# Patient Record
Sex: FEMALE | Race: BLACK OR AFRICAN AMERICAN | NOT HISPANIC OR LATINO | Employment: UNEMPLOYED | URBAN - METROPOLITAN AREA
[De-identification: names, ages, dates, MRNs, and addresses within clinical notes are randomized per-mention and may not be internally consistent; named-entity substitution may affect disease eponyms.]

---

## 2017-01-10 ENCOUNTER — TELEPHONE (OUTPATIENT)
Dept: INTERNAL MEDICINE | Facility: CLINIC | Age: 72
End: 2017-01-10

## 2017-01-10 RX ORDER — ALPRAZOLAM 0.5 MG/1
TABLET ORAL
Qty: 60 TABLET | Refills: 5 | Status: SHIPPED | OUTPATIENT
Start: 2017-01-10 | End: 2017-08-07 | Stop reason: SDUPTHER

## 2017-01-10 NOTE — TELEPHONE ENCOUNTER
----- Message from Veronica Johnson sent at 1/10/2017 10:51 AM CST -----  Contact: Patient  Patient is requesting a refill on [facility] pharmacy. Please call patient back if needed at [phone]. Thank you

## 2017-02-07 ENCOUNTER — OFFICE VISIT (OUTPATIENT)
Dept: INTERNAL MEDICINE | Facility: CLINIC | Age: 72
End: 2017-02-07
Payer: MEDICARE

## 2017-02-07 VITALS
TEMPERATURE: 97 F | DIASTOLIC BLOOD PRESSURE: 80 MMHG | HEART RATE: 84 BPM | SYSTOLIC BLOOD PRESSURE: 146 MMHG | BODY MASS INDEX: 29.9 KG/M2 | WEIGHT: 162.5 LBS | HEIGHT: 62 IN

## 2017-02-07 DIAGNOSIS — D56.0 ALPHA THALASSEMIA: Chronic | ICD-10-CM

## 2017-02-07 DIAGNOSIS — I70.0 ATHEROSCLEROSIS OF AORTA: Chronic | ICD-10-CM

## 2017-02-07 DIAGNOSIS — F41.9 CHRONIC ANXIETY: Primary | ICD-10-CM

## 2017-02-07 DIAGNOSIS — F32.A DEPRESSION, UNSPECIFIED DEPRESSION TYPE: Chronic | ICD-10-CM

## 2017-02-07 DIAGNOSIS — R63.4 LOSS OF WEIGHT: ICD-10-CM

## 2017-02-07 PROCEDURE — 99214 OFFICE O/P EST MOD 30 MIN: CPT | Mod: S$GLB,,, | Performed by: FAMILY MEDICINE

## 2017-02-07 PROCEDURE — 3079F DIAST BP 80-89 MM HG: CPT | Mod: S$GLB,,, | Performed by: FAMILY MEDICINE

## 2017-02-07 PROCEDURE — 1157F ADVNC CARE PLAN IN RCRD: CPT | Mod: S$GLB,,, | Performed by: FAMILY MEDICINE

## 2017-02-07 PROCEDURE — 3077F SYST BP >= 140 MM HG: CPT | Mod: S$GLB,,, | Performed by: FAMILY MEDICINE

## 2017-02-07 PROCEDURE — 1159F MED LIST DOCD IN RCRD: CPT | Mod: S$GLB,,, | Performed by: FAMILY MEDICINE

## 2017-02-07 PROCEDURE — 99999 PR PBB SHADOW E&M-EST. PATIENT-LVL II: CPT | Mod: PBBFAC,,, | Performed by: FAMILY MEDICINE

## 2017-02-07 PROCEDURE — 1160F RVW MEDS BY RX/DR IN RCRD: CPT | Mod: S$GLB,,, | Performed by: FAMILY MEDICINE

## 2017-02-07 PROCEDURE — 99499 UNLISTED E&M SERVICE: CPT | Mod: S$GLB,,, | Performed by: FAMILY MEDICINE

## 2017-02-07 NOTE — MR AVS SNAPSHOT
[place] [facility]  [address] [address] [address]  Phone: [phone]                  Tea Ring   2017 9:00 AM   Office Visit    Description:  Female : 1945   Provider:  Tom Fernandez MD   Department:  [place] [facility]           Diagnoses this Visit        Comments    Chronic anxiety    -  Primary     Depression, unspecified depression type         Loss of weight         Alpha thalassemia         Atherosclerosis of aorta                To Do List           Future Appointments        Provider Department Dept Phone    3/14/2017 9:20 AM Tom Fernandez MD [place] [facility] [phone]      Goals (5 Years of Data)     None      Follow-Up and Disposition     Return in about 4 weeks (around 3/7/2017).      [facility]     [facility] On Call Nurse Care Line -  Assistance  Registered nurses in the [facility] Call Center provide clinical advisement, health education, appointment booking, and other advisory services.  Call for this free service at [phone].             Medications           Message regarding Medications     Verify the changes and/or additions to your medication regime listed below are the same as discussed with your clinician today.  If any of these changes or additions are incorrect, please notify your healthcare provider.             Verify that the below list of medications is an accurate representation of the medications you are currently taking.  If none reported, the list may be blank. If incorrect, please contact your healthcare provider. Carry this list with you in case of emergency.           Current Medications     albuterol (PROVENTIL HFA) 90 mcg/actuation inhaler Inhale 2 puffs into the lungs every 6 (six) hours as needed for Wheezing.    ALBUTEROL INHL Inhale into the lungs.    alendronate (FOSAMAX) 70 MG tablet Take 1 tablet once weekly in the morning with a full glass of water on an empty stomach. Do not lie down  "for at least 30 minutes afterwards.    alprazolam (XANAX) 0.5 MG tablet TAKE ONE TABLET BY MOUTH TWICE A DAY AS NEEDED FOR FOR ANXIETY    amitriptyline (ELAVIL) 50 MG tablet TAKE 1 TABLET BY MOUTH AT BEDTIME.    betamethasone valerate 0.1% (VALISONE) 0.1 % Crea APPLY TOPICALLY TWICE DAILY.    blood pressure monitor (BLOOD PRESSURE KIT) Kit Record daily    diltiazem (TIAZAC) 360 MG CpSR Take 1 capsule (360 mg total) by mouth once daily.    hydroquinone 4 % Crea Apply topically 2 (two) times daily.    mirtazapine (REMERON) 15 MG tablet Take 1 tablet (15 mg total) by mouth every evening.    oxybutynin (DITROPAN) 5 MG Tab Take 1 tablet (5 mg total) by mouth 3 (three) times daily.    SUPREP BOWEL PREP KIT 17.5-3.13-1.6 gram SolR Use as directed           Clinical Reference Information           Your Vitals Were     BP Pulse Temp    146/80 (BP Location: Right arm, Patient Position: Sitting, BP Method: Manual) 84 97.2 °F (36.2 °C) (Tympanic)    Height Weight BMI    5' 2" (1.575 m) 73.7 kg (162 lb 7.7 oz) 29.72 kg/m2      Blood Pressure          Most Recent Value    BP  (!)  146/80      Allergies as of 2/7/2017     Duloxetine      Immunizations Administered on Date of Encounter - 2/7/2017     None      Language Assistance Services     ATTENTION: Language assistance services are available, free of charge. Please call [phone].      ATENCIÓN: Si alexei humphrey, tiene a sanchez disposición servicios gratuitos de asistencia lingüística. Llame al [phone].     [facility] Ý: N?u b?n nói Ti?ng Vi?t, có các d?ch v? h? tr? ngôn ng? mi?n phí dành cho b?n. G?i s? [phone].         [facility] complies with applicable Federal civil rights laws and does not discriminate on the basis of race, color, national origin, age, disability, or sex.        "

## 2017-02-07 NOTE — PROGRESS NOTES
Subjective:       Patient ID: Tea Ring is a 71 y.o. female.    Chief Complaint: Multiple issues see below    HPI chronic anxiety/depression: she self escalated to tid xnax. D/wd avoiding. this  attrib intermitt wt loss to anxiety  Htn: d/wd taking qhs aleve for knee. Recent injxn; d/wd tyl instead of aleve  Atherosclerosis of aorta; stable x htn  Alpha thalassemia asympt    Past Medical History   Diagnosis Date    Alpha thalassemia     Asthma     Chronic anxiety      years tid xanax 1mg    Chronic knee pain     Chronic pain of left ankle     Clotting disorder     Cutaneous lupus erythematosus      dr henry derm    Depression     Depression      dr radha hughes previously    Ex-smoker      quit fall 1    Hypertension     Osteopenia  rec     Past Surgical History   Procedure Laterality Date    Knee arthroscopy       both knees twice    Back surgery      Ankle surgery Left      History reviewed. No pertinent family history.  Social History     Social History    Marital status:      Spouse name:     Number of children: 3    Years of education: N/A     Social History Main Topics    Smoking status: Former Smoker     Packs/day: 1.00     Years: 30.00     Types: Cigarettes    Smokeless tobacco: None      Comment: smoke last cigarette 9/15    Alcohol use Yes    Drug use: No    Sexual activity: No     Other Topics Concern    None     Social History Narrative       Review of Systems  Cardiovascular: no chest pain  Chest: no shortness of breath  Abd: no abd pain  Remainder review of systems negative    Objective:      Physical Exam  cvrrr    Assessment:       1. Chronic anxiety    2. Depression, unspecified depression type    3. Loss of weight    4. Alpha thalassemia    5. Atherosclerosis of aorta        Plan:       **no intervnt needed for Alpha thalassemia, ather aorta x nl bp  F/u one month htn, wt, mood  dont inc xnax beyond bid*    Defers counsling but considering speaking w    If wt loss cont then lab etc

## 2017-02-11 ENCOUNTER — HOSPITAL ENCOUNTER (EMERGENCY)
Facility: HOSPITAL | Age: 72
Discharge: HOME | End: 2017-02-11
Attending: EMERGENCY MEDICINE
Payer: MEDICARE

## 2017-02-11 VITALS
BODY MASS INDEX: 29.81 KG/M2 | TEMPERATURE: 99 F | OXYGEN SATURATION: 97 % | HEIGHT: 62 IN | HEART RATE: 75 BPM | SYSTOLIC BLOOD PRESSURE: 171 MMHG | DIASTOLIC BLOOD PRESSURE: 93 MMHG | WEIGHT: 162 LBS | RESPIRATION RATE: 18 BRPM

## 2017-02-11 DIAGNOSIS — R07.89 ANTERIOR CHEST WALL PAIN: Primary | ICD-10-CM

## 2017-02-11 DIAGNOSIS — L93.2 CUTANEOUS LUPUS ERYTHEMATOSUS: ICD-10-CM
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
## 2017-02-11 PROCEDURE — 25000003 PHARM REV CODE 250: Performed by: EMERGENCY MEDICINE

## 2017-02-11 PROCEDURE — 99283 EMERGENCY DEPT VISIT LOW MDM: CPT

## 2017-02-11 RX ORDER — HYDROCODONE BITARTRATE AND ACETAMINOPHEN 5; 325 MG/1; MG/1
1 TABLET ORAL EVERY 6 HOURS PRN
Qty: 15 TABLET | Refills: 0 | Status: SHIPPED | OUTPATIENT
Start: 2017-02-11 | End: 2017-12-07

## 2017-02-11 RX ORDER — HYDROCODONE BITARTRATE AND ACETAMINOPHEN 5; 325 MG/1; MG/1
1 TABLET ORAL
Status: DISCONTINUED | OUTPATIENT
Start: 2017-02-11 | End: 2017-02-11 | Stop reason: HOSPADM

## 2017-02-11 RX ORDER — NAPROXEN 500 MG/1
500 TABLET ORAL
Status: DISCONTINUED | OUTPATIENT
Start: 2017-02-11 | End: 2017-02-11
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
## 2017-02-11 NOTE — ED NOTES
Pt stable, in NAD, and states no further needs at this time. MD aware of vitals ok to d/c. Pt to be d/c'd home.
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
## 2017-02-28 ENCOUNTER — PATIENT OUTREACH (OUTPATIENT)
Dept: ADMINISTRATIVE | Facility: HOSPITAL | Age: 72
End: 2017-02-28

## 2017-03-21 ENCOUNTER — OFFICE VISIT (OUTPATIENT)
Dept: INTERNAL MEDICINE | Facility: CLINIC | Age: 72
End: 2017-03-21
Payer: MEDICARE

## 2017-03-21 VITALS
DIASTOLIC BLOOD PRESSURE: 88 MMHG | HEIGHT: 62 IN | WEIGHT: 166.25 LBS | OXYGEN SATURATION: 96 % | BODY MASS INDEX: 30.59 KG/M2 | TEMPERATURE: 97 F | HEART RATE: 96 BPM | SYSTOLIC BLOOD PRESSURE: 130 MMHG

## 2017-03-21 DIAGNOSIS — I10 ESSENTIAL HYPERTENSION: Chronic | ICD-10-CM

## 2017-03-21 DIAGNOSIS — Z23 NEED FOR VACCINATION FOR PNEUMOCOCCUS: ICD-10-CM

## 2017-03-21 DIAGNOSIS — F41.9 CHRONIC ANXIETY: Primary | ICD-10-CM

## 2017-03-21 PROCEDURE — 3079F DIAST BP 80-89 MM HG: CPT | Mod: S$GLB,,, | Performed by: FAMILY MEDICINE

## 2017-03-21 PROCEDURE — 90732 PPSV23 VACC 2 YRS+ SUBQ/IM: CPT | Mod: S$GLB,,, | Performed by: FAMILY MEDICINE

## 2017-03-21 PROCEDURE — 1157F ADVNC CARE PLAN IN RCRD: CPT | Mod: S$GLB,,, | Performed by: FAMILY MEDICINE

## 2017-03-21 PROCEDURE — 1159F MED LIST DOCD IN RCRD: CPT | Mod: S$GLB,,, | Performed by: FAMILY MEDICINE

## 2017-03-21 PROCEDURE — 99999 PR PBB SHADOW E&M-EST. PATIENT-LVL III: CPT | Mod: PBBFAC,,, | Performed by: FAMILY MEDICINE

## 2017-03-21 PROCEDURE — G0009 ADMIN PNEUMOCOCCAL VACCINE: HCPCS | Mod: S$GLB,,, | Performed by: FAMILY MEDICINE

## 2017-03-21 PROCEDURE — 99213 OFFICE O/P EST LOW 20 MIN: CPT | Mod: S$GLB,,, | Performed by: FAMILY MEDICINE

## 2017-03-21 PROCEDURE — 99499 UNLISTED E&M SERVICE: CPT | Mod: S$GLB,,, | Performed by: FAMILY MEDICINE

## 2017-03-21 PROCEDURE — 3075F SYST BP GE 130 - 139MM HG: CPT | Mod: S$GLB,,, | Performed by: FAMILY MEDICINE

## 2017-03-21 PROCEDURE — 1160F RVW MEDS BY RX/DR IN RCRD: CPT | Mod: S$GLB,,, | Performed by: FAMILY MEDICINE

## 2017-03-21 NOTE — MR AVS SNAPSHOT
[facility]  [address] [address] [address]  Phone: [phone]                  Tea Ring   3/21/2017 2:00 PM   Office Visit    Description:  Female : 1945   Provider:  Tom Fernandez MD   Department:  [facility]           Diagnoses this Visit        Comments    Chronic anxiety    -  Primary     Essential hypertension                To Do List           Future Appointments        Provider Department Dept Phone    2017 9:30 AM [facility] [place] LABORATORY [facility] [phone]      Goals (5 Years of Data)     None      [facility]     [facility] Care [facility] -  Assistance  Registered nurses in the [facility] provide clinical advisement, health education, appointment booking, and other advisory services.  Call for this free service at [phone].             Medications           Message regarding Medications     Verify the changes and/or additions to your medication regime listed below are the same as discussed with your clinician today.  If any of these changes or additions are incorrect, please notify your healthcare provider.             Verify that the below list of medications is an accurate representation of the medications you are currently taking.  If none reported, the list may be blank. If incorrect, please contact your healthcare provider. Carry this list with you in case of emergency.           Current Medications     albuterol (PROVENTIL HFA) 90 mcg/actuation inhaler Inhale 2 puffs into the lungs every 6 (six) hours as needed for Wheezing.    ALBUTEROL INHL Inhale into the lungs.    alendronate (FOSAMAX) 70 MG tablet Take 1 tablet once weekly in the morning with a full glass of water on an empty stomach. Do not lie down for at least 30 minutes afterwards.    alprazolam (XANAX) 0.5 MG tablet TAKE ONE TABLET BY MOUTH TWICE A DAY AS NEEDED FOR FOR ANXIETY    amitriptyline (ELAVIL) 50 MG tablet TAKE  "1 TABLET BY MOUTH AT BEDTIME.    betamethasone valerate 0.1% (VALISONE) 0.1 % Crea APPLY TOPICALLY TWICE DAILY.    blood pressure monitor (BLOOD PRESSURE KIT) Kit Record daily    diltiazem (TIAZAC) 360 MG CpSR Take 1 capsule (360 mg total) by mouth once daily.    hydrocodone-acetaminophen 5-325mg (NORCO) 5-325 mg per tablet Take 1 tablet by mouth every 6 (six) hours as needed for Pain (take at night as needed to help you rest).    hydroquinone 4 % Crea Apply topically 2 (two) times daily.    mirtazapine (REMERON) 15 MG tablet Take 1 tablet (15 mg total) by mouth every evening.    oxybutynin (DITROPAN) 5 MG Tab Take 1 tablet (5 mg total) by mouth 3 (three) times daily.    SUPREP BOWEL PREP KIT 17.5-3.13-1.6 gram SolR Use as directed           Clinical Reference Information           Your Vitals Were     BP Pulse Temp Height    130/88 (BP Location: Left arm, Patient Position: Sitting, BP Method: Manual) 96 97.4 °F (36.3 °C) (Tympanic) 5' 2" (1.575 m)    Weight SpO2 BMI    75.4 kg (166 lb 3.6 oz) 96% 30.4 kg/m2      Blood Pressure          Most Recent Value    BP  130/88      Allergies as of 3/21/2017     Duloxetine      Immunizations Administered on Date of Encounter - 3/21/2017     Name Date Dose VIS Date Route    Pneumococcal Conjugate - 13 Valent  Incomplete 0.5 mL 11/5/2015 Intramuscular      Orders Placed During Today's Visit      Normal Orders This Visit    Pneumococcal Conjugate Vaccine (13 Valent) (IM)     Future Labs/Procedures Expected by Expires    Basic metabolic panel  9/17/2017 3/21/2018    Lipid panel  9/17/2017 3/21/2018      Language Assistance Services     ATTENTION: Language assistance services are available, free of charge. Please call [phone].      ATENCIÓN: Si habla kam, tiene a sanchez disposición servicios gratuitos de asistencia lingüística. Llame al [phone].     CHÚ Ý: N?u b?n nói Ti?ng Vi?t, có các d?ch v? h? tr? ngôn ng? mi?n phí dành cho b?n. G?i s? [phone].         " [facility] complies with applicable Federal civil rights laws and does not discriminate on the basis of race, color, national origin, age, disability, or sex.

## 2017-03-21 NOTE — PROGRESS NOTES
Subjective:       Patient ID: Tea Ring is a. female.    Chief Complaint: Multiple issues see below    HPI chronic anxiety/depression: much better. Mostly good days. She tried to dec xanax from bid but unable  attrib intermitt wt loss to anxiety; this has improved  Htn: controlled  Atherosclerosis of aorta; stable x htn  Alpha thalassemia asympt    Past Medical History   Diagnosis Date    Alpha thalassemia     Asthma     Chronic anxiety      years tid xanax 1mg    Chronic knee pain     Chronic pain of left ankle     Clotting disorder     Cutaneous lupus erythematosus      dr henry derm    Depression     Depression      dr radha hughes previously    Ex-smoker      quit fall 1    Hypertension     Osteopenia  rec     Past Surgical History   Procedure Laterality Date    Knee arthroscopy       both knees twice    Back surgery      Ankle surgery Left      History reviewed. No pertinent family history.  Social History     Social History    Marital status:      Spouse name:     Number of children: 3    Years of education: N/A     Social History Main Topics    Smoking status: Former Smoker     Packs/day: 1.00     Years: 30.00     Types: Cigarettes    Smokeless tobacco: None      Comment: smoke last cigarette 9/15    Alcohol use Yes    Drug use: No    Sexual activity: No     Other Topics Concern    None     Social History Narrative       Review of Systems  Cardiovascular: no chest pain  Chest: no shortness of breath  Abd: no abd pain  Remainder review of systems negative    Objective:          Assessment:       1. Chronic anxiety    2. Depression, unspecified depression type    3. Loss of weight stable   4. Alpha thalassemia    5. Atherosclerosis of aorta        Plan:     cscope sched soon  **no intervnt needed for Alpha thalassemia, ather aorta x nl bp     Annual lab due sept and plan f/u there  pvax  Chronic anxiety    Essential hypertension  -     Lipid panel; Future;  Expected date: 9/17/17  -     Basic metabolic panel; Future; Expected date: 9/17/17  -     Pneumococcal Polysaccharide Vaccine (23 Valent) (SQ/IM)    Need for vaccination for pneumococcus  -     Pneumococcal Polysaccharide Vaccine (23 Valent) (SQ/IM)    Other orders  -     Cancel: Pneumococcal Conjugate Vaccine (13 Valent) (IM)

## 2017-04-03 DIAGNOSIS — I10 ESSENTIAL HYPERTENSION: ICD-10-CM

## 2017-04-03 RX ORDER — DILTIAZEM HYDROCHLORIDE 360 MG/1
CAPSULE, EXTENDED RELEASE ORAL
Qty: 30 CAPSULE | Status: CANCELLED | OUTPATIENT
Start: 2017-04-03

## 2017-04-05 DIAGNOSIS — I10 ESSENTIAL HYPERTENSION: ICD-10-CM

## 2017-04-05 RX ORDER — DILTIAZEM HYDROCHLORIDE 360 MG/1
CAPSULE, EXTENDED RELEASE ORAL
Qty: 30 CAPSULE | Refills: 11 | Status: SHIPPED | OUTPATIENT
Start: 2017-04-05 | End: 2017-08-17 | Stop reason: SDUPTHER

## 2017-04-05 RX ORDER — DILTIAZEM HYDROCHLORIDE 360 MG/1
360 CAPSULE, EXTENDED RELEASE ORAL DAILY
Qty: 30 CAPSULE | Refills: 5 | OUTPATIENT
Start: 2017-04-05

## 2017-04-05 NOTE — TELEPHONE ENCOUNTER
----- Message from Michelle Mora sent at 4/5/2017  2:46 PM CDT -----  [facility] at [phone]//regarding a refill requested for b/p medicine/we already loan her some,please call us///jorge luis ht

## 2017-05-08 DIAGNOSIS — F32.A DEPRESSION: ICD-10-CM

## 2017-05-08 DIAGNOSIS — F41.9 CHRONIC ANXIETY: ICD-10-CM

## 2017-05-08 RX ORDER — VENLAFAXINE HYDROCHLORIDE 75 MG/1
CAPSULE, EXTENDED RELEASE ORAL
Qty: 30 CAPSULE | Refills: 5 | Status: SHIPPED | OUTPATIENT
Start: 2017-05-08 | End: 2017-12-07

## 2017-05-11 RX ORDER — BETAMETHASONE VALERATE 1.2 MG/G
CREAM TOPICAL
Qty: 45 G | Refills: 5 | Status: SHIPPED | OUTPATIENT
Start: 2017-05-11 | End: 2017-12-07

## 2017-06-01 RX ORDER — AMITRIPTYLINE HYDROCHLORIDE 50 MG/1
TABLET, FILM COATED ORAL
Qty: 30 TABLET | Refills: 1 | Status: SHIPPED | OUTPATIENT
Start: 2017-06-01 | End: 2017-07-07 | Stop reason: SDUPTHER

## 2017-06-22 ENCOUNTER — TELEPHONE (OUTPATIENT)
Dept: INTERNAL MEDICINE | Facility: CLINIC | Age: 72
End: 2017-06-22

## 2017-06-22 DIAGNOSIS — R13.10 DYSPHAGIA, UNSPECIFIED TYPE: Primary | ICD-10-CM

## 2017-06-22 NOTE — TELEPHONE ENCOUNTER
----- Message from Nabeel Hernandez sent at 6/22/2017 12:19 PM CDT -----  Contact: Pt   Pt is calling nurse staff regarding a referral to see a gastro Doctor. Pt call back [phone] thanks

## 2017-06-22 NOTE — TELEPHONE ENCOUNTER
Pt req referral for gastro. Pt states she discussed at last visit problems with swallowing very cold or hot liquids/food.

## 2017-07-10 ENCOUNTER — PATIENT OUTREACH (OUTPATIENT)
Dept: ADMINISTRATIVE | Facility: HOSPITAL | Age: 72
End: 2017-07-10

## 2017-07-10 NOTE — LETTER
July 10, 2017    Tea Ring  [address] [address] [place] [address]             [facility]  [address]  [facility] [address]  Phone: [phone] Dear Mrs. Ring:    [facility] is committed to your overall health.  To help you get the most out of each of your visits, we will review your information to make sure you are up to date on all of your recommended tests and/or procedures.      Angelika Oden NP has found that you may be due for:  Health Maintenance Due   Topic    Colonoscopy     Zoster Vaccine      If you have had any of the above done at another facility, please bring the records or information with you so that your record at [facility] will be complete.  If you are currently taking medication, please bring it with you to your appointment for review.    We will be happy to assist you with scheduling any necessary appointments or you may contact the [facility] appointment desk at [phone] to schedule at your convenience.     Thank you for choosing [facility] for your healthcare needs,  If you have any questions or concerns, please don't hesitate to call.    Sincerely,    Neli PRADO LPN  Care Coordination Department  [facility]  [phone]

## 2017-07-12 ENCOUNTER — INITIAL CONSULT (OUTPATIENT)
Dept: GASTROENTEROLOGY | Facility: CLINIC | Age: 72
End: 2017-07-12
Payer: MEDICARE

## 2017-07-12 ENCOUNTER — LAB VISIT (OUTPATIENT)
Dept: LAB | Facility: HOSPITAL | Age: 72
End: 2017-07-12
Attending: NURSE PRACTITIONER
Payer: MEDICARE

## 2017-07-12 VITALS
DIASTOLIC BLOOD PRESSURE: 80 MMHG | HEIGHT: 62 IN | HEART RATE: 84 BPM | BODY MASS INDEX: 31.72 KG/M2 | SYSTOLIC BLOOD PRESSURE: 130 MMHG | WEIGHT: 172.38 LBS

## 2017-07-12 DIAGNOSIS — R49.0 HOARSENESS: ICD-10-CM

## 2017-07-12 DIAGNOSIS — R13.10 DYSPHAGIA, UNSPECIFIED TYPE: ICD-10-CM

## 2017-07-12 DIAGNOSIS — R13.10 DYSPHAGIA, UNSPECIFIED TYPE: Primary | ICD-10-CM

## 2017-07-12 DIAGNOSIS — Z12.11 COLON CANCER SCREENING: ICD-10-CM

## 2017-07-12 LAB
ALBUMIN SERPL BCP-MCNC: 3.3 G/DL
ALP SERPL-CCNC: 103 U/L
ALT SERPL W/O P-5'-P-CCNC: 7 U/L
ANION GAP SERPL CALC-SCNC: 10 MMOL/L
AST SERPL-CCNC: 12 U/L
BASOPHILS # BLD AUTO: 0.03 K/UL
BASOPHILS NFR BLD: 0.3 %
BILIRUB SERPL-MCNC: 0.3 MG/DL
BUN SERPL-MCNC: 19 MG/DL
CALCIUM SERPL-MCNC: 9.5 MG/DL
CHLORIDE SERPL-SCNC: 106 MMOL/L
CO2 SERPL-SCNC: 27 MMOL/L
CREAT SERPL-MCNC: 1 MG/DL
DIFFERENTIAL METHOD: ABNORMAL
EOSINOPHIL # BLD AUTO: 0.2 K/UL
EOSINOPHIL NFR BLD: 1.4 %
ERYTHROCYTE [DISTWIDTH] IN BLOOD BY AUTOMATED COUNT: 17.3 %
EST. GFR  (AFRICAN AMERICAN): >60 ML/MIN/1.73 M^2
EST. GFR  (NON AFRICAN AMERICAN): 57.2 ML/MIN/1.73 M^2
GLUCOSE SERPL-MCNC: 81 MG/DL
HCT VFR BLD AUTO: 37.8 %
HGB BLD-MCNC: 11.8 G/DL
LYMPHOCYTES # BLD AUTO: 2.5 K/UL
LYMPHOCYTES NFR BLD: 23.6 %
MCH RBC QN AUTO: 21.8 PG
MCHC RBC AUTO-ENTMCNC: 31.2 %
MCV RBC AUTO: 70 FL
MONOCYTES # BLD AUTO: 0.5 K/UL
MONOCYTES NFR BLD: 4.8 %
NEUTROPHILS # BLD AUTO: 7.5 K/UL
NEUTROPHILS NFR BLD: 69.7 %
PLATELET # BLD AUTO: 443 K/UL
PMV BLD AUTO: 9.5 FL
POTASSIUM SERPL-SCNC: 3.7 MMOL/L
PROT SERPL-MCNC: 7.9 G/DL
RBC # BLD AUTO: 5.41 M/UL
SODIUM SERPL-SCNC: 143 MMOL/L
WBC # BLD AUTO: 10.68 K/UL

## 2017-07-12 PROCEDURE — 36415 COLL VENOUS BLD VENIPUNCTURE: CPT | Mod: PO

## 2017-07-12 PROCEDURE — 99204 OFFICE O/P NEW MOD 45 MIN: CPT | Mod: S$GLB,,, | Performed by: NURSE PRACTITIONER

## 2017-07-12 PROCEDURE — 80053 COMPREHEN METABOLIC PANEL: CPT

## 2017-07-12 PROCEDURE — 1125F AMNT PAIN NOTED PAIN PRSNT: CPT | Mod: S$GLB,,, | Performed by: NURSE PRACTITIONER

## 2017-07-12 PROCEDURE — 1159F MED LIST DOCD IN RCRD: CPT | Mod: S$GLB,,, | Performed by: NURSE PRACTITIONER

## 2017-07-12 PROCEDURE — 99999 PR PBB SHADOW E&M-EST. PATIENT-LVL III: CPT | Mod: PBBFAC,,, | Performed by: NURSE PRACTITIONER

## 2017-07-12 PROCEDURE — 85025 COMPLETE CBC W/AUTO DIFF WBC: CPT

## 2017-07-12 RX ORDER — AMITRIPTYLINE HYDROCHLORIDE 50 MG/1
TABLET, FILM COATED ORAL
Qty: 30 TABLET | Refills: 5 | Status: SHIPPED | OUTPATIENT
Start: 2017-07-12 | End: 2017-12-07 | Stop reason: SDUPTHER

## 2017-07-12 NOTE — PROGRESS NOTES
Clinic Consult:  [facility] Consultation Note    Reason for Consult:  The primary encounter diagnosis was Dysphagia, unspecified type. Diagnoses of Hoarseness and Colon cancer screening were also pertinent to this visit.    PCP: Tom Fernandez   [address] [place] [address]    HPI:  This is a 71 y.o. female here for evaluation of the above  She reports that over the last 6-8 weeks, she has had progressive worsening of difficulty swallowing.  She reports that difficulty is to solid foods only, no issues with liquids.  She has been having to chop her meats very fine and yet still has difficulty.  In addition, she has also had a worsening of hoarseness for unexplained reasons.   She denies any abdominal pain.  No nausea or vomiting.  No change in bowel pattern.  No melena or hematochezia.   She has not previous colonoscopy for screening.   PMH includes smoking.  Has not smoked since 2013    Review of Systems   Constitutional: Negative for chills, fever, malaise/fatigue and weight loss.   Respiratory: Negative for cough.    Cardiovascular: Negative for chest pain.   Gastrointestinal:        Per HPI   Musculoskeletal: Negative for myalgias.   Skin: Negative for itching and rash.   Neurological: Negative for headaches.   Psychiatric/Behavioral: The patient is not nervous/anxious.        Medical History:   Past Medical History:   Diagnosis Date    Alpha thalassemia     Asthma     Chronic anxiety     years tid xanax 1mg    Chronic knee pain     Chronic pain of left ankle     Clotting disorder     Cutaneous lupus erythematosus     dr henry derm    Depression     Depression     dr radha hughes previously    Ex-smoker     quit fall 1    Hypertension     Osteopenia 1/16 reck1/18       Surgical History:  Past Surgical History:   Procedure Laterality Date    ANKLE SURGERY Left     BACK SURGERY      KNEE ARTHROSCOPY      both knees twice       Family History:   History reviewed. No  pertinent family history.    Social History:   Social History   Substance Use Topics    Smoking status: Former Smoker     Packs/day: 1.00     Years: 30.00     Types: Cigarettes    Smokeless tobacco: Never Used      Comment: smoke last cigarette 9/15    Alcohol use Yes       Allergies: Reviewed    Home Medications:   Current Outpatient Prescriptions on File Prior to Visit   Medication Sig Dispense Refill    albuterol (PROVENTIL HFA) 90 mcg/actuation inhaler Inhale 2 puffs into the lungs every 6 (six) hours as needed for Wheezing.      ALBUTEROL INHL Inhale into the lungs.      alendronate (FOSAMAX) 70 MG tablet Take 1 tablet once weekly in the morning with a full glass of water on an empty stomach. Do not lie down for at least 30 minutes afterwards. 4 tablet 11    alprazolam (XANAX) 0.5 MG tablet TAKE ONE TABLET BY MOUTH TWICE A DAY AS NEEDED FOR FOR ANXIETY 60 tablet 5    amitriptyline (ELAVIL) 50 MG tablet TAKE 1 TABLET BY MOUTH AT BEDTIME. 30 tablet 1    diltiaZEM (TIAZAC) 360 MG CpSR TAKE 1 CAPSULE BY MOUTH DAILY 30 capsule 11    mirtazapine (REMERON) 15 MG tablet Take 1 tablet (15 mg total) by mouth every evening. 30 tablet 11    oxybutynin (DITROPAN) 5 MG Tab Take 1 tablet (5 mg total) by mouth 3 (three) times daily. 90 tablet 2    venlafaxine (EFFEXOR-XR) 75 MG 24 hr capsule TAKE ONE CAPSULE BY MOUTH DAILY 30 capsule 5    betamethasone valerate 0.1% (VALISONE) 0.1 % Crea APPLY TOPICALLY TWICE DAILY. 45 g 5    blood pressure monitor (BLOOD PRESSURE KIT) Kit Record daily 1 each 0    hydrocodone-acetaminophen 5-325mg (NORCO) 5-325 mg per tablet Take 1 tablet by mouth every 6 (six) hours as needed for Pain (take at night as needed to help you rest). 15 tablet 0    hydroquinone 4 % Crea Apply topically 2 (two) times daily. 46 g 1    SUPREP BOWEL PREP KIT 17.5-3.13-1.6 gram SolR Use as directed 354 mL 0     No current facility-administered medications on file prior to visit.        Physical  "Exam:  Vital Signs:  /80   Pulse 84   Ht 5' 2" (1.575 m)   Wt 78.2 kg (172 lb 6.4 oz)   BMI 31.53 kg/m²   Body mass index is 31.53 kg/m².  Physical Exam   Constitutional: She is oriented to person, place, and time. She appears well-developed and well-nourished.   HENT:   Head: Normocephalic.   Eyes: No scleral icterus.   Neck: Normal range of motion.   Cardiovascular: Normal rate and regular rhythm.    Pulmonary/Chest: Effort normal and breath sounds normal.   Abdominal: Soft. Bowel sounds are normal. She exhibits no distension. There is no tenderness.   Musculoskeletal: Normal range of motion.   Neurological: She is alert and oriented to person, place, and time.   Skin: Skin is warm and dry.   Psychiatric: She has a normal mood and affect.   Vitals reviewed.      Labs: No recent labs.  WIll update      Assessment:  1. Dysphagia, unspecified type    2. Hoarseness    3. Colon cancer screening         Recommendations:  Dysphagia, unspecified type  Hoarseness  - Esophageal stricture vs GERD vs other etiologies  - Will plan for EGD  -     Case request GI: ESOPHAGOGASTRODUODENOSCOPY (EGD), COLONOSCOPY  -     CBC auto differential; Future; Expected date: 07/12/2017  -     Comprehensive metabolic panel; Future; Expected date: 07/12/2017          Colon cancer screening  -     Case request GI: ESOPHAGOGASTRODUODENOSCOPY (EGD), COLONOSCOPY  -     CBC auto differential; Future; Expected date: 07/12/2017  -     Comprehensive metabolic panel; Future; Expected date: 07/12/2017        Follow up to be determined by results of procedure.        Thank you so much for allowing me to participate in the care of PETER Srivastava  "

## 2017-07-12 NOTE — LETTER
July 12, 2017      Tom Fernandez MD  [address] [place] [address]           [facility]  [address]  [place] [address]  Phone: [phone]  Fax: [phone]          Patient: Tea Ring   MR Number: [id number]   YOB: 1945   Date of Visit: 7/12/2017       Dear Dr. Tom Fernandez:    Thank you for referring Tea Ring to me for evaluation. Attached you will find relevant portions of my assessment and plan of care.    If you have questions, please do not hesitate to call me. I look forward to following Tea Ring along with you.    Sincerely,    Tanesha Lim, [facility]    Enclosure  CC:  No Recipients    If you would like to receive this communication electronically, please contact [email or web address] or [phone] to request more information on [facility] Link access.    For providers and/or their staff who would like to refer a patient to [facility], please contact us through our one-stop-shop provider referral line, [facility], at [phone].    If you feel you have received this communication in error or would no longer like to receive these types of communications, please e-mail [email or web address]

## 2017-08-02 ENCOUNTER — ANESTHESIA (OUTPATIENT)
Dept: ENDOSCOPY | Facility: HOSPITAL | Age: 72
End: 2017-08-02
Payer: MEDICARE

## 2017-08-02 ENCOUNTER — SURGERY (OUTPATIENT)
Age: 72
End: 2017-08-02

## 2017-08-02 ENCOUNTER — HOSPITAL ENCOUNTER (OUTPATIENT)
Facility: HOSPITAL | Age: 72
Discharge: HOME | End: 2017-08-02
Attending: INTERNAL MEDICINE | Admitting: INTERNAL MEDICINE
Payer: MEDICARE

## 2017-08-02 ENCOUNTER — ANESTHESIA EVENT (OUTPATIENT)
Dept: ENDOSCOPY | Facility: HOSPITAL | Age: 72
End: 2017-08-02
Payer: MEDICARE

## 2017-08-02 VITALS — RESPIRATION RATE: 15 BRPM

## 2017-08-02 VITALS
HEIGHT: 62 IN | BODY MASS INDEX: 31.28 KG/M2 | OXYGEN SATURATION: 97 % | DIASTOLIC BLOOD PRESSURE: 77 MMHG | RESPIRATION RATE: 18 BRPM | SYSTOLIC BLOOD PRESSURE: 132 MMHG | WEIGHT: 170 LBS | TEMPERATURE: 99 F | HEART RATE: 70 BPM

## 2017-08-02 DIAGNOSIS — R13.10 DYSPHAGIA: ICD-10-CM

## 2017-08-02 PROCEDURE — 27201012 HC FORCEPS, HOT/COLD, DISP: Performed by: INTERNAL MEDICINE

## 2017-08-02 PROCEDURE — 25000003 PHARM REV CODE 250: Performed by: NURSE ANESTHETIST, CERTIFIED REGISTERED

## 2017-08-02 PROCEDURE — 25000003 PHARM REV CODE 250: Performed by: INTERNAL MEDICINE

## 2017-08-02 PROCEDURE — 37000009 HC ANESTHESIA EA ADD 15 MINS: Performed by: INTERNAL MEDICINE

## 2017-08-02 PROCEDURE — 63600175 PHARM REV CODE 636 W HCPCS: Performed by: NURSE ANESTHETIST, CERTIFIED REGISTERED

## 2017-08-02 PROCEDURE — G0121 COLON CA SCRN NOT HI RSK IND: HCPCS | Performed by: INTERNAL MEDICINE

## 2017-08-02 PROCEDURE — 43239 EGD BIOPSY SINGLE/MULTIPLE: CPT | Performed by: INTERNAL MEDICINE

## 2017-08-02 PROCEDURE — G0121 COLON CA SCRN NOT HI RSK IND: HCPCS | Mod: ,,, | Performed by: INTERNAL MEDICINE

## 2017-08-02 PROCEDURE — 88305 TISSUE EXAM BY PATHOLOGIST: CPT | Performed by: PATHOLOGY

## 2017-08-02 PROCEDURE — 88342 IMHCHEM/IMCYTCHM 1ST ANTB: CPT | Mod: 26,,, | Performed by: PATHOLOGY

## 2017-08-02 PROCEDURE — 43239 EGD BIOPSY SINGLE/MULTIPLE: CPT | Mod: 51,,, | Performed by: INTERNAL MEDICINE

## 2017-08-02 PROCEDURE — 37000008 HC ANESTHESIA 1ST 15 MINUTES: Performed by: INTERNAL MEDICINE

## 2017-08-02 PROCEDURE — 88305 TISSUE EXAM BY PATHOLOGIST: CPT | Mod: 26,,, | Performed by: PATHOLOGY

## 2017-08-02 RX ORDER — SODIUM CHLORIDE, SODIUM LACTATE, POTASSIUM CHLORIDE, CALCIUM CHLORIDE 600; 310; 30; 20 MG/100ML; MG/100ML; MG/100ML; MG/100ML
INJECTION, SOLUTION INTRAVENOUS CONTINUOUS
Status: DISCONTINUED | OUTPATIENT
Start: 2017-08-02 | End: 2017-08-02 | Stop reason: HOSPADM

## 2017-08-02 RX ORDER — SODIUM CHLORIDE, SODIUM LACTATE, POTASSIUM CHLORIDE, CALCIUM CHLORIDE 600; 310; 30; 20 MG/100ML; MG/100ML; MG/100ML; MG/100ML
INJECTION, SOLUTION INTRAVENOUS CONTINUOUS PRN
Status: DISCONTINUED | OUTPATIENT
Start: 2017-08-02 | End: 2017-08-02

## 2017-08-02 RX ORDER — FLUCONAZOLE 200 MG/1
200 TABLET ORAL DAILY
Qty: 21 TABLET | Refills: 0 | Status: SHIPPED | OUTPATIENT
Start: 2017-08-02 | End: 2017-08-23

## 2017-08-02 RX ORDER — PANTOPRAZOLE SODIUM 20 MG/1
20 TABLET, DELAYED RELEASE ORAL DAILY
Qty: 30 TABLET | Refills: 11 | Status: SHIPPED | OUTPATIENT
Start: 2017-08-02 | End: 2017-10-26 | Stop reason: SDUPTHER

## 2017-08-02 RX ORDER — LIDOCAINE HCL/PF 100 MG/5ML
SYRINGE (ML) INTRAVENOUS
Status: DISCONTINUED | OUTPATIENT
Start: 2017-08-02 | End: 2017-08-02

## 2017-08-02 RX ORDER — PROPOFOL 10 MG/ML
INJECTION, EMULSION INTRAVENOUS
Status: DISCONTINUED | OUTPATIENT
Start: 2017-08-02 | End: 2017-08-02

## 2017-08-02 RX ADMIN — PROPOFOL 30 MG: 10 INJECTION, EMULSION INTRAVENOUS at 11:08

## 2017-08-02 RX ADMIN — SODIUM CHLORIDE, SODIUM LACTATE, POTASSIUM CHLORIDE, AND CALCIUM CHLORIDE: 600; 310; 30; 20 INJECTION, SOLUTION INTRAVENOUS at 10:08

## 2017-08-02 RX ADMIN — LIDOCAINE HYDROCHLORIDE 100 MG: 20 INJECTION, SOLUTION INTRAVENOUS at 11:08

## 2017-08-02 RX ADMIN — SODIUM CHLORIDE, SODIUM LACTATE, POTASSIUM CHLORIDE, AND CALCIUM CHLORIDE: .6; .31; .03; .02 INJECTION, SOLUTION INTRAVENOUS at 10:08

## 2017-08-02 RX ADMIN — PROPOFOL 130 MG: 10 INJECTION, EMULSION INTRAVENOUS at 11:08

## 2017-08-02 NOTE — ANESTHESIA POSTPROCEDURE EVALUATION
"Anesthesia Post Evaluation    Patient: Tea Ring    Procedure(s) Performed: Procedure(s) (LRB):  ESOPHAGOGASTRODUODENOSCOPY (EGD) (N/A)  COLONOSCOPY (N/A)    Final Anesthesia Type: MAC  Patient location during evaluation: PACU  Patient participation: Yes- Able to Participate  Level of consciousness: awake and alert and oriented  Post-procedure vital signs: reviewed and stable  Pain management: adequate  Airway patency: patent  PONV status at discharge: No PONV  Anesthetic complications: no      Cardiovascular status: blood pressure returned to baseline  Respiratory status: unassisted, room air and spontaneous ventilation  Hydration status: euvolemic  Follow-up not needed.        Visit Vitals  BP (!) 101/42 (BP Location: Left arm, Patient Position: Lying, BP Method: Automatic)   Pulse 62   Temp 37.1 °C (98.8 °F) (Oral)   Resp 16   Ht 5' 2" (1.575 m)   Wt 77.1 kg (170 lb)   SpO2 (!) 94%   Breastfeeding? No   BMI 31.09 kg/m²       Pain/Rosanne Score: Pain Assessment Performed: Yes (8/2/2017 10:22 AM)  Presence of Pain: complains of pain/discomfort (8/2/2017 10:22 AM)      "

## 2017-08-02 NOTE — ANESTHESIA RELEASE NOTE
"Anesthesia Release from PACU Note    Patient: Tea Ring    Procedure(s) Performed: Procedure(s) (LRB):  ESOPHAGOGASTRODUODENOSCOPY (EGD) (N/A)  COLONOSCOPY (N/A)    Anesthesia type: MAC    Post pain: Adequate analgesia    Post assessment: no apparent anesthetic complications, tolerated procedure well and no evidence of recall    Last Vitals:   Visit Vitals  BP (!) 101/42 (BP Location: Left arm, Patient Position: Lying, BP Method: Automatic)   Pulse 62   Temp 37.1 °C (98.8 °F) (Oral)   Resp 16   Ht 5' 2" (1.575 m)   Wt 77.1 kg (170 lb)   SpO2 (!) 94%   Breastfeeding? No   BMI 31.09 kg/m²       Post vital signs: stable    Level of consciousness: awake and alert     Nausea/Vomiting: no nausea/no vomiting    Complications: none    Airway Patency: patent    Respiratory: unassisted, spontaneous ventilation, room air    Cardiovascular: stable    Hydration: euvolemic  "

## 2017-08-02 NOTE — H&P (VIEW-ONLY)
Clinic Consult:  [facility] Consultation Note    Reason for Consult:  The primary encounter diagnosis was Dysphagia, unspecified type. Diagnoses of Hoarseness and Colon cancer screening were also pertinent to this visit.    PCP: Tom Fernandez   [address] [place] [address]    HPI:  This is a 71 y.o. female here for evaluation of the above  She reports that over the last 6-8 weeks, she has had progressive worsening of difficulty swallowing.  She reports that difficulty is to solid foods only, no issues with liquids.  She has been having to chop her meats very fine and yet still has difficulty.  In addition, she has also had a worsening of hoarseness for unexplained reasons.   She denies any abdominal pain.  No nausea or vomiting.  No change in bowel pattern.  No melena or hematochezia.   She has not previous colonoscopy for screening.   PMH includes smoking.  Has not smoked since 2013    Review of Systems   Constitutional: Negative for chills, fever, malaise/fatigue and weight loss.   Respiratory: Negative for cough.    Cardiovascular: Negative for chest pain.   Gastrointestinal:        Per HPI   Musculoskeletal: Negative for myalgias.   Skin: Negative for itching and rash.   Neurological: Negative for headaches.   Psychiatric/Behavioral: The patient is not nervous/anxious.        Medical History:   Past Medical History:   Diagnosis Date    Alpha thalassemia     Asthma     Chronic anxiety     years tid xanax 1mg    Chronic knee pain     Chronic pain of left ankle     Clotting disorder     Cutaneous lupus erythematosus     dr henry derm    Depression     Depression     dr radha hughes previously    Ex-smoker     quit fall 1    Hypertension     Osteopenia 1/16 reck1/18       Surgical History:  Past Surgical History:   Procedure Laterality Date    ANKLE SURGERY Left     BACK SURGERY      KNEE ARTHROSCOPY      both knees twice       Family History:   History reviewed. No  pertinent family history.    Social History:   Social History   Substance Use Topics    Smoking status: Former Smoker     Packs/day: 1.00     Years: 30.00     Types: Cigarettes    Smokeless tobacco: Never Used      Comment: smoke last cigarette 9/15    Alcohol use Yes       Allergies: Reviewed    Home Medications:   Current Outpatient Prescriptions on File Prior to Visit   Medication Sig Dispense Refill    albuterol (PROVENTIL HFA) 90 mcg/actuation inhaler Inhale 2 puffs into the lungs every 6 (six) hours as needed for Wheezing.      ALBUTEROL INHL Inhale into the lungs.      alendronate (FOSAMAX) 70 MG tablet Take 1 tablet once weekly in the morning with a full glass of water on an empty stomach. Do not lie down for at least 30 minutes afterwards. 4 tablet 11    alprazolam (XANAX) 0.5 MG tablet TAKE ONE TABLET BY MOUTH TWICE A DAY AS NEEDED FOR FOR ANXIETY 60 tablet 5    amitriptyline (ELAVIL) 50 MG tablet TAKE 1 TABLET BY MOUTH AT BEDTIME. 30 tablet 1    diltiaZEM (TIAZAC) 360 MG CpSR TAKE 1 CAPSULE BY MOUTH DAILY 30 capsule 11    mirtazapine (REMERON) 15 MG tablet Take 1 tablet (15 mg total) by mouth every evening. 30 tablet 11    oxybutynin (DITROPAN) 5 MG Tab Take 1 tablet (5 mg total) by mouth 3 (three) times daily. 90 tablet 2    venlafaxine (EFFEXOR-XR) 75 MG 24 hr capsule TAKE ONE CAPSULE BY MOUTH DAILY 30 capsule 5    betamethasone valerate 0.1% (VALISONE) 0.1 % Crea APPLY TOPICALLY TWICE DAILY. 45 g 5    blood pressure monitor (BLOOD PRESSURE KIT) Kit Record daily 1 each 0    hydrocodone-acetaminophen 5-325mg (NORCO) 5-325 mg per tablet Take 1 tablet by mouth every 6 (six) hours as needed for Pain (take at night as needed to help you rest). 15 tablet 0    hydroquinone 4 % Crea Apply topically 2 (two) times daily. 46 g 1    SUPREP BOWEL PREP KIT 17.5-3.13-1.6 gram SolR Use as directed 354 mL 0     No current facility-administered medications on file prior to visit.        Physical  "Exam:  Vital Signs:  /80   Pulse 84   Ht 5' 2" (1.575 m)   Wt 78.2 kg (172 lb 6.4 oz)   BMI 31.53 kg/m²   Body mass index is 31.53 kg/m².  Physical Exam   Constitutional: She is oriented to person, place, and time. She appears well-developed and well-nourished.   HENT:   Head: Normocephalic.   Eyes: No scleral icterus.   Neck: Normal range of motion.   Cardiovascular: Normal rate and regular rhythm.    Pulmonary/Chest: Effort normal and breath sounds normal.   Abdominal: Soft. Bowel sounds are normal. She exhibits no distension. There is no tenderness.   Musculoskeletal: Normal range of motion.   Neurological: She is alert and oriented to person, place, and time.   Skin: Skin is warm and dry.   Psychiatric: She has a normal mood and affect.   Vitals reviewed.      Labs: No recent labs.  WIll update      Assessment:  1. Dysphagia, unspecified type    2. Hoarseness    3. Colon cancer screening         Recommendations:  Dysphagia, unspecified type  Hoarseness  - Esophageal stricture vs GERD vs other etiologies  - Will plan for EGD  -     Case request GI: ESOPHAGOGASTRODUODENOSCOPY (EGD), COLONOSCOPY  -     CBC auto differential; Future; Expected date: 07/12/2017  -     Comprehensive metabolic panel; Future; Expected date: 07/12/2017          Colon cancer screening  -     Case request GI: ESOPHAGOGASTRODUODENOSCOPY (EGD), COLONOSCOPY  -     CBC auto differential; Future; Expected date: 07/12/2017  -     Comprehensive metabolic panel; Future; Expected date: 07/12/2017        Follow up to be determined by results of procedure.        Thank you so much for allowing me to participate in the care of PETER Srivastava  "

## 2017-08-02 NOTE — ANESTHESIA PREPROCEDURE EVALUATION
08/02/2017  Tea Ring is a 71 y.o., female.    Anesthesia Evaluation    I have reviewed the Patient Summary Reports.    I have reviewed the Nursing Notes.   I have reviewed the Medications.     Review of Systems  Anesthesia Hx:  No problems with previous Anesthesia    Social:  Former Smoker, Alcohol Use    Hematology/Oncology:     Oncology Normal    -- Anemia:   EENT/Dental:EENT/Dental Normal   Cardiovascular:   Hypertension, well controlled    Pulmonary:   Asthma mild Snore   Renal/:  Renal/ Normal     Hepatic/GI:   Bowel Prep. GERD, poorly controlled 0645 last drink of fluid.   Musculoskeletal:   Arthritis     Neurological:  Neurology Normal    Endocrine:  Endocrine Normal    Dermatological:   Cutaneous lupus erythematosus       Physical Exam  General:  Well nourished, Obesity    Airway/Jaw/Neck:  Airway Findings: Mallampati: III     Dental:  Dental Findings: Upper Dentures, Lower Dentures             Anesthesia Plan  Type of Anesthesia, risks & benefits discussed:  Anesthesia Type:  MAC  Patient's Preference:   Intra-op Monitoring Plan:   Intra-op Monitoring Plan Comments:   Post Op Pain Control Plan:   Post Op Pain Control Plan Comments:   Induction:   IV  Beta Blocker:  Patient is not currently on a Beta-Blocker (No further documentation required).       Informed Consent: Patient understands risks and agrees with Anesthesia plan.  Questions answered. Anesthesia consent signed with patient.  ASA Score: 2     Day of Surgery Review of History & Physical: I have interviewed and examined the patient. I have reviewed the patient's H&P dated: 08/02/17. There are no significant changes.  H&P update referred to the surgeon.         Ready For Surgery From Anesthesia Perspective.

## 2017-08-02 NOTE — TRANSFER OF CARE
"Anesthesia Transfer of Care Note    Patient: Tea Ring    Procedure(s) Performed: Procedure(s) (LRB):  ESOPHAGOGASTRODUODENOSCOPY (EGD) (N/A)  COLONOSCOPY (N/A)    Patient location: PACU    Anesthesia Type: MAC    Transport from OR: Transported from OR on room air with adequate spontaneous ventilation    Post pain: adequate analgesia    Post assessment: no apparent anesthetic complications    Post vital signs: stable    Level of consciousness: sedated    Nausea/Vomiting: no nausea/vomiting    Complications: none    Transfer of care protocol was followed      Last vitals:   Visit Vitals  BP (!) 101/42 (BP Location: Left arm, Patient Position: Lying, BP Method: Automatic)   Pulse 62   Temp 37.1 °C (98.8 °F) (Oral)   Resp 16   Ht 5' 2" (1.575 m)   Wt 77.1 kg (170 lb)   SpO2 (!) 94%   Breastfeeding? No   BMI 31.09 kg/m²     "

## 2017-08-04 ENCOUNTER — TELEPHONE (OUTPATIENT)
Dept: ADMINISTRATIVE | Facility: HOSPITAL | Age: 72
End: 2017-08-04

## 2017-08-05 ENCOUNTER — TELEPHONE (OUTPATIENT)
Dept: URGENT CARE | Facility: CLINIC | Age: 72
End: 2017-08-05

## 2017-08-05 NOTE — TELEPHONE ENCOUNTER
Returned call and the patient only has an order for EGD and not colon I asked that she give us a call back and let us know why she needs the colon and I will send to DR Pryor for approval.

## 2017-08-07 ENCOUNTER — TELEPHONE (OUTPATIENT)
Dept: GASTROENTEROLOGY | Facility: CLINIC | Age: 72
End: 2017-08-07

## 2017-08-07 RX ORDER — ALPRAZOLAM 0.5 MG/1
TABLET ORAL
Qty: 60 TABLET | Refills: 2 | Status: SHIPPED | OUTPATIENT
Start: 2017-08-07 | End: 2017-11-06 | Stop reason: SDUPTHER

## 2017-08-08 ENCOUNTER — OFFICE VISIT (OUTPATIENT)
Dept: INTERNAL MEDICINE | Facility: CLINIC | Age: 72
End: 2017-08-08
Payer: MEDICARE

## 2017-08-08 ENCOUNTER — LAB VISIT (OUTPATIENT)
Dept: LAB | Facility: HOSPITAL | Age: 72
End: 2017-08-08
Attending: FAMILY MEDICINE
Payer: MEDICARE

## 2017-08-08 VITALS
HEIGHT: 62 IN | TEMPERATURE: 98 F | HEART RATE: 96 BPM | DIASTOLIC BLOOD PRESSURE: 80 MMHG | OXYGEN SATURATION: 98 % | BODY MASS INDEX: 31.32 KG/M2 | WEIGHT: 170.19 LBS | SYSTOLIC BLOOD PRESSURE: 136 MMHG

## 2017-08-08 DIAGNOSIS — I10 ESSENTIAL HYPERTENSION: Chronic | ICD-10-CM

## 2017-08-08 DIAGNOSIS — I10 ESSENTIAL HYPERTENSION: Primary | Chronic | ICD-10-CM

## 2017-08-08 DIAGNOSIS — L93.2 CUTANEOUS LUPUS ERYTHEMATOSUS: ICD-10-CM

## 2017-08-08 DIAGNOSIS — F32.A DEPRESSION, UNSPECIFIED DEPRESSION TYPE: Chronic | ICD-10-CM

## 2017-08-08 DIAGNOSIS — M85.80 OSTEOPENIA, UNSPECIFIED LOCATION: ICD-10-CM

## 2017-08-08 DIAGNOSIS — F41.9 CHRONIC ANXIETY: ICD-10-CM

## 2017-08-08 LAB
CHOLEST/HDLC SERPL: 3 {RATIO}
HDL/CHOLESTEROL RATIO: 33.6 %
HDLC SERPL-MCNC: 137 MG/DL
HDLC SERPL-MCNC: 46 MG/DL
LDLC SERPL CALC-MCNC: 77.6 MG/DL
NONHDLC SERPL-MCNC: 91 MG/DL
TRIGL SERPL-MCNC: 67 MG/DL

## 2017-08-08 PROCEDURE — 3079F DIAST BP 80-89 MM HG: CPT | Mod: S$GLB,,, | Performed by: FAMILY MEDICINE

## 2017-08-08 PROCEDURE — 80061 LIPID PANEL: CPT

## 2017-08-08 PROCEDURE — 36415 COLL VENOUS BLD VENIPUNCTURE: CPT | Mod: PO

## 2017-08-08 PROCEDURE — 99999 PR PBB SHADOW E&M-EST. PATIENT-LVL III: CPT | Mod: PBBFAC,,, | Performed by: FAMILY MEDICINE

## 2017-08-08 PROCEDURE — 3008F BODY MASS INDEX DOCD: CPT | Mod: S$GLB,,, | Performed by: FAMILY MEDICINE

## 2017-08-08 PROCEDURE — 99499 UNLISTED E&M SERVICE: CPT | Mod: S$GLB,,, | Performed by: FAMILY MEDICINE

## 2017-08-08 PROCEDURE — 1159F MED LIST DOCD IN RCRD: CPT | Mod: S$GLB,,, | Performed by: FAMILY MEDICINE

## 2017-08-08 PROCEDURE — 3075F SYST BP GE 130 - 139MM HG: CPT | Mod: S$GLB,,, | Performed by: FAMILY MEDICINE

## 2017-08-08 PROCEDURE — 1125F AMNT PAIN NOTED PAIN PRSNT: CPT | Mod: S$GLB,,, | Performed by: FAMILY MEDICINE

## 2017-08-08 PROCEDURE — 99214 OFFICE O/P EST MOD 30 MIN: CPT | Mod: S$GLB,,, | Performed by: FAMILY MEDICINE

## 2017-08-08 NOTE — PROGRESS NOTES
Subjective:       Patient ID: Tea Ring is a. female.    Chief Complaint: Multiple issues see below    HPI chronic anxiety/depression: intermitt anxeity. Mostly good days. She tried to dec xanax from bid but unable  attrib intermitt wt loss to anxiety; this has improved  Htn: controlled  Atherosclerosis of aorta; stable x htn  Alpha thalassemia asympt  Osteopenia on fosmax    Hoarseness rsolved; reviewed w her egd cscope report;aldo esoph. Taking med  Gastritis on ppi now    Past Medical History   Diagnosis Date    Alpha thalassemia     Asthma     Chronic anxiety      years tid xanax 1mg    Chronic knee pain     Chronic pain of left ankle     Clotting disorder     Cutaneous lupus erythematosus      dr henry derm    Depression     Depression      dr radha hughes previously    Ex-smoker      quit fall 1    Hypertension     Osteopenia  rec     Past Surgical History   Procedure Laterality Date    Knee arthroscopy       both knees twice    Back surgery      Ankle surgery Left      History reviewed. No pertinent family history.  Social History     Social History    Marital status:      Spouse name:     Number of children: 3    Years of education: N/A     Social History Main Topics    Smoking status: Former Smoker     Packs/day: 1.00     Years: 30.00     Types: Cigarettes    Smokeless tobacco: None      Comment: smoke last cigarette 9/15    Alcohol use Yes    Drug use: No    Sexual activity: No     Other Topics Concern    None     Social History Narrative       Review of Systems  Cardiovascular: no chest pain  Chest: no shortness of breath  Abd: no abd pain  Remainder review of systems negative    Objective:    cvrrr  Chest ctabilat      Assessment:     htn  1. Chronic anxiety    2. Depression, unspecified depression type    3. Loss of weight stable   4. Alpha thalassemia    5. Atherosclerosis of aorta        Plan:     Cancel sept lab  Do chol only today  F/u me summa 6  months  Can d/c ppi after 2 months  Essential hypertension    Chronic anxiety    Cutaneous lupus erythematosus    Depression, unspecified depression type    Osteopenia, unspecified location

## 2017-08-10 ENCOUNTER — TELEPHONE (OUTPATIENT)
Dept: INTERNAL MEDICINE | Facility: CLINIC | Age: 72
End: 2017-08-10

## 2017-08-10 NOTE — TELEPHONE ENCOUNTER
----- Message from Tom Fernandez MD sent at 8/9/2017  5:23 PM CDT -----  Her chol is in range where I recommend chol statin . If she is ok with this let me know and I will erx

## 2017-08-16 ENCOUNTER — TELEPHONE (OUTPATIENT)
Dept: INTERNAL MEDICINE | Facility: CLINIC | Age: 72
End: 2017-08-16

## 2017-08-16 DIAGNOSIS — I10 ESSENTIAL HYPERTENSION: ICD-10-CM

## 2017-08-16 NOTE — TELEPHONE ENCOUNTER
----- Message from Nathaly London sent at 8/16/2017  3:03 PM CDT -----  Contact: [phone]  Patient states she would like to consult with nurse regarding blood pressure medication making her weak. Please call back at [phone].    Pt uses:  [facility] - [place], [place] - [address]  [address] [place] [address]  Phone: [phone] Fax: [phone]        Thanks,  Nathaly London

## 2017-08-16 NOTE — TELEPHONE ENCOUNTER
Spoke with pt and she states the diltiazem 360mg is causing stomach pain and dizziness since she has lost weight. Pt ask if med need to be changed. Pt informed will call back tomorrow.

## 2017-08-17 RX ORDER — DILTIAZEM HYDROCHLORIDE 300 MG/1
300 CAPSULE, EXTENDED RELEASE ORAL DAILY
Qty: 30 CAPSULE | Refills: 11 | Status: SHIPPED | OUTPATIENT
Start: 2017-08-17 | End: 2017-12-07 | Stop reason: SDUPTHER

## 2017-08-17 NOTE — TELEPHONE ENCOUNTER
----- Message from Carmita Augustin sent at 8/17/2017 10:20 AM CDT -----  Contact: Pt  Pt called and stated she was returning a call to the nurse. She can be reached at [phone].      Thanks,  TF

## 2017-08-18 ENCOUNTER — TELEPHONE (OUTPATIENT)
Dept: INTERNAL MEDICINE | Facility: CLINIC | Age: 72
End: 2017-08-18

## 2017-08-18 DIAGNOSIS — E78.00 HYPERCHOLESTEREMIA: Primary | ICD-10-CM

## 2017-08-18 NOTE — TELEPHONE ENCOUNTER
----- Message from Mary Herrera sent at 8/18/2017  4:06 PM CDT -----  Patient returning Tammy call. Please av/call [phone].//thanks. cw

## 2017-08-22 RX ORDER — PRAVASTATIN SODIUM 20 MG/1
20 TABLET ORAL DAILY
Qty: 90 TABLET | Refills: 3 | Status: SHIPPED | OUTPATIENT
Start: 2017-08-22 | End: 2017-12-07

## 2017-08-22 NOTE — TELEPHONE ENCOUNTER
Pravastatin erxd  flp alt 6 weeks  Caution her if muscle aches develop (new ones) then d/c med and let me know

## 2017-08-24 ENCOUNTER — TELEPHONE (OUTPATIENT)
Dept: INTERNAL MEDICINE | Facility: CLINIC | Age: 72
End: 2017-08-24

## 2017-08-24 NOTE — TELEPHONE ENCOUNTER
----- Message from Michelle Mora sent at 8/24/2017 10:14 AM CDT -----  Call pt at [phone]//regarding test results//jorge luis latif

## 2017-08-25 ENCOUNTER — TELEPHONE (OUTPATIENT)
Dept: GASTROENTEROLOGY | Facility: CLINIC | Age: 72
End: 2017-08-25

## 2017-08-25 NOTE — TELEPHONE ENCOUNTER
----- Message from Tammy Watson LPN sent at 8/24/2017 10:41 AM CDT -----  Pt states she received letter.  ----- Message -----  From: Michelle Mora  Sent: 8/24/2017  10:14 AM  To: Jim MORGAN Staff    Call pt at [phone]//regarding test results//jorge luis ht

## 2017-08-25 NOTE — TELEPHONE ENCOUNTER
----- Message from Aarti Moreland sent at 8/25/2017  9:03 AM CDT -----  Contact: Pt   Pt returned your phone call..[phone] (home)

## 2017-09-13 ENCOUNTER — PATIENT OUTREACH (OUTPATIENT)
Dept: ADMINISTRATIVE | Facility: HOSPITAL | Age: 72
End: 2017-09-13

## 2017-09-13 NOTE — LETTER
September 13, 2017    Tea Ring  [address] [place] [address]             [facility]  [address]  [facility] [address]  Phone: [phone] Dear Ms. Ring:    [facility] is committed to your overall health.  To help you get the most out of each of your visits, we will review your information to make sure you are up to date on all of your recommended tests and/or procedures.      Tom Fernandez MD has found that you may be due for    Zoster Vaccine     Influenza Vaccine     Mammogram         If you have had any of the above done at another facility, please bring the records or information with you so that your record at [facility] will be complete.    If you are currently taking medication, please bring it with you to your appointment for review.    We will be happy to assist you with scheduling any necessary appointments or you may contact the [facility] appointment desk at [phone] to schedule at your convenience.     Thank you for choosing [facility] for your healthcare needs,        Bobbye, LPN  Care Coordination Department  [facility]  [phone]

## 2017-09-28 ENCOUNTER — TELEPHONE (OUTPATIENT)
Dept: INTERNAL MEDICINE | Facility: CLINIC | Age: 72
End: 2017-09-28

## 2017-09-28 NOTE — TELEPHONE ENCOUNTER
Spoke with pt and informed her that she missed her appt with  today at 1pm. Pt said she didn't realize she had an appt. She rescheduled for Monday 10/2/17 at 11am.

## 2017-10-02 ENCOUNTER — OFFICE VISIT (OUTPATIENT)
Dept: INTERNAL MEDICINE | Facility: CLINIC | Age: 72
End: 2017-10-02
Payer: MEDICARE

## 2017-10-02 VITALS
WEIGHT: 172.63 LBS | HEIGHT: 62 IN | HEART RATE: 109 BPM | BODY MASS INDEX: 31.77 KG/M2 | DIASTOLIC BLOOD PRESSURE: 90 MMHG | SYSTOLIC BLOOD PRESSURE: 149 MMHG | TEMPERATURE: 97 F | RESPIRATION RATE: 18 BRPM | OXYGEN SATURATION: 97 %

## 2017-10-02 DIAGNOSIS — F32.A DEPRESSION, UNSPECIFIED DEPRESSION TYPE: Chronic | ICD-10-CM

## 2017-10-02 DIAGNOSIS — I10 ESSENTIAL HYPERTENSION: Primary | Chronic | ICD-10-CM

## 2017-10-02 DIAGNOSIS — Z28.9 DELAYED IMMUNIZATIONS: ICD-10-CM

## 2017-10-02 PROCEDURE — 99214 OFFICE O/P EST MOD 30 MIN: CPT | Mod: S$GLB,,, | Performed by: FAMILY MEDICINE

## 2017-10-02 PROCEDURE — 3008F BODY MASS INDEX DOCD: CPT | Mod: S$GLB,,, | Performed by: FAMILY MEDICINE

## 2017-10-02 PROCEDURE — 90662 IIV NO PRSV INCREASED AG IM: CPT | Mod: S$GLB,,, | Performed by: FAMILY MEDICINE

## 2017-10-02 PROCEDURE — 1125F AMNT PAIN NOTED PAIN PRSNT: CPT | Mod: S$GLB,,, | Performed by: FAMILY MEDICINE

## 2017-10-02 PROCEDURE — 3080F DIAST BP >= 90 MM HG: CPT | Mod: S$GLB,,, | Performed by: FAMILY MEDICINE

## 2017-10-02 PROCEDURE — 1159F MED LIST DOCD IN RCRD: CPT | Mod: S$GLB,,, | Performed by: FAMILY MEDICINE

## 2017-10-02 PROCEDURE — 99999 PR PBB SHADOW E&M-EST. PATIENT-LVL III: CPT | Mod: PBBFAC,,, | Performed by: FAMILY MEDICINE

## 2017-10-02 PROCEDURE — 99499 UNLISTED E&M SERVICE: CPT | Mod: S$GLB,,, | Performed by: FAMILY MEDICINE

## 2017-10-02 PROCEDURE — G0008 ADMIN INFLUENZA VIRUS VAC: HCPCS | Mod: S$GLB,,, | Performed by: FAMILY MEDICINE

## 2017-10-02 PROCEDURE — 3077F SYST BP >= 140 MM HG: CPT | Mod: S$GLB,,, | Performed by: FAMILY MEDICINE

## 2017-10-02 RX ORDER — LOSARTAN POTASSIUM 50 MG/1
50 TABLET ORAL DAILY
Qty: 30 TABLET | Refills: 11 | Status: SHIPPED | OUTPATIENT
Start: 2017-10-02 | End: 2017-10-26 | Stop reason: SINTOL

## 2017-10-02 NOTE — PROGRESS NOTES
Subjective:       Patient ID: Tea Ring is a 71 y.o. female.    Chief Complaint: Follow-up; Hypertension; and Establish Care    Hypertension   This is a chronic problem. The current episode started more than 1 year ago. The problem is uncontrolled. Associated symptoms include headaches. Pertinent negatives include no chest pain, shortness of breath or sweats. The current treatment provides moderate improvement. There are no compliance problems.      Review of Systems   Constitutional: Negative for fever.   HENT: Negative for congestion.    Eyes: Negative for discharge.   Respiratory: Negative for shortness of breath.    Cardiovascular: Negative for chest pain.   Gastrointestinal: Negative for abdominal pain.   Genitourinary: Negative for difficulty urinating.   Musculoskeletal: Negative for joint swelling.   Neurological: Positive for headaches. Negative for dizziness.   Psychiatric/Behavioral: Negative for agitation.       Objective:      Physical Exam   Constitutional: She appears well-developed and well-nourished. No distress.   HENT:   Head: Normocephalic and atraumatic.   Nose: Nose normal.   Mouth/Throat: Oropharynx is clear and moist.   Pulmonary/Chest: Effort normal and breath sounds normal. No respiratory distress. She has no wheezes.   Skin: Skin is warm and dry. She is not diaphoretic. No erythema.   Diffuse areas of Hypopigmentation of BUE   Nursing note and vitals reviewed.      Assessment:       1. Essential hypertension    2. Delayed immunizations    3. Depression, unspecified depression type        Plan:           Delayed immunizations  Flu shot.  Will get zoster vacc next time.    Depression  Pt is controlled on meds.    Essential hypertension  Add losartan to regimen. F/u 3 wks.

## 2017-10-26 ENCOUNTER — OFFICE VISIT (OUTPATIENT)
Dept: INTERNAL MEDICINE | Facility: CLINIC | Age: 72
End: 2017-10-26
Payer: MEDICARE

## 2017-10-26 VITALS
OXYGEN SATURATION: 97 % | HEIGHT: 61 IN | WEIGHT: 170.44 LBS | TEMPERATURE: 97 F | SYSTOLIC BLOOD PRESSURE: 140 MMHG | BODY MASS INDEX: 32.18 KG/M2 | RESPIRATION RATE: 18 BRPM | DIASTOLIC BLOOD PRESSURE: 96 MMHG | HEART RATE: 114 BPM

## 2017-10-26 DIAGNOSIS — Z28.9 DELAYED IMMUNIZATIONS: ICD-10-CM

## 2017-10-26 DIAGNOSIS — I70.0 ATHEROSCLEROSIS OF AORTA: Chronic | ICD-10-CM

## 2017-10-26 DIAGNOSIS — I10 ESSENTIAL HYPERTENSION: Primary | Chronic | ICD-10-CM

## 2017-10-26 DIAGNOSIS — R13.10 ODYNOPHAGIA: ICD-10-CM

## 2017-10-26 PROCEDURE — 99999 PR PBB SHADOW E&M-EST. PATIENT-LVL IV: CPT | Mod: PBBFAC,,, | Performed by: FAMILY MEDICINE

## 2017-10-26 PROCEDURE — 90471 IMMUNIZATION ADMIN: CPT | Mod: S$GLB,,, | Performed by: FAMILY MEDICINE

## 2017-10-26 PROCEDURE — 99214 OFFICE O/P EST MOD 30 MIN: CPT | Mod: 25,S$GLB,, | Performed by: FAMILY MEDICINE

## 2017-10-26 PROCEDURE — 90736 HZV VACCINE LIVE SUBQ: CPT | Mod: S$GLB,,, | Performed by: FAMILY MEDICINE

## 2017-10-26 PROCEDURE — 99499 UNLISTED E&M SERVICE: CPT | Mod: S$GLB,,, | Performed by: FAMILY MEDICINE

## 2017-10-26 RX ORDER — PANTOPRAZOLE SODIUM 40 MG/1
40 TABLET, DELAYED RELEASE ORAL DAILY
Qty: 30 TABLET | Refills: 11 | Status: SHIPPED | OUTPATIENT
Start: 2017-10-26 | End: 2017-12-07 | Stop reason: SDUPTHER

## 2017-10-26 RX ORDER — BENAZEPRIL HYDROCHLORIDE AND HYDROCHLOROTHIAZIDE 20; 12.5 MG/1; MG/1
1 TABLET ORAL DAILY
Qty: 45 TABLET | Refills: 0 | Status: SHIPPED | OUTPATIENT
Start: 2017-10-26 | End: 2017-12-07 | Stop reason: SDUPTHER

## 2017-10-26 RX ORDER — ASPIRIN 81 MG/1
81 TABLET ORAL DAILY
Refills: 0 | COMMUNITY
Start: 2017-10-26 | End: 2017-12-07 | Stop reason: SDUPTHER

## 2017-10-26 RX ORDER — SUCRALFATE 1 G/1
1 TABLET ORAL 2 TIMES DAILY
Qty: 20 TABLET | Refills: 0 | Status: SHIPPED | OUTPATIENT
Start: 2017-10-26 | End: 2017-12-07 | Stop reason: ALTCHOICE

## 2017-10-26 NOTE — PROGRESS NOTES
Subjective:       Patient ID: Tea Ring is a 72 y.o. female.    Chief Complaint: Follow-up    Swallowing difficulty  Only able to eat small amounts.  L - throat pain  D - for 2 month  C - choking, sharp  A - attributes to indigestion, but only able to eat very small amounts.  R - no radiation.  T - occurs when swallowing  No feeling of acid, no pain in abdomen.      Review of Systems   Respiratory: Positive for shortness of breath.    Cardiovascular: Negative for chest pain.   Gastrointestinal: Negative for abdominal pain.       Objective:      Physical Exam   Constitutional: She appears well-developed and well-nourished. She appears distressed.   HENT:   Head: Normocephalic and atraumatic.   Nose: Nose normal.   Mouth/Throat: Oropharynx is clear and moist.   Pulmonary/Chest: Effort normal and breath sounds normal. No respiratory distress. She has no wheezes.   Abdominal: Soft. There is no tenderness. There is no rebound.   Skin: Skin is warm and dry. No rash noted. She is not diaphoretic. No erythema.   Nursing note and vitals reviewed.      Assessment:       1. Essential hypertension    2. Atherosclerosis of aorta    3. Delayed immunizations    4. Odynophagia        Plan:           Atherosclerosis of aorta  Asa 81 mg daily.    Delayed immunizations  zostavax    Odynophagia  Refer to Gi, curious if this is GERD related or 2/2 eosinophilic esophagitis perhaps.  Giving carafate for now, will consider need for montleukast or antihistamine therapy.  Will check on pt in a few days, and if not resolved with carafate, will start antihistamines, montleukast prior to seeing GI.

## 2017-10-30 ENCOUNTER — TELEPHONE (OUTPATIENT)
Dept: INTERNAL MEDICINE | Facility: CLINIC | Age: 72
End: 2017-10-30

## 2017-10-30 NOTE — ASSESSMENT & PLAN NOTE
Refer to Gi, curious if this is GERD related or 2/2 eosinophilic esophagitis perhaps.  Giving carafate for now, will consider need for montleukast or antihistamine therapy.  Will check on pt in a few days, and if not resolved with carafate, will start antihistamines, montleukast prior to seeing GI.

## 2017-11-07 RX ORDER — ALPRAZOLAM 0.5 MG/1
TABLET ORAL
Qty: 60 TABLET | Refills: 0 | Status: SHIPPED | OUTPATIENT
Start: 2017-11-07 | End: 2017-12-07 | Stop reason: SDUPTHER

## 2017-11-27 ENCOUNTER — PATIENT OUTREACH (OUTPATIENT)
Dept: ADMINISTRATIVE | Facility: HOSPITAL | Age: 72
End: 2017-11-27

## 2017-12-07 ENCOUNTER — OFFICE VISIT (OUTPATIENT)
Dept: INTERNAL MEDICINE | Facility: CLINIC | Age: 72
End: 2017-12-07
Payer: MEDICARE

## 2017-12-07 VITALS
WEIGHT: 173.5 LBS | OXYGEN SATURATION: 97 % | RESPIRATION RATE: 18 BRPM | TEMPERATURE: 98 F | SYSTOLIC BLOOD PRESSURE: 130 MMHG | BODY MASS INDEX: 32.76 KG/M2 | HEIGHT: 61 IN | DIASTOLIC BLOOD PRESSURE: 82 MMHG | HEART RATE: 97 BPM

## 2017-12-07 DIAGNOSIS — Z96.651 HISTORY OF TOTAL RIGHT KNEE REPLACEMENT: ICD-10-CM

## 2017-12-07 DIAGNOSIS — M25.561 ACUTE PAIN OF RIGHT KNEE: ICD-10-CM

## 2017-12-07 DIAGNOSIS — M85.80 OSTEOPENIA, UNSPECIFIED LOCATION: ICD-10-CM
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
## 2017-12-07 DIAGNOSIS — I10 ESSENTIAL HYPERTENSION: ICD-10-CM

## 2017-12-07 DIAGNOSIS — F41.9 CHRONIC ANXIETY: ICD-10-CM

## 2017-12-07 DIAGNOSIS — F32.A DEPRESSION, UNSPECIFIED DEPRESSION TYPE: ICD-10-CM

## 2017-12-07 PROBLEM — R13.10 DYSPHAGIA: Status: RESOLVED | Noted: 2017-08-02 | Resolved: 2017-12-07

## 2017-12-07 PROBLEM — R13.10 ODYNOPHAGIA: Status: RESOLVED | Noted: 2017-10-26 | Resolved: 2017-12-07

## 2017-12-07 PROCEDURE — 99214 OFFICE O/P EST MOD 30 MIN: CPT | Mod: S$GLB,,, | Performed by: FAMILY MEDICINE

## 2017-12-07 PROCEDURE — 99999 PR PBB SHADOW E&M-EST. PATIENT-LVL IV: CPT | Mod: PBBFAC,,, | Performed by: FAMILY MEDICINE

## 2017-12-07 PROCEDURE — 99499 UNLISTED E&M SERVICE: CPT | Mod: S$GLB,,, | Performed by: FAMILY MEDICINE

## 2017-12-07 RX ORDER — ALPRAZOLAM 0.5 MG/1
0.5 TABLET ORAL 2 TIMES DAILY PRN
Qty: 60 TABLET | Refills: 0 | Status: SHIPPED | OUTPATIENT
Start: 2017-12-07 | End: 2018-01-19 | Stop reason: SDUPTHER

## 2017-12-07 RX ORDER — FLUOXETINE 10 MG/1
10 TABLET ORAL DAILY
COMMUNITY
End: 2017-12-07 | Stop reason: SDUPTHER

## 2017-12-07 RX ORDER — PANTOPRAZOLE SODIUM 40 MG/1
40 TABLET, DELAYED RELEASE ORAL DAILY
Qty: 90 TABLET | Refills: 3 | Status: SHIPPED | OUTPATIENT
Start: 2017-12-07 | End: 2018-12-04 | Stop reason: SDUPTHER

## 2017-12-07 RX ORDER — BENAZEPRIL HYDROCHLORIDE AND HYDROCHLOROTHIAZIDE 20; 12.5 MG/1; MG/1
1 TABLET ORAL DAILY
Qty: 90 TABLET | Refills: 3 | Status: SHIPPED | OUTPATIENT
Start: 2017-12-07 | End: 2018-05-23 | Stop reason: ALTCHOICE

## 2017-12-07 RX ORDER — BACLOFEN 10 MG/1
10 TABLET ORAL NIGHTLY
Qty: 30 TABLET | Refills: 0 | Status: SHIPPED | OUTPATIENT
Start: 2017-12-07 | End: 2017-12-14 | Stop reason: SDUPTHER

## 2017-12-07 RX ORDER — ALENDRONATE SODIUM 5 MG/1
5 TABLET ORAL
Qty: 90 TABLET | Refills: 3 | Status: SHIPPED | OUTPATIENT
Start: 2017-12-07 | End: 2018-06-06

## 2017-12-07 RX ORDER — MELOXICAM 7.5 MG/1
7.5 TABLET ORAL DAILY
Qty: 15 TABLET | Refills: 0 | Status: SHIPPED | OUTPATIENT
Start: 2017-12-07 | End: 2018-03-08

## 2017-12-07 RX ORDER — DILTIAZEM HYDROCHLORIDE 300 MG/1
300 CAPSULE, EXTENDED RELEASE ORAL DAILY
Qty: 30 CAPSULE | Refills: 11 | Status: SHIPPED | OUTPATIENT
Start: 2017-12-07 | End: 2018-06-06

## 2017-12-07 RX ORDER — FLUOXETINE 10 MG/1
10 TABLET ORAL DAILY
Qty: 90 TABLET | Refills: 3 | Status: SHIPPED | OUTPATIENT
Start: 2017-12-07 | End: 2018-10-08

## 2017-12-07 RX ORDER — AMITRIPTYLINE HYDROCHLORIDE 50 MG/1
50 TABLET, FILM COATED ORAL NIGHTLY
Qty: 90 TABLET | Refills: 3 | Status: SHIPPED | OUTPATIENT
Start: 2017-12-07 | End: 2018-07-02 | Stop reason: SDUPTHER

## 2017-12-07 RX ORDER — ASPIRIN 81 MG/1
81 TABLET ORAL DAILY
Refills: 0 | COMMUNITY
Start: 2017-12-07 | End: 2019-10-29

## 2017-12-07 NOTE — PROGRESS NOTES
Subjective:       Patient ID: Tea Ring is a 72 y.o. female.    Chief Complaint: Hypertension and Spasms    Fall   Incident onset: 6 days ago. Fall occurred: pt slipped on brick steps at [employer]. She fell from a height of 1 to 2 ft. She landed on concrete. There was no blood loss. The point of impact was the head and right knee. Pain location: right buttocks, rt knee. The pain is moderate. The symptoms are aggravated by movement. Pertinent negatives include no abdominal pain, fever, headaches, loss of consciousness, nausea, numbness, tingling or visual change. She has tried heat, acetaminophen and NSAID for the symptoms. Improvement on treatment: aleve, motrin, heating (ad.     Review of Systems   Constitutional: Negative for fever.   Gastrointestinal: Negative for abdominal pain and nausea.   Neurological: Negative for tingling, loss of consciousness, numbness and headaches.       Objective:      Physical Exam   Constitutional: She appears well-developed and well-nourished. No distress.   HENT:   Head: Normocephalic and atraumatic.   Eyes: Conjunctivae are normal. No scleral icterus.   Cardiovascular: Normal rate and regular rhythm.    Pulmonary/Chest: Effort normal and breath sounds normal. No respiratory distress. She has no wheezes.   Abdominal: Soft. Bowel sounds are normal. There is no tenderness. There is no guarding.   Musculoskeletal:   Right knee NTTP   Neurological: She is alert. No cranial nerve deficit.   Skin: Skin is warm. No rash noted. She is not diaphoretic. No erythema. No pallor.   Good turgor   Nursing note and vitals reviewed.      Assessment:       1. Fall (on) (from) other stairs and steps, initial encounter    2. Essential hypertension    3. Depression, unspecified depression type    4. Chronic anxiety    5. Acute pain of right knee    6. History of total right knee replacement    7. Osteopenia, unspecified location        Plan:           No problem-specific Assessment & Plan notes  found for this encounter.    Fall (on) (from) other stairs and steps, initial encounter    Essential hypertension  -     aspirin (ECOTRIN) 81 MG EC tablet; Take 1 tablet (81 mg total) by mouth once daily.; Refill: 0  -     benazepril-hydrochlorthiazide (LOTENSIN HCT) 20-12.5 mg per tablet; Take 1 tablet by mouth once daily.  Dispense: 90 tablet; Refill: 3  -     diltiaZEM HCl (TIAZAC) 300 mg 24 hr capsule; Take 1 capsule (300 mg total) by mouth once daily.  Dispense: 30 capsule; Refill: 11    Depression, unspecified depression type  -     ALPRAZolam (XANAX) 0.5 MG tablet; Take 1 tablet (0.5 mg total) by mouth 2 (two) times daily as needed for Anxiety.  Dispense: 60 tablet; Refill: 0    Chronic anxiety  -     FLUoxetine 10 MG Tab; Take 1 tablet (10 mg total) by mouth once daily.  Dispense: 90 tablet; Refill: 3  -     ALPRAZolam (XANAX) 0.5 MG tablet; Take 1 tablet (0.5 mg total) by mouth 2 (two) times daily as needed for Anxiety.  Dispense: 60 tablet; Refill: 0    Acute pain of right knee  -     meloxicam (MOBIC) 7.5 MG tablet; Take 1 tablet (7.5 mg total) by mouth once daily.  Dispense: 15 tablet; Refill: 0    History of total right knee replacement    Osteopenia, unspecified location  -     alendronate (FOSAMAX) 5 MG Tab; Take 1 tablet (5 mg total) by mouth before breakfast.  Dispense: 90 tablet; Refill: 3    Other orders  -     Cancel: Mammo Digital Screening Bilat with CAD; Future  -     amitriptyline (ELAVIL) 50 MG tablet; Take 1 tablet (50 mg total) by mouth every evening.  Dispense: 90 tablet; Refill: 3  -     pantoprazole (PROTONIX) 40 MG tablet; Take 1 tablet (40 mg total) by mouth once daily.  Dispense: 90 tablet; Refill: 3  -     baclofen (LIORESAL) 10 MG tablet; Take 1 tablet (10 mg total) by mouth nightly.  Dispense: 30 tablet; Refill: 0

## 2017-12-12 ENCOUNTER — PATIENT OUTREACH (OUTPATIENT)
Dept: ADMINISTRATIVE | Facility: HOSPITAL | Age: 72
End: 2017-12-12

## 2017-12-12 NOTE — PROGRESS NOTES
Fax request was sent to [facility] Medical records for the pt's Health Maintenance records(mammogram, PAP and immunizations)

## 2017-12-14 ENCOUNTER — TELEPHONE (OUTPATIENT)
Dept: INTERNAL MEDICINE | Facility: CLINIC | Age: 72
End: 2017-12-14

## 2017-12-14 ENCOUNTER — OFFICE VISIT (OUTPATIENT)
Dept: INTERNAL MEDICINE | Facility: CLINIC | Age: 72
End: 2017-12-14
Payer: MEDICARE

## 2017-12-14 ENCOUNTER — PATIENT OUTREACH (OUTPATIENT)
Dept: ADMINISTRATIVE | Facility: HOSPITAL | Age: 72
End: 2017-12-14

## 2017-12-14 VITALS
OXYGEN SATURATION: 99 % | BODY MASS INDEX: 33.05 KG/M2 | DIASTOLIC BLOOD PRESSURE: 78 MMHG | WEIGHT: 175.06 LBS | RESPIRATION RATE: 18 BRPM | HEART RATE: 101 BPM | HEIGHT: 61 IN | TEMPERATURE: 98 F | SYSTOLIC BLOOD PRESSURE: 130 MMHG

## 2017-12-14 DIAGNOSIS — M62.838 MUSCLE SPASMS OF LOWER EXTREMITY, UNSPECIFIED LATERALITY: ICD-10-CM

## 2017-12-14 DIAGNOSIS — M25.569 CHRONIC KNEE PAIN, UNSPECIFIED LATERALITY: Primary | ICD-10-CM

## 2017-12-14 DIAGNOSIS — M85.80 OSTEOPENIA, UNSPECIFIED LOCATION: ICD-10-CM

## 2017-12-14 DIAGNOSIS — R26.81 GAIT INSTABILITY: ICD-10-CM

## 2017-12-14 DIAGNOSIS — Z12.31 SCREENING MAMMOGRAM, ENCOUNTER FOR: ICD-10-CM

## 2017-12-14 DIAGNOSIS — G89.29 CHRONIC KNEE PAIN, UNSPECIFIED LATERALITY: Primary | ICD-10-CM

## 2017-12-14 PROCEDURE — 99999 PR PBB SHADOW E&M-EST. PATIENT-LVL IV: CPT | Mod: PBBFAC,,, | Performed by: FAMILY MEDICINE

## 2017-12-14 PROCEDURE — 99214 OFFICE O/P EST MOD 30 MIN: CPT | Mod: S$GLB,,, | Performed by: FAMILY MEDICINE

## 2017-12-14 RX ORDER — BACLOFEN 10 MG/1
10 TABLET ORAL NIGHTLY
Qty: 30 TABLET | Refills: 0 | Status: SHIPPED | OUTPATIENT
Start: 2017-12-14 | End: 2018-10-08

## 2017-12-14 NOTE — PROGRESS NOTES
Subjective:       Patient ID: Tea Ring is a 72 y.o. female.    Chief Complaint: Follow-up (for muscle spasm)    Spasms   This is a recurrent problem. The current episode started more than 1 month ago. The problem occurs constantly. The problem has been gradually worsening. Pertinent negatives include no abdominal pain, anorexia, chest pain, joint swelling or neck pain. The symptoms are aggravated by bending and twisting.     Review of Systems   Respiratory: Negative for shortness of breath.    Cardiovascular: Negative for chest pain.   Gastrointestinal: Negative for abdominal pain and anorexia.   Musculoskeletal: Positive for gait problem. Negative for back pain, joint swelling and neck pain.       Objective:      Physical Exam   Constitutional: She appears well-developed and well-nourished. She appears distressed.   HENT:   Head: Normocephalic and atraumatic.   Nose: Nose normal.   Mouth/Throat: Oropharynx is clear and moist.   Pulmonary/Chest: Effort normal and breath sounds normal. No respiratory distress. She has no wheezes.   Musculoskeletal:   TTP of right gluteal muscle, some fasciculations present.  No clicks or clunks noted on exam.   Skin: Skin is warm and dry. No rash noted. She is not diaphoretic. No erythema.   Nursing note and vitals reviewed.      Assessment:       1. Chronic knee pain, unspecified laterality    2. Gait instability    3. Osteopenia, unspecified location    4. Muscle spasms of lower extremity, unspecified laterality    5. Screening mammogram, encounter for        Plan:           Muscle spasms of lower extremity  rx pain cream    Chronic knee pain, unspecified laterality  -     Ambulatory consult to Physical Therapy    Gait instability  -     Ambulatory consult to Physical Therapy    Osteopenia, unspecified location    Muscle spasms of lower extremity, unspecified laterality  -     Ambulatory consult to Physical Therapy  -     baclofen (LIORESAL) 10 MG tablet; Take 1 tablet (10 mg  total) by mouth nightly.  Dispense: 30 tablet; Refill: 0    Screening mammogram, encounter for  -     Mammo Digital Screening Bilat without CA; Future; Expected date: 12/14/2017    Other orders  -     Cancel: Mammo Digital Screening Bilat with CAD; Future

## 2017-12-14 NOTE — TELEPHONE ENCOUNTER
----- Message from Kayli Alcantar sent at 12/14/2017  8:57 AM CST -----  returned call..[phone] (home)

## 2017-12-18 ENCOUNTER — TELEPHONE (OUTPATIENT)
Dept: INTERNAL MEDICINE | Facility: CLINIC | Age: 72
End: 2017-12-18

## 2017-12-18 NOTE — TELEPHONE ENCOUNTER
Voice message left for pt. [facility] spoke with tech in regards to pt medication for muscle spasms.

## 2017-12-18 NOTE — TELEPHONE ENCOUNTER
----- Message from Umu Da Silva sent at 12/18/2017 11:05 AM CST -----  Contact: self [phone]  States that pharm did not receive rx for flexeril. Pt [place]     [facility] [address]  [address]  [place] [address]  Phone: [phone] Fax: [phone]    Please call back at [phone]//thank you acc

## 2018-01-19 DIAGNOSIS — F32.A DEPRESSION, UNSPECIFIED DEPRESSION TYPE: ICD-10-CM

## 2018-01-19 DIAGNOSIS — F41.9 CHRONIC ANXIETY: ICD-10-CM

## 2018-01-19 RX ORDER — ALPRAZOLAM 0.5 MG/1
0.5 TABLET ORAL 2 TIMES DAILY PRN
Qty: 60 TABLET | Refills: 0 | Status: SHIPPED | OUTPATIENT
Start: 2018-01-19 | End: 2018-03-05 | Stop reason: SDUPTHER

## 2018-01-19 RX ORDER — ALPRAZOLAM 0.5 MG/1
TABLET ORAL
Qty: 60 TABLET | Status: CANCELLED | OUTPATIENT
Start: 2018-01-19

## 2018-01-19 NOTE — TELEPHONE ENCOUNTER
----- Message from Greg Zhu sent at 1/19/2018  2:49 PM CST -----  Contact: pt  1. What is the name of the medication you are requesting? Xynex  2. What is the dose? 0.5 mg  3. How do you take the medication? Orally, topically, etc? Orally  4. How often do you take this medication? Twice daily  5. Do you need a 30 day or 90 day supply? 90  6. How many refills are you requesting? 4  7. What is your preferred pharmacy and location of the pharmacy? [facility] in [place]  8. Who can we contact with further questions? [phone] (home)      Pt is out of her medication...

## 2018-03-05 DIAGNOSIS — F32.A DEPRESSION, UNSPECIFIED DEPRESSION TYPE: ICD-10-CM

## 2018-03-05 DIAGNOSIS — F41.9 CHRONIC ANXIETY: ICD-10-CM

## 2018-03-05 RX ORDER — ALPRAZOLAM 0.5 MG/1
TABLET ORAL
Qty: 60 TABLET | Refills: 1 | Status: SHIPPED | OUTPATIENT
Start: 2018-03-05 | End: 2018-03-14 | Stop reason: SDUPTHER

## 2018-03-06 RX ORDER — BETAMETHASONE VALERATE 1.2 MG/G
CREAM TOPICAL
Qty: 45 G | Refills: 1 | Status: SHIPPED | OUTPATIENT
Start: 2018-03-06 | End: 2018-05-23 | Stop reason: SDUPTHER

## 2018-03-06 RX ORDER — BETAMETHASONE VALERATE 1.2 MG/G
CREAM TOPICAL
Qty: 45 G | OUTPATIENT
Start: 2018-03-06

## 2018-03-08 ENCOUNTER — OFFICE VISIT (OUTPATIENT)
Dept: INTERNAL MEDICINE | Facility: CLINIC | Age: 73
End: 2018-03-08
Payer: MEDICARE

## 2018-03-08 ENCOUNTER — HOSPITAL ENCOUNTER (OUTPATIENT)
Dept: RADIOLOGY | Facility: HOSPITAL | Age: 73
Discharge: HOME | End: 2018-03-08
Attending: FAMILY MEDICINE
Payer: MEDICARE

## 2018-03-08 VITALS
WEIGHT: 171.31 LBS | DIASTOLIC BLOOD PRESSURE: 78 MMHG | HEART RATE: 88 BPM | OXYGEN SATURATION: 96 % | SYSTOLIC BLOOD PRESSURE: 156 MMHG | TEMPERATURE: 97 F | HEIGHT: 62 IN | BODY MASS INDEX: 31.52 KG/M2

## 2018-03-08 DIAGNOSIS — F41.9 CHRONIC ANXIETY: ICD-10-CM

## 2018-03-08 DIAGNOSIS — M89.9 BONE DISORDER: ICD-10-CM

## 2018-03-08 DIAGNOSIS — R29.6 RECURRENT FALLS WHILE WALKING: ICD-10-CM

## 2018-03-08 DIAGNOSIS — R51.9 NONINTRACTABLE HEADACHE, UNSPECIFIED CHRONICITY PATTERN, UNSPECIFIED HEADACHE TYPE: ICD-10-CM

## 2018-03-08 DIAGNOSIS — I70.0 ATHEROSCLEROSIS OF AORTA: Chronic | ICD-10-CM

## 2018-03-08 DIAGNOSIS — L93.2 CUTANEOUS LUPUS ERYTHEMATOSUS: ICD-10-CM

## 2018-03-08 DIAGNOSIS — G89.29 CHRONIC MIDLINE LOW BACK PAIN WITHOUT SCIATICA: ICD-10-CM

## 2018-03-08 DIAGNOSIS — M54.50 CHRONIC MIDLINE LOW BACK PAIN WITHOUT SCIATICA: ICD-10-CM

## 2018-03-08 DIAGNOSIS — I10 ESSENTIAL HYPERTENSION: Primary | Chronic | ICD-10-CM

## 2018-03-08 DIAGNOSIS — D56.0 ALPHA THALASSEMIA: Chronic | ICD-10-CM

## 2018-03-08 PROCEDURE — 99214 OFFICE O/P EST MOD 30 MIN: CPT | Mod: S$GLB,,, | Performed by: FAMILY MEDICINE

## 2018-03-08 PROCEDURE — 99999 PR PBB SHADOW E&M-EST. PATIENT-LVL III: CPT | Mod: PBBFAC,,, | Performed by: FAMILY MEDICINE

## 2018-03-08 PROCEDURE — 72100 X-RAY EXAM L-S SPINE 2/3 VWS: CPT | Mod: TC,FY,PO

## 2018-03-08 PROCEDURE — 3078F DIAST BP <80 MM HG: CPT | Mod: S$GLB,,, | Performed by: FAMILY MEDICINE

## 2018-03-08 PROCEDURE — 99499 UNLISTED E&M SERVICE: CPT | Mod: S$GLB,,, | Performed by: FAMILY MEDICINE

## 2018-03-08 PROCEDURE — 72100 X-RAY EXAM L-S SPINE 2/3 VWS: CPT | Mod: 26,,, | Performed by: RADIOLOGY

## 2018-03-08 PROCEDURE — 3077F SYST BP >= 140 MM HG: CPT | Mod: S$GLB,,, | Performed by: FAMILY MEDICINE

## 2018-03-08 NOTE — PROGRESS NOTES
Subjective:       Patient ID: Tea Ring is a. female.    Chief Complaint: Multiple issues see below    HPI chronic anxiety/depression: intermitt anxeity. Mostly good days. She tried to dec xanax from bid but unable  attrib intermitt wt loss to anxiety; this had improved;she stopped prozac sec sedation  Htn: controlled typically; not t  Atherosclerosis of aorta; stable x htn  Alpha thalassemia asympt  Osteopenia on fosmax    Few months lower back pain. No radiat. No abd pain. May have started w a fall; occas falls since    Recurrent falls several months. Dr massey rec PT but she declined. She declines for back pain as swell    Months of r sided headached every other day; no nausea. Worse w sun. No neuro c/o;main have started after one of the falls    On elavil at least few months . She is not sure why started    Past Medical History   Diagnosis Date    Alpha thalassemia     Asthma     Chronic anxiety      years tid xanax 1mg    Chronic knee pain     Chronic pain of left ankle     Clotting disorder     Cutaneous lupus erythematosus      dr henry derm    Depression     Depression      dr radha hughes previously    Ex-smoker      quit fall 1    Hypertension     Osteopenia  rec     Past Surgical History   Procedure Laterality Date    Knee arthroscopy       both knees twice    Back surgery      Ankle surgery Left      History reviewed. No pertinent family history.  Social History     Social History    Marital status:      Spouse name:     Number of children: 3    Years of education: N/A     Social History Main Topics    Smoking status: Former Smoker     Packs/day: 1.00     Years: 30.00     Types: Cigarettes    Smokeless tobacco: None      Comment: smoke last cigarette 9/15    Alcohol use Yes    Drug use: No    Sexual activity: No     Other Topics Concern    None     Social History Narrative       Review of Systems  Cardiovascular: no chest pain  Chest: no shortness of  breath  Abd: no abd pain  Remainder review of systems negative    Objective:    cvrrr  Chest ctabilat  Neuro cn 2012 motor 5/5 nl dtrs  M/s ttp lumbar midlines  Assessment:     htn  1. Chronic anxiety    2. Depression, unspecified depression type    3. Low back pain   4. Alpha thalassemia    5. Atherosclerosis of aorta      Headaches  recurr falls  Plan:     F/u few weeks  Suspect much of her mult concerns related to stressors /anxiety/depression. Work on this mor at f/u    Defer dexa for now. She is concerneda bout mult testing    Taper off amitriptyline: decrease to 1/2 nightly for few days then every other night for one week then 1/2 every few nights one week then stop    D/wq resuming statin at a f/u    D/cibup/try prn tyl    Mult varied c/o. willwork on prioritizing     sched annual flp bmp at a f/u    Plans to get cataracts removed    Essential hypertension    Cutaneous lupus erythematosus    Chronic anxiety    Alpha thalassemia    Atherosclerosis of aorta    Bone disorder  -     X-Ray Lumbar Spine Ap And Lateral; Future; Expected date: 03/08/2018    Chronic midline low back pain without sciatica  -     X-Ray Lumbar Spine Ap And Lateral; Future; Expected date: 03/08/2018    Recurrent falls while walking    Nonintractable headache, unspecified chronicity pattern, unspecified headache type  -     CT Head Without Contrast; Future; Expected date: 03/08/2018    Other orders  -     Cancel: Basic metabolic panel; Future; Expected date: 09/04/2018  -     Cancel: Lipid panel; Future; Expected date: 09/04/2018  -     Cancel: DXA Bone Density Spine And Hip; Future; Expected date: 03/08/2018    f/u 6 months

## 2018-03-08 NOTE — PATIENT INSTRUCTIONS
Taper off amitriptyline: decrease to 1/2 nightly for few days then every other night for one week then 1/2 every few nights one week then stop

## 2018-03-09 ENCOUNTER — TELEPHONE (OUTPATIENT)
Dept: RADIOLOGY | Facility: HOSPITAL | Age: 73
End: 2018-03-09

## 2018-03-14 DIAGNOSIS — F32.A DEPRESSION, UNSPECIFIED DEPRESSION TYPE: ICD-10-CM

## 2018-03-14 DIAGNOSIS — F41.9 CHRONIC ANXIETY: ICD-10-CM

## 2018-03-14 RX ORDER — ALPRAZOLAM 0.5 MG/1
0.5 TABLET ORAL 2 TIMES DAILY
Qty: 60 TABLET | Refills: 1 | Status: SHIPPED | OUTPATIENT
Start: 2018-03-14 | End: 2018-06-25 | Stop reason: SDUPTHER

## 2018-03-21 ENCOUNTER — TELEPHONE (OUTPATIENT)
Dept: INTERNAL MEDICINE | Facility: CLINIC | Age: 73
End: 2018-03-21

## 2018-03-21 NOTE — TELEPHONE ENCOUNTER
Patient states she would like a call from Dr. Fernandez explaining her xray results because she does not understand them. Patient also states she does not have any money to come in for a visit

## 2018-04-04 ENCOUNTER — TELEPHONE (OUTPATIENT)
Dept: INTERNAL MEDICINE | Facility: CLINIC | Age: 73
End: 2018-04-04

## 2018-04-04 NOTE — TELEPHONE ENCOUNTER
----- Message from Nely Ray sent at 4/4/2018 10:52 AM CDT -----  Pt at [phone]//states she is returning your call/please call again//nisha//humberto

## 2018-04-06 ENCOUNTER — HOSPITAL ENCOUNTER (EMERGENCY)
Facility: HOSPITAL | Age: 73
Discharge: HOME | End: 2018-04-06
Attending: EMERGENCY MEDICINE
Payer: MEDICARE

## 2018-04-06 VITALS
OXYGEN SATURATION: 100 % | TEMPERATURE: 100 F | BODY MASS INDEX: 32.39 KG/M2 | DIASTOLIC BLOOD PRESSURE: 88 MMHG | WEIGHT: 176 LBS | SYSTOLIC BLOOD PRESSURE: 132 MMHG | HEART RATE: 100 BPM | HEIGHT: 62 IN | RESPIRATION RATE: 20 BRPM

## 2018-04-06 DIAGNOSIS — R53.1 WEAKNESS GENERALIZED: ICD-10-CM

## 2018-04-06 DIAGNOSIS — B34.9 ACUTE VIRAL SYNDROME: Primary | ICD-10-CM

## 2018-04-06 DIAGNOSIS — R51.9 NONINTRACTABLE HEADACHE, UNSPECIFIED CHRONICITY PATTERN, UNSPECIFIED HEADACHE TYPE: ICD-10-CM

## 2018-04-06 DIAGNOSIS — I10 HYPERTENSION, UNSPECIFIED TYPE: ICD-10-CM

## 2018-04-06 DIAGNOSIS — J45.41 MODERATE PERSISTENT ASTHMA WITH ACUTE EXACERBATION: ICD-10-CM

## 2018-04-06 DIAGNOSIS — J20.9 ACUTE BRONCHITIS, UNSPECIFIED ORGANISM: ICD-10-CM
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
## 2018-04-06 LAB
ALBUMIN SERPL BCP-MCNC: 3.3 G/DL
ALP SERPL-CCNC: 134 U/L
ALT SERPL W/O P-5'-P-CCNC: 6 U/L
ANION GAP SERPL CALC-SCNC: 11 MMOL/L
APTT BLDCRRT: 28.8 SEC
AST SERPL-CCNC: 10 U/L
BASOPHILS # BLD AUTO: 0.02 K/UL
BASOPHILS NFR BLD: 0.2 %
BILIRUB SERPL-MCNC: 0.4 MG/DL
BILIRUB UR QL STRIP: NEGATIVE
BUN SERPL-MCNC: 14 MG/DL
CALCIUM SERPL-MCNC: 9.4 MG/DL
CHLORIDE SERPL-SCNC: 103 MMOL/L
CLARITY UR REFRACT.AUTO: CLEAR
CO2 SERPL-SCNC: 26 MMOL/L
COLOR UR AUTO: YELLOW
CREAT SERPL-MCNC: 0.9 MG/DL
DIFFERENTIAL METHOD: ABNORMAL
EOSINOPHIL # BLD AUTO: 0.1 K/UL
EOSINOPHIL NFR BLD: 1.5 %
ERYTHROCYTE [DISTWIDTH] IN BLOOD BY AUTOMATED COUNT: 17.4 %
EST. GFR  (AFRICAN AMERICAN): >60 ML/MIN/1.73 M^2
EST. GFR  (NON AFRICAN AMERICAN): >60 ML/MIN/1.73 M^2
FLUAV AG SPEC QL IA: NEGATIVE
FLUBV AG SPEC QL IA: NEGATIVE
GLUCOSE SERPL-MCNC: 103 MG/DL
GLUCOSE SERPL-MCNC: 120 MG/DL (ref 70–110)
GLUCOSE UR QL STRIP: NEGATIVE
HCT VFR BLD AUTO: 36.3 %
HGB BLD-MCNC: 11.6 G/DL
HGB UR QL STRIP: ABNORMAL
INR PPP: 1
KETONES UR QL STRIP: NEGATIVE
LACTATE SERPL-SCNC: 1.5 MMOL/L
LEUKOCYTE ESTERASE UR QL STRIP: NEGATIVE
LYMPHOCYTES # BLD AUTO: 1.7 K/UL
LYMPHOCYTES NFR BLD: 18.7 %
MCH RBC QN AUTO: 21.1 PG
MCHC RBC AUTO-ENTMCNC: 32 G/DL
MCV RBC AUTO: 66 FL
MONOCYTES # BLD AUTO: 0.6 K/UL
MONOCYTES NFR BLD: 7.2 %
NEUTROPHILS # BLD AUTO: 6.4 K/UL
NEUTROPHILS NFR BLD: 72.4 %
NITRITE UR QL STRIP: NEGATIVE
PH UR STRIP: 7 [PH] (ref 5–8)
PLATELET # BLD AUTO: 464 K/UL
PMV BLD AUTO: 10 FL
POCT GLUCOSE: 120 MG/DL (ref 70–110)
POTASSIUM SERPL-SCNC: 3 MMOL/L
PROCALCITONIN SERPL IA-MCNC: 0.04 NG/ML
PROCALCITONIN SERPL IA-MCNC: 0.08 NG/ML
PROT SERPL-MCNC: 7.6 G/DL
PROT UR QL STRIP: NEGATIVE
PROTHROMBIN TIME: 10.7 SEC
RBC # BLD AUTO: 5.49 M/UL
SODIUM SERPL-SCNC: 140 MMOL/L
SP GR UR STRIP: 1.01 (ref 1–1.03)
SPECIMEN SOURCE: NORMAL
TROPONIN I SERPL DL<=0.01 NG/ML-MCNC: <0.006 NG/ML
URN SPEC COLLECT METH UR: ABNORMAL
UROBILINOGEN UR STRIP-ACNC: NEGATIVE EU/DL
WBC # BLD AUTO: 8.91 K/UL

## 2018-04-06 PROCEDURE — 82962 GLUCOSE BLOOD TEST: CPT

## 2018-04-06 PROCEDURE — 85025 COMPLETE CBC W/AUTO DIFF WBC: CPT

## 2018-04-06 PROCEDURE — 81003 URINALYSIS AUTO W/O SCOPE: CPT

## 2018-04-06 PROCEDURE — 96361 HYDRATE IV INFUSION ADD-ON: CPT

## 2018-04-06 PROCEDURE — 25000242 PHARM REV CODE 250 ALT 637 W/ HCPCS: Performed by: EMERGENCY MEDICINE

## 2018-04-06 PROCEDURE — 84145 PROCALCITONIN (PCT): CPT

## 2018-04-06 PROCEDURE — 87400 INFLUENZA A/B EACH AG IA: CPT | Mod: 59

## 2018-04-06 PROCEDURE — 99900035 HC TECH TIME PER 15 MIN (STAT)

## 2018-04-06 PROCEDURE — 85610 PROTHROMBIN TIME: CPT

## 2018-04-06 PROCEDURE — 93005 ELECTROCARDIOGRAM TRACING: CPT

## 2018-04-06 PROCEDURE — 25000003 PHARM REV CODE 250: Performed by: EMERGENCY MEDICINE

## 2018-04-06 PROCEDURE — 83605 ASSAY OF LACTIC ACID: CPT

## 2018-04-06 PROCEDURE — 84484 ASSAY OF TROPONIN QUANT: CPT

## 2018-04-06 PROCEDURE — 80053 COMPREHEN METABOLIC PANEL: CPT

## 2018-04-06 PROCEDURE — 99285 EMERGENCY DEPT VISIT HI MDM: CPT | Mod: 25

## 2018-04-06 PROCEDURE — 87086 URINE CULTURE/COLONY COUNT: CPT

## 2018-04-06 PROCEDURE — 85730 THROMBOPLASTIN TIME PARTIAL: CPT

## 2018-04-06 PROCEDURE — 96374 THER/PROPH/DIAG INJ IV PUSH: CPT

## 2018-04-06 PROCEDURE — 87040 BLOOD CULTURE FOR BACTERIA: CPT

## 2018-04-06 PROCEDURE — 94640 AIRWAY INHALATION TREATMENT: CPT

## 2018-04-06 PROCEDURE — 93010 ELECTROCARDIOGRAM REPORT: CPT | Mod: ,,, | Performed by: INTERNAL MEDICINE

## 2018-04-06 PROCEDURE — 63600175 PHARM REV CODE 636 W HCPCS: Performed by: EMERGENCY MEDICINE

## 2018-04-06 RX ORDER — IPRATROPIUM BROMIDE AND ALBUTEROL SULFATE 2.5; .5 MG/3ML; MG/3ML
3 SOLUTION RESPIRATORY (INHALATION)
Status: COMPLETED | OUTPATIENT
Start: 2018-04-06 | End: 2018-04-06

## 2018-04-06 RX ORDER — AMOXICILLIN 500 MG/1
500 CAPSULE ORAL 3 TIMES DAILY
Qty: 21 CAPSULE | Refills: 0 | Status: SHIPPED | OUTPATIENT
Start: 2018-04-06 | End: 2018-04-13

## 2018-04-06 RX ORDER — ACETAMINOPHEN 500 MG
1000 TABLET ORAL
Status: COMPLETED | OUTPATIENT
Start: 2018-04-06 | End: 2018-04-06

## 2018-04-06 RX ORDER — BENZONATATE 100 MG/1
100 CAPSULE ORAL 3 TIMES DAILY PRN
Qty: 20 CAPSULE | Refills: 0 | Status: SHIPPED | OUTPATIENT
Start: 2018-04-06 | End: 2018-04-16

## 2018-04-06 RX ORDER — PREDNISONE 20 MG/1
40 TABLET ORAL DAILY
Qty: 10 TABLET | Refills: 0 | Status: SHIPPED | OUTPATIENT
Start: 2018-04-06 | End: 2018-04-11

## 2018-04-06 RX ORDER — METHYLPREDNISOLONE SOD SUCC 125 MG
125 VIAL (EA) INJECTION
Status: COMPLETED | OUTPATIENT
Start: 2018-04-06 | End: 2018-04-06

## 2018-04-06 RX ORDER — CETIRIZINE HYDROCHLORIDE 10 MG/1
10 TABLET ORAL DAILY
Qty: 30 TABLET | Refills: 0 | Status: SHIPPED | OUTPATIENT
Start: 2018-04-06 | End: 2018-05-23 | Stop reason: SDUPTHER

## 2018-04-06 RX ORDER — TRAMADOL HYDROCHLORIDE 50 MG/1
50 TABLET ORAL
Status: DISCONTINUED | OUTPATIENT
Start: 2018-04-06 | End: 2018-04-06 | Stop reason: HOSPADM

## 2018-04-06 RX ADMIN — ACETAMINOPHEN 1000 MG: 500 TABLET, FILM COATED ORAL at 12:04

## 2018-04-06 RX ADMIN — IPRATROPIUM BROMIDE AND ALBUTEROL SULFATE 3 ML: .5; 3 SOLUTION RESPIRATORY (INHALATION) at 12:04

## 2018-04-06 RX ADMIN — SODIUM CHLORIDE 1000 ML: 0.9 INJECTION, SOLUTION INTRAVENOUS at 12:04

## 2018-04-06 RX ADMIN — METHYLPREDNISOLONE SODIUM SUCCINATE 125 MG: 125 INJECTION, POWDER, FOR SOLUTION INTRAMUSCULAR; INTRAVENOUS at 12:04

## 2018-04-06 NOTE — ED PROVIDER NOTES
Encounter Date: 4/6/2018       History     Chief Complaint   Patient presents with    Weakness     cough, congestion, weakness since monday      The history is provided by the patient.   General Injury    The incident occurred several days ago (3-4 days). The incident occurred at home. The injury mechanism was a fall (multiple falls). Context: weak/fever/cough. She came to the ER via walk-in. There is an injury to the head. There is an injury to the chest. The pain is at a severity of 3/10. It is unlikely that a foreign body is present. Associated symptoms include chest pain, nausea, vomiting, headaches, light-headedness, weakness, cough and difficulty breathing. Pertinent negatives include no altered mental status, no numbness, no visual disturbance, no abdominal pain, no bowel incontinence, no bladder incontinence, no hearing loss, no inability to bear weight, no neck pain, no focal weakness, no decreased responsiveness, no loss of consciousness, no seizures, no tingling and no memory loss. She has received no recent medical care.     Review of patient's allergies indicates:   Allergen Reactions    Duloxetine      Feels bad     Past Medical History:   Diagnosis Date    Alpha thalassemia     Asthma     Chronic anxiety     years tid xanax 1mg    Chronic knee pain     Chronic pain of left ankle     Clotting disorder     Cutaneous lupus erythematosus     dr henry derm    Depression     Depression     dr radha hughes previously    Ex-smoker     quit fall 1    Hypertension     Osteopenia 1/16 reck1/18     Past Surgical History:   Procedure Laterality Date    ANKLE SURGERY Left     BACK SURGERY      COLONOSCOPY N/A 8/2/2017    Procedure: COLONOSCOPY;  Surgeon: Virgil Oliva MD;  Location: [facility];  Service: Endoscopy;  Laterality: N/A;    KNEE ARTHROSCOPY      both knees twice     Family History   Problem Relation Age of Onset    Diabetes Mother     Diabetes Father      Social History  "  Substance Use Topics    Smoking status: Former Smoker     Packs/day: 1.00     Years: 30.00     Types: Cigarettes     Quit date: 9/2/2014    Smokeless tobacco: Never Used      Comment: smoke last cigarette 9/15    Alcohol use 0.6 oz/week     1 Glasses of wine per week     Review of Systems   Constitutional: Positive for fever. Negative for decreased responsiveness.   HENT: Negative for hearing loss and sore throat.    Eyes: Negative for visual disturbance.   Respiratory: Positive for cough and wheezing. Negative for chest tightness and shortness of breath.    Cardiovascular: Positive for chest pain.   Gastrointestinal: Positive for nausea and vomiting. Negative for abdominal pain and bowel incontinence.   Genitourinary: Negative for bladder incontinence and dysuria.   Musculoskeletal: Negative for back pain and neck pain.   Skin: Negative for rash.   Neurological: Positive for weakness, light-headedness and headaches. Negative for tingling, focal weakness, seizures, loss of consciousness and numbness.   Hematological: Does not bruise/bleed easily.   Psychiatric/Behavioral: Negative for memory loss.   All other systems reviewed and are negative.      Physical Exam     Initial Vitals [04/06/18 1129]   BP Pulse Resp Temp SpO2   (!) 195/99 100 20 99.8 °F (37.7 °C) 95 %      MAP       131         Vitals:    04/06/18 1129 04/06/18 1215 04/06/18 1241 04/06/18 1301   BP: (!) 195/99   (!) 158/91   Pulse: 100 91 92 99   Resp: 20  20 20   Temp: 99.8 °F (37.7 °C)      TempSrc: Oral      SpO2: 95%  100% 99%   Weight: 79.8 kg (176 lb)      Height: 5' 2" (1.575 m)        Physical Exam    Nursing note and vitals reviewed.  Constitutional: She appears well-developed and well-nourished. No distress.   HENT:   Head: Normocephalic and atraumatic.   Eyes: EOM are normal. Pupils are equal, round, and reactive to light.   Neck: Normal range of motion. Neck supple.   No meningeal signs   Cardiovascular: Normal rate, regular rhythm, " normal heart sounds and intact distal pulses.   Pulmonary/Chest: Accessory muscle usage present. No stridor. Tachypnea noted. She is in respiratory distress. She has decreased breath sounds. She has wheezes. She has no rhonchi.   Abdominal: Soft. Bowel sounds are normal. She exhibits no mass. There is no rebound and no guarding.   Musculoskeletal: Normal range of motion. She exhibits no edema.   Neurological: She is alert and oriented to person, place, and time. She has normal strength. No sensory deficit.   Skin: Skin is warm and dry. Capillary refill takes less than 2 seconds. No rash noted.   Chronic rash associated with her lupus-no acute change or new rashes noted   Psychiatric: She has a normal mood and affect. Her behavior is normal. Judgment and thought content normal.         ED Course   Procedures  Labs Reviewed   CBC W/ AUTO DIFFERENTIAL - Abnormal; Notable for the following:        Result Value    RBC 5.49 (*)     Hemoglobin 11.6 (*)     Hematocrit 36.3 (*)     MCV 66 (*)     MCH 21.1 (*)     RDW 17.4 (*)     Platelets 464 (*)     All other components within normal limits   POCT GLUCOSE MONITORING CONTINUOUS - Abnormal; Notable for the following:     POC Glucose 120 (*)     All other components within normal limits   POCT GLUCOSE - Abnormal; Notable for the following:     POCT Glucose 120 (*)     All other components within normal limits   CULTURE, BLOOD   CULTURE, BLOOD   CULTURE, URINE   LACTIC ACID, PLASMA   INFLUENZA A AND B ANTIGEN   PROCALCITONIN   LACTIC ACID, PLASMA   URINALYSIS   PROTIME-INR   APTT   TROPONIN I   COMPREHENSIVE METABOLIC PANEL   PROCALCITONIN     EKG Readings: (Independently Interpreted)   Initial Reading: No STEMI. Rhythm: Normal Sinus Rhythm. Heart Rate: 90. Ectopy: No Ectopy. Other Impression: Normal sinus at 90 with nonspecific ST-T wave abnormalities.  No STEMI    no previous EKG to compare to.        Results for orders placed or performed during the hospital encounter of  04/06/18   CBC auto differential   Result Value Ref Range    WBC 8.91 3.90 - 12.70 K/uL    RBC 5.49 (H) 4.00 - 5.40 M/uL    Hemoglobin 11.6 (L) 12.0 - 16.0 g/dL    Hematocrit 36.3 (L) 37.0 - 48.5 %    MCV 66 (L) 82 - 98 fL    MCH 21.1 (L) 27.0 - 31.0 pg    MCHC 32.0 32.0 - 36.0 g/dL    RDW 17.4 (H) 11.5 - 14.5 %    Platelets 464 (H) 150 - 350 K/uL    MPV 10.0 9.2 - 12.9 fL    Gran # (ANC) 6.4 1.8 - 7.7 K/uL    Lymph # 1.7 1.0 - 4.8 K/uL    Mono # 0.6 0.3 - 1.0 K/uL    Eos # 0.1 0.0 - 0.5 K/uL    Baso # 0.02 0.00 - 0.20 K/uL    Gran% 72.4 38.0 - 73.0 %    Lymph% 18.7 18.0 - 48.0 %    Mono% 7.2 4.0 - 15.0 %    Eosinophil% 1.5 0.0 - 8.0 %    Basophil% 0.2 0.0 - 1.9 %    Differential Method Automated    Lactic acid, plasma #1   Result Value Ref Range    Lactate (Lactic Acid) 1.5 0.5 - 2.2 mmol/L   Influenza antigen Nasopharyngeal Swab   Result Value Ref Range    Influenza A Ag, EIA Negative Negative    Influenza B Ag, EIA Negative Negative    Flu A & B Source Nasopharyngeal Swab    Procalcitonin   Result Value Ref Range    Procalcitonin 0.04 <0.25 ng/mL   POCT glucose   Result Value Ref Range    POC Glucose 120 (A) 70 - 110 mg/dL   POCT glucose   Result Value Ref Range    POCT Glucose 120 (H) 70 - 110 mg/dL                Imaging Results          X-Ray Chest AP Portable (Final result)  Result time 04/06/18 12:44:23    Final result by Celso Hall MD (04/06/18 12:44:23)                 Impression:     Prominence of the cardiac silhouette could reflect chamber enlargement or pericardial effusion.      Electronically signed by: CELSO HALL MD  Date:     04/06/18  Time:    12:44              Narrative:    Clinical Data:Sepsis    Comparison:  2/11/17    Findings: Single view of the chest.      Cardiac silhouette is enlarged.  The lungs demonstrate no evidence of active disease.  No evidence of pleural effusion or pneumothorax.  Bones demonstrate moderate degenerative changes.                             CT Head Without  Contrast (Final result)  Result time 04/06/18 12:39:12    Final result by Celso Hall MD (04/06/18 12:39:12)                 Impression:         Chronic microvascular ischemic changes.     All CT scans at this facility use dose modulation, iterative reconstruction and/or weight based dosing when appropriate to reduce radiation dose to as low as reasonably achievable.        Electronically signed by: CELSO HALL MD  Date:     04/06/18  Time:    12:39              Narrative:    CT OF THE HEAD WITHOUT CONTRAST:     Technique: 5 mm axial images were obtained from the skullbase to the vertex, without administration of contrast.    Comparison: 10/16/15    History:  Dizziness    Findings:    No intracranial hemorrhage, extra-axial fluid collection, midline shift, or mass effect.  No evidence of an acute cortical infarct or of an infarct in a major vascular territory.    There is mild prominence of the ventricles and sulci compatible with diffuse cerebral volume loss.  Patchy hypoattenuation in the periventricular white matter regions is nonspecific, but most commonly seen with chronic microvascular ischemic changes. Vascular calcifications noted.    The calvarium is unremarkable. Visualized portions of the paranasal sinuses are clear. Visualized mastoid air cells are clear. Visualized orbits are unremarkable.                                     patient improves after treatment here in emergency department.  She is not orthostatic.  She is here for discharge home and will follow-up with her primary care physician.  She states she has appointment with Dr. figueroa on Monday.  She will return to emergency department for any worsening signs or symptoms.    Patient presents with upper respiratory and flulike symptoms. Based on my assessment in the ED, I do not suspect any respiratory, airway, pulmonary, cardiovascular (including myocarditis), metabolic, CNS, medical, or surgical emergency medical condition. I have discussed  with the patient and/or caregiver signs and symptoms for secondary bacterial infections, such as pneumonia. I believe that the patient's symptoms are most consistent with a viral illness, possibly influenza. Patient is safe for discharge home with conservative therapy.        Clinical Impression:   The primary encounter diagnosis was Acute viral syndrome. Diagnoses of Weakness generalized, Acute bronchitis, unspecified organism, Moderate persistent asthma with acute exacerbation, Fall, initial encounter, Hypertension, unspecified type, and Nonintractable headache, unspecified chronicity pattern, unspecified headache type were also pertinent to this visit.    Disposition:   Disposition: Discharged  Condition: Stable                        Armando Grayson MD  04/06/18 8514

## 2018-04-07 LAB — BACTERIA UR CULT: NORMAL

## 2018-04-11 LAB
BACTERIA BLD CULT: NORMAL
BACTERIA BLD CULT: NORMAL

## 2018-04-12 ENCOUNTER — TELEPHONE (OUTPATIENT)
Dept: INTERNAL MEDICINE | Facility: CLINIC | Age: 73
End: 2018-04-12

## 2018-05-05 DIAGNOSIS — M62.838 MUSCLE SPASMS OF LOWER EXTREMITY, UNSPECIFIED LATERALITY: ICD-10-CM

## 2018-05-05 RX ORDER — BACLOFEN 10 MG/1
TABLET ORAL
Qty: 30 TABLET | OUTPATIENT
Start: 2018-05-05

## 2018-05-22 ENCOUNTER — TELEPHONE (OUTPATIENT)
Dept: INTERNAL MEDICINE | Facility: CLINIC | Age: 73
End: 2018-05-22

## 2018-05-22 NOTE — TELEPHONE ENCOUNTER
----- Message from Vamshi Juan sent at 5/22/2018  2:26 PM CDT -----  Contact: self [phone]  Returning call, please call back at [phone]. Lee Malone

## 2018-05-22 NOTE — TELEPHONE ENCOUNTER
----- Message from Bladimir Love sent at 5/22/2018  1:02 PM CDT -----  Contact: Pt  Please give pt a call at ..[phone] (home) regarding her appt she has for today.

## 2018-05-23 ENCOUNTER — OFFICE VISIT (OUTPATIENT)
Dept: INTERNAL MEDICINE | Facility: CLINIC | Age: 73
End: 2018-05-23
Payer: MEDICARE

## 2018-05-23 VITALS
DIASTOLIC BLOOD PRESSURE: 92 MMHG | TEMPERATURE: 97 F | BODY MASS INDEX: 33.18 KG/M2 | RESPIRATION RATE: 18 BRPM | WEIGHT: 180.31 LBS | HEIGHT: 62 IN | SYSTOLIC BLOOD PRESSURE: 172 MMHG | OXYGEN SATURATION: 98 % | HEART RATE: 96 BPM

## 2018-05-23 DIAGNOSIS — F33.1 MODERATE EPISODE OF RECURRENT MAJOR DEPRESSIVE DISORDER: Chronic | ICD-10-CM

## 2018-05-23 DIAGNOSIS — R41.9 MEDICATION NONCOMPLIANCE DUE TO COGNITIVE IMPAIRMENT: ICD-10-CM

## 2018-05-23 DIAGNOSIS — I10 ESSENTIAL HYPERTENSION: Chronic | ICD-10-CM

## 2018-05-23 DIAGNOSIS — Z96.651 HISTORY OF TOTAL RIGHT KNEE REPLACEMENT: ICD-10-CM

## 2018-05-23 DIAGNOSIS — H65.02 ACUTE SEROUS OTITIS MEDIA OF LEFT EAR, RECURRENCE NOT SPECIFIED: Primary | ICD-10-CM

## 2018-05-23 DIAGNOSIS — Z91.148 MEDICATION NONCOMPLIANCE DUE TO COGNITIVE IMPAIRMENT: ICD-10-CM

## 2018-05-23 PROBLEM — Z28.9 DELAYED IMMUNIZATIONS: Status: RESOLVED | Noted: 2017-10-02 | Resolved: 2018-05-23

## 2018-05-23 PROBLEM — Z12.31 SCREENING MAMMOGRAM, ENCOUNTER FOR: Status: RESOLVED | Noted: 2017-12-14 | Resolved: 2018-05-23

## 2018-05-23 PROCEDURE — 3080F DIAST BP >= 90 MM HG: CPT | Mod: CPTII,S$GLB,, | Performed by: FAMILY MEDICINE

## 2018-05-23 PROCEDURE — 99999 PR PBB SHADOW E&M-EST. PATIENT-LVL IV: CPT | Mod: PBBFAC,,, | Performed by: FAMILY MEDICINE

## 2018-05-23 PROCEDURE — 3077F SYST BP >= 140 MM HG: CPT | Mod: CPTII,S$GLB,, | Performed by: FAMILY MEDICINE

## 2018-05-23 PROCEDURE — 99214 OFFICE O/P EST MOD 30 MIN: CPT | Mod: S$GLB,,, | Performed by: FAMILY MEDICINE

## 2018-05-23 RX ORDER — LOSARTAN POTASSIUM 50 MG/1
50 TABLET ORAL DAILY
COMMUNITY
End: 2018-06-25

## 2018-05-23 RX ORDER — CETIRIZINE HYDROCHLORIDE 10 MG/1
10 TABLET ORAL DAILY
Qty: 30 TABLET | Refills: 5 | Status: SHIPPED | OUTPATIENT
Start: 2018-05-23 | End: 2018-08-08 | Stop reason: SDUPTHER

## 2018-05-23 RX ORDER — MIRTAZAPINE 15 MG/1
15 TABLET, FILM COATED ORAL NIGHTLY
COMMUNITY
End: 2018-10-08

## 2018-05-23 RX ORDER — BETAMETHASONE VALERATE 1.2 MG/G
CREAM TOPICAL
Qty: 45 G | Refills: 0 | Status: SHIPPED | OUTPATIENT
Start: 2018-05-23 | End: 2018-07-05 | Stop reason: SDUPTHER

## 2018-05-23 RX ORDER — FLUTICASONE PROPIONATE 50 MCG
1 SPRAY, SUSPENSION (ML) NASAL DAILY
Qty: 16 G | Refills: 5 | Status: SHIPPED | OUTPATIENT
Start: 2018-05-23 | End: 2018-08-08 | Stop reason: SDUPTHER

## 2018-05-23 NOTE — ASSESSMENT & PLAN NOTE
Today she brought in some medicines but says she is not taking all of them and also there are other medicines at home that she is taking but did bring.  She had multiple different pills within the same medication containers and could not really say confidently a what she was taking.  Says that her daughter manages a lot of her medications and sets them in a tray weekly.

## 2018-05-23 NOTE — ASSESSMENT & PLAN NOTE
She is not doing well in regards to depression although she does have some good days.  She is not really sure which she is taking.  She does have Elavil and Xanax and mirtazapine on her medication list.

## 2018-05-23 NOTE — PROGRESS NOTES
Subjective:       Patient ID: Tea Ring is a 72 y.o. female.    Chief Complaint: Otalgia (rt ); Shoulder Pain (alexandrea); and Fatigue    HPI  Here today with chief complaint of left ear discomfort for the last week or so.  She says she has decreased hearing on that side.  Never had similar symptoms before.  She is not currently taking Zyrtec all the was on her medication list.  Has not used any Flonase.  Not having any fever and no significant pain to her ear.  Has not had any drainage.  She also has chronic shoulder pain and sees Dr. Brady her orthopedic for that.  Recently had right-sided shoulder injection.  Has a history of bilateral knee replacements and frequently has pain associated with that.  She says she has taken Tylenol No.  3 to help manage her pain. She has difficulty walking and says that she has fallen multiple times.  She uses a walker at home sometimes but says that it is too big for her to really use effectively infrequently.   blood pressure is elevated today and continues to be this way over the last few visits.  I inquired about her medication and she has no idea what she is taking.  She says that her daughter manages all of her medications.  Her daughter is not here with    Family History   Problem Relation Age of Onset    Diabetes Mother     Diabetes Father        Current Outpatient Prescriptions:     ALPRAZolam (XANAX) 0.5 MG tablet, Take 1 tablet (0.5 mg total) by mouth 2 (two) times daily. as needed for anxiety., Disp: 60 tablet, Rfl: 1    amitriptyline (ELAVIL) 50 MG tablet, Take 1 tablet (50 mg total) by mouth every evening., Disp: 90 tablet, Rfl: 3    aspirin (ECOTRIN) 81 MG EC tablet, Take 1 tablet (81 mg total) by mouth once daily., Disp: , Rfl: 0    betamethasone valerate 0.1% (VALISONE) 0.1 % Crea, APPLY TOPICALLY TWICE DAILY., Disp: 45 g, Rfl: 1    blood pressure monitor (BLOOD PRESSURE KIT) Kit, Record daily, Disp: 1 each, Rfl: 0    cetirizine (ZYRTEC) 10 MG tablet, Take 1  "tablet (10 mg total) by mouth once daily., Disp: 30 tablet, Rfl: 5    diltiaZEM HCl (TIAZAC) 300 mg 24 hr capsule, Take 1 capsule (300 mg total) by mouth once daily., Disp: 30 capsule, Rfl: 11    losartan (COZAAR) 50 MG tablet, Take 50 mg by mouth once daily., Disp: , Rfl:     mirtazapine (REMERON) 15 MG tablet, Take 15 mg by mouth every evening., Disp: , Rfl:     pantoprazole (PROTONIX) 40 MG tablet, Take 1 tablet (40 mg total) by mouth once daily., Disp: 90 tablet, Rfl: 3    alendronate (FOSAMAX) 5 MG Tab, Take 1 tablet (5 mg total) by mouth before breakfast., Disp: 90 tablet, Rfl: 3    baclofen (LIORESAL) 10 MG tablet, Take 1 tablet (10 mg total) by mouth nightly., Disp: 30 tablet, Rfl: 0    FLUoxetine 10 MG Tab, Take 1 tablet (10 mg total) by mouth once daily., Disp: 90 tablet, Rfl: 3    fluticasone (FLONASE) 50 mcg/actuation nasal spray, 1 spray (50 mcg total) by Each Nare route once daily., Disp: 16 g, Rfl: 5    Review of Systems   Constitutional: Negative for chills and fever.   HENT: Positive for ear pain. Negative for congestion and ear discharge.    Respiratory: Negative for cough and shortness of breath.    Cardiovascular: Negative for chest pain.   Gastrointestinal: Negative for abdominal pain.   Musculoskeletal: Positive for arthralgias, back pain and gait problem.   Skin: Negative for rash.   Neurological: Negative for dizziness.   Psychiatric/Behavioral: The patient is nervous/anxious.        Objective:   BP (!) 172/92 (BP Location: Left arm, Patient Position: Sitting, BP Method: Large (Manual))   Pulse 96   Temp 96.8 °F (36 °C) (Tympanic)   Resp 18   Ht 5' 2" (1.575 m)   Wt 81.8 kg (180 lb 5.4 oz)   SpO2 98%   BMI 32.98 kg/m²      Physical Exam   Constitutional: She is oriented to person, place, and time. She appears well-developed and well-nourished.   HENT:   Head: Normocephalic and atraumatic.   Has some bulging and yellow appearance of the left tympanic membrane.  There is no " erythema.   Eyes: Conjunctivae are normal.   Cardiovascular: Normal rate.    Pulmonary/Chest: Effort normal. No respiratory distress.   Musculoskeletal: She exhibits no edema.   Has difficulty arising from a seated position.  Has a very slow gait with using a cane here in the clinic.  Seems very unstable.  Does have some trace lower extremity edema in the pretibial region and also on her ankles.  Scars on both of her knees from history of knee replacements.   Neurological: She is alert and oriented to person, place, and time. Coordination normal.   Skin: Skin is warm and dry. No rash noted.   Psychiatric: She has a normal mood and affect. Her behavior is normal.   She seems on the verge of crying several times during the encounter.   Vitals reviewed.      Assessment & Plan     Problem List Items Addressed This Visit        Psychiatric    Depression (Chronic)    Overview     ? Type. On Elavil         Current Assessment & Plan     She is not doing well in regards to depression although she does have some good days.  She is not really sure which she is taking.  She does have Elavil and Xanax and mirtazapine on her medication list.            ENT    Acute serous otitis media of left ear - Primary    Current Assessment & Plan     No signs of infection.  Recommended eustachian tube exercises and use of Zyrtec and Flonase.            Cardiac/Vascular    Essential hypertension (Chronic)    Current Assessment & Plan     Blood pressure is poorly controlled today and was last time.  It is impossible to adjust her medications at this time because she cannot clearly tell me which she is taking.            Orthopedic    History of total right knee replacement    Current Assessment & Plan     She admits to having many falls at home.  She uses a walker at home but says it is too big and does not help wheels on it so she does not use it too much.         Relevant Orders    WALKER FOR HOME USE    Ambulatory referral to Home Health        Other    Medication noncompliance due to cognitive impairment    Current Assessment & Plan     Today she brought in some medicines but says she is not taking all of them and also there are other medicines at home that she is taking but did bring.  She had multiple different pills within the same medication containers and could not really say confidently a what she was taking.  Says that her daughter manages a lot of her medications and sets them in a tray weekly.         Relevant Orders    Ambulatory referral to Home Health        Having her follow up in 1 week with her daughter so we can better understand what she is taking and more safely make medication adjustments.    Follow-up in about 7 days (around 5/30/2018).

## 2018-05-23 NOTE — ASSESSMENT & PLAN NOTE
She admits to having many falls at home.  She uses a walker at home but says it is too big and does not help wheels on it so she does not use it too much.

## 2018-05-23 NOTE — ASSESSMENT & PLAN NOTE
Blood pressure is poorly controlled today and was last time.  It is impossible to adjust her medications at this time because she cannot clearly tell me which she is taking.

## 2018-06-06 ENCOUNTER — OFFICE VISIT (OUTPATIENT)
Dept: INTERNAL MEDICINE | Facility: CLINIC | Age: 73
End: 2018-06-06
Payer: MEDICARE

## 2018-06-06 VITALS
RESPIRATION RATE: 17 BRPM | HEART RATE: 93 BPM | BODY MASS INDEX: 33.51 KG/M2 | WEIGHT: 182.13 LBS | DIASTOLIC BLOOD PRESSURE: 80 MMHG | SYSTOLIC BLOOD PRESSURE: 136 MMHG | TEMPERATURE: 98 F | HEIGHT: 62 IN | OXYGEN SATURATION: 98 %

## 2018-06-06 DIAGNOSIS — I10 ESSENTIAL HYPERTENSION: Primary | Chronic | ICD-10-CM

## 2018-06-06 DIAGNOSIS — L93.2 CUTANEOUS LUPUS ERYTHEMATOSUS: ICD-10-CM

## 2018-06-06 DIAGNOSIS — R60.0 LOWER EXTREMITY EDEMA: ICD-10-CM

## 2018-06-06 PROBLEM — H65.02 ACUTE SEROUS OTITIS MEDIA OF LEFT EAR: Status: RESOLVED | Noted: 2018-05-23 | Resolved: 2018-06-06

## 2018-06-06 PROCEDURE — 3075F SYST BP GE 130 - 139MM HG: CPT | Mod: CPTII,S$GLB,, | Performed by: FAMILY MEDICINE

## 2018-06-06 PROCEDURE — 3079F DIAST BP 80-89 MM HG: CPT | Mod: CPTII,S$GLB,, | Performed by: FAMILY MEDICINE

## 2018-06-06 PROCEDURE — 99499 UNLISTED E&M SERVICE: CPT | Mod: S$GLB,,, | Performed by: FAMILY MEDICINE

## 2018-06-06 PROCEDURE — 99214 OFFICE O/P EST MOD 30 MIN: CPT | Mod: S$GLB,,, | Performed by: FAMILY MEDICINE

## 2018-06-06 PROCEDURE — 99999 PR PBB SHADOW E&M-EST. PATIENT-LVL III: CPT | Mod: PBBFAC,,, | Performed by: FAMILY MEDICINE

## 2018-06-06 RX ORDER — POTASSIUM CHLORIDE 20 MEQ/1
20 TABLET, EXTENDED RELEASE ORAL 2 TIMES DAILY
Qty: 60 TABLET | Refills: 1 | Status: SHIPPED | OUTPATIENT
Start: 2018-06-06 | End: 2019-06-17 | Stop reason: SDUPTHER

## 2018-06-06 RX ORDER — FUROSEMIDE 20 MG/1
20 TABLET ORAL 2 TIMES DAILY
Qty: 60 TABLET | Refills: 1 | Status: SHIPPED | OUTPATIENT
Start: 2018-06-06 | End: 2018-10-08

## 2018-06-06 RX ORDER — ATORVASTATIN CALCIUM 10 MG/1
10 TABLET, FILM COATED ORAL DAILY
Qty: 30 TABLET | Refills: 1 | Status: SHIPPED | OUTPATIENT
Start: 2018-06-06 | End: 2018-10-08 | Stop reason: SDUPTHER
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
## 2018-06-11 PROBLEM — R60.0 LOWER EXTREMITY EDEMA: Status: ACTIVE | Noted: 2018-06-11

## 2018-06-11 NOTE — ASSESSMENT & PLAN NOTE
Do not suspect that this has anything to do with the lower extremity edema however will keep this in mind while monitoring her symptoms.

## 2018-06-11 NOTE — PROGRESS NOTES
Subjective:       Patient ID: Tea Ring is a 72 y.o. female.    Chief Complaint: Joint Swelling    HPI  Came in today to follow-up on hypertension and is concerned about lower extremity edema. At her last visit her blood pressure was quite elevated we may medication changes and today it is doing much better.  She has no concerning side effects at this time.  One thing that has been bothering her as her lower extremity edema.  She has had this going on for a long time.  She does not have any shortness of breath or chest pain. No known history of congestive heart failure.  She did have any injury or fall.  Her lower extremities are equal in swelling.  Says that the swelling does get worse throughout the day.  It does seem to get a bit better after sleeping.    Family History   Problem Relation Age of Onset    Diabetes Mother     Diabetes Father        Current Outpatient Prescriptions:     ALPRAZolam (XANAX) 0.5 MG tablet, Take 1 tablet (0.5 mg total) by mouth 2 (two) times daily. as needed for anxiety., Disp: 60 tablet, Rfl: 1    amitriptyline (ELAVIL) 50 MG tablet, Take 1 tablet (50 mg total) by mouth every evening., Disp: 90 tablet, Rfl: 3    aspirin (ECOTRIN) 81 MG EC tablet, Take 1 tablet (81 mg total) by mouth once daily., Disp: , Rfl: 0    baclofen (LIORESAL) 10 MG tablet, Take 1 tablet (10 mg total) by mouth nightly., Disp: 30 tablet, Rfl: 0    betamethasone valerate 0.1% (VALISONE) 0.1 % Crea, APPLY TOPICALLY TWICE DAILY., Disp: 45 g, Rfl: 0    blood pressure monitor (BLOOD PRESSURE KIT) Kit, Record daily, Disp: 1 each, Rfl: 0    cetirizine (ZYRTEC) 10 MG tablet, Take 1 tablet (10 mg total) by mouth once daily., Disp: 30 tablet, Rfl: 5    FLUoxetine 10 MG Tab, Take 1 tablet (10 mg total) by mouth once daily., Disp: 90 tablet, Rfl: 3    fluticasone (FLONASE) 50 mcg/actuation nasal spray, 1 spray (50 mcg total) by Each Nare route once daily., Disp: 16 g, Rfl: 5    losartan (COZAAR) 50 MG tablet,  "Take 50 mg by mouth once daily., Disp: , Rfl:     mirtazapine (REMERON) 15 MG tablet, Take 15 mg by mouth every evening., Disp: , Rfl:     pantoprazole (PROTONIX) 40 MG tablet, Take 1 tablet (40 mg total) by mouth once daily., Disp: 90 tablet, Rfl: 3    atorvastatin (LIPITOR) 10 MG tablet, Take 1 tablet (10 mg total) by mouth once daily., Disp: 30 tablet, Rfl: 1    furosemide (LASIX) 20 MG tablet, Take 1 tablet (20 mg total) by mouth 2 (two) times daily., Disp: 60 tablet, Rfl: 1    potassium chloride SA (K-DUR,KLOR-CON) 20 MEQ tablet, Take 1 tablet (20 mEq total) by mouth 2 (two) times daily., Disp: 60 tablet, Rfl: 1    Review of Systems   Constitutional: Negative for chills and fever.   Respiratory: Negative for cough and shortness of breath.    Cardiovascular: Positive for leg swelling. Negative for chest pain.   Gastrointestinal: Negative for abdominal pain.   Skin: Negative for rash.   Neurological: Negative for dizziness.       Objective:   /80 (BP Location: Left arm, Patient Position: Sitting, BP Method: Medium (Automatic))   Pulse 93   Temp 97.9 °F (36.6 °C) (Tympanic)   Resp 17   Ht 5' 2" (1.575 m)   Wt 82.6 kg (182 lb 1.6 oz)   SpO2 98%   BMI 33.31 kg/m²      Physical Exam   Constitutional: She is oriented to person, place, and time. She appears well-developed and well-nourished. No distress.   HENT:   Head: Normocephalic and atraumatic.   Nose: Nose normal.   Eyes: Conjunctivae and EOM are normal. Pupils are equal, round, and reactive to light. Right eye exhibits no discharge. Left eye exhibits no discharge.   Neck: No thyromegaly present.   Cardiovascular: Normal rate and regular rhythm.    No murmur heard.  Pulmonary/Chest: Effort normal and breath sounds normal. No respiratory distress. She has no rales.   Abdominal: Soft. She exhibits no distension.   Musculoskeletal: She exhibits edema (One to 2+ edema of bilateral lower extremities.  No erythema or tenderness to palpation). "   Neurological: She is alert and oriented to person, place, and time.   Skin: No rash noted. She is not diaphoretic.   Psychiatric: She has a normal mood and affect. Her behavior is normal.       Assessment & Plan     Problem List Items Addressed This Visit        Derm    Cutaneous lupus erythematosus    Current Assessment & Plan     Do not suspect that this has anything to do with the lower extremity edema however will keep this in mind while monitoring her symptoms.            Cardiac/Vascular    Essential hypertension - Primary (Chronic)    Current Assessment & Plan     Much better improved at this time.  Continue same dose of other medications.         Relevant Orders    Comprehensive metabolic panel    CBC auto differential       Other    Lower extremity edema    Current Assessment & Plan     Starting on a low dose of furosemide along with potassium supplementation.  If the symptoms continue or if she is having any shortness of breath will need to pursue further workup with echocardiogram.                 No Follow-up on file.

## 2018-06-11 NOTE — ASSESSMENT & PLAN NOTE
Starting on a low dose of furosemide along with potassium supplementation.  If the symptoms continue or if she is having any shortness of breath will need to pursue further workup with echocardiogram.

## 2018-06-13 ENCOUNTER — TELEPHONE (OUTPATIENT)
Dept: INTERNAL MEDICINE | Facility: CLINIC | Age: 73
End: 2018-06-13

## 2018-06-13 NOTE — TELEPHONE ENCOUNTER
Spoke with pt and she wanted  to know that she does not want to do the home health or physical therapy. That she can do for herself. I told her I would let  know.

## 2018-06-13 NOTE — TELEPHONE ENCOUNTER
----- Message from Aubrey Lane sent at 6/13/2018 12:09 PM CDT -----  Contact: Pt   States she doesn't want the home health right now and can be reached at [phone]//[email or web address]

## 2018-06-18 ENCOUNTER — LAB VISIT (OUTPATIENT)
Dept: LAB | Facility: HOSPITAL | Age: 73
End: 2018-06-18
Attending: FAMILY MEDICINE
Payer: MEDICARE

## 2018-06-18 DIAGNOSIS — I10 ESSENTIAL HYPERTENSION: Chronic | ICD-10-CM

## 2018-06-18 LAB
ALBUMIN SERPL BCP-MCNC: 3.5 G/DL
ALP SERPL-CCNC: 189 U/L
ALT SERPL W/O P-5'-P-CCNC: 73 U/L
ANION GAP SERPL CALC-SCNC: 8 MMOL/L
AST SERPL-CCNC: 27 U/L
BASOPHILS # BLD AUTO: 0.04 K/UL
BASOPHILS NFR BLD: 0.5 %
BILIRUB SERPL-MCNC: 0.3 MG/DL
BUN SERPL-MCNC: 13 MG/DL
CALCIUM SERPL-MCNC: 9.8 MG/DL
CHLORIDE SERPL-SCNC: 109 MMOL/L
CO2 SERPL-SCNC: 25 MMOL/L
CREAT SERPL-MCNC: 0.9 MG/DL
DIFFERENTIAL METHOD: ABNORMAL
EOSINOPHIL # BLD AUTO: 0.2 K/UL
EOSINOPHIL NFR BLD: 2.9 %
ERYTHROCYTE [DISTWIDTH] IN BLOOD BY AUTOMATED COUNT: 18.9 %
EST. GFR  (AFRICAN AMERICAN): >60 ML/MIN/1.73 M^2
EST. GFR  (NON AFRICAN AMERICAN): >60 ML/MIN/1.73 M^2
GLUCOSE SERPL-MCNC: 103 MG/DL
HCT VFR BLD AUTO: 37.5 %
HGB BLD-MCNC: 12 G/DL
LYMPHOCYTES # BLD AUTO: 2.7 K/UL
LYMPHOCYTES NFR BLD: 31.9 %
MCH RBC QN AUTO: 21.1 PG
MCHC RBC AUTO-ENTMCNC: 32 G/DL
MCV RBC AUTO: 66 FL
MONOCYTES # BLD AUTO: 0.5 K/UL
MONOCYTES NFR BLD: 5.6 %
NEUTROPHILS # BLD AUTO: 4.9 K/UL
NEUTROPHILS NFR BLD: 59.1 %
PLATELET # BLD AUTO: 450 K/UL
PMV BLD AUTO: 9.6 FL
POTASSIUM SERPL-SCNC: 3.6 MMOL/L
PROT SERPL-MCNC: 7.8 G/DL
RBC # BLD AUTO: 5.68 M/UL
SODIUM SERPL-SCNC: 142 MMOL/L
WBC # BLD AUTO: 8.32 K/UL

## 2018-06-18 PROCEDURE — 36415 COLL VENOUS BLD VENIPUNCTURE: CPT | Mod: PO

## 2018-06-18 PROCEDURE — 80053 COMPREHEN METABOLIC PANEL: CPT | Mod: PO

## 2018-06-18 PROCEDURE — 85025 COMPLETE CBC W/AUTO DIFF WBC: CPT | Mod: PO

## 2018-06-19 ENCOUNTER — PATIENT OUTREACH (OUTPATIENT)
Dept: ADMINISTRATIVE | Facility: HOSPITAL | Age: 73
End: 2018-06-19

## 2018-06-19 ENCOUNTER — TELEPHONE (OUTPATIENT)
Dept: ADMINISTRATIVE | Facility: CLINIC | Age: 73
End: 2018-06-19

## 2018-06-19 DIAGNOSIS — M89.9 BONE DISORDER: ICD-10-CM

## 2018-06-19 DIAGNOSIS — Z12.39 ENCOUNTER FOR SPECIAL SCREENING EXAMINATION FOR NEOPLASM OF BREAST: Primary | ICD-10-CM

## 2018-06-19 NOTE — PATIENT INSTRUCTIONS
Home Health SOC 05/29/2018 to 07/27/2018 [facility] Pawel Dominguez, SN, PT, MSW services.

## 2018-06-25 ENCOUNTER — OFFICE VISIT (OUTPATIENT)
Dept: INTERNAL MEDICINE | Facility: CLINIC | Age: 73
End: 2018-06-25
Payer: MEDICARE

## 2018-06-25 VITALS
SYSTOLIC BLOOD PRESSURE: 172 MMHG | DIASTOLIC BLOOD PRESSURE: 100 MMHG | WEIGHT: 177.69 LBS | HEART RATE: 97 BPM | RESPIRATION RATE: 18 BRPM | HEIGHT: 62 IN | BODY MASS INDEX: 32.7 KG/M2 | OXYGEN SATURATION: 99 % | TEMPERATURE: 96 F

## 2018-06-25 DIAGNOSIS — F32.A DEPRESSION, UNSPECIFIED DEPRESSION TYPE: ICD-10-CM

## 2018-06-25 DIAGNOSIS — F41.9 CHRONIC ANXIETY: ICD-10-CM

## 2018-06-25 DIAGNOSIS — I10 ESSENTIAL HYPERTENSION: Chronic | ICD-10-CM

## 2018-06-25 PROCEDURE — 99214 OFFICE O/P EST MOD 30 MIN: CPT | Mod: S$GLB,,, | Performed by: FAMILY MEDICINE

## 2018-06-25 PROCEDURE — 3080F DIAST BP >= 90 MM HG: CPT | Mod: CPTII,S$GLB,, | Performed by: FAMILY MEDICINE

## 2018-06-25 PROCEDURE — 3077F SYST BP >= 140 MM HG: CPT | Mod: CPTII,S$GLB,, | Performed by: FAMILY MEDICINE

## 2018-06-25 PROCEDURE — 99999 PR PBB SHADOW E&M-EST. PATIENT-LVL III: CPT | Mod: PBBFAC,,, | Performed by: FAMILY MEDICINE

## 2018-06-25 PROCEDURE — 99499 UNLISTED E&M SERVICE: CPT | Mod: HCNC,S$GLB,, | Performed by: FAMILY MEDICINE

## 2018-06-25 RX ORDER — IBUPROFEN 800 MG/1
800 TABLET ORAL DAILY PRN
Qty: 30 TABLET | Refills: 0 | Status: SHIPPED | OUTPATIENT
Start: 2018-06-25 | End: 2018-08-14 | Stop reason: SDUPTHER

## 2018-06-25 RX ORDER — VALSARTAN AND HYDROCHLOROTHIAZIDE 160; 12.5 MG/1; MG/1
1 TABLET, FILM COATED ORAL DAILY
Qty: 90 TABLET | Refills: 3 | Status: SHIPPED | OUTPATIENT
Start: 2018-06-25 | End: 2019-06-17 | Stop reason: SDUPTHER

## 2018-06-25 RX ORDER — ALPRAZOLAM 0.5 MG/1
0.5 TABLET ORAL 2 TIMES DAILY
Qty: 60 TABLET | Refills: 1 | Status: SHIPPED | OUTPATIENT
Start: 2018-06-25 | End: 2018-10-08

## 2018-06-25 RX ORDER — DILTIAZEM HYDROCHLORIDE 300 MG/1
300 CAPSULE, COATED, EXTENDED RELEASE ORAL DAILY
COMMUNITY
End: 2018-10-08

## 2018-06-29 NOTE — ASSESSMENT & PLAN NOTE
Elevated today and has been borderline last several visits. Changing losartan to valsartan/hctz. F/u 1 week to recheck blood pressure

## 2018-06-29 NOTE — PROGRESS NOTES
Subjective:       Patient ID: Tea Ring is a 72 y.o. female.    Chief Complaint: Follow-up (2 wks)    HPI  Came in today to follow-up on hypertension.  She has been taking all of her medications however blood pressure is elevated today.  At the last visit it was borderline but had been elevated frequently in the past.  She doesn't have any new acute complaints at this time.  Still struggles with depression which seems to stem a lot from her chronic musculoskeletal issues.    Family History   Problem Relation Age of Onset    Diabetes Mother     Diabetes Father        Current Outpatient Prescriptions:     ALPRAZolam (XANAX) 0.5 MG tablet, Take 1 tablet (0.5 mg total) by mouth 2 (two) times daily. as needed for anxiety., Disp: 60 tablet, Rfl: 1    amitriptyline (ELAVIL) 50 MG tablet, Take 1 tablet (50 mg total) by mouth every evening., Disp: 90 tablet, Rfl: 3    aspirin (ECOTRIN) 81 MG EC tablet, Take 1 tablet (81 mg total) by mouth once daily., Disp: , Rfl: 0    atorvastatin (LIPITOR) 10 MG tablet, Take 1 tablet (10 mg total) by mouth once daily., Disp: 30 tablet, Rfl: 1    baclofen (LIORESAL) 10 MG tablet, Take 1 tablet (10 mg total) by mouth nightly., Disp: 30 tablet, Rfl: 0    betamethasone valerate 0.1% (VALISONE) 0.1 % Crea, APPLY TOPICALLY TWICE DAILY., Disp: 45 g, Rfl: 0    blood pressure monitor (BLOOD PRESSURE KIT) Kit, Record daily, Disp: 1 each, Rfl: 0    cetirizine (ZYRTEC) 10 MG tablet, Take 1 tablet (10 mg total) by mouth once daily., Disp: 30 tablet, Rfl: 5    diltiaZEM (CARDIZEM CD) 300 MG 24 hr capsule, Take 300 mg by mouth once daily., Disp: , Rfl:     FLUoxetine 10 MG Tab, Take 1 tablet (10 mg total) by mouth once daily., Disp: 90 tablet, Rfl: 3    fluticasone (FLONASE) 50 mcg/actuation nasal spray, 1 spray (50 mcg total) by Each Nare route once daily., Disp: 16 g, Rfl: 5    furosemide (LASIX) 20 MG tablet, Take 1 tablet (20 mg total) by mouth 2 (two) times daily., Disp: 60 tablet,  "Rfl: 1    mirtazapine (REMERON) 15 MG tablet, Take 15 mg by mouth every evening., Disp: , Rfl:     pantoprazole (PROTONIX) 40 MG tablet, Take 1 tablet (40 mg total) by mouth once daily., Disp: 90 tablet, Rfl: 3    potassium chloride SA (K-DUR,KLOR-CON) 20 MEQ tablet, Take 1 tablet (20 mEq total) by mouth 2 (two) times daily., Disp: 60 tablet, Rfl: 1    ibuprofen (ADVIL,MOTRIN) 800 MG tablet, Take 1 tablet (800 mg total) by mouth daily as needed for Pain., Disp: 30 tablet, Rfl: 0    valsartan-hydrochlorothiazide (DIOVAN-HCT) 160-12.5 mg per tablet, Take 1 tablet by mouth once daily., Disp: 90 tablet, Rfl: 3    Review of Systems   Constitutional: Negative for chills and fever.   Respiratory: Negative for cough and shortness of breath.    Cardiovascular: Negative for chest pain.   Gastrointestinal: Negative for abdominal pain.   Skin: Negative for rash.   Neurological: Negative for dizziness.   Psychiatric/Behavioral: Positive for dysphoric mood.       Objective:   BP (!) 172/100 (BP Location: Right arm, Patient Position: Sitting, BP Method: Medium (Manual))   Pulse 97   Temp 96.4 °F (35.8 °C) (Tympanic)   Resp 18   Ht 5' 2" (1.575 m)   Wt 80.6 kg (177 lb 11.1 oz)   SpO2 99%   BMI 32.50 kg/m²      Physical Exam   Constitutional: She is oriented to person, place, and time. She appears well-developed and well-nourished.   HENT:   Head: Normocephalic and atraumatic.   Eyes: Conjunctivae are normal.   Cardiovascular: Normal rate.    Pulmonary/Chest: Effort normal. No respiratory distress.   Musculoskeletal: She exhibits no edema.   Neurological: She is alert and oriented to person, place, and time. Coordination normal.   Skin: Skin is warm and dry. No rash noted.   Psychiatric: She has a normal mood and affect. Her behavior is normal.   Vitals reviewed.      Assessment & Plan     Problem List Items Addressed This Visit        Psychiatric    Depression (Chronic)    Current Assessment & Plan     A lot of her " depression stems from not feeling so well physically. Continues on fluoxetine and taking xanax occasionally for breakthrough anxiety.          Relevant Medications    ALPRAZolam (XANAX) 0.5 MG tablet    Chronic anxiety    Relevant Medications    ALPRAZolam (XANAX) 0.5 MG tablet       Cardiac/Vascular    Essential hypertension (Chronic)    Current Assessment & Plan     Elevated today and has been borderline last several visits. Changing losartan to valsartan/hctz. F/u 1 week to recheck blood pressure                 Follow-up in about 7 days (around 7/2/2018) for Blood pressure monitoring.

## 2018-06-29 NOTE — ASSESSMENT & PLAN NOTE
A lot of her depression stems from not feeling so well physically. Continues on fluoxetine and taking xanax occasionally for breakthrough anxiety.

## 2018-07-02 ENCOUNTER — OFFICE VISIT (OUTPATIENT)
Dept: INTERNAL MEDICINE | Facility: CLINIC | Age: 73
End: 2018-07-02
Payer: MEDICARE

## 2018-07-02 VITALS
DIASTOLIC BLOOD PRESSURE: 78 MMHG | RESPIRATION RATE: 19 BRPM | WEIGHT: 177.94 LBS | TEMPERATURE: 97 F | OXYGEN SATURATION: 99 % | BODY MASS INDEX: 32.74 KG/M2 | HEART RATE: 96 BPM | SYSTOLIC BLOOD PRESSURE: 132 MMHG | HEIGHT: 62 IN

## 2018-07-02 DIAGNOSIS — I10 ESSENTIAL HYPERTENSION: Primary | Chronic | ICD-10-CM

## 2018-07-02 DIAGNOSIS — M85.80 OSTEOPENIA, UNSPECIFIED LOCATION: ICD-10-CM

## 2018-07-02 DIAGNOSIS — Z12.39 SCREENING FOR BREAST CANCER: ICD-10-CM

## 2018-07-02 PROCEDURE — 3075F SYST BP GE 130 - 139MM HG: CPT | Mod: CPTII,S$GLB,, | Performed by: NURSE PRACTITIONER

## 2018-07-02 PROCEDURE — 99214 OFFICE O/P EST MOD 30 MIN: CPT | Mod: S$GLB,,, | Performed by: NURSE PRACTITIONER

## 2018-07-02 PROCEDURE — 99999 PR PBB SHADOW E&M-EST. PATIENT-LVL V: CPT | Mod: PBBFAC,,, | Performed by: NURSE PRACTITIONER

## 2018-07-02 PROCEDURE — 3078F DIAST BP <80 MM HG: CPT | Mod: CPTII,S$GLB,, | Performed by: NURSE PRACTITIONER

## 2018-07-02 RX ORDER — AMITRIPTYLINE HYDROCHLORIDE 50 MG/1
50 TABLET, FILM COATED ORAL NIGHTLY
Qty: 90 TABLET | Refills: 3 | Status: SHIPPED | OUTPATIENT
Start: 2018-07-02 | End: 2018-10-18 | Stop reason: SDUPTHER

## 2018-07-05 RX ORDER — BETAMETHASONE VALERATE 1.2 MG/G
CREAM TOPICAL
Qty: 45 G | Refills: 0 | Status: SHIPPED | OUTPATIENT
Start: 2018-07-05 | End: 2018-08-17 | Stop reason: SDUPTHER

## 2018-07-08 NOTE — PROGRESS NOTES
Subjective:       Patient ID: Tea Ring is a 72 y.o. female.    Chief Complaint: Follow-up (HTN)    Hypertension   This is a chronic problem. The problem has been gradually improving since onset. The problem is controlled. Pertinent negatives include no anxiety, blurred vision, chest pain, headaches, malaise/fatigue, neck pain, orthopnea, palpitations, peripheral edema, PND, shortness of breath or sweats. There are no associated agents to hypertension. Risk factors for coronary artery disease include obesity, sedentary lifestyle and dyslipidemia. Past treatments include diuretics and angiotensin blockers. The current treatment provides significant improvement. There are no compliance problems.  Hypertensive end-organ damage includes CAD/MI.     Review of Systems   Constitutional: Negative for chills, fever and malaise/fatigue.   Eyes: Negative for blurred vision and visual disturbance.   Respiratory: Negative for cough and shortness of breath.    Cardiovascular: Positive for leg swelling. Negative for chest pain, palpitations, orthopnea and PND.   Gastrointestinal: Negative for abdominal pain, constipation, diarrhea, nausea and vomiting.   Musculoskeletal: Negative for myalgias and neck pain.   Skin: Negative for rash.   Neurological: Negative for dizziness, weakness, light-headedness and headaches.   Psychiatric/Behavioral: Negative.        Objective:      Physical Exam   Constitutional: She is oriented to person, place, and time. She appears well-developed and well-nourished. No distress.   HENT:   Head: Normocephalic and atraumatic.   Nose: Nose normal.   Eyes: Conjunctivae and EOM are normal. Pupils are equal, round, and reactive to light. Right eye exhibits no discharge. Left eye exhibits no discharge.   Neck: No thyromegaly present.   Cardiovascular: Normal rate and regular rhythm.    No murmur heard.  Pulmonary/Chest: Effort normal and breath sounds normal. No respiratory distress. She has no rales.    Abdominal: Soft. She exhibits no distension.   Musculoskeletal: She exhibits edema (1+ edema BLE).   Neurological: She is alert and oriented to person, place, and time.   Skin: No rash noted. She is not diaphoretic.   Psychiatric: She has a normal mood and affect. Her behavior is normal.       Assessment:       1. Essential hypertension    2. Osteopenia, unspecified location    3. Screening for breast cancer        Plan:     Problem List Items Addressed This Visit        Cardiac/Vascular    Essential hypertension - Primary (Chronic)    Current Assessment & Plan     Controlled continue current meds            Renal/    Screening for breast cancer    Current Assessment & Plan     Schedule mammo            Orthopedic    Osteopenia    Overview     DEXA 1/7/16         Current Assessment & Plan     Schedule dexa             Follow-up in about 3 months (around 10/2/2018) for mood/ HTN.

## 2018-08-08 ENCOUNTER — HOSPITAL ENCOUNTER (OUTPATIENT)
Dept: RADIOLOGY | Facility: HOSPITAL | Age: 73
Discharge: HOME | End: 2018-08-08
Attending: FAMILY MEDICINE
Payer: MEDICARE

## 2018-08-08 ENCOUNTER — OFFICE VISIT (OUTPATIENT)
Dept: INTERNAL MEDICINE | Facility: CLINIC | Age: 73
End: 2018-08-08
Payer: MEDICARE

## 2018-08-08 VITALS
HEART RATE: 100 BPM | WEIGHT: 175.5 LBS | SYSTOLIC BLOOD PRESSURE: 142 MMHG | BODY MASS INDEX: 32.3 KG/M2 | BODY MASS INDEX: 32.57 KG/M2 | TEMPERATURE: 97 F | DIASTOLIC BLOOD PRESSURE: 72 MMHG | HEIGHT: 62 IN | HEIGHT: 62 IN | OXYGEN SATURATION: 97 % | WEIGHT: 177 LBS | RESPIRATION RATE: 18 BRPM

## 2018-08-08 DIAGNOSIS — Z12.39 ENCOUNTER FOR SPECIAL SCREENING EXAMINATION FOR NEOPLASM OF BREAST: ICD-10-CM

## 2018-08-08 DIAGNOSIS — H93.8X3 POPPING OF BOTH EARS: Primary | ICD-10-CM

## 2018-08-08 PROCEDURE — 99999 PR PBB SHADOW E&M-EST. PATIENT-LVL IV: CPT | Mod: PBBFAC,,, | Performed by: FAMILY MEDICINE

## 2018-08-08 PROCEDURE — 99213 OFFICE O/P EST LOW 20 MIN: CPT | Mod: S$GLB,,, | Performed by: FAMILY MEDICINE

## 2018-08-08 PROCEDURE — 3078F DIAST BP <80 MM HG: CPT | Mod: CPTII,S$GLB,, | Performed by: FAMILY MEDICINE

## 2018-08-08 PROCEDURE — 3077F SYST BP >= 140 MM HG: CPT | Mod: CPTII,S$GLB,, | Performed by: FAMILY MEDICINE

## 2018-08-08 PROCEDURE — 77067 SCR MAMMO BI INCL CAD: CPT | Mod: TC,PO

## 2018-08-08 PROCEDURE — 77067 SCR MAMMO BI INCL CAD: CPT | Mod: 26,,, | Performed by: RADIOLOGY

## 2018-08-08 RX ORDER — CETIRIZINE HYDROCHLORIDE 10 MG/1
10 TABLET ORAL DAILY
Qty: 30 TABLET | Refills: 5 | Status: SHIPPED | OUTPATIENT
Start: 2018-08-08 | End: 2018-10-08

## 2018-08-08 RX ORDER — FLUTICASONE PROPIONATE 50 MCG
1 SPRAY, SUSPENSION (ML) NASAL DAILY
Qty: 16 G | Refills: 5 | Status: SHIPPED | OUTPATIENT
Start: 2018-08-08 | End: 2019-07-18

## 2018-08-08 NOTE — PROGRESS NOTES
Subjective:       Patient ID: Tea Ring is a 72 y.o. female.    Chief Complaint: Otalgia and Neck Pain    Pt states there is a constant popping in her ears and which has been occurring for mitch 2 months -  She is concerned about this and it is causing her distress.      Otalgia    There is pain in both ears. This is a recurrent problem. The current episode started more than 1 month ago. The problem occurs constantly. The problem has been gradually worsening. There has been no fever. The pain is moderate. Associated symptoms include rhinorrhea. Pertinent negatives include no coughing, ear discharge, sore throat or vomiting. She has tried nothing for the symptoms. The treatment provided no relief.     Review of Systems   HENT: Positive for ear pain and rhinorrhea. Negative for ear discharge and sore throat.    Respiratory: Negative for cough.    Gastrointestinal: Negative for vomiting.       Objective:      Physical Exam   Constitutional: She appears well-developed and well-nourished. She appears distressed.   HENT:   Head: Normocephalic and atraumatic.   Right Ear: Hearing and external ear normal.   Left Ear: Hearing and external ear normal.   Nose: Nose normal.   Mouth/Throat: Oropharynx is clear and moist.   Dullness and some minimal bulging of TM   Pulmonary/Chest: Effort normal and breath sounds normal. No respiratory distress. She has no wheezes.   Skin: Skin is warm and dry. No rash noted. She is not diaphoretic. No erythema.   Nursing note and vitals reviewed.      Assessment:       1. Popping of both ears        Plan:     Problem List Items Addressed This Visit        ENT    Popping of both ears - Primary    Current Assessment & Plan     mucinex d. I explained to pt that she can try a decongestant.  But to watch her BP, as it can cause increases in BP         Relevant Medications    dextromethorphan-guaifenesin  mg (MUCINEX DM)  mg per 12 hr tablet    cetirizine (ZYRTEC) 10 MG tablet     fluticasone (FLONASE) 50 mcg/actuation nasal spray    Other Relevant Orders    Ambulatory referral to ENT

## 2018-08-08 NOTE — ASSESSMENT & PLAN NOTE
mucinex d. I explained to pt that she can try a decongestant.  But to watch her BP, as it can cause increases in BP

## 2018-08-14 ENCOUNTER — CLINICAL SUPPORT (OUTPATIENT)
Dept: AUDIOLOGY | Facility: CLINIC | Age: 73
End: 2018-08-14
Payer: MEDICARE

## 2018-08-14 ENCOUNTER — OFFICE VISIT (OUTPATIENT)
Dept: OTOLARYNGOLOGY | Facility: CLINIC | Age: 73
End: 2018-08-14
Payer: MEDICARE

## 2018-08-14 VITALS
SYSTOLIC BLOOD PRESSURE: 161 MMHG | WEIGHT: 173.75 LBS | TEMPERATURE: 97 F | HEART RATE: 93 BPM | DIASTOLIC BLOOD PRESSURE: 94 MMHG | BODY MASS INDEX: 31.77 KG/M2

## 2018-08-14 DIAGNOSIS — H90.A32 MIXED CONDUCTIVE AND SENSORINEURAL HEARING LOSS OF LEFT EAR WITH RESTRICTED HEARING OF RIGHT EAR: ICD-10-CM

## 2018-08-14 DIAGNOSIS — H65.192 ACUTE OTITIS MEDIA WITH EFFUSION OF LEFT EAR: Primary | ICD-10-CM

## 2018-08-14 DIAGNOSIS — H90.A12 CONDUCTIVE HEARING LOSS OF LEFT EAR WITH RESTRICTED HEARING OF RIGHT EAR: Primary | ICD-10-CM

## 2018-08-14 PROCEDURE — 99204 OFFICE O/P NEW MOD 45 MIN: CPT | Mod: S$GLB,,, | Performed by: ORTHOPAEDIC SURGERY

## 2018-08-14 PROCEDURE — 92557 COMPREHENSIVE HEARING TEST: CPT | Mod: S$GLB,,, | Performed by: AUDIOLOGIST

## 2018-08-14 PROCEDURE — 3077F SYST BP >= 140 MM HG: CPT | Mod: CPTII,S$GLB,, | Performed by: ORTHOPAEDIC SURGERY

## 2018-08-14 PROCEDURE — 3080F DIAST BP >= 90 MM HG: CPT | Mod: CPTII,S$GLB,, | Performed by: ORTHOPAEDIC SURGERY

## 2018-08-14 PROCEDURE — 99999 PR PBB SHADOW E&M-EST. PATIENT-LVL IV: CPT | Mod: PBBFAC,,, | Performed by: ORTHOPAEDIC SURGERY

## 2018-08-14 PROCEDURE — 92567 TYMPANOMETRY: CPT | Mod: S$GLB,,, | Performed by: AUDIOLOGIST

## 2018-08-14 RX ORDER — BETAMETHASONE VALERATE 1.2 MG/G
CREAM TOPICAL
Qty: 45 G | Refills: 0 | OUTPATIENT
Start: 2018-08-14

## 2018-08-14 RX ORDER — IBUPROFEN 800 MG/1
TABLET ORAL
Qty: 30 TABLET | Refills: 0 | Status: SHIPPED | OUTPATIENT
Start: 2018-08-14 | End: 2018-11-06

## 2018-08-14 NOTE — PROGRESS NOTES
Tea Ring was seen 08/14/2018 for an audiological evaluation.  Patient complains of decreased hearing in her left ear.  She denies tinnitus and dizziness.  She reports popping sensation in her left ear.  She denies family history of hearing loss.    Results reveal a mild-to-moderate sensorineural hearing loss 250-8000 Hz for the right ear, and  moderate-to-severe mixed hearing loss 250-8000 Hz for the left ear.   Speech Reception Thresholds were  15 dBHL for the right ear and 40 dBHL for the left ear.   Word recognition scores were excellent for the right ear and good for the left ear.   Tympanograms were Type A for the right ear and Type B for the left ear.    Patient was counseled on the above findings.    REC:  ENT review of audiogram  Repeat audiogram upon ENT request

## 2018-08-14 NOTE — LETTER
August 15, 2018      Hernán Sandoval MD  [id number] [address] [address]           [facility]  [address] [facility] [address]  [facility] [address]  Phone: [phone]  Fax: [phone]          Patient: Tea Ring   MR Number: [id number]   YOB: 1945   Date of Visit: 8/14/2018       Dear Dr. Hernán Sandoval:    Thank you for referring Tea Ring to me for evaluation. Attached you will find relevant portions of my assessment and plan of care.    If you have questions, please do not hesitate to call me. I look forward to following Tea Ring along with you.    Sincerely,    Umu Alvarado MD    Enclosure  CC:  No Recipients    If you would like to receive this communication electronically, please contact [email or web address] or [phone] to request more information on [employer] access.    For providers and/or their staff who would like to refer a patient to [facility], please contact us through our one-stop-shop provider referral line, [facility], at [phone].    If you feel you have received this communication in error or would no longer like to receive these types of communications, please e-mail [email or web address]

## 2018-08-14 NOTE — PROGRESS NOTES
Subjective:      Patient ID: Tea Ring is a 72 y.o. female.    Chief Complaint: Popping of both ears and Otalgia (Left ear)    Patient is a very pleasant 72 y.o. female here to see me today for the first time for evaluation of hearing loss.  She reports hearing loss that has been present in the left ear for two months.  She has noted any difference in hearing between the ears, with the right ear being the better hearing ear.  She has noted any tinnitus in the left ear.  She has not had any recent issues with ear pain or ear drainage.  She denies a family history of hearing loss, and has not had any previous otologic surgery.  She denies any history of significant loud noise exposure.  She denies issues with dizziness.  She has Flonase at home, but has not used consistently.            Review of Systems   Constitutional: Positive for fatigue, fever (At night) and unexpected weight change (Gained weight). Negative for chills.   HENT: Negative for congestion, dental problem, ear discharge, ear pain, facial swelling, hearing loss, nosebleeds, postnasal drip, rhinorrhea, sinus pressure, sneezing, sore throat, tinnitus, trouble swallowing and voice change.    Eyes: Positive for redness, itching and visual disturbance.   Respiratory: Positive for shortness of breath and wheezing (Occasionally). Negative for cough and choking.    Cardiovascular: Positive for palpitations. Negative for chest pain.   Gastrointestinal: Positive for abdominal pain.        No reflux.   Musculoskeletal: Positive for gait problem.   Skin: Negative for rash.   Neurological: Positive for dizziness, light-headedness and headaches.       Objective:       Physical Exam   Constitutional: She is oriented to person, place, and time. She appears well-developed and well-nourished. No distress.   HENT:   Head: Normocephalic and atraumatic.   Right Ear: Tympanic membrane, external ear and ear canal normal.   Left Ear: External ear and ear canal normal. A  middle ear effusion is present. Decreased hearing is noted.   Nose: Nose normal. No mucosal edema, rhinorrhea, nasal deformity or septal deviation. No epistaxis. Right sinus exhibits no maxillary sinus tenderness and no frontal sinus tenderness. Left sinus exhibits no maxillary sinus tenderness and no frontal sinus tenderness.   Mouth/Throat: Uvula is midline, oropharynx is clear and moist and mucous membranes are normal. Mucous membranes are not pale and not dry. No dental caries. No oropharyngeal exudate or posterior oropharyngeal erythema.   Mucopurulent effusion left middle ear   Eyes: Conjunctivae, EOM and lids are normal. Pupils are equal, round, and reactive to light. Right eye exhibits no chemosis. Left eye exhibits no chemosis. Right conjunctiva is not injected. Left conjunctiva is not injected. No scleral icterus. Right eye exhibits normal extraocular motion and no nystagmus. Left eye exhibits normal extraocular motion and no nystagmus.   Neck: Trachea normal and phonation normal. No tracheal tenderness present. No tracheal deviation present. No thyroid mass and no thyromegaly present.   Cardiovascular: Intact distal pulses.   Pulmonary/Chest: Effort normal. No stridor. No respiratory distress.   Abdominal: She exhibits no distension.   Lymphadenopathy:        Head (right side): No submental, no submandibular, no preauricular, no posterior auricular and no occipital adenopathy present.        Head (left side): No submental, no submandibular, no preauricular, no posterior auricular and no occipital adenopathy present.     She has no cervical adenopathy.   Neurological: She is alert and oriented to person, place, and time. No cranial nerve deficit.   Skin: Skin is warm and dry. No rash noted. No erythema.   Diffuse hypopigmentation   Psychiatric: She has a normal mood and affect. Her behavior is normal.     AUDIOGRAM:  Results reveal a mild-to-moderate sensorineural hearing loss 250-8000 Hz for the right  ear, and  moderate-to-severe mixed hearing loss 250-8000 Hz for the left ear.   Speech Reception Thresholds were  15 dBHL for the right ear and 40 dBHL for the left ear.   Word recognition scores were excellent for the right ear and good for the left ear.   Tympanograms were Type A for the right ear and Type B for the left ear.    Assessment:       1. Acute otitis media with effusion of left ear    2. Mixed conductive and sensorineural hearing loss of left ear with restricted hearing of right ear        Plan:     Acute otitis media with effusion of left ear    Mixed conductive and sensorineural hearing loss of left ear with restricted hearing of right ear     Will treat with oral antibiotics as well as oral steroids for her acute otitis media in the left ear.  I would also recommend that she continue with her Flonase daily until she returns to clinic.  Return to clinic in 6 weeks with repeat audiogram.  If fluid in the left ear persists at that time, would then proceed with nasopharyngoscopy as well as discussion of tube placement in that ear.  Call for sooner appointment if needed.  Due to ear exam today, would possibly need to consider placement of tube in OR, will evaluate at return visit.

## 2018-08-15 RX ORDER — PREDNISONE 20 MG/1
TABLET ORAL
Qty: 21 TABLET | Refills: 0 | Status: SHIPPED | OUTPATIENT
Start: 2018-08-15 | End: 2018-10-08

## 2018-08-15 RX ORDER — AMOXICILLIN 875 MG/1
875 TABLET, FILM COATED ORAL 2 TIMES DAILY
Qty: 20 TABLET | Refills: 0 | Status: SHIPPED | OUTPATIENT
Start: 2018-08-15 | End: 2018-08-25

## 2018-08-15 RX ORDER — FLUTICASONE PROPIONATE 50 MCG
2 SPRAY, SUSPENSION (ML) NASAL DAILY
Qty: 1 BOTTLE | Refills: 12 | Status: SHIPPED | OUTPATIENT
Start: 2018-08-15 | End: 2018-10-08

## 2018-08-16 ENCOUNTER — TELEPHONE (OUTPATIENT)
Dept: INTERNAL MEDICINE | Facility: CLINIC | Age: 73
End: 2018-08-16

## 2018-08-16 RX ORDER — BETAMETHASONE VALERATE 1.2 MG/G
1 CREAM TOPICAL 2 TIMES DAILY
Qty: 45 G | Refills: 0 | OUTPATIENT
Start: 2018-08-16
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
## 2018-08-17 NOTE — TELEPHONE ENCOUNTER
----- Message from Nathaly London sent at 8/17/2018  3:28 PM CDT -----  Contact: self   Patient would like to consult with nurse regarding refilling face cream. Patient states she has left several messages without a response.Please call back at [phone].        Thanks,  Nathaly London

## 2018-08-20 RX ORDER — BETAMETHASONE VALERATE 1.2 MG/G
CREAM TOPICAL
Qty: 45 G | Refills: 0 | Status: SHIPPED | OUTPATIENT
Start: 2018-08-20 | End: 2018-10-08

## 2018-09-06 ENCOUNTER — OFFICE VISIT (OUTPATIENT)
Dept: OPHTHALMOLOGY | Facility: CLINIC | Age: 73
End: 2018-09-06
Payer: MEDICARE

## 2018-09-06 DIAGNOSIS — H40.023 OAG (OPEN ANGLE GLAUCOMA) SUSPECT, HIGH RISK, BILATERAL: Primary | ICD-10-CM

## 2018-09-06 DIAGNOSIS — H25.13 NUCLEAR SCLEROSIS, BILATERAL: ICD-10-CM

## 2018-09-06 DIAGNOSIS — H52.223 REGULAR ASTIGMATISM OF BOTH EYES: ICD-10-CM

## 2018-09-06 PROCEDURE — 92015 DETERMINE REFRACTIVE STATE: CPT | Mod: ,,, | Performed by: OPTOMETRIST

## 2018-09-06 PROCEDURE — 99211 OFF/OP EST MAY X REQ PHY/QHP: CPT | Mod: PBBFAC,PO | Performed by: OPTOMETRIST

## 2018-09-06 PROCEDURE — 99999 PR PBB SHADOW E&M-EST. PATIENT-LVL I: CPT | Mod: PBBFAC,,, | Performed by: OPTOMETRIST

## 2018-09-06 PROCEDURE — 92004 COMPRE OPH EXAM NEW PT 1/>: CPT | Mod: S$PBB,,, | Performed by: OPTOMETRIST

## 2018-09-06 NOTE — PROGRESS NOTES
HPI     PTs last exam was 5 years ago. PT c/o blurred near va and wears gls part   time.   HPI    Any vision changes since last exam: decreased near va  Eye pain: no  Other ocular symptoms: irritation and discharge, uses visine prn      Do you wear currently wear glasses or contacts? gls    Interested in contacts today? no    Do you plan on getting new glasses today? If needed      Last edited by Kristin Guerrero MA on 9/6/2018  9:56 AM. (History)            Assessment /Plan     For exam results, see Encounter Report.    OAG (open angle glaucoma) suspect, high risk, bilateral  -     Posterior Segment OCT Optic Nerve- Both eyes; Future  -     Marquez Visual Field - OU - Extended - Both Eyes; Future  Suspect based on ONH cupping, increased IOP and +fam h/o OAG- siblings  RTC for testing    Nuclear sclerosis, bilateral  Cataract accounts for change in vision. Patient is at the option stage. Cataract surgery will improve vision, but necessity is dependent on patient's symptoms. Patient prefers to wait on surgery at this time. Pt to call back if symptoms worsen before next appointment.    Regular astigmatism of both eyes  Eyeglass Final Rx     Eyeglass Final Rx       Sphere Cylinder Axis Add    Right -0.50 +1.00 170 +2.75    Left -0.25 +1.00 170 +2.75    Expiration Date:  9/7/2019                RTC 1-6 wks for IOP check, 24-2VF, gOCT and pach  Discussed above and answered questions.

## 2018-10-08 ENCOUNTER — OFFICE VISIT (OUTPATIENT)
Dept: INTERNAL MEDICINE | Facility: CLINIC | Age: 73
End: 2018-10-08
Payer: MEDICARE

## 2018-10-08 VITALS
DIASTOLIC BLOOD PRESSURE: 81 MMHG | RESPIRATION RATE: 16 BRPM | TEMPERATURE: 99 F | OXYGEN SATURATION: 97 % | HEART RATE: 100 BPM | HEIGHT: 62 IN | BODY MASS INDEX: 32.7 KG/M2 | WEIGHT: 177.69 LBS | SYSTOLIC BLOOD PRESSURE: 145 MMHG

## 2018-10-08 DIAGNOSIS — F41.9 CHRONIC ANXIETY: ICD-10-CM

## 2018-10-08 DIAGNOSIS — F32.1 MODERATE MAJOR DEPRESSION: Primary | ICD-10-CM

## 2018-10-08 DIAGNOSIS — M47.816 LUMBAR SPONDYLOSIS: ICD-10-CM

## 2018-10-08 PROCEDURE — 3077F SYST BP >= 140 MM HG: CPT | Mod: CPTII,,, | Performed by: FAMILY MEDICINE

## 2018-10-08 PROCEDURE — 99999 PR PBB SHADOW E&M-EST. PATIENT-LVL III: CPT | Mod: PBBFAC,,, | Performed by: FAMILY MEDICINE

## 2018-10-08 PROCEDURE — 3079F DIAST BP 80-89 MM HG: CPT | Mod: CPTII,,, | Performed by: FAMILY MEDICINE

## 2018-10-08 PROCEDURE — 99213 OFFICE O/P EST LOW 20 MIN: CPT | Mod: PBBFAC,PO | Performed by: FAMILY MEDICINE

## 2018-10-08 PROCEDURE — 99499 UNLISTED E&M SERVICE: CPT | Mod: HCNC,S$GLB,, | Performed by: FAMILY MEDICINE

## 2018-10-08 PROCEDURE — 99214 OFFICE O/P EST MOD 30 MIN: CPT | Mod: S$PBB,,, | Performed by: FAMILY MEDICINE

## 2018-10-08 PROCEDURE — 1101F PT FALLS ASSESS-DOCD LE1/YR: CPT | Mod: CPTII,,, | Performed by: FAMILY MEDICINE

## 2018-10-08 RX ORDER — OXYBUTYNIN CHLORIDE 5 MG/1
5 TABLET ORAL 3 TIMES DAILY
COMMUNITY
End: 2018-10-08

## 2018-10-08 RX ORDER — BETAMETHASONE VALERATE 1.2 MG/G
CREAM TOPICAL
Qty: 45 G | Refills: 0 | Status: SHIPPED | OUTPATIENT
Start: 2018-10-08 | End: 2018-11-06 | Stop reason: SDUPTHER

## 2018-10-08 RX ORDER — SERTRALINE HYDROCHLORIDE 50 MG/1
50 TABLET, FILM COATED ORAL DAILY
Qty: 30 TABLET | Refills: 11 | Status: SHIPPED | OUTPATIENT
Start: 2018-10-08 | End: 2018-11-06 | Stop reason: SDUPTHER

## 2018-10-08 RX ORDER — ALPRAZOLAM 0.25 MG/1
0.25 TABLET ORAL 2 TIMES DAILY
Qty: 60 TABLET | Refills: 0 | Status: SHIPPED | OUTPATIENT
Start: 2018-10-08 | End: 2018-11-07 | Stop reason: SDUPTHER

## 2018-10-08 RX ORDER — ATORVASTATIN CALCIUM 10 MG/1
10 TABLET, FILM COATED ORAL DAILY
Qty: 90 TABLET | Refills: 3 | Status: SHIPPED | OUTPATIENT
Start: 2018-10-08 | End: 2019-10-29 | Stop reason: SDUPTHER

## 2018-10-08 NOTE — PROGRESS NOTES
Subjective:       Patient ID: Tea Ring is a 72 y.o. female.    Chief Complaint: Follow-up; Back Pain; and Headache    HPI  Recently had a couple of falls. One was at [employer] and another was at home. One fall was when she was hustling to get to bathroom. Says that her feet got tangled in the sheets.   Continues to have headache.  Taking OTC pain meds and using ice and heat. A lot of back pain in morning.     She admits to being depressed and the only medication she has taken his alprazolam.  She will take 0.5 mg up to 3 times a day at times.  Is not on any SSRI medication.  Says that she was on higher dose   Of Xanax the past.    Still having ear issues after going to ENT. Received antibiotics and steroids.     Family History   Problem Relation Age of Onset    Diabetes Mother     Diabetes Father     Breast cancer Maternal Aunt        Current Outpatient Medications:     ALPRAZolam (XANAX) 0.25 MG tablet, Take 1 tablet (0.25 mg total) by mouth 2 (two) times daily. as needed for anxiety., Disp: 60 tablet, Rfl: 0    amitriptyline (ELAVIL) 50 MG tablet, Take 1 tablet (50 mg total) by mouth every evening., Disp: 90 tablet, Rfl: 3    aspirin (ECOTRIN) 81 MG EC tablet, Take 1 tablet (81 mg total) by mouth once daily., Disp: , Rfl: 0    atorvastatin (LIPITOR) 10 MG tablet, Take 1 tablet (10 mg total) by mouth once daily., Disp: 90 tablet, Rfl: 3    betamethasone valerate 0.1% (VALISONE) 0.1 % Crea, APPLY TOPICALLY TWICE DAILY., Disp: 45 g, Rfl: 0    blood pressure monitor (BLOOD PRESSURE KIT) Kit, Record daily, Disp: 1 each, Rfl: 0    fluticasone (FLONASE) 50 mcg/actuation nasal spray, 1 spray (50 mcg total) by Each Nare route once daily., Disp: 16 g, Rfl: 5    ibuprofen (ADVIL,MOTRIN) 800 MG tablet, TAKE ONE TABLET BY MOUTH ONCE DAILY AS NEEDED FOR PAIN, Disp: 30 tablet, Rfl: 0    pantoprazole (PROTONIX) 40 MG tablet, Take 1 tablet (40 mg total) by mouth once daily., Disp: 90 tablet, Rfl: 3     "valsartan-hydrochlorothiazide (DIOVAN-HCT) 160-12.5 mg per tablet, Take 1 tablet by mouth once daily., Disp: 90 tablet, Rfl: 3    sertraline (ZOLOFT) 50 MG tablet, Take 1 tablet (50 mg total) by mouth once daily., Disp: 30 tablet, Rfl: 11    Review of Systems   Constitutional: Negative for chills and fever.   Respiratory: Negative for cough and shortness of breath.    Cardiovascular: Negative for chest pain.   Gastrointestinal: Negative for abdominal pain.   Musculoskeletal: Positive for back pain.   Skin: Negative for rash.   Neurological: Positive for light-headedness and headaches. Negative for dizziness.       Objective:   BP (!) 145/81 (BP Location: Right arm, Patient Position: Sitting, BP Method: Medium (Automatic))   Pulse 100   Temp 99.1 °F (37.3 °C) (Oral)   Resp 16   Ht 5' 2.4" (1.585 m)   Wt 80.6 kg (177 lb 11.1 oz)   SpO2 97%   BMI 32.08 kg/m²      Physical Exam   Constitutional: She is oriented to person, place, and time. She appears well-developed and well-nourished.   HENT:   Head: Normocephalic and atraumatic.   Eyes: Conjunctivae are normal.   Cardiovascular: Normal rate.   Pulmonary/Chest: Effort normal. No respiratory distress.   Musculoskeletal: She exhibits no edema.   Neurological: She is alert and oriented to person, place, and time. Coordination normal.   Skin: Skin is warm and dry. No rash noted.   Psychiatric: Her behavior is normal.   Appeared quite depressed. Is really tired of dealing with all of the pain that she is in   Vitals reviewed.      Assessment & Plan     Problem List Items Addressed This Visit        Neuro    Lumbar spondylosis    Current Assessment & Plan       Recommended trying a low dose of daily meloxicam.  I reviewed her most recent renal function and everything was greater than 60. Hopefully this can help give her some relief because she says she is pretty miserable with her back and knee pain.            Psychiatric    Chronic anxiety    Relevant Medications    " ALPRAZolam (XANAX) 0.25 MG tablet    Moderate major depression - Primary    Current Assessment & Plan      Recommended that we start her on Zoloft in efforts to help with the depression and perhaps even manage was some of her chronic pain.  I told her that we need to have from list Xanax because it does pose an increased risk for falls.  She was not very happy about this but except a decision.  I told her wrist taper down to 0.25 mg at a maximum of 2 times a day but really to only use it as needed.                 Follow-up in about 4 weeks (around 11/5/2018).

## 2018-10-09 NOTE — ASSESSMENT & PLAN NOTE
Recommended that we start her on Zoloft in efforts to help with the depression and perhaps even manage was some of her chronic pain.  I told her that we need to have from list Xanax because it does pose an increased risk for falls.  She was not very happy about this but except a decision.  I told her wrist taper down to 0.25 mg at a maximum of 2 times a day but really to only use it as needed.

## 2018-10-09 NOTE — ASSESSMENT & PLAN NOTE
Recommended trying a low dose of daily meloxicam.  I reviewed her most recent renal function and everything was greater than 60. Hopefully this can help give her some relief because she says she is pretty miserable with her back and knee pain.

## 2018-10-18 ENCOUNTER — OFFICE VISIT (OUTPATIENT)
Dept: OPHTHALMOLOGY | Facility: CLINIC | Age: 73
End: 2018-10-18
Payer: MEDICARE

## 2018-10-18 ENCOUNTER — APPOINTMENT (OUTPATIENT)
Dept: OPHTHALMOLOGY | Facility: CLINIC | Age: 73
End: 2018-10-18
Payer: MEDICARE

## 2018-10-18 DIAGNOSIS — H40.023 OAG (OPEN ANGLE GLAUCOMA) SUSPECT, HIGH RISK, BILATERAL: ICD-10-CM

## 2018-10-18 DIAGNOSIS — H40.1133 PRIMARY OPEN ANGLE GLAUCOMA (POAG) OF BOTH EYES, SEVERE STAGE: Primary | ICD-10-CM

## 2018-10-18 PROCEDURE — 92012 INTRM OPH EXAM EST PATIENT: CPT | Mod: S$PBB,,, | Performed by: OPTOMETRIST

## 2018-10-18 PROCEDURE — 92133 CPTRZD OPH DX IMG PST SGM ON: CPT | Mod: PBBFAC,PO | Performed by: OPTOMETRIST

## 2018-10-18 PROCEDURE — 99999 PR PBB SHADOW E&M-EST. PATIENT-LVL I: CPT | Mod: PBBFAC,,, | Performed by: OPTOMETRIST

## 2018-10-18 PROCEDURE — 92083 EXTENDED VISUAL FIELD XM: CPT | Mod: PBBFAC,PO | Performed by: OPTOMETRIST

## 2018-10-18 PROCEDURE — 76514 ECHO EXAM OF EYE THICKNESS: CPT | Mod: PBBFAC,PO | Performed by: OPTOMETRIST

## 2018-10-18 PROCEDURE — 76514 ECHO EXAM OF EYE THICKNESS: CPT | Mod: 26,S$PBB,, | Performed by: OPTOMETRIST

## 2018-10-18 PROCEDURE — 99211 OFF/OP EST MAY X REQ PHY/QHP: CPT | Mod: PBBFAC,PO,25 | Performed by: OPTOMETRIST

## 2018-10-18 RX ORDER — AMITRIPTYLINE HYDROCHLORIDE 50 MG/1
50 TABLET, FILM COATED ORAL NIGHTLY
Qty: 90 TABLET | Refills: 3 | Status: SHIPPED | OUTPATIENT
Start: 2018-10-18 | End: 2019-07-25 | Stop reason: SDUPTHER

## 2018-10-18 RX ORDER — LATANOPROST 50 UG/ML
1 SOLUTION/ DROPS OPHTHALMIC NIGHTLY
Qty: 1 BOTTLE | Refills: 4 | Status: SHIPPED | OUTPATIENT
Start: 2018-10-18 | End: 2019-07-18

## 2018-10-18 NOTE — PROGRESS NOTES
HPI     Glaucoma Suspect      Additional comments: IOP check, 24-2VF, gOCT and pach              Comments     PT was last seen on 9/6/8 with DNL for full exam. PT was told to rtc 1-6   weeks for IOP check, 24-2VF, gOCT and pach.  Medication eye drops if any: none  Last HVF: 10/18/18  Last gOCT: 10/18/18  Last SDP: none             Last edited by Kristin Guerrero MA on 10/18/2018  9:25 AM. (History)            Assessment /Plan     For exam results, see Encounter Report.    Primary open angle glaucoma (POAG) of both eyes, severe stage    -     Marquez Visual Field - OU - Extended - Both Eyes  -     Posterior Segment OCT Optic Nerve- Both eyes    -     latanoprost 0.005 % ophthalmic solution; Place 1 drop into both eyes every evening.  Dispense: 1 Bottle; Refill: 4    Significant VF changes OD>OS with thinning on gOCT  Start latanoprost qhs OU    RTC 1 month for va check, discussed may need 2nd drop or SLT

## 2018-10-18 NOTE — TELEPHONE ENCOUNTER
----- Message from Sherron Galeana sent at 10/18/2018  2:37 PM CDT -----  Contact: pt  She is calling to get a refill...    1. What is the name of the medication you are requesting? Amitripty  2. What is the dose? 50 mg  3. How do you take the medication? Orally, topically, etc? orally  4. How often do you take this medication? Once daily  5. Do you need a 30 day or 90 day supply? 30  6. How many refills are you requesting? 1  7. What is your preferred pharmacy and location of the pharmacy? [facility] in [place], [place] [phone]  8. Who can we contact with further questions? [phone] ([place])

## 2018-11-02 ENCOUNTER — TELEPHONE (OUTPATIENT)
Dept: INTERNAL MEDICINE | Facility: CLINIC | Age: 73
End: 2018-11-02

## 2018-11-02 NOTE — TELEPHONE ENCOUNTER
----- Message from Carissa Jones sent at 11/2/2018  1:21 PM CDT -----  Patient returning call..[phone] ([place])

## 2018-11-06 ENCOUNTER — OUTPATIENT CASE MANAGEMENT (OUTPATIENT)
Dept: ADMINISTRATIVE | Facility: OTHER | Age: 73
End: 2018-11-06

## 2018-11-06 ENCOUNTER — OFFICE VISIT (OUTPATIENT)
Dept: INTERNAL MEDICINE | Facility: CLINIC | Age: 73
End: 2018-11-06
Payer: MEDICARE

## 2018-11-06 ENCOUNTER — LAB VISIT (OUTPATIENT)
Dept: LAB | Facility: HOSPITAL | Age: 73
End: 2018-11-06
Attending: NURSE PRACTITIONER
Payer: MEDICARE

## 2018-11-06 VITALS
WEIGHT: 182.31 LBS | SYSTOLIC BLOOD PRESSURE: 152 MMHG | RESPIRATION RATE: 18 BRPM | DIASTOLIC BLOOD PRESSURE: 84 MMHG | BODY MASS INDEX: 33.55 KG/M2 | HEART RATE: 91 BPM | TEMPERATURE: 98 F | OXYGEN SATURATION: 98 % | HEIGHT: 62 IN

## 2018-11-06 DIAGNOSIS — F41.9 CHRONIC ANXIETY: ICD-10-CM

## 2018-11-06 DIAGNOSIS — M89.9 DISORDER OF BONE: ICD-10-CM

## 2018-11-06 DIAGNOSIS — F33.1 MODERATE EPISODE OF RECURRENT MAJOR DEPRESSIVE DISORDER: Chronic | ICD-10-CM

## 2018-11-06 DIAGNOSIS — I10 ESSENTIAL HYPERTENSION: Primary | Chronic | ICD-10-CM

## 2018-11-06 DIAGNOSIS — M47.816 LUMBAR SPONDYLOSIS: ICD-10-CM

## 2018-11-06 DIAGNOSIS — L93.2 CUTANEOUS LUPUS ERYTHEMATOSUS: ICD-10-CM

## 2018-11-06 DIAGNOSIS — M25.569 CHRONIC KNEE PAIN, UNSPECIFIED LATERALITY: ICD-10-CM

## 2018-11-06 DIAGNOSIS — G89.29 CHRONIC KNEE PAIN, UNSPECIFIED LATERALITY: ICD-10-CM

## 2018-11-06 DIAGNOSIS — M85.80 OSTEOPENIA, UNSPECIFIED LOCATION: ICD-10-CM

## 2018-11-06 DIAGNOSIS — I10 ESSENTIAL HYPERTENSION: Chronic | ICD-10-CM

## 2018-11-06 LAB
CHOLEST SERPL-MCNC: 134 MG/DL
CHOLEST/HDLC SERPL: 3 {RATIO}
HDLC SERPL-MCNC: 45 MG/DL
HDLC SERPL: 33.6 %
LDLC SERPL CALC-MCNC: 71.8 MG/DL
NONHDLC SERPL-MCNC: 89 MG/DL
TRIGL SERPL-MCNC: 86 MG/DL
TSH SERPL DL<=0.005 MIU/L-ACNC: 0.47 UIU/ML

## 2018-11-06 PROCEDURE — 3079F DIAST BP 80-89 MM HG: CPT | Mod: CPTII,HCNC,S$GLB, | Performed by: NURSE PRACTITIONER

## 2018-11-06 PROCEDURE — G0008 ADMIN INFLUENZA VIRUS VAC: HCPCS | Mod: HCNC,S$GLB,, | Performed by: FAMILY MEDICINE

## 2018-11-06 PROCEDURE — 90662 IIV NO PRSV INCREASED AG IM: CPT | Mod: HCNC,S$GLB,, | Performed by: FAMILY MEDICINE

## 2018-11-06 PROCEDURE — 99499 UNLISTED E&M SERVICE: CPT | Mod: HCNC,S$GLB,, | Performed by: NURSE PRACTITIONER

## 2018-11-06 PROCEDURE — 99215 OFFICE O/P EST HI 40 MIN: CPT | Mod: HCNC,25,S$GLB, | Performed by: NURSE PRACTITIONER

## 2018-11-06 PROCEDURE — 36415 COLL VENOUS BLD VENIPUNCTURE: CPT | Mod: HCNC,PO

## 2018-11-06 PROCEDURE — 80061 LIPID PANEL: CPT | Mod: HCNC

## 2018-11-06 PROCEDURE — 84443 ASSAY THYROID STIM HORMONE: CPT | Mod: HCNC,PO

## 2018-11-06 PROCEDURE — 99999 PR PBB SHADOW E&M-EST. PATIENT-LVL V: CPT | Mod: PBBFAC,HCNC,, | Performed by: NURSE PRACTITIONER

## 2018-11-06 PROCEDURE — 1101F PT FALLS ASSESS-DOCD LE1/YR: CPT | Mod: CPTII,HCNC,S$GLB, | Performed by: NURSE PRACTITIONER

## 2018-11-06 PROCEDURE — 3077F SYST BP >= 140 MM HG: CPT | Mod: CPTII,HCNC,S$GLB, | Performed by: NURSE PRACTITIONER

## 2018-11-06 RX ORDER — AMLODIPINE BESYLATE 5 MG/1
5 TABLET ORAL DAILY
Qty: 30 TABLET | Refills: 1 | Status: SHIPPED | OUTPATIENT
Start: 2018-11-06 | End: 2019-10-08 | Stop reason: ALTCHOICE

## 2018-11-06 RX ORDER — BETAMETHASONE VALERATE 1.2 MG/G
CREAM TOPICAL
Qty: 45 G | Refills: 0 | Status: SHIPPED | OUTPATIENT
Start: 2018-11-06 | End: 2018-12-04 | Stop reason: SDUPTHER

## 2018-11-06 RX ORDER — MELOXICAM 7.5 MG/1
7.5 TABLET ORAL DAILY PRN
Qty: 30 TABLET | Refills: 1 | Status: SHIPPED | OUTPATIENT
Start: 2018-11-06 | End: 2018-12-06

## 2018-11-06 RX ORDER — SERTRALINE HYDROCHLORIDE 100 MG/1
100 TABLET, FILM COATED ORAL DAILY
Qty: 30 TABLET | Refills: 2 | Status: SHIPPED | OUTPATIENT
Start: 2018-11-06 | End: 2019-02-06 | Stop reason: SDUPTHER

## 2018-11-06 NOTE — PROGRESS NOTES
Thank you for the referral.  Patient has been assigned to Barb Crouch LMSW for low risk screening for Outpatient Case Management.     Reason for referral: Essential hypertension  Moderate episode of recurrent major depressive disorder  Lumbar spondylosis    Please contact [facility] at ext. [phone] with any questions.    Rhdoa Perez    [facility]

## 2018-11-06 NOTE — MEDICAL/APP STUDENT
Subjective:       Patient ID: Tea Ring is a 73 y.o. female.    Chief Complaint: Follow-up (4 week depression)    States her depression is a little better with zoloft, but states the lower dose of xanex is not working. Has not taken any since Sunday.    Hurting in her right knee reports history of steroid injection last year that Dr. Sandoval did. Reports that helped.   Also reports lower back pain. Reports the pain comes and goes, activity makes it worse. She took the mobic and reports that helped some. Has not seen ortho in a while. Dr. Fritz.   Right arm weakness.    Reports shortness of breath with exertion and reports productive cough in the morning. And reports BP has been elevated at home 150s. And reports headaches.     Eyes - elevated pressure, blurriness that improves with glasses - eye doctor in [place] on [address] - doesn't know their name.   Ears stay stopped up - ENT started her on steroids, [place] doc [place] [address].     Needs lipid level  Has not had Dexa scan states she was on medication for her bones but states she needs a new prescription for that.      Review of Systems    Objective:      Physical Exam    Assessment:       No diagnosis found.    Plan:

## 2018-11-07 ENCOUNTER — OUTPATIENT CASE MANAGEMENT (OUTPATIENT)
Dept: ADMINISTRATIVE | Facility: OTHER | Age: 73
End: 2018-11-07

## 2018-11-07 DIAGNOSIS — F41.9 CHRONIC ANXIETY: ICD-10-CM

## 2018-11-07 RX ORDER — ALPRAZOLAM 0.25 MG/1
0.25 TABLET ORAL 2 TIMES DAILY
Qty: 60 TABLET | Refills: 0 | Status: SHIPPED | OUTPATIENT
Start: 2018-11-07 | End: 2018-12-04 | Stop reason: SDUPTHER

## 2018-11-07 NOTE — ASSESSMENT & PLAN NOTE
Increasing zoloft  She is wanting to go back up on xanax  But willing to try increased zoloft first

## 2018-11-07 NOTE — PROGRESS NOTES
Subjective:       Patient ID: Tea Ring is a 73 y.o. female.    Chief Complaint: Follow-up (4 week depression)    States her depression is a little better with zoloft, but states the lower dose of xanex is not working. Has not taken any since Sunday.    Hurting in her right knee reports history of steroid injection last year that Dr. Sandoval did. Reports that helped.   Also reports lower back pain. Reports the pain comes and goes, activity makes it worse. She took the mobic and reports that helped some. Has not seen ortho in a while. Dr. Fritz.   Right arm weakness.     Reports shortness of breath with exertion and reports productive cough in the morning. And reports BP has been elevated at home 150s. And reports headaches.      Eyes - elevated pressure, blurriness that improves with glasses - eye doctor in [place] [address] - doesn't know their name.   Ears stay stopped up - ENT started her on steroids, See doc [place] [address].      Needs lipid level  Has not had Dexa scan states she was on medication for her bones but states she needs a new prescription for that.          Review of Systems   Constitutional: Negative for activity change, chills, diaphoresis and fever.   HENT: Positive for congestion and rhinorrhea. Negative for postnasal drip, sinus pressure, sinus pain and sore throat.    Eyes: Negative for pain and visual disturbance.   Respiratory: Negative for cough, chest tightness, shortness of breath and wheezing.    Cardiovascular: Negative for chest pain, palpitations and leg swelling.   Gastrointestinal: Negative for abdominal pain, constipation, diarrhea, nausea and vomiting.   Musculoskeletal: Positive for arthralgias and back pain. Negative for myalgias.   Skin: Negative for rash.   Allergic/Immunologic: Positive for environmental allergies. Negative for immunocompromised state.   Neurological: Negative for dizziness, weakness, light-headedness and headaches.   Hematological: Negative.     Psychiatric/Behavioral: Positive for dysphoric mood and sleep disturbance. Negative for confusion and decreased concentration. The patient is nervous/anxious.        Objective:      Physical Exam   Constitutional: She is oriented to person, place, and time. She appears well-developed and well-nourished. No distress.   HENT:   Head: Normocephalic and atraumatic.   Right Ear: A middle ear effusion is present.   Left Ear: A middle ear effusion is present.   Nose: Rhinorrhea present. No mucosal edema.   Mouth/Throat: Oropharynx is clear and moist.   Eyes: Conjunctivae and EOM are normal. Pupils are equal, round, and reactive to light. Right eye exhibits no discharge. Left eye exhibits no discharge.   Neck: No thyromegaly present.   Cardiovascular: Normal rate and regular rhythm.   No murmur heard.  Pulmonary/Chest: Effort normal and breath sounds normal. No respiratory distress. She has no rales.   Abdominal: Soft. She exhibits no distension.   Musculoskeletal: She exhibits no edema.        Right knee: She exhibits normal range of motion. Tenderness found.        Lumbar back: She exhibits tenderness and pain. She exhibits normal range of motion, no bony tenderness and no spasm.   Neurological: She is alert and oriented to person, place, and time. Coordination normal.   Skin: Skin is warm and dry. Rash (chronic to face) noted. She is not diaphoretic.   Psychiatric: Her behavior is normal. She exhibits a depressed mood.   Vitals reviewed.      Assessment:       1. Essential hypertension    2. Chronic anxiety    3. Moderate episode of recurrent major depressive disorder    4. Osteopenia, unspecified location    5. Chronic knee pain, unspecified laterality    6. Lumbar spondylosis    7. Cutaneous lupus erythematosus    8. Disorder of bone         Plan:     Problem List Items Addressed This Visit        Neuro    Lumbar spondylosis    Current Assessment & Plan     Has JADE 2016 with good results with Dr Fritz         Relevant  Medications    meloxicam (MOBIC) 7.5 MG tablet    Other Relevant Orders    Ambulatory referral to Outpatient Case Management       Psychiatric    Depression (Chronic)    Current Assessment & Plan     Increased zoloft         Relevant Medications    sertraline (ZOLOFT) 100 MG tablet    Other Relevant Orders    Ambulatory referral to Outpatient Case Management    Chronic anxiety    Current Assessment & Plan     Increasing zoloft  She is wanting to go back up on xanax  But willing to try increased zoloft first         Relevant Medications    sertraline (ZOLOFT) 100 MG tablet       Derm    Cutaneous lupus erythematosus    Relevant Medications    betamethasone valerate 0.1% (VALISONE) 0.1 % Crea       Cardiac/Vascular    Essential hypertension - Primary (Chronic)    Current Assessment & Plan     Adding norvasc RTC 4 weeks for recheck         Relevant Medications    amLODIPine (NORVASC) 5 MG tablet    Other Relevant Orders    Lipid panel (Completed)    Ambulatory referral to Outpatient Case Management    TSH (Completed)       Orthopedic    Chronic knee pain    Current Assessment & Plan     Trying mobic         Relevant Medications    meloxicam (MOBIC) 7.5 MG tablet    Osteopenia    Overview     DEXA 1/7/16         Current Assessment & Plan     Due for DXA         Relevant Orders    DXA Bone Density Spine And Hip    Disorder of bone     Relevant Orders    DXA Bone Density Spine And Hip      more than 40 mins spent with pt face to face >50% on coordination of care and   Follow-up in about 4 weeks (around 12/4/2018) for depression/ HTN with Dr Weeks.

## 2018-11-09 ENCOUNTER — OUTPATIENT CASE MANAGEMENT (OUTPATIENT)
Dept: ADMINISTRATIVE | Facility: OTHER | Age: 73
End: 2018-11-09

## 2018-11-09 NOTE — PROGRESS NOTES
2nd attempt  This LMSW attempted to reach patient/caregiver to provide resource and left msg requesting a return call.  Letter with contact information was sent via US Mail  to patient/caregiver.  Referral source notified.

## 2018-11-09 NOTE — LETTER
November 9, 2018    Tea Ring  [id number] [place] [id number]             [facility] [address] [place] [address] Dear: Tea Ring    I am writing from the Outpatient Complex Care Management Department at [facility].  I received a referral from Miri Hopper MD to contact you or your caregiver regarding any needs you may have. I have attempted to contact you or your cargeiver by phone two times unsuccessfully.  Please contact the Outpatient Complex Care Management Department at [phone] if you would like to discuss your needs.      Sincerely,         Barb Crouch LMSW

## 2018-12-04 ENCOUNTER — OFFICE VISIT (OUTPATIENT)
Dept: INTERNAL MEDICINE | Facility: CLINIC | Age: 73
End: 2018-12-04
Payer: MEDICARE

## 2018-12-04 VITALS
SYSTOLIC BLOOD PRESSURE: 136 MMHG | BODY MASS INDEX: 33.26 KG/M2 | OXYGEN SATURATION: 99 % | DIASTOLIC BLOOD PRESSURE: 76 MMHG | WEIGHT: 180.75 LBS | HEIGHT: 62 IN | RESPIRATION RATE: 18 BRPM | TEMPERATURE: 97 F | HEART RATE: 92 BPM

## 2018-12-04 DIAGNOSIS — I10 ESSENTIAL HYPERTENSION: Chronic | ICD-10-CM

## 2018-12-04 DIAGNOSIS — F33.1 MODERATE EPISODE OF RECURRENT MAJOR DEPRESSIVE DISORDER: Chronic | ICD-10-CM

## 2018-12-04 DIAGNOSIS — L93.2 CUTANEOUS LUPUS ERYTHEMATOSUS: ICD-10-CM

## 2018-12-04 DIAGNOSIS — F41.9 CHRONIC ANXIETY: ICD-10-CM

## 2018-12-04 DIAGNOSIS — M47.816 LUMBAR SPONDYLOSIS: Primary | ICD-10-CM

## 2018-12-04 PROCEDURE — 3075F SYST BP GE 130 - 139MM HG: CPT | Mod: CPTII,HCNC,S$GLB, | Performed by: FAMILY MEDICINE

## 2018-12-04 PROCEDURE — 3078F DIAST BP <80 MM HG: CPT | Mod: CPTII,HCNC,S$GLB, | Performed by: FAMILY MEDICINE

## 2018-12-04 PROCEDURE — 99214 OFFICE O/P EST MOD 30 MIN: CPT | Mod: HCNC,S$GLB,, | Performed by: FAMILY MEDICINE

## 2018-12-04 PROCEDURE — 1101F PT FALLS ASSESS-DOCD LE1/YR: CPT | Mod: CPTII,HCNC,S$GLB, | Performed by: FAMILY MEDICINE

## 2018-12-04 PROCEDURE — 99999 PR PBB SHADOW E&M-EST. PATIENT-LVL IV: CPT | Mod: PBBFAC,HCNC,, | Performed by: FAMILY MEDICINE

## 2018-12-04 PROCEDURE — 99499 UNLISTED E&M SERVICE: CPT | Mod: HCNC,S$GLB,, | Performed by: FAMILY MEDICINE

## 2018-12-04 RX ORDER — PANTOPRAZOLE SODIUM 40 MG/1
40 TABLET, DELAYED RELEASE ORAL DAILY
Qty: 90 TABLET | Refills: 3 | Status: SHIPPED | OUTPATIENT
Start: 2018-12-04 | End: 2019-10-08 | Stop reason: SDUPTHER

## 2018-12-04 RX ORDER — ALPRAZOLAM 0.25 MG/1
0.25 TABLET ORAL 2 TIMES DAILY
Qty: 60 TABLET | Refills: 2 | Status: SHIPPED | OUTPATIENT
Start: 2018-12-04 | End: 2019-03-11 | Stop reason: SDUPTHER

## 2018-12-04 RX ORDER — BETAMETHASONE VALERATE 1.2 MG/G
CREAM TOPICAL
Qty: 45 G | Refills: 0 | Status: SHIPPED | OUTPATIENT
Start: 2018-12-04 | End: 2019-10-29

## 2018-12-04 RX ORDER — AMITRIPTYLINE HYDROCHLORIDE 50 MG/1
50 TABLET, FILM COATED ORAL NIGHTLY
Qty: 90 TABLET | Refills: 3 | Status: CANCELLED | OUTPATIENT
Start: 2018-12-04

## 2018-12-04 NOTE — ASSESSMENT & PLAN NOTE
Advised to make a follow-up appointment with Dr. Fritz to consider methods for managing her back pain.

## 2018-12-04 NOTE — PROGRESS NOTES
Subjective:       Patient ID: Tea Ring is a 73 y.o. female.    Chief Complaint: Follow-up (4 weeks)    HPI  Came in today to follow-up on depression and hypertension.  At the last visit she had with nurse practitioner they had thorough discussion on her depression and agree to continue on Zoloft with use of Xanax for intermittent issues with anxiety.  She is doing okay but continues to have challenges with this.  Not interested in making any medication changes at this time.  Her main concern today however is her continued back pain. She feels like this is very limiting.  Had back surgery in the past with Dr. Fritz and says that she would like to see him again for recommendations and possible injection into her back to help alleviate some of her pain.    Family History   Problem Relation Age of Onset    Diabetes Mother     Diabetes Father     Breast cancer Maternal Aunt        Current Outpatient Medications:     ALPRAZolam (XANAX) 0.25 MG tablet, Take 1 tablet (0.25 mg total) by mouth 2 (two) times daily. as needed for anxiety., Disp: 60 tablet, Rfl: 2    amitriptyline (ELAVIL) 50 MG tablet, Take 1 tablet (50 mg total) by mouth every evening., Disp: 90 tablet, Rfl: 3    amLODIPine (NORVASC) 5 MG tablet, Take 1 tablet (5 mg total) by mouth once daily., Disp: 30 tablet, Rfl: 1    aspirin (ECOTRIN) 81 MG EC tablet, Take 1 tablet (81 mg total) by mouth once daily., Disp: , Rfl: 0    atorvastatin (LIPITOR) 10 MG tablet, Take 1 tablet (10 mg total) by mouth once daily., Disp: 90 tablet, Rfl: 3    betamethasone valerate 0.1% (VALISONE) 0.1 % Crea, APPLY TOPICALLY TWICE DAILY., Disp: 45 g, Rfl: 0    blood pressure monitor (BLOOD PRESSURE KIT) Kit, Record daily, Disp: 1 each, Rfl: 0    fluticasone (FLONASE) 50 mcg/actuation nasal spray, 1 spray (50 mcg total) by Each Nare route once daily., Disp: 16 g, Rfl: 5    latanoprost 0.005 % ophthalmic solution, Place 1 drop into both eyes every evening., Disp: 1 Bottle,  "Rfl: 4    meloxicam (MOBIC) 7.5 MG tablet, Take 1 tablet (7.5 mg total) by mouth daily as needed for Pain., Disp: 30 tablet, Rfl: 1    pantoprazole (PROTONIX) 40 MG tablet, Take 1 tablet (40 mg total) by mouth once daily., Disp: 90 tablet, Rfl: 3    sertraline (ZOLOFT) 100 MG tablet, Take 1 tablet (100 mg total) by mouth once daily., Disp: 30 tablet, Rfl: 2    valsartan-hydrochlorothiazide (DIOVAN-HCT) 160-12.5 mg per tablet, Take 1 tablet by mouth once daily., Disp: 90 tablet, Rfl: 3    Review of Systems   Constitutional: Negative for chills and fever.   Respiratory: Negative for cough and shortness of breath.    Cardiovascular: Negative for chest pain.   Gastrointestinal: Negative for abdominal pain.   Musculoskeletal: Positive for back pain.   Skin: Negative for rash.   Neurological: Negative for dizziness.       Objective:   /76 (BP Location: Left arm, Patient Position: Sitting, BP Method: Medium (Automatic))   Pulse 92   Temp 96.7 °F (35.9 °C) (Tympanic)   Resp 18   Ht 5' 2.4" (1.585 m)   Wt 82 kg (180 lb 12.4 oz)   SpO2 99%   BMI 32.64 kg/m²      Physical Exam   Constitutional: She is oriented to person, place, and time. She appears well-developed and well-nourished. No distress.   HENT:   Head: Normocephalic and atraumatic.   Nose: Nose normal.   Eyes: Conjunctivae and EOM are normal. Pupils are equal, round, and reactive to light. Right eye exhibits no discharge. Left eye exhibits no discharge.   Neck: No thyromegaly present.   Cardiovascular: Normal rate and regular rhythm.   No murmur heard.  Pulmonary/Chest: Effort normal and breath sounds normal. No respiratory distress.   Abdominal: Soft. She exhibits no distension.   Musculoskeletal: She exhibits no edema.   Neurological: She is alert and oriented to person, place, and time.   Skin: No rash noted. She is not diaphoretic.   Psychiatric: She has a normal mood and affect. Her behavior is normal.       Assessment & Plan     Problem List " Items Addressed This Visit        Neuro    Lumbar spondylosis - Primary    Current Assessment & Plan     Advised to make a follow-up appointment with Dr. Fritz to consider methods for managing her back pain.            Psychiatric    Depression (Chronic)    Current Assessment & Plan     She still struggles with this however it is stable at this time.  Continue on same dose of Zoloft along with intermittent use of Xanax.         Chronic anxiety    Relevant Medications    ALPRAZolam (XANAX) 0.25 MG tablet       Derm    Cutaneous lupus erythematosus    Relevant Medications    betamethasone valerate 0.1% (VALISONE) 0.1 % Crea       Cardiac/Vascular    Essential hypertension (Chronic)    Current Assessment & Plan     Improved.  Continue on same medication regimen                 No Follow-up on file.

## 2018-12-04 NOTE — ASSESSMENT & PLAN NOTE
She still struggles with this however it is stable at this time.  Continue on same dose of Zoloft along with intermittent use of Xanax.

## 2018-12-10 ENCOUNTER — TELEPHONE (OUTPATIENT)
Dept: OPHTHALMOLOGY | Facility: CLINIC | Age: 73
End: 2018-12-10

## 2018-12-10 NOTE — TELEPHONE ENCOUNTER
----- Message from Carmita Favorite sent at 12/10/2018  9:12 AM CST -----  Contact: Pt   Pt called and stated she has to the Flu and needs to r/s her appt. She can be reached at [phone].    Thanks,  TF

## 2018-12-10 NOTE — TELEPHONE ENCOUNTER
----- Message from Tyson Singh sent at 12/10/2018 10:54 AM CST -----  Contact: self  Requesting call back regarding r/s appt. Please call back at [phone].      Thanks,  Tyson Singh

## 2018-12-31 ENCOUNTER — HOSPITAL ENCOUNTER (EMERGENCY)
Facility: HOSPITAL | Age: 73
Discharge: HOME | End: 2018-12-31
Attending: EMERGENCY MEDICINE
Payer: MEDICARE

## 2018-12-31 VITALS
WEIGHT: 181.56 LBS | RESPIRATION RATE: 16 BRPM | HEART RATE: 84 BPM | HEIGHT: 62 IN | TEMPERATURE: 99 F | BODY MASS INDEX: 33.41 KG/M2 | SYSTOLIC BLOOD PRESSURE: 137 MMHG | DIASTOLIC BLOOD PRESSURE: 92 MMHG | OXYGEN SATURATION: 100 %

## 2018-12-31 DIAGNOSIS — M25.511 RIGHT SHOULDER PAIN: ICD-10-CM

## 2018-12-31 DIAGNOSIS — S46.911A STRAIN OF ACROMIOCLAVICULAR JOINT, RIGHT, INITIAL ENCOUNTER: Primary | ICD-10-CM

## 2018-12-31 DIAGNOSIS — Z91.81 HISTORY OF RECENT FALL: ICD-10-CM

## 2018-12-31 PROCEDURE — 63600175 PHARM REV CODE 636 W HCPCS: Mod: HCNC | Performed by: NURSE PRACTITIONER

## 2018-12-31 PROCEDURE — 25000003 PHARM REV CODE 250: Mod: HCNC | Performed by: NURSE PRACTITIONER

## 2018-12-31 PROCEDURE — 96372 THER/PROPH/DIAG INJ SC/IM: CPT | Mod: HCNC

## 2018-12-31 PROCEDURE — 99284 EMERGENCY DEPT VISIT MOD MDM: CPT | Mod: 25,HCNC

## 2018-12-31 RX ORDER — NABUMETONE 500 MG/1
500 TABLET, FILM COATED ORAL 2 TIMES DAILY PRN
Qty: 30 TABLET | Refills: 0 | Status: SHIPPED | OUTPATIENT
Start: 2018-12-31 | End: 2019-02-22

## 2018-12-31 RX ORDER — KETOROLAC TROMETHAMINE 30 MG/ML
30 INJECTION, SOLUTION INTRAMUSCULAR; INTRAVENOUS
Status: COMPLETED | OUTPATIENT
Start: 2018-12-31 | End: 2018-12-31

## 2018-12-31 RX ORDER — HYDROCODONE BITARTRATE AND ACETAMINOPHEN 7.5; 325 MG/1; MG/1
1 TABLET ORAL EVERY 6 HOURS PRN
Qty: 15 TABLET | Refills: 0 | Status: SHIPPED | OUTPATIENT
Start: 2018-12-31 | End: 2019-02-21

## 2018-12-31 RX ORDER — HYDROCODONE BITARTRATE AND ACETAMINOPHEN 10; 325 MG/1; MG/1
1 TABLET ORAL
Status: COMPLETED | OUTPATIENT
Start: 2018-12-31 | End: 2018-12-31

## 2018-12-31 RX ADMIN — KETOROLAC TROMETHAMINE 30 MG: 30 INJECTION INTRAMUSCULAR; INTRAVENOUS at 06:12

## 2018-12-31 RX ADMIN — HYDROCODONE BITARTRATE AND ACETAMINOPHEN 1 TABLET: 10; 325 TABLET ORAL at 05:12

## 2018-12-31 NOTE — ED PROVIDER NOTES
"Encounter Date: 12/31/2018       History     Chief Complaint   Patient presents with    Arm Injury     upper right, x 2 weeks, after reaching for something, worse with movement      The history is provided by the patient.   Arm Injury    The incident occurred several days ago (approximately two weeks ago). The incident occurred at home. The injury mechanism was a fall (reports that she slipped and fell at home in her bathroom and "caught herself" before hitting the ground - causing pain to her right shoulder). She came to the ER via by private vehicle. There is an injury to the right shoulder. There have been no prior injuries to these areas. She is right-handed. She has been sleeping poorly (due to pain). Recently, medical care has been given by the PCP (regular office visit). Pertinent negatives include no chest pain, no altered mental status, no numbness, no abdominal pain, no bowel incontinence, no nausea, no vomiting, no bladder incontinence, no headaches, no neck pain, no pain when bearing weight, no focal weakness, no decreased responsiveness, no light-headedness, no loss of consciousness, no seizures, no tingling, no weakness, no cough, no difficulty breathing and no memory loss.   Fall   The accident occurred several days ago (two weeks ago). The fall occurred while walking (at home in bathroom). She fell from a height of 3 to 5 ft. She landed on a hard floor. There was no blood loss. The point of impact was the right shoulder. The pain is present in the right shoulder. The pain is at a severity of 6/10. She was ambulatory at the scene. There was no entrapment after the fall. There was no drug use involved in the accident. There was no alcohol use involved in the accident. Pertinent negatives include no neck pain, no back pain, no paresthesias, no fever, no numbness, no abdominal pain, no bowel incontinence, no nausea, no vomiting, no hematuria, no headaches, no loss of consciousness and no tingling. The " "symptoms are aggravated by use of the injured limb and pressure on the injury. Treatments tried: "a half of Percocet and some Flexeril" The treatment provided mild relief.         PCP:   Pawel Dominguez DO        Review of patient's allergies indicates:   Allergen Reactions    Duloxetine      Feels bad     Past Medical History:   Diagnosis Date    Alpha thalassemia     Asthma     Cataract     Chronic anxiety     years tid xanax 1mg    Chronic knee pain     Chronic pain of left ankle     Clotting disorder     Cutaneous lupus erythematosus     dr henry derm    Depression     Depression     dr radha hughes previously    Ex-smoker     quit fall 1    Hypertension     Osteopenia  rec     Past Surgical History:   Procedure Laterality Date    ANKLE SURGERY Left     BACK SURGERY      COLONOSCOPY N/A 2017    Per[facility] at [facility]    ESOPHAGOGASTRODUODENOSCOPY (EGD) N/A 2017    Per[facility] at [facility]    HYSTERECTOMY      KNEE ARTHROSCOPY      both knees twice    OOPHORECTOMY       Family History   Problem Relation Age of Onset    Diabetes Mother     Diabetes Father     Breast cancer Maternal Aunt      Social History     Tobacco Use    Smoking status: Former Smoker     Packs/day: 1.00     Years: 30.00     Pack years: 30.00     Types: Cigarettes     Last attempt to quit: 2014     Years since quittin.3    Smokeless tobacco: Never Used    Tobacco comment: smoke last cigarette 9/15   Substance Use Topics    Alcohol use: Yes     Alcohol/week: 0.6 oz     Types: 1 Glasses of wine per week    Drug use: No     Review of Systems   Constitutional: Negative for decreased responsiveness and fever.   HENT: Negative for sore throat.    Respiratory: Negative for cough and shortness of breath.    Cardiovascular: Negative for chest pain.   Gastrointestinal: Negative for abdominal pain, bowel incontinence, nausea and vomiting. "   Genitourinary: Negative for bladder incontinence, dysuria and hematuria.   Musculoskeletal: Positive for arthralgias (right shoulder). Negative for back pain and neck pain.   Skin: Negative for rash.   Neurological: Negative for tingling, focal weakness, seizures, loss of consciousness, weakness, light-headedness, numbness, headaches and paresthesias.   Hematological: Does not bruise/bleed easily.   Psychiatric/Behavioral: Negative for memory loss.       Physical Exam     Initial Vitals [12/31/18 1627]   BP Pulse Resp Temp SpO2   (!) 147/82 95 20 98.7 °F (37.1 °C) 96 %      MAP       --         Physical Exam    Nursing note and vitals reviewed.  Constitutional: She appears well-developed and well-nourished. She is cooperative. She does not appear ill. No distress.   HENT:   Head: Normocephalic and atraumatic.   Nose: Nose normal.   Mouth/Throat: Uvula is midline, oropharynx is clear and moist and mucous membranes are normal.   Eyes: Conjunctivae, EOM and lids are normal. Pupils are equal, round, and reactive to light.   Neck: Trachea normal and normal range of motion. Neck supple.   Cardiovascular: Normal rate, regular rhythm, intact distal pulses and normal pulses.   Pulmonary/Chest: Effort normal. No respiratory distress.   Musculoskeletal: She exhibits no edema.        Right shoulder: She exhibits decreased range of motion (secondary to pain - worse with abduction), tenderness (reproducible tenderness noted to acromioclavicular joint - no crepitus or obvious defomity noted - neurovascular intact distally - patient denies any radiculopathy) and bony tenderness. She exhibits no swelling, no effusion, no crepitus, no deformity, no spasm and normal pulse.   Neurological: She is alert and oriented to person, place, and time. She has normal strength. No sensory deficit. Gait normal. GCS eye subscore is 4. GCS verbal subscore is 5. GCS motor subscore is 6.   Neurovascular intact to all extremities.    Skin: Skin is  "warm, dry and intact. Capillary refill takes less than 2 seconds. No rash noted.   Normal color and turgor.    Psychiatric: She has a normal mood and affect. Her speech is normal and behavior is normal. Cognition and memory are normal.         ED Course   Procedures       ED Imaging Results:   Imaging Results          X-Ray Shoulder Complete 2 View Right (Final result)  Result time 12/31/18 17:37:47    Final result by Ricardo Yeager MD (12/31/18 17:37:47)                 Impression:      No acute findings.  Chronic degenerative change of the acromioclavicular joint of right shoulder is present.      Electronically signed by: Cory Yeager MD  Date:    12/31/2018  Time:    17:37             Narrative:    EXAMINATION:  XR SHOULDER COMPLETE 2 OR MORE VIEWS RIGHT    CLINICAL HISTORY:  Pain in right shoulder    TECHNIQUE:  Standard radiography performed.    COMPARISON:  None    FINDINGS:  Bone density and architecture are normal.  No fracture or dislocation is demonstrated.  Moderate spurring of the acromioclavicular joint and undersurface of the acromion are present.                                ED Medications:   Medications   HYDROcodone-acetaminophen  mg per tablet 1 tablet (1 tablet Oral Given 12/31/18 1726)   ketorolac injection 30 mg (30 mg Intramuscular Given 12/31/18 1811)         ED Course Vitals  Vitals:    12/31/18 1627 12/31/18 1754   BP: (!) 147/82 (!) 137/92   BP Location: Left arm Left arm   Patient Position: Sitting Sitting   Pulse: 95 84   Resp: 20 16   Temp: 98.7 °F (37.1 °C)    TempSrc: Oral    SpO2: 96% 100%   Weight: 82.3 kg (181 lb 8.8 oz)    Height: 5' 2" (1.575 m)        1745 HOURS RE-EVALUATION & DISPOSITION:   Reassessment at the time of disposition demonstrates that the patient is resting comfortably in no acute distress.  She has remained hemodynamically stable throughout the entire ED visit and is without objective evidence for acute process requiring urgent intervention or " "hospitalization. I discussed test results and provided counseling to patient with regard to condition, the treatment plan, specific conditions for return, and the importance of follow up. Patient has requested "an injection" for her pain.  She reports that "she can take Norco like water" and notes that Norco "does nothing for her pain".  Will order a Toradol injection and discharge patient.  Answered questions at this time. The patient is stable for discharge.         X-Rays:   Independently Interpreted Readings:   Other Readings:  Radiographs of the right shoulder reveal no acute findings.     Medical Decision Making:   History:   Old Records Summarized: records from clinic visits.  Clinical Tests:   Radiological Study: Ordered and Reviewed                      Clinical Impression:       ICD-10-CM ICD-9-CM   1. Strain/Sprain of acromioclavicular joint, right, initial encounter S46.911A 840.0   2. Right shoulder pain M25.511 719.41   3. History of recent fall Z91.81 V15.88           Disposition:   Disposition: Discharged  Condition: Stable  I discussed with patient that the evaluation in the emergency department does not suggest any emergent or life threatening medical condition requiring immediate intervention beyond what was provided in the ED, and I believe patient is safe for discharge.  Regardless, an unremarkable evaluation in the ED does not preclude the development or presence of a serious of life threatening condition. As such, patient was instructed to return immediately for any worsening or change in current symptoms. I also discussed the results of my evaluation and diagnosis with patient and she concurs with the evaluation and management plan.  Detailed written and verbal instructions provided to patient and she expressed a verbal understanding of the discharge instructions and overall management plan. Reiterated the importance of medication administration and safety and advised patient to follow up " with primary care provider in 3-5 days or sooner if needed.  Also advised patient to return to the ER for any complications.     Regarding SHOULDER PAIN, for treatment, I advised patient to: apply cold packs or ice bags to shoulder (15 min on and 15 min off) several times per day; rest shoulder for the next few days; slowly return to your regular activities; take ibuprofen (to also help with inflammation) or acetaminophen to manage pain. Patient was provided information and also shown proper exercises to stretch and strengthen rotator cuff tendons and shoulder muscles.     Regarding FALL PRECAUTIONS, I advised patient to: exercise regularly, keep all appointments with caregivers and bring updated, current list of all medications, keep diet healthy and include calcium and Vitamin D, and drink plenty of fluids.    Also recommended that patient keep home safe by: keeping pathways clear; have carpet installed in bathroom; have enough lighting to clearly see all pathways; keep all cords secured and out of the way; secure carpeting to the floor around all edges; remove throw rugs, or secure them with double-sided tape or special backing; if using stairs, install sturdy handrails; leave a light on at night to help you find way to the bathroom and kitchen; and select shoes with non-slip soles that are not too big or too small for your feet. Other home safety tips: do not leave objects in the bathtub or on the floor of the shower; install grab bars on walls around tubs or shower enclosures, and next to toilets; and install non-skid mats on shower floors and in the bathtub.             Follow-up Information     Call  Pawel Dominguez DO.    Specialty:  Family Medicine  Why:  If symptoms worsen or as needed  Contact information:  [address] [address] [address]  [phone]                     Current Discharge Medication List      START taking these medications    Details   HYDROcodone-acetaminophen (NORCO) 7.5-325 mg  per tablet Take 1 tablet by mouth every 6 (six) hours as needed for Pain.  Qty: 15 tablet, Refills: 0      nabumetone (RELAFEN) 500 MG tablet Take 1 tablet (500 mg total) by mouth 2 (two) times daily as needed for Pain.  Qty: 30 tablet, Refills: 0                          Alejo Bell NP  12/31/18 1951

## 2019-01-01 NOTE — ED NOTES
No reaction to IM injection. No complaints pain is now rated a 6/10 on pain scale. Pt cleared for d/c.

## 2019-01-01 NOTE — ED NOTES
Pt requesting to speak with provider at this time. NP aware. Reports pain has not improved since norco and pt is requesting injection.

## 2019-02-06 ENCOUNTER — OFFICE VISIT (OUTPATIENT)
Dept: INTERNAL MEDICINE | Facility: CLINIC | Age: 74
End: 2019-02-06
Payer: MEDICARE

## 2019-02-06 VITALS
HEART RATE: 84 BPM | OXYGEN SATURATION: 98 % | SYSTOLIC BLOOD PRESSURE: 143 MMHG | DIASTOLIC BLOOD PRESSURE: 70 MMHG | TEMPERATURE: 97 F | WEIGHT: 182.31 LBS | RESPIRATION RATE: 18 BRPM | BODY MASS INDEX: 33.55 KG/M2 | HEIGHT: 62 IN

## 2019-02-06 DIAGNOSIS — M19.011 GLENOHUMERAL ARTHRITIS, RIGHT: ICD-10-CM

## 2019-02-06 DIAGNOSIS — I10 ESSENTIAL HYPERTENSION: Chronic | ICD-10-CM

## 2019-02-06 DIAGNOSIS — F33.1 MODERATE EPISODE OF RECURRENT MAJOR DEPRESSIVE DISORDER: Chronic | ICD-10-CM

## 2019-02-06 DIAGNOSIS — D56.0 ALPHA THALASSEMIA: Chronic | ICD-10-CM

## 2019-02-06 DIAGNOSIS — M13.0 ARTHRITIS OF MULTIPLE SITES: Primary | ICD-10-CM

## 2019-02-06 DIAGNOSIS — F41.9 CHRONIC ANXIETY: ICD-10-CM

## 2019-02-06 DIAGNOSIS — I70.0 ATHEROSCLEROSIS OF AORTA: Chronic | ICD-10-CM

## 2019-02-06 PROCEDURE — 99214 OFFICE O/P EST MOD 30 MIN: CPT | Mod: HCNC,25,S$GLB, | Performed by: FAMILY MEDICINE

## 2019-02-06 PROCEDURE — 99214 PR OFFICE/OUTPT VISIT, EST, LEVL IV, 30-39 MIN: ICD-10-PCS | Mod: HCNC,25,S$GLB, | Performed by: FAMILY MEDICINE

## 2019-02-06 PROCEDURE — 99499 RISK ADDL DX/OHS AUDIT: ICD-10-PCS | Mod: HCNC,S$GLB,, | Performed by: FAMILY MEDICINE

## 2019-02-06 PROCEDURE — 3077F PR MOST RECENT SYSTOLIC BLOOD PRESSURE >= 140 MM HG: ICD-10-PCS | Mod: HCNC,CPTII,S$GLB, | Performed by: FAMILY MEDICINE

## 2019-02-06 PROCEDURE — 3078F PR MOST RECENT DIASTOLIC BLOOD PRESSURE < 80 MM HG: ICD-10-PCS | Mod: HCNC,CPTII,S$GLB, | Performed by: FAMILY MEDICINE

## 2019-02-06 PROCEDURE — 99999 PR PBB SHADOW E&M-EST. PATIENT-LVL V: ICD-10-PCS | Mod: PBBFAC,HCNC,, | Performed by: FAMILY MEDICINE

## 2019-02-06 PROCEDURE — 96372 PR INJECTION,THERAP/PROPH/DIAG2ST, IM OR SUBCUT: ICD-10-PCS | Mod: HCNC,S$GLB,, | Performed by: FAMILY MEDICINE

## 2019-02-06 PROCEDURE — 99499 UNLISTED E&M SERVICE: CPT | Mod: HCNC,S$GLB,, | Performed by: FAMILY MEDICINE

## 2019-02-06 PROCEDURE — 99999 PR PBB SHADOW E&M-EST. PATIENT-LVL V: CPT | Mod: PBBFAC,HCNC,, | Performed by: FAMILY MEDICINE

## 2019-02-06 PROCEDURE — 1101F PR PT FALLS ASSESS DOC 0-1 FALLS W/OUT INJ PAST YR: ICD-10-PCS | Mod: HCNC,CPTII,S$GLB, | Performed by: FAMILY MEDICINE

## 2019-02-06 PROCEDURE — 3077F SYST BP >= 140 MM HG: CPT | Mod: HCNC,CPTII,S$GLB, | Performed by: FAMILY MEDICINE

## 2019-02-06 PROCEDURE — 96372 THER/PROPH/DIAG INJ SC/IM: CPT | Mod: HCNC,S$GLB,, | Performed by: FAMILY MEDICINE

## 2019-02-06 PROCEDURE — 3078F DIAST BP <80 MM HG: CPT | Mod: HCNC,CPTII,S$GLB, | Performed by: FAMILY MEDICINE

## 2019-02-06 PROCEDURE — 1101F PT FALLS ASSESS-DOCD LE1/YR: CPT | Mod: HCNC,CPTII,S$GLB, | Performed by: FAMILY MEDICINE

## 2019-02-06 RX ORDER — METHYLPREDNISOLONE ACETATE 40 MG/ML
40 INJECTION, SUSPENSION INTRA-ARTICULAR; INTRALESIONAL; INTRAMUSCULAR; SOFT TISSUE ONCE
Status: COMPLETED | OUTPATIENT
Start: 2019-02-06 | End: 2019-02-06

## 2019-02-06 RX ORDER — ALBUTEROL SULFATE 90 UG/1
2 AEROSOL, METERED RESPIRATORY (INHALATION) EVERY 6 HOURS PRN
COMMUNITY
End: 2020-02-20

## 2019-02-06 RX ORDER — SERTRALINE HYDROCHLORIDE 100 MG/1
150 TABLET, FILM COATED ORAL DAILY
Qty: 135 TABLET | Refills: 3 | Status: SHIPPED | OUTPATIENT
Start: 2019-02-06 | End: 2019-10-29

## 2019-02-06 RX ADMIN — METHYLPREDNISOLONE ACETATE 40 MG: 40 INJECTION, SUSPENSION INTRA-ARTICULAR; INTRALESIONAL; INTRAMUSCULAR; SOFT TISSUE at 01:02

## 2019-02-06 NOTE — ASSESSMENT & PLAN NOTE
Recommended consulting with Rheumatology.  She has multiple diffuse joint problems.  Will be following up with Dr. Fritz for her back and knees soon.  She is high risk for chronic pain secondary to  Depression that is challenging to treat.

## 2019-02-06 NOTE — ASSESSMENT & PLAN NOTE
Since she has had good results with this in the past and lack of response to intermittent Aleve, we agree to do an intra-articular injection today to help with range of motion and functionality of her right shoulder.

## 2019-02-06 NOTE — PROGRESS NOTES
Subjective:       Patient ID: Tea Ring is a 73 y.o. female.    Chief Complaint: Back Pain    HPI    Came in today complaining of chronic pain in her knees back and shoulder.  She will be seeing her orthopedic specialist Dr. Fritz on the 1st of next month to consult on her knees and back.  She continues to have pain that is quite debilitating complicated by her depression.  She is requesting an injection in the right shoulder today.  Has had this in the past but it has been awhile.  She has been having limited range of motion in worsening symptoms of pain with her right shoulder.  Any movement makes it worse and nothing release has been helping it despite trying Aleve intermittently.  She says that she has been taking 2 pills of Zoloft at times which equals 0.5 mg.  She thinks that the current dose of 0.25 mg is not sufficient for her symptoms although prior to her telling me this she said that her depression was better.  Has been continuing to take Zoloft on a routine basis.    Family History   Problem Relation Age of Onset    Diabetes Mother     Diabetes Father     Breast cancer Maternal Aunt        Current Outpatient Medications:     albuterol (PROAIR HFA) 90 mcg/actuation inhaler, Inhale 2 puffs into the lungs every 6 (six) hours as needed for Wheezing. Rescue, Disp: , Rfl:     ALPRAZolam (XANAX) 0.25 MG tablet, Take 1 tablet (0.25 mg total) by mouth 2 (two) times daily. as needed for anxiety., Disp: 60 tablet, Rfl: 2    amitriptyline (ELAVIL) 50 MG tablet, Take 1 tablet (50 mg total) by mouth every evening., Disp: 90 tablet, Rfl: 3    amLODIPine (NORVASC) 5 MG tablet, Take 1 tablet (5 mg total) by mouth once daily., Disp: 30 tablet, Rfl: 1    aspirin (ECOTRIN) 81 MG EC tablet, Take 1 tablet (81 mg total) by mouth once daily., Disp: , Rfl: 0    atorvastatin (LIPITOR) 10 MG tablet, Take 1 tablet (10 mg total) by mouth once daily., Disp: 90 tablet, Rfl: 3    betamethasone valerate 0.1% (VALISONE) 0.1 %  "Crea, APPLY TOPICALLY TWICE DAILY., Disp: 45 g, Rfl: 0    blood pressure monitor (BLOOD PRESSURE KIT) Kit, Record daily, Disp: 1 each, Rfl: 0    fluticasone (FLONASE) 50 mcg/actuation nasal spray, 1 spray (50 mcg total) by Each Nare route once daily., Disp: 16 g, Rfl: 5    latanoprost 0.005 % ophthalmic solution, Place 1 drop into both eyes every evening., Disp: 1 Bottle, Rfl: 4    pantoprazole (PROTONIX) 40 MG tablet, Take 1 tablet (40 mg total) by mouth once daily., Disp: 90 tablet, Rfl: 3    sertraline (ZOLOFT) 100 MG tablet, Take 1.5 tablets (150 mg total) by mouth once daily., Disp: 135 tablet, Rfl: 3    valsartan-hydrochlorothiazide (DIOVAN-HCT) 160-12.5 mg per tablet, Take 1 tablet by mouth once daily., Disp: 90 tablet, Rfl: 3    HYDROcodone-acetaminophen (NORCO) 7.5-325 mg per tablet, Take 1 tablet by mouth every 6 (six) hours as needed for Pain., Disp: 15 tablet, Rfl: 0    nabumetone (RELAFEN) 500 MG tablet, Take 1 tablet (500 mg total) by mouth 2 (two) times daily as needed for Pain., Disp: 30 tablet, Rfl: 0    Current Facility-Administered Medications:     methylPREDNISolone acetate injection 40 mg, 40 mg, Intra-articular, Once, Pawel Dominguez, DO    Review of Systems   Constitutional: Negative for chills and fever.   Respiratory: Negative for cough and shortness of breath.    Cardiovascular: Negative for chest pain.   Gastrointestinal: Negative for abdominal pain.   Musculoskeletal: Positive for arthralgias and back pain.   Skin: Negative for rash.   Neurological: Negative for dizziness.   Psychiatric/Behavioral: Positive for dysphoric mood and sleep disturbance. The patient is nervous/anxious.        Objective:   BP (!) 143/70 (BP Location: Left arm, Patient Position: Sitting, BP Method: Medium (Automatic))   Pulse 84   Temp 96.9 °F (36.1 °C) (Tympanic)   Resp 18   Ht 5' 2" (1.575 m)   Wt 82.7 kg (182 lb 5.1 oz)   SpO2 98%   BMI 33.35 kg/m²      Physical Exam   Constitutional: She is " oriented to person, place, and time. She appears well-developed and well-nourished. No distress.   HENT:   Head: Normocephalic and atraumatic.   Nose: Nose normal.   Eyes: Conjunctivae and EOM are normal. Pupils are equal, round, and reactive to light. Right eye exhibits no discharge. Left eye exhibits no discharge.   Neck: No thyromegaly present.   Cardiovascular: Normal rate and regular rhythm.   No murmur heard.  Pulmonary/Chest: Effort normal and breath sounds normal. No respiratory distress.   Abdominal: Soft. She exhibits no distension.   Musculoskeletal: She exhibits no edema.   Limited ROM with right shoulder. Crepitus.    Neurological: She is alert and oriented to person, place, and time.   Skin: No rash noted. She is not diaphoretic.   Psychiatric: She has a normal mood and affect. Her behavior is normal.   Vitals reviewed.        Procedure note:   After obtaining verbal consent for right glenohumeral joint injection, proceeded with cleaning the skin using alcohol swab.  Then using a 22 gauge needle advanced into the glenohumeral space and injected 40 mg of Depo-Medrol with 1 mL of 1% lidocaine without epinephrine.  Patient tolerated the procedure well.  I instructed her on using ice pack for soreness and to do gradual range-of-motion exercises.    Assessment & Plan     Problem List Items Addressed This Visit        Psychiatric    Depression (Chronic)    Current Assessment & Plan      Increasing doses Zoloft 150 mg.  She had wanted to increase her Xanax but I told her I would not be appropriate especially considering her older age and increased risk for falls.         Relevant Medications    sertraline (ZOLOFT) 100 MG tablet    Chronic anxiety    Relevant Medications    sertraline (ZOLOFT) 100 MG tablet       Cardiac/Vascular    Essential hypertension (Chronic)    Current Assessment & Plan       Not as well controlled today.  Likely secondary to pain.  Since it is just above goal not make any changes to  her medication at this time.  Continue on same meds         Atherosclerosis of aorta (Chronic)    Overview     7/25/16 CT chest:..Aorta and vasculature: Atherosclerosis including coronary arteries...         Current Assessment & Plan       Continue focus on blood pressure control.  She is on appropriate statin medication.            Oncology    Alpha thalassemia (Chronic)    Overview     10/13/15 Hbg electrophoresis         Current Assessment & Plan       Asymptomatic.  Not having any problems in relation to this.            Orthopedic    Arthritis of multiple sites - Primary    Current Assessment & Plan       Recommended consulting with Rheumatology.  She has multiple diffuse joint problems.  Will be following up with Dr. Fritz for her back and knees soon.  She is high risk for chronic pain secondary to  Depression that is challenging to treat.         Relevant Orders    Ambulatory Referral to Rheumatology    Glenohumeral arthritis, right    Current Assessment & Plan       Since she has had good results with this in the past and lack of response to intermittent Aleve, we agree to do an intra-articular injection today to help with range of motion and functionality of her right shoulder.                 Follow-up if symptoms worsen or fail to improve.

## 2019-02-06 NOTE — ASSESSMENT & PLAN NOTE
Increasing doses Zoloft 150 mg.  She had wanted to increase her Xanax but I told her I would not be appropriate especially considering her older age and increased risk for falls.

## 2019-02-06 NOTE — ASSESSMENT & PLAN NOTE
Not as well controlled today.  Likely secondary to pain.  Since it is just above goal not make any changes to her medication at this time.  Continue on same meds

## 2019-02-21 ENCOUNTER — OFFICE VISIT (OUTPATIENT)
Dept: RHEUMATOLOGY | Facility: CLINIC | Age: 74
End: 2019-02-21
Payer: MEDICARE

## 2019-02-21 VITALS
DIASTOLIC BLOOD PRESSURE: 96 MMHG | SYSTOLIC BLOOD PRESSURE: 161 MMHG | BODY MASS INDEX: 33.15 KG/M2 | WEIGHT: 180.13 LBS | HEIGHT: 62 IN | HEART RATE: 93 BPM

## 2019-02-21 DIAGNOSIS — M47.816 LUMBAR SPONDYLOSIS: ICD-10-CM

## 2019-02-21 DIAGNOSIS — M15.9 GENERALIZED OA: Primary | ICD-10-CM

## 2019-02-21 DIAGNOSIS — M25.511 ACUTE PAIN OF RIGHT SHOULDER: ICD-10-CM

## 2019-02-21 DIAGNOSIS — L93.2 CUTANEOUS LUPUS ERYTHEMATOSUS: ICD-10-CM

## 2019-02-21 PROCEDURE — 3077F SYST BP >= 140 MM HG: CPT | Mod: HCNC,CPTII,S$GLB, | Performed by: INTERNAL MEDICINE

## 2019-02-21 PROCEDURE — 99204 OFFICE O/P NEW MOD 45 MIN: CPT | Mod: HCNC,S$GLB,, | Performed by: INTERNAL MEDICINE

## 2019-02-21 PROCEDURE — 3077F PR MOST RECENT SYSTOLIC BLOOD PRESSURE >= 140 MM HG: ICD-10-PCS | Mod: HCNC,CPTII,S$GLB, | Performed by: INTERNAL MEDICINE

## 2019-02-21 PROCEDURE — 3080F DIAST BP >= 90 MM HG: CPT | Mod: HCNC,CPTII,S$GLB, | Performed by: INTERNAL MEDICINE

## 2019-02-21 PROCEDURE — 1101F PR PT FALLS ASSESS DOC 0-1 FALLS W/OUT INJ PAST YR: ICD-10-PCS | Mod: HCNC,CPTII,S$GLB, | Performed by: INTERNAL MEDICINE

## 2019-02-21 PROCEDURE — 1101F PT FALLS ASSESS-DOCD LE1/YR: CPT | Mod: HCNC,CPTII,S$GLB, | Performed by: INTERNAL MEDICINE

## 2019-02-21 PROCEDURE — 3080F PR MOST RECENT DIASTOLIC BLOOD PRESSURE >= 90 MM HG: ICD-10-PCS | Mod: HCNC,CPTII,S$GLB, | Performed by: INTERNAL MEDICINE

## 2019-02-21 PROCEDURE — 99999 PR PBB SHADOW E&M-EST. PATIENT-LVL III: ICD-10-PCS | Mod: PBBFAC,HCNC,, | Performed by: INTERNAL MEDICINE

## 2019-02-21 PROCEDURE — 99204 PR OFFICE/OUTPT VISIT, NEW, LEVL IV, 45-59 MIN: ICD-10-PCS | Mod: HCNC,S$GLB,, | Performed by: INTERNAL MEDICINE

## 2019-02-21 PROCEDURE — 99999 PR PBB SHADOW E&M-EST. PATIENT-LVL III: CPT | Mod: PBBFAC,HCNC,, | Performed by: INTERNAL MEDICINE

## 2019-02-21 RX ORDER — TIZANIDINE 4 MG/1
4 TABLET ORAL EVERY 12 HOURS PRN
Qty: 60 TABLET | Refills: 0 | Status: SHIPPED | OUTPATIENT
Start: 2019-02-21 | End: 2019-03-26 | Stop reason: SDUPTHER

## 2019-02-21 RX ORDER — DICLOFENAC SODIUM 10 MG/G
2 GEL TOPICAL 2 TIMES DAILY
Qty: 1 TUBE | Refills: 1 | Status: SHIPPED | OUTPATIENT
Start: 2019-02-21 | End: 2019-05-09 | Stop reason: SDUPTHER

## 2019-02-21 ASSESSMENT — ROUTINE ASSESSMENT OF PATIENT INDEX DATA (RAPID3): MDHAQ FUNCTION SCORE: .8

## 2019-02-21 NOTE — PROGRESS NOTES
CC:  Chief Complaint   Patient presents with    Disease Management    Pain       History of Present Illness:  Tea Ruff a 73 y.o.yo female   Patient Active Problem List   Diagnosis    Essential hypertension    Chronic pain of left ankle    Chronic knee pain    Depression    Chronic anxiety    Cutaneous lupus erythematosus    Alpha thalassemia    Osteopenia    Ex-smoker    Chronic asthma without complication    Lumbar spondylosis    Atherosclerosis of aorta    History of total right knee replacement    Right knee pain    Gait instability    Muscle spasms of lower extremity    Medication noncompliance due to cognitive impairment    Lower extremity edema    Screening for breast cancer    Popping of both ears    Moderate major depression    Primary open angle glaucoma (POAG) of both eyes, severe stage    Disorder of bone     Arthritis of multiple sites    Glenohumeral arthritis, right    Generalized OA     Referred here for further evaluation of generalized arthralgias  She complains of chronic pain for years  She complains of pain in right shoulder, right elbow, both knees, ankles  She has a known history of chronic low back pain had a surgery done 9-10 years ago  She also had knee replacement surgery bilateral done by Dr. Brady  States she continues to hurt  in the knees even after surgery  She with soon schedule a follow-up appointment with Orthopedics for her knees and back    Today her main concern seems to be right shoulder  She had issues with right shoulder in the past she was given an injection and it helped then  Now she noted the pain spontaneous onset in January  Took some Advil as needed  Then she was evaluated by her PCP Dr. Dominguez, 3 weeks back she received right shoulder Kenalog injection  But she continues to have persistent pain  Aggravated with movement  Difficulty raising the arm up    Left shoulder does not bother her  She also denies any pain or stiffness in the  hands    She has chronic discolored discoloration on her face  She states many years back she was told by a dermatologist on [place] this was cutaneous lupus  She was given topical steroids  But then again she was seen by [facility], diagnosed this as Ochronosis and gave her tretinoin cream  Reviewed [facility] note  She states this was biopsied many years ago    History of osteopenia and is on Actonel per PCP  Q monthly  Tolerating well no issues  No jaw pain  Denies any bleeding gums or dental caries or anticipated dental work    Past Medical History:   Diagnosis Date    Alpha thalassemia     Asthma     Cataract     Chronic anxiety     years tid xanax 1mg    Chronic knee pain     Chronic pain of left ankle     Clotting disorder     Cutaneous lupus erythematosus     dr henry derm    Depression     Depression     dr radha hughes previously    Ex-smoker     quit fall 1    Hypertension     Osteopenia  rec       Past Surgical History:   Procedure Laterality Date    ANKLE SURGERY Left     BACK SURGERY      COLONOSCOPY N/A 2017    [facility]    ESOPHAGOGASTRODUODENOSCOPY (EGD) N/A 2017    [facility]    HYSTERECTOMY      KNEE ARTHROSCOPY      both knees twice    OOPHORECTOMY           Social History     Tobacco Use    Smoking status: Former Smoker     Packs/day: 1.00     Years: 30.00     Pack years: 30.00     Types: Cigarettes     Last attempt to quit: 2014     Years since quittin.4    Smokeless tobacco: Never Used    Tobacco comment: smoke last cigarette 9/15   Substance Use Topics    Alcohol use: Yes     Alcohol/week: 0.6 oz     Types: 1 Glasses of wine per week    Drug use: No       Family History   Problem Relation Age of Onset    Diabetes Mother     Diabetes Father     Breast cancer Maternal Aunt        Review of patient's allergies indicates:   Allergen Reactions    Duloxetine       Feels bad             Review of Systems:  Constitutional: Denies fever, chills. No recent weight changes.   Fatigue: no  Muscle weakness: no  Headaches: no new headaches  Eyes: No redness or dryness.  No recent visual changes.  ENT: Denies dry mouth. No oral or nasal ulcers.  Card: No chest pain.  Resp: No cough or sob.   Gastro: No nausea or vomiting.  No heartburn.  Constipation: no  Diarrhea: no  Genito:  No dysuria.  No genital ulcers.  Skin: +rash.  Raynauds:no  Neuro: No numbness / tingling.   Psych: No depression, anxiety  Endo:  no excess thirst.  Heme: no abnormal bleeding or bruising  Clots:none   Miscarriages : none     OBJECTIVE:     Vital Signs   Vitals:    02/21/19 0947   BP: (!) 161/96   Pulse: 93     Physical Exam:  General Appearance:  NAD.   Gait: not favoring.  HEENT: PERRL.  Eyes not dry or injected.  No nasal ulcers.  Mouth not dry, no oral lesions.  Lymph: cervical, supraclavicular or axillary nodes: none abnormal   Cardio: no irregularity of S1 or S2.  No gallops or rubs.   Resp: Normal respiratory motion. Clear to auscultation bilaterally.   No abnormal chest conformation.  Abd: Soft, non-tender, nondistended.  No masses.   Skin: Head and neck,  and extremities examined.     Skin dark pigmentation noted over malar area  Neuro: Ox3.   Cranial nerves II-XII grossly intact.   Sensation intact  in both distal LE and upper extremities to light touch.  Musculoskeletal Exam:  Rt shoulder :  Or tenderness in subacromial, bicipital areas  Pain on abduction external and internal rotation    Left shoulder good range of motion  B/l hands and feet no synovitis  B/l knee status post replacement:    Lower lumbar facet tenderness  Multiple tender points  Laboratory:   Results for orders placed or performed in visit on 11/06/18   Lipid panel   Result Value Ref Range    Cholesterol 134 120 - 199 mg/dL    Triglycerides 86 30 - 150 mg/dL    HDL 45 40 - 75 mg/dL    LDL Cholesterol 71.8 63.0 - 159.0 mg/dL     HDL/Chol Ratio 33.6 20.0 - 50.0 %    Total Cholesterol/HDL Ratio 3.0 2.0 - 5.0    Non-HDL Cholesterol 89 mg/dL   TSH   Result Value Ref Range    TSH 0.467 0.400 - 4.000 uIU/mL     Imaging :    Notes reviewed  Other procedures:    ASSESSMENT/PLAN:     Generalized OA  -     Ambulatory Referral to Physical/Occupational Therapy    Lumbar spondylosis    Cutaneous lupus erythematosus  -     Comprehensive metabolic panel; Standing  -     C-reactive protein; Standing  -     Sedimentation rate; Standing  -     C4 complement; Standing; Expected date: 02/21/2019  -     C3 complement; Standing; Expected date: 02/21/2019  -     LOLITA Screen w/Reflex; Standing  -     Urinalysis; Standing  -     CBC auto differential; Standing  -     Rheumatoid factor; Future; Expected date: 02/21/2019  -     Cyclic citrul peptide antibody, IgG; Future; Expected date: 02/21/2019    Acute pain of right shoulder  -     diclofenac sodium (VOLTAREN) 1 % Gel; Apply 2 g topically 2 (two) times daily.  Dispense: 1 Tube; Refill: 1  -     Ambulatory Referral to Physical/Occupational Therapy  -     tiZANidine (ZANAFLEX) 4 MG tablet; Take 1 tablet (4 mg total) by mouth every 12 (twelve) hours as needed.  Dispense: 60 tablet; Refill: 0      1:  Generalized osteoarthritis:  Agree with amitriptyline 50 mg at bedtime  Add Zanaflex 4 mg every 12 hr as needed  Check RF/CCP to rule out any autoimmune etiology    2:  Right shoulder pain:  X-ray reviewed- chronic degenerative changes of AC joint noted  Inadequate response to steroid injection received in recent past  Voltaren gel topical  Refer PT  If no adequate relief Will check MRI right shoulder, or Will need of Kenalog injection into the AC joint    3:  DEXA 2016 suggestive of osteopenia with high FRAX 20.8/4.7  She is on Actonel per PCP  Due for repeat DEXA this year  Also takes MvT with calcium and vitamin-D    4:  Facial rash:  Unclear etiology cutaneous lupus versus ochronosis  Check LOLITA and complement  No  other signs or symptoms suggestive of systemic lupus noted at this point  Review labs and obtain [facility] records    5:  Status post bilateral knee replacement:  Follow-up per Ortho    6:  Chronic back pain/spondylosis with history of back surgery in the past  Follow-up per Ortho    Risks vs Benefits and potential side effects of medication prescribed today were discussed with patient. Medication literature given to patient up discharge  Went over uptodate information /literature on the meds prescribed today      Disclaimer: This note was prepared using voice recognition system and is likely to have sound alike errors and is not proof read.  Please call me with any questions.

## 2019-02-21 NOTE — LETTER
February 21, 2019      Pawel Dominguez DO  [address] [address] [address]           [facility]  [address] [facility] [address]  Phone: [phone]  Fax: [phone]          Patient: Tea Ring   MR Number: [id number]   YOB: 1945   Date of Visit: 2/21/2019       Dear Dr. Pawel Dominguez:    Thank you for referring Tea Ring to me for evaluation. Attached you will find relevant portions of my assessment and plan of care.    If you have questions, please do not hesitate to call me. I look forward to following Tea Ring along with you.    Sincerely,    Ese Thomas MD    Enclosure  CC:  No Recipients    If you would like to receive this communication electronically, please contact [email or web address] or [phone] to request more information on [employer] access.    For providers and/or their staff who would like to refer a patient to [facility], please contact us through our one-stop-shop provider referral line, [facility] , at [phone].    If you feel you have received this communication in error or would no longer like to receive these types of communications, please e-mail [email or web address]

## 2019-02-22 ENCOUNTER — TELEPHONE (OUTPATIENT)
Dept: RHEUMATOLOGY | Facility: CLINIC | Age: 74
End: 2019-02-22

## 2019-02-22 DIAGNOSIS — N39.0 URINARY TRACT INFECTION WITHOUT HEMATURIA, SITE UNSPECIFIED: Primary | ICD-10-CM

## 2019-02-22 NOTE — TELEPHONE ENCOUNTER
I called the pt and discuss lab  Noted to have a positive UA, elevated inflammatory marker  She does mention of dysuria often on  Will start her on Bactrim 1 tablet p.o. b.i.d. for 3 days  She has used Bactrim in the past and tolerated well  Will add urine culture  Also told her not to take any advil or other NSAIDS   Take only Tylenol arthritis and use Voltaren gel as needed along with tizanidine    Will see her back in 4 weeks with all 4

## 2019-02-26 ENCOUNTER — TELEPHONE (OUTPATIENT)
Dept: INTERNAL MEDICINE | Facility: CLINIC | Age: 74
End: 2019-02-26

## 2019-02-26 NOTE — TELEPHONE ENCOUNTER
----- Message from Tyson Singh sent at 2/26/2019  1:31 PM CST -----  Contact:  human-byron  Type:  Needs Medical Advice    Who Called: byron  Symptoms (please be specific): n/a   How long has patient had these symptoms:  n/a  Pharmacy name and phone #:  n/a  Would the patient rather a call back or a response via [facility]? Call back  Best Call Back Number:[phone]  Additional Information: requesting call back regarding questions about pt rx.      Thanks,  Tyson Singh

## 2019-02-26 NOTE — TELEPHONE ENCOUNTER
Call to verify our fax number to send information on patient, in regard to medication.  Correct fax number was given

## 2019-03-11 DIAGNOSIS — F41.9 CHRONIC ANXIETY: ICD-10-CM

## 2019-03-12 RX ORDER — ALPRAZOLAM 0.25 MG/1
TABLET ORAL
Qty: 60 TABLET | Refills: 1 | Status: SHIPPED | OUTPATIENT
Start: 2019-03-12 | End: 2019-06-04 | Stop reason: SDUPTHER

## 2019-03-13 ENCOUNTER — PES CALL (OUTPATIENT)
Dept: ADMINISTRATIVE | Facility: CLINIC | Age: 74
End: 2019-03-13

## 2019-03-14 ENCOUNTER — TELEPHONE (OUTPATIENT)
Dept: RHEUMATOLOGY | Facility: CLINIC | Age: 74
End: 2019-03-14

## 2019-03-14 DIAGNOSIS — M19.90 ARTHRITIS: Primary | ICD-10-CM

## 2019-03-14 NOTE — TELEPHONE ENCOUNTER
I would like to repeat all her labs now to follow up on her inflammatory markers   Needs all 4 and uric acid , SPEP, IEP

## 2019-03-14 NOTE — TELEPHONE ENCOUNTER
Called pt to schedule labs as requested By Dr. Thoams. Pt scheduled for 3/19/19 @ [facility], pt verbalized understanding

## 2019-03-15 ENCOUNTER — TELEPHONE (OUTPATIENT)
Dept: RHEUMATOLOGY | Facility: CLINIC | Age: 74
End: 2019-03-15

## 2019-03-19 ENCOUNTER — LAB VISIT (OUTPATIENT)
Dept: LAB | Facility: HOSPITAL | Age: 74
End: 2019-03-19
Attending: INTERNAL MEDICINE
Payer: MEDICARE

## 2019-03-19 DIAGNOSIS — M19.90 ARTHRITIS: ICD-10-CM

## 2019-03-19 LAB
ALBUMIN SERPL BCP-MCNC: 3.3 G/DL
ALP SERPL-CCNC: 95 U/L
ALT SERPL W/O P-5'-P-CCNC: 15 U/L
ANION GAP SERPL CALC-SCNC: 8 MMOL/L
ANISOCYTOSIS BLD QL SMEAR: SLIGHT
AST SERPL-CCNC: 17 U/L
BASOPHILS # BLD AUTO: 0.04 K/UL
BASOPHILS NFR BLD: 0.4 %
BILIRUB SERPL-MCNC: 0.3 MG/DL
BUN SERPL-MCNC: 30 MG/DL
CALCIUM SERPL-MCNC: 9.3 MG/DL
CHLORIDE SERPL-SCNC: 108 MMOL/L
CO2 SERPL-SCNC: 26 MMOL/L
CREAT SERPL-MCNC: 1.2 MG/DL
CRP SERPL-MCNC: 52.7 MG/L
DIFFERENTIAL METHOD: ABNORMAL
EOSINOPHIL # BLD AUTO: 0.3 K/UL
EOSINOPHIL NFR BLD: 3.4 %
ERYTHROCYTE [DISTWIDTH] IN BLOOD BY AUTOMATED COUNT: 17.8 %
ERYTHROCYTE [SEDIMENTATION RATE] IN BLOOD BY WESTERGREN METHOD: 86 MM/HR
EST. GFR  (AFRICAN AMERICAN): 52.2 ML/MIN/1.73 M^2
EST. GFR  (NON AFRICAN AMERICAN): 45.3 ML/MIN/1.73 M^2
GLUCOSE SERPL-MCNC: 109 MG/DL
HCT VFR BLD AUTO: 34.6 %
HGB BLD-MCNC: 10.6 G/DL
HYPOCHROMIA BLD QL SMEAR: ABNORMAL
LYMPHOCYTES # BLD AUTO: 2.7 K/UL
LYMPHOCYTES NFR BLD: 29.8 %
MCH RBC QN AUTO: 21.3 PG
MCHC RBC AUTO-ENTMCNC: 30.6 G/DL
MCV RBC AUTO: 70 FL
MONOCYTES # BLD AUTO: 0.6 K/UL
MONOCYTES NFR BLD: 6 %
NEUTROPHILS # BLD AUTO: 5.5 K/UL
NEUTROPHILS NFR BLD: 60.4 %
OVALOCYTES BLD QL SMEAR: ABNORMAL
PLATELET # BLD AUTO: 457 K/UL
PMV BLD AUTO: 9 FL
POIKILOCYTOSIS BLD QL SMEAR: SLIGHT
POTASSIUM SERPL-SCNC: 3.6 MMOL/L
PROT SERPL-MCNC: 7.1 G/DL
RBC # BLD AUTO: 4.98 M/UL
SODIUM SERPL-SCNC: 142 MMOL/L
URATE SERPL-MCNC: 9.2 MG/DL
WBC # BLD AUTO: 9.14 K/UL

## 2019-03-19 PROCEDURE — 85652 RBC SED RATE AUTOMATED: CPT | Mod: HCNC

## 2019-03-19 PROCEDURE — 36415 COLL VENOUS BLD VENIPUNCTURE: CPT | Mod: HCNC,PO

## 2019-03-19 PROCEDURE — 84165 PROTEIN E-PHORESIS SERUM: CPT | Mod: 26,HCNC,, | Performed by: PATHOLOGY

## 2019-03-19 PROCEDURE — 84165 PATHOLOGIST INTERPRETATION SPE: ICD-10-PCS | Mod: 26,HCNC,, | Performed by: PATHOLOGY

## 2019-03-19 PROCEDURE — 86334 PATHOLOGIST INTERPRETATION IFE: ICD-10-PCS | Mod: 26,HCNC,, | Performed by: PATHOLOGY

## 2019-03-19 PROCEDURE — 84550 ASSAY OF BLOOD/URIC ACID: CPT | Mod: HCNC,PO

## 2019-03-19 PROCEDURE — 84165 PROTEIN E-PHORESIS SERUM: CPT | Mod: HCNC

## 2019-03-19 PROCEDURE — 86140 C-REACTIVE PROTEIN: CPT | Mod: HCNC,PO

## 2019-03-19 PROCEDURE — 86334 IMMUNOFIX E-PHORESIS SERUM: CPT | Mod: 26,HCNC,, | Performed by: PATHOLOGY

## 2019-03-19 PROCEDURE — 85025 COMPLETE CBC W/AUTO DIFF WBC: CPT | Mod: HCNC,PO

## 2019-03-19 PROCEDURE — 86334 IMMUNOFIX E-PHORESIS SERUM: CPT | Mod: HCNC

## 2019-03-19 PROCEDURE — 80053 COMPREHEN METABOLIC PANEL: CPT | Mod: HCNC,PO

## 2019-03-20 LAB
ALBUMIN SERPL ELPH-MCNC: 3.3 G/DL
ALPHA1 GLOB SERPL ELPH-MCNC: 0.44 G/DL
ALPHA2 GLOB SERPL ELPH-MCNC: 0.84 G/DL
B-GLOBULIN SERPL ELPH-MCNC: 0.83 G/DL
GAMMA GLOB SERPL ELPH-MCNC: 1.29 G/DL
INTERPRETATION SERPL IFE-IMP: NORMAL
PATHOLOGIST INTERPRETATION IFE: NORMAL
PATHOLOGIST INTERPRETATION SPE: NORMAL
PROT SERPL-MCNC: 6.7 G/DL

## 2019-03-26 ENCOUNTER — PATIENT OUTREACH (OUTPATIENT)
Dept: ADMINISTRATIVE | Facility: HOSPITAL | Age: 74
End: 2019-03-26

## 2019-03-26 DIAGNOSIS — M25.511 ACUTE PAIN OF RIGHT SHOULDER: ICD-10-CM

## 2019-03-26 RX ORDER — TIZANIDINE 4 MG/1
4 TABLET ORAL EVERY 12 HOURS PRN
Qty: 60 TABLET | Refills: 0 | Status: SHIPPED | OUTPATIENT
Start: 2019-03-26 | End: 2019-05-03 | Stop reason: SDUPTHER

## 2019-04-05 ENCOUNTER — TELEPHONE (OUTPATIENT)
Dept: RHEUMATOLOGY | Facility: CLINIC | Age: 74
End: 2019-04-05

## 2019-04-05 DIAGNOSIS — R79.89 OTHER SPECIFIED ABNORMAL FINDINGS OF BLOOD CHEMISTRY: ICD-10-CM

## 2019-04-05 DIAGNOSIS — M1A.0110 IDIOPATHIC CHRONIC GOUT OF RIGHT SHOULDER WITHOUT TOPHUS: ICD-10-CM

## 2019-04-05 DIAGNOSIS — K92.1 GASTROINTESTINAL HEMORRHAGE WITH MELENA: Primary | ICD-10-CM

## 2019-04-05 DIAGNOSIS — D64.9 ANEMIA, UNSPECIFIED TYPE: ICD-10-CM

## 2019-04-05 RX ORDER — COLCHICINE 0.6 MG/1
0.6 TABLET ORAL EVERY OTHER DAY
Qty: 15 TABLET | Refills: 3 | Status: SHIPPED | OUTPATIENT
Start: 2019-04-05 | End: 2019-06-03

## 2019-04-05 RX ORDER — ALLOPURINOL 100 MG/1
100 TABLET ORAL DAILY
Qty: 30 TABLET | Refills: 3 | Status: SHIPPED | OUTPATIENT
Start: 2019-04-05 | End: 2019-06-03

## 2019-04-05 NOTE — TELEPHONE ENCOUNTER
Patient called and labs discussed  1:  She is noted to have elevated inflammatory markers, with elevated uric acid  With right shoulder pain, suspect any possible gout  Start allopurinol 100 mg daily,  colcrys 0.6 mg qod     2:  Chronic anemia noted:  With mild worsening   History of iron deficiency anemia with a normal colonoscopy in August 2017 per her   She is not on any iron supplements  In the last 1 month she is noticing dark stools, some lower abdominal discomfort intermittently  She did not notify her PCP, she states she did not really bother about that , she said she did not want to pay much attention to it at this age so ignored it   Review of labs looks like she had chronic anemia since 2015, also CKD noted now     Recommend she starts taking iron supplements over-the-counter  Will place GI referral for possible colonoscopy  Check CT scan chest abdomen and pelvis

## 2019-04-05 NOTE — TELEPHONE ENCOUNTER
Please schedule CT abdomen pelvis and chest  Schedule GI referrel   Start Medications allopurinol 100 mg p.o. Daily  Colcrys 0.6 mg every other day    Please let her

## 2019-04-05 NOTE — TELEPHONE ENCOUNTER
She needs GI referral  She also needs CT scan chest abdomen pelvis  She needs to be started on medications for possible gout  Medications called in, please let her know and schedule GI a follow-up appointment in CT scan

## 2019-04-10 ENCOUNTER — PATIENT OUTREACH (OUTPATIENT)
Dept: ADMINISTRATIVE | Facility: HOSPITAL | Age: 74
End: 2019-04-10

## 2019-04-16 ENCOUNTER — TELEPHONE (OUTPATIENT)
Dept: RHEUMATOLOGY | Facility: CLINIC | Age: 74
End: 2019-04-16

## 2019-04-16 NOTE — TELEPHONE ENCOUNTER
Message left  Please reschedule CT chest abdomen and pelvis  No showed to the last visit  Advised her to follow up with PCP for GI bleed and a keep up GI appointment

## 2019-05-02 DIAGNOSIS — M25.511 ACUTE PAIN OF RIGHT SHOULDER: ICD-10-CM

## 2019-05-02 NOTE — TELEPHONE ENCOUNTER
----- Message from Kayli Alcantar sent at 5/2/2019  2:37 PM CDT -----  .Type:  RX Refill Request    Who Called: patient  Refill or New Rx: refill  RX Name and Strength:piganidine 4mg  How is the patient currently taking it? (ex. 1XDay): 1 every 12 hrs but has taken 2 every 4 hrs for severe pain  Is this a 30 day or 90 day RX:90  Preferred Pharmacy with phone number[place] [address]  [address]  [place] [address]  Phone: [phone] Fax: [phone]    Local or Mail Order:local  Ordering Provider: feliciano  Would the patient rather a call back or a response via [facility]? call  Best Call Back Number:.[phone] (home)   Additional Information:

## 2019-05-03 RX ORDER — TIZANIDINE 4 MG/1
4 TABLET ORAL EVERY 12 HOURS PRN
Qty: 60 TABLET | Refills: 0 | Status: SHIPPED | OUTPATIENT
Start: 2019-05-03 | End: 2019-05-06 | Stop reason: SDUPTHER

## 2019-05-03 NOTE — TELEPHONE ENCOUNTER
----- Message from Nathaly London sent at 5/3/2019 11:58 AM CDT -----  Contact: Patient   Type:  RX Refill Request    Who Called: Patient   Refill or New Rx:refill  RX Name and Strength:tiZANidine (ZANAFLEX) 4 MG tablet  How is the patient currently taking it? (ex. 1XDay)n/a  Is this a 30 day or 90 day RX:n/a  Preferred Pharmacy with phone number:  [facility] [address]  [address]  [place] [address]  Phone: [phone] Fax: [phone]  Local or Mail Order:local  Ordering Provider:Dr. Thomas  Would the patient rather a call back or a response via [facility]? call  Best Call Back Number:[phone]  Additional Information: n/a

## 2019-05-06 ENCOUNTER — HOSPITAL ENCOUNTER (EMERGENCY)
Facility: HOSPITAL | Age: 74
Discharge: HOME | End: 2019-05-06
Attending: FAMILY MEDICINE
Payer: MEDICARE

## 2019-05-06 ENCOUNTER — TELEPHONE (OUTPATIENT)
Dept: INTERNAL MEDICINE | Facility: CLINIC | Age: 74
End: 2019-05-06

## 2019-05-06 VITALS
BODY MASS INDEX: 32.76 KG/M2 | OXYGEN SATURATION: 98 % | HEART RATE: 88 BPM | HEIGHT: 62 IN | TEMPERATURE: 98 F | WEIGHT: 178 LBS | RESPIRATION RATE: 20 BRPM | DIASTOLIC BLOOD PRESSURE: 86 MMHG | SYSTOLIC BLOOD PRESSURE: 128 MMHG

## 2019-05-06 DIAGNOSIS — I10 HYPERTENSION, UNSPECIFIED TYPE: ICD-10-CM

## 2019-05-06 DIAGNOSIS — M54.50 ACUTE LEFT-SIDED LOW BACK PAIN WITHOUT SCIATICA: Primary | ICD-10-CM

## 2019-05-06 LAB
BACTERIA #/AREA URNS AUTO: NORMAL /HPF
BILIRUB UR QL STRIP: NEGATIVE
CLARITY UR REFRACT.AUTO: CLEAR
COLOR UR AUTO: YELLOW
GLUCOSE UR QL STRIP: NEGATIVE
HGB UR QL STRIP: ABNORMAL
KETONES UR QL STRIP: NEGATIVE
LEUKOCYTE ESTERASE UR QL STRIP: ABNORMAL
MICROSCOPIC COMMENT: NORMAL
NITRITE UR QL STRIP: NEGATIVE
PH UR STRIP: 6 [PH] (ref 5–8)
PROT UR QL STRIP: NEGATIVE
RBC #/AREA URNS AUTO: 1 /HPF (ref 0–4)
SP GR UR STRIP: 1.01 (ref 1–1.03)
SQUAMOUS #/AREA URNS AUTO: 7 /HPF
URN SPEC COLLECT METH UR: ABNORMAL
UROBILINOGEN UR STRIP-ACNC: NEGATIVE EU/DL
WBC #/AREA URNS AUTO: 2 /HPF (ref 0–5)

## 2019-05-06 PROCEDURE — 25000003 PHARM REV CODE 250: Mod: HCNC,ER | Performed by: PHYSICIAN ASSISTANT

## 2019-05-06 PROCEDURE — 99284 EMERGENCY DEPT VISIT MOD MDM: CPT | Mod: HCNC,25,ER

## 2019-05-06 PROCEDURE — 81000 URINALYSIS NONAUTO W/SCOPE: CPT | Mod: HCNC,ER

## 2019-05-06 PROCEDURE — 63600175 PHARM REV CODE 636 W HCPCS: Mod: HCNC,ER | Performed by: PHYSICIAN ASSISTANT

## 2019-05-06 PROCEDURE — 96372 THER/PROPH/DIAG INJ SC/IM: CPT | Mod: HCNC,ER

## 2019-05-06 RX ORDER — HYDROCODONE BITARTRATE AND ACETAMINOPHEN 5; 325 MG/1; MG/1
1 TABLET ORAL
Status: COMPLETED | OUTPATIENT
Start: 2019-05-06 | End: 2019-05-06

## 2019-05-06 RX ORDER — MORPHINE SULFATE 4 MG/ML
3 INJECTION, SOLUTION INTRAMUSCULAR; INTRAVENOUS
Status: COMPLETED | OUTPATIENT
Start: 2019-05-06 | End: 2019-05-06

## 2019-05-06 RX ORDER — ACETAMINOPHEN 325 MG/1
650 TABLET ORAL
Status: COMPLETED | OUTPATIENT
Start: 2019-05-06 | End: 2019-05-06

## 2019-05-06 RX ORDER — METHOCARBAMOL 750 MG/1
1500 TABLET, FILM COATED ORAL 3 TIMES DAILY
Qty: 30 TABLET | Refills: 0 | Status: SHIPPED | OUTPATIENT
Start: 2019-05-06 | End: 2019-05-16

## 2019-05-06 RX ORDER — ONDANSETRON 2 MG/ML
4 INJECTION INTRAMUSCULAR; INTRAVENOUS
Status: COMPLETED | OUTPATIENT
Start: 2019-05-06 | End: 2019-05-06

## 2019-05-06 RX ORDER — HYDROCODONE BITARTRATE AND ACETAMINOPHEN 5; 325 MG/1; MG/1
1 TABLET ORAL EVERY 4 HOURS PRN
Qty: 10 TABLET | Refills: 0 | Status: SHIPPED | OUTPATIENT
Start: 2019-05-06 | End: 2019-06-03

## 2019-05-06 RX ORDER — TIZANIDINE 4 MG/1
4 TABLET ORAL EVERY 12 HOURS PRN
Qty: 60 TABLET | Refills: 0 | Status: SHIPPED | OUTPATIENT
Start: 2019-05-06 | End: 2019-05-09 | Stop reason: SDUPTHER

## 2019-05-06 RX ADMIN — ACETAMINOPHEN 650 MG: 325 TABLET ORAL at 07:05

## 2019-05-06 RX ADMIN — ONDANSETRON 4 MG: 2 INJECTION INTRAMUSCULAR; INTRAVENOUS at 09:05

## 2019-05-06 RX ADMIN — MORPHINE SULFATE 3 MG: 4 INJECTION INTRAVENOUS at 09:05

## 2019-05-06 RX ADMIN — HYDROCODONE BITARTRATE AND ACETAMINOPHEN 1 TABLET: 5; 325 TABLET ORAL at 07:05

## 2019-05-06 NOTE — TELEPHONE ENCOUNTER
----- Message from Glo Amaya sent at 5/6/2019  2:02 PM CDT -----  Contact: self- [phone]  Would like to consult, patients states that's the nurse call her concerning getting an appt, please call back at [phone], thanks sj

## 2019-05-06 NOTE — ED NOTES
LOC: The patient is awake, alert and aware of environment with an appropriate affect, the patient is oriented x 3 and speaking appropriately.  APPEARANCE: Patient resting comfortably and in no acute distress, patient is clean and well groomed, patient's clothing is properly fastened.  HEENT: Brief WNL  SKIN: Brief WNL.   MUSCULOSKELETAL: Brief WNL. Pain to left lower back more with movement  RESPIRATORY: Brief WNL  CARDIAC: Brief WNL  GASTRO: Brief WNL  : Brief WNL  Peripheral Vasc: Brief WNL  NEURO: Brief WNL  PSYCH: Brief WNL

## 2019-05-06 NOTE — TELEPHONE ENCOUNTER
----- Message from Umu Da Silva sent at 5/6/2019 12:32 PM CDT -----  Contact: self [phone]  Type:  Same Day Appointment Request    Caller is requesting a same day appointment.  Caller declined first available appointment listed below.    Name of Caller:Tea Ring  When is the first available appointment?05/16/19  Symptoms:rt arm/shoulder pain, abd pain  Best Call Back Number:[phone]  Additional Information:

## 2019-05-07 NOTE — ED PROVIDER NOTES
History      Chief Complaint   Patient presents with    Back Pain     c/low back spasms       Review of patient's allergies indicates:   Allergen Reactions    Duloxetine      Feels bad        HPI   HPI    2019, 8:08 PM   History obtained from the patient      History of Present Illness: Tea Ring is a 73 y.o. female patient who presents to the Emergency Department for left low back pain since Saturday night.  Feels like spasms.  Better with heating pad. Symptoms are constant and moderate in severity.   Denies bladder/bowel dysfunction, fever, saddle anesthesia, or focal weakness.   No further complaints or concerns at this time.           PCP: Pawel Dominguez DO       Past Medical History:  Past Medical History:   Diagnosis Date    Alpha thalassemia     Asthma     Cataract     Chronic anxiety     years tid xanax 1mg    Chronic knee pain     Chronic pain of left ankle     Clotting disorder     Cutaneous lupus erythematosus     dr henry derm    Depression     Depression     dr radha hughes previously    Ex-smoker     quit fall 1    Hypertension     Osteopenia  rec         Past Surgical History:  Past Surgical History:   Procedure Laterality Date    ANKLE SURGERY Left     BACK SURGERY      COLONOSCOPY N/A 2017    [facility]    ESOPHAGOGASTRODUODENOSCOPY (EGD) N/A 2017    [facility]    HYSTERECTOMY      KNEE ARTHROSCOPY      both knees twice    OOPHORECTOMY             Family History:  Family History   Problem Relation Age of Onset    Diabetes Mother     Diabetes Father     Breast cancer Maternal Aunt            Social History:  Social History     Tobacco Use    Smoking status: Former Smoker     Packs/day: 1.00     Years: 30.00     Pack years: 30.00     Types: Cigarettes     Last attempt to quit: 2014     Years since quittin.6    Smokeless tobacco: Never Used    Tobacco comment:  smoke last cigarette 9/15   Substance and Sexual Activity    Alcohol use: Yes     Alcohol/week: 0.6 oz     Types: 1 Glasses of wine per week    Drug use: No    Sexual activity: Never       ROS   Review of Systems   Constitutional: Negative for chills and fever.   HENT: Negative for sore throat.    Respiratory: Negative for shortness of breath.    Cardiovascular: Negative for chest pain.   Gastrointestinal: Negative for nausea and vomiting.   Genitourinary: Negative for decreased urine volume, difficulty urinating, dysuria.   Musculoskeletal: Positive for back pain. Negative for neck stiffness.   Skin: Negative for rash and wound.   Neurological: Negative for weakness and numbness.   Hematological: Does not bruise/bleed easily.   All other systems reviewed and are negative.    Review of Systems    Physical Exam      Initial Vitals [05/06/19 1846]   BP Pulse Resp Temp SpO2   (!) 146/106 104 20 98.3 °F (36.8 °C) 96 %      MAP       --         Physical Exam  Vital signs and nursing notes reviewed.  Constitutional: Patient is in NAD. Awake and alert. Well-developed and well-nourished.  Head: Atraumatic. Normocephalic.  Eyes: PERRL. EOM intact. Conjunctivae nl. No scleral icterus.  ENT: Mucous membranes are moist. Oropharynx is clear.  Neck: Supple. No JVD. No lymphadenopathy.  No meningismus.  Nontender  Cardiovascular: Regular rate and rhythm. No murmurs, rubs, or gallops. Distal pulses are 2+ and symmetric.  Pulmonary/Chest: No respiratory distress. Clear to auscultation bilaterally. No wheezing, rales, or rhonchi.  Abdominal: Soft. Non-distended. No TTP. No rebound, guarding, or rigidity. Good bowel sounds.  Genitourinary: No CVA tenderness  Musculoskeletal: Moves all extremities. No edema.   Non tender c/t spine.  Lumbar spine with mild left paraspinous ttp, no midline ttp or step.  Skin: Warm and dry.  Neurological: Awake and alert. No acute focal neurological deficits are appreciated.  5/5 x 4 strength.  Strong  "and equal foot plantar and dorsiflexion.  Psychiatric: Normal affect. Good eye contact. Appropriate in content.      ED Course      Procedures  ED Vital Signs:  Vitals:    05/06/19 1846 05/06/19 1939   BP: (!) 146/106 (!) 132/94   Pulse: 104 96   Resp: 20 20   Temp: 98.3 °F (36.8 °C)    TempSrc: Oral    SpO2: 96% 97%   Weight: 80.7 kg (178 lb)    Height: 5' 2" (1.575 m)          Results for orders placed or performed during the hospital encounter of 05/06/19   Urinalysis, Reflex to Urine Culture Urine, Clean Catch   Result Value Ref Range    Specimen UA Urine, Clean Catch     Color, UA Yellow Yellow, Straw, Staci    Appearance, UA Clear Clear    pH, UA 6.0 5.0 - 8.0    Specific Gravity, UA 1.010 1.005 - 1.030    Protein, UA Negative Negative    Glucose, UA Negative Negative    Ketones, UA Negative Negative    Bilirubin (UA) Negative Negative    Occult Blood UA 2+ (A) Negative    Nitrite, UA Negative Negative    Urobilinogen, UA Negative <2.0 EU/dL    Leukocytes, UA Trace (A) Negative   Urinalysis Microscopic   Result Value Ref Range    RBC, UA 1 0 - 4 /hpf    WBC, UA 2 0 - 5 /hpf    Bacteria Occasional None-Occ /hpf    Squam Epithel, UA 7 /hpf    Microscopic Comment SEE COMMENT              Imaging Results:  Imaging Results          CT Renal Stone Study ABD Pelvis WO (Final result)  Result time 05/06/19 20:15:11    Final result by Bismark Reno MD (05/06/19 20:15:11)                 Impression:      No acute abnormality identified in the abdomen or pelvis.    All CT scans at this facility are performed  using dose modulation techniques as appropriate to performed exam including the following:  automated exposure control; adjustment of mA and/or kV according to the patients size (this includes techniques or standardized protocols for targeted exams where dose is matched to indication/reason for exam: i.e. extremities or head);  iterative reconstruction technique.      Electronically signed by: Bismark Reno, " MD  Date:    05/06/2019  Time:    20:15             Narrative:    EXAMINATION:  CT RENAL STONE STUDY ABD PELVIS WO    CLINICAL HISTORY:  Flank pain, stone disease suspected;    TECHNIQUE:  Axial CT images performed through the abdomen and pelvis without intravenous contrast. Multiplanar reformats were performed and interpreted.    COMPARISON:  12/29/2016    FINDINGS:  Lung bases are clear.    No urolithiasis, hydronephrosis, or perinephric stranding.    The liver, spleen, kidneys, adrenal glands, and pancreas have a normal noncontrasted appearance.    The gallbladder has been removed.  No biliary ductal dilatation.    No free fluid, free air, or inflammatory change.    The bowel is nondistended and within normal limits.  The appendix is normal.    Aortic atherosclerosis without evidence of aneurysm.    The urinary bladder is unremarkable. The uterus has been removed.  No free pelvic fluid or adenopathy detected.    No significant osseous abnormality is identified.  Degenerative disc disease and facet DJD throughout the visualized thoracolumbar spine.                               X-Ray Lumbar Spine Ap And Lateral (Final result)  Result time 05/06/19 19:28:39    Final result by Bismark Reno MD (05/06/19 19:28:39)                 Impression:      Advanced facet DJD throughout the lumbar spine.  Mild multilevel lumbar spondylosis.      Electronically signed by: Bismark Reno MD  Date:    05/06/2019  Time:    19:28             Narrative:    EXAMINATION:  XR LUMBAR SPINE AP AND LATERAL    CLINICAL HISTORY:  Low back pain, <6wks, no red flags, no prior management;    TECHNIQUE:  AP, lateral and spot images were performed of the lumbar spine.    COMPARISON:  03/08/2018    FINDINGS:  The vertebral bodies demonstrate a normal height and alignment.  Advanced facet DJD throughout the lumbar spine greatest at L3-4 through L5-S1.  Mild degenerative disc disease throughout with anterior syndesmophyte formation and endplate  sclerosis.  Aortic atherosclerosis.                                   The Emergency Provider reviewed the vital signs and test results, which are outlined above.    ED Discussion         Medication(s) given in the ER:  Medications   HYDROcodone-acetaminophen 5-325 mg per tablet 1 tablet (1 tablet Oral Given 5/6/19 1918)   acetaminophen tablet 650 mg (650 mg Oral Given 5/6/19 1918)   morphine injection 3 mg (3 mg Intramuscular Given 5/6/19 2114)   ondansetron injection 4 mg (4 mg Intramuscular Given 5/6/19 2119)           Follow-up Information     Pawel Dominguez, DO In 2 days.    Specialty:  Family Medicine  Contact information:  [address] [address] [address]  [phone]                       New Prescriptions    HYDROCODONE-ACETAMINOPHEN (NORCO) 5-325 MG PER TABLET    Take 1 tablet by mouth every 4 (four) hours as needed for Pain.    METHOCARBAMOL (ROBAXIN) 750 MG TAB    Take 2 tablets (1,500 mg total) by mouth 3 (three) times daily. for 10 days          Medical Decision Making      Pt says pain still present, requests a shot.    All findings were reviewed with the patient/family in detail.   All remaining questions and concerns were addressed at that time.  Patient/family has been counseled regarding the need for follow-up as well as the indication to return to the emergency room should new or worrisome developments occur.          MDM               Clinical Impression:        ICD-10-CM ICD-9-CM   1. Acute left-sided low back pain without sciatica M54.5 724.2   2. Hypertension, unspecified type I10 401.9             Deanna Hurd PA-C  05/06/19 1414

## 2019-05-08 ENCOUNTER — PATIENT OUTREACH (OUTPATIENT)
Dept: ADMINISTRATIVE | Facility: HOSPITAL | Age: 74
End: 2019-05-08

## 2019-05-09 ENCOUNTER — OFFICE VISIT (OUTPATIENT)
Dept: INTERNAL MEDICINE | Facility: CLINIC | Age: 74
End: 2019-05-09
Payer: MEDICARE

## 2019-05-09 VITALS
OXYGEN SATURATION: 99 % | RESPIRATION RATE: 19 BRPM | SYSTOLIC BLOOD PRESSURE: 149 MMHG | HEIGHT: 62 IN | TEMPERATURE: 98 F | HEART RATE: 103 BPM | DIASTOLIC BLOOD PRESSURE: 68 MMHG | BODY MASS INDEX: 33.15 KG/M2 | WEIGHT: 180.13 LBS

## 2019-05-09 DIAGNOSIS — M25.511 ACUTE PAIN OF RIGHT SHOULDER: ICD-10-CM

## 2019-05-09 DIAGNOSIS — M75.01 ADHESIVE CAPSULITIS OF RIGHT SHOULDER: Primary | ICD-10-CM

## 2019-05-09 PROCEDURE — 99214 OFFICE O/P EST MOD 30 MIN: CPT | Mod: HCNC,S$GLB,, | Performed by: FAMILY MEDICINE

## 2019-05-09 PROCEDURE — 99214 PR OFFICE/OUTPT VISIT, EST, LEVL IV, 30-39 MIN: ICD-10-PCS | Mod: HCNC,S$GLB,, | Performed by: FAMILY MEDICINE

## 2019-05-09 PROCEDURE — 3078F PR MOST RECENT DIASTOLIC BLOOD PRESSURE < 80 MM HG: ICD-10-PCS | Mod: HCNC,CPTII,S$GLB, | Performed by: FAMILY MEDICINE

## 2019-05-09 PROCEDURE — 3077F SYST BP >= 140 MM HG: CPT | Mod: HCNC,CPTII,S$GLB, | Performed by: FAMILY MEDICINE

## 2019-05-09 PROCEDURE — 1101F PT FALLS ASSESS-DOCD LE1/YR: CPT | Mod: HCNC,CPTII,S$GLB, | Performed by: FAMILY MEDICINE

## 2019-05-09 PROCEDURE — 99999 PR PBB SHADOW E&M-EST. PATIENT-LVL IV: CPT | Mod: PBBFAC,HCNC,, | Performed by: FAMILY MEDICINE

## 2019-05-09 PROCEDURE — 3078F DIAST BP <80 MM HG: CPT | Mod: HCNC,CPTII,S$GLB, | Performed by: FAMILY MEDICINE

## 2019-05-09 PROCEDURE — 1101F PR PT FALLS ASSESS DOC 0-1 FALLS W/OUT INJ PAST YR: ICD-10-PCS | Mod: HCNC,CPTII,S$GLB, | Performed by: FAMILY MEDICINE

## 2019-05-09 PROCEDURE — 99999 PR PBB SHADOW E&M-EST. PATIENT-LVL IV: ICD-10-PCS | Mod: PBBFAC,HCNC,, | Performed by: FAMILY MEDICINE

## 2019-05-09 PROCEDURE — 3077F PR MOST RECENT SYSTOLIC BLOOD PRESSURE >= 140 MM HG: ICD-10-PCS | Mod: HCNC,CPTII,S$GLB, | Performed by: FAMILY MEDICINE

## 2019-05-09 RX ORDER — DICLOFENAC SODIUM 10 MG/G
2 GEL TOPICAL 2 TIMES DAILY
Qty: 1 TUBE | Refills: 1 | Status: SHIPPED | OUTPATIENT
Start: 2019-05-09 | End: 2019-10-29

## 2019-05-09 RX ORDER — TIZANIDINE 4 MG/1
4 TABLET ORAL EVERY 12 HOURS PRN
Qty: 60 TABLET | Refills: 0 | Status: SHIPPED | OUTPATIENT
Start: 2019-05-09 | End: 2019-06-03 | Stop reason: SDUPTHER

## 2019-05-09 NOTE — PROGRESS NOTES
Subjective:       Patient ID: Tea Ring is a 73 y.o. female.    Chief Complaint: Shoulder Pain    Shoulder Pain    The pain is present in the right shoulder. This is a new problem. The current episode started in the past 7 days. The problem occurs constantly. The problem has been gradually worsening. The quality of the pain is described as aching and dull. The pain is moderate. Associated symptoms include joint locking and stiffness. Pertinent negatives include no fever, numbness or tingling. The symptoms are aggravated by activity. She has tried heat, cold and NSAIDS for the symptoms. Family history includes arthritis.     Review of Systems   Constitutional: Negative for fever.   Respiratory: Negative for shortness of breath.    Cardiovascular: Negative for chest pain.   Gastrointestinal: Negative for abdominal pain.   Musculoskeletal: Positive for arthralgias and stiffness. Negative for gait problem and joint swelling.   Neurological: Negative for tingling and numbness.       Objective:      Physical Exam   Constitutional: She appears well-developed and well-nourished. She appears distressed.   HENT:   Head: Normocephalic and atraumatic.   Pulmonary/Chest: Effort normal and breath sounds normal. No respiratory distress. She has no wheezes.   Musculoskeletal: She exhibits tenderness. She exhibits no edema or deformity.   Limited ROM with movement of right shoulder to 90 degrees.  TTP Right shoulder   Skin: Skin is warm and dry. No rash noted. She is not diaphoretic. No erythema.   Nursing note and vitals reviewed.      Assessment:       1. Adhesive capsulitis of right shoulder    2. Acute pain of right shoulder        Plan:     Problem List Items Addressed This Visit        Orthopedic    Adhesive capsulitis of right shoulder - Primary    Relevant Medications    diclofenac sodium (VOLTAREN) 1 % Gel    tiZANidine (ZANAFLEX) 4 MG tablet    Other Relevant Orders    Ambulatory Referral to Orthopedics      Other Visit  Diagnoses     Acute pain of right shoulder

## 2019-05-14 ENCOUNTER — TELEPHONE (OUTPATIENT)
Dept: RHEUMATOLOGY | Facility: CLINIC | Age: 74
End: 2019-05-14

## 2019-05-14 NOTE — TELEPHONE ENCOUNTER
Called pt to reschedule 5/15/19 appt & lab to 6/3/19 at provider request. Pt verbalized understanding, reminder letter sent as well

## 2019-06-03 ENCOUNTER — PATIENT OUTREACH (OUTPATIENT)
Dept: ADMINISTRATIVE | Facility: HOSPITAL | Age: 74
End: 2019-06-03

## 2019-06-03 ENCOUNTER — OFFICE VISIT (OUTPATIENT)
Dept: RHEUMATOLOGY | Facility: CLINIC | Age: 74
End: 2019-06-03
Payer: MEDICARE

## 2019-06-03 ENCOUNTER — TELEPHONE (OUTPATIENT)
Dept: RHEUMATOLOGY | Facility: CLINIC | Age: 74
End: 2019-06-03

## 2019-06-03 ENCOUNTER — LAB VISIT (OUTPATIENT)
Dept: LAB | Facility: HOSPITAL | Age: 74
End: 2019-06-03
Attending: INTERNAL MEDICINE
Payer: MEDICARE

## 2019-06-03 VITALS
DIASTOLIC BLOOD PRESSURE: 96 MMHG | HEART RATE: 92 BPM | SYSTOLIC BLOOD PRESSURE: 155 MMHG | BODY MASS INDEX: 32.5 KG/M2 | WEIGHT: 177.69 LBS

## 2019-06-03 DIAGNOSIS — M75.01 ADHESIVE CAPSULITIS OF RIGHT SHOULDER: ICD-10-CM

## 2019-06-03 DIAGNOSIS — L93.2 CUTANEOUS LUPUS ERYTHEMATOSUS: ICD-10-CM

## 2019-06-03 DIAGNOSIS — M19.90 ARTHRITIS: ICD-10-CM

## 2019-06-03 LAB
ALBUMIN SERPL BCP-MCNC: 3.6 G/DL (ref 3.5–5.2)
ALP SERPL-CCNC: 107 U/L (ref 55–135)
ALT SERPL W/O P-5'-P-CCNC: 8 U/L (ref 10–44)
ANION GAP SERPL CALC-SCNC: 10 MMOL/L (ref 8–16)
AST SERPL-CCNC: 11 U/L (ref 10–40)
BASOPHILS # BLD AUTO: 0.05 K/UL (ref 0–0.2)
BASOPHILS NFR BLD: 0.5 % (ref 0–1.9)
BILIRUB SERPL-MCNC: 0.3 MG/DL (ref 0.1–1)
BUN SERPL-MCNC: 27 MG/DL (ref 8–23)
CALCIUM SERPL-MCNC: 9.6 MG/DL (ref 8.7–10.5)
CHLORIDE SERPL-SCNC: 108 MMOL/L (ref 95–110)
CO2 SERPL-SCNC: 25 MMOL/L (ref 23–29)
CREAT SERPL-MCNC: 1.2 MG/DL (ref 0.5–1.4)
CRP SERPL-MCNC: 45.3 MG/L (ref 0–8.2)
DIFFERENTIAL METHOD: ABNORMAL
EOSINOPHIL # BLD AUTO: 0.4 K/UL (ref 0–0.5)
EOSINOPHIL NFR BLD: 4.2 % (ref 0–8)
ERYTHROCYTE [DISTWIDTH] IN BLOOD BY AUTOMATED COUNT: 16.6 % (ref 11.5–14.5)
ERYTHROCYTE [SEDIMENTATION RATE] IN BLOOD BY WESTERGREN METHOD: 46 MM/HR (ref 0–36)
EST. GFR  (AFRICAN AMERICAN): 52 ML/MIN/1.73 M^2
EST. GFR  (NON AFRICAN AMERICAN): 45 ML/MIN/1.73 M^2
GLUCOSE SERPL-MCNC: 100 MG/DL (ref 70–110)
HCT VFR BLD AUTO: 35.9 % (ref 37–48.5)
HGB BLD-MCNC: 10.5 G/DL (ref 12–16)
IMM GRANULOCYTES # BLD AUTO: 0.09 K/UL (ref 0–0.04)
IMM GRANULOCYTES NFR BLD AUTO: 0.9 % (ref 0–0.5)
LYMPHOCYTES # BLD AUTO: 2.5 K/UL (ref 1–4.8)
LYMPHOCYTES NFR BLD: 25.2 % (ref 18–48)
MCH RBC QN AUTO: 21.1 PG (ref 27–31)
MCHC RBC AUTO-ENTMCNC: 29.2 G/DL (ref 32–36)
MCV RBC AUTO: 72 FL (ref 82–98)
MONOCYTES # BLD AUTO: 0.6 K/UL (ref 0.3–1)
MONOCYTES NFR BLD: 6 % (ref 4–15)
NEUTROPHILS # BLD AUTO: 6.3 K/UL (ref 1.8–7.7)
NEUTROPHILS NFR BLD: 64.1 % (ref 38–73)
NRBC BLD-RTO: 0 /100 WBC
PLATELET # BLD AUTO: 464 K/UL (ref 150–350)
PMV BLD AUTO: 8.7 FL (ref 9.2–12.9)
POTASSIUM SERPL-SCNC: 3.9 MMOL/L (ref 3.5–5.1)
PROT SERPL-MCNC: 7.9 G/DL (ref 6–8.4)
RBC # BLD AUTO: 4.97 M/UL (ref 4–5.4)
SODIUM SERPL-SCNC: 143 MMOL/L (ref 136–145)
URATE SERPL-MCNC: 10.6 MG/DL (ref 2.4–5.7)
WBC # BLD AUTO: 9.8 K/UL (ref 3.9–12.7)

## 2019-06-03 PROCEDURE — 99999 PR PBB SHADOW E&M-EST. PATIENT-LVL II: CPT | Mod: PBBFAC,HCNC,, | Performed by: INTERNAL MEDICINE

## 2019-06-03 PROCEDURE — 86140 C-REACTIVE PROTEIN: CPT | Mod: HCNC

## 2019-06-03 PROCEDURE — 99499 UNLISTED E&M SERVICE: CPT | Mod: HCNC,S$GLB,, | Performed by: INTERNAL MEDICINE

## 2019-06-03 PROCEDURE — 99214 OFFICE O/P EST MOD 30 MIN: CPT | Mod: HCNC,S$GLB,, | Performed by: INTERNAL MEDICINE

## 2019-06-03 PROCEDURE — 85652 RBC SED RATE AUTOMATED: CPT | Mod: HCNC

## 2019-06-03 PROCEDURE — 3077F PR MOST RECENT SYSTOLIC BLOOD PRESSURE >= 140 MM HG: ICD-10-PCS | Mod: HCNC,CPTII,S$GLB, | Performed by: INTERNAL MEDICINE

## 2019-06-03 PROCEDURE — 80053 COMPREHEN METABOLIC PANEL: CPT | Mod: HCNC

## 2019-06-03 PROCEDURE — 1101F PR PT FALLS ASSESS DOC 0-1 FALLS W/OUT INJ PAST YR: ICD-10-PCS | Mod: HCNC,CPTII,S$GLB, | Performed by: INTERNAL MEDICINE

## 2019-06-03 PROCEDURE — 1101F PT FALLS ASSESS-DOCD LE1/YR: CPT | Mod: HCNC,CPTII,S$GLB, | Performed by: INTERNAL MEDICINE

## 2019-06-03 PROCEDURE — 99499 RISK ADDL DX/OHS AUDIT: ICD-10-PCS | Mod: HCNC,S$GLB,, | Performed by: INTERNAL MEDICINE

## 2019-06-03 PROCEDURE — 99999 PR PBB SHADOW E&M-EST. PATIENT-LVL II: ICD-10-PCS | Mod: PBBFAC,HCNC,, | Performed by: INTERNAL MEDICINE

## 2019-06-03 PROCEDURE — 99214 PR OFFICE/OUTPT VISIT, EST, LEVL IV, 30-39 MIN: ICD-10-PCS | Mod: HCNC,S$GLB,, | Performed by: INTERNAL MEDICINE

## 2019-06-03 PROCEDURE — 3080F DIAST BP >= 90 MM HG: CPT | Mod: HCNC,CPTII,S$GLB, | Performed by: INTERNAL MEDICINE

## 2019-06-03 PROCEDURE — 85025 COMPLETE CBC W/AUTO DIFF WBC: CPT | Mod: HCNC

## 2019-06-03 PROCEDURE — 36415 COLL VENOUS BLD VENIPUNCTURE: CPT | Mod: HCNC

## 2019-06-03 PROCEDURE — 3080F PR MOST RECENT DIASTOLIC BLOOD PRESSURE >= 90 MM HG: ICD-10-PCS | Mod: HCNC,CPTII,S$GLB, | Performed by: INTERNAL MEDICINE

## 2019-06-03 PROCEDURE — 3077F SYST BP >= 140 MM HG: CPT | Mod: HCNC,CPTII,S$GLB, | Performed by: INTERNAL MEDICINE

## 2019-06-03 PROCEDURE — 84550 ASSAY OF BLOOD/URIC ACID: CPT | Mod: HCNC

## 2019-06-03 RX ORDER — TIZANIDINE 4 MG/1
4 TABLET ORAL EVERY 12 HOURS PRN
Qty: 60 TABLET | Refills: 2 | Status: SHIPPED | OUTPATIENT
Start: 2019-06-03 | End: 2019-06-18 | Stop reason: SDUPTHER

## 2019-06-03 RX ORDER — TRAMADOL HYDROCHLORIDE 50 MG/1
TABLET ORAL
Refills: 0 | COMMUNITY
Start: 2019-05-24 | End: 2019-10-29

## 2019-06-03 NOTE — TELEPHONE ENCOUNTER
----- Message from Celia Zacarias sent at 6/3/2019  1:13 PM CDT -----  Contact: pt  Pt request a call back pt stated she forgot to report the  ibuprofen at last visit  ... [phone] (home)

## 2019-06-03 NOTE — PROGRESS NOTES
CC:  Chief Complaint   Patient presents with    Osteoarthritis     Right arm and back pain       History of Present Illness:  Tea watkins 73 y.o.yo female   Patient Active Problem List   Diagnosis    Essential hypertension    Chronic pain of left ankle    Chronic knee pain    Depression    Chronic anxiety    Cutaneous lupus erythematosus    Alpha thalassemia    Osteopenia    Ex-smoker    Chronic asthma without complication    Lumbar spondylosis    Atherosclerosis of aorta    History of total right knee replacement    Right knee pain    Gait instability    Muscle spasms of lower extremity    Medication noncompliance due to cognitive impairment    Lower extremity edema    Screening for breast cancer    Popping of both ears    Moderate major depression    Primary open angle glaucoma (POAG) of both eyes, severe stage    Disorder of bone     Arthritis of multiple sites    Glenohumeral arthritis, right    Generalized OA    Adhesive capsulitis of right shoulder     Referred here for further follow up of generalized arthralgias  She complains of chronic pain for years  She complains of pain in right shoulder, right elbow, both knees  She has a known history of chronic low back pain had a surgery done 9-10 years ago  She also had knee replacement surgery bilateral done by Dr. Brady  States she continues to hurt  in the knees even after surgery  She with soon schedule a follow-up appointment with Orthopedics for her knees and back    Today her main concern seems to be right shoulder  No relief with steroid injection given in the right shoulder   she continues to have persistent pain  Aggravated with movement  Difficulty raising the arm up  Pain radiates down the right lower extremity  She was evaluated by orthopedic surgeon at [facility] for the same, Dr Gonzales , he ordered an MRI of the right shoulder which she is supposed to do tomorrow  She is using tizanidine as needed and this is  helping  She also uses Elavil  at night  Tramadol did not make a difference    Left shoulder does not bother her  She also denies any pain or stiffness in the hands    Review of recent labs suggest hyperuricemia, with elevated inflammatory markers  She was recently started on allopurinol 100 mg daily and Colcrys 0.6 mg every other day, (with history of CKD) for any possibility of an underlying gout/ CPPD arthropathy  No change noted  Will review MRI results of right shoulder    Both ankles do not bother her anymore    Since last visit GI bleed resolved  She has chronic anemia related to thalassemia trait and some iron deficiency  She does not use any iron supplements   Last labs there was mild worsening of her anemia and she did complain of GI bleed, so I recommended she see Gastroenterology  However she could not keep up with the appointment, but then the bleed resolved      She has chronic discolored discoloration on her face  She states many years back she was told by a dermatologist on [place] this was cutaneous lupus  She was given topical steroids  But then again she was seen by [facility], diagnosed this as Ochronosis and gave her tretinoin cream  Reviewed [facility] note  She states this was biopsied many years ago    History of osteopenia and is on Actonel per PCP  Q monthly  Tolerating well no issues  No jaw pain  Denies any bleeding gums or dental caries or anticipated dental work    Past Medical History:   Diagnosis Date    Alpha thalassemia     Asthma     Cataract     Chronic anxiety     years tid xanax 1mg    Chronic knee pain     Chronic pain of left ankle     Clotting disorder     Cutaneous lupus erythematosus     dr henry derm    Depression     Depression     dr radha hughes previously    Ex-smoker     quit fall 1    Hypertension     Osteopenia 1/16 reck1/18       Past Surgical History:   Procedure Laterality Date    ANKLE SURGERY Left     BACK SURGERY      COLONOSCOPY N/A  2017    [facility]    ESOPHAGOGASTRODUODENOSCOPY (EGD) N/A 2017    [facility]    HYSTERECTOMY      KNEE ARTHROSCOPY      both knees twice    OOPHORECTOMY           Social History     Tobacco Use    Smoking status: Former Smoker     Packs/day: 1.00     Years: 30.00     Pack years: 30.00     Types: Cigarettes     Last attempt to quit: 2014     Years since quittin.7    Smokeless tobacco: Never Used    Tobacco comment: smoke last cigarette 9/15   Substance Use Topics    Alcohol use: Yes     Alcohol/week: 0.6 oz     Types: 1 Glasses of wine per week    Drug use: No       Family History   Problem Relation Age of Onset    Diabetes Mother     Diabetes Father     Breast cancer Maternal Aunt        Review of patient's allergies indicates:   Allergen Reactions    Duloxetine      Feels bad             Review of Systems:  Constitutional: Denies fever, chills. No recent weight changes.   Fatigue: no  Muscle weakness: no  Headaches: no new headaches  Eyes: No redness or dryness.  No recent visual changes.  ENT: Denies dry mouth. No oral or nasal ulcers.  Card: No chest pain.  Resp: No cough or sob.   Gastro: No nausea or vomiting.  No heartburn.  Constipation: no  Diarrhea: no  Genito:  No dysuria.  No genital ulcers.  Skin: +rash.  Raynauds:no  Neuro: No numbness / tingling.   Psych: No depression, anxiety  Endo:  no excess thirst.  Heme: no abnormal bleeding or bruising  Clots:none   Miscarriages : none     OBJECTIVE:     Vital Signs   Vitals:    19 1040   BP: (!) 155/96   Pulse: 92     Physical Exam:  General Appearance:  NAD.   Gait: not favoring.  HEENT: PERRL.  Eyes not dry or injected.  No nasal ulcers.  Mouth not dry, no oral lesions.  Lymph: cervical, supraclavicular or axillary nodes: none abnormal   Cardio: no irregularity of S1 or S2.  No gallops or rubs.   Resp: Normal respiratory motion. Clear to  auscultation bilaterally.   No abnormal chest conformation.  Abd: Soft, non-tender, nondistended.  No masses.   Skin: Head and neck,  and extremities examined.     Skin dark pigmentation noted over malar area  Neuro: Ox3.   Cranial nerves II-XII grossly intact.   Sensation intact  in both distal LE and upper extremities to light touch.  Musculoskeletal Exam:  Rt shoulder :  + tenderness in subacromial, bicipital areas  Pain on abduction external and internal rotation    Left shoulder good range of motion  B/l hands and feet no synovitis  B/l knee status post replacement  Bilateral ankles no swelling and normal range of motion    Lower lumbar facet tenderness  Multiple tender points  Laboratory:   Results for orders placed or performed during the hospital encounter of 05/06/19   Urinalysis, Reflex to Urine Culture Urine, Clean Catch   Result Value Ref Range    Specimen UA Urine, Clean Catch     Color, UA Yellow Yellow, Straw, Staci    Appearance, UA Clear Clear    pH, UA 6.0 5.0 - 8.0    Specific Gravity, UA 1.010 1.005 - 1.030    Protein, UA Negative Negative    Glucose, UA Negative Negative    Ketones, UA Negative Negative    Bilirubin (UA) Negative Negative    Occult Blood UA 2+ (A) Negative    Nitrite, UA Negative Negative    Urobilinogen, UA Negative <2.0 EU/dL    Leukocytes, UA Trace (A) Negative   Urinalysis Microscopic   Result Value Ref Range    RBC, UA 1 0 - 4 /hpf    WBC, UA 2 0 - 5 /hpf    Bacteria Occasional None-Occ /hpf    Squam Epithel, UA 7 /hpf    Microscopic Comment SEE COMMENT      Imaging :  X-ray right shoulder    FINDINGS:  Bone density and architecture are normal.  No fracture or dislocation is demonstrated.  Moderate spurring of the acromioclavicular joint and undersurface of the acromion are present.      Impression       No acute findings.  Chronic degenerative change of the acromioclavicular joint of right shoulder is present.         Notes reviewed  Other procedures:    ASSESSMENT/PLAN:      Adhesive capsulitis of right shoulder  -     tiZANidine (ZANAFLEX) 4 MG tablet; Take 1 tablet (4 mg total) by mouth every 12 (twelve) hours as needed.  Dispense: 60 tablet; Refill: 2      1:  Generalized osteoarthritis:  C/w with amitriptyline 50 mg at bedtime  Add Zanaflex 4 mg every 12 hr as needed      2:  Right shoulder pain:  RF/CCP negative  X-ray reviewed- chronic degenerative changes of AC joint noted  Inadequate response to steroid injection received in recent past  Voltaren gel topical  Will review MRI- ordered by Dr. Gonzales / [facility]  Possible adhesive capsulitis with AC joint arthritis- follow-up with ortho  Continue tizanidine as needed until then  No NSAIDs due to CKD  Tramadol does not help    With severe hyperuricemia:  No history of gout  Last visit with severe elevated inflammatory markers  and severe right shoulder pain  Empirically started on allopurinol 100 mg and Colcrys 0.6 every other day  X-rays reviewed no CPPD arthropathy, no relief with Colcrys noted  Stop allopurinol and colchicine      3:  DEXA 2016 suggestive of osteopenia with high FRAX 20.8/4.7  She is on Actonel per PCP  Due for repeat DEXA later  this year  Also takes MvT with calcium and vitamin-D    4:  Facial rash:  Unclear etiology cutaneous lupus versus ochronosis  LOLITA negative  No other signs or symptoms suggestive of systemic lupus noted at this point  Review labs and obtain [facility] records    5:  Status post bilateral knee replacement:  With persistent knee pain  Follow-up per Ortho    6:  Chronic back pain/spondylosis with history of back surgery in the past  Follow-up per Ortho    7:  Anemia with history of GI bleed which resolved now:  Continue to follow up with PCP for further monitoring  May  start taking some OTC iron supplement    8:  Chronic elevated inflammatory markers:  With normal IEP  No evidence of autoimmune disease noted on labs    Risks vs Benefits and potential side effects of medication  prescribed today were discussed with patient. Medication literature given to patient up discharge  Went over uptodate information /literature on the meds prescribed today      3 month return with [facility] in [place] ( close to home )    Disclaimer: This note was prepared using voice recognition system and is likely to have sound alike errors and is not proof read.  Please call me with any questions.

## 2019-06-03 NOTE — TELEPHONE ENCOUNTER
Pt called stating she does not really want to take zanaflex unless she is hurting really bad, would like to know if you can call in ibuprofen 800mg.?

## 2019-06-04 DIAGNOSIS — F41.9 CHRONIC ANXIETY: ICD-10-CM

## 2019-06-04 RX ORDER — ALPRAZOLAM 0.25 MG/1
TABLET ORAL
Qty: 60 TABLET | Refills: 2 | Status: SHIPPED | OUTPATIENT
Start: 2019-06-04 | End: 2019-10-08 | Stop reason: SDUPTHER

## 2019-06-04 NOTE — TELEPHONE ENCOUNTER
Dr. Thomas verbally states pt can not take ibuprofen or other NSAIDs due to kidney issues. Tylenol or tizanidine (Zanaflex), which she already has is better option. Called pt to advise, pt verbalized understanding

## 2019-06-17 RX ORDER — POTASSIUM CHLORIDE 20 MEQ/1
TABLET, EXTENDED RELEASE ORAL
Qty: 60 TABLET | Refills: 1 | Status: SHIPPED | OUTPATIENT
Start: 2019-06-17 | End: 2019-10-29

## 2019-06-17 RX ORDER — VALSARTAN AND HYDROCHLOROTHIAZIDE 160; 12.5 MG/1; MG/1
TABLET, FILM COATED ORAL
Qty: 90 TABLET | Refills: 3 | Status: SHIPPED | OUTPATIENT
Start: 2019-06-17 | End: 2019-10-29 | Stop reason: SDUPTHER

## 2019-06-18 DIAGNOSIS — M75.01 ADHESIVE CAPSULITIS OF RIGHT SHOULDER: ICD-10-CM

## 2019-06-18 RX ORDER — TIZANIDINE 4 MG/1
4 TABLET ORAL EVERY 12 HOURS PRN
Qty: 60 TABLET | Refills: 2 | Status: SHIPPED | OUTPATIENT
Start: 2019-06-18 | End: 2019-10-29

## 2019-06-18 NOTE — TELEPHONE ENCOUNTER
----- Message from Efraín Henderson sent at 6/18/2019 10:39 AM CDT -----  Contact: [phone]  .Type:  RX Refill Request    Who Called: Tea Ring  Refill or New Rx:Refill  RX Name and Strength:Tibanidine 4mg  How is the patient currently taking it?(ex. 1XDay): 3xDay   Is this a 30 day or 90 day RX:30 day      Preferred Pharmacy with phone number:  [facility] [address]  [address]  [place] [address]  Phone: [phone] Fax: [phone]      Local or Mail Order:local  Ordering Provider:  Would the patient rather a call back or a response via [facility]? Call back   Best Call Back Number:[phone]  Additional Information: pt is going out of town on Thursday and needs a refill.

## 2019-06-18 NOTE — TELEPHONE ENCOUNTER
Returned call to pt, states haing body pain and due to go on vacation 7 will run out of Zanaflex. Req refill to [facility]

## 2019-06-30 ENCOUNTER — HOSPITAL ENCOUNTER (EMERGENCY)
Facility: HOSPITAL | Age: 74
Discharge: HOME | End: 2019-06-30
Attending: EMERGENCY MEDICINE
Payer: MEDICARE

## 2019-06-30 VITALS
BODY MASS INDEX: 32.82 KG/M2 | WEIGHT: 178.38 LBS | HEIGHT: 62 IN | OXYGEN SATURATION: 98 % | TEMPERATURE: 98 F | HEART RATE: 90 BPM | SYSTOLIC BLOOD PRESSURE: 176 MMHG | RESPIRATION RATE: 18 BRPM | DIASTOLIC BLOOD PRESSURE: 76 MMHG

## 2019-06-30 DIAGNOSIS — G89.29 CHRONIC RIGHT SHOULDER PAIN: Primary | ICD-10-CM

## 2019-06-30 DIAGNOSIS — M25.511 CHRONIC RIGHT SHOULDER PAIN: Primary | ICD-10-CM

## 2019-06-30 DIAGNOSIS — M75.01 ADHESIVE CAPSULITIS OF RIGHT SHOULDER: ICD-10-CM

## 2019-06-30 PROCEDURE — 96372 THER/PROPH/DIAG INJ SC/IM: CPT | Mod: HCNC,ER

## 2019-06-30 PROCEDURE — 25000003 PHARM REV CODE 250: Mod: HCNC,ER | Performed by: NURSE PRACTITIONER

## 2019-06-30 PROCEDURE — 63600175 PHARM REV CODE 636 W HCPCS: Mod: HCNC,ER | Performed by: NURSE PRACTITIONER

## 2019-06-30 PROCEDURE — 99284 EMERGENCY DEPT VISIT MOD MDM: CPT | Mod: 25,HCNC,ER

## 2019-06-30 RX ORDER — NABUMETONE 500 MG/1
500 TABLET, FILM COATED ORAL 2 TIMES DAILY PRN
Qty: 30 TABLET | Refills: 0 | Status: SHIPPED | OUTPATIENT
Start: 2019-06-30 | End: 2019-07-18

## 2019-06-30 RX ORDER — HYDROCODONE BITARTRATE AND ACETAMINOPHEN 10; 325 MG/1; MG/1
1 TABLET ORAL
Status: COMPLETED | OUTPATIENT
Start: 2019-06-30 | End: 2019-06-30

## 2019-06-30 RX ORDER — KETOROLAC TROMETHAMINE 30 MG/ML
30 INJECTION, SOLUTION INTRAMUSCULAR; INTRAVENOUS
Status: COMPLETED | OUTPATIENT
Start: 2019-06-30 | End: 2019-06-30

## 2019-06-30 RX ADMIN — HYDROCODONE BITARTRATE AND ACETAMINOPHEN 1 TABLET: 10; 325 TABLET ORAL at 05:06

## 2019-06-30 RX ADMIN — KETOROLAC TROMETHAMINE 30 MG: 30 INJECTION, SOLUTION INTRAMUSCULAR; INTRAVENOUS at 05:06

## 2019-06-30 NOTE — ED PROVIDER NOTES
"Encounter Date: 6/30/2019       History     Chief Complaint   Patient presents with    Arm Injury     Patient reports that she fell 3 weeks ago saw her pcp took injections, placed on meds, arm not getting better. She reports that she did one follow up but has not returned to her pcp     The history is provided by the patient.   Arm Injury    The incident occurred several weeks ago (chronic condition - patient reports that she fell approximately three weeks ago). The injury mechanism was a fall (patient has chronic pain to her right shoulder but reports that it was exacerbated three weeks ago following a fall). She came to the ER via by private vehicle. There is an injury to the right shoulder (no injury - chronic pain to right shoulder). There have been prior injuries to these areas. She is right-handed. She has been behaving normally. Recently, medical care has been given by the PCP and by a specialist (seen by PCP on 05/09/2019 and by rheumatology on 06/03/2019). Pertinent negatives include no chest pain, no altered mental status, no numbness, no visual disturbance, no abdominal pain, no nausea, no bladder incontinence, no headaches, no neck pain, no light-headedness, no weakness, no cough and no difficulty breathing.   Ms. Ring states that she fell approximately three weeks ago and "aggravated her right shoulder".  She states, "The cortisone shots are not working, they just been blowing me up with all this fat". She adds, "I just can't sleep and nothing is helping my shoulder".  The patient has a history of arthritis and adhesive capsulitis to her right shoulder.  She denies any recent trauma, excluding the fall three weeks ago, as well as any further complaints or concerns.       PCP:    Pawel Dominguez, DO        Review of patient's allergies indicates:   Allergen Reactions    Duloxetine      Feels bad     Past Medical History:   Diagnosis Date    Alpha thalassemia     Asthma     Cataract     Chronic " anxiety     years tid xanax 1mg    Chronic knee pain     Chronic pain of left ankle     Clotting disorder     Cutaneous lupus erythematosus     dr henry derm    Depression     Depression     dr radha hughes previously    Ex-smoker     quit fall 1    Hypertension     Osteopenia  rec     Past Surgical History:   Procedure Laterality Date    ANKLE SURGERY Left     BACK SURGERY      COLONOSCOPY N/A 2017    [facility]    ESOPHAGOGASTRODUODENOSCOPY (EGD) N/A 2017    [facility] ENDO    HYSTERECTOMY      KNEE ARTHROSCOPY      both knees twice    OOPHORECTOMY       Family History   Problem Relation Age of Onset    Diabetes Mother     Diabetes Father     Breast cancer Maternal Aunt      Social History     Tobacco Use    Smoking status: Former Smoker     Packs/day: 1.00     Years: 30.00     Pack years: 30.00     Types: Cigarettes     Last attempt to quit: 2014     Years since quittin.8    Smokeless tobacco: Never Used    Tobacco comment: smoke last cigarette 9/15   Substance Use Topics    Alcohol use: Yes     Alcohol/week: 0.6 oz     Types: 1 Glasses of wine per week    Drug use: No     Review of Systems   Constitutional: Negative for chills and fever.   HENT: Negative for congestion and sore throat.    Eyes: Negative for visual disturbance.   Respiratory: Negative for cough, chest tightness and shortness of breath.    Cardiovascular: Negative for chest pain and palpitations.   Gastrointestinal: Negative for abdominal pain and nausea.   Genitourinary: Negative for bladder incontinence and dysuria.   Musculoskeletal: Negative for back pain and neck pain.        Positive for right shoulder pain.    Skin: Negative for color change and rash.   Neurological: Negative for dizziness, weakness, light-headedness, numbness and headaches.   Hematological: Does not bruise/bleed easily.       Physical Exam     Initial  Vitals [06/30/19 1658]   BP Pulse Resp Temp SpO2   (!) 187/81 89 20 98.7 °F (37.1 °C) 99 %      MAP       --         Physical Exam    Nursing note and vitals reviewed.  Constitutional: She appears well-developed and well-nourished. She is cooperative. She does not appear ill. No distress.   HENT:   Head: Normocephalic and atraumatic.   Nose: Nose normal.   Mouth/Throat: Uvula is midline, oropharynx is clear and moist and mucous membranes are normal.   Eyes: Conjunctivae, EOM and lids are normal. Pupils are equal, round, and reactive to light.   Neck: Trachea normal and normal range of motion. Neck supple.   Cardiovascular: Normal rate, regular rhythm, intact distal pulses and normal pulses.   Pulmonary/Chest: Effort normal. No respiratory distress.   Musculoskeletal: She exhibits no edema.        Right shoulder: She exhibits decreased range of motion (pain worse with abduction) and tenderness (to subacromial and bicipital areas). She exhibits no swelling and no effusion.   + tenderness in subacromial, bicipital areas  Pain on abduction external and internal rotation   Neurological: She is alert and oriented to person, place, and time. She has normal strength. No sensory deficit. Gait normal. GCS eye subscore is 4. GCS verbal subscore is 5. GCS motor subscore is 6.   Neurovascular intact to all extremities.    Skin: Skin is warm, dry and intact. Capillary refill takes less than 2 seconds. No abrasion, no bruising, no ecchymosis and no rash noted.   Psychiatric: She has a normal mood and affect. Her speech is normal and behavior is normal. Cognition and memory are normal.         ED Course   Procedures    ED Medications:   Medications   HYDROcodone-acetaminophen  mg per tablet 1 tablet (1 tablet Oral Given 6/30/19 1747)   ketorolac injection 30 mg (30 mg Intramuscular Given 6/30/19 1749)         ED Course Vitals  Vitals:    06/30/19 1658 06/30/19 1806   BP: (!) 187/81 (!) 176/76   BP Location: Left arm Left arm  "  Patient Position: Sitting    Pulse: 89 90   Resp: 20 18   Temp: 98.7 °F (37.1 °C) 98 °F (36.7 °C)   TempSrc: Oral    SpO2: 99% 98%   Weight: 80.9 kg (178 lb 5.6 oz)    Height: 5' 2" (1.575 m)          1755 HOURS RE-EVALUATION & DISPOSITION:   Reassessment at the time of disposition demonstrates that the patient is resting comfortably in no acute distress.  She has remained hemodynamically stable throughout the entire ED visit and is without objective evidence for acute process requiring urgent intervention or hospitalization. I provided counseling to patient with regard to condition, the treatment plan, specific conditions for return, and the importance of follow up.  Answered questions at this time. The patient is stable for discharge.                   Medical Decision Making:   History:   Old Records Summarized: records from clinic visits.                      Clinical Impression:       ICD-10-CM ICD-9-CM   1. Chronic right shoulder pain M25.511 719.41    G89.29 338.29   2. Adhesive capsulitis of right shoulder M75.01 726.0           Disposition:   Disposition: Discharged  Condition: Stable  I discussed with patient that the evaluation in the emergency department does not suggest any emergent or life threatening medical condition requiring immediate intervention beyond what was provided in the ED, and I believe patient is safe for discharge.  Regardless, an unremarkable evaluation in the ED does not preclude the development or presence of a serious of life threatening condition. As such, patient was instructed to return immediately for any worsening or change in current symptoms. I also discussed the results of my evaluation and diagnosis with patient and she concurs with the evaluation and management plan.  Detailed written and verbal instructions provided to patient and she expressed a verbal understanding of the discharge instructions and overall management plan. Reiterated the importance of medication " administration and safety and advised patient to follow up with primary care provider in 3-5 days or sooner if needed.  Also advised patient to return to the ER for any complications.     Regarding SHOULDER PAIN, for treatment, I advised patient to: apply cold packs or ice bags to shoulder (15 min on and 15 min off) several times per day; rest shoulder for the next few days; slowly return to your regular activities; take ibuprofen (to also help with inflammation) or acetaminophen to manage pain. Patient was provided information and also shown proper exercises to stretch and strengthen rotator cuff tendons and shoulder muscles.         Discharge Medication List as of 6/30/2019  5:55 PM      START taking these medications    Details   nabumetone (RELAFEN) 500 MG tablet Take 1 tablet (500 mg total) by mouth 2 (two) times daily as needed for Pain., Starting Sun 6/30/2019, Normal               Follow-up Information     Schedule an appointment as soon as possible for a visit  with Pawel Dominguez DO.    Specialty:  Family Medicine  Contact information:  [address] [address] [address]  [phone]             Schedule an appointment as soon as possible for a visit  with Ese Thomas MD.    Specialties:  Rheumatology, Internal Medicine  Contact information:  [id number] [place] [address] [address] Elizabeth RUBIO [id number]  [phone]                                     Alejo Bell NP  07/01/19 6977

## 2019-06-30 NOTE — DISCHARGE INSTRUCTIONS
A prescription for an antiinflammatory medication, nabumetone (Relafen), has been sent electronically to [facility] in [place].

## 2019-06-30 NOTE — ED NOTES
Patient reports a fall 3 weeks ago. Has seen her PCP had an MRI and given oral meds and cortisone injections. She reports that she is also following up tomorrow but states arm is not better. + pulses, + sensation. amb with slow steady gait. Will continue to monitor.

## 2019-07-17 ENCOUNTER — PES CALL (OUTPATIENT)
Dept: ADMINISTRATIVE | Facility: CLINIC | Age: 74
End: 2019-07-17

## 2019-07-18 ENCOUNTER — OFFICE VISIT (OUTPATIENT)
Dept: INTERNAL MEDICINE | Facility: CLINIC | Age: 74
End: 2019-07-18
Payer: MEDICARE

## 2019-07-18 VITALS
HEIGHT: 62 IN | WEIGHT: 174.81 LBS | HEART RATE: 95 BPM | OXYGEN SATURATION: 96 % | SYSTOLIC BLOOD PRESSURE: 138 MMHG | TEMPERATURE: 97 F | DIASTOLIC BLOOD PRESSURE: 72 MMHG | RESPIRATION RATE: 12 BRPM | BODY MASS INDEX: 32.17 KG/M2

## 2019-07-18 DIAGNOSIS — I10 ESSENTIAL HYPERTENSION: Chronic | ICD-10-CM

## 2019-07-18 DIAGNOSIS — M75.01 ADHESIVE CAPSULITIS OF RIGHT SHOULDER: ICD-10-CM

## 2019-07-18 DIAGNOSIS — Z96.651 HISTORY OF TOTAL RIGHT KNEE REPLACEMENT: ICD-10-CM

## 2019-07-18 DIAGNOSIS — Z12.39 SCREENING FOR BREAST CANCER: ICD-10-CM

## 2019-07-18 DIAGNOSIS — M13.0 ARTHRITIS OF MULTIPLE SITES: Primary | ICD-10-CM

## 2019-07-18 PROCEDURE — 1101F PT FALLS ASSESS-DOCD LE1/YR: CPT | Mod: HCNC,CPTII,S$GLB, | Performed by: FAMILY MEDICINE

## 2019-07-18 PROCEDURE — 1101F PR PT FALLS ASSESS DOC 0-1 FALLS W/OUT INJ PAST YR: ICD-10-PCS | Mod: HCNC,CPTII,S$GLB, | Performed by: FAMILY MEDICINE

## 2019-07-18 PROCEDURE — 3078F PR MOST RECENT DIASTOLIC BLOOD PRESSURE < 80 MM HG: ICD-10-PCS | Mod: HCNC,CPTII,S$GLB, | Performed by: FAMILY MEDICINE

## 2019-07-18 PROCEDURE — 99214 PR OFFICE/OUTPT VISIT, EST, LEVL IV, 30-39 MIN: ICD-10-PCS | Mod: HCNC,S$GLB,, | Performed by: FAMILY MEDICINE

## 2019-07-18 PROCEDURE — 3078F DIAST BP <80 MM HG: CPT | Mod: HCNC,CPTII,S$GLB, | Performed by: FAMILY MEDICINE

## 2019-07-18 PROCEDURE — 3075F PR MOST RECENT SYSTOLIC BLOOD PRESS GE 130-139MM HG: ICD-10-PCS | Mod: HCNC,CPTII,S$GLB, | Performed by: FAMILY MEDICINE

## 2019-07-18 PROCEDURE — 3075F SYST BP GE 130 - 139MM HG: CPT | Mod: HCNC,CPTII,S$GLB, | Performed by: FAMILY MEDICINE

## 2019-07-18 PROCEDURE — 99999 PR PBB SHADOW E&M-EST. PATIENT-LVL IV: ICD-10-PCS | Mod: PBBFAC,HCNC,, | Performed by: FAMILY MEDICINE

## 2019-07-18 PROCEDURE — 99999 PR PBB SHADOW E&M-EST. PATIENT-LVL IV: CPT | Mod: PBBFAC,HCNC,, | Performed by: FAMILY MEDICINE

## 2019-07-18 PROCEDURE — 99214 OFFICE O/P EST MOD 30 MIN: CPT | Mod: HCNC,S$GLB,, | Performed by: FAMILY MEDICINE

## 2019-07-18 RX ORDER — NAPROXEN 500 MG/1
TABLET ORAL
Refills: 0 | COMMUNITY
Start: 2019-06-17 | End: 2019-10-29

## 2019-07-18 NOTE — ASSESSMENT & PLAN NOTE
Certainly think she would benefit from a rolling walker that she can have a full down seat.  This will allow her to be more mobile without risking falls and excessive exhaustion.

## 2019-07-18 NOTE — PROGRESS NOTES
Subjective:       Patient ID: Tea Ring is a 73 y.o. female.    Chief Complaint: Spasms (right upper back) and Hypertension    HPI   came in today to follow-up on right shoulder and upper back pain.  Has been dealing with this for a while now without much relief.  Has been taking muscle relaxer tizanidine which gets a little bit of help.  Says that she was seen [facility] and was referred to physical therapy but is still awaiting appointment for this.  Also requesting a prescription for Rollator walker.  She came in today into the clinic using a wheelchair and has difficulty getting around with just using a cane.  Family History   Problem Relation Age of Onset    Diabetes Mother     Diabetes Father     Breast cancer Maternal Aunt        Current Outpatient Medications:     ALPRAZolam (XANAX) 0.25 MG tablet, TAKE ONE TABLET BY MOUTH TWICE A DAY AS NEEDED FOR ANXIETY, Disp: 60 tablet, Rfl: 2    amitriptyline (ELAVIL) 50 MG tablet, Take 1 tablet (50 mg total) by mouth every evening., Disp: 90 tablet, Rfl: 3    aspirin (ECOTRIN) 81 MG EC tablet, Take 1 tablet (81 mg total) by mouth once daily., Disp: , Rfl: 0    benzocaine-lanolin (DERMOPLAST) 20-0.5 % Aero, Apply topically 4 (four) times daily as needed., Disp: , Rfl:     diclofenac sodium (VOLTAREN) 1 % Gel, Apply 2 g topically 2 (two) times daily., Disp: 1 Tube, Rfl: 1    pantoprazole (PROTONIX) 40 MG tablet, Take 1 tablet (40 mg total) by mouth once daily., Disp: 90 tablet, Rfl: 3    potassium chloride SA (K-DUR,KLOR-CON) 20 MEQ tablet, TAKE 1 TABLET BY MOUTH TWICE DAILY, Disp: 60 tablet, Rfl: 1    tiZANidine (ZANAFLEX) 4 MG tablet, Take 1 tablet (4 mg total) by mouth every 12 (twelve) hours as needed., Disp: 60 tablet, Rfl: 2    valsartan-hydrochlorothiazide (DIOVAN-HCT) 160-12.5 mg per tablet, TAKE ONE TABLET BY MOUTH ONCE DAILY, Disp: 90 tablet, Rfl: 3    albuterol (PROAIR HFA) 90 mcg/actuation inhaler, Inhale 2  "puffs into the lungs every 6 (six) hours as needed for Wheezing. Rescue, Disp: , Rfl:     amLODIPine (NORVASC) 5 MG tablet, Take 1 tablet (5 mg total) by mouth once daily., Disp: 30 tablet, Rfl: 1    atorvastatin (LIPITOR) 10 MG tablet, Take 1 tablet (10 mg total) by mouth once daily., Disp: 90 tablet, Rfl: 3    betamethasone valerate 0.1% (VALISONE) 0.1 % Crea, APPLY TOPICALLY TWICE DAILY., Disp: 45 g, Rfl: 0    blood pressure monitor (BLOOD PRESSURE KIT) Kit, Record daily, Disp: 1 each, Rfl: 0    naproxen (NAPROSYN) 500 MG tablet, TAKE ONE TABLET BY MOUTH TWICE A DAY AFTER MEALS, Disp: , Rfl: 0    sertraline (ZOLOFT) 100 MG tablet, Take 1.5 tablets (150 mg total) by mouth once daily., Disp: 135 tablet, Rfl: 3    traMADol (ULTRAM) 50 mg tablet, TAKE ONE TABLET BY MOUTH EVERY TWELVE HOURS AS NEEDED FOR PAIN, Disp: , Rfl: 0    Review of Systems   Constitutional: Negative for chills and fever.   Respiratory: Negative for cough and shortness of breath.    Cardiovascular: Negative for chest pain.   Gastrointestinal: Negative for abdominal pain.   Musculoskeletal: Positive for arthralgias and gait problem.   Skin: Negative for rash.   Neurological: Negative for dizziness.       Objective:   /72 (BP Location: Left arm, Patient Position: Sitting, BP Method: Large (Automatic))   Pulse 95   Temp 97.3 °F (36.3 °C) (Oral)   Resp 12   Ht 5' 2" (1.575 m)   Wt 79.3 kg (174 lb 13.2 oz)   SpO2 96%   BMI 31.98 kg/m²      Physical Exam   Constitutional: She is oriented to person, place, and time. She appears well-developed and well-nourished.   HENT:   Head: Normocephalic and atraumatic.   Eyes: Conjunctivae are normal.   Cardiovascular: Normal rate.   Pulmonary/Chest: Effort normal. No respiratory distress.   Musculoskeletal: She exhibits no edema.   Limited range of motion of right shoulder with discomfort.  Cannot raise her arm above parallel in abduction.  Some tenderness to palpation of the trapezius and " rotator cuff muscles.   Neurological: She is alert and oriented to person, place, and time. Coordination normal.   Skin: Skin is warm and dry. No rash noted.   Psychiatric: She has a normal mood and affect. Her behavior is normal.   Vitals reviewed.      Assessment & Plan     Problem List Items Addressed This Visit        Cardiac/Vascular    Essential hypertension (Chronic)    Current Assessment & Plan       Blood pressure well controlled.  Continue on same medication.            Renal/    Screening for breast cancer    Current Assessment & Plan       Due for mammogram next month.         Relevant Orders    Mammo Digital Screening Bilat       Orthopedic    History of total right knee replacement    Relevant Orders    WALKER FOR HOME USE    Arthritis of multiple sites - Primary    Current Assessment & Plan       Certainly think she would benefit from a rolling walker that she can have a full down seat.  This will allow her to be more mobile without risking falls and excessive exhaustion.         Relevant Orders    WALKER FOR HOME USE    Adhesive capsulitis of right shoulder    Current Assessment & Plan       Told her that she definitely needs to follow up with [facility]opedic [facility] to get referral set up an established for physical therapy which had previously been discussed.  Told her that no other medication would cure her symptoms and only would be for  Temporary relief.                 Follow up in about 3 months (around 10/18/2019).    Disclaimer:  This note may have been prepared using voice recognition software, it may have not been extensively proofed, as such there could be errors within the text such as sound alike errors.

## 2019-07-18 NOTE — ASSESSMENT & PLAN NOTE
Told her that she definitely needs to follow up with her orthopedic [facility] to get referral set up an established for physical therapy which had previously been discussed.  Told her that no other medication would cure her symptoms and only would be for  Temporary relief.

## 2019-07-26 RX ORDER — AMITRIPTYLINE HYDROCHLORIDE 50 MG/1
50 TABLET, FILM COATED ORAL NIGHTLY
Qty: 90 TABLET | Refills: 0 | Status: SHIPPED | OUTPATIENT
Start: 2019-07-26 | End: 2019-10-08 | Stop reason: SDUPTHER

## 2019-08-13 ENCOUNTER — TELEPHONE (OUTPATIENT)
Dept: INTERNAL MEDICINE | Facility: CLINIC | Age: 74
End: 2019-08-13

## 2019-08-13 NOTE — TELEPHONE ENCOUNTER
----- Message from Nabeel Hernandez sent at 8/13/2019  4:31 PM CDT -----  Contact: pt  Pt is calling staff regarding an order for pt to get a  wheel Chair.  Pt call back [phone]  thanks

## 2019-08-13 NOTE — TELEPHONE ENCOUNTER
----- Message from Aide Guy sent at 8/13/2019  2:44 PM CDT -----  Contact: Pt  Pt would like to speak with nurse about her wheelchair, pt asked that nurse return her call at ([phone])

## 2019-08-13 NOTE — TELEPHONE ENCOUNTER
----- Message from Doc Pittman sent at 8/13/2019  4:41 PM CDT -----  Contact: pt   .Type:  Patient Returning Call    Who Called: pt   Who Left Message for Patient nurse   Does the patient know what this is regarding?: not sure   Would the patient rather a call back or a response via [facility]? Callback   Best Call Back Number: ..[phone]      Additional Information:  Pt states she was having a problem with her phone.             ..[phone] (

## 2019-08-14 ENCOUNTER — HOSPITAL ENCOUNTER (EMERGENCY)
Facility: HOSPITAL | Age: 74
Discharge: ANOTHER HOSPITAL | End: 2019-08-14
Attending: EMERGENCY MEDICINE
Payer: MEDICARE

## 2019-08-14 VITALS
RESPIRATION RATE: 19 BRPM | HEIGHT: 62 IN | OXYGEN SATURATION: 97 % | TEMPERATURE: 99 F | HEART RATE: 94 BPM | WEIGHT: 174.81 LBS | SYSTOLIC BLOOD PRESSURE: 153 MMHG | BODY MASS INDEX: 32.17 KG/M2 | DIASTOLIC BLOOD PRESSURE: 86 MMHG
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
## 2019-08-14 DIAGNOSIS — R29.6 RECURRENT FALLS: ICD-10-CM

## 2019-08-14 DIAGNOSIS — S32.009A LUMBAR TRANSVERSE PROCESS FRACTURE, CLOSED, INITIAL ENCOUNTER: Primary | ICD-10-CM

## 2019-08-14 DIAGNOSIS — S32.000A LUMBAR COMPRESSION FRACTURE, CLOSED, INITIAL ENCOUNTER: ICD-10-CM

## 2019-08-14 LAB
ALBUMIN SERPL BCP-MCNC: 3.9 G/DL (ref 3.5–5.2)
ALP SERPL-CCNC: 86 U/L (ref 55–135)
ALT SERPL W/O P-5'-P-CCNC: 9 U/L (ref 10–44)
ANION GAP SERPL CALC-SCNC: 13 MMOL/L (ref 8–16)
ANISOCYTOSIS BLD QL SMEAR: SLIGHT
AST SERPL-CCNC: 19 U/L (ref 10–40)
BACTERIA #/AREA URNS AUTO: ABNORMAL /HPF
BASOPHILS # BLD AUTO: 0.01 K/UL (ref 0–0.2)
BASOPHILS NFR BLD: 0.1 % (ref 0–1.9)
BILIRUB SERPL-MCNC: 0.5 MG/DL (ref 0.1–1)
BILIRUB UR QL STRIP: NEGATIVE
BUN SERPL-MCNC: 40 MG/DL (ref 8–23)
CALCIUM SERPL-MCNC: 9.1 MG/DL (ref 8.7–10.5)
CHLORIDE SERPL-SCNC: 102 MMOL/L (ref 95–110)
CLARITY UR REFRACT.AUTO: ABNORMAL
CO2 SERPL-SCNC: 25 MMOL/L (ref 23–29)
COLOR UR AUTO: YELLOW
CREAT SERPL-MCNC: 2.2 MG/DL (ref 0.5–1.4)
DIFFERENTIAL METHOD: ABNORMAL
EOSINOPHIL # BLD AUTO: 0 K/UL (ref 0–0.5)
EOSINOPHIL NFR BLD: 0.1 % (ref 0–8)
ERYTHROCYTE [DISTWIDTH] IN BLOOD BY AUTOMATED COUNT: 17 % (ref 11.5–14.5)
EST. GFR  (AFRICAN AMERICAN): 25.1 ML/MIN/1.73 M^2
EST. GFR  (NON AFRICAN AMERICAN): 21.7 ML/MIN/1.73 M^2
GLUCOSE SERPL-MCNC: 104 MG/DL (ref 70–110)
GLUCOSE UR QL STRIP: NEGATIVE
HCT VFR BLD AUTO: 35.3 % (ref 37–48.5)
HGB BLD-MCNC: 10.9 G/DL (ref 12–16)
HGB UR QL STRIP: NEGATIVE
HYPOCHROMIA BLD QL SMEAR: ABNORMAL
KETONES UR QL STRIP: NEGATIVE
LEUKOCYTE ESTERASE UR QL STRIP: ABNORMAL
LYMPHOCYTES # BLD AUTO: 1.2 K/UL (ref 1–4.8)
LYMPHOCYTES NFR BLD: 8 % (ref 18–48)
MCH RBC QN AUTO: 21.2 PG (ref 27–31)
MCHC RBC AUTO-ENTMCNC: 30.9 G/DL (ref 32–36)
MCV RBC AUTO: 69 FL (ref 82–98)
MICROSCOPIC COMMENT: ABNORMAL
MONOCYTES # BLD AUTO: 1.4 K/UL (ref 0.3–1)
MONOCYTES NFR BLD: 9.8 % (ref 4–15)
NEUTROPHILS # BLD AUTO: 12 K/UL (ref 1.8–7.7)
NEUTROPHILS NFR BLD: 82 % (ref 38–73)
NITRITE UR QL STRIP: POSITIVE
PH UR STRIP: 6 [PH] (ref 5–8)
PLATELET # BLD AUTO: 489 K/UL (ref 150–350)
PMV BLD AUTO: 9.8 FL (ref 9.2–12.9)
POIKILOCYTOSIS BLD QL SMEAR: SLIGHT
POTASSIUM SERPL-SCNC: 3.6 MMOL/L (ref 3.5–5.1)
PROT SERPL-MCNC: 8.3 G/DL (ref 6–8.4)
PROT UR QL STRIP: NEGATIVE
RBC # BLD AUTO: 5.14 M/UL (ref 4–5.4)
SODIUM SERPL-SCNC: 140 MMOL/L (ref 136–145)
SP GR UR STRIP: 1.01 (ref 1–1.03)
URN SPEC COLLECT METH UR: ABNORMAL
UROBILINOGEN UR STRIP-ACNC: NEGATIVE EU/DL
WBC # BLD AUTO: 14.66 K/UL (ref 3.9–12.7)
WBC #/AREA URNS AUTO: 6 /HPF (ref 0–5)

## 2019-08-14 PROCEDURE — 85025 COMPLETE CBC W/AUTO DIFF WBC: CPT | Mod: HCNC,ER

## 2019-08-14 PROCEDURE — 81000 URINALYSIS NONAUTO W/SCOPE: CPT | Mod: HCNC,ER

## 2019-08-14 PROCEDURE — 93010 ELECTROCARDIOGRAM REPORT: CPT | Mod: HCNC,,, | Performed by: INTERNAL MEDICINE

## 2019-08-14 PROCEDURE — 63600175 PHARM REV CODE 636 W HCPCS: Mod: HCNC,ER | Performed by: EMERGENCY MEDICINE

## 2019-08-14 PROCEDURE — 96372 THER/PROPH/DIAG INJ SC/IM: CPT | Mod: HCNC,ER

## 2019-08-14 PROCEDURE — P9612 CATHETERIZE FOR URINE SPEC: HCPCS | Mod: HCNC,ER

## 2019-08-14 PROCEDURE — 93010 EKG 12-LEAD: ICD-10-PCS | Mod: HCNC,,, | Performed by: INTERNAL MEDICINE

## 2019-08-14 PROCEDURE — 93005 ELECTROCARDIOGRAM TRACING: CPT | Mod: HCNC,ER

## 2019-08-14 PROCEDURE — 80053 COMPREHEN METABOLIC PANEL: CPT | Mod: HCNC,ER

## 2019-08-14 PROCEDURE — 99285 EMERGENCY DEPT VISIT HI MDM: CPT | Mod: 25,HCNC,ER

## 2019-08-14 RX ORDER — DILTIAZEM HYDROCHLORIDE 300 MG/1
300 CAPSULE, COATED, EXTENDED RELEASE ORAL
COMMUNITY
End: 2019-10-08 | Stop reason: SDUPTHER

## 2019-08-14 RX ORDER — KETOROLAC TROMETHAMINE 30 MG/ML
30 INJECTION, SOLUTION INTRAMUSCULAR; INTRAVENOUS
Status: COMPLETED | OUTPATIENT
Start: 2019-08-14 | End: 2019-08-14

## 2019-08-14 RX ORDER — DIAZEPAM 10 MG/2ML
5 INJECTION INTRAMUSCULAR
Status: COMPLETED | OUTPATIENT
Start: 2019-08-14 | End: 2019-08-14

## 2019-08-14 RX ORDER — FUROSEMIDE 20 MG/1
20 TABLET ORAL
COMMUNITY
End: 2019-10-29

## 2019-08-14 RX ADMIN — DIAZEPAM 5 MG: 5 INJECTION, SOLUTION INTRAMUSCULAR; INTRAVENOUS at 02:08

## 2019-08-14 RX ADMIN — KETOROLAC TROMETHAMINE 30 MG: 30 INJECTION, SOLUTION INTRAMUSCULAR at 02:08

## 2019-08-14 NOTE — ED NOTES
Pt unable to get on bedpan to obtain urine specimen because of pain. md aware. Order for in and out cath will be placed.

## 2019-08-14 NOTE — ED NOTES
Pt accepted by Dr. Okeefe. Transport will be setup by [facility] at this time.

## 2019-08-14 NOTE — ED NOTES
md updated pt and her son on test results and poc. They have verbalized understanding. Pt states that she would like to be transferred to [facility].

## 2019-08-14 NOTE — ED PROVIDER NOTES
Encounter Date: 8/14/2019       History     Chief Complaint   Patient presents with    Fall     fall 2 weeks ago. Reports lower back pain      Poor historian.  Underlying history of lupus, depression, anxiety, multiple others as documented.  Has been in physical therapy for right shoulder problems, making good progress.  Reports that she fell about 2 weeks ago at home from standing, landed on her back, specifics of the impact are not clear.  She tripped while mopping.  She reports that she did not seek medical attention at that time and has not been seen anywhere for this problem before now other than continuing with physical therapy.  She has unknown pain medicine and nonsteroidals at home, states that she does not have any muscle relaxers at home.  She is here for persistent muscle spasms by report involving the mid and low back, worse with movement and worse with flexion at the hips.  No urinary symptoms. No other specific complaints.  Arrives by ambulance, son at bedside. Very anxious on arrival.    The history is provided by the patient, the EMS personnel and a relative. No  was used.     Review of patient's allergies indicates:   Allergen Reactions    Duloxetine      Feels bad     Past Medical History:   Diagnosis Date    Alpha thalassemia     Asthma     Cataract     Chronic anxiety     years tid xanax 1mg    Chronic knee pain     Chronic pain of left ankle     Clotting disorder     Cutaneous lupus erythematosus     dr henry derm    Depression     Depression     dr radha hughes previously    Ex-smoker     quit fall 1    Hypertension     Osteopenia 1/16 reck1/18     Past Surgical History:   Procedure Laterality Date    ANKLE SURGERY Left     BACK SURGERY      COLONOSCOPY N/A 8/2/2017    Performed by Virgil Oliva MD at [facility] END[facility]    ESOPHAGOGASTRODUODENOSCOPY (EGD) N/A 8/2/2017    Performed by Virgil Oliva MD at [facility] ENDO    HYSTERECTOMY      KNEE ARTHROSCOPY       both knees twice    OOPHORECTOMY       Family History   Problem Relation Age of Onset    Diabetes Mother     Diabetes Father     Breast cancer Maternal Aunt      Social History     Tobacco Use    Smoking status: Former Smoker     Packs/day: 1.00     Years: 30.00     Pack years: 30.00     Types: Cigarettes     Last attempt to quit: 2014     Years since quittin.9    Smokeless tobacco: Never Used    Tobacco comment: smoke last cigarette 9/15   Substance Use Topics    Alcohol use: Yes     Alcohol/week: 0.6 oz     Types: 1 Glasses of wine per week    Drug use: No     Review of Systems   Constitutional: Negative for activity change, fatigue and fever.   HENT: Negative for congestion, ear pain, facial swelling, nosebleeds, sinus pressure and sore throat.    Eyes: Negative for pain, discharge, redness and visual disturbance.   Respiratory: Negative for cough, choking, chest tightness, shortness of breath and wheezing.    Cardiovascular: Negative for chest pain, palpitations and leg swelling.   Gastrointestinal: Negative for abdominal distention, abdominal pain, nausea and vomiting.   Endocrine: Negative for heat intolerance, polydipsia and polyuria.   Genitourinary: Negative for difficulty urinating, dysuria, flank pain, hematuria and urgency.   Musculoskeletal: Positive for back pain. Negative for gait problem, joint swelling and myalgias.   Skin: Negative for color change and rash.   Allergic/Immunologic: Negative for environmental allergies and food allergies.   Neurological: Negative for dizziness, weakness, numbness and headaches.   Hematological: Negative for adenopathy. Does not bruise/bleed easily.   Psychiatric/Behavioral: Negative for agitation and behavioral problems. The patient is not nervous/anxious.    All other systems reviewed and are negative.      Physical Exam     Initial Vitals [19 1341]   BP Pulse Resp Temp SpO2   (!) 111/58 93 19 100 °F (37.8 °C) (!) 94 %      MAP       --          Physical Exam    Nursing note and vitals reviewed.  Constitutional: She appears well-developed and well-nourished. She is not diaphoretic. She appears distressed.   Anxious, mild distress   HENT:   Head: Normocephalic and atraumatic.   Mouth/Throat: No oropharyngeal exudate.   Eyes: Conjunctivae and EOM are normal. Pupils are equal, round, and reactive to light. Right eye exhibits no discharge. Left eye exhibits no discharge. No scleral icterus.   Neck: Normal range of motion. Neck supple. No thyromegaly present. No tracheal deviation present. No JVD present.   Cardiovascular: Normal rate, regular rhythm, normal heart sounds and intact distal pulses. Exam reveals no gallop and no friction rub.    No murmur heard.  Pulmonary/Chest: Breath sounds normal. No stridor. No respiratory distress. She has no wheezes. She has no rhonchi. She has no rales. She exhibits no tenderness.   Abdominal: Soft. Bowel sounds are normal. She exhibits no distension and no mass. There is no tenderness. There is no rebound and no guarding.   Musculoskeletal: She exhibits tenderness. She exhibits no edema.   Poorly localizing lumbar and thoracic paraspinous muscular spasm and tenderness. No clear vertebral tenderness, deformity, or swelling.  Pain on flexion both right and left hip, difficult to be certain where she experiences the pain.   Neurological: She is alert and oriented to person, place, and time. She has normal strength.   Skin: Skin is warm and dry. No rash and no abscess noted. No erythema.   Moderate skin changes consistent with lupus   Psychiatric: She has a normal mood and affect. Her behavior is normal. Judgment and thought content normal.         ED Course   Procedures  Labs Reviewed   URINALYSIS, REFLEX TO URINE CULTURE - Abnormal; Notable for the following components:       Result Value    Appearance, UA Hazy (*)     Nitrite, UA Positive (*)     Leukocytes, UA 1+ (*)     All other components within normal limits     Narrative:     Preferred Collection Type->Urine, Clean Catch   URINALYSIS MICROSCOPIC - Abnormal; Notable for the following components:    WBC, UA 6 (*)     Bacteria Moderate (*)     All other components within normal limits    Narrative:     Preferred Collection Type->Urine, Clean Catch   CBC W/ AUTO DIFFERENTIAL - Abnormal; Notable for the following components:    WBC 14.66 (*)     Hemoglobin 10.9 (*)     Hematocrit 35.3 (*)     Mean Corpuscular Volume 69 (*)     Mean Corpuscular Hemoglobin 21.2 (*)     Mean Corpuscular Hemoglobin Conc 30.9 (*)     RDW 17.0 (*)     Platelets 489 (*)     Gran # (ANC) 12.0 (*)     Mono # 1.4 (*)     Gran% 82.0 (*)     Lymph% 8.0 (*)     All other components within normal limits   COMPREHENSIVE METABOLIC PANEL - Abnormal; Notable for the following components:    BUN, Bld 40 (*)     Creatinine 2.2 (*)     ALT 9 (*)     eGFR if  25.1 (*)     eGFR if non  21.7 (*)     All other components within normal limits     EKG Readings: (Independently Interpreted)   Initial Reading: No STEMI. Rhythm: Normal Sinus Rhythm. Heart Rate: 90. Ectopy: No Ectopy. Conduction: Normal.   Normal sinus rhythm, possible left atrial enlargement, nonspecific ST and T-wave abnormality, noisy baseline, no acute concerning change       Imaging Results          CT Cervical Spine Without Contrast (Final result)  Result time 08/14/19 17:01:45    Final result by Kimi Puri MD (Timothy) (08/14/19 17:01:45)                 Impression:      No fractures.    All CT scans at this facility use dose modulation, iterative reconstructions, and/or weight base dosing when appropriate to reduce radiation dose to as low as reasonably achievable.      Electronically signed by: Kimi Puri MD  Date:    08/14/2019  Time:    17:01             Narrative:    EXAMINATION:  CT CERVICAL SPINE WITHOUT CONTRAST    CLINICAL HISTORY:  C-spine trauma, NEXUS/CCR positive, +risk  factor(s);    TECHNIQUE:  Thin section axial images were acquired. Reformatted images were generated in the sagittal and coronal planes.    COMPARISON:  None    FINDINGS:  No fracture, precervical soft tissue swelling, or subluxation identified. No CT evidence of central canal stenosis or traumatic disc herniation.  Cervical spondylotic changes are present.  Degenerative changes are present of the facet joints.                               CT Head Without Contrast (Final result)  Result time 08/14/19 16:57:26    Final result by Kimi Puri MD (Timothy) (08/14/19 16:57:26)                 Impression:      Stable exam.  No acute intracranial abnormality.    All CT scans at this facility use dose modulation, iterative reconstructions, and/or weight base dosing when appropriate to reduce radiation dose to as low as reasonably achievable.      Electronically signed by: Kimi Puri MD  Date:    08/14/2019  Time:    16:57             Narrative:    EXAMINATION:  CT HEAD WITHOUT CONTRAST    CLINICAL HISTORY:  Confusion/delirium, altered LOC, unexplained;    COMPARISON:  04/06/2018    FINDINGS:  No intracranial acute hemorrhage or acute focal brain parenchymal abnormality is identified.  Atrophy is present.  Calvarium is intact.                               X-Ray Chest AP Portable (Final result)  Result time 08/14/19 16:59:27    Final result by Kimi Puri MD (Timothy) (08/14/19 16:59:27)                 Impression:      Stable chest.  No acute infiltrate.      Electronically signed by: Kimi Puri MD  Date:    08/14/2019  Time:    16:59             Narrative:    EXAMINATION:  XR CHEST AP PORTABLE    CLINICAL HISTORY:  Fall lumbar fracture,    COMPARISON:  None    FINDINGS:  Heart size is normal. The lung fields are clear. No acute cardiopulmonary infiltrate.                               CT Thoracic Spine Without Contrast (Final result)  Result time 08/14/19 15:00:22    Final result by Bolden (Timothy)  MD Khushi (08/14/19 15:00:22)                 Impression:      No fractures.    All CT scans at this facility are performed  using dose modulation techniques as appropriate to performed exam including the following:  automated exposure control; adjustment of mA and/or kV according to the patients size (this includes techniques or standardized protocols for targeted exams where dose is matched to indication/reason for exam: i.e. extremities or head);  iterative reconstruction technique.      Electronically signed by: Kimi Puri MD  Date:    08/14/2019  Time:    15:00             Narrative:    EXAMINATION:  CT THORACIC SPINE WITHOUT CONTRAST    CLINICAL HISTORY:  <Reason For Exam>    TECHNIQUE:  Axial CT of the thoracic spine with coronal and sagittal reformates.    COMPARISON:  None    FINDINGS:  No fracture or dislocation.  Hypertrophic degenerative spondylosis is seen throughout the thoracic spine.  No definite disc herniations or evidence of spinal stenosis.                               CT Lumbar Spine Without Contrast (Final result)  Result time 08/14/19 15:07:28    Final result by Kimi Puri MD (Timothy) (08/14/19 15:07:28)                 Impression:      Fractures involving transverse processes of L1, L2 and L3 on the left.  Fractures are probably subacute.    There is also interval fracture involving superior endplate of L3 since previous study of 05/06/2019.  Fracture age is indeterminate.    All CT scans at this facility are performed  using dose modulation techniques as appropriate to performed exam including the following:  automated exposure control; adjustment of mA and/or kV according to the patients size (this includes techniques or standardized protocols for targeted exams where dose is matched to indication/reason for exam: i.e. extremities or head);  iterative reconstruction technique.      Electronically signed by: Kimi Puri MD  Date:    08/14/2019  Time:    15:07              Narrative:    EXAMINATION:  CT LUMBAR SPINE WITHOUT CONTRAST    CLINICAL HISTORY:  T/L-spine trauma, minor-mod, low back pain;    TECHNIQUE:  Standard noncontrast CT scan of the lumbar spine.    COMPARISON:  X-rays 05/06/2019    FINDINGS:  There are fractures involving the transverse processes of L1, L2 and L3 on the left.  Fractures are probably subacute.  There is extensive facet arthropathy involving the lower lumbar levels.  There is a superior endplate fracture involving L3 which has developed since previous study of 05/06/2019.  Age of fracture is indeterminate.    No disc herniations.  There are multiple hypertrophic bridging endplate spurs.                                4:03 PM Improved symptoms and exam, still somewhat poor historian.  Although my suspicion for cervical spine or head trauma are low, son does report that she has had multiple falls and it does appear that she fell with enough force 2 weeks ago to cause lumbar spine fractures.  Will complete imaging with CT of the brain and cervical spine to look for occult try trauma in those areas.  Recently inpatient at [facility], that is the facility of choice as per the patient and her son.  I do not feel she is safe to remain at home under these circumstances as she does have recurrent falls and delayed presentation lumbar fractures, un stable gait.  Needs orthopedic evaluation and appropriate rehabilitation.  Will pursue transfer to [facility].      4:39 PM  All historical, clinical, radiographic, and laboratory findings were reviewed with the patient/family in detail along with the indications for transfer to an outside facility (rather than admission to our facility in [place]) secondary to patient and family request and a need for Ortho/ Spine evalutaion given the diagnosis of Multiple Falls/ Multiple lumbar fractures.  All remaining questions and concerns were addressed at that time and the patient/family communicates  understanding and agrees to proceed accordingly.  Similarly all pertinent details of the encounter were discussed with Dr. Okeefe at [facility] via [place] ([facility]) who agrees to accept the patient in transfer based on the needs/patient preferences outlined above.  Patient will be transferred by [facility] ambulance services secondary to a need for ongoing spine and fall precautions en route. She is stable for ground transfer.    Santiago Guerra MD  4:37 PM                               Clinical Impression:     1. Lumbar transverse process fracture, closed, initial encounter    2. Fall    3. Lumbar compression fracture, closed, initial encounter    4. Recurrent falls         Disposition:   Disposition: Transferred  Condition: Stable                        Santiago Guerra MD  08/14/19 1640       Santiago Guerra MD  08/14/19 [id number]

## 2019-08-14 NOTE — ED NOTES
ALEJANDRO Moran performed in and out catheter using sterile technique to obtain a urine sample. Pt tolerated well.

## 2019-09-03 ENCOUNTER — TELEPHONE (OUTPATIENT)
Dept: RHEUMATOLOGY | Facility: CLINIC | Age: 74
End: 2019-09-03

## 2019-09-03 NOTE — TELEPHONE ENCOUNTER
Called pt regarding today's missed appt, pt states she is currently in hosp, unable to reschedule at this time

## 2019-09-09 ENCOUNTER — TELEPHONE (OUTPATIENT)
Dept: INTERNAL MEDICINE | Facility: CLINIC | Age: 74
End: 2019-09-09

## 2019-09-09 NOTE — TELEPHONE ENCOUNTER
----- Message from Yashira Aburto sent at 9/9/2019  3:50 PM CDT -----  [facility]  Type:  Needs Medical Advice    Who Called: vinita fink   Symptoms (please be specific):   How long has patient had these symptoms:   Pharmacy name and phone #:    Would the patient rather a call back or a response via [facility]? Call   Best Call Back Number:  [phone]  Additional Information: caller is requesting a call back from the nurse in regards to the pt orders for her wheelchair

## 2019-09-11 ENCOUNTER — TELEPHONE (OUTPATIENT)
Dept: RHEUMATOLOGY | Facility: CLINIC | Age: 74
End: 2019-09-11

## 2019-09-11 ENCOUNTER — TELEPHONE (OUTPATIENT)
Dept: INTERNAL MEDICINE | Facility: CLINIC | Age: 74
End: 2019-09-11

## 2019-09-11 DIAGNOSIS — M13.0 ARTHRITIS OF MULTIPLE SITES: Primary | ICD-10-CM

## 2019-09-11 DIAGNOSIS — M62.838 MUSCLE SPASMS OF LOWER EXTREMITY, UNSPECIFIED LATERALITY: ICD-10-CM

## 2019-09-11 NOTE — TELEPHONE ENCOUNTER
----- Message from Doc Pittman sent at 9/11/2019  1:35 PM CDT -----  Contact: pt   Pt would like cb has some questions would like to ask.             [phone]

## 2019-09-11 NOTE — TELEPHONE ENCOUNTER
----- Message from Dorothy Herrera sent at 9/11/2019  3:40 PM CDT -----  Contact: pt  The pt wants to know if her wheelchair was approved, no additional info given and can be reached at [phone]///[id number]

## 2019-09-11 NOTE — TELEPHONE ENCOUNTER
----- Message from Tyson Singh sent at 9/11/2019  3:09 PM CDT -----  Contact: self  Type:  Needs Medical Advice    Who Called: pt  Symptoms (please be specific):n/a   How long has patient had these symptoms: n/a  Pharmacy name and phone #: n/a  Would the patient rather a call back or a response via [facility]? Call back  Best Call Back Number: [phone]  Additional Information: requesting call back regarding getting status of provider ordering wheel chair for pt.    Thanks,  Tyson Singh

## 2019-09-11 NOTE — TELEPHONE ENCOUNTER
Spoke with Mrs. Ring regarding scheduling NP appointment for Osteoporosis and DEXA SCAN, patient reports that she is in patient at [facility] and will call our clinic upon discharge from the hospital to schedule appointment

## 2019-09-26 ENCOUNTER — PES CALL (OUTPATIENT)
Dept: ADMINISTRATIVE | Facility: CLINIC | Age: 74
End: 2019-09-26

## 2019-10-08 ENCOUNTER — TELEPHONE (OUTPATIENT)
Dept: INTERNAL MEDICINE | Facility: CLINIC | Age: 74
End: 2019-10-08

## 2019-10-08 ENCOUNTER — OFFICE VISIT (OUTPATIENT)
Dept: INTERNAL MEDICINE | Facility: CLINIC | Age: 74
End: 2019-10-08
Payer: MEDICARE

## 2019-10-08 VITALS
DIASTOLIC BLOOD PRESSURE: 90 MMHG | SYSTOLIC BLOOD PRESSURE: 148 MMHG | HEIGHT: 62 IN | BODY MASS INDEX: 30.71 KG/M2 | WEIGHT: 166.88 LBS | TEMPERATURE: 97 F

## 2019-10-08 DIAGNOSIS — F41.9 CHRONIC ANXIETY: ICD-10-CM

## 2019-10-08 DIAGNOSIS — F32.1 MODERATE MAJOR DEPRESSION: ICD-10-CM

## 2019-10-08 DIAGNOSIS — I10 ESSENTIAL HYPERTENSION: Primary | Chronic | ICD-10-CM

## 2019-10-08 DIAGNOSIS — Z91.148 MEDICATION NONCOMPLIANCE DUE TO COGNITIVE IMPAIRMENT: ICD-10-CM

## 2019-10-08 DIAGNOSIS — R41.9 MEDICATION NONCOMPLIANCE DUE TO COGNITIVE IMPAIRMENT: ICD-10-CM

## 2019-10-08 PROCEDURE — 1100F PR PT FALLS ASSESS DOC 2+ FALLS/FALL W/INJURY/YR: ICD-10-PCS | Mod: HCNC,CPTII,S$GLB, | Performed by: FAMILY MEDICINE

## 2019-10-08 PROCEDURE — 3077F SYST BP >= 140 MM HG: CPT | Mod: HCNC,CPTII,S$GLB, | Performed by: FAMILY MEDICINE

## 2019-10-08 PROCEDURE — 90662 IIV NO PRSV INCREASED AG IM: CPT | Mod: HCNC,S$GLB,, | Performed by: FAMILY MEDICINE

## 2019-10-08 PROCEDURE — 99499 UNLISTED E&M SERVICE: CPT | Mod: HCNC,S$GLB,, | Performed by: FAMILY MEDICINE

## 2019-10-08 PROCEDURE — G0008 ADMIN INFLUENZA VIRUS VAC: HCPCS | Mod: HCNC,S$GLB,, | Performed by: FAMILY MEDICINE

## 2019-10-08 PROCEDURE — 99499 RISK ADDL DX/OHS AUDIT: ICD-10-PCS | Mod: HCNC,S$GLB,, | Performed by: FAMILY MEDICINE

## 2019-10-08 PROCEDURE — 90662 FLU VACCINE - HIGH DOSE (65+) PRESERVATIVE FREE IM: ICD-10-PCS | Mod: HCNC,S$GLB,, | Performed by: FAMILY MEDICINE

## 2019-10-08 PROCEDURE — 99999 PR PBB SHADOW E&M-EST. PATIENT-LVL V: CPT | Mod: PBBFAC,HCNC,, | Performed by: FAMILY MEDICINE

## 2019-10-08 PROCEDURE — 3080F DIAST BP >= 90 MM HG: CPT | Mod: HCNC,CPTII,S$GLB, | Performed by: FAMILY MEDICINE

## 2019-10-08 PROCEDURE — 99999 PR PBB SHADOW E&M-EST. PATIENT-LVL V: ICD-10-PCS | Mod: PBBFAC,HCNC,, | Performed by: FAMILY MEDICINE

## 2019-10-08 PROCEDURE — 99214 PR OFFICE/OUTPT VISIT, EST, LEVL IV, 30-39 MIN: ICD-10-PCS | Mod: HCNC,25,S$GLB, | Performed by: FAMILY MEDICINE

## 2019-10-08 PROCEDURE — 3288F FALL RISK ASSESSMENT DOCD: CPT | Mod: HCNC,CPTII,S$GLB, | Performed by: FAMILY MEDICINE

## 2019-10-08 PROCEDURE — 1100F PTFALLS ASSESS-DOCD GE2>/YR: CPT | Mod: HCNC,CPTII,S$GLB, | Performed by: FAMILY MEDICINE

## 2019-10-08 PROCEDURE — 99214 OFFICE O/P EST MOD 30 MIN: CPT | Mod: HCNC,25,S$GLB, | Performed by: FAMILY MEDICINE

## 2019-10-08 PROCEDURE — 3288F PR FALLS RISK ASSESSMENT DOCUMENTED: ICD-10-PCS | Mod: HCNC,CPTII,S$GLB, | Performed by: FAMILY MEDICINE

## 2019-10-08 PROCEDURE — 3077F PR MOST RECENT SYSTOLIC BLOOD PRESSURE >= 140 MM HG: ICD-10-PCS | Mod: HCNC,CPTII,S$GLB, | Performed by: FAMILY MEDICINE

## 2019-10-08 PROCEDURE — 3080F PR MOST RECENT DIASTOLIC BLOOD PRESSURE >= 90 MM HG: ICD-10-PCS | Mod: HCNC,CPTII,S$GLB, | Performed by: FAMILY MEDICINE

## 2019-10-08 PROCEDURE — G0008 FLU VACCINE - HIGH DOSE (65+) PRESERVATIVE FREE IM: ICD-10-PCS | Mod: HCNC,S$GLB,, | Performed by: FAMILY MEDICINE

## 2019-10-08 RX ORDER — PANTOPRAZOLE SODIUM 40 MG/1
TABLET, DELAYED RELEASE ORAL
Qty: 90 TABLET | Refills: 3 | Status: SHIPPED | OUTPATIENT
Start: 2019-10-08 | End: 2019-10-29

## 2019-10-08 RX ORDER — AMITRIPTYLINE HYDROCHLORIDE 50 MG/1
TABLET, FILM COATED ORAL
Qty: 90 TABLET | Refills: 4 | Status: SHIPPED | OUTPATIENT
Start: 2019-10-08 | End: 2019-10-08 | Stop reason: SDUPTHER

## 2019-10-08 RX ORDER — ALPRAZOLAM 0.25 MG/1
0.25 TABLET ORAL 2 TIMES DAILY PRN
Qty: 60 TABLET | Refills: 0 | Status: SHIPPED | OUTPATIENT
Start: 2019-10-08 | End: 2019-10-29

## 2019-10-08 RX ORDER — HYDROCODONE BITARTRATE AND ACETAMINOPHEN 5; 325 MG/1; MG/1
TABLET ORAL
Refills: 0 | COMMUNITY
Start: 2019-07-26 | End: 2019-10-29

## 2019-10-08 RX ORDER — LORAZEPAM 0.5 MG/1
0.5 TABLET ORAL EVERY 6 HOURS PRN
COMMUNITY
End: 2019-10-08 | Stop reason: ALTCHOICE

## 2019-10-08 RX ORDER — AMITRIPTYLINE HYDROCHLORIDE 50 MG/1
50 TABLET, FILM COATED ORAL NIGHTLY
Qty: 90 TABLET | Refills: 4 | Status: SHIPPED | OUTPATIENT
Start: 2019-10-08 | End: 2019-10-29

## 2019-10-08 RX ORDER — DILTIAZEM HYDROCHLORIDE 300 MG/1
300 CAPSULE, COATED, EXTENDED RELEASE ORAL DAILY
Qty: 90 CAPSULE | Refills: 2 | Status: SHIPPED | OUTPATIENT
Start: 2019-10-08 | End: 2019-12-17

## 2019-10-08 NOTE — PROGRESS NOTES
" Patient ID: Tea Ring is a 73 y.o. female.    Chief Complaint: Hospital F/U and Back Pain    HPI Pt is 72 yo F here for hospital follow-up. She was hospitalized 2 months ago for closed fracture of L1-L3 transverse process fracture (for pain control) and acute cystitis.Orthopedic surgery evaluated her and recommended nonsurgical management. She was discharged to SNF. After discharge from SNF, physical therapist were working with her at home however she told them that she no longer needed their services because her sister was at home helping her. She still does PT exercises at home on her own. Says they are "easy". She reports improvement in pain and ambulation.    She is noncompliant with home bp meds and only takes them when she feels that she needs them. She also doesn't take meds if she needs to drive because she is afraid they could make her sleepy.     Per chart check she also had unintentional TCA overdose. No associated arrhythmias. TCA level declined by discharge. She also had benzo withdrawal due to home benzos not being restarted during admission.      Family History   Problem Relation Age of Onset    Diabetes Mother     Diabetes Father     Breast cancer Maternal Aunt        Current Outpatient Medications:     albuterol (PROAIR HFA) 90 mcg/actuation inhaler, Inhale 2 puffs into the lungs every 6 (six) hours as needed for Wheezing. Rescue, Disp: , Rfl:     ALPRAZolam (XANAX) 0.25 MG tablet, Take 1 tablet (0.25 mg total) by mouth 2 (two) times daily as needed., Disp: 60 tablet, Rfl: 0    amitriptyline (ELAVIL) 50 MG tablet, Take 1 tablet (50 mg total) by mouth every evening., Disp: 90 tablet, Rfl: 4    aspirin (ECOTRIN) 81 MG EC tablet, Take 1 tablet (81 mg total) by mouth once daily., Disp: , Rfl: 0    atorvastatin (LIPITOR) 10 MG tablet, Take 1 tablet (10 mg total) by mouth once daily., Disp: 90 tablet, Rfl: 3    betamethasone valerate 0.1% (VALISONE) 0.1 % Crea, APPLY TOPICALLY TWICE DAILY., " Disp: 45 g, Rfl: 0    blood pressure monitor (BLOOD PRESSURE KIT) Kit, Record daily, Disp: 1 each, Rfl: 0    diclofenac sodium (VOLTAREN) 1 % Gel, Apply 2 g topically 2 (two) times daily., Disp: 1 Tube, Rfl: 1    diltiaZEM (CARDIZEM CD) 300 MG 24 hr capsule, Take 1 capsule (300 mg total) by mouth once daily., Disp: 90 capsule, Rfl: 2    furosemide (LASIX) 20 MG tablet, Take 20 mg by mouth., Disp: , Rfl:     HYDROcodone-acetaminophen (NORCO) 5-325 mg per tablet, TAKE ONE TABLET BY MOUTH EVERY 6 HOURS AS NEEDED FOR PAIN FOR THREE DAYS, Disp: , Rfl: 0    pantoprazole (PROTONIX) 40 MG tablet, TAKE ONE TABLET BY MOUTH ONCE DAILY, Disp: 90 tablet, Rfl: 3    potassium chloride SA (K-DUR,KLOR-CON) 20 MEQ tablet, TAKE 1 TABLET BY MOUTH TWICE DAILY, Disp: 60 tablet, Rfl: 1    traMADol (ULTRAM) 50 mg tablet, TAKE ONE TABLET BY MOUTH EVERY TWELVE HOURS AS NEEDED FOR PAIN, Disp: , Rfl: 0    valsartan-hydrochlorothiazide (DIOVAN-HCT) 160-12.5 mg per tablet, TAKE ONE TABLET BY MOUTH ONCE DAILY, Disp: 90 tablet, Rfl: 3    benzocaine-lanolin (DERMOPLAST) 20-0.5 % Aero, Apply topically 4 (four) times daily as needed., Disp: , Rfl:     naproxen (NAPROSYN) 500 MG tablet, TAKE ONE TABLET BY MOUTH TWICE A DAY AFTER MEALS, Disp: , Rfl: 0    sertraline (ZOLOFT) 100 MG tablet, Take 1.5 tablets (150 mg total) by mouth once daily. (Patient not taking: Reported on 10/8/2019), Disp: 135 tablet, Rfl: 3    tiZANidine (ZANAFLEX) 4 MG tablet, Take 1 tablet (4 mg total) by mouth every 12 (twelve) hours as needed. (Patient not taking: Reported on 10/8/2019), Disp: 60 tablet, Rfl: 2    Review of Systems   Constitutional: Negative for chills and fever.   HENT: Negative for congestion and sore throat.    Eyes: Negative for visual disturbance.   Respiratory: Negative for cough, shortness of breath and wheezing.    Cardiovascular: Negative for chest pain.   Gastrointestinal: Negative for abdominal pain, diarrhea and nausea.   Endocrine:  "Negative for polydipsia and polyuria.   Musculoskeletal: Positive for back pain. Negative for arthralgias.   Skin: Negative for rash.   Neurological: Negative for dizziness and headaches.   Psychiatric/Behavioral: Negative for sleep disturbance. The patient is not nervous/anxious.        Objective:   BP (!) 148/90 (BP Location: Right arm, Patient Position: Sitting, BP Method: Large (Automatic))   Temp 97.3 °F (36.3 °C) (Tympanic)   Ht 5' 2" (1.575 m)   Wt 75.7 kg (166 lb 14.2 oz)   BMI 30.52 kg/m²      Physical Exam   Constitutional: She is oriented to person, place, and time. She appears well-developed and well-nourished. No distress.   Ambulating with cane   HENT:   Head: Normocephalic and atraumatic.   Eyes: Conjunctivae are normal.   Neck: Normal range of motion.   Cardiovascular: Normal rate, regular rhythm and normal heart sounds.   No murmur heard.  Pulmonary/Chest: Effort normal and breath sounds normal. No respiratory distress. She has no wheezes.   Abdominal: Soft.   Musculoskeletal: Normal range of motion. She exhibits tenderness (mild lumbar). She exhibits no edema or deformity.   Left vertebral tendrness   Neurological: She is alert and oriented to person, place, and time. She exhibits normal muscle tone. Coordination normal.   Skin: Skin is warm. She is not diaphoretic.   Hyperpigmented regions of face and hands   Psychiatric: She has a normal mood and affect. Her behavior is normal. Judgment and thought content normal.       Assessment & Plan     Problem List Items Addressed This Visit        Psychiatric    Chronic anxiety    Current Assessment & Plan     -Pt had benzo withdrawal during last hospital stay due to home benzo not being restarted  -Refilled home benzo today         Relevant Medications    ALPRAZolam (XANAX) 0.25 MG tablet    Moderate major depression    Current Assessment & Plan     -Pt with hx of unintentional overdose at most recent hospital admission 2 months. She was started back " on meds after hospital discharge. Discussed importance of taknig meds as prescribed  -Social work consulted         Relevant Medications    amitriptyline (ELAVIL) 50 MG tablet       Cardiac/Vascular    Essential hypertension - Primary (Chronic)    Current Assessment & Plan     -Discussed importance of taking meds as prescribed to ensure optimal bp           Relevant Medications    diltiaZEM (CARDIZEM CD) 300 MG 24 hr capsule       Other    Medication noncompliance due to cognitive impairment    Relevant Orders    Ambulatory Referral to Social Work            Follow up in about 3 weeks (around 10/29/2019) for Recheck blood pressure .

## 2019-10-08 NOTE — TELEPHONE ENCOUNTER
----- Message from Tanesha Reyes sent at 10/8/2019 11:28 AM CDT -----  Contact: Patient   .Type:  Same Day Appointment Request    Caller is requesting a same day appointment.  Caller declined first available appointment listed below.    Name of Caller:Patient  When is the first available appointment?10/15  Symptoms:back pain  Best Call Back Number:[phone]  Additional Information: Patient wants an appointment today

## 2019-10-08 NOTE — ASSESSMENT & PLAN NOTE
-Pt had benzo withdrawal during last hospital stay due to home benzo not being restarted  -Refilled home benzo today

## 2019-10-08 NOTE — ASSESSMENT & PLAN NOTE
-Pt with hx of unintentional overdose at most recent hospital admission 2 months. She was started back on meds after hospital discharge. Discussed importance of taknig meds as prescribed  -Social work consulted

## 2019-10-25 ENCOUNTER — PATIENT OUTREACH (OUTPATIENT)
Dept: ADMINISTRATIVE | Facility: HOSPITAL | Age: 74
End: 2019-10-25

## 2019-10-25 DIAGNOSIS — Z12.31 ENCOUNTER FOR SCREENING MAMMOGRAM FOR MALIGNANT NEOPLASM OF BREAST: Primary | ICD-10-CM

## 2019-10-29 ENCOUNTER — OFFICE VISIT (OUTPATIENT)
Dept: INTERNAL MEDICINE | Facility: CLINIC | Age: 74
End: 2019-10-29
Payer: MEDICARE

## 2019-10-29 VITALS
OXYGEN SATURATION: 96 % | DIASTOLIC BLOOD PRESSURE: 95 MMHG | HEIGHT: 62 IN | WEIGHT: 167.13 LBS | BODY MASS INDEX: 30.76 KG/M2 | TEMPERATURE: 97 F | RESPIRATION RATE: 20 BRPM | HEART RATE: 109 BPM | SYSTOLIC BLOOD PRESSURE: 174 MMHG

## 2019-10-29 DIAGNOSIS — F33.1 MODERATE EPISODE OF RECURRENT MAJOR DEPRESSIVE DISORDER: Chronic | ICD-10-CM

## 2019-10-29 DIAGNOSIS — I10 ESSENTIAL HYPERTENSION: Primary | Chronic | ICD-10-CM

## 2019-10-29 DIAGNOSIS — F41.9 CHRONIC ANXIETY: ICD-10-CM

## 2019-10-29 PROCEDURE — 99214 OFFICE O/P EST MOD 30 MIN: CPT | Mod: HCNC,S$GLB,, | Performed by: FAMILY MEDICINE

## 2019-10-29 PROCEDURE — 99999 PR PBB SHADOW E&M-EST. PATIENT-LVL IV: ICD-10-PCS | Mod: PBBFAC,HCNC,, | Performed by: FAMILY MEDICINE

## 2019-10-29 PROCEDURE — 3080F PR MOST RECENT DIASTOLIC BLOOD PRESSURE >= 90 MM HG: ICD-10-PCS | Mod: HCNC,CPTII,S$GLB, | Performed by: FAMILY MEDICINE

## 2019-10-29 PROCEDURE — 99214 PR OFFICE/OUTPT VISIT, EST, LEVL IV, 30-39 MIN: ICD-10-PCS | Mod: HCNC,S$GLB,, | Performed by: FAMILY MEDICINE

## 2019-10-29 PROCEDURE — 3077F SYST BP >= 140 MM HG: CPT | Mod: HCNC,CPTII,S$GLB, | Performed by: FAMILY MEDICINE

## 2019-10-29 PROCEDURE — 3080F DIAST BP >= 90 MM HG: CPT | Mod: HCNC,CPTII,S$GLB, | Performed by: FAMILY MEDICINE

## 2019-10-29 PROCEDURE — 3077F PR MOST RECENT SYSTOLIC BLOOD PRESSURE >= 140 MM HG: ICD-10-PCS | Mod: HCNC,CPTII,S$GLB, | Performed by: FAMILY MEDICINE

## 2019-10-29 PROCEDURE — 1101F PR PT FALLS ASSESS DOC 0-1 FALLS W/OUT INJ PAST YR: ICD-10-PCS | Mod: HCNC,CPTII,S$GLB, | Performed by: FAMILY MEDICINE

## 2019-10-29 PROCEDURE — 99999 PR PBB SHADOW E&M-EST. PATIENT-LVL IV: CPT | Mod: PBBFAC,HCNC,, | Performed by: FAMILY MEDICINE

## 2019-10-29 PROCEDURE — 1101F PT FALLS ASSESS-DOCD LE1/YR: CPT | Mod: HCNC,CPTII,S$GLB, | Performed by: FAMILY MEDICINE

## 2019-10-29 RX ORDER — ATORVASTATIN CALCIUM 10 MG/1
10 TABLET, FILM COATED ORAL DAILY
Qty: 90 TABLET | Refills: 3 | Status: SHIPPED | OUTPATIENT
Start: 2019-10-29 | End: 2020-06-04 | Stop reason: SDUPTHER

## 2019-10-29 RX ORDER — VALSARTAN AND HYDROCHLOROTHIAZIDE 160; 12.5 MG/1; MG/1
1 TABLET, FILM COATED ORAL DAILY
Qty: 90 TABLET | Refills: 3 | Status: SHIPPED | OUTPATIENT
Start: 2019-10-29 | End: 2020-06-04 | Stop reason: SDUPTHER

## 2019-10-29 RX ORDER — SERTRALINE HYDROCHLORIDE 100 MG/1
100 TABLET, FILM COATED ORAL DAILY
Qty: 90 TABLET | Refills: 3 | Status: SHIPPED | OUTPATIENT
Start: 2019-10-29 | End: 2019-12-17

## 2019-10-29 NOTE — ASSESSMENT & PLAN NOTE
Getting her back on Zoloft.  She has been off of amitriptyline as well as Xanax for over week so I think that the really or starting from scratch and she does not have to be back on these medications as they have a questionable safety profile anyway   Especially in someone her age.

## 2019-10-29 NOTE — PROGRESS NOTES
Subjective:       Patient ID: Tea Ring is a 74 y.o. female.    Chief Complaint: Follow-up (HTN)    HPI    Came in today to follow-up on hypertension.  She has been taking the Cardizem as directed but that is actually the only medicine she has been taking Adderall for the last week.  Her main concern is feeling anxious and jittery especially at nighttime.  She discontinue the amitriptyline and Xanax because she felt that it actually was making her feel worse.  Has been off of Zoloft for quite some time now.  She has been taking intermittent Aleve but otherwise not taking any other pain medication.    Family History   Problem Relation Age of Onset    Diabetes Mother     Diabetes Father     Breast cancer Maternal Aunt        Current Outpatient Medications:     diltiaZEM (CARDIZEM CD) 300 MG 24 hr capsule, Take 1 capsule (300 mg total) by mouth once daily., Disp: 90 capsule, Rfl: 2    albuterol (PROAIR HFA) 90 mcg/actuation inhaler, Inhale 2 puffs into the lungs every 6 (six) hours as needed for Wheezing. Rescue, Disp: , Rfl:     atorvastatin (LIPITOR) 10 MG tablet, Take 1 tablet (10 mg total) by mouth once daily., Disp: 90 tablet, Rfl: 3    benzocaine-lanolin (DERMOPLAST) 20-0.5 % Aero, Apply topically 4 (four) times daily as needed., Disp: , Rfl:     blood pressure monitor (BLOOD PRESSURE KIT) Kit, Record daily, Disp: 1 each, Rfl: 0    sertraline (ZOLOFT) 100 MG tablet, Take 1 tablet (100 mg total) by mouth once daily., Disp: 90 tablet, Rfl: 3    valsartan-hydrochlorothiazide (DIOVAN-HCT) 160-12.5 mg per tablet, Take 1 tablet by mouth once daily., Disp: 90 tablet, Rfl: 3    Review of Systems   Constitutional: Negative for chills and fever.   Respiratory: Negative for cough and shortness of breath.    Cardiovascular: Negative for chest pain.   Gastrointestinal: Negative for abdominal pain.   Skin: Negative for rash.   Neurological: Negative for dizziness.   Psychiatric/Behavioral: The patient is  "nervous/anxious.        Objective:   BP (!) 174/95 (BP Location: Left arm, Patient Position: Sitting, BP Method: X-Large (Automatic))   Pulse 109   Temp 96.6 °F (35.9 °C) (Tympanic)   Resp 20   Ht 5' 2" (1.575 m)   Wt 75.8 kg (167 lb 1.7 oz)   SpO2 96%   BMI 30.56 kg/m²      Physical Exam   Constitutional: She is oriented to person, place, and time. She appears well-developed and well-nourished. No distress.   HENT:   Head: Normocephalic and atraumatic.   Nose: Nose normal.   Eyes: Pupils are equal, round, and reactive to light. Conjunctivae and EOM are normal. Right eye exhibits no discharge. Left eye exhibits no discharge.   Neck: No thyromegaly present.   Cardiovascular: Normal rate and regular rhythm.   No murmur heard.  Pulmonary/Chest: Effort normal and breath sounds normal. No respiratory distress.   Abdominal: Soft. She exhibits no distension.   Musculoskeletal: She exhibits no edema.   Neurological: She is alert and oriented to person, place, and time.   Skin: No rash noted. She is not diaphoretic.   Psychiatric: She has a normal mood and affect. Her behavior is normal.       Assessment & Plan     Problem List Items Addressed This Visit        Psychiatric    Depression (Chronic)    Current Assessment & Plan       Getting her back on Zoloft.  She has been off of amitriptyline as well as Xanax for over week so I think that the really or starting from scratch and she does not have to be back on these medications as they have a questionable safety profile anyway   Especially in someone her age.         Relevant Medications    sertraline (ZOLOFT) 100 MG tablet    Chronic anxiety    Relevant Medications    sertraline (ZOLOFT) 100 MG tablet       Cardiac/Vascular    Essential hypertension - Primary (Chronic)    Current Assessment & Plan       Blood pressure very poorly controlled.  Recommended that she get back on the valsartan hydrochlorothiazide combination that she was previously prescribed and will have " her follow up in 2 weeks to recheck this.  Continue on the Cardizem also.                 Follow up in about 2 weeks (around 11/12/2019) for Hypertension.    Disclaimer:  This note may have been prepared using voice recognition software, it may have not been extensively proofed, as such there could be errors within the text such as sound alike errors.

## 2019-10-29 NOTE — ASSESSMENT & PLAN NOTE
Blood pressure very poorly controlled.  Recommended that she get back on the valsartan hydrochlorothiazide combination that she was previously prescribed and will have her follow up in 2 weeks to recheck this.  Continue on the Cardizem also.

## 2019-10-31 ENCOUNTER — TELEPHONE (OUTPATIENT)
Dept: RHEUMATOLOGY | Facility: CLINIC | Age: 74
End: 2019-10-31

## 2019-10-31 NOTE — TELEPHONE ENCOUNTER
Spoke with patient who states that she will have to call this nurse back regarding her OP screening with Eva Reinoso PA-C due to her currently being at the doctor's office. Ms. Ring is currently a patient of Dr. Thomas and was last seen 06/03/2019. September appointment was cancelled due to hospitalization.

## 2019-11-01 ENCOUNTER — TELEPHONE (OUTPATIENT)
Dept: RHEUMATOLOGY | Facility: CLINIC | Age: 74
End: 2019-11-01

## 2019-11-01 NOTE — TELEPHONE ENCOUNTER
----- Message from Efraín Henderson sent at 11/1/2019 10:59 AM CDT -----  Contact: Self- [phone]  .Type:  Patient Returning Call    Who Called:Tea Ring  Who Left Message for Patient:Jerrodia   Does the patient know what this is regarding?: Bone test   Would the patient rather a call back or a response via [facility]? Call back   Best Call Back Number:.[phone] (home)   Additional Information:

## 2019-11-18 ENCOUNTER — TELEPHONE (OUTPATIENT)
Dept: ADMINISTRATIVE | Facility: HOSPITAL | Age: 74
End: 2019-11-18

## 2019-11-19 ENCOUNTER — HOSPITAL ENCOUNTER (OUTPATIENT)
Dept: RADIOLOGY | Facility: HOSPITAL | Age: 74
Discharge: HOME | End: 2019-11-19
Attending: FAMILY MEDICINE
Payer: MEDICARE

## 2019-11-19 VITALS — HEIGHT: 62 IN | BODY MASS INDEX: 30.76 KG/M2 | WEIGHT: 167.13 LBS

## 2019-11-19 DIAGNOSIS — Z12.31 ENCOUNTER FOR SCREENING MAMMOGRAM FOR MALIGNANT NEOPLASM OF BREAST: ICD-10-CM

## 2019-11-19 PROCEDURE — 77063 MAMMO DIGITAL SCREENING BILAT WITH TOMOSYNTHESIS_CAD: ICD-10-PCS | Mod: 26,HCNC,, | Performed by: RADIOLOGY

## 2019-11-19 PROCEDURE — 77067 MAMMO DIGITAL SCREENING BILAT WITH TOMOSYNTHESIS_CAD: ICD-10-PCS | Mod: 26,HCNC,, | Performed by: RADIOLOGY

## 2019-11-19 PROCEDURE — 77063 BREAST TOMOSYNTHESIS BI: CPT | Mod: 26,HCNC,, | Performed by: RADIOLOGY

## 2019-11-19 PROCEDURE — 77067 SCR MAMMO BI INCL CAD: CPT | Mod: 26,HCNC,, | Performed by: RADIOLOGY

## 2019-11-19 PROCEDURE — 77067 SCR MAMMO BI INCL CAD: CPT | Mod: TC,HCNC,PO

## 2019-12-17 ENCOUNTER — OFFICE VISIT (OUTPATIENT)
Dept: INTERNAL MEDICINE | Facility: CLINIC | Age: 74
End: 2019-12-17
Payer: MEDICARE

## 2019-12-17 VITALS
WEIGHT: 167.13 LBS | DIASTOLIC BLOOD PRESSURE: 78 MMHG | SYSTOLIC BLOOD PRESSURE: 146 MMHG | HEART RATE: 72 BPM | BODY MASS INDEX: 30.76 KG/M2 | HEIGHT: 62 IN | TEMPERATURE: 98 F | OXYGEN SATURATION: 98 %

## 2019-12-17 DIAGNOSIS — F41.9 CHRONIC ANXIETY: Primary | ICD-10-CM

## 2019-12-17 DIAGNOSIS — Z87.891 PERSONAL HISTORY OF NICOTINE DEPENDENCE: ICD-10-CM

## 2019-12-17 DIAGNOSIS — Z12.9 SCREENING FOR CANCER: ICD-10-CM

## 2019-12-17 DIAGNOSIS — M13.0 ARTHRITIS OF MULTIPLE SITES: ICD-10-CM

## 2019-12-17 DIAGNOSIS — F33.1 MODERATE EPISODE OF RECURRENT MAJOR DEPRESSIVE DISORDER: Chronic | ICD-10-CM

## 2019-12-17 PROCEDURE — 3077F PR MOST RECENT SYSTOLIC BLOOD PRESSURE >= 140 MM HG: ICD-10-PCS | Mod: HCNC,CPTII,S$GLB, | Performed by: FAMILY MEDICINE

## 2019-12-17 PROCEDURE — 3078F PR MOST RECENT DIASTOLIC BLOOD PRESSURE < 80 MM HG: ICD-10-PCS | Mod: HCNC,CPTII,S$GLB, | Performed by: FAMILY MEDICINE

## 2019-12-17 PROCEDURE — 1101F PR PT FALLS ASSESS DOC 0-1 FALLS W/OUT INJ PAST YR: ICD-10-PCS | Mod: HCNC,CPTII,S$GLB, | Performed by: FAMILY MEDICINE

## 2019-12-17 PROCEDURE — 1159F MED LIST DOCD IN RCRD: CPT | Mod: HCNC,S$GLB,, | Performed by: FAMILY MEDICINE

## 2019-12-17 PROCEDURE — 1101F PT FALLS ASSESS-DOCD LE1/YR: CPT | Mod: HCNC,CPTII,S$GLB, | Performed by: FAMILY MEDICINE

## 2019-12-17 PROCEDURE — 99214 PR OFFICE/OUTPT VISIT, EST, LEVL IV, 30-39 MIN: ICD-10-PCS | Mod: HCNC,S$GLB,, | Performed by: FAMILY MEDICINE

## 2019-12-17 PROCEDURE — 3077F SYST BP >= 140 MM HG: CPT | Mod: HCNC,CPTII,S$GLB, | Performed by: FAMILY MEDICINE

## 2019-12-17 PROCEDURE — 1126F AMNT PAIN NOTED NONE PRSNT: CPT | Mod: HCNC,S$GLB,, | Performed by: FAMILY MEDICINE

## 2019-12-17 PROCEDURE — 99999 PR PBB SHADOW E&M-EST. PATIENT-LVL III: ICD-10-PCS | Mod: PBBFAC,HCNC,, | Performed by: FAMILY MEDICINE

## 2019-12-17 PROCEDURE — 99999 PR PBB SHADOW E&M-EST. PATIENT-LVL III: CPT | Mod: PBBFAC,HCNC,, | Performed by: FAMILY MEDICINE

## 2019-12-17 PROCEDURE — 1159F PR MEDICATION LIST DOCUMENTED IN MEDICAL RECORD: ICD-10-PCS | Mod: HCNC,S$GLB,, | Performed by: FAMILY MEDICINE

## 2019-12-17 PROCEDURE — 99214 OFFICE O/P EST MOD 30 MIN: CPT | Mod: HCNC,S$GLB,, | Performed by: FAMILY MEDICINE

## 2019-12-17 PROCEDURE — 3078F DIAST BP <80 MM HG: CPT | Mod: HCNC,CPTII,S$GLB, | Performed by: FAMILY MEDICINE

## 2019-12-17 PROCEDURE — 1126F PR PAIN SEVERITY QUANTIFIED, NO PAIN PRESENT: ICD-10-PCS | Mod: HCNC,S$GLB,, | Performed by: FAMILY MEDICINE

## 2019-12-17 RX ORDER — VENLAFAXINE HYDROCHLORIDE 37.5 MG/1
37.5 CAPSULE, EXTENDED RELEASE ORAL DAILY
Qty: 30 CAPSULE | Refills: 11 | Status: SHIPPED | OUTPATIENT
Start: 2019-12-17 | End: 2020-02-20

## 2019-12-17 RX ORDER — CLONAZEPAM 0.5 MG/1
0.5 TABLET ORAL 2 TIMES DAILY
Qty: 60 TABLET | Refills: 2 | Status: SHIPPED | OUTPATIENT
Start: 2019-12-17 | End: 2020-02-20

## 2019-12-17 RX ORDER — ALPRAZOLAM 0.25 MG/1
TABLET ORAL
Qty: 60 TABLET | Refills: 0 | Status: SHIPPED | OUTPATIENT
Start: 2019-12-17 | End: 2019-12-17

## 2019-12-17 RX ORDER — PANTOPRAZOLE SODIUM 40 MG/1
40 TABLET, DELAYED RELEASE ORAL DAILY
COMMUNITY
End: 2020-05-26 | Stop reason: SDUPTHER

## 2019-12-17 NOTE — PROGRESS NOTES
Subjective:       Patient ID: Tea Ring is a 74 y.o. female.    Chief Complaint: Hypertension    HPI     Out of zoloft for 2 days. Feels that it has not been working. Still anxious and depressed. Unable to sleep well without the xanax.     Has been following with Dr. Perrin (orthopedic)  Is going to therapy for her right shoulder.   Has MRI scheduled for her back.     Family History   Problem Relation Age of Onset    Diabetes Mother     Diabetes Father     Breast cancer Maternal Aunt        Current Outpatient Medications:     atorvastatin (LIPITOR) 10 MG tablet, Take 1 tablet (10 mg total) by mouth once daily., Disp: 90 tablet, Rfl: 3    blood pressure monitor (BLOOD PRESSURE KIT) Kit, Record daily, Disp: 1 each, Rfl: 0    pantoprazole (PROTONIX) 40 MG tablet, Take 40 mg by mouth once daily., Disp: , Rfl:     valsartan-hydrochlorothiazide (DIOVAN-HCT) 160-12.5 mg per tablet, Take 1 tablet by mouth once daily., Disp: 90 tablet, Rfl: 3    albuterol (PROAIR HFA) 90 mcg/actuation inhaler, Inhale 2 puffs into the lungs every 6 (six) hours as needed for Wheezing. Rescue, Disp: , Rfl:     benzocaine-lanolin (DERMOPLAST) 20-0.5 % Aero, Apply topically 4 (four) times daily as needed., Disp: , Rfl:     clonazePAM (KLONOPIN) 0.5 MG tablet, Take 1 tablet (0.5 mg total) by mouth 2 (two) times daily. Take at night time 1 hour before bed. May take in day if anxious, Disp: 60 tablet, Rfl: 2    venlafaxine (EFFEXOR-XR) 37.5 MG 24 hr capsule, Take 1 capsule (37.5 mg total) by mouth once daily., Disp: 30 capsule, Rfl: 11    Review of Systems   Constitutional: Negative for chills and fever.   Respiratory: Negative for cough and shortness of breath.    Cardiovascular: Negative for chest pain.   Gastrointestinal: Negative for abdominal pain.   Musculoskeletal: Positive for arthralgias and back pain.   Skin: Negative for rash.   Neurological: Negative for dizziness.   Psychiatric/Behavioral: Positive for sleep disturbance.  "The patient is nervous/anxious.        Objective:   BP (!) 146/78 (BP Location: Right arm, Patient Position: Sitting)   Pulse 72   Temp 97.7 °F (36.5 °C)   Ht 5' 2" (1.575 m)   Wt 75.8 kg (167 lb 1.7 oz)   SpO2 98%   BMI 30.56 kg/m²      Physical Exam   Constitutional: She is oriented to person, place, and time. She appears well-developed and well-nourished.   HENT:   Head: Normocephalic and atraumatic.   Eyes: Conjunctivae are normal.   Cardiovascular: Normal rate.   Pulmonary/Chest: Effort normal. No respiratory distress.   Musculoskeletal: She exhibits no edema.   Neurological: She is alert and oriented to person, place, and time. Coordination normal.   Skin: Skin is warm and dry. No rash noted.   Psychiatric: She has a normal mood and affect. Her behavior is normal.   Vitals reviewed.      Assessment & Plan     Problem List Items Addressed This Visit        Psychiatric    Depression (Chronic)    Current Assessment & Plan     Compound by multiple medical comorbidities as well as anxiety.  Did not have any results with Zoloft.  Switching to Effexor.         Chronic anxiety - Primary    Current Assessment & Plan     Pitting her back on benzodiazepine but recommended clonazepam instead of alprazolam since it has a longer half-life.  Suggested that she use this at nighttime to help with sleep and as needed during the daytime.  Switching from Zoloft to low dose of venlafaxine.  Starting a low-dose due to age but may need to titrate up on this.            Orthopedic    Arthritis of multiple sites    Current Assessment & Plan     Continue follow-up with Orthopedics for therapy and imaging.            Other    Personal history of nicotine dependence     Current Assessment & Plan     Do for repeat lung cancer screening.  Her last low dose CT was in 2016.         Relevant Orders    CT Chest Lung Screening Low Dose      Other Visit Diagnoses     Screening for cancer        Relevant Orders    CT Chest Lung Screening " Low Dose            Follow up in about 3 months (around 3/17/2020).    Disclaimer:  This note may have been prepared using voice recognition software, it may have not been extensively proofed, as such there could be errors within the text such as sound alike errors.

## 2019-12-18 PROBLEM — Z87.891 PERSONAL HISTORY OF NICOTINE DEPENDENCE: Status: ACTIVE | Noted: 2019-12-18

## 2019-12-18 NOTE — ASSESSMENT & PLAN NOTE
Pitting her back on benzodiazepine but recommended clonazepam instead of alprazolam since it has a longer half-life.  Suggested that she use this at nighttime to help with sleep and as needed during the daytime.  Switching from Zoloft to low dose of venlafaxine.  Starting a low-dose due to age but may need to titrate up on this.

## 2019-12-18 NOTE — ASSESSMENT & PLAN NOTE
Compound by multiple medical comorbidities as well as anxiety.  Did not have any results with Zoloft.  Switching to Effexor.

## 2020-02-04 ENCOUNTER — PATIENT OUTREACH (OUTPATIENT)
Dept: ADMINISTRATIVE | Facility: HOSPITAL | Age: 75
End: 2020-02-04

## 2020-02-04 NOTE — PROGRESS NOTES
Documentation of Med Rec routed to [facility] as attestation documentation for Med Rec post discharge measure. 2019 measure has been met. Manually sent.

## 2020-02-17 ENCOUNTER — PATIENT OUTREACH (OUTPATIENT)
Dept: ADMINISTRATIVE | Facility: HOSPITAL | Age: 75
End: 2020-02-17

## 2020-02-20 ENCOUNTER — OFFICE VISIT (OUTPATIENT)
Dept: INTERNAL MEDICINE | Facility: CLINIC | Age: 75
End: 2020-02-20
Payer: MEDICARE

## 2020-02-20 VITALS
DIASTOLIC BLOOD PRESSURE: 67 MMHG | HEART RATE: 91 BPM | RESPIRATION RATE: 19 BRPM | SYSTOLIC BLOOD PRESSURE: 115 MMHG | OXYGEN SATURATION: 98 % | HEIGHT: 62 IN | BODY MASS INDEX: 31.32 KG/M2 | WEIGHT: 170.19 LBS | TEMPERATURE: 97 F

## 2020-02-20 DIAGNOSIS — S76.019A HIP STRAIN, INITIAL ENCOUNTER: Primary | ICD-10-CM

## 2020-02-20 DIAGNOSIS — L93.2 CUTANEOUS LUPUS ERYTHEMATOSUS: ICD-10-CM

## 2020-02-20 DIAGNOSIS — I70.0 ATHEROSCLEROSIS OF AORTA: Chronic | ICD-10-CM

## 2020-02-20 DIAGNOSIS — F41.9 CHRONIC ANXIETY: ICD-10-CM

## 2020-02-20 PROBLEM — I10 ESSENTIAL HYPERTENSION: Status: ACTIVE | Noted: 2019-08-14

## 2020-02-20 PROCEDURE — 99214 PR OFFICE/OUTPT VISIT, EST, LEVL IV, 30-39 MIN: ICD-10-PCS | Mod: HCNC,S$GLB,, | Performed by: NURSE PRACTITIONER

## 2020-02-20 PROCEDURE — 99499 UNLISTED E&M SERVICE: CPT | Mod: HCNC,S$GLB,, | Performed by: NURSE PRACTITIONER

## 2020-02-20 PROCEDURE — 99999 PR PBB SHADOW E&M-EST. PATIENT-LVL IV: ICD-10-PCS | Mod: PBBFAC,HCNC,, | Performed by: NURSE PRACTITIONER

## 2020-02-20 PROCEDURE — 99214 OFFICE O/P EST MOD 30 MIN: CPT | Mod: HCNC,S$GLB,, | Performed by: NURSE PRACTITIONER

## 2020-02-20 PROCEDURE — 1101F PT FALLS ASSESS-DOCD LE1/YR: CPT | Mod: HCNC,CPTII,S$GLB, | Performed by: NURSE PRACTITIONER

## 2020-02-20 PROCEDURE — 99999 PR PBB SHADOW E&M-EST. PATIENT-LVL IV: CPT | Mod: PBBFAC,HCNC,, | Performed by: NURSE PRACTITIONER

## 2020-02-20 PROCEDURE — 3078F DIAST BP <80 MM HG: CPT | Mod: HCNC,CPTII,S$GLB, | Performed by: NURSE PRACTITIONER

## 2020-02-20 PROCEDURE — 1101F PR PT FALLS ASSESS DOC 0-1 FALLS W/OUT INJ PAST YR: ICD-10-PCS | Mod: HCNC,CPTII,S$GLB, | Performed by: NURSE PRACTITIONER

## 2020-02-20 PROCEDURE — 3074F SYST BP LT 130 MM HG: CPT | Mod: HCNC,CPTII,S$GLB, | Performed by: NURSE PRACTITIONER

## 2020-02-20 PROCEDURE — 1125F AMNT PAIN NOTED PAIN PRSNT: CPT | Mod: HCNC,S$GLB,, | Performed by: NURSE PRACTITIONER

## 2020-02-20 PROCEDURE — 3074F PR MOST RECENT SYSTOLIC BLOOD PRESSURE < 130 MM HG: ICD-10-PCS | Mod: HCNC,CPTII,S$GLB, | Performed by: NURSE PRACTITIONER

## 2020-02-20 PROCEDURE — 3078F PR MOST RECENT DIASTOLIC BLOOD PRESSURE < 80 MM HG: ICD-10-PCS | Mod: HCNC,CPTII,S$GLB, | Performed by: NURSE PRACTITIONER

## 2020-02-20 PROCEDURE — 1159F PR MEDICATION LIST DOCUMENTED IN MEDICAL RECORD: ICD-10-PCS | Mod: HCNC,S$GLB,, | Performed by: NURSE PRACTITIONER

## 2020-02-20 PROCEDURE — 1125F PR PAIN SEVERITY QUANTIFIED, PAIN PRESENT: ICD-10-PCS | Mod: HCNC,S$GLB,, | Performed by: NURSE PRACTITIONER

## 2020-02-20 PROCEDURE — 99499 RISK ADDL DX/OHS AUDIT: ICD-10-PCS | Mod: HCNC,S$GLB,, | Performed by: NURSE PRACTITIONER

## 2020-02-20 PROCEDURE — 1159F MED LIST DOCD IN RCRD: CPT | Mod: HCNC,S$GLB,, | Performed by: NURSE PRACTITIONER

## 2020-02-20 RX ORDER — BETAMETHASONE VALERATE 1.2 MG/G
CREAM TOPICAL 2 TIMES DAILY
Qty: 45 G | Refills: 2 | Status: SHIPPED | OUTPATIENT
Start: 2020-02-20 | End: 2020-04-29

## 2020-02-20 RX ORDER — ALPRAZOLAM 0.25 MG/1
TABLET ORAL
COMMUNITY
Start: 2019-12-17 | End: 2020-02-20 | Stop reason: SDUPTHER

## 2020-02-20 RX ORDER — ALPRAZOLAM 0.25 MG/1
0.25 TABLET ORAL NIGHTLY PRN
Qty: 30 TABLET | Refills: 0 | Status: SHIPPED | OUTPATIENT
Start: 2020-02-20 | End: 2020-03-23

## 2020-02-20 RX ORDER — SERTRALINE HYDROCHLORIDE 100 MG/1
TABLET, FILM COATED ORAL
COMMUNITY
Start: 2020-01-13 | End: 2020-08-24

## 2020-02-20 RX ORDER — HYDROCODONE BITARTRATE AND ACETAMINOPHEN 5; 325 MG/1; MG/1
TABLET ORAL
COMMUNITY
Start: 2020-01-20 | End: 2020-05-12 | Stop reason: SDUPTHER

## 2020-02-20 NOTE — PATIENT INSTRUCTIONS
Hip Strain    You have a strain of the muscles around the hip joint. A muscle strain is a stretching or tearing of muscle fibers. This causes pain, especially when you move that muscle. There may also be some swelling and bruising.  Home care  · Stay off the injured leg as much as possible until you can walk on it without pain. If you have a lot of pain with walking, crutches or a walker may be prescribed. These can be rented or purchased at many pharmacies and surgical or orthopedic supply stores. Follow your healthcare provider's advice regarding when to begin putting weight on that leg.  · Apply an ice pack over the injured area for 15 to 20 minutes every 3 to 6 hours. You should do this for the first 24 to 48 hours. You can make an ice pack by filling a plastic bag that seals at the top with ice cubes and then wrapping it with a thin towel. Be careful not to injure your skin with the ice treatments. Ice should never be applied directly to skin. Continue the use of ice packs for relief of pain and swelling as needed. After 48 hours, apply heat (warm shower or warm bath) for 15 to 20 minutes several times a day, or alternate ice and heat.  · You may use over-the-counter pain medicine to control pain, unless another pain medicine was prescribed. If you have chronic liver or kidney disease or ever had a stomach ulcer or GI bleeding, talk with your healthcare provider beforeusing these medicines.  · If you play sports, you may resume these activities when you are able to hop and run on the injured leg without pain.  Follow-up care  Follow up with your healthcare provider, or as advised. If your symptoms do not begin to get better after a week, more tests may be needed.  If X-rays were taken, you will be told of any new findings that may affect your care.  When to seek medical advice  Call your healthcare provider right away if any of these occur:  · Increased swelling or bruising  · Increased pain  · Losing the  ability to put weight on the injured side  Date Last Reviewed: 11/19/2015  © 6740-2676 [employer]. [address] [place] [address], [place] [address]. All rights reserved. This information is not intended as a substitute for professional medical care. Always follow your healthcare professional's instructions.

## 2020-02-20 NOTE — ASSESSMENT & PLAN NOTE
She continues to take zoloft and xanax PRN, she reports that she has only been taking xanax once daily even though it is prescribed BID. Will refill for 30 pills today, but discussed the goal to get pt off of benzos in the future due to risk associated with the drug class, especially in the elderly population.

## 2020-02-20 NOTE — ASSESSMENT & PLAN NOTE
Ice/heat therapy for pain relief.   Rest  Already taking norco for chronic pain control so does not need rx tx for pain.   Discussed red flag symptoms and when to rtc. RTC if symptoms persist or do not improve in one week.

## 2020-02-20 NOTE — PROGRESS NOTES
"Subjective:       Patient ID: Tea Ring is a 74 y.o. female.    Chief Complaint: Abdominal Pain    Mrs Ring is here today c/o R hip pain that started on Sunday after trying to get into bathtub. She denies fall or trauma to R hip or affected area. She reports that she had a back procedure today where Dr Carrillo performed a back injection/nerve block at [facility] this morning. She goes back for another nerve procedure in 2 weeks.     Hip Pain    The incident occurred 5 to 7 days ago (Sunday). The incident occurred at home. The pain is present in the right hip. Quality: "hard pain" The pain is at a severity of 9/10. The pain is moderate. The pain has been intermittent (worse at night) since onset. Associated symptoms include an inability to bear weight (able to bear weight, but pain worse with bearing weight). Pertinent negatives include no loss of motion, loss of sensation, muscle weakness, numbness or tingling. She reports no foreign bodies present. The symptoms are aggravated by movement, palpation and weight bearing. She has tried heat (norco) for the symptoms. The treatment provided mild relief.       Patient Active Problem List   Diagnosis    Essential hypertension    Chronic pain of left ankle    Chronic knee pain    Depression    Chronic anxiety    Cutaneous lupus erythematosus    Alpha thalassemia    Osteopenia    Ex-smoker    Chronic asthma without complication    Lumbar spondylosis    Atherosclerosis of aorta    History of total right knee replacement    Right knee pain    Gait instability    Muscle spasms of lower extremity    Medication noncompliance due to cognitive impairment    Lower extremity edema    Screening for breast cancer    Popping of both ears    Moderate major depression    Primary open angle glaucoma (POAG) of both eyes, severe stage    Disorder of bone     Arthritis of multiple sites    Glenohumeral arthritis, right    Generalized OA    Adhesive " capsulitis of right shoulder    Personal history of nicotine dependence     Hip strain, initial encounter       Family History   Problem Relation Age of Onset    Diabetes Mother     Diabetes Father     Breast cancer Maternal Aunt      Past Surgical History:   Procedure Laterality Date    ANKLE SURGERY Left     BACK SURGERY      COLONOSCOPY N/A 8/2/2017    Procedure: COLONOSCOPY;  Surgeon: Virgil Oliva MD;  Location: [facility];  Service: Endoscopy;  Laterality: N/A;    HYSTERECTOMY      KNEE ARTHROSCOPY      both knees twice    OOPHORECTOMY           Current Outpatient Medications:     ALPRAZolam (XANAX) 0.25 MG tablet, Take 1 tablet (0.25 mg total) by mouth nightly as needed for Insomnia or Anxiety., Disp: 30 tablet, Rfl: 0    atorvastatin (LIPITOR) 10 MG tablet, Take 1 tablet (10 mg total) by mouth once daily., Disp: 90 tablet, Rfl: 3    benzocaine-lanolin (DERMOPLAST) 20-0.5 % Aero, Apply topically 4 (four) times daily as needed., Disp: , Rfl:     blood pressure monitor (BLOOD PRESSURE KIT) Kit, Record daily, Disp: 1 each, Rfl: 0    HYDROcodone-acetaminophen (NORCO) 5-325 mg per tablet, , Disp: , Rfl:     pantoprazole (PROTONIX) 40 MG tablet, Take 40 mg by mouth once daily., Disp: , Rfl:     sertraline (ZOLOFT) 100 MG tablet, , Disp: , Rfl:     valsartan-hydrochlorothiazide (DIOVAN-HCT) 160-12.5 mg per tablet, Take 1 tablet by mouth once daily., Disp: 90 tablet, Rfl: 3    betamethasone valerate 0.1% (VALISONE) 0.1 % Crea, Apply topically 2 (two) times daily., Disp: 45 g, Rfl: 2    Review of Systems   Constitutional: Negative for chills, diaphoresis, fatigue and fever.   Eyes: Negative for photophobia and visual disturbance.   Respiratory: Negative for cough, chest tightness, shortness of breath and wheezing.    Cardiovascular: Negative for chest pain and palpitations.   Gastrointestinal: Negative for abdominal pain, constipation, diarrhea, nausea and vomiting.   Genitourinary: Negative  "for dysuria, flank pain, frequency, hematuria and urgency.   Musculoskeletal: Positive for arthralgias (R hip) and back pain (R lower back). Negative for myalgias.   Neurological: Positive for dizziness (chronic with position changes when getting out of bed. resolves after sitting on bed for a few seconds). Negative for tingling, light-headedness, numbness and headaches.       Objective:   /67 (BP Location: Left arm, Patient Position: Sitting, BP Method: Medium (Automatic))   Pulse 91   Temp 96.5 °F (35.8 °C) (Tympanic)   Resp 19   Ht 5' 2" (1.575 m)   Wt 77.2 kg (170 lb 3.1 oz)   SpO2 98%   BMI 31.13 kg/m²      Physical Exam   Constitutional: She is oriented to person, place, and time. She appears well-developed and well-nourished. No distress.   Cardiovascular: Normal rate, regular rhythm, normal heart sounds and intact distal pulses. Exam reveals no gallop and no friction rub.   No murmur heard.  Pulmonary/Chest: Effort normal and breath sounds normal. No respiratory distress. She has no wheezes.   Abdominal: Soft. Bowel sounds are normal. She exhibits no distension. There is no tenderness. There is no guarding.   Musculoskeletal: She exhibits tenderness. She exhibits no edema or deformity.        Right hip: She exhibits decreased range of motion and tenderness. She exhibits normal strength, no bony tenderness and no swelling.   Lateral R hip and R lower back with mild TTP, Bandaids in place to bilateral lower lumbar region from procedure today   Neurological: She is alert and oriented to person, place, and time. No cranial nerve deficit.   Skin: Skin is warm and dry. She is not diaphoretic. No erythema.       Assessment & Plan     Problem List Items Addressed This Visit        Psychiatric    Chronic anxiety    Current Assessment & Plan     She continues to take zoloft and xanax PRN, she reports that she has only been taking xanax once daily even though it is prescribed BID. Will refill for 30 pills " today, but discussed the goal to get pt off of benzos in the future due to risk associated with the drug class, especially in the elderly population.          Relevant Medications    ALPRAZolam (XANAX) 0.25 MG tablet       Derm    Cutaneous lupus erythematosus    Relevant Medications    betamethasone valerate 0.1% (VALISONE) 0.1 % Crea       Cardiac/Vascular    Atherosclerosis of aorta (Chronic)    Overview     7/25/16 CT chest:..Aorta and vasculature: Atherosclerosis including coronary arteries...         Current Assessment & Plan     Blood pressure well controlled. Continue current BP medication and statin.             Other    Hip strain, initial encounter - Primary    Current Assessment & Plan     Ice/heat therapy for pain relief.   Rest  Already taking norco for chronic pain control so does not need rx tx for pain.   Discussed red flag symptoms and when to rtc. RTC if symptoms persist or do not improve in one week.                 Follow up if symptoms worsen or fail to improve.

## 2020-03-20 DIAGNOSIS — F41.9 CHRONIC ANXIETY: ICD-10-CM

## 2020-03-23 RX ORDER — ALPRAZOLAM 0.25 MG/1
TABLET ORAL
Qty: 30 TABLET | Refills: 0 | Status: SHIPPED | OUTPATIENT
Start: 2020-03-23 | End: 2020-05-26 | Stop reason: SDUPTHER

## 2020-04-09 ENCOUNTER — TELEPHONE (OUTPATIENT)
Dept: PAIN MEDICINE | Facility: CLINIC | Age: 75
End: 2020-04-09

## 2020-04-09 NOTE — TELEPHONE ENCOUNTER
----- Message from Talita Allison sent at 4/9/2020 11:47 AM CDT -----  Contact: Pt  Pt requesting a call back in regards to scheduling a appt      Please call and advise    Phone [phone]

## 2020-04-09 NOTE — TELEPHONE ENCOUNTER
Contacted pt. Pt requesting to set up pain management appointment with Dr. Ignacio referred by pcp. Informed pt that // practices interventional pain management meaning they can offer non-narcotics and injections for pain. Pt declined as she is on Norco and pain is well managed.Pt states she will reach out to PCP for another referral. All questions answered.//lp

## 2020-04-13 ENCOUNTER — TELEPHONE (OUTPATIENT)
Dept: INTERNAL MEDICINE | Facility: CLINIC | Age: 75
End: 2020-04-13

## 2020-04-13 NOTE — TELEPHONE ENCOUNTER
----- Message from Hernán Kolby sent at 4/13/2020 10:30 AM CDT -----  Contact: pt   Pt called and said she had been referred  to a pain management  Dr but is confused as to what Dr she was being referred to.    Pt stated she had a number to call but was unsure as to what she was to say when she called that number.     Pt said she was using a particular pain management Dr but they stopped taking her insurance.     I was looking for a referral but did not see one put in.     Pt can be reached at [phone]

## 2020-04-29 DIAGNOSIS — L93.2 CUTANEOUS LUPUS ERYTHEMATOSUS: ICD-10-CM

## 2020-04-29 RX ORDER — BETAMETHASONE VALERATE 1.2 MG/G
CREAM TOPICAL 2 TIMES DAILY
Qty: 45 G | Refills: 2 | Status: SHIPPED | OUTPATIENT
Start: 2020-04-29 | End: 2020-08-04

## 2020-05-11 ENCOUNTER — TELEPHONE (OUTPATIENT)
Dept: PAIN MEDICINE | Facility: CLINIC | Age: 75
End: 2020-05-11

## 2020-05-11 NOTE — TELEPHONE ENCOUNTER
Spoke with patient regarding an appointment with Dr. Moore on 5.12.2020 at the [facility]. Informed patient about updated check in process. Patient confirmed understanding.

## 2020-05-12 ENCOUNTER — OFFICE VISIT (OUTPATIENT)
Dept: PAIN MEDICINE | Facility: CLINIC | Age: 75
End: 2020-05-12
Payer: MEDICARE

## 2020-05-12 VITALS
WEIGHT: 176.13 LBS | HEIGHT: 62 IN | SYSTOLIC BLOOD PRESSURE: 156 MMHG | BODY MASS INDEX: 32.41 KG/M2 | HEART RATE: 86 BPM | DIASTOLIC BLOOD PRESSURE: 86 MMHG

## 2020-05-12 DIAGNOSIS — G89.29 CHRONIC PAIN OF BOTH KNEES: Primary | ICD-10-CM

## 2020-05-12 DIAGNOSIS — M25.561 CHRONIC PAIN OF BOTH KNEES: Primary | ICD-10-CM

## 2020-05-12 DIAGNOSIS — M25.562 CHRONIC PAIN OF BOTH KNEES: Primary | ICD-10-CM

## 2020-05-12 PROCEDURE — 99204 OFFICE O/P NEW MOD 45 MIN: CPT | Mod: HCNC,S$GLB,, | Performed by: PAIN MEDICINE

## 2020-05-12 PROCEDURE — 99999 PR PBB SHADOW E&M-EST. PATIENT-LVL III: ICD-10-PCS | Mod: PBBFAC,HCNC,, | Performed by: PAIN MEDICINE

## 2020-05-12 PROCEDURE — 99999 PR PBB SHADOW E&M-EST. PATIENT-LVL III: CPT | Mod: PBBFAC,HCNC,, | Performed by: PAIN MEDICINE

## 2020-05-12 PROCEDURE — 3077F PR MOST RECENT SYSTOLIC BLOOD PRESSURE >= 140 MM HG: ICD-10-PCS | Mod: HCNC,CPTII,S$GLB, | Performed by: PAIN MEDICINE

## 2020-05-12 PROCEDURE — 1159F PR MEDICATION LIST DOCUMENTED IN MEDICAL RECORD: ICD-10-PCS | Mod: HCNC,S$GLB,, | Performed by: PAIN MEDICINE

## 2020-05-12 PROCEDURE — 1100F PTFALLS ASSESS-DOCD GE2>/YR: CPT | Mod: HCNC,CPTII,S$GLB, | Performed by: PAIN MEDICINE

## 2020-05-12 PROCEDURE — 99204 PR OFFICE/OUTPT VISIT, NEW, LEVL IV, 45-59 MIN: ICD-10-PCS | Mod: HCNC,S$GLB,, | Performed by: PAIN MEDICINE

## 2020-05-12 PROCEDURE — 1125F AMNT PAIN NOTED PAIN PRSNT: CPT | Mod: HCNC,S$GLB,, | Performed by: PAIN MEDICINE

## 2020-05-12 PROCEDURE — 3079F PR MOST RECENT DIASTOLIC BLOOD PRESSURE 80-89 MM HG: ICD-10-PCS | Mod: HCNC,CPTII,S$GLB, | Performed by: PAIN MEDICINE

## 2020-05-12 PROCEDURE — 3077F SYST BP >= 140 MM HG: CPT | Mod: HCNC,CPTII,S$GLB, | Performed by: PAIN MEDICINE

## 2020-05-12 PROCEDURE — 3288F FALL RISK ASSESSMENT DOCD: CPT | Mod: HCNC,CPTII,S$GLB, | Performed by: PAIN MEDICINE

## 2020-05-12 PROCEDURE — 1100F PR PT FALLS ASSESS DOC 2+ FALLS/FALL W/INJURY/YR: ICD-10-PCS | Mod: HCNC,CPTII,S$GLB, | Performed by: PAIN MEDICINE

## 2020-05-12 PROCEDURE — 1159F MED LIST DOCD IN RCRD: CPT | Mod: HCNC,S$GLB,, | Performed by: PAIN MEDICINE

## 2020-05-12 PROCEDURE — 3079F DIAST BP 80-89 MM HG: CPT | Mod: HCNC,CPTII,S$GLB, | Performed by: PAIN MEDICINE

## 2020-05-12 PROCEDURE — 3288F PR FALLS RISK ASSESSMENT DOCUMENTED: ICD-10-PCS | Mod: HCNC,CPTII,S$GLB, | Performed by: PAIN MEDICINE

## 2020-05-12 PROCEDURE — 1125F PR PAIN SEVERITY QUANTIFIED, PAIN PRESENT: ICD-10-PCS | Mod: HCNC,S$GLB,, | Performed by: PAIN MEDICINE

## 2020-05-12 RX ORDER — HYDROCODONE BITARTRATE AND ACETAMINOPHEN 5; 325 MG/1; MG/1
1 TABLET ORAL EVERY 12 HOURS PRN
Qty: 60 TABLET | Refills: 0 | Status: SHIPPED | OUTPATIENT
Start: 2020-05-12 | End: 2020-06-11

## 2020-05-12 RX ORDER — AMITRIPTYLINE HYDROCHLORIDE 50 MG/1
TABLET, FILM COATED ORAL
COMMUNITY
Start: 2020-04-02 | End: 2020-05-26 | Stop reason: SDUPTHER

## 2020-05-12 NOTE — H&P (VIEW-ONLY)
Chief Pain Complaint:  Low-back Pain; Leg Pain; and Knee Pain    This note was created using voice recognition, there may be errors that were missed during proofreading.    History of Present Illness:   This patient is a 74 y.o. female who presents today complaining of the above noted pain/s. The patient describes the pain as follows.  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  She also has bilateral knee pain with right being worse than the left.  She has been having symptoms for several years and currently rates her pain as an 8/10.  She describes mostly sharp and aching sensation in the low back and tingling sensation in her bilateral lower extremities.  She finds that her symptoms are worse with standing up and walking.  She has taken Amitriptyline and Tramadol with minimal benefit.  She has undergone bilateral lumbar medial branch blocks back in 2016 in addition to using heating pad and ice packs.  She has recently been treated at the [facility] with lumbar medial branch blocks.  Today her worse complaints are her bilateral knees with the right knee being worse than left knee.  She reports undergoing bilateral knee replacement several years ago.  She does use pain creams and patches however this provides minimal pain relief.  In the past she has had hydrocodone which she did find to be helpful for her knees.  She finds activity cause her symptoms worsen while rest does provide some benefit.    Previous Therapy:  Medications:  Xanax, Norco, Zoloft  Injections:  Bilateral lumbar medial branch blocks in 2016, 2020  Surgeries: Right Knee Replacement and Left Knee Replacement  Physical Therapy: Completed in the Past    Past Surgical History:   Procedure Laterality Date    ANKLE SURGERY Left     BACK SURGERY      COLONOSCOPY N/A 8/2/2017    Procedure: COLONOSCOPY;  Surgeon: Virgil Oliva MD;  Location: [facility];  Service:  Endoscopy;  Laterality: N/A;    HYSTERECTOMY      KNEE ARTHROSCOPY      both knees twice    OOPHORECTOMY         Family History   Problem Relation Age of Onset    Diabetes Mother     Diabetes Father     Breast cancer Maternal Aunt        Social History     Socioeconomic History    Marital status:      Spouse name:     Number of children: 3    Years of education: Not on file    Highest education level: Not on file   Occupational History    Not on file   Social Needs    Financial resource strain: Not on file    Food insecurity:     Worry: Not on file     Inability: Not on file    Transportation needs:     Medical: Not on file     Non-medical: Not on file   Tobacco Use    Smoking status: Former Smoker     Packs/day: 1.00     Years: 30.00     Pack years: 30.00     Types: Cigarettes     Last attempt to quit: 2014     Years since quittin.6    Smokeless tobacco: Never Used    Tobacco comment: smoke last cigarette 9/15   Substance and Sexual Activity    Alcohol use: Yes     Alcohol/week: 1.0 standard drinks     Types: 1 Glasses of wine per week    Drug use: No    Sexual activity: Not Currently     Partners: Male   Lifestyle    Physical activity:     Days per week: Not on file     Minutes per session: Not on file    Stress: Not at all   Relationships    Social connections:     Talks on phone: Not on file     Gets together: Not on file     Attends Faith service: Not on file     Active member of club or organization: Not on file     Attends meetings of clubs or organizations: Not on file     Relationship status: Not on file   Other Topics Concern    Not on file   Social History Narrative    Not on file      Imaging / Labs / Studies (reviewed on 2020):    Results for orders placed during the hospital encounter of 19   CT Lumbar Spine Without Contrast    Narrative EXAMINATION:  CT LUMBAR SPINE WITHOUT CONTRAST    CLINICAL HISTORY:  T/L-spine trauma, minor-mod, low  back pain;    TECHNIQUE:  Standard noncontrast CT scan of the lumbar spine.    COMPARISON:  X-rays 05/06/2019    FINDINGS:  There are fractures involving the transverse processes of L1, L2 and L3 on the left.  Fractures are probably subacute.  There is extensive facet arthropathy involving the lower lumbar levels.  There is a superior endplate fracture involving L3 which has developed since previous study of 05/06/2019.  Age of fracture is indeterminate.    No disc herniations.  There are multiple hypertrophic bridging endplate spurs.      Impression Fractures involving transverse processes of L1, L2 and L3 on the left.  Fractures are probably subacute.    There is also interval fracture involving superior endplate of L3 since previous study of 05/06/2019.  Fracture age is indeterminate.    All CT scans at this facility are performed  using dose modulation techniques as appropriate to performed exam including the following:  automated exposure control; adjustment of mA and/or kV according to the patients size (this includes techniques or standardized protocols for targeted exams where dose is matched to indication/reason for exam: i.e. extremities or head);  iterative reconstruction technique.     Results for orders placed during the hospital encounter of 08/14/19   CT Cervical Spine Without Contrast    Narrative EXAMINATION:  CT CERVICAL SPINE WITHOUT CONTRAST    CLINICAL HISTORY:  C-spine trauma, NEXUS/CCR positive, +risk factor(s);    TECHNIQUE:  Thin section axial images were acquired. Reformatted images were generated in the sagittal and coronal planes.    COMPARISON:  None    FINDINGS:  No fracture, precervical soft tissue swelling, or subluxation identified. No CT evidence of central canal stenosis or traumatic disc herniation.  Cervical spondylotic changes are present.  Degenerative changes are present of the facet joints.      Impression No fractures.    All CT scans at this facility use dose modulation,  iterative reconstructions, and/or weight base dosing when appropriate to reduce radiation dose to as low as reasonably achievable.     Results for orders placed during the hospital encounter of 02/10/16   X-Ray Lumbar Complete With Flex And Ext    Narrative Comparison: None     5 views with flexion and extension    Findings:    Vertebral height and alignment well maintained.  Multilevel anterior marginal spurring in the lower L-spine.  Routine anterior and lateral syndesmophytes noted throughout the remaining visualized lower T-spine and upper L-spine.  Extensive facet arthropathy throughout the lumbar spine.  Prominent calcification along the posterior margins of the L1 and L2 spinous processes.  Vascular calcifications noted.  No spondylolysis or spondylolisthesis appreciated.  Vacuum phenomenon with mild loss of disc height at the L4-5 level.  Pedicles and SI joints appear intact.  No subluxation or evidence of instability on flexion and extension views.    Impression       Extensive spondylosis with multilevel facet arthropathy as above.    Multilevel bridging syndesmophyte formation as above.    No acute fracture or subluxation.      No evidence of subluxation or instability on flexion and extension views.    No spondylolysis or spondylolisthesis.  See above.      Results for orders placed during the hospital encounter of 05/06/19   X-Ray Lumbar Spine Ap And Lateral    Narrative EXAMINATION:  XR LUMBAR SPINE AP AND LATERAL    CLINICAL HISTORY:  Low back pain, <6wks, no red flags, no prior management;    TECHNIQUE:  AP, lateral and spot images were performed of the lumbar spine.    COMPARISON:  03/08/2018    FINDINGS:  The vertebral bodies demonstrate a normal height and alignment.  Advanced facet DJD throughout the lumbar spine greatest at L3-4 through L5-S1.  Mild degenerative disc disease throughout with anterior syndesmophyte formation and endplate sclerosis.  Aortic atherosclerosis.      Impression Advanced  "facet DJD throughout the lumbar spine.  Mild multilevel lumbar spondylosis.     Review of Systems:  Review of Systems   Constitutional: Negative for fever.   Eyes: Negative for blurred vision.   Respiratory: Negative for cough and wheezing.    Cardiovascular: Negative for chest pain and orthopnea.   Gastrointestinal: Negative for constipation, diarrhea, nausea and vomiting.   Genitourinary: Negative for dysuria.   Musculoskeletal: Positive for joint pain.   Skin: Negative for itching and rash.   Neurological: Negative for weakness.   Endo/Heme/Allergies: Does not bruise/bleed easily.       Physical Exam:  BP (!) 156/86   Pulse 86   Ht 5' 2" (1.575 m)   Wt 79.9 kg (176 lb 2.4 oz)   BMI 32.22 kg/m²  (reviewed on 2020)\  General    Constitutional: She is oriented to person, place, and time. She appears well-developed and well-nourished.   HENT:   Head: Normocephalic and atraumatic.   Eyes: EOM are normal.   Neck: Neck supple.   Cardiovascular: Normal rate.    Pulmonary/Chest: Effort normal.   Abdominal: She exhibits no distension.   Neurological: She is alert and oriented to person, place, and time. No cranial nerve deficit.   Psychiatric: She has a normal mood and affect.     General Musculoskeletal Exam   Gait: antalgic       Right Knee Exam     Inspection   Erythema: absent  Scars: present  Swelling: present    Tenderness   The patient is tender to palpation of the lateral joint line.    Range of Motion   Extension: abnormal   Flexion: abnormal     Other   Sensation: normal    Comments:  Warm to the touch    Left Knee Exam     Inspection   Erythema: absent  Scars: present  Swelling: absent    Range of Motion   Extension: abnormal   Flexion: abnormal     Other   Sensation: normal    Muscle Strength   Right Lower Extremity   Quadriceps:  4/5   Hamstrin/5   Left Lower Extremity   Quadriceps:  4/5   Hamstrin/5     Reflexes     Left Side  Achilles:  2+    Right Side   Achilles:  " 2+      Assessment  Knee OA    1. 74 y.o. year old patient with PMH of   Past Medical History:   Diagnosis Date    Alpha thalassemia     Asthma     Cataract     Chronic anxiety     years tid xanax 1mg    Chronic knee pain     Chronic pain of left ankle     Clotting disorder     Cutaneous lupus erythematosus     dr henry derm    Depression     Depression     dr radha hughes previously    Ex-smoker     quit fall 1    Hypertension     Osteopenia 1/16 reck1/18      presenting with pain located bilateral knees  2. Pain Generators / Etiology:  Lumbar spondylosis, bilateral knee post arthroplasty pain  3. Failed Meds (E- Effective, NE- Not Effective)  4. Physical Therapy - Completed in the Past  5. Psychological comorbidities - Depression and Anxiety  6. Anticoagulants / Antiplatelets: None     PLAN:  1. Medications:  Will provide short-term prescription for hydrocodone 5/325 mg tablets to be taken twice daily as needed for 30 days; patient has beyond procedure will refer her to outside pain clinic for continued opioid therapy  2. PT - has completed physical therapy and past minimal benefit  3. Psychological - continue Zoloft 100 mg tablets as prescribed  4. Labs - obtain  none  5. Imaging - obtain  none  6. Interventions - schedule bilateral genicular nerve blocks  7. Referrals - none  8. Records - will request records from [facility]  9. Follow up visit - follow up in clinic after the procedure  10. Patient Questions - answered all of the patient's questions regarding diagnosis, therapy, and treatment  11.  This condition does not require this patient to take time off of work    LEXI Moore MD  Interventional Pain  [facility] [place]

## 2020-05-12 NOTE — PROGRESS NOTES
Chief Pain Complaint:  Low-back Pain; Leg Pain; and Knee Pain    This note was created using voice recognition, there may be errors that were missed during proofreading.    History of Present Illness:   This patient is a 74 y.o. female who presents today complaining of the above noted pain/s. The patient describes the pain as follows.  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  She also has bilateral knee pain with right being worse than the left.  She has been having symptoms for several years and currently rates her pain as an 8/10.  She describes mostly sharp and aching sensation in the low back and tingling sensation in her bilateral lower extremities.  She finds that her symptoms are worse with standing up and walking.  She has taken Amitriptyline and Tramadol with minimal benefit.  She has undergone bilateral lumbar medial branch blocks back in 2016 in addition to using heating pad and ice packs.  She has recently been treated at the [facility] with lumbar medial branch blocks.  Today her worse complaints are her bilateral knees with the right knee being worse than left knee.  She reports undergoing bilateral knee replacement several years ago.  She does use pain creams and patches however this provides minimal pain relief.  In the past she has had hydrocodone which she did find to be helpful for her knees.  She finds activity cause her symptoms worsen while rest does provide some benefit.    Previous Therapy:  Medications:  Xanax, Norco, Zoloft  Injections:  Bilateral lumbar medial branch blocks in 2016, 2020  Surgeries: Right Knee Replacement and Left Knee Replacement  Physical Therapy: Completed in the Past    Past Surgical History:   Procedure Laterality Date    ANKLE SURGERY Left     BACK SURGERY      COLONOSCOPY N/A 8/2/2017    Procedure: COLONOSCOPY;  Surgeon: Virgil Oliva MD;  Location: [facility];  Service:  Endoscopy;  Laterality: N/A;    HYSTERECTOMY      KNEE ARTHROSCOPY      both knees twice    OOPHORECTOMY         Family History   Problem Relation Age of Onset    Diabetes Mother     Diabetes Father     Breast cancer Maternal Aunt        Social History     Socioeconomic History    Marital status:      Spouse name:     Number of children: 3    Years of education: Not on file    Highest education level: Not on file   Occupational History    Not on file   Social Needs    Financial resource strain: Not on file    Food insecurity:     Worry: Not on file     Inability: Not on file    Transportation needs:     Medical: Not on file     Non-medical: Not on file   Tobacco Use    Smoking status: Former Smoker     Packs/day: 1.00     Years: 30.00     Pack years: 30.00     Types: Cigarettes     Last attempt to quit: 2014     Years since quittin.6    Smokeless tobacco: Never Used    Tobacco comment: smoke last cigarette 9/15   Substance and Sexual Activity    Alcohol use: Yes     Alcohol/week: 1.0 standard drinks     Types: 1 Glasses of wine per week    Drug use: No    Sexual activity: Not Currently     Partners: Male   Lifestyle    Physical activity:     Days per week: Not on file     Minutes per session: Not on file    Stress: Not at all   Relationships    Social connections:     Talks on phone: Not on file     Gets together: Not on file     Attends Alevism service: Not on file     Active member of club or organization: Not on file     Attends meetings of clubs or organizations: Not on file     Relationship status: Not on file   Other Topics Concern    Not on file   Social History Narrative    Not on file      Imaging / Labs / Studies (reviewed on 2020):    Results for orders placed during the hospital encounter of 19   CT Lumbar Spine Without Contrast    Narrative EXAMINATION:  CT LUMBAR SPINE WITHOUT CONTRAST    CLINICAL HISTORY:  T/L-spine trauma, minor-mod, low  back pain;    TECHNIQUE:  Standard noncontrast CT scan of the lumbar spine.    COMPARISON:  X-rays 05/06/2019    FINDINGS:  There are fractures involving the transverse processes of L1, L2 and L3 on the left.  Fractures are probably subacute.  There is extensive facet arthropathy involving the lower lumbar levels.  There is a superior endplate fracture involving L3 which has developed since previous study of 05/06/2019.  Age of fracture is indeterminate.    No disc herniations.  There are multiple hypertrophic bridging endplate spurs.      Impression Fractures involving transverse processes of L1, L2 and L3 on the left.  Fractures are probably subacute.    There is also interval fracture involving superior endplate of L3 since previous study of 05/06/2019.  Fracture age is indeterminate.    All CT scans at this facility are performed  using dose modulation techniques as appropriate to performed exam including the following:  automated exposure control; adjustment of mA and/or kV according to the patients size (this includes techniques or standardized protocols for targeted exams where dose is matched to indication/reason for exam: i.e. extremities or head);  iterative reconstruction technique.     Results for orders placed during the hospital encounter of 08/14/19   CT Cervical Spine Without Contrast    Narrative EXAMINATION:  CT CERVICAL SPINE WITHOUT CONTRAST    CLINICAL HISTORY:  C-spine trauma, NEXUS/CCR positive, +risk factor(s);    TECHNIQUE:  Thin section axial images were acquired. Reformatted images were generated in the sagittal and coronal planes.    COMPARISON:  None    FINDINGS:  No fracture, precervical soft tissue swelling, or subluxation identified. No CT evidence of central canal stenosis or traumatic disc herniation.  Cervical spondylotic changes are present.  Degenerative changes are present of the facet joints.      Impression No fractures.    All CT scans at this facility use dose modulation,  iterative reconstructions, and/or weight base dosing when appropriate to reduce radiation dose to as low as reasonably achievable.     Results for orders placed during the hospital encounter of 02/10/16   X-Ray Lumbar Complete With Flex And Ext    Narrative Comparison: None     5 views with flexion and extension    Findings:    Vertebral height and alignment well maintained.  Multilevel anterior marginal spurring in the lower L-spine.  Routine anterior and lateral syndesmophytes noted throughout the remaining visualized lower T-spine and upper L-spine.  Extensive facet arthropathy throughout the lumbar spine.  Prominent calcification along the posterior margins of the L1 and L2 spinous processes.  Vascular calcifications noted.  No spondylolysis or spondylolisthesis appreciated.  Vacuum phenomenon with mild loss of disc height at the L4-5 level.  Pedicles and SI joints appear intact.  No subluxation or evidence of instability on flexion and extension views.    Impression       Extensive spondylosis with multilevel facet arthropathy as above.    Multilevel bridging syndesmophyte formation as above.    No acute fracture or subluxation.      No evidence of subluxation or instability on flexion and extension views.    No spondylolysis or spondylolisthesis.  See above.      Results for orders placed during the hospital encounter of 05/06/19   X-Ray Lumbar Spine Ap And Lateral    Narrative EXAMINATION:  XR LUMBAR SPINE AP AND LATERAL    CLINICAL HISTORY:  Low back pain, <6wks, no red flags, no prior management;    TECHNIQUE:  AP, lateral and spot images were performed of the lumbar spine.    COMPARISON:  03/08/2018    FINDINGS:  The vertebral bodies demonstrate a normal height and alignment.  Advanced facet DJD throughout the lumbar spine greatest at L3-4 through L5-S1.  Mild degenerative disc disease throughout with anterior syndesmophyte formation and endplate sclerosis.  Aortic atherosclerosis.      Impression Advanced  "facet DJD throughout the lumbar spine.  Mild multilevel lumbar spondylosis.     Review of Systems:  Review of Systems   Constitutional: Negative for fever.   Eyes: Negative for blurred vision.   Respiratory: Negative for cough and wheezing.    Cardiovascular: Negative for chest pain and orthopnea.   Gastrointestinal: Negative for constipation, diarrhea, nausea and vomiting.   Genitourinary: Negative for dysuria.   Musculoskeletal: Positive for joint pain.   Skin: Negative for itching and rash.   Neurological: Negative for weakness.   Endo/Heme/Allergies: Does not bruise/bleed easily.       Physical Exam:  BP (!) 156/86   Pulse 86   Ht 5' 2" (1.575 m)   Wt 79.9 kg (176 lb 2.4 oz)   BMI 32.22 kg/m²  (reviewed on 2020)\  General    Constitutional: She is oriented to person, place, and time. She appears well-developed and well-nourished.   HENT:   Head: Normocephalic and atraumatic.   Eyes: EOM are normal.   Neck: Neck supple.   Cardiovascular: Normal rate.    Pulmonary/Chest: Effort normal.   Abdominal: She exhibits no distension.   Neurological: She is alert and oriented to person, place, and time. No cranial nerve deficit.   Psychiatric: She has a normal mood and affect.     General Musculoskeletal Exam   Gait: antalgic       Right Knee Exam     Inspection   Erythema: absent  Scars: present  Swelling: present    Tenderness   The patient is tender to palpation of the lateral joint line.    Range of Motion   Extension: abnormal   Flexion: abnormal     Other   Sensation: normal    Comments:  Warm to the touch    Left Knee Exam     Inspection   Erythema: absent  Scars: present  Swelling: absent    Range of Motion   Extension: abnormal   Flexion: abnormal     Other   Sensation: normal    Muscle Strength   Right Lower Extremity   Quadriceps:  4/5   Hamstrin/5   Left Lower Extremity   Quadriceps:  4/5   Hamstrin/5     Reflexes     Left Side  Achilles:  2+    Right Side   Achilles:  " 2+      Assessment  Knee OA    1. 74 y.o. year old patient with PMH of   Past Medical History:   Diagnosis Date    Alpha thalassemia     Asthma     Cataract     Chronic anxiety     years tid xanax 1mg    Chronic knee pain     Chronic pain of left ankle     Clotting disorder     Cutaneous lupus erythematosus     dr henry derm    Depression     Depression     dr radha hughes previously    Ex-smoker     quit fall 1    Hypertension     Osteopenia 1/16 reck1/18      presenting with pain located bilateral knees  2. Pain Generators / Etiology:  Lumbar spondylosis, bilateral knee post arthroplasty pain  3. Failed Meds (E- Effective, NE- Not Effective)  4. Physical Therapy - Completed in the Past  5. Psychological comorbidities - Depression and Anxiety  6. Anticoagulants / Antiplatelets: None     PLAN:  1. Medications:  Will provide short-term prescription for hydrocodone 5/325 mg tablets to be taken twice daily as needed for 30 days; patient has beyond procedure will refer her to outside pain clinic for continued opioid therapy  2. PT - has completed physical therapy and past minimal benefit  3. Psychological - continue Zoloft 100 mg tablets as prescribed  4. Labs - obtain  none  5. Imaging - obtain  none  6. Interventions - schedule bilateral genicular nerve blocks  7. Referrals - none  8. Records - will request records from [facility]  9. Follow up visit - follow up in clinic after the procedure  10. Patient Questions - answered all of the patient's questions regarding diagnosis, therapy, and treatment  11.  This condition does not require this patient to take time off of work    LEXI Moore MD  Interventional Pain  [facility] [place]

## 2020-05-12 NOTE — PATIENT INSTRUCTIONS
-will provide 1 month prescription of Lortab 5/325 mg tablets  -will schedule for bilateral genicular nerve blocks  -obtain records from previous pain clinic  -follow up in clinic after the procedure

## 2020-05-13 NOTE — PRE ADMISSION SCREENING
Spoke with   Patient    regarding procedure scheduled on 5/19/20  Arrival time 6 am  Has patient been sick with fever or on antibiotics within the last 7 days? No  Has patient received a vaccination within the last 7 days?No  Has the patient stopped all medications as directed? N/A  Does patient have a pacemaker and or defibrillator?No  Does the patient have a ride to and from procedure and someone reliable to remain with patient? Maida Lane  Is the patient diabetic? No  Does the patient have sleep apnea? No Or use O2 at home? No  Is the patient receiving sedation? Yes  Is the patient instructed to remain NPO beginning at midnight the night before their procedure? Yes  Procedure location confirmed with patient? [facility]  Covid Screening will be the [place] the day of the procedure due to pt. Does not drive.

## 2020-05-18 RX ORDER — AMITRIPTYLINE HYDROCHLORIDE 50 MG/1
TABLET, FILM COATED ORAL
Status: CANCELLED | OUTPATIENT
Start: 2020-05-18

## 2020-05-18 NOTE — TELEPHONE ENCOUNTER
Last visit 02/20/20    Amitriptyline 50 mg needs a PA    Alternatives are     Amoxapine 50 mg  Maprotiline 75 mg   12-Feb-2019

## 2020-05-19 ENCOUNTER — HOSPITAL ENCOUNTER (OUTPATIENT)
Facility: HOSPITAL | Age: 75
Discharge: HOME | End: 2020-05-19
Attending: PAIN MEDICINE | Admitting: PAIN MEDICINE
Payer: MEDICARE

## 2020-05-19 VITALS
RESPIRATION RATE: 16 BRPM | HEIGHT: 62 IN | SYSTOLIC BLOOD PRESSURE: 161 MMHG | HEART RATE: 71 BPM | DIASTOLIC BLOOD PRESSURE: 72 MMHG | BODY MASS INDEX: 31.28 KG/M2 | TEMPERATURE: 98 F | WEIGHT: 170 LBS | OXYGEN SATURATION: 96 %

## 2020-05-19 DIAGNOSIS — M17.10 ARTHRITIS OF KNEE: Primary | ICD-10-CM

## 2020-05-19 LAB — SARS-COV-2 RDRP RESP QL NAA+PROBE: NEGATIVE

## 2020-05-19 PROCEDURE — U0002 COVID-19 LAB TEST NON-CDC: HCPCS | Mod: HCNC

## 2020-05-19 PROCEDURE — 25000003 PHARM REV CODE 250: Mod: HCNC | Performed by: PAIN MEDICINE

## 2020-05-19 PROCEDURE — 63600175 PHARM REV CODE 636 W HCPCS: Mod: HCNC | Performed by: PAIN MEDICINE

## 2020-05-19 PROCEDURE — 64454 NJX AA&/STRD GNCLR NRV BRNCH: CPT | Mod: 50,HCNC | Performed by: PAIN MEDICINE

## 2020-05-19 PROCEDURE — 64454 NJX AA&/STRD GNCLR NRV BRNCH: CPT | Mod: 50,HCNC,, | Performed by: PAIN MEDICINE

## 2020-05-19 PROCEDURE — 99152 MOD SED SAME PHYS/QHP 5/>YRS: CPT | Mod: HCNC | Performed by: PAIN MEDICINE

## 2020-05-19 PROCEDURE — 64450 NJX AA&/STRD OTHER PN/BRANCH: CPT | Mod: 50,HCNC | Performed by: PAIN MEDICINE

## 2020-05-19 PROCEDURE — 64454 PR NERVE BLOCK INJ, ANES/STEROID, GENICULAR NERVE, W/IMG: ICD-10-PCS | Mod: 50,HCNC,, | Performed by: PAIN MEDICINE

## 2020-05-19 RX ORDER — MIDAZOLAM HYDROCHLORIDE 1 MG/ML
INJECTION, SOLUTION INTRAMUSCULAR; INTRAVENOUS
Status: DISCONTINUED | OUTPATIENT
Start: 2020-05-19 | End: 2020-05-19 | Stop reason: HOSPADM

## 2020-05-19 RX ORDER — FENTANYL CITRATE 50 UG/ML
INJECTION, SOLUTION INTRAMUSCULAR; INTRAVENOUS
Status: DISCONTINUED | OUTPATIENT
Start: 2020-05-19 | End: 2020-05-19 | Stop reason: HOSPADM

## 2020-05-19 RX ORDER — LIDOCAINE HYDROCHLORIDE 20 MG/ML
INJECTION, SOLUTION EPIDURAL; INFILTRATION; INTRACAUDAL; PERINEURAL
Status: DISCONTINUED | OUTPATIENT
Start: 2020-05-19 | End: 2020-05-19 | Stop reason: HOSPADM

## 2020-05-19 RX ORDER — LIDOCAINE HYDROCHLORIDE 10 MG/ML
INJECTION, SOLUTION EPIDURAL; INFILTRATION; INTRACAUDAL; PERINEURAL
Status: DISCONTINUED | OUTPATIENT
Start: 2020-05-19 | End: 2020-05-19 | Stop reason: HOSPADM

## 2020-05-19 NOTE — DISCHARGE INSTRUCTIONS

## 2020-05-19 NOTE — OP NOTE
PROCEDURE: Genicular Nerve Block (Superior Lateral, Superior Medial, and Inferior Medial Genicular Nerve Block) under fluoroscopic guidance    SIDE: bilateral     PROCEDURE DATE: 5/19/2020    PREOPERATIVE DIAGNOSIS: Mononeuropathies of Lower Limb, Chronic Knee Pain  POSTOPERATIVE DIAGNOSIS: Mononeuropathies of Lower Limb, Chronic Knee Pain    PROVIDER: LEXI Moore MD  Assistant(s): None    ANESTHESIA: Local, IV Sedation    >> 1 mg of VERSED    >> 50 mcg of FENTANYL     INDICATION: The patient has knee pain unresponsive to conservative treatments. Fluoroscopy was used to optimize visualization of needle placement and to maximize safety.        PROCEDURE DESCRIPTION / TECHNIQUE:   The patient was seen and identified in the preoperative area. Risks, benefits, complications, and alternatives were discussed with the patient. The patient agreed to proceed with the procedure and signed the consent. The site and side of the procedure was identified and marked. An iv was started.    The patient was taken to the procedural suite. The patient was positioned in supine orientation on procedure table.  The procedural knee/s was/were exposed. A pillow was placed under the procedural knee to offset lateral alignment in order to optimize lateral fluoroscopic visualization. A time out was performed prior to any intervention. The procedure, site, side, and allergies were stated and agreed to by all present. The anterior, lateral, and medial aspects of the procedural knee/s was/were widely prepped with ChloraPrep. The procedural site/s was/were draped in usual sterile fashion. Vital signs were closely monitored throughout this procedure. Conscious sedation was used for this procedure to decrease patient anxiety.    AP fluoroscopy was used to identify the interface of the femoral shaft and the superior medial and superior lateral femoral epicondyle, as well as the interface of the tibial shaft and the medial tibial epicondyle. These  targeted sites were marked and localized with 1% Lidocaine. The target sites were approached under fluoroscopic guidance using 3 seperate 25 gauge 3.5 inch spinal needles. The needles were advanced until osseus contact was made. The fluoroscope was moved into a lateral orientation and the needles were advanced further until each needle tip reached the median aspect of the femoral or tibial shaft. Osseus contact was maintained. After negative aspiration, 1.0ml of 2% lidocaine was injected at each targeted site. After injection, the stylets were replaced and all needles were removed intact. This injection technique was performed for all of the above noted sites. Following injection, the injection sites were cleaned and bandages were applied. The patient tolerated the procedure well.      Description of Findings: Not applicable    Prosthetic devices, grafts, tissues, or devices implanted: None    Specimen Removed: No    Estimated Blood Loss: minimal    COMPLICATIONS: None    DISPOSITION / PLANS: The patient was transferred to the recovery area in a stable condition for observation. The patient was reexamined prior to discharge. There was no evidence of acute neurologic injury following the procedure.  Patient was discharged from the recovery room after meeting discharge criteria. Home discharge instructions were given to the patient by the staff.

## 2020-05-19 NOTE — DISCHARGE SUMMARY
[facility]  Short Stay  Discharge Summary    Admit Date: 5/19/2020    Discharge Date and Time: 5/19/2020  8:39 AM      Discharge Attending Physician: LEXI Moore MD     Hospital Course (synopsis of major diagnoses, care, treatment, and services provided during the course of the hospital stay): Patient was admitted to Pre-op where informed consent was signed.  The patient was then taken to the procedure suite where the procedure was performed.  The patient was then return to the Pre-Op area and discharge was performed.     Final Diagnoses:    Principal Problem: <principal problem not specified>   Secondary Diagnoses:   Active Hospital Problems    Diagnosis  POA    Arthritis of knee [M17.10]  Yes      Resolved Hospital Problems   No resolved problems to display.       Discharged Condition: good    Disposition: Home or Self Care    Follow up/Patient Instructions:    Medications:  Reconciled Home Medications:      Medication List      CONTINUE taking these medications    ALPRAZolam 0.25 MG tablet  Commonly known as:  XANAX  TAKE ONE TABLET BY MOUTH NIGHTLY AS NEEDED FOR INSOMNIA OR SLEEP     amitriptyline 50 MG tablet  Commonly known as:  ELAVIL     atorvastatin 10 MG tablet  Commonly known as:  LIPITOR  Take 1 tablet (10 mg total) by mouth once daily.     benzocaine-lanolin 20-0.5 % Aero  Commonly known as:  DERMOPLAST  Apply topically 4 (four) times daily as needed.     betamethasone valerate 0.1% 0.1 % Crea  Commonly known as:  VALISONE  Apply topically 2 (two) times daily. Apply to affected area.     blood pressure monitor Kit  Commonly known as:  BLOOD PRESSURE KIT  Record daily     HYDROcodone-acetaminophen 5-325 mg per tablet  Commonly known as:  NORCO  Take 1 tablet by mouth every 12 (twelve) hours as needed for Pain.     pantoprazole 40 MG tablet  Commonly known as:  PROTONIX  Take 40 mg by mouth once daily.     sertraline 100 MG tablet  Commonly known as:  ZOLOFT        ASK your doctor  about these medications    valsartan-hydrochlorothiazide 160-12.5 mg per tablet  Commonly known as:  DIOVAN-HCT  Take 1 tablet by mouth once daily.          Discharge Procedure Orders   Diet general     Call MD for:  severe uncontrolled pain     Call MD for:  difficulty breathing, headache or visual disturbances     Call MD for:  redness, tenderness, or signs of infection (pain, swelling, redness, odor or green/yellow discharge around incision site)     Activity as tolerated

## 2020-05-26 ENCOUNTER — OFFICE VISIT (OUTPATIENT)
Dept: INTERNAL MEDICINE | Facility: CLINIC | Age: 75
End: 2020-05-26
Payer: MEDICARE

## 2020-05-26 ENCOUNTER — LAB VISIT (OUTPATIENT)
Dept: LAB | Facility: HOSPITAL | Age: 75
End: 2020-05-26
Attending: FAMILY MEDICINE
Payer: MEDICARE

## 2020-05-26 VITALS
HEIGHT: 62 IN | SYSTOLIC BLOOD PRESSURE: 132 MMHG | BODY MASS INDEX: 32.82 KG/M2 | WEIGHT: 178.38 LBS | RESPIRATION RATE: 16 BRPM | DIASTOLIC BLOOD PRESSURE: 80 MMHG

## 2020-05-26 DIAGNOSIS — F33.1 MODERATE EPISODE OF RECURRENT MAJOR DEPRESSIVE DISORDER: ICD-10-CM

## 2020-05-26 DIAGNOSIS — F41.9 CHRONIC ANXIETY: ICD-10-CM

## 2020-05-26 DIAGNOSIS — M85.80 OSTEOPENIA, UNSPECIFIED LOCATION: ICD-10-CM

## 2020-05-26 DIAGNOSIS — Z78.0 POST-MENOPAUSAL: ICD-10-CM

## 2020-05-26 DIAGNOSIS — I10 ESSENTIAL HYPERTENSION: Primary | ICD-10-CM

## 2020-05-26 DIAGNOSIS — K21.9 GASTROESOPHAGEAL REFLUX DISEASE, ESOPHAGITIS PRESENCE NOT SPECIFIED: ICD-10-CM

## 2020-05-26 DIAGNOSIS — I10 ESSENTIAL HYPERTENSION: ICD-10-CM

## 2020-05-26 LAB
ALBUMIN SERPL BCP-MCNC: 3.5 G/DL (ref 3.5–5.2)
ALP SERPL-CCNC: 107 U/L (ref 55–135)
ALT SERPL W/O P-5'-P-CCNC: 7 U/L (ref 10–44)
ANION GAP SERPL CALC-SCNC: 10 MMOL/L (ref 8–16)
AST SERPL-CCNC: 12 U/L (ref 10–40)
BILIRUB SERPL-MCNC: 0.2 MG/DL (ref 0.1–1)
BUN SERPL-MCNC: 16 MG/DL (ref 8–23)
CALCIUM SERPL-MCNC: 9 MG/DL (ref 8.7–10.5)
CHLORIDE SERPL-SCNC: 106 MMOL/L (ref 95–110)
CO2 SERPL-SCNC: 26 MMOL/L (ref 23–29)
CREAT SERPL-MCNC: 1 MG/DL (ref 0.5–1.4)
EST. GFR  (AFRICAN AMERICAN): >60 ML/MIN/1.73 M^2
EST. GFR  (NON AFRICAN AMERICAN): 56 ML/MIN/1.73 M^2
GLUCOSE SERPL-MCNC: 114 MG/DL (ref 70–110)
POTASSIUM SERPL-SCNC: 3.5 MMOL/L (ref 3.5–5.1)
PROT SERPL-MCNC: 7.8 G/DL (ref 6–8.4)
SODIUM SERPL-SCNC: 142 MMOL/L (ref 136–145)

## 2020-05-26 PROCEDURE — 80053 COMPREHEN METABOLIC PANEL: CPT | Mod: HCNC,PO

## 2020-05-26 PROCEDURE — 99499 UNLISTED E&M SERVICE: CPT | Mod: HCNC,S$GLB,, | Performed by: FAMILY MEDICINE

## 2020-05-26 PROCEDURE — 99999 PR PBB SHADOW E&M-EST. PATIENT-LVL IV: CPT | Mod: PBBFAC,HCNC,, | Performed by: FAMILY MEDICINE

## 2020-05-26 PROCEDURE — 3079F DIAST BP 80-89 MM HG: CPT | Mod: HCNC,CPTII,S$GLB, | Performed by: FAMILY MEDICINE

## 2020-05-26 PROCEDURE — 1125F AMNT PAIN NOTED PAIN PRSNT: CPT | Mod: HCNC,S$GLB,, | Performed by: FAMILY MEDICINE

## 2020-05-26 PROCEDURE — 36415 COLL VENOUS BLD VENIPUNCTURE: CPT | Mod: HCNC,PO

## 2020-05-26 PROCEDURE — 99214 OFFICE O/P EST MOD 30 MIN: CPT | Mod: HCNC,S$GLB,, | Performed by: FAMILY MEDICINE

## 2020-05-26 PROCEDURE — 1101F PT FALLS ASSESS-DOCD LE1/YR: CPT | Mod: HCNC,CPTII,S$GLB, | Performed by: FAMILY MEDICINE

## 2020-05-26 PROCEDURE — 1159F PR MEDICATION LIST DOCUMENTED IN MEDICAL RECORD: ICD-10-PCS | Mod: HCNC,S$GLB,, | Performed by: FAMILY MEDICINE

## 2020-05-26 PROCEDURE — 1159F MED LIST DOCD IN RCRD: CPT | Mod: HCNC,S$GLB,, | Performed by: FAMILY MEDICINE

## 2020-05-26 PROCEDURE — 1125F PR PAIN SEVERITY QUANTIFIED, PAIN PRESENT: ICD-10-PCS | Mod: HCNC,S$GLB,, | Performed by: FAMILY MEDICINE

## 2020-05-26 PROCEDURE — 3075F PR MOST RECENT SYSTOLIC BLOOD PRESS GE 130-139MM HG: ICD-10-PCS | Mod: HCNC,CPTII,S$GLB, | Performed by: FAMILY MEDICINE

## 2020-05-26 PROCEDURE — 3075F SYST BP GE 130 - 139MM HG: CPT | Mod: HCNC,CPTII,S$GLB, | Performed by: FAMILY MEDICINE

## 2020-05-26 PROCEDURE — 99499 RISK ADDL DX/OHS AUDIT: ICD-10-PCS | Mod: HCNC,S$GLB,, | Performed by: FAMILY MEDICINE

## 2020-05-26 PROCEDURE — 99999 PR PBB SHADOW E&M-EST. PATIENT-LVL IV: ICD-10-PCS | Mod: PBBFAC,HCNC,, | Performed by: FAMILY MEDICINE

## 2020-05-26 PROCEDURE — 80061 LIPID PANEL: CPT | Mod: HCNC

## 2020-05-26 PROCEDURE — 3079F PR MOST RECENT DIASTOLIC BLOOD PRESSURE 80-89 MM HG: ICD-10-PCS | Mod: HCNC,CPTII,S$GLB, | Performed by: FAMILY MEDICINE

## 2020-05-26 PROCEDURE — 99214 PR OFFICE/OUTPT VISIT, EST, LEVL IV, 30-39 MIN: ICD-10-PCS | Mod: HCNC,S$GLB,, | Performed by: FAMILY MEDICINE

## 2020-05-26 PROCEDURE — 1101F PR PT FALLS ASSESS DOC 0-1 FALLS W/OUT INJ PAST YR: ICD-10-PCS | Mod: HCNC,CPTII,S$GLB, | Performed by: FAMILY MEDICINE

## 2020-05-26 RX ORDER — PANTOPRAZOLE SODIUM 40 MG/1
40 TABLET, DELAYED RELEASE ORAL DAILY
Qty: 30 TABLET | Refills: 4 | Status: SHIPPED | OUTPATIENT
Start: 2020-05-26 | End: 2021-03-29 | Stop reason: SDUPTHER

## 2020-05-26 RX ORDER — AMITRIPTYLINE HYDROCHLORIDE 50 MG/1
50 TABLET, FILM COATED ORAL DAILY
Qty: 30 TABLET | Refills: 4 | Status: SHIPPED | OUTPATIENT
Start: 2020-05-26 | End: 2020-10-27 | Stop reason: SDUPTHER

## 2020-05-26 RX ORDER — ALPRAZOLAM 0.25 MG/1
TABLET ORAL
Qty: 30 TABLET | Refills: 0 | Status: SHIPPED | OUTPATIENT
Start: 2020-05-26 | End: 2020-07-01

## 2020-05-26 NOTE — ASSESSMENT & PLAN NOTE
-patient requesting refill of Xanax. she is currently taking Norco prescribed by pain medicine. Did discuss risks associated with with both opioid and benzodiazepine use.  Discussed risk of increased CNS depression etc.  Patient verbalized understanding and would like refill of xanax

## 2020-05-26 NOTE — PROGRESS NOTES
Patient ID: Tea Ring is a 74 y.o. female.    Chief Complaint: Discuss back procedure    HPI Patient is 74-year-old female who presents to follow-up blood pressure.  Was scheduled for procedure with pain medicine for back pain however procedure was canceled due to elevated blood pressure (156/86).  Was able to get bilateral genicular nerve block which has provided relief of her chronic knee pain.  Was told to return in 2 weeks.  Does not check blood pressures at home.  Currently takes Diovan.  Reports compliance with medication. Is also requesting refill of elavil.        Family History   Problem Relation Age of Onset    Diabetes Mother     Diabetes Father     Breast cancer Maternal Aunt        Current Outpatient Medications:     ALPRAZolam (XANAX) 0.25 MG tablet, TAKE ONE TABLET BY MOUTH NIGHTLY AS NEEDED FOR INSOMNIA OR SLEEP, Disp: 30 tablet, Rfl: 0    amitriptyline (ELAVIL) 50 MG tablet, Take 1 tablet (50 mg total) by mouth once daily., Disp: 30 tablet, Rfl: 4    atorvastatin (LIPITOR) 10 MG tablet, Take 1 tablet (10 mg total) by mouth once daily., Disp: 90 tablet, Rfl: 3    benzocaine-lanolin (DERMOPLAST) 20-0.5 % Aero, Apply topically 4 (four) times daily as needed., Disp: , Rfl:     betamethasone valerate 0.1% (VALISONE) 0.1 % Crea, Apply topically 2 (two) times daily. Apply to affected area., Disp: 45 g, Rfl: 2    blood pressure monitor (BLOOD PRESSURE KIT) Kit, Record daily, Disp: 1 each, Rfl: 0    HYDROcodone-acetaminophen (NORCO) 5-325 mg per tablet, Take 1 tablet by mouth every 12 (twelve) hours as needed for Pain., Disp: 60 tablet, Rfl: 0    pantoprazole (PROTONIX) 40 MG tablet, Take 1 tablet (40 mg total) by mouth once daily., Disp: 30 tablet, Rfl: 4    sertraline (ZOLOFT) 100 MG tablet, , Disp: , Rfl:     valsartan-hydrochlorothiazide (DIOVAN-HCT) 160-12.5 mg per tablet, Take 1 tablet by mouth once daily., Disp: 90 tablet, Rfl: 3    Review of Systems   Constitutional: Negative for  "chills and fever.   HENT: Negative for congestion and sore throat.    Eyes: Negative for visual disturbance.   Respiratory: Negative for cough, shortness of breath and wheezing.    Cardiovascular: Negative for chest pain.   Gastrointestinal: Negative for abdominal pain, diarrhea and nausea.   Endocrine: Negative for polydipsia and polyuria.   Musculoskeletal: Negative for arthralgias.   Skin: Negative for rash.   Neurological: Negative for dizziness and headaches.   Psychiatric/Behavioral: Negative for sleep disturbance. The patient is not nervous/anxious.        Objective:   /80   Resp 16   Ht 5' 2" (1.575 m)   Wt 80.9 kg (178 lb 5.6 oz)   BMI 32.62 kg/m²      Physical Exam   Constitutional: She is oriented to person, place, and time. She appears well-developed and well-nourished.   HENT:   Head: Normocephalic and atraumatic.   Eyes: Conjunctivae are normal.   Neck: Normal range of motion.   Cardiovascular: Normal rate, regular rhythm and normal heart sounds.   Pulmonary/Chest: Effort normal and breath sounds normal.   Abdominal: Soft.   Musculoskeletal: Normal range of motion.   Neurological: She is alert and oriented to person, place, and time.   Skin: Skin is warm.   Psychiatric: She has a normal mood and affect. Her behavior is normal.       Assessment & Plan     Problem List Items Addressed This Visit        Psychiatric    Depression (Chronic)    Relevant Medications    amitriptyline (ELAVIL) 50 MG tablet    Chronic anxiety    Current Assessment & Plan     -patient requesting refill of Xanax. she is currently taking Norco prescribed by pain medicine. Did discuss risks associated with with both opioid and benzodiazepine use.  Discussed risk of increased CNS depression etc.  Patient verbalized understanding and would like refill of xanax         Relevant Medications    ALPRAZolam (XANAX) 0.25 MG tablet       Cardiac/Vascular    Essential hypertension - Primary    Current Assessment & Plan     -patient " says she will purchase home blood pressure monitor.  Reports that blood pressure was elevated at pain medicine clinic because her sister recently  and she was under lot of stress.  Would like her to monitor blood pressures at home over the next week. if blood pressures are elevated will increase BP medication dose  -continue diovan for now         Relevant Orders    Comprehensive metabolic panel (Completed)    Lipid Panel       Orthopedic    Osteopenia    Overview     DEXA 16           Other Visit Diagnoses     Post-menopausal        Relevant Orders    DXA Bone Density Spine And Hip    Gastroesophageal reflux disease, esophagitis presence not specified        Relevant Medications    pantoprazole (PROTONIX) 40 MG tablet            Disclaimer:  This note may have been prepared using voice recognition software, without extensive proofreading. As such, there could be errors within the text such as sound alike errors.

## 2020-05-26 NOTE — ASSESSMENT & PLAN NOTE
-patient says she will purchase home blood pressure monitor.  Reports that blood pressure was elevated at pain medicine clinic because her sister recently  and she was under lot of stress.  Would like her to monitor blood pressures at home over the next week. if blood pressures are elevated will increase BP medication dose  -continue diovan for now

## 2020-05-27 LAB
CHOLEST SERPL-MCNC: 129 MG/DL (ref 120–199)
CHOLEST/HDLC SERPL: 3 {RATIO} (ref 2–5)
HDLC SERPL-MCNC: 43 MG/DL (ref 40–75)
HDLC SERPL: 33.3 % (ref 20–50)
LDLC SERPL CALC-MCNC: 62 MG/DL (ref 63–159)
NONHDLC SERPL-MCNC: 86 MG/DL
TRIGL SERPL-MCNC: 120 MG/DL (ref 30–150)

## 2020-05-28 ENCOUNTER — TELEPHONE (OUTPATIENT)
Dept: INTERNAL MEDICINE | Facility: CLINIC | Age: 75
End: 2020-05-28

## 2020-05-28 NOTE — TELEPHONE ENCOUNTER
----- Message from Dorothy Herrera sent at 5/28/2020 11:23 AM CDT -----  Contact: pt  Type:  Patient Returning Call    Who Called: the pt  Who Left Message for Patient: unknown   Does the patient know what this is regarding?: no  Would the patient rather a call back or a response via "[employer]ner? Call back  Best Call Back Number: [phone]  Additional Information: n/a

## 2020-05-28 NOTE — TELEPHONE ENCOUNTER
----- Message from Yadira Torres MD sent at 5/27/2020  8:08 AM CDT -----  Labs reviewed and are stable. Renal function greatly improved since last check. Continue current management

## 2020-06-01 ENCOUNTER — NURSE TRIAGE (OUTPATIENT)
Dept: ADMINISTRATIVE | Facility: CLINIC | Age: 75
End: 2020-06-01

## 2020-06-01 ENCOUNTER — TELEPHONE (OUTPATIENT)
Dept: PAIN MEDICINE | Facility: CLINIC | Age: 75
End: 2020-06-01

## 2020-06-01 NOTE — TELEPHONE ENCOUNTER
[facility] on [facility] of [facility]ural Symptom Tracker. No answer. Left message to let patient know someone will call back tomorrow.       Reason for Disposition   No answer.  First attempt to contact caller.  Follow-up call scheduled within 15 minutes.   Message left on identified voicemail    Protocols used: NO CONTACT OR DUPLICATE CONTACT CALL-A-[place]

## 2020-06-02 ENCOUNTER — OFFICE VISIT (OUTPATIENT)
Dept: PAIN MEDICINE | Facility: CLINIC | Age: 75
End: 2020-06-02
Payer: MEDICARE

## 2020-06-02 DIAGNOSIS — M47.816 LUMBAR SPONDYLOSIS: Primary | ICD-10-CM

## 2020-06-02 PROCEDURE — 1101F PT FALLS ASSESS-DOCD LE1/YR: CPT | Mod: HCNC,CPTII,95, | Performed by: PAIN MEDICINE

## 2020-06-02 PROCEDURE — 1159F MED LIST DOCD IN RCRD: CPT | Mod: HCNC,95,, | Performed by: PAIN MEDICINE

## 2020-06-02 PROCEDURE — 1101F PR PT FALLS ASSESS DOC 0-1 FALLS W/OUT INJ PAST YR: ICD-10-PCS | Mod: HCNC,CPTII,95, | Performed by: PAIN MEDICINE

## 2020-06-02 PROCEDURE — 1159F PR MEDICATION LIST DOCUMENTED IN MEDICAL RECORD: ICD-10-PCS | Mod: HCNC,95,, | Performed by: PAIN MEDICINE

## 2020-06-02 PROCEDURE — 99441 PR PHYSICIAN TELEPHONE EVALUATION 5-10 MIN: CPT | Mod: HCNC,95,, | Performed by: PAIN MEDICINE

## 2020-06-02 PROCEDURE — 99441 PR PHYSICIAN TELEPHONE EVALUATION 5-10 MIN: ICD-10-PCS | Mod: HCNC,95,, | Performed by: PAIN MEDICINE

## 2020-06-02 NOTE — TELEPHONE ENCOUNTER
Pt contacted through the Post Procedural Symptom Tracker. No answer. No additional contact today as per post procedure protocol.  Reason for Disposition   Second attempt to contact family AND no contact made. Phone number verified.    Additional Information   Negative: Message left on unidentified voice mail. Phone number verified.   Negative: Message left with person in household   Negative: Wrong number reached. Phone number verified.    Protocols used: NO CONTACT OR DUPLICATE CONTACT CALL-A-[place]

## 2020-06-02 NOTE — PROGRESS NOTES
Established Patient - Audio Only Telehealth Visit     The patient location is: at home  The chief complaint leading to consultation is: low back pain  Visit type: Virtual visit with audio only (telephone)  Total time spent with patient: 3 minutes       The reason for the audio only service rather than synchronous audio and video virtual visit was related to technical difficulties or patient preference/necessity.     Each patient to whom I provide medical services by telemedicine is:  (1) informed of the relationship between the physician and patient and the respective role of any other health care provider with respect to management of the patient; and (2) notified that they may decline to receive medical services by telemedicine and may withdraw from such care at any time. Patient verbally consented to receive this service via voice-only telephone call.       SUBJECTIVE:   Tea Ring presents tele-medicine appointment for a follow-up appointment for low back pain. Since the last visit, Tea Ring states the pain has been worsening in her lumbar spine.  She underwent bilateral genicular nerve blocks which she reports has been tremendous and helped her significantly with her pain however the low back has become her main concern.  The pain in her low back is worse with standing and with activities and is mildly improved with rest.  She does have a prescription for hydrocodone which he takes sparingly in addition to Elavil which provides minimal pain relief.  She denies having any symptoms that radiate into her lower extremities.  Pain Medications:   - Opioids:  Hydrocodone  - NSAIDs:  None   - Anti-Depressants:  Elavil   - Anti-Convulsants:  None   - Others:  None   Physical Therapy/Home Exercise: no    report: Not applicable   Pain Procedures:  Bilateral genicular nerve blocks  Imaging:  None   Past Medical History:   Diagnosis Date    Alpha thalassemia     Asthma     resolved per patient    Cataract      Chronic anxiety     years tid xanax 1mg    Chronic knee pain     Chronic pain of left ankle     Clotting disorder     Cutaneous lupus erythematosus     dr henry derm    Depression     Depression     dr radha hughes previously    Ex-smoker     quit fall 1    Hypertension     Osteopenia  rec      Past Surgical History:   Procedure Laterality Date    ANKLE SURGERY Left     BACK SURGERY      COLONOSCOPY N/A 2017    Procedure: COLONOSCOPY;  Surgeon: Virgil Oliva MD;  Location: [facility];  Service: Endoscopy;  Laterality: N/A;    HYSTERECTOMY      INJECTION OF ANESTHETIC AGENT AROUND NERVE Bilateral 2020    Procedure: Bilateral Genicular nerve block with RN IV sedation Covid testing day of procedure PT does not drive;  Surgeon: Yury Moore MD;  Location: [facility];  Service: Pain Management;  Laterality: Bilateral;    KNEE ARTHROSCOPY      both knees twice    OOPHORECTOMY        Social History     Socioeconomic History    Marital status:      Spouse name:     Number of children: 3    Years of education: Not on file    Highest education level: Not on file   Occupational History    Not on file   Social Needs    Financial resource strain: Not on file    Food insecurity:     Worry: Not on file     Inability: Not on file    Transportation needs:     Medical: Not on file     Non-medical: Not on file   Tobacco Use    Smoking status: Former Smoker     Packs/day: 1.00     Years: 30.00     Pack years: 30.00     Types: Cigarettes     Last attempt to quit: 2014     Years since quittin.7    Smokeless tobacco: Never Used    Tobacco comment: smoke last cigarette 9/15   Substance and Sexual Activity    Alcohol use: Yes     Alcohol/week: 1.0 standard drinks     Types: 1 Glasses of wine per week    Drug use: No    Sexual activity: Not Currently     Partners: Male   Lifestyle    Physical activity:     Days per week: Not on file     Minutes per session:  Not on file    Stress: Not at all   Relationships    Social connections:     Talks on phone: Not on file     Gets together: Not on file     Attends Scientologist service: Not on file     Active member of club or organization: Not on file     Attends meetings of clubs or organizations: Not on file     Relationship status: Not on file   Other Topics Concern    Not on file   Social History Narrative    Not on file      Family History   Problem Relation Age of Onset    Diabetes Mother     Diabetes Father     Breast cancer Maternal Aunt       Review of patient's allergies indicates:   Allergen Reactions    Morphine Shortness Of Breath    Duloxetine      Feels bad      Current Outpatient Medications   Medication Sig    ALPRAZolam (XANAX) 0.25 MG tablet TAKE ONE TABLET BY MOUTH NIGHTLY AS NEEDED FOR INSOMNIA OR SLEEP    amitriptyline (ELAVIL) 50 MG tablet Take 1 tablet (50 mg total) by mouth once daily.    atorvastatin (LIPITOR) 10 MG tablet Take 1 tablet (10 mg total) by mouth once daily.    benzocaine-lanolin (DERMOPLAST) 20-0.5 % Aero Apply topically 4 (four) times daily as needed.    betamethasone valerate 0.1% (VALISONE) 0.1 % Crea Apply topically 2 (two) times daily. Apply to affected area.    blood pressure monitor (BLOOD PRESSURE KIT) Kit Record daily    HYDROcodone-acetaminophen (NORCO) 5-325 mg per tablet Take 1 tablet by mouth every 12 (twelve) hours as needed for Pain.    pantoprazole (PROTONIX) 40 MG tablet Take 1 tablet (40 mg total) by mouth once daily.    sertraline (ZOLOFT) 100 MG tablet     valsartan-hydrochlorothiazide (DIOVAN-HCT) 160-12.5 mg per tablet Take 1 tablet by mouth once daily.     No current facility-administered medications for this visit.       REVIEW OF SYSTEMS:   GENERAL: No weight loss, malaise or fevers.   HEENT: No recent changes in vision or hearing   NECK: Negative for lumps, no difficulty with swallowing.   RESPIRATORY: Negative for cough, wheezing or shortness of  breath, patient denies any recent URI.   CARDIOVASCULAR: Negative for chest pain, leg swelling or palpitations.   GI: Negative for abdominal discomfort, blood in stools or black stools or change in bowel habits.   MUSCULOSKELETAL: See HPI.   SKIN: Negative for lesions, rash, and itching.   PSYCH: No mood disorder or recent psychosocial stressors. Patients sleep is not disturbed secondary to pain.   HEMATOLOGY/LYMPHOLOGY: Negative for prolonged bleeding, bruising easily or swollen nodes. Patient is not currently taking any anti-coagulants   NEURO: No history of headaches, syncope, paralysis, seizures or tremors.   All other reviewed and negative other than HPI.   OBJECTIVE:   Respiratory:  Nonlabored breathing  Psych:  Normal affect    ASSESSMENT: 74 y.o. year old female with lumbar spine pain, consistent with   Lumbar spondylosis  PLAN:   -will schedule for bilateral lumbar medial branch blocks targeting the L4/5 and L5/S1 facet joints  -continue all medications as prescribed  -follow up in clinic after the procedure  The above plan and management options were discussed at length with patient. Patient is in agreement with the above and verbalized understanding. It will be communicated with the referring physician via electronic record, fax, or mail.   Yury Moore   06/02/2020                           This service was not originating from a related E/M service provided within the previous 7 days nor will  to an E/M service or procedure within the next 24 hours or my soonest available appointment.  Prevailing standard of care was able to be met in this audio-only visit.

## 2020-06-02 NOTE — PATIENT INSTRUCTIONS
-will schedule for bilateral lumbar medial branch blocks  -continue all medications as prescribed  -follow up in clinic after the injections

## 2020-06-02 NOTE — H&P (VIEW-ONLY)
Established Patient - Audio Only Telehealth Visit     The patient location is: at home  The chief complaint leading to consultation is: low back pain  Visit type: Virtual visit with audio only (telephone)  Total time spent with patient: 3 minutes       The reason for the audio only service rather than synchronous audio and video virtual visit was related to technical difficulties or patient preference/necessity.     Each patient to whom I provide medical services by telemedicine is:  (1) informed of the relationship between the physician and patient and the respective role of any other health care provider with respect to management of the patient; and (2) notified that they may decline to receive medical services by telemedicine and may withdraw from such care at any time. Patient verbally consented to receive this service via voice-only telephone call.       SUBJECTIVE:   Tea Ring presents tele-medicine appointment for a follow-up appointment for low back pain. Since the last visit, Tea Ring states the pain has been worsening in her lumbar spine.  She underwent bilateral genicular nerve blocks which she reports has been tremendous and helped her significantly with her pain however the low back has become her main concern.  The pain in her low back is worse with standing and with activities and is mildly improved with rest.  She does have a prescription for hydrocodone which he takes sparingly in addition to Elavil which provides minimal pain relief.  She denies having any symptoms that radiate into her lower extremities.  Pain Medications:   - Opioids:  Hydrocodone  - NSAIDs:  None   - Anti-Depressants:  Elavil   - Anti-Convulsants:  None   - Others:  None   Physical Therapy/Home Exercise: no    report: Not applicable   Pain Procedures:  Bilateral genicular nerve blocks  Imaging:  None   Past Medical History:   Diagnosis Date    Alpha thalassemia     Asthma     resolved per patient    Cataract      Chronic anxiety     years tid xanax 1mg    Chronic knee pain     Chronic pain of left ankle     Clotting disorder     Cutaneous lupus erythematosus     dr henry derm    Depression     Depression     dr radha hughes previously    Ex-smoker     quit fall 1    Hypertension     Osteopenia  rec      Past Surgical History:   Procedure Laterality Date    ANKLE SURGERY Left     BACK SURGERY      COLONOSCOPY N/A 2017    Procedure: COLONOSCOPY;  Surgeon: Virgil Oliva MD;  Location: [facility];  Service: Endoscopy;  Laterality: N/A;    HYSTERECTOMY      INJECTION OF ANESTHETIC AGENT AROUND NERVE Bilateral 2020    Procedure: Bilateral Genicular nerve block with RN IV sedation Covid testing day of procedure PT does not drive;  Surgeon: Yury Moore MD;  Location: [facility];  Service: Pain Management;  Laterality: Bilateral;    KNEE ARTHROSCOPY      both knees twice    OOPHORECTOMY        Social History     Socioeconomic History    Marital status:      Spouse name:     Number of children: 3    Years of education: Not on file    Highest education level: Not on file   Occupational History    Not on file   Social Needs    Financial resource strain: Not on file    Food insecurity:     Worry: Not on file     Inability: Not on file    Transportation needs:     Medical: Not on file     Non-medical: Not on file   Tobacco Use    Smoking status: Former Smoker     Packs/day: 1.00     Years: 30.00     Pack years: 30.00     Types: Cigarettes     Last attempt to quit: 2014     Years since quittin.7    Smokeless tobacco: Never Used    Tobacco comment: smoke last cigarette 9/15   Substance and Sexual Activity    Alcohol use: Yes     Alcohol/week: 1.0 standard drinks     Types: 1 Glasses of wine per week    Drug use: No    Sexual activity: Not Currently     Partners: Male   Lifestyle    Physical activity:     Days per week: Not on file     Minutes per session:  Not on file    Stress: Not at all   Relationships    Social connections:     Talks on phone: Not on file     Gets together: Not on file     Attends [employer] service: Not on file     Active member of club or organization: Not on file     Attends meetings of clubs or organizations: Not on file     Relationship status: Not on file   Other Topics Concern    Not on file   Social History Narrative    Not on file      Family History   Problem Relation Age of Onset    Diabetes Mother     Diabetes Father     Breast cancer Maternal Aunt       Review of patient's allergies indicates:   Allergen Reactions    Morphine Shortness Of Breath    Duloxetine      Feels bad      Current Outpatient Medications   Medication Sig    ALPRAZolam (XANAX) 0.25 MG tablet TAKE ONE TABLET BY MOUTH NIGHTLY AS NEEDED FOR INSOMNIA OR SLEEP    amitriptyline (ELAVIL) 50 MG tablet Take 1 tablet (50 mg total) by mouth once daily.    atorvastatin (LIPITOR) 10 MG tablet Take 1 tablet (10 mg total) by mouth once daily.    benzocaine-lanolin (DERMOPLAST) 20-0.5 % Aero Apply topically 4 (four) times daily as needed.    betamethasone valerate 0.1% (VALISONE) 0.1 % Crea Apply topically 2 (two) times daily. Apply to affected area.    blood pressure monitor (BLOOD PRESSURE KIT) Kit Record daily    HYDROcodone-acetaminophen (NORCO) 5-325 mg per tablet Take 1 tablet by mouth every 12 (twelve) hours as needed for Pain.    pantoprazole (PROTONIX) 40 MG tablet Take 1 tablet (40 mg total) by mouth once daily.    sertraline (ZOLOFT) 100 MG tablet     valsartan-hydrochlorothiazide (DIOVAN-HCT) 160-12.5 mg per tablet Take 1 tablet by mouth once daily.     No current facility-administered medications for this visit.       REVIEW OF SYSTEMS:   GENERAL: No weight loss, malaise or fevers.   HEENT: No recent changes in vision or hearing   NECK: Negative for lumps, no difficulty with swallowing.   RESPIRATORY: Negative for cough, wheezing or shortness of  breath, patient denies any recent URI.   CARDIOVASCULAR: Negative for chest pain, leg swelling or palpitations.   GI: Negative for abdominal discomfort, blood in stools or black stools or change in bowel habits.   MUSCULOSKELETAL: See HPI.   SKIN: Negative for lesions, rash, and itching.   PSYCH: No mood disorder or recent psychosocial stressors. Patients sleep is not disturbed secondary to pain.   HEMATOLOGY/LYMPHOLOGY: Negative for prolonged bleeding, bruising easily or swollen nodes. Patient is not currently taking any anti-coagulants   NEURO: No history of headaches, syncope, paralysis, seizures or tremors.   All other reviewed and negative other than HPI.   OBJECTIVE:   Respiratory:  Nonlabored breathing  Psych:  Normal affect    ASSESSMENT: 74 y.o. year old female with lumbar spine pain, consistent with   Lumbar spondylosis  PLAN:   -will schedule for bilateral lumbar medial branch blocks targeting the L4/5 and L5/S1 facet joints  -continue all medications as prescribed  -follow up in clinic after the procedure  The above plan and management options were discussed at length with patient. Patient is in agreement with the above and verbalized understanding. It will be communicated with the referring physician via electronic record, fax, or mail.   Yury Moore   06/02/2020                           This service was not originating from a related E/M service provided within the previous 7 days nor will  to an E/M service or procedure within the next 24 hours or my soonest available appointment.  Prevailing standard of care was able to be met in this audio-only visit.

## 2020-06-04 RX ORDER — ATORVASTATIN CALCIUM 10 MG/1
10 TABLET, FILM COATED ORAL DAILY
Qty: 90 TABLET | Refills: 3 | Status: SHIPPED | OUTPATIENT
Start: 2020-06-04 | End: 2021-03-29 | Stop reason: SDUPTHER

## 2020-06-04 RX ORDER — VALSARTAN AND HYDROCHLOROTHIAZIDE 160; 12.5 MG/1; MG/1
1 TABLET, FILM COATED ORAL DAILY
Qty: 90 TABLET | Refills: 3 | Status: SHIPPED | OUTPATIENT
Start: 2020-06-04 | End: 2021-01-11

## 2020-06-05 ENCOUNTER — TELEPHONE (OUTPATIENT)
Dept: PAIN MEDICINE | Facility: CLINIC | Age: 75
End: 2020-06-05

## 2020-06-05 DIAGNOSIS — Z03.818 ENCOUNTER FOR OBSERVATION FOR SUSPECTED EXPOSURE TO OTHER BIOLOGICAL AGENTS RULED OUT: Primary | ICD-10-CM

## 2020-06-11 NOTE — PRE-PROCEDURE INSTRUCTIONS
Spoke with patient regarding procedure scheduled on 6/17/2020  Arrival time 0640  Has patient been sick with fever or on antibiotics within the last 7 days? no  Has patient received a vaccination within the last 7 days? no  Has the patient stopped all medications as directed? yes  Does patient have a pacemaker and or defibrillator? no  Does the patient have a ride to and from procedure and someone reliable to remain with patient? Yes Maya  Is the patient diabetic? no  Does the patient have sleep apnea? Or use O2 at home? no  Is the patient receiving sedation? yes  Is the patient instructed to remain NPO beginning at midnight the night before their procedure? yes  Procedure location confirmed with patient? yes  Covid testing: rapid covid due to no transportation

## 2020-06-17 ENCOUNTER — HOSPITAL ENCOUNTER (OUTPATIENT)
Facility: HOSPITAL | Age: 75
Discharge: HOME | End: 2020-06-17
Attending: PAIN MEDICINE | Admitting: PAIN MEDICINE
Payer: MEDICARE

## 2020-06-17 VITALS
RESPIRATION RATE: 16 BRPM | HEIGHT: 62 IN | TEMPERATURE: 98 F | DIASTOLIC BLOOD PRESSURE: 77 MMHG | BODY MASS INDEX: 31.28 KG/M2 | HEART RATE: 74 BPM | WEIGHT: 170 LBS | OXYGEN SATURATION: 100 % | SYSTOLIC BLOOD PRESSURE: 164 MMHG

## 2020-06-17 DIAGNOSIS — M47.816 LUMBAR SPONDYLOSIS: Primary | ICD-10-CM

## 2020-06-17 LAB — SARS-COV-2 RDRP RESP QL NAA+PROBE: NEGATIVE

## 2020-06-17 PROCEDURE — 64494 INJ PARAVERT F JNT L/S 2 LEV: CPT | Mod: 50,HCNC | Performed by: PAIN MEDICINE

## 2020-06-17 PROCEDURE — U0002 COVID-19 LAB TEST NON-CDC: HCPCS | Mod: HCNC

## 2020-06-17 PROCEDURE — 25000003 PHARM REV CODE 250: Mod: HCNC | Performed by: PAIN MEDICINE

## 2020-06-17 PROCEDURE — 64494 PR INJ DX/THER AGNT PARAVERT FACET JOINT,IMG GUIDE,LUMBAR/SAC, 2ND LEVEL: ICD-10-PCS | Mod: HCNC,RT,, | Performed by: PAIN MEDICINE

## 2020-06-17 PROCEDURE — 64494 INJ PARAVERT F JNT L/S 2 LEV: CPT | Mod: HCNC,LT,, | Performed by: PAIN MEDICINE

## 2020-06-17 PROCEDURE — 99152 MOD SED SAME PHYS/QHP 5/>YRS: CPT | Mod: HCNC | Performed by: PAIN MEDICINE

## 2020-06-17 PROCEDURE — 64493 INJ PARAVERT F JNT L/S 1 LEV: CPT | Mod: 50,HCNC | Performed by: PAIN MEDICINE

## 2020-06-17 PROCEDURE — 64493 PR INJ DX/THER AGNT PARAVERT FACET JOINT,IMG GUIDE,LUMBAR/SAC,1ST LVL: ICD-10-PCS | Mod: 50,HCNC,, | Performed by: PAIN MEDICINE

## 2020-06-17 PROCEDURE — 63600175 PHARM REV CODE 636 W HCPCS: Mod: HCNC | Performed by: PAIN MEDICINE

## 2020-06-17 PROCEDURE — 64495 INJ PARAVERT F JNT L/S 3 LEV: CPT | Mod: 50,HCNC | Performed by: PAIN MEDICINE

## 2020-06-17 PROCEDURE — 64493 INJ PARAVERT F JNT L/S 1 LEV: CPT | Mod: 50,HCNC,, | Performed by: PAIN MEDICINE

## 2020-06-17 RX ORDER — FENTANYL CITRATE 50 UG/ML
INJECTION, SOLUTION INTRAMUSCULAR; INTRAVENOUS
Status: DISCONTINUED | OUTPATIENT
Start: 2020-06-17 | End: 2020-06-17 | Stop reason: HOSPADM

## 2020-06-17 RX ORDER — MIDAZOLAM HYDROCHLORIDE 1 MG/ML
INJECTION, SOLUTION INTRAMUSCULAR; INTRAVENOUS
Status: DISCONTINUED | OUTPATIENT
Start: 2020-06-17 | End: 2020-06-17 | Stop reason: HOSPADM

## 2020-06-17 RX ORDER — OXYCODONE AND ACETAMINOPHEN 5; 325 MG/1; MG/1
1 TABLET ORAL EVERY 12 HOURS PRN
Status: ON HOLD | COMMUNITY
End: 2020-06-17 | Stop reason: SDUPTHER

## 2020-06-17 RX ORDER — NAPROXEN SODIUM 220 MG
220 TABLET ORAL 2 TIMES DAILY WITH MEALS
COMMUNITY
End: 2021-06-03

## 2020-06-17 RX ORDER — LIDOCAINE HYDROCHLORIDE 20 MG/ML
INJECTION, SOLUTION EPIDURAL; INFILTRATION; INTRACAUDAL; PERINEURAL
Status: DISCONTINUED | OUTPATIENT
Start: 2020-06-17 | End: 2020-06-17 | Stop reason: HOSPADM

## 2020-06-17 RX ORDER — OXYCODONE AND ACETAMINOPHEN 5; 325 MG/1; MG/1
1 TABLET ORAL EVERY 12 HOURS PRN
Qty: 20 TABLET | Refills: 0 | Status: SHIPPED | OUTPATIENT
Start: 2020-06-17 | End: 2020-07-02

## 2020-06-17 NOTE — OP NOTE
Procedure: Lumbar Medial Branch Block under Fluoroscopic Guidance    Side: bilateral     Level:  Sacral ala (Corresponding to the L5 dorsal ramus), L5 transverse process (Corresponding to the L4 medial branch) and L4 transverse process (Corresponding to the L3 medial branch)     PROCEDURE DATE: 6/17/2020    Pre-operative Diagnosis: Lumbar Spondylosis  Post-operative Diagnosis: Lumbar Spondylosis    Provider: LEXI Moore MD  Assistant(s): none    Anesthesia: Local, IV Sedation    >> 1 mg of VERSED    >> 50 mcg of FENTANYL     Indication: Low back pain without radiculopathy. Symptoms unresponsive to conservative treatments. Fluoroscopy was used to optimize visualization of needle placement and to maximize safety.     Procedure Description / Technique:  The patient was seen and identified in the preoperative area. Risks, benefits, complications, and alternatives were discussed with the patient. The patient agreed to proceed with the procedure and signed the consent. The site and side of the procedure was identified and marked. An iv was started.     The patient was taken to the procedural suite and positioned in prone orientation on the procedure table. A pillow was placed under the abdomen to reduce lumbar lordosis. A time out was performed. The procedure, site, side, and allergies were stated and agreed to by all present. The lumbosacral area was widely prepped with ChloraPrep. The procedural site was draped in usual sterile fashion. Vital signs were closely monitored throughout this procedure. Conscious sedation was used for this procedure to decrease patient anxiety.    The Left sacral ala and superior articular process was identified and marked on AP fluoroscopic imaging. Subcutaneous tissues were localized using 1% PF Lidocaine to improve patient comfort. A 25 gauge 3.5 inch spinal needle was advance until the needle rested on OS at the interface of the sacral ala and the base of the sacral superior articular  process. After negative aspiration, 1ml of 2% lidocaine was injected. No pain or paresthesia was noted on injection. After bilateral injection, the fluoroscope was rotated into the oblique view and the spinal needle was advanced towards the eye of the tomasa dog until os was contacted. 1ml of 2% lidocaine was injected after negative aspiration. No pain or paresthesia was noted. This technique was repeated at each of the above noted levels. The spinal needle was removed intact following injection at each targeted site. The stylet was replaced prior to needle removal at each site.    Description of Findings: Not applicable    Prosthetic devices, grafts, tissues, or devices implanted: None    Specimen Removed: No    ESTIMATED BLOOD LOSS: minimal    COMPLICATIONS: None    DISPOSITION / PLANS: The patient was transferred to the recovery area in a stable condition for observation. The patient was reexamined prior to discharge. There was no evidence of acute neurologic injury following the procedure.  Patient was discharged from the recovery room after meeting discharge criteria. Home discharge instructions were given to the patient by the staff.

## 2020-06-17 NOTE — DISCHARGE INSTRUCTIONS

## 2020-06-17 NOTE — PROGRESS NOTES
Placed 22G iv in right AC using ultrasound guidance.  Patient was a difficult IV placement and had previously had 4 attempts.    LEXI Moore MD  Interventional Pain Medicine  [facility] [place]

## 2020-06-18 ENCOUNTER — TELEPHONE (OUTPATIENT)
Dept: PAIN MEDICINE | Facility: CLINIC | Age: 75
End: 2020-06-18

## 2020-06-18 DIAGNOSIS — M47.816 LUMBAR SPONDYLOSIS: Primary | ICD-10-CM

## 2020-06-18 NOTE — TELEPHONE ENCOUNTER
Patient Name:Tea Ring MRN:[id number]       underwent the following procedure: Lumbar Medial Branch Block  with LEXI Moore MD on: 06/17/2020 and  received 80% RELIEF.

## 2020-06-18 NOTE — TELEPHONE ENCOUNTER
----- Message from Yury Moore MD sent at 6/18/2020  2:44 PM CDT -----  RFA ordered!  ----- Message -----  From: Leslee Chapman MA  Sent: 6/18/2020   1:16 PM CDT  To: Yury Moore MD    Patient Name:Tea Ring MRN:[id number]       underwent the following procedure: Lumbar Medial Branch Block  with LEXI Moore MD on: 06/17/2020 and  received 80% RELIEF.

## 2020-06-18 NOTE — DISCHARGE SUMMARY
[facility]  Short Stay  Discharge Summary    Admit Date: 6/17/2020    Discharge Date and Time: 6/17/2020 10:03 AM      Discharge Attending Physician: LEXI Moore MD     Hospital Course (synopsis of major diagnoses, care, treatment, and services provided during the course of the hospital stay): Patient was admitted to Pre-op where informed consent was signed.  The patient was then taken to the procedure suite where the procedure was performed.  The patient was then return to the Pre-Op area and discharge was performed.     Final Diagnoses:    Principal Problem: <principal problem not specified>   Secondary Diagnoses:   Active Hospital Problems    Diagnosis  POA    Lumbar spondylosis [M47.816]  Yes      Resolved Hospital Problems   No resolved problems to display.       Discharged Condition: good    Disposition: Home or Self Care    Follow up/Patient Instructions:    Medications:  Reconciled Home Medications:      Medication List      CONTINUE taking these medications    ALPRAZolam 0.25 MG tablet  Commonly known as: XANAX  TAKE ONE TABLET BY MOUTH NIGHTLY AS NEEDED FOR INSOMNIA OR SLEEP     amitriptyline 50 MG tablet  Commonly known as: ELAVIL  Take 1 tablet (50 mg total) by mouth once daily.     atorvastatin 10 MG tablet  Commonly known as: LIPITOR  Take 1 tablet (10 mg total) by mouth once daily.     benzocaine-lanolin 20-0.5 % Aero  Commonly known as: DERMOPLAST  Apply topically 4 (four) times daily as needed.     betamethasone valerate 0.1% 0.1 % Crea  Commonly known as: VALISONE  Apply topically 2 (two) times daily. Apply to affected area.     blood pressure monitor Kit  Commonly known as: BLOOD PRESSURE KIT  Record daily     naproxen sodium 220 MG tablet  Commonly known as: ANAPROX  Take 220 mg by mouth 2 (two) times daily with meals.     pantoprazole 40 MG tablet  Commonly known as: PROTONIX  Take 1 tablet (40 mg total) by mouth once daily.     valsartan-hydrochlorothiazide 160-12.5 mg per  tablet  Commonly known as: DIOVAN-HCT  Take 1 tablet by mouth once daily.        ASK your doctor about these medications    oxyCODONE-acetaminophen 5-325 mg per tablet  Commonly known as: PERCOCET  Take 1 tablet by mouth every 12 (twelve) hours as needed for Pain.  Ask about: Which instructions should I use?     sertraline 100 MG tablet  Commonly known as: ZOLOFT          Discharge Procedure Orders   Diet general     Call MD for:  severe uncontrolled pain     Call MD for:  difficulty breathing, headache or visual disturbances     Call MD for:  redness, tenderness, or signs of infection (pain, swelling, redness, odor or green/yellow discharge around incision site)     Activity as tolerated

## 2020-06-24 ENCOUNTER — TELEPHONE (OUTPATIENT)
Dept: PAIN MEDICINE | Facility: CLINIC | Age: 75
End: 2020-06-24

## 2020-06-24 NOTE — TELEPHONE ENCOUNTER
----- Message from Kate Hopper sent at 6/24/2020 12:22 PM CDT -----  Contact: Tea  Tea miss a call about getting her injection, Please call her at [phone].    Thanks  td

## 2020-06-29 ENCOUNTER — TELEPHONE (OUTPATIENT)
Dept: PAIN MEDICINE | Facility: CLINIC | Age: 75
End: 2020-06-29

## 2020-06-29 DIAGNOSIS — Z03.818 ENCOUNTER FOR OBSERVATION FOR SUSPECTED EXPOSURE TO OTHER BIOLOGICAL AGENTS RULED OUT: Primary | ICD-10-CM

## 2020-06-29 NOTE — TELEPHONE ENCOUNTER
Contacted pt as she got in touch with financial dept to see what her co pays are . Appt for procedure scheduled July 14 and July 28 with . Went over instructions with pt and pt verbalized understanding.Instructions also mailed to pt.  All questions answered.

## 2020-06-29 NOTE — TELEPHONE ENCOUNTER
----- Message from Juli De Jesus sent at 6/29/2020  8:29 AM CDT -----  Regarding: needs appt  Contact: pt  Patient  states she needs the nurse for appt. Denis call back [phone]

## 2020-06-30 ENCOUNTER — NURSE TRIAGE (OUTPATIENT)
Dept: ADMINISTRATIVE | Facility: CLINIC | Age: 75
End: 2020-06-30

## 2020-06-30 NOTE — TELEPHONE ENCOUNTER
Attempted to contact the pt x1 at [phone] regarding day 13 of [facility].  Unable to reach patient.  Someone will reach out again tomorrow.        Reason for Disposition   No answer.  First attempt to contact caller.  Follow-up call scheduled within 15 minutes.    Protocols used: NO CONTACT OR DUPLICATE CONTACT CALL-A-[place]

## 2020-07-02 ENCOUNTER — DOCUMENTATION ONLY (OUTPATIENT)
Dept: PAIN MEDICINE | Facility: CLINIC | Age: 75
End: 2020-07-02

## 2020-07-03 NOTE — PROGRESS NOTES
Ms. Ring has requested a refill on Percoect 5/325mg tablets.  I will refill this with total number of 20 tablets to be taken twice daily as needed.  She is due to undergo lumbar RFA in approximately two weeks.        LEXI Moore MD  Interventional Pain Medicine  [facility] [place]

## 2020-07-07 NOTE — PRE-PROCEDURE INSTRUCTIONS
Spoke with patient regarding procedure scheduled on 7/14/2020  Arrival time 0700  Has patient been sick with fever or on antibiotics within the last 7 days? no  Has patient received a vaccination within the last 7 days? no  Has the patient stopped all medications as directed? Yes naproxen, nsaids, vits. And supplements last dose 7/7  Does patient have a pacemaker and or defibrillator? no  Does the patient have a ride to and from procedure and someone reliable to remain with patient? Yes nephew Maya  Is the patient diabetic? no  Does the patient have sleep apnea? Or use O2 at home? No no  Is the patient receiving sedation? yes  Is the patient instructed to remain NPO beginning at midnight the night before their procedure? yes  Procedure location confirmed with patient? yes  Covid testing: rapid dt transportation

## 2020-07-13 ENCOUNTER — TELEPHONE (OUTPATIENT)
Dept: PAIN MEDICINE | Facility: CLINIC | Age: 75
End: 2020-07-13

## 2020-07-13 NOTE — TELEPHONE ENCOUNTER
----- Message from Celia Zacarias sent at 7/13/2020 11:37 AM CDT -----  Regarding: return call  .Type:  Patient Returning Call    Who Called: pt   Who Left Message for Patient:   Does the patient know what this is regarding?: procedure  Would the patient rather a call back or a response via [email or web address]? Call back   Best Call Back Number: [phone] (home)     Additional Information:

## 2020-07-14 ENCOUNTER — HOSPITAL ENCOUNTER (OUTPATIENT)
Facility: HOSPITAL | Age: 75
Discharge: HOME | End: 2020-07-14
Attending: PAIN MEDICINE | Admitting: PAIN MEDICINE
Payer: MEDICARE

## 2020-07-14 VITALS
DIASTOLIC BLOOD PRESSURE: 94 MMHG | RESPIRATION RATE: 20 BRPM | SYSTOLIC BLOOD PRESSURE: 149 MMHG | TEMPERATURE: 98 F | BODY MASS INDEX: 35.49 KG/M2 | HEART RATE: 79 BPM | HEIGHT: 60 IN | OXYGEN SATURATION: 96 % | WEIGHT: 180.75 LBS

## 2020-07-14 DIAGNOSIS — M47.816 LUMBAR SPONDYLOSIS: Primary | ICD-10-CM

## 2020-07-14 LAB — SARS-COV-2 RDRP RESP QL NAA+PROBE: NEGATIVE

## 2020-07-14 PROCEDURE — 64636 PR DESTROY L/S FACET JNT ADDL: ICD-10-PCS | Mod: HCNC,RT,, | Performed by: PAIN MEDICINE

## 2020-07-14 PROCEDURE — 64636 DESTROY L/S FACET JNT ADDL: CPT | Mod: HCNC,RT,, | Performed by: PAIN MEDICINE

## 2020-07-14 PROCEDURE — 25000003 PHARM REV CODE 250: Mod: HCNC | Performed by: PAIN MEDICINE

## 2020-07-14 PROCEDURE — 63600175 PHARM REV CODE 636 W HCPCS: Mod: HCNC | Performed by: PAIN MEDICINE

## 2020-07-14 PROCEDURE — 64635 PR DESTROY LUMB/SAC FACET JNT: ICD-10-PCS | Mod: HCNC,RT,, | Performed by: PAIN MEDICINE

## 2020-07-14 PROCEDURE — 64636 DESTROY L/S FACET JNT ADDL: CPT | Mod: HCNC | Performed by: PAIN MEDICINE

## 2020-07-14 PROCEDURE — 64635 DESTROY LUMB/SAC FACET JNT: CPT | Mod: HCNC | Performed by: PAIN MEDICINE

## 2020-07-14 PROCEDURE — 64635 DESTROY LUMB/SAC FACET JNT: CPT | Mod: HCNC,RT,, | Performed by: PAIN MEDICINE

## 2020-07-14 PROCEDURE — 99152 MOD SED SAME PHYS/QHP 5/>YRS: CPT | Mod: HCNC | Performed by: PAIN MEDICINE

## 2020-07-14 PROCEDURE — S0020 INJECTION, BUPIVICAINE HYDRO: HCPCS | Mod: HCNC | Performed by: PAIN MEDICINE

## 2020-07-14 PROCEDURE — U0002 COVID-19 LAB TEST NON-CDC: HCPCS | Mod: HCNC

## 2020-07-14 RX ORDER — BUPIVACAINE HYDROCHLORIDE 5 MG/ML
INJECTION, SOLUTION EPIDURAL; INTRACAUDAL
Status: DISCONTINUED | OUTPATIENT
Start: 2020-07-14 | End: 2020-07-14 | Stop reason: HOSPADM

## 2020-07-14 RX ORDER — MIDAZOLAM HYDROCHLORIDE 1 MG/ML
INJECTION, SOLUTION INTRAMUSCULAR; INTRAVENOUS
Status: DISCONTINUED | OUTPATIENT
Start: 2020-07-14 | End: 2020-07-14 | Stop reason: HOSPADM

## 2020-07-14 RX ORDER — OXYCODONE AND ACETAMINOPHEN 5; 325 MG/1; MG/1
TABLET ORAL
Qty: 20 TABLET | Refills: 0 | Status: ON HOLD | OUTPATIENT
Start: 2020-07-14 | End: 2020-08-06 | Stop reason: SDUPTHER

## 2020-07-14 RX ORDER — BACLOFEN 10 MG/1
5 TABLET ORAL 3 TIMES DAILY PRN
Qty: 30 TABLET | Refills: 3 | Status: SHIPPED | OUTPATIENT
Start: 2020-07-14 | End: 2020-08-31 | Stop reason: SDUPTHER

## 2020-07-14 RX ORDER — LIDOCAINE HYDROCHLORIDE 20 MG/ML
INJECTION, SOLUTION EPIDURAL; INFILTRATION; INTRACAUDAL; PERINEURAL
Status: DISCONTINUED | OUTPATIENT
Start: 2020-07-14 | End: 2020-07-14 | Stop reason: HOSPADM

## 2020-07-14 RX ORDER — FENTANYL CITRATE 50 UG/ML
INJECTION, SOLUTION INTRAMUSCULAR; INTRAVENOUS
Status: DISCONTINUED | OUTPATIENT
Start: 2020-07-14 | End: 2020-07-14 | Stop reason: HOSPADM

## 2020-07-14 NOTE — OP NOTE
PROCEDURE: Lumbar medial branch radiofrequency (RF) ablation under fluoroscopic guidance    SIDE: right    LEVEL:   Sacral ala (Corresponding to the L5 dorsal ramus),   L5 transverse process (Corresponding to the L4 medial branch),   L4 transverse process (Corresponding to the L3 medial branch),     PROCEDURE DATE: 7/14/2020    PREOPERATIVE DIAGNOSIS: Lumbar spondylosis  POSTOPERATIVE DIAGNOSIS: Lumbar spondylosis    PROVIDER: LEXI Moore MD  Assistant(s): None    ANESTHESIA: Local, IV Sedation    >> 1 mg of VERSED    >> 50 mcg of FENTANYL     INDICATION: The patient has low back pain without radiculopathy symptoms unresponsive to conservative treatments. Fluoroscopy was used to optimize visualization of needle placement and to maximize safety.        PROCEDURE DESCRIPTION / TECHNIQUE:   The patient was seen and identified in the preoperative area. Risks, benefits, complications, and alternatives were discussed with the patient. The patient agreed to proceed with the procedure and signed the consent. The site and side of the procedure was identified and marked. An iv was started.    The patient was taken to the procedural suite. The patient was positioned in prone orientation on procedure table and a pillow was placed under the abdomen to reduce lumbar lordosis. A time out was performed prior to any intervention. The procedure, site, side, and allergies were stated and agreed to by all present. The lumbosacral area was widely prepped with ChloraPrep. The procedural site was draped in usual sterile fashion. Vital signs were closely monitored throughout this procedure. Conscious sedation was used for this procedure to decrease patient anxiety.    Using AP fluoroscopy, the junction of the vetebral transverse process (TP) and the superior articular process (SAP) at the above noted levels was identified. The skin caudal and oblique to the SAP-TP junctions described above was marked. Superficial tissues were localized  with 2% PF Lidocaine at each level. AirClic 150 mm 20-gauge radiofrequency cannulae with curved 10-mm active tips were advanced to the above noted TP-SAP junction until osseus contact was made. The needle was walked off of the sulcus. The fluoroscope was obliqued to the ipsilateral side and the needles were repositioned as needed until the active tip was aligned along the base of the SAP and the needle tip met the anterior fluoroscopic shadow of the SAP. Lateral fluoroscopic imaging was captured and the needle tips were adjusted such that the tips extended into the SAP shadow, but not beyond the SAP silhouette into the intervertebral foramen. After satisfactory cannula positioning was realized in true lateral orientation, the needle stylets were removed, RF electrodes were introduced, and sensory and motor testing was performed.     Nerves were tested sequentially. Sensory testing was not performed at 50 Hz up to  0.5 volt with production of concordant pain. Motor testing was performed at 2 Hz up to 1.5 volts with elicitation of mulitifidus muscle contraction and with no radicular pain, paresthesias, or distal muscle contraction beyond the buttocks produced during motor testing. Following testing, RF electrodes were removed and 1 mL of 2% lidocaine and 1ml of 0.5% bupivacaine was injected through each cannula following negative aspiration. The RF electrodes were then replaced and each targeted medial branch was sequentially subjected to thermal coagulation at 80 degrees Celsius for 90 seconds. Following the burn, 1ml of 0.5% bupivacaine was injected, RF electrodes were removed, stylets were replaced, and each cannua was removed intact.    Description of Findings: Not applicable    Prosthetic devices, grafts, tissues, or devices implanted: None    Specimen Removed: No    Estimated Blood Loss: minimal    COMPLICATIONS: None    DISPOSITION / PLANS: The patient was transferred to the recovery area in a stable condition  for observation. The patient was reexamined prior to discharge. There was no evidence of acute neurologic injury following the procedure.  Patient was discharged from the recovery room after meeting discharge criteria. Home discharge instructions were given to the patient by the staff.

## 2020-07-14 NOTE — H&P (VIEW-ONLY)
Progress Notes  Yury Moore MD (Physician)   Pain Medicine   6/2/2020  4:40 PM   Signed  Expand All Collapse All    Established Patient - Audio Only Telehealth Visit     The patient location is: at home  The chief complaint leading to consultation is: low back pain  Visit type: Virtual visit with audio only (telephone)  Total time spent with patient: 3 minutes        The reason for the audio only service rather than synchronous audio and video virtual visit was related to technical difficulties or patient preference/necessity.     Each patient to whom I provide medical services by telemedicine is:  (1) informed of the relationship between the physician and patient and the respective role of any other health care provider with respect to management of the patient; and (2) notified that they may decline to receive medical services by telemedicine and may withdraw from such care at any time. Patient verbally consented to receive this service via voice-only telephone call.        SUBJECTIVE:   Tea Ring presents tele-medicine appointment for a follow-up appointment for low back pain. Since the last visit, Tea Ring states the pain has been worsening in her lumbar spine.  She underwent bilateral genicular nerve blocks which she reports has been tremendous and helped her significantly with her pain however the low back has become her main concern.  The pain in her low back is worse with standing and with activities and is mildly improved with rest.  She does have a prescription for hydrocodone which he takes sparingly in addition to Elavil which provides minimal pain relief.  She denies having any symptoms that radiate into her lower extremities.  Pain Medications:   - Opioids:  Hydrocodone  - NSAIDs:  None   - Anti-Depressants:  Elavil   - Anti-Convulsants:  None   - Others:  None   Physical Therapy/Home Exercise: no    report: Not applicable   Pain Procedures:  Bilateral genicular nerve  blocks  Imaging:  None        Past Medical History:   Diagnosis Date    Alpha thalassemia      Asthma       resolved per patient    Cataract      Chronic anxiety       years tid xanax 1mg    Chronic knee pain      Chronic pain of left ankle      Clotting disorder      Cutaneous lupus erythematosus       dr henry derm    Depression      Depression       dr radha hughes previously    Ex-smoker       quit fall 1    Hypertension      Osteopenia  rec            Past Surgical History:   Procedure Laterality Date    ANKLE SURGERY Left      BACK SURGERY        COLONOSCOPY N/A 2017     Procedure: COLONOSCOPY;  Surgeon: Virgil Oliva MD;  Location: [facility] ENDO;  Service: Endoscopy;  Laterality: N/A;    HYSTERECTOMY        INJECTION OF ANESTHETIC AGENT AROUND NERVE Bilateral 2020     Procedure: Bilateral Genicular nerve block with RN IV sedation Covid testing day of procedure PT does not drive;  Surgeon: Yury Moore MD;  Location: [facility];  Service: Pain Management;  Laterality: Bilateral;    KNEE ARTHROSCOPY         both knees twice    OOPHORECTOMY          Social History               Socioeconomic History    Marital status:        Spouse name:     Number of children: 3    Years of education: Not on file    Highest education level: Not on file   Occupational History    Not on file   Social Needs    Financial resource strain: Not on file    Food insecurity:       Worry: Not on file       Inability: Not on file    Transportation needs:       Medical: Not on file       Non-medical: Not on file   Tobacco Use    Smoking status: Former Smoker       Packs/day: 1.00       Years: 30.00       Pack years: 30.00       Types: Cigarettes       Last attempt to quit: 2014       Years since quittin.7    Smokeless tobacco: Never Used    Tobacco comment: smoke last cigarette 9/15   Substance and Sexual Activity    Alcohol use: Yes       Alcohol/week: 1.0  standard drinks       Types: 1 Glasses of wine per week    Drug use: No    Sexual activity: Not Currently       Partners: Male   Lifestyle    Physical activity:       Days per week: Not on file       Minutes per session: Not on file    Stress: Not at all   Relationships    Social connections:       Talks on phone: Not on file       Gets together: Not on file       Attends Congregation service: Not on file       Active member of club or organization: Not on file       Attends meetings of clubs or organizations: Not on file       Relationship status: Not on file   Other Topics Concern    Not on file   Social History Narrative    Not on file               Family History   Problem Relation Age of Onset    Diabetes Mother      Diabetes Father      Breast cancer Maternal Aunt              Review of patient's allergies indicates:   Allergen Reactions    Morphine Shortness Of Breath    Duloxetine         Feels bad           Current Outpatient Medications   Medication Sig    ALPRAZolam (XANAX) 0.25 MG tablet TAKE ONE TABLET BY MOUTH NIGHTLY AS NEEDED FOR INSOMNIA OR SLEEP    amitriptyline (ELAVIL) 50 MG tablet Take 1 tablet (50 mg total) by mouth once daily.    atorvastatin (LIPITOR) 10 MG tablet Take 1 tablet (10 mg total) by mouth once daily.    benzocaine-lanolin (DERMOPLAST) 20-0.5 % Aero Apply topically 4 (four) times daily as needed.    betamethasone valerate 0.1% (VALISONE) 0.1 % Crea Apply topically 2 (two) times daily. Apply to affected area.    blood pressure monitor (BLOOD PRESSURE KIT) Kit Record daily    HYDROcodone-acetaminophen (NORCO) 5-325 mg per tablet Take 1 tablet by mouth every 12 (twelve) hours as needed for Pain.    pantoprazole (PROTONIX) 40 MG tablet Take 1 tablet (40 mg total) by mouth once daily.    sertraline (ZOLOFT) 100 MG tablet      valsartan-hydrochlorothiazide (DIOVAN-HCT) 160-12.5 mg per tablet Take 1 tablet by mouth once daily.      No current facility-administered  medications for this visit.       REVIEW OF SYSTEMS:   GENERAL: No weight loss, malaise or fevers.   HEENT: No recent changes in vision or hearing   NECK: Negative for lumps, no difficulty with swallowing.   RESPIRATORY: Negative for cough, wheezing or shortness of breath, patient denies any recent URI.   CARDIOVASCULAR: Negative for chest pain, leg swelling or palpitations.   GI: Negative for abdominal discomfort, blood in stools or black stools or change in bowel habits.   MUSCULOSKELETAL: See HPI.   SKIN: Negative for lesions, rash, and itching.   PSYCH: No mood disorder or recent psychosocial stressors. Patients sleep is not disturbed secondary to pain.   HEMATOLOGY/LYMPHOLOGY: Negative for prolonged bleeding, bruising easily or swollen nodes. Patient is not currently taking any anti-coagulants   NEURO: No history of headaches, syncope, paralysis, seizures or tremors.   All other reviewed and negative other than HPI.   OBJECTIVE:   Respiratory:  Nonlabored breathing  Psych:  Normal affect    ASSESSMENT: 74 y.o. year old female with lumbar spine pain, consistent with   Lumbar spondylosis  PLAN:   -will proceed with right L3-5 lumbar RFA  -continue all medications as prescribed  -follow up in clinic after the procedure  The above plan and management options were discussed at length with patient. Patient is in agreement with the above and verbalized understanding. It will be communicated with the referring physician via electronic record, fax, or mail.   Yury Moore   06/02/2020

## 2020-07-14 NOTE — PLAN OF CARE
Pt verbalized understanding of discharge instructions. Bandaids x 2 to right lower back c/d/i. Patient voiced no complaints, with no further questions at this time. Patient stood at side of bed, walked steps with no new motor deficits. VSS on RA. Recovery care complete.

## 2020-07-14 NOTE — H&P
Progress Notes  Yury Moore MD (Physician)   Pain Medicine   6/2/2020  4:40 PM   Signed  Expand All Collapse All    Established Patient - Audio Only Telehealth Visit     The patient location is: at home  The chief complaint leading to consultation is: low back pain  Visit type: Virtual visit with audio only (telephone)  Total time spent with patient: 3 minutes        The reason for the audio only service rather than synchronous audio and video virtual visit was related to technical difficulties or patient preference/necessity.     Each patient to whom I provide medical services by telemedicine is:  (1) informed of the relationship between the physician and patient and the respective role of any other health care provider with respect to management of the patient; and (2) notified that they may decline to receive medical services by telemedicine and may withdraw from such care at any time. Patient verbally consented to receive this service via voice-only telephone call.        SUBJECTIVE:   Tea Ring presents tele-medicine appointment for a follow-up appointment for low back pain. Since the last visit, Tea Rnig states the pain has been worsening in her lumbar spine.  She underwent bilateral genicular nerve blocks which she reports has been tremendous and helped her significantly with her pain however the low back has become her main concern.  The pain in her low back is worse with standing and with activities and is mildly improved with rest.  She does have a prescription for hydrocodone which he takes sparingly in addition to Elavil which provides minimal pain relief.  She denies having any symptoms that radiate into her lower extremities.  Pain Medications:   - Opioids:  Hydrocodone  - NSAIDs:  None   - Anti-Depressants:  Elavil   - Anti-Convulsants:  None   - Others:  None   Physical Therapy/Home Exercise: no    report: Not applicable   Pain Procedures:  Bilateral genicular nerve  blocks  Imaging:  None        Past Medical History:   Diagnosis Date    Alpha thalassemia      Asthma       resolved per patient    Cataract      Chronic anxiety       years tid xanax 1mg    Chronic knee pain      Chronic pain of left ankle      Clotting disorder      Cutaneous lupus erythematosus       dr henry derm    Depression      Depression       dr radha hughes previously    Ex-smoker       quit fall 1    Hypertension      Osteopenia  rec            Past Surgical History:   Procedure Laterality Date    ANKLE SURGERY Left      BACK SURGERY        COLONOSCOPY N/A 2017     Procedure: COLONOSCOPY;  Surgeon: Virgil Oliva MD;  Location: [facility] ENDO;  Service: Endoscopy;  Laterality: N/A;    HYSTERECTOMY        INJECTION OF ANESTHETIC AGENT AROUND NERVE Bilateral 2020     Procedure: Bilateral Genicular nerve block with RN IV sedation Covid testing day of procedure PT does not drive;  Surgeon: Yury Moore MD;  Location: [facility];  Service: Pain Management;  Laterality: Bilateral;    KNEE ARTHROSCOPY         both knees twice    OOPHORECTOMY          Social History               Socioeconomic History    Marital status:        Spouse name:     Number of children: 3    Years of education: Not on file    Highest education level: Not on file   Occupational History    Not on file   Social Needs    Financial resource strain: Not on file    Food insecurity:       Worry: Not on file       Inability: Not on file    Transportation needs:       Medical: Not on file       Non-medical: Not on file   Tobacco Use    Smoking status: Former Smoker       Packs/day: 1.00       Years: 30.00       Pack years: 30.00       Types: Cigarettes       Last attempt to quit: 2014       Years since quittin.7    Smokeless tobacco: Never Used    Tobacco comment: smoke last cigarette 9/15   Substance and Sexual Activity    Alcohol use: Yes       Alcohol/week: 1.0  standard drinks       Types: 1 Glasses of wine per week    Drug use: No    Sexual activity: Not Currently       Partners: Male   Lifestyle    Physical activity:       Days per week: Not on file       Minutes per session: Not on file    Stress: Not at all   Relationships    Social connections:       Talks on phone: Not on file       Gets together: Not on file       Attends Caodaism service: Not on file       Active member of club or organization: Not on file       Attends meetings of clubs or organizations: Not on file       Relationship status: Not on file   Other Topics Concern    Not on file   Social History Narrative    Not on file               Family History   Problem Relation Age of Onset    Diabetes Mother      Diabetes Father      Breast cancer Maternal Aunt              Review of patient's allergies indicates:   Allergen Reactions    Morphine Shortness Of Breath    Duloxetine         Feels bad           Current Outpatient Medications   Medication Sig    ALPRAZolam (XANAX) 0.25 MG tablet TAKE ONE TABLET BY MOUTH NIGHTLY AS NEEDED FOR INSOMNIA OR SLEEP    amitriptyline (ELAVIL) 50 MG tablet Take 1 tablet (50 mg total) by mouth once daily.    atorvastatin (LIPITOR) 10 MG tablet Take 1 tablet (10 mg total) by mouth once daily.    benzocaine-lanolin (DERMOPLAST) 20-0.5 % Aero Apply topically 4 (four) times daily as needed.    betamethasone valerate 0.1% (VALISONE) 0.1 % Crea Apply topically 2 (two) times daily. Apply to affected area.    blood pressure monitor (BLOOD PRESSURE KIT) Kit Record daily    HYDROcodone-acetaminophen (NORCO) 5-325 mg per tablet Take 1 tablet by mouth every 12 (twelve) hours as needed for Pain.    pantoprazole (PROTONIX) 40 MG tablet Take 1 tablet (40 mg total) by mouth once daily.    sertraline (ZOLOFT) 100 MG tablet      valsartan-hydrochlorothiazide (DIOVAN-HCT) 160-12.5 mg per tablet Take 1 tablet by mouth once daily.      No current facility-administered  medications for this visit.       REVIEW OF SYSTEMS:   GENERAL: No weight loss, malaise or fevers.   HEENT: No recent changes in vision or hearing   NECK: Negative for lumps, no difficulty with swallowing.   RESPIRATORY: Negative for cough, wheezing or shortness of breath, patient denies any recent URI.   CARDIOVASCULAR: Negative for chest pain, leg swelling or palpitations.   GI: Negative for abdominal discomfort, blood in stools or black stools or change in bowel habits.   MUSCULOSKELETAL: See HPI.   SKIN: Negative for lesions, rash, and itching.   PSYCH: No mood disorder or recent psychosocial stressors. Patients sleep is not disturbed secondary to pain.   HEMATOLOGY/LYMPHOLOGY: Negative for prolonged bleeding, bruising easily or swollen nodes. Patient is not currently taking any anti-coagulants   NEURO: No history of headaches, syncope, paralysis, seizures or tremors.   All other reviewed and negative other than HPI.   OBJECTIVE:   Respiratory:  Nonlabored breathing  Psych:  Normal affect    ASSESSMENT: 74 y.o. year old female with lumbar spine pain, consistent with   Lumbar spondylosis  PLAN:   -will proceed with right L3-5 lumbar RFA  -continue all medications as prescribed  -follow up in clinic after the procedure  The above plan and management options were discussed at length with patient. Patient is in agreement with the above and verbalized understanding. It will be communicated with the referring physician via electronic record, fax, or mail.   Yury Moore   06/02/2020

## 2020-07-14 NOTE — DISCHARGE INSTRUCTIONS

## 2020-07-15 NOTE — DISCHARGE SUMMARY
[facility]  Short Stay  Discharge Summary    Admit Date: 7/14/2020    Discharge Date and Time: 7/14/2020  8:43 AM      Discharge Attending Physician: LEXI Moore MD     Hospital Course (synopsis of major diagnoses, care, treatment, and services provided during the course of the hospital stay): Patient was admitted to Pre-op where informed consent was signed.  The patient was then taken to the procedure suite where the procedure was performed.  The patient was then return to the Pre-Op area and discharge was performed.     Final Diagnoses:    Principal Problem: <principal problem not specified>   Secondary Diagnoses:   Active Hospital Problems    Diagnosis  POA    Lumbar spondylosis [M47.816]  Yes      Resolved Hospital Problems   No resolved problems to display.       Discharged Condition: good    Disposition: Home or Self Care    Follow up/Patient Instructions:    Medications:  Reconciled Home Medications:      Medication List      CONTINUE taking these medications    ALPRAZolam 0.25 MG tablet  Commonly known as: XANAX  TAKE ONE TABLET BY MOUTH NIGHTLY FOR SLEEP     amitriptyline 50 MG tablet  Commonly known as: ELAVIL  Take 1 tablet (50 mg total) by mouth once daily.     atorvastatin 10 MG tablet  Commonly known as: LIPITOR  Take 1 tablet (10 mg total) by mouth once daily.     benzocaine-lanolin 20-0.5 % Aero  Commonly known as: DERMOPLAST  Apply topically 4 (four) times daily as needed.     betamethasone valerate 0.1% 0.1 % Crea  Commonly known as: VALISONE  Apply topically 2 (two) times daily. Apply to affected area.     blood pressure monitor Kit  Commonly known as: BLOOD PRESSURE KIT  Record daily     diclofenac sodium 1 % Gel  Commonly known as: VOLTAREN  APPLY TWO GRAMS TO THE AFFECTED AREA TWICE A DAY     naproxen sodium 220 MG tablet  Commonly known as: ANAPROX  Take 220 mg by mouth 2 (two) times daily with meals.  Notes to patient: Restart tomorrow      pantoprazole 40 MG  tablet  Commonly known as: PROTONIX  Take 1 tablet (40 mg total) by mouth once daily.     sertraline 100 MG tablet  Commonly known as: ZOLOFT     valsartan-hydrochlorothiazide 160-12.5 mg per tablet  Commonly known as: DIOVAN-HCT  Take 1 tablet by mouth once daily.        ASK your doctor about these medications    baclofen 10 MG tablet  Commonly known as: LIORESAL  Take 0.5 tablets (5 mg total) by mouth 3 (three) times daily as needed.     oxyCODONE-acetaminophen 5-325 mg per tablet  Commonly known as: PERCOCET  TAKE ONE TABLET BY MOUTH EVERY TWELVE HOURS AS NEEDED FOR PAIN  Ask about: Which instructions should I use?          Discharge Procedure Orders   Diet general     Call MD for:  severe uncontrolled pain     Call MD for:  difficulty breathing, headache or visual disturbances     Call MD for:  redness, tenderness, or signs of infection (pain, swelling, redness, odor or green/yellow discharge around incision site)     Activity as tolerated

## 2020-07-21 NOTE — PRE-PROCEDURE INSTRUCTIONS
Spoke with patient regarding procedure scheduled on 7/28  Arrival time 0710  Has patient been sick with fever or on antibiotics within the last 7 days? No  Has patient received a vaccination within the last 7 days? no  Has the patient stopped all medications as directed? Naproxen and Vit C on 7/21  Does patient have a pacemaker and or defibrillator? no  Does the patient have a ride to and from procedure and someone reliable to remain with patient? granddaughter or grandson  Is the patient diabetic? no  Does the patient have sleep apnea? Or use O2 at home? No and no   Is the patient receiving sedation? yes  Is the patient instructed to remain NPO beginning at midnight the night before their procedure? yes  Procedure location confirmed with patient? yes  Covid testing: rapid dt transportation

## 2020-07-24 ENCOUNTER — TELEPHONE (OUTPATIENT)
Dept: PAIN MEDICINE | Facility: CLINIC | Age: 75
End: 2020-07-24

## 2020-07-24 NOTE — TELEPHONE ENCOUNTER
----- Message from Yashira Aburto sent at 7/24/2020 12:49 PM CDT -----  Type:  Needs Medical Advice    Who Called:SHA WHITE   Symptoms (please be specific):   How long has patient had these symptoms:   Pharmacy name and phone #:   Would the patient rather a call back or a response via [facility]? Call   Best Call Back Number: [phone] (home) Additional Information:    Pt is requesting a call back from the nurse in regards to the pt canceling her Procedure on 07/28/2020 pt is going to want to reschedule also on the 08/04/2020 please

## 2020-07-30 NOTE — PRE-PROCEDURE INSTRUCTIONS
Spoke with patient regarding procedure scheduled on 8/6  Arrival time 1120  Has patient been sick with fever or on antibiotics within the last 7 days? No  Has patient received a vaccination within the last 7 days? no  Has the patient stopped all medications as directed? Naproxen and Vit C on 7/30  Does patient have a pacemaker and or defibrillator? no  Does the patient have a ride to and from procedure and someone reliable to remain with patient? granddaughter or grandson  Is the patient diabetic? no  Does the patient have sleep apnea? Or use O2 at home? No and no   Is the patient receiving sedation? yes  Is the patient instructed to remain NPO beginning at midnight the night before their procedure? yes  Procedure location confirmed with patient? yes  Covid no signs or symptoms

## 2020-08-04 ENCOUNTER — OFFICE VISIT (OUTPATIENT)
Dept: INTERNAL MEDICINE | Facility: CLINIC | Age: 75
End: 2020-08-04
Payer: MEDICARE

## 2020-08-04 VITALS
SYSTOLIC BLOOD PRESSURE: 120 MMHG | HEART RATE: 86 BPM | WEIGHT: 179.25 LBS | RESPIRATION RATE: 18 BRPM | HEIGHT: 61 IN | TEMPERATURE: 98 F | OXYGEN SATURATION: 95 % | BODY MASS INDEX: 33.84 KG/M2 | DIASTOLIC BLOOD PRESSURE: 82 MMHG

## 2020-08-04 DIAGNOSIS — I10 ESSENTIAL HYPERTENSION: ICD-10-CM

## 2020-08-04 DIAGNOSIS — F41.9 CHRONIC ANXIETY: ICD-10-CM

## 2020-08-04 DIAGNOSIS — M47.816 LUMBAR SPONDYLOSIS: ICD-10-CM

## 2020-08-04 DIAGNOSIS — M75.01 ADHESIVE CAPSULITIS OF RIGHT SHOULDER: ICD-10-CM

## 2020-08-04 PROCEDURE — 3008F PR BODY MASS INDEX (BMI) DOCUMENTED: ICD-10-PCS | Mod: HCNC,CPTII,S$GLB, | Performed by: NURSE PRACTITIONER

## 2020-08-04 PROCEDURE — 1101F PT FALLS ASSESS-DOCD LE1/YR: CPT | Mod: HCNC,CPTII,S$GLB, | Performed by: NURSE PRACTITIONER

## 2020-08-04 PROCEDURE — 1159F PR MEDICATION LIST DOCUMENTED IN MEDICAL RECORD: ICD-10-PCS | Mod: HCNC,S$GLB,, | Performed by: NURSE PRACTITIONER

## 2020-08-04 PROCEDURE — 3079F DIAST BP 80-89 MM HG: CPT | Mod: HCNC,CPTII,S$GLB, | Performed by: NURSE PRACTITIONER

## 2020-08-04 PROCEDURE — 99214 OFFICE O/P EST MOD 30 MIN: CPT | Mod: HCNC,S$GLB,, | Performed by: NURSE PRACTITIONER

## 2020-08-04 PROCEDURE — 3079F PR MOST RECENT DIASTOLIC BLOOD PRESSURE 80-89 MM HG: ICD-10-PCS | Mod: HCNC,CPTII,S$GLB, | Performed by: NURSE PRACTITIONER

## 2020-08-04 PROCEDURE — 1159F MED LIST DOCD IN RCRD: CPT | Mod: HCNC,S$GLB,, | Performed by: NURSE PRACTITIONER

## 2020-08-04 PROCEDURE — 1125F PR PAIN SEVERITY QUANTIFIED, PAIN PRESENT: ICD-10-PCS | Mod: HCNC,S$GLB,, | Performed by: NURSE PRACTITIONER

## 2020-08-04 PROCEDURE — 3008F BODY MASS INDEX DOCD: CPT | Mod: HCNC,CPTII,S$GLB, | Performed by: NURSE PRACTITIONER

## 2020-08-04 PROCEDURE — 1101F PR PT FALLS ASSESS DOC 0-1 FALLS W/OUT INJ PAST YR: ICD-10-PCS | Mod: HCNC,CPTII,S$GLB, | Performed by: NURSE PRACTITIONER

## 2020-08-04 PROCEDURE — 99214 PR OFFICE/OUTPT VISIT, EST, LEVL IV, 30-39 MIN: ICD-10-PCS | Mod: HCNC,S$GLB,, | Performed by: NURSE PRACTITIONER

## 2020-08-04 PROCEDURE — 99999 PR PBB SHADOW E&M-EST. PATIENT-LVL IV: ICD-10-PCS | Mod: PBBFAC,HCNC,, | Performed by: NURSE PRACTITIONER

## 2020-08-04 PROCEDURE — 3074F SYST BP LT 130 MM HG: CPT | Mod: HCNC,CPTII,S$GLB, | Performed by: NURSE PRACTITIONER

## 2020-08-04 PROCEDURE — 3074F PR MOST RECENT SYSTOLIC BLOOD PRESSURE < 130 MM HG: ICD-10-PCS | Mod: HCNC,CPTII,S$GLB, | Performed by: NURSE PRACTITIONER

## 2020-08-04 PROCEDURE — 1125F AMNT PAIN NOTED PAIN PRSNT: CPT | Mod: HCNC,S$GLB,, | Performed by: NURSE PRACTITIONER

## 2020-08-04 PROCEDURE — 99999 PR PBB SHADOW E&M-EST. PATIENT-LVL IV: CPT | Mod: PBBFAC,HCNC,, | Performed by: NURSE PRACTITIONER

## 2020-08-04 RX ORDER — ALPRAZOLAM 0.25 MG/1
TABLET ORAL
Qty: 30 TABLET | Refills: 0 | Status: SHIPPED | OUTPATIENT
Start: 2020-08-04 | End: 2020-09-02

## 2020-08-04 NOTE — ASSESSMENT & PLAN NOTE
reviewed. Discussed risk associated with taking opioid pain medication with xanax. Pt verbalized understanding. Medication helps with anxiety and sleep. Will refill today.

## 2020-08-04 NOTE — ASSESSMENT & PLAN NOTE
Being treated by Dr[facility]. Was told by Dr. Squires to do 6 weeks of therapy then follow up. She reports worsening pain since therapy. Recommended possibly discussing injection with provider. Follow up with ortho.

## 2020-08-04 NOTE — PROGRESS NOTES
Subjective:       Patient ID: Tea Ring is a 74 y.o. female.    Chief Complaint: Pain right flank (x 3 weeks worse with movement) and Pain left arm    Mrs. Ring presents to clinic with complaints of L shoulder and R lower back pain. L shoulder pain has been present for months, and she states that Dr. Squires ortho told her that her cartilage is broken from her previous fall.  She has had 1 injection into that shoulder, and has also went through 6 weeks of physical therapy.  Offered patient to start back therapy, but she declines.  Patient also has upcoming appointment with Dr. Moore on Thursday for lumbar injection for right-sided lower back pain.  She also already has Percocet prescribed. There has been no new injury or fall. Pain is from chronic conditions.     Back Pain  This is a chronic problem. The current episode started more than 1 year ago. The problem occurs constantly. The problem has been waxing and waning since onset. The pain is present in the lumbar spine. The quality of the pain is described as aching. The pain does not radiate. The pain is at a severity of 8/10. The pain is moderate. The pain is worse during the day. The symptoms are aggravated by bending, twisting and position. Pertinent negatives include no abdominal pain, bladder incontinence, bowel incontinence, chest pain, dysuria, fever, headaches, leg pain, numbness, pelvic pain or weakness. Risk factors include sedentary lifestyle, obesity and menopause. She has tried analgesics and NSAIDs (PT, intraarticular injections) for the symptoms.       Patient Active Problem List   Diagnosis    Essential hypertension    Chronic pain of left ankle    Chronic knee pain    Depression    Chronic anxiety    Cutaneous lupus erythematosus    Alpha thalassemia    Osteopenia    Ex-smoker    Chronic asthma without complication    Lumbar spondylosis    Atherosclerosis of aorta    History of total right knee replacement    Right knee pain     Gait instability    Muscle spasms of lower extremity    Medication noncompliance due to cognitive impairment    Lower extremity edema    Screening for breast cancer    Popping of both ears    Moderate major depression    Primary open angle glaucoma (POAG) of both eyes, severe stage    Disorder of bone     Arthritis of multiple sites    Glenohumeral arthritis, right    Generalized OA    Adhesive capsulitis of right shoulder    Personal history of nicotine dependence     Hip strain, initial encounter    Arthritis of knee       Family History   Problem Relation Age of Onset    Diabetes Mother     Diabetes Father     Breast cancer Maternal Aunt      Past Surgical History:   Procedure Laterality Date    ANKLE SURGERY Left     BACK SURGERY      COLONOSCOPY N/A 8/2/2017    Procedure: COLONOSCOPY;  Surgeon: Virgil Oliva MD;  Location: [facility] [place] [facility];  Service: Endoscopy;  Laterality: N/A;    HYSTERECTOMY      INJECTION OF ANESTHETIC AGENT AROUND MEDIAL BRANCH NERVES INNERVATING LUMBAR FACET JOINT Bilateral 6/17/2020    Procedure: Bilateral L3-5 MBB;  Surgeon: Yury Moore MD;  Location: [facility] PAIN MGT;  Service: Pain Management;  Laterality: Bilateral;    INJECTION OF ANESTHETIC AGENT AROUND NERVE Bilateral 5/19/2020    Procedure: Bilateral Genicular nerve block with RN IV sedation Covid testing day of procedure PT does not drive;  Surgeon: Yury Moore MD;  Location: [facility] PAIN MGT;  Service: Pain Management;  Laterality: Bilateral;    KNEE ARTHROSCOPY      both knees twice    OOPHORECTOMY      RADIOFREQUENCY THERMOCOAGULATION Right 7/14/2020    Procedure: Right L3-5 Lumbar RFA;  Surgeon: Yury Moore MD;  Location: [facility] PAIN MGT;  Service: Pain Management;  Laterality: Right;         Current Outpatient Medications:     ALPRAZolam (XANAX) 0.25 MG tablet, TAKE ONE TABLET BY MOUTH NIGHTLY FOR SLEEP, Disp: 30 tablet, Rfl: 0    amitriptyline (ELAVIL) 50 MG tablet, Take 1 tablet (50 mg  total) by mouth once daily., Disp: 30 tablet, Rfl: 4    atorvastatin (LIPITOR) 10 MG tablet, Take 1 tablet (10 mg total) by mouth once daily., Disp: 90 tablet, Rfl: 3    baclofen (LIORESAL) 10 MG tablet, Take 0.5 tablets (5 mg total) by mouth 3 (three) times daily as needed., Disp: 30 tablet, Rfl: 3    diclofenac sodium (VOLTAREN) 1 % Gel, APPLY TWO GRAMS TO THE AFFECTED AREA TWICE A DAY, Disp: 100 g, Rfl: 1    pantoprazole (PROTONIX) 40 MG tablet, Take 1 tablet (40 mg total) by mouth once daily., Disp: 30 tablet, Rfl: 4    sertraline (ZOLOFT) 100 MG tablet, , Disp: , Rfl:     valsartan-hydrochlorothiazide (DIOVAN-HCT) 160-12.5 mg per tablet, Take 1 tablet by mouth once daily., Disp: 90 tablet, Rfl: 3    benzocaine-lanolin (DERMOPLAST) 20-0.5 % Aero, Apply topically 4 (four) times daily as needed., Disp: , Rfl:     naproxen sodium (ANAPROX) 220 MG tablet, Take 220 mg by mouth 2 (two) times daily with meals., Disp: , Rfl:     oxyCODONE-acetaminophen (PERCOCET) 5-325 mg per tablet, TAKE ONE TABLET BY MOUTH EVERY TWELVE HOURS AS NEEDED FOR PAIN, Disp: 20 tablet, Rfl: 0  No current facility-administered medications for this visit.     Review of Systems   Constitutional: Negative for appetite change, chills, diaphoresis, fatigue and fever.   Respiratory: Negative for cough, chest tightness and shortness of breath.    Cardiovascular: Negative for chest pain and palpitations.   Gastrointestinal: Negative for abdominal pain, bowel incontinence, constipation, diarrhea, nausea and vomiting.   Genitourinary: Negative for bladder incontinence, dysuria, flank pain, frequency, hematuria, pelvic pain and urgency.   Musculoskeletal: Positive for arthralgias (L shoulder) and back pain (R side lower back). Negative for gait problem.   Neurological: Negative for dizziness, weakness, light-headedness, numbness and headaches.       Objective:   /82 (BP Location: Left arm, Patient Position: Sitting, BP Method: Large  "(Manual))   Pulse 86   Temp 97.8 °F (36.6 °C) (Temporal)   Resp 18   Ht 5' 1" (1.549 m)   Wt 81.3 kg (179 lb 3.7 oz)   SpO2 95%   BMI 33.87 kg/m²      Physical Exam  Constitutional:       General: She is not in acute distress.     Appearance: Normal appearance. She is not ill-appearing.   Neck:      Musculoskeletal: Normal range of motion and neck supple.   Cardiovascular:      Rate and Rhythm: Normal rate and regular rhythm.      Pulses: Normal pulses.      Heart sounds: Normal heart sounds. No murmur. No friction rub. No gallop.    Pulmonary:      Effort: Pulmonary effort is normal. No respiratory distress.      Breath sounds: Normal breath sounds.   Musculoskeletal:      Left shoulder: She exhibits decreased range of motion and tenderness. She exhibits no swelling and no effusion.      Lumbar back: She exhibits decreased range of motion, tenderness and bony tenderness (greater on R side).   Skin:     General: Skin is warm and dry.      Coloration: Skin is not pale.   Neurological:      Mental Status: She is alert.         Assessment & Plan     Problem List Items Addressed This Visit        Neuro    Lumbar spondylosis    Current Assessment & Plan     Upcoming appt with Dr. Moore on Thursday. Continue to follow up.             Psychiatric    Chronic anxiety    Current Assessment & Plan      reviewed. Discussed risk associated with taking opioid pain medication with xanax. Pt verbalized understanding. Medication helps with anxiety and sleep. Will refill today.          Relevant Medications    ALPRAZolam (XANAX) 0.25 MG tablet       Cardiac/Vascular    Essential hypertension    Current Assessment & Plan     Well controlled. Continue current medications.             Orthopedic    Adhesive capsulitis of right shoulder    Current Assessment & Plan     [facility] Was told by Dr. Squires to do 6 weeks of therapy then follow up. She reports worsening pain since therapy. " "Recommended possibly discussing injection with provider. Follow up with ortho.                 Follow up in about 3 months (around 11/4/2020), or if symptoms worsen or fail to improve, for medication management.          Portions of this note may have been created with voice recognition software. Occasional "wrong-word" or "sound-a-like" substitutions may have occurred due to the inherent limitations of voice recognition software. Please, read the note carefully and recognize, using context, where substitutions have occurred.       "

## 2020-08-06 ENCOUNTER — HOSPITAL ENCOUNTER (OUTPATIENT)
Facility: HOSPITAL | Age: 75
Discharge: HOME | End: 2020-08-06
Attending: PAIN MEDICINE | Admitting: PAIN MEDICINE
Payer: MEDICARE

## 2020-08-06 VITALS
HEIGHT: 61 IN | HEART RATE: 74 BPM | DIASTOLIC BLOOD PRESSURE: 84 MMHG | OXYGEN SATURATION: 100 % | RESPIRATION RATE: 16 BRPM | SYSTOLIC BLOOD PRESSURE: 180 MMHG | TEMPERATURE: 98 F | BODY MASS INDEX: 33.87 KG/M2

## 2020-08-06 DIAGNOSIS — M47.816 LUMBAR SPONDYLOSIS: Primary | ICD-10-CM

## 2020-08-06 PROCEDURE — 25000003 PHARM REV CODE 250: Mod: HCNC | Performed by: PAIN MEDICINE

## 2020-08-06 PROCEDURE — 63600175 PHARM REV CODE 636 W HCPCS: Mod: HCNC | Performed by: PAIN MEDICINE

## 2020-08-06 PROCEDURE — 64635 DESTROY LUMB/SAC FACET JNT: CPT | Mod: HCNC,LT,, | Performed by: PAIN MEDICINE

## 2020-08-06 PROCEDURE — 64636 DESTROY L/S FACET JNT ADDL: CPT | Mod: HCNC | Performed by: PAIN MEDICINE

## 2020-08-06 PROCEDURE — 99152 MOD SED SAME PHYS/QHP 5/>YRS: CPT | Mod: HCNC | Performed by: PAIN MEDICINE

## 2020-08-06 PROCEDURE — S0020 INJECTION, BUPIVICAINE HYDRO: HCPCS | Mod: HCNC | Performed by: PAIN MEDICINE

## 2020-08-06 PROCEDURE — 64635 DESTROY LUMB/SAC FACET JNT: CPT | Mod: HCNC | Performed by: PAIN MEDICINE

## 2020-08-06 PROCEDURE — 64636 DESTROY L/S FACET JNT ADDL: CPT | Mod: HCNC,LT,, | Performed by: PAIN MEDICINE

## 2020-08-06 PROCEDURE — 64636 PR DESTROY L/S FACET JNT ADDL: ICD-10-PCS | Mod: HCNC,LT,, | Performed by: PAIN MEDICINE

## 2020-08-06 PROCEDURE — 64635 PR DESTROY LUMB/SAC FACET JNT: ICD-10-PCS | Mod: HCNC,LT,, | Performed by: PAIN MEDICINE

## 2020-08-06 RX ORDER — FENTANYL CITRATE 50 UG/ML
INJECTION, SOLUTION INTRAMUSCULAR; INTRAVENOUS
Status: DISCONTINUED | OUTPATIENT
Start: 2020-08-06 | End: 2020-08-06 | Stop reason: HOSPADM

## 2020-08-06 RX ORDER — BUPIVACAINE HYDROCHLORIDE 5 MG/ML
INJECTION, SOLUTION EPIDURAL; INTRACAUDAL
Status: DISCONTINUED | OUTPATIENT
Start: 2020-08-06 | End: 2020-08-06 | Stop reason: HOSPADM

## 2020-08-06 RX ORDER — OXYCODONE AND ACETAMINOPHEN 5; 325 MG/1; MG/1
TABLET ORAL
Qty: 20 TABLET | Refills: 0 | Status: SHIPPED | OUTPATIENT
Start: 2020-08-06 | End: 2020-08-31 | Stop reason: SDUPTHER

## 2020-08-06 RX ORDER — MIDAZOLAM HYDROCHLORIDE 1 MG/ML
INJECTION, SOLUTION INTRAMUSCULAR; INTRAVENOUS
Status: DISCONTINUED | OUTPATIENT
Start: 2020-08-06 | End: 2020-08-06 | Stop reason: HOSPADM

## 2020-08-06 RX ORDER — LIDOCAINE HYDROCHLORIDE 20 MG/ML
INJECTION, SOLUTION EPIDURAL; INFILTRATION; INTRACAUDAL; PERINEURAL
Status: DISCONTINUED | OUTPATIENT
Start: 2020-08-06 | End: 2020-08-06 | Stop reason: HOSPADM

## 2020-08-06 NOTE — PROGRESS NOTES
Discharge instructions reviewed with patient, verbalized understanding. 2 injection sites to lower back; clean, dry and intact. Voiced no complaints. Vital signs stable. Discharging home with ride.

## 2020-08-06 NOTE — DISCHARGE INSTRUCTIONS

## 2020-08-06 NOTE — OP NOTE
PROCEDURE: Lumbar medial branch radiofrequency (RF) ablation under fluoroscopic guidance    SIDE: left    LEVEL:   Sacral ala (Corresponding to the L5 dorsal ramus),   L5 transverse process (Corresponding to the L4 medial branch),   L4 transverse process (Corresponding to the L3 medial branch),     PROCEDURE DATE: 8/6/2020    PREOPERATIVE DIAGNOSIS: Lumbar spondylosis  POSTOPERATIVE DIAGNOSIS: Lumbar spondylosis    PROVIDER: LEXI Moore MD  Assistant(s): None    ANESTHESIA: Local, IV Sedation    >> 1 mg of VERSED    >> 25 mcg of FENTANYL     INDICATION: The patient has low back pain without radiculopathy symptoms unresponsive to conservative treatments. Fluoroscopy was used to optimize visualization of needle placement and to maximize safety.        PROCEDURE DESCRIPTION / TECHNIQUE:   The patient was seen and identified in the preoperative area. Risks, benefits, complications, and alternatives were discussed with the patient. The patient agreed to proceed with the procedure and signed the consent. The site and side of the procedure was identified and marked. An iv was started.    The patient was taken to the procedural suite. The patient was positioned in prone orientation on procedure table and a pillow was placed under the abdomen to reduce lumbar lordosis. A time out was performed prior to any intervention. The procedure, site, side, and allergies were stated and agreed to by all present. The lumbosacral area was widely prepped with ChloraPrep. The procedural site was draped in usual sterile fashion. Vital signs were closely monitored throughout this procedure. Conscious sedation was used for this procedure to decrease patient anxiety.    Using AP fluoroscopy, the junction of the vetebral transverse process (TP) and the superior articular process (SAP) at the above noted levels was identified. The skin caudal and oblique to the SAP-TP junctions described above was marked. Superficial tissues were localized  with 2% PF Lidocaine at each level. MoFuse 150 mm 20-gauge radiofrequency cannulae with curved 10-mm active tips were advanced to the above noted TP-SAP junction until osseus contact was made. The needle was walked off of the sulcus. The fluoroscope was obliqued to the ipsilateral side and the needles were repositioned as needed until the active tip was aligned along the base of the SAP and the needle tip met the anterior fluoroscopic shadow of the SAP. Lateral fluoroscopic imaging was captured and the needle tips were adjusted such that the tips extended into the SAP shadow, but not beyond the SAP silhouette into the intervertebral foramen. After satisfactory cannula positioning was realized in true lateral orientation, the needle stylets were removed, RF electrodes were introduced, and sensory and motor testing was performed.     Nerves were tested sequentially. Sensory testing was not performed at 50 Hz up to  0.5 volt with production of concordant pain. Motor testing was performed at 2 Hz up to 1.5 volts with elicitation of mulitifidus muscle contraction and with no radicular pain, paresthesias, or distal muscle contraction beyond the buttocks produced during motor testing. Following testing, RF electrodes were removed and 1 mL of 2% lidocaine and 1ml of 0.5% bupivacaine was injected through each cannula following negative aspiration. The RF electrodes were then replaced and each targeted medial branch was sequentially subjected to thermal coagulation at 80 degrees Celsius for 90 seconds. Following the burn, 1ml of 0.5% bupivacaine was injected, RF electrodes were removed, stylets were replaced, and each cannua was removed intact.    Description of Findings: Not applicable    Prosthetic devices, grafts, tissues, or devices implanted: None    Specimen Removed: No    Estimated Blood Loss: minimal    COMPLICATIONS: None    DISPOSITION / PLANS: The patient was transferred to the recovery area in a stable condition  for observation. The patient was reexamined prior to discharge. There was no evidence of acute neurologic injury following the procedure.  Patient was discharged from the recovery room after meeting discharge criteria. Home discharge instructions were given to the patient by the staff.

## 2020-08-25 RX ORDER — OXYCODONE AND ACETAMINOPHEN 5; 325 MG/1; MG/1
TABLET ORAL
Qty: 20 TABLET | Refills: 0 | OUTPATIENT
Start: 2020-08-25

## 2020-08-25 NOTE — TELEPHONE ENCOUNTER
Pt stated she is still in a lot of pain three weeks out from RFA pt has a follow up scheduled 08/31/2020 with PA.  Pt wanted more Oxycodone called in at this time provider Moore wanted her to be seen before more medication can be called in//dp

## 2020-08-25 NOTE — TELEPHONE ENCOUNTER
----- Message from Julissa Roach sent at 8/25/2020 12:41 PM CDT -----  Regarding: refill  Contact: pt  1. What is the name of the medication you are requesting? Oxycodone   2. What is the dose? .  3. How do you take the medication? Orally, topically, etc? .  4. How often do you take this medication? .  5. Do you need a 30 day or 90 day supply? .  6. How many refills are you requesting? .  7. What is your preferred pharmacy and location of the pharmacy? [facility] [place]  8. Who can we contact with further questions? [phone]

## 2020-08-31 ENCOUNTER — OFFICE VISIT (OUTPATIENT)
Dept: PAIN MEDICINE | Facility: CLINIC | Age: 75
End: 2020-08-31
Payer: MEDICARE

## 2020-08-31 VITALS
SYSTOLIC BLOOD PRESSURE: 112 MMHG | HEART RATE: 76 BPM | WEIGHT: 173 LBS | BODY MASS INDEX: 32.66 KG/M2 | HEIGHT: 61 IN | DIASTOLIC BLOOD PRESSURE: 66 MMHG | RESPIRATION RATE: 18 BRPM

## 2020-08-31 DIAGNOSIS — M25.562 CHRONIC PAIN OF BOTH KNEES: ICD-10-CM

## 2020-08-31 DIAGNOSIS — G89.29 CHRONIC LEFT SHOULDER PAIN: ICD-10-CM

## 2020-08-31 DIAGNOSIS — M25.512 CHRONIC LEFT SHOULDER PAIN: ICD-10-CM

## 2020-08-31 DIAGNOSIS — G89.29 CHRONIC PAIN OF BOTH KNEES: ICD-10-CM

## 2020-08-31 DIAGNOSIS — M47.816 LUMBAR SPONDYLOSIS: Primary | ICD-10-CM

## 2020-08-31 DIAGNOSIS — M25.561 CHRONIC PAIN OF BOTH KNEES: ICD-10-CM

## 2020-08-31 PROCEDURE — 1101F PT FALLS ASSESS-DOCD LE1/YR: CPT | Mod: HCNC,CPTII,S$GLB, | Performed by: PHYSICIAN ASSISTANT

## 2020-08-31 PROCEDURE — 3074F SYST BP LT 130 MM HG: CPT | Mod: HCNC,CPTII,S$GLB, | Performed by: PHYSICIAN ASSISTANT

## 2020-08-31 PROCEDURE — 1159F PR MEDICATION LIST DOCUMENTED IN MEDICAL RECORD: ICD-10-PCS | Mod: HCNC,S$GLB,, | Performed by: PHYSICIAN ASSISTANT

## 2020-08-31 PROCEDURE — 99214 OFFICE O/P EST MOD 30 MIN: CPT | Mod: HCNC,S$GLB,, | Performed by: PHYSICIAN ASSISTANT

## 2020-08-31 PROCEDURE — 1125F PR PAIN SEVERITY QUANTIFIED, PAIN PRESENT: ICD-10-PCS | Mod: HCNC,S$GLB,, | Performed by: PHYSICIAN ASSISTANT

## 2020-08-31 PROCEDURE — 1159F MED LIST DOCD IN RCRD: CPT | Mod: HCNC,S$GLB,, | Performed by: PHYSICIAN ASSISTANT

## 2020-08-31 PROCEDURE — 3078F DIAST BP <80 MM HG: CPT | Mod: HCNC,CPTII,S$GLB, | Performed by: PHYSICIAN ASSISTANT

## 2020-08-31 PROCEDURE — 1125F AMNT PAIN NOTED PAIN PRSNT: CPT | Mod: HCNC,S$GLB,, | Performed by: PHYSICIAN ASSISTANT

## 2020-08-31 PROCEDURE — 99999 PR PBB SHADOW E&M-EST. PATIENT-LVL IV: CPT | Mod: PBBFAC,HCNC,, | Performed by: PHYSICIAN ASSISTANT

## 2020-08-31 PROCEDURE — 3074F PR MOST RECENT SYSTOLIC BLOOD PRESSURE < 130 MM HG: ICD-10-PCS | Mod: HCNC,CPTII,S$GLB, | Performed by: PHYSICIAN ASSISTANT

## 2020-08-31 PROCEDURE — 99999 PR PBB SHADOW E&M-EST. PATIENT-LVL IV: ICD-10-PCS | Mod: PBBFAC,HCNC,, | Performed by: PHYSICIAN ASSISTANT

## 2020-08-31 PROCEDURE — 3078F PR MOST RECENT DIASTOLIC BLOOD PRESSURE < 80 MM HG: ICD-10-PCS | Mod: HCNC,CPTII,S$GLB, | Performed by: PHYSICIAN ASSISTANT

## 2020-08-31 PROCEDURE — 3008F BODY MASS INDEX DOCD: CPT | Mod: HCNC,CPTII,S$GLB, | Performed by: PHYSICIAN ASSISTANT

## 2020-08-31 PROCEDURE — 99499 RISK ADDL DX/OHS AUDIT: ICD-10-PCS | Mod: HCNC,S$GLB,, | Performed by: PHYSICIAN ASSISTANT

## 2020-08-31 PROCEDURE — 99214 PR OFFICE/OUTPT VISIT, EST, LEVL IV, 30-39 MIN: ICD-10-PCS | Mod: HCNC,S$GLB,, | Performed by: PHYSICIAN ASSISTANT

## 2020-08-31 PROCEDURE — 3008F PR BODY MASS INDEX (BMI) DOCUMENTED: ICD-10-PCS | Mod: HCNC,CPTII,S$GLB, | Performed by: PHYSICIAN ASSISTANT

## 2020-08-31 PROCEDURE — 99499 UNLISTED E&M SERVICE: CPT | Mod: HCNC,S$GLB,, | Performed by: PHYSICIAN ASSISTANT

## 2020-08-31 PROCEDURE — 1101F PR PT FALLS ASSESS DOC 0-1 FALLS W/OUT INJ PAST YR: ICD-10-PCS | Mod: HCNC,CPTII,S$GLB, | Performed by: PHYSICIAN ASSISTANT

## 2020-08-31 RX ORDER — BACLOFEN 10 MG/1
5 TABLET ORAL 2 TIMES DAILY
Qty: 30 TABLET | Refills: 0 | Status: SHIPPED | OUTPATIENT
Start: 2020-08-31 | End: 2022-05-04 | Stop reason: ALTCHOICE

## 2020-08-31 NOTE — PROGRESS NOTES
Chief Complaint   Patient presents with    Low-back Pain       SUBJECTIVE:     Interval History (8/31/2020): Patient was seen on 7/14/20 then 8/6/20. At that time she underwent right then left L3-5 RFA.  The patient reports that she is/was slightly better following the procedure.  she reports 40% pain relief.  Patient reports pain as 9/10 today.  She is requesting a refill of Percocet.  She is seeing Orthopedics Dr. Galeas for left shoulder pain.    Last visit HPI (6/2/2020):  Tea Ring presents tele-medicine appointment for a follow-up appointment for low back pain. Since the last visit, Tea Ring states the pain has been worsening in her lumbar spine.  She underwent bilateral genicular nerve blocks which she reports has been tremendous and helped her significantly with her pain however the low back has become her main concern.  The pain in her low back is worse with standing and with activities and is mildly improved with rest.  She does have a prescription for hydrocodone which he takes sparingly in addition to Elavil which provides minimal pain relief.  She denies having any symptoms that radiate into her lower extremities.  Pain Medications:   - Opioids:  Hydrocodone  - NSAIDs:  None   - Anti-Depressants:  Elavil   - Anti-Convulsants:  None   - Others:  None   Physical Therapy/Home Exercise: no   Pain Procedures:    - Bilateral genicular nerve blocks  - bilateral L3-5 MBB on 6/17/20 with 80% pain relief   - right then left L3-5 RFA on 7/14/20 then 8/6/20 with 40% pain relief        Imaging (Reviewed on 9/2/2020):    Results for orders placed during the hospital encounter of 02/10/16   X-Ray Lumbar Complete With Flex And Ext    Narrative Comparison: None   5 views with flexion and extension  Findings:  Vertebral height and alignment well maintained.  Multilevel anterior marginal spurring in the lower L-spine.  Routine anterior and lateral syndesmophytes noted throughout the remaining visualized lower  T-spine and upper L-spine.  Extensive facet arthropathy throughout the lumbar spine.  Prominent calcification along the posterior margins of the L1 and L2 spinous processes.  Vascular calcifications noted.  No spondylolysis or spondylolisthesis appreciated.  Vacuum phenomenon with mild loss of disc height at the L4-5 level.  Pedicles and SI joints appear intact.  No subluxation or evidence of instability on flexion and extension views.    Impression Extensive spondylosis with multilevel facet arthropathy as above.  Multilevel bridging syndesmophyte formation as above.  No acute fracture or subluxation.    No evidence of subluxation or instability on flexion and extension views.  No spondylolysis or spondylolisthesis.  See above.      Results for orders placed during the hospital encounter of 05/06/19   X-Ray Lumbar Spine Ap And Lateral    Narrative COMPARISON:  03/08/2018  FINDINGS:  The vertebral bodies demonstrate a normal height and alignment.  Advanced facet DJD throughout the lumbar spine greatest at L3-4 through L5-S1.  Mild degenerative disc disease throughout with anterior syndesmophyte formation and endplate sclerosis.  Aortic atherosclerosis.    Impression Advanced facet DJD throughout the lumbar spine.  Mild multilevel lumbar spondylosis.          Past Medical History:   Diagnosis Date    Alpha thalassemia     Asthma     resolved per patient    Cataract     Chronic anxiety     years tid xanax 1mg    Chronic knee pain     Chronic pain of left ankle     Clotting disorder     Cutaneous lupus erythematosus     dr henry derm    Depression     Depression     dr radha hughes previously    Ex-smoker     quit fall 1    Hypertension     Osteopenia 1/16 reck1/18      Past Surgical History:   Procedure Laterality Date    ANKLE SURGERY Left     BACK SURGERY      COLONOSCOPY N/A 8/2/2017    Procedure: COLONOSCOPY;  Surgeon: Virgil Oliva MD;  Location: [facility];  Service: Endoscopy;  Laterality:  N/A;    HYSTERECTOMY      INJECTION OF ANESTHETIC AGENT AROUND MEDIAL BRANCH NERVES INNERVATING LUMBAR FACET JOINT Bilateral 2020    Procedure: Bilateral L3-5 MBB;  Surgeon: Yury Moore MD;  Location: [facility];  Service: Pain Management;  Laterality: Bilateral;    INJECTION OF ANESTHETIC AGENT AROUND NERVE Bilateral 2020    Procedure: Bilateral Genicular nerve block with RN IV sedation Covid testing day of procedure PT does not drive;  Surgeon: Yury Moore MD;  Location: [facility];  Service: Pain Management;  Laterality: Bilateral;    KNEE ARTHROSCOPY      both knees twice    OOPHORECTOMY      RADIOFREQUENCY THERMOCOAGULATION Right 2020    Procedure: Right L3-5 Lumbar RFA;  Surgeon: Yury Moore MD;  Location: [facility];  Service: Pain Management;  Laterality: Right;    RADIOFREQUENCY THERMOCOAGULATION Left 2020    Procedure: Left L3-5 Lumbar RFA;  Surgeon: Yury Moore MD;  Location: [facility];  Service: Pain Management;  Laterality: Left;      Social History     Socioeconomic History    Marital status:      Spouse name:     Number of children: 3    Years of education: Not on file    Highest education level: Not on file   Occupational History    Not on file   Social Needs    Financial resource strain: Not on file    Food insecurity     Worry: Not on file     Inability: Not on file    Transportation needs     Medical: Not on file     Non-medical: Not on file   Tobacco Use    Smoking status: Former Smoker     Packs/day: 1.00     Years: 30.00     Pack years: 30.00     Types: Cigarettes     Quit date: 2014     Years since quittin.0    Smokeless tobacco: Never Used    Tobacco comment: smoke last cigarette 9/15   Substance and Sexual Activity    Alcohol use: Yes     Alcohol/week: 1.0 standard drinks     Types: 1 Glasses of wine per week    Drug use: No    Sexual activity: Not Currently     Partners: Male   Lifestyle     Physical activity     Days per week: Not on file     Minutes per session: Not on file    Stress: Not at all   Relationships    Social connections     Talks on phone: Not on file     Gets together: Not on file     Attends Yazidism service: Not on file     Active member of club or organization: Not on file     Attends meetings of clubs or organizations: Not on file     Relationship status: Not on file   Other Topics Concern    Not on file   Social History Narrative    Not on file      Family History   Problem Relation Age of Onset    Diabetes Mother     Diabetes Father     Breast cancer Maternal Aunt       Review of patient's allergies indicates:   Allergen Reactions    Morphine Shortness Of Breath    Duloxetine      Feels bad      Current Outpatient Medications   Medication Sig    ALPRAZolam (XANAX) 0.25 MG tablet TAKE ONE TABLET BY MOUTH NIGHTLY FOR SLEEP    amitriptyline (ELAVIL) 50 MG tablet Take 1 tablet (50 mg total) by mouth once daily.    atorvastatin (LIPITOR) 10 MG tablet Take 1 tablet (10 mg total) by mouth once daily.    benzocaine-lanolin (DERMOPLAST) 20-0.5 % Aero Apply topically 4 (four) times daily as needed.    diclofenac sodium (VOLTAREN) 1 % Gel APPLY TWO GRAMS TO THE AFFECTED AREA TWICE A DAY    naproxen sodium (ANAPROX) 220 MG tablet Take 220 mg by mouth 2 (two) times daily with meals.    pantoprazole (PROTONIX) 40 MG tablet Take 1 tablet (40 mg total) by mouth once daily.    sertraline (ZOLOFT) 100 MG tablet TAKE ONE TABLET BY MOUTH ONCE DAILY    valsartan-hydrochlorothiazide (DIOVAN-HCT) 160-12.5 mg per tablet Take 1 tablet by mouth once daily.    baclofen (LIORESAL) 10 MG tablet Take 0.5 tablets (5 mg total) by mouth 2 (two) times daily.    oxyCODONE-acetaminophen (PERCOCET) 5-325 mg per tablet TAKE ONE TABLET BY MOUTH EVERY TWELVE HOURS AS NEEDED FOR PAIN     No current facility-administered medications for this visit.       REVIEW OF SYSTEMS:   GENERAL: No weight loss,  "malaise or fevers.   HEENT: No recent changes in vision or hearing   NECK: Negative for lumps, no difficulty with swallowing.   RESPIRATORY: Negative for cough, wheezing or shortness of breath, patient denies any recent URI.   CARDIOVASCULAR: Negative for chest pain, leg swelling or palpitations.   GI: Negative for abdominal discomfort, blood in stools or black stools or change in bowel habits.   MUSCULOSKELETAL: See HPI.   SKIN: Negative for lesions, rash, and itching.   PSYCH: No mood disorder or recent psychosocial stressors. Patients sleep is not disturbed secondary to pain.   HEMATOLOGY/LYMPHOLOGY: Negative for prolonged bleeding, bruising easily or swollen nodes. Patient is not currently taking any anti-coagulants   NEURO: No history of headaches, syncope, paralysis, seizures or tremors.   All other reviewed and negative other than HPI.       OBJECTIVE:   Vitals:  Vitals:    08/31/20 1050   BP: 112/66   Pulse: 76   Resp: 18   Weight: 78.5 kg (173 lb)   Height: 5' 1" (1.549 m)   PainSc:   9   PainLoc: Back    (reviewed on 9/2/2020)    General: alert and oriented, in no apparent distress.  Gait: in wheelchair   Skin: no rashes, no discoloration, no obvious lesions  HEENT: normocephalic, atraumatic. Pupils equal and round.  Cardiovascular: peripheral edema present.  Respiratory: without use of accessory muscles of respiration.    Musculoskeletal - Lumbar Spine:  - Limited ROM secondary to pain reproduction   - Pain on flexion of lumbar spine: Present   - Pain on extension of lumbar spine: Present  - Lumbar facet loading: Present, improved some since procedure   - TTP over the lumbar facet joints: Present, improved some since procedure   - TTP over the lumbar paraspinals: Present, improved some since procedure   - TTP over the SI joints:  Present   - Straight Leg Raise: unable to fully examine    Neuro - Lower Extremities:  - BLE Strength: R/L: decreased diffusely  - Sensory: Sensation to light touch intact " bilaterally      Psych:  Mood and affect is appropriate      Tea Ring is a 74 y.o. female who presents with    ICD-10-CM ICD-9-CM   1. Lumbar spondylosis  M47.816 721.3   2. Chronic pain of both knees  M25.561 719.46    M25.562 338.29    G89.29          Plan:  1. Interventional: None for now.     2. Pharmacologic:   - Refill Percocet 5/325mg #20 tablets to take PRN severe pain.  This will be final rx of opioids given.  Our goal would be to treat her with interventional measures.   * Patient was informed about the risk of concurrent use of benzodiazepines and opioid medications, as they create a synergistic effect causing amplified effect of both substances in combination.  she was instructed not to take opioid pain medication at the same time as Xanax.  These risks include extreme sleepiness, respiratory depression, coma, and even death.   - Continue amitriptyline 50mg QHS and baclofen 10mg BID.   - Start gabapentin 300mg QHS.  She reports she has trouble sleeping at night even with other medications, so this may help with pain at night to help her sleep better.   - LA  reviewed and appropriate.       3. Rehabilitative: Encouraged regular exercise.  She completed 6 weeks of physical therapy for left shoulder recently, none for low back pain.    4. Diagnostic: None for now.    5. Follow up: PRN     - This primary goal of our Pain Management services is to improve the patient's functional capacity.   - I discussed the risks, benefits, and alternatives to potential treatment options. All questions and concerns were fully addressed today in clinic.           Disclaimer:  This note was prepared using voice recognition system and is likely to have sound alike errors that may have been overlooked even after proof reading.  Please call me with any questions.

## 2020-09-01 RX ORDER — OXYCODONE AND ACETAMINOPHEN 5; 325 MG/1; MG/1
TABLET ORAL
Qty: 20 TABLET | Refills: 0 | Status: SHIPPED | OUTPATIENT
Start: 2020-09-01 | End: 2021-03-29 | Stop reason: SDUPTHER

## 2020-09-09 ENCOUNTER — TELEPHONE (OUTPATIENT)
Dept: INTERNAL MEDICINE | Facility: CLINIC | Age: 75
End: 2020-09-09

## 2020-09-09 RX ORDER — BETAMETHASONE VALERATE 1.2 MG/G
CREAM TOPICAL 2 TIMES DAILY
Qty: 15 G | Refills: 2 | Status: SHIPPED | OUTPATIENT
Start: 2020-09-09 | End: 2021-06-03
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
## 2020-09-09 NOTE — TELEPHONE ENCOUNTER
----- Message from Glo Amaya sent at 9/9/2020  3:18 PM CDT -----  Contact: [phone]  Would like to consult with the nurse  patient states that she has been trying to get a refill on her Rx cream,  patient would like to speak with the nurse concerning this,, please call back thanks sj

## 2020-09-10 ENCOUNTER — TELEPHONE (OUTPATIENT)
Dept: PAIN MEDICINE | Facility: CLINIC | Age: 75
End: 2020-09-10

## 2020-09-10 NOTE — TELEPHONE ENCOUNTER
----- Message from Celia Zacarias sent at 9/10/2020  3:01 PM CDT -----  Regarding: request call back  Pt request call back to verify will she get an appt scheduled ... call back: [phone] (home)

## 2020-09-10 NOTE — TELEPHONE ENCOUNTER
----- Message from Nely Herron LPN sent at 9/9/2020  3:29 PM CDT -----  Contact: [phone]  Can you check and update pt?  ----- Message -----  From: Glo Amaya  Sent: 9/9/2020   3:15 PM CDT  To: Mateus Ontiveros Staff    Would like to consult with the nurse  patient would like to know if her Medical Records was sent in, patient  would like a call back concerning this, please call back thanks sj

## 2020-09-10 NOTE — TELEPHONE ENCOUNTER
"Informed pt she must get with insurance to see what pain doctors are in network since  in not . Pt stated this is too much and asked why cant  refill percocet ? Informed pt on visit 08/31 per nathaly "2. Pharmacologic:   - Refill Percocet 5/325mg #20 tablets to take PRN severe pain.  This will be final rx of opioids given.  Our goal would be to treat her with interventional measures.   * Patient was informed about the risk of concurrent use of benzodiazepines and opioid medications, as they create a synergistic effect causing amplified effect of both substances in combination.  she was instructed not to take opioid pain medication at the same time as Xanax.  These risks include extreme sleepiness, respiratory depression, coma, and even death. " .   I asked pt did she need refill of gabapentin , pt declined and stated she will figure out on her own . Apolized to pt and informed if she needed gabapentin refill to give us a call . Pt understood. All questions answered.   Calixto Wallace MA  [facility]  "

## 2020-09-10 NOTE — TELEPHONE ENCOUNTER
----- Message from Nataly Jones sent at 9/10/2020  2:20 PM CDT -----  Regarding: Advice  Contact: Patient  Please call patient concerning the referral to see a different doctor on [facility], patient states no one ever answer the phone when she call the number given. Please call at Ph .[phone] (home)

## 2020-09-21 ENCOUNTER — PES CALL (OUTPATIENT)
Dept: ADMINISTRATIVE | Facility: CLINIC | Age: 75
End: 2020-09-21

## 2020-10-22 ENCOUNTER — OFFICE VISIT (OUTPATIENT)
Dept: HOME HEALTH SERVICES | Facility: CLINIC | Age: 75
End: 2020-10-22
Payer: MEDICARE

## 2020-10-22 VITALS
HEART RATE: 82 BPM | DIASTOLIC BLOOD PRESSURE: 86 MMHG | HEIGHT: 61 IN | TEMPERATURE: 98 F | OXYGEN SATURATION: 98 % | SYSTOLIC BLOOD PRESSURE: 140 MMHG | BODY MASS INDEX: 32.1 KG/M2 | WEIGHT: 170 LBS

## 2020-10-22 DIAGNOSIS — Z99.89 DEPENDENCE ON OTHER ENABLING MACHINES AND DEVICES: ICD-10-CM

## 2020-10-22 DIAGNOSIS — I10 ESSENTIAL HYPERTENSION: ICD-10-CM

## 2020-10-22 DIAGNOSIS — J45.909 CHRONIC ASTHMA WITHOUT COMPLICATION, UNSPECIFIED ASTHMA SEVERITY, UNSPECIFIED WHETHER PERSISTENT: Chronic | ICD-10-CM

## 2020-10-22 DIAGNOSIS — M85.80 OSTEOPENIA, UNSPECIFIED LOCATION: ICD-10-CM

## 2020-10-22 DIAGNOSIS — Z12.31 BREAST CANCER SCREENING BY MAMMOGRAM: ICD-10-CM

## 2020-10-22 DIAGNOSIS — I70.0 ATHEROSCLEROSIS OF AORTA: Chronic | ICD-10-CM

## 2020-10-22 DIAGNOSIS — D56.0 ALPHA THALASSEMIA: Chronic | ICD-10-CM

## 2020-10-22 DIAGNOSIS — Z00.00 ENCOUNTER FOR PREVENTIVE HEALTH EXAMINATION: Primary | ICD-10-CM

## 2020-10-22 DIAGNOSIS — F41.9 CHRONIC ANXIETY: ICD-10-CM

## 2020-10-22 DIAGNOSIS — R26.81 GAIT INSTABILITY: ICD-10-CM

## 2020-10-22 DIAGNOSIS — Z87.891 EX-SMOKER: ICD-10-CM

## 2020-10-22 DIAGNOSIS — G89.29 OTHER CHRONIC PAIN: ICD-10-CM

## 2020-10-22 DIAGNOSIS — M13.0 ARTHRITIS OF MULTIPLE SITES: ICD-10-CM

## 2020-10-22 DIAGNOSIS — H40.1133 PRIMARY OPEN ANGLE GLAUCOMA (POAG) OF BOTH EYES, SEVERE STAGE: ICD-10-CM

## 2020-10-22 DIAGNOSIS — R26.9 ABNORMALITY OF GAIT AND MOBILITY: ICD-10-CM

## 2020-10-22 DIAGNOSIS — F32.1 MODERATE MAJOR DEPRESSION: ICD-10-CM

## 2020-10-22 DIAGNOSIS — M47.816 LUMBAR SPONDYLOSIS: ICD-10-CM

## 2020-10-22 PROBLEM — M89.9 DISORDER OF BONE: Status: RESOLVED | Noted: 2018-11-06 | Resolved: 2020-10-22

## 2020-10-22 PROBLEM — M62.838 MUSCLE SPASMS OF LOWER EXTREMITY: Status: RESOLVED | Noted: 2017-12-14 | Resolved: 2020-10-22

## 2020-10-22 PROBLEM — M19.011 GLENOHUMERAL ARTHRITIS, RIGHT: Status: RESOLVED | Noted: 2019-02-06 | Resolved: 2020-10-22

## 2020-10-22 PROBLEM — R60.0 LOWER EXTREMITY EDEMA: Status: RESOLVED | Noted: 2018-06-11 | Resolved: 2020-10-22

## 2020-10-22 PROBLEM — M15.9 GENERALIZED OA: Status: RESOLVED | Noted: 2019-02-21 | Resolved: 2020-10-22

## 2020-10-22 PROBLEM — M17.10 ARTHRITIS OF KNEE: Status: RESOLVED | Noted: 2020-05-19 | Resolved: 2020-10-22

## 2020-10-22 PROBLEM — S76.019A HIP STRAIN, INITIAL ENCOUNTER: Status: RESOLVED | Noted: 2020-02-20 | Resolved: 2020-10-22

## 2020-10-22 PROBLEM — H93.8X3 POPPING OF BOTH EARS: Status: RESOLVED | Noted: 2018-08-08 | Resolved: 2020-10-22

## 2020-10-22 PROCEDURE — G0439 PPPS, SUBSEQ VISIT: HCPCS | Mod: S$GLB,,, | Performed by: NURSE PRACTITIONER

## 2020-10-22 PROCEDURE — 3079F PR MOST RECENT DIASTOLIC BLOOD PRESSURE 80-89 MM HG: ICD-10-PCS | Mod: CPTII,S$GLB,, | Performed by: NURSE PRACTITIONER

## 2020-10-22 PROCEDURE — 3077F PR MOST RECENT SYSTOLIC BLOOD PRESSURE >= 140 MM HG: ICD-10-PCS | Mod: CPTII,S$GLB,, | Performed by: NURSE PRACTITIONER

## 2020-10-22 PROCEDURE — 3079F DIAST BP 80-89 MM HG: CPT | Mod: CPTII,S$GLB,, | Performed by: NURSE PRACTITIONER

## 2020-10-22 PROCEDURE — G0439 PR MEDICARE ANNUAL WELLNESS SUBSEQUENT VISIT: ICD-10-PCS | Mod: S$GLB,,, | Performed by: NURSE PRACTITIONER

## 2020-10-22 PROCEDURE — 3077F SYST BP >= 140 MM HG: CPT | Mod: CPTII,S$GLB,, | Performed by: NURSE PRACTITIONER

## 2020-10-22 NOTE — PROGRESS NOTES
"Tea Ring presented for Medicare AW today. The following components were reviewed and updated:    · Medical history  · Family History  · Social history  · Allergies and Current Medications  · Health Risk Assessment  · Health Maintenance  · Care Team    **See Completed Assessments for Annual Wellness visit with in the encounter summary    The following assessments were completed:  · Depression Screening  · Cognitive function Screening      · Timed Get Up Test  · Whisper Test    Vitals:    10/22/20 1140   BP: (!) 140/86   Pulse: 82   Temp: 97.7 °F (36.5 °C)   TempSrc: Temporal   SpO2: 98%   Weight: 77.1 kg (170 lb)   Height: 5' 1" (1.549 m)     Body mass index is 32.12 kg/m².   ]    Physical Exam  Vitals signs reviewed.   Constitutional:       Appearance: Normal appearance. She is obese.   HENT:      Head: Normocephalic and atraumatic.   Neck:      Musculoskeletal: Normal range of motion and neck supple.   Cardiovascular:      Rate and Rhythm: Normal rate and regular rhythm.      Pulses: Normal pulses.      Heart sounds: Normal heart sounds.   Pulmonary:      Effort: Pulmonary effort is normal.      Breath sounds: Normal breath sounds.   Musculoskeletal: Normal range of motion.      Right lower leg: Edema (1+) present.      Left lower leg: Edema (1+) present.   Skin:     General: Skin is warm and dry.   Neurological:      Mental Status: She is alert and oriented to person, place, and time.      Gait: Gait abnormal.   Psychiatric:         Mood and Affect: Mood normal.         Behavior: Behavior normal.          Outpatient Medications Marked as Taking for the 10/22/20 encounter (Office Visit) with CORNELIO Genao   Medication Sig Dispense Refill    ALPRAZolam (XANAX) 0.25 MG tablet TAKE 1 TABLET BY MOUTH AT BEDTIME FOR SLEEP 30 tablet 0    amitriptyline (ELAVIL) 50 MG tablet Take 1 tablet (50 mg total) by mouth once daily. 30 tablet 4    atorvastatin (LIPITOR) 10 MG tablet Take 1 tablet (10 mg total) by " mouth once daily. 90 tablet 3    diclofenac sodium (VOLTAREN) 1 % Gel APPLY TWO GRAMS TO THE AFFECTED AREA TWICE A  g 1    naproxen sodium (ANAPROX) 220 MG tablet Take 220 mg by mouth 2 (two) times daily with meals.      oxyCODONE-acetaminophen (PERCOCET) 5-325 mg per tablet TAKE ONE TABLET BY MOUTH EVERY TWELVE HOURS AS NEEDED FOR PAIN 20 tablet 0    pantoprazole (PROTONIX) 40 MG tablet Take 1 tablet (40 mg total) by mouth once daily. 30 tablet 4    sertraline (ZOLOFT) 100 MG tablet TAKE ONE TABLET BY MOUTH ONCE DAILY 90 tablet 1    valsartan-hydrochlorothiazide (DIOVAN-HCT) 160-12.5 mg per tablet Take 1 tablet by mouth once daily. 90 tablet 3        Diagnoses and health risks identified today and associated recommendations/orders:  1. Encounter for preventive health examination  Medicare awv complete. Health maintenance: Patient informed to call [facility] to schedule LDCT lung screen, DEXA scan, and mammogram-f/u with pcp on results. Shingles and flu vaccines due-discussed with patient. Annual physical exam and annual labwork with PCP due.   pain contract and opioid risk tool due-defer to prescribing MD of pain medication.     2. Essential hypertension  Chronic and stable. No acute issues. Continue current management. See med list above. Follow up with PCP.     3. Atherosclerosis of aorta  Chronic and stable. No acute issues. Continue current management. See med list above. Follow up with PCP.     4. Chronic asthma without complication, unspecified asthma severity, unspecified whether persistent  Chronic and stable. No acute issues. Continue current management. See med list above. Follow up with PCP.     5. Alpha thalassemia  Results for SHA WHITE (MRN [id number]) as of 10/22/2020 14:09   Ref. Range 6/3/2019 10:15 8/14/2019 15:01 8/14/2019 16:23   RBC Latest Ref Range: 4.00 - 5.40 M/uL 4.97  5.14   Hemoglobin Latest Ref Range: 12.0 - 16.0 g/dL 10.5 (L)  10.9 (L)   Hematocrit Latest Ref Range: 37.0 -  48.5 % 35.9 (L)  35.3 (L)   Chronic and stable. No acute issues. Continue current management. See med list above. Follow up with hematology.     6. Arthritis of multiple sites  Chronic and stable. No acute issues. Continue current management. See med list above. Follow up with PCP.     7. Osteopenia, unspecified location  Chronic and stable. No acute issues. Continue current management. Recommend vitamin d supplement, calcium in the diet, and weight bearing exercise.  Follow up with PCP.     8. Ex-smoker    stable. No acute issues. LDCT lung screen due-patient to call to schedule. Follow up with PCP.     9. Other chronic pain  Chronic and stable. No acute issues. Continue current management. See med list above. Follow up with PCP.     10. Primary open angle glaucoma (POAG) of both eyes, severe stage  Chronic and stable. No acute issues. Continue current management. See med list above. Follow up with ophthalmology.     11. Lumbar spondylosis  Chronic and stable. No acute issues. Continue current management. See med list above. Follow up with PCP.     12. Chronic anxiety  Chronic and stable. No acute issues. Continue current management. See med list above. Follow up with PCP.     13. Moderate major depression  Chronic and stable. No acute issues. Continue current management. See med list above. Follow up with PCP.     14. Breast cancer screening by mammogram  - Mammo Digital Screening Bilat; Future. F/u with pcp on results.     15. Dependence on other enabling machines and devices  Chronic and stable. No acute issues. Continue current management. Fall precautions recommended. Follow up with PCP.      16. Abnormality of gait and mobility  Chronic and stable. No acute issues. Continue current management. Fall precautions recommended. Follow up with PCP.      17. Gait instability  Chronic and stable. No acute issues. Continue current management. Fall precautions recommended. Follow up with PCP.            Xander Metcalf  with a 5-10 year written screening schedule and personal prevention plan. Recommendations were developed using the USPSTF age appropriate recommendations. Education, counseling, and referrals were provided as needed.  After Visit Summary printed and given to patient which includes a list of additional screenings\tests needed.    Follow up in about 1 year (around 10/22/2021) for annual wellness visit.      CORNELIO Genao  I offered to discuss end of life issues, including information on how to make advance directives that the patient could use to name someone who would make medical decisions on their behalf if they became too ill to make themselves.    ___Patient declined  _X_Patient is interested, I provided paper work and offered to discuss.

## 2020-10-22 NOTE — Clinical Note
Medicare awv complete. Health maintenance: Patient informed to call [facility] to schedule LDCT lung screen, DEXA scan, and mammogram-f/u with pcp on results. Shingles and flu vaccines due-discussed with patient. Annual physical exam and annual labwork with PCP due.   pain contract and opioid risk tool due-defer to prescribing MD of pain medication.

## 2020-10-22 NOTE — PATIENT INSTRUCTIONS
Counseling and Referral of Other Preventative  (Italic type indicates deductible and co-insurance are waived)    Patient Name: Tea Ring  Today's Date: 10/22/2020    Health Maintenance       Date Due Completion Date    Sign Pain Contract 10/23/1963 ---    Complete Opioid Risk Tool 10/23/1963 ---    LDCT Lung Screen 08/25/2017 8/25/2016    Shingles Vaccine (2 of 3) 12/21/2017 10/26/2017    DEXA SCAN 01/07/2018 1/7/2016    Influenza Vaccine (1) 08/01/2020 10/8/2019    Mammogram 11/19/2020 11/19/2019    Override on 6/10/2015: Done (summer 15 womans)    Lipid Panel 05/26/2021 5/26/2020    High Dose Statin 10/22/2021 10/22/2020    TETANUS VACCINE 07/25/2026 7/25/2016    Colorectal Cancer Screening 08/02/2027 8/2/2017        Orders Placed This Encounter   Procedures    Mammo Digital Screening Bilat     The following information is provided to all patients.  This information is to help you find resources for any of the problems found today that may be affecting your health:                Living healthy guide: www.[facility].[place].gov      Understanding Diabetes: www.diabetes.org      Eating healthy: www.cdc.gov/healthyweight      CDC home safety checklist: www.cdc.gov/steadi/patient.html      Agency on Aging: www.goea.[place].[facility]      Alcoholics anonymous (AA): www.aa.org      Physical Activity: www.anthony.nih.gov/aq9gpkl      Tobacco use: www.quitwithusla.org     
no fever

## 2020-10-27 DIAGNOSIS — F33.1 MODERATE EPISODE OF RECURRENT MAJOR DEPRESSIVE DISORDER: ICD-10-CM

## 2020-10-27 RX ORDER — AMITRIPTYLINE HYDROCHLORIDE 50 MG/1
50 TABLET, FILM COATED ORAL DAILY
Qty: 30 TABLET | Refills: 4 | Status: SHIPPED | OUTPATIENT
Start: 2020-10-27 | End: 2021-03-02

## 2020-11-10 ENCOUNTER — OFFICE VISIT (OUTPATIENT)
Dept: INTERNAL MEDICINE | Facility: CLINIC | Age: 75
End: 2020-11-10
Payer: MEDICARE

## 2020-11-10 VITALS
HEART RATE: 88 BPM | OXYGEN SATURATION: 98 % | DIASTOLIC BLOOD PRESSURE: 70 MMHG | TEMPERATURE: 98 F | HEIGHT: 61 IN | WEIGHT: 170.88 LBS | SYSTOLIC BLOOD PRESSURE: 124 MMHG | BODY MASS INDEX: 32.26 KG/M2

## 2020-11-10 DIAGNOSIS — M47.816 LUMBAR SPONDYLOSIS: ICD-10-CM

## 2020-11-10 DIAGNOSIS — Z00.00 ENCOUNTER FOR SCREENING AND PREVENTATIVE CARE: Primary | ICD-10-CM

## 2020-11-10 DIAGNOSIS — I10 ESSENTIAL HYPERTENSION: ICD-10-CM

## 2020-11-10 DIAGNOSIS — F41.9 CHRONIC ANXIETY: ICD-10-CM

## 2020-11-10 PROCEDURE — 99214 PR OFFICE/OUTPT VISIT, EST, LEVL IV, 30-39 MIN: ICD-10-PCS | Mod: HCNC,S$GLB,, | Performed by: NURSE PRACTITIONER

## 2020-11-10 PROCEDURE — 99214 OFFICE O/P EST MOD 30 MIN: CPT | Mod: HCNC,S$GLB,, | Performed by: NURSE PRACTITIONER

## 2020-11-10 PROCEDURE — 3078F DIAST BP <80 MM HG: CPT | Mod: HCNC,CPTII,S$GLB, | Performed by: NURSE PRACTITIONER

## 2020-11-10 PROCEDURE — 99999 PR PBB SHADOW E&M-EST. PATIENT-LVL III: CPT | Mod: PBBFAC,HCNC,, | Performed by: NURSE PRACTITIONER

## 2020-11-10 PROCEDURE — 1125F PR PAIN SEVERITY QUANTIFIED, PAIN PRESENT: ICD-10-PCS | Mod: HCNC,S$GLB,, | Performed by: NURSE PRACTITIONER

## 2020-11-10 PROCEDURE — 3074F SYST BP LT 130 MM HG: CPT | Mod: HCNC,CPTII,S$GLB, | Performed by: NURSE PRACTITIONER

## 2020-11-10 PROCEDURE — 3074F PR MOST RECENT SYSTOLIC BLOOD PRESSURE < 130 MM HG: ICD-10-PCS | Mod: HCNC,CPTII,S$GLB, | Performed by: NURSE PRACTITIONER

## 2020-11-10 PROCEDURE — 1159F PR MEDICATION LIST DOCUMENTED IN MEDICAL RECORD: ICD-10-PCS | Mod: HCNC,S$GLB,, | Performed by: NURSE PRACTITIONER

## 2020-11-10 PROCEDURE — 3008F PR BODY MASS INDEX (BMI) DOCUMENTED: ICD-10-PCS | Mod: HCNC,CPTII,S$GLB, | Performed by: NURSE PRACTITIONER

## 2020-11-10 PROCEDURE — 1125F AMNT PAIN NOTED PAIN PRSNT: CPT | Mod: HCNC,S$GLB,, | Performed by: NURSE PRACTITIONER

## 2020-11-10 PROCEDURE — 1101F PT FALLS ASSESS-DOCD LE1/YR: CPT | Mod: HCNC,CPTII,S$GLB, | Performed by: NURSE PRACTITIONER

## 2020-11-10 PROCEDURE — 3008F BODY MASS INDEX DOCD: CPT | Mod: HCNC,CPTII,S$GLB, | Performed by: NURSE PRACTITIONER

## 2020-11-10 PROCEDURE — 99999 PR PBB SHADOW E&M-EST. PATIENT-LVL III: ICD-10-PCS | Mod: PBBFAC,HCNC,, | Performed by: NURSE PRACTITIONER

## 2020-11-10 PROCEDURE — 1101F PR PT FALLS ASSESS DOC 0-1 FALLS W/OUT INJ PAST YR: ICD-10-PCS | Mod: HCNC,CPTII,S$GLB, | Performed by: NURSE PRACTITIONER

## 2020-11-10 PROCEDURE — 3078F PR MOST RECENT DIASTOLIC BLOOD PRESSURE < 80 MM HG: ICD-10-PCS | Mod: HCNC,CPTII,S$GLB, | Performed by: NURSE PRACTITIONER

## 2020-11-10 PROCEDURE — 1159F MED LIST DOCD IN RCRD: CPT | Mod: HCNC,S$GLB,, | Performed by: NURSE PRACTITIONER

## 2020-11-10 RX ORDER — ALPRAZOLAM 0.25 MG/1
0.25 TABLET ORAL NIGHTLY
Qty: 30 TABLET | Refills: 0 | Status: SHIPPED | OUTPATIENT
Start: 2020-11-10 | End: 2020-12-20

## 2020-11-10 NOTE — PROGRESS NOTES
Subjective:       Patient ID: Tea Ring is a 75 y.o. female.    Chief Complaint: Back Pain    Mrs. Ring presents to clinic to discuss preventative health screenings that was written down by nurse that came to her home to perform wellness evaluation. She instructed pt that the following needed to be performed: Low Dose lung CT, DEXA, mammogram, flu vaccine, and shingrix. All orders already in place. Flu shot yesterday at pain management. Back pain is currently being treated by pain management with percocet. No changes in back pain since yesterday's visit with pain management.     Back Pain  This is a chronic problem. The current episode started more than 1 year ago. The problem occurs intermittently. The problem has been waxing and waning since onset. The pain is present in the lumbar spine. The pain does not radiate. The pain is at a severity of 9/10. The pain is moderate. The symptoms are aggravated by bending, position and twisting. Pertinent negatives include no abdominal pain, bladder incontinence, bowel incontinence, chest pain, dysuria, fever, headaches, leg pain, numbness, paresthesias or tingling. She has tried analgesics, heat, muscle relaxant and NSAIDs for the symptoms. The treatment provided moderate relief.       Patient Active Problem List   Diagnosis    Essential hypertension    Chronic pain    Depression    Chronic anxiety    Cutaneous lupus erythematosus    Alpha thalassemia    Osteopenia    Ex-smoker    Chronic asthma without complication    Lumbar spondylosis    Atherosclerosis of aorta    History of total right knee replacement    Right knee pain    Gait instability    Medication noncompliance due to cognitive impairment    Encounter for screening and preventative care    Moderate major depression    Primary open angle glaucoma (POAG) of both eyes, severe stage    Arthritis of multiple sites    Adhesive capsulitis of right shoulder    Personal history of nicotine dependence         Family History   Problem Relation Age of Onset    Diabetes Mother     Diabetes Father     Breast cancer Maternal Aunt      Past Surgical History:   Procedure Laterality Date    ANKLE SURGERY Left     BACK SURGERY      COLONOSCOPY N/A 8/2/2017    Procedure: COLONOSCOPY;  Surgeon: Virgil Oliva MD;  Location: [facility];  Service: Endoscopy;  Laterality: N/A;    HYSTERECTOMY      INJECTION OF ANESTHETIC AGENT AROUND MEDIAL BRANCH NERVES INNERVATING LUMBAR FACET JOINT Bilateral 6/17/2020    Procedure: Bilateral L3-5 MBB;  Surgeon: Yury Moore MD;  [facility];  Service: Pain Management;  Laterality: Bilateral;    INJECTION OF ANESTHETIC AGENT AROUND NERVE Bilateral 5/19/2020    Procedure: Bilateral Genicular nerve block with RN IV sedation Covid testing day of procedure PT does not drive;  Surgeon: Yury Moore MD;  [facility];  Service: Pain Management;  Laterality: Bilateral;    KNEE ARTHROSCOPY      both knees twice    OOPHORECTOMY      RADIOFREQUENCY THERMOCOAGULATION Right 7/14/2020    Procedure: Right L3-5 Lumbar RFA;  Surgeon: Yury Moore MD;  Location: [facility];  Service: Pain Management;  Laterality: Right;    RADIOFREQUENCY THERMOCOAGULATION Left 8/6/2020    Procedure: Left L3-5 Lumbar RFA;  Surgeon: Yury Moore MD;  Location: [facility];  Service: Pain Management;  Laterality: Left;         Current Outpatient Medications:     ALPRAZolam (XANAX) 0.25 MG tablet, Take 1 tablet (0.25 mg total) by mouth nightly., Disp: 30 tablet, Rfl: 0    amitriptyline (ELAVIL) 50 MG tablet, Take 1 tablet (50 mg total) by mouth once daily., Disp: 30 tablet, Rfl: 4    atorvastatin (LIPITOR) 10 MG tablet, Take 1 tablet (10 mg total) by mouth once daily., Disp: 90 tablet, Rfl: 3    baclofen (LIORESAL) 10 MG tablet, Take 0.5 tablets (5 mg total) by mouth 2 (two) times daily., Disp: 30 tablet, Rfl: 0    benzocaine-lanolin (DERMOPLAST) 20-0.5 % Aero,  "Apply topically 4 (four) times daily as needed., Disp: , Rfl:     betamethasone valerate 0.1% (VALISONE) 0.1 % Crea, Apply topically 2 (two) times daily., Disp: 15 g, Rfl: 2    diclofenac sodium (VOLTAREN) 1 % Gel, APPLY TWO GRAMS TO THE AFFECTED AREA TWICE A DAY, Disp: 100 g, Rfl: 1    naproxen sodium (ANAPROX) 220 MG tablet, Take 220 mg by mouth 2 (two) times daily with meals., Disp: , Rfl:     oxyCODONE-acetaminophen (PERCOCET) 5-325 mg per tablet, TAKE ONE TABLET BY MOUTH EVERY TWELVE HOURS AS NEEDED FOR PAIN, Disp: 20 tablet, Rfl: 0    pantoprazole (PROTONIX) 40 MG tablet, Take 1 tablet (40 mg total) by mouth once daily., Disp: 30 tablet, Rfl: 4    sertraline (ZOLOFT) 100 MG tablet, TAKE ONE TABLET BY MOUTH ONCE DAILY, Disp: 90 tablet, Rfl: 1    valsartan-hydrochlorothiazide (DIOVAN-HCT) 160-12.5 mg per tablet, Take 1 tablet by mouth once daily., Disp: 90 tablet, Rfl: 3    Review of Systems   Constitutional: Negative for chills, fatigue and fever.   Respiratory: Negative for cough and shortness of breath.    Cardiovascular: Negative for chest pain and palpitations.   Gastrointestinal: Negative for abdominal pain, bowel incontinence, nausea and vomiting.   Genitourinary: Negative for bladder incontinence and dysuria.   Musculoskeletal: Positive for arthralgias and back pain. Negative for gait problem.   Neurological: Negative for dizziness, tingling, syncope, light-headedness, numbness, headaches and paresthesias.       Objective:   /70   Pulse 88   Temp 98.2 °F (36.8 °C) (Temporal)   Ht 5' 1" (1.549 m)   Wt 77.5 kg (170 lb 13.7 oz)   SpO2 98%   BMI 32.28 kg/m²      Physical Exam  Constitutional:       General: She is not in acute distress.     Appearance: Normal appearance. She is not ill-appearing.   HENT:      Head: Normocephalic and atraumatic.   Neck:      Musculoskeletal: Normal range of motion and neck supple. No muscular tenderness.   Cardiovascular:      Rate and Rhythm: Normal rate " "and regular rhythm.      Pulses: Normal pulses.      Heart sounds: Normal heart sounds. No murmur. No friction rub. No gallop.    Pulmonary:      Effort: Pulmonary effort is normal. No respiratory distress.      Breath sounds: Normal breath sounds.   Musculoskeletal: Normal range of motion.      Right lower leg: No edema.      Left lower leg: No edema.   Lymphadenopathy:      Cervical: No cervical adenopathy.   Skin:     General: Skin is warm and dry.      Coloration: Skin is not pale.      Findings: No erythema.   Neurological:      General: No focal deficit present.      Mental Status: She is alert and oriented to person, place, and time.         Assessment & Plan     Problem List Items Addressed This Visit        Neuro    Lumbar spondylosis    Current Assessment & Plan     Continue follow up with pain management.             Psychiatric    Chronic anxiety    Current Assessment & Plan      reviewed. Discussed risk associated with opioid pain medication and benzo. Pt verbalized understanding. Has been on both medications for some time now. No concerns for abuse. Refill sent for xanax. Continue zoloft also.         Relevant Medications    ALPRAZolam (XANAX) 0.25 MG tablet       Cardiac/Vascular    Essential hypertension    Current Assessment & Plan     Bp controlled. Continue current medications.             Other    Encounter for screening and preventative care - Primary    Current Assessment & Plan     Orders for preventative screenings already in place.                 Follow up in about 3 months (around 2/10/2021).          Portions of this note may have been created with voice recognition software. Occasional "wrong-word" or "sound-a-like" substitutions may have occurred due to the inherent limitations of voice recognition software. Please, read the note carefully and recognize, using context, where substitutions have occurred.       "

## 2021-01-11 DIAGNOSIS — F41.9 CHRONIC ANXIETY: ICD-10-CM

## 2021-01-12 RX ORDER — ALPRAZOLAM 0.25 MG/1
TABLET ORAL
Qty: 30 TABLET | Refills: 0 | Status: SHIPPED | OUTPATIENT
Start: 2021-01-20 | End: 2021-03-02

## 2021-01-26 ENCOUNTER — TELEPHONE (OUTPATIENT)
Dept: ADMINISTRATIVE | Facility: HOSPITAL | Age: 76
End: 2021-01-26

## 2021-01-27 RX ORDER — SERTRALINE HYDROCHLORIDE 100 MG/1
100 TABLET, FILM COATED ORAL DAILY
Qty: 90 TABLET | Refills: 1 | Status: SHIPPED | OUTPATIENT
Start: 2021-01-27 | End: 2021-03-29 | Stop reason: SDUPTHER

## 2021-02-15 PROBLEM — Z00.00 ENCOUNTER FOR SCREENING AND PREVENTATIVE CARE: Status: RESOLVED | Noted: 2018-07-02 | Resolved: 2021-02-15

## 2021-03-08 ENCOUNTER — HOSPITAL ENCOUNTER (EMERGENCY)
Facility: HOSPITAL | Age: 76
Discharge: HOME | End: 2021-03-08
Attending: STUDENT IN AN ORGANIZED HEALTH CARE EDUCATION/TRAINING PROGRAM
Payer: MEDICARE

## 2021-03-08 VITALS
OXYGEN SATURATION: 99 % | HEART RATE: 99 BPM | TEMPERATURE: 99 F | RESPIRATION RATE: 16 BRPM | WEIGHT: 173.31 LBS | BODY MASS INDEX: 29.59 KG/M2 | SYSTOLIC BLOOD PRESSURE: 159 MMHG | DIASTOLIC BLOOD PRESSURE: 94 MMHG | HEIGHT: 64 IN

## 2021-03-08 DIAGNOSIS — S16.1XXA CERVICAL STRAIN, ACUTE, INITIAL ENCOUNTER: ICD-10-CM
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
## 2021-03-08 DIAGNOSIS — S09.90XA INJURY OF HEAD, INITIAL ENCOUNTER: Primary | ICD-10-CM
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
## 2021-03-08 DIAGNOSIS — S39.012A STRAIN OF LUMBAR REGION, INITIAL ENCOUNTER: ICD-10-CM

## 2021-03-08 DIAGNOSIS — S16.1XXA STRAIN OF NECK MUSCLE, INITIAL ENCOUNTER: ICD-10-CM

## 2021-03-08 PROCEDURE — 96372 THER/PROPH/DIAG INJ SC/IM: CPT | Mod: 59,ER

## 2021-03-08 PROCEDURE — 63600175 PHARM REV CODE 636 W HCPCS: Mod: ER | Performed by: STUDENT IN AN ORGANIZED HEALTH CARE EDUCATION/TRAINING PROGRAM

## 2021-03-08 PROCEDURE — 99285 EMERGENCY DEPT VISIT HI MDM: CPT | Mod: 25,ER

## 2021-03-08 RX ORDER — MEPERIDINE HYDROCHLORIDE 25 MG/ML
12.5 INJECTION INTRAMUSCULAR; INTRAVENOUS; SUBCUTANEOUS
Status: COMPLETED | OUTPATIENT
Start: 2021-03-08 | End: 2021-03-08

## 2021-03-08 RX ORDER — CYCLOBENZAPRINE HCL 10 MG
10 TABLET ORAL 3 TIMES DAILY PRN
Qty: 15 TABLET | Refills: 0 | Status: SHIPPED | OUTPATIENT
Start: 2021-03-08 | End: 2021-03-13

## 2021-03-08 RX ADMIN — MEPERIDINE HYDROCHLORIDE 12.5 MG: 25 INJECTION INTRAMUSCULAR; INTRAVENOUS; SUBCUTANEOUS at 10:03

## 2021-03-26 ENCOUNTER — TELEPHONE (OUTPATIENT)
Dept: INTERNAL MEDICINE | Facility: CLINIC | Age: 76
End: 2021-03-26

## 2021-03-29 ENCOUNTER — OFFICE VISIT (OUTPATIENT)
Dept: INTERNAL MEDICINE | Facility: CLINIC | Age: 76
End: 2021-03-29
Payer: MEDICARE

## 2021-03-29 VITALS
HEART RATE: 97 BPM | SYSTOLIC BLOOD PRESSURE: 104 MMHG | WEIGHT: 171.94 LBS | OXYGEN SATURATION: 97 % | HEIGHT: 64 IN | DIASTOLIC BLOOD PRESSURE: 60 MMHG | TEMPERATURE: 98 F | BODY MASS INDEX: 29.35 KG/M2

## 2021-03-29 DIAGNOSIS — I10 ESSENTIAL HYPERTENSION: ICD-10-CM

## 2021-03-29 DIAGNOSIS — K21.9 GASTROESOPHAGEAL REFLUX DISEASE, UNSPECIFIED WHETHER ESOPHAGITIS PRESENT: ICD-10-CM

## 2021-03-29 DIAGNOSIS — F41.9 CHRONIC ANXIETY: ICD-10-CM

## 2021-03-29 DIAGNOSIS — M47.816 LUMBAR SPONDYLOSIS: ICD-10-CM

## 2021-03-29 DIAGNOSIS — I70.0 ATHEROSCLEROSIS OF AORTA: Chronic | ICD-10-CM
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
## 2021-03-29 DIAGNOSIS — G89.29 OTHER CHRONIC PAIN: ICD-10-CM

## 2021-03-29 DIAGNOSIS — D56.0 ALPHA THALASSEMIA: Chronic | ICD-10-CM

## 2021-03-29 DIAGNOSIS — F32.1 MODERATE MAJOR DEPRESSION: ICD-10-CM
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
## 2021-03-29 PROCEDURE — 3288F FALL RISK ASSESSMENT DOCD: CPT | Mod: CPTII,S$GLB,, | Performed by: NURSE PRACTITIONER

## 2021-03-29 PROCEDURE — 3074F SYST BP LT 130 MM HG: CPT | Mod: CPTII,S$GLB,, | Performed by: NURSE PRACTITIONER

## 2021-03-29 PROCEDURE — 99999 PR PBB SHADOW E&M-EST. PATIENT-LVL IV: ICD-10-PCS | Mod: PBBFAC,,, | Performed by: NURSE PRACTITIONER

## 2021-03-29 PROCEDURE — 99214 OFFICE O/P EST MOD 30 MIN: CPT | Mod: S$GLB,,, | Performed by: NURSE PRACTITIONER

## 2021-03-29 PROCEDURE — 3008F PR BODY MASS INDEX (BMI) DOCUMENTED: ICD-10-PCS | Mod: CPTII,S$GLB,, | Performed by: NURSE PRACTITIONER

## 2021-03-29 PROCEDURE — 1159F PR MEDICATION LIST DOCUMENTED IN MEDICAL RECORD: ICD-10-PCS | Mod: S$GLB,,, | Performed by: NURSE PRACTITIONER

## 2021-03-29 PROCEDURE — 99214 PR OFFICE/OUTPT VISIT, EST, LEVL IV, 30-39 MIN: ICD-10-PCS | Mod: S$GLB,,, | Performed by: NURSE PRACTITIONER

## 2021-03-29 PROCEDURE — 1159F MED LIST DOCD IN RCRD: CPT | Mod: S$GLB,,, | Performed by: NURSE PRACTITIONER

## 2021-03-29 PROCEDURE — 1125F AMNT PAIN NOTED PAIN PRSNT: CPT | Mod: S$GLB,,, | Performed by: NURSE PRACTITIONER

## 2021-03-29 PROCEDURE — 1101F PT FALLS ASSESS-DOCD LE1/YR: CPT | Mod: CPTII,S$GLB,, | Performed by: NURSE PRACTITIONER

## 2021-03-29 PROCEDURE — 3074F PR MOST RECENT SYSTOLIC BLOOD PRESSURE < 130 MM HG: ICD-10-PCS | Mod: CPTII,S$GLB,, | Performed by: NURSE PRACTITIONER

## 2021-03-29 PROCEDURE — 3078F DIAST BP <80 MM HG: CPT | Mod: CPTII,S$GLB,, | Performed by: NURSE PRACTITIONER

## 2021-03-29 PROCEDURE — 99999 PR PBB SHADOW E&M-EST. PATIENT-LVL IV: CPT | Mod: PBBFAC,,, | Performed by: NURSE PRACTITIONER

## 2021-03-29 PROCEDURE — 99499 UNLISTED E&M SERVICE: CPT | Mod: S$GLB,,, | Performed by: NURSE PRACTITIONER

## 2021-03-29 PROCEDURE — 1101F PR PT FALLS ASSESS DOC 0-1 FALLS W/OUT INJ PAST YR: ICD-10-PCS | Mod: CPTII,S$GLB,, | Performed by: NURSE PRACTITIONER

## 2021-03-29 PROCEDURE — 3008F BODY MASS INDEX DOCD: CPT | Mod: CPTII,S$GLB,, | Performed by: NURSE PRACTITIONER

## 2021-03-29 PROCEDURE — 1125F PR PAIN SEVERITY QUANTIFIED, PAIN PRESENT: ICD-10-PCS | Mod: S$GLB,,, | Performed by: NURSE PRACTITIONER

## 2021-03-29 PROCEDURE — 99499 RISK ADDL DX/OHS AUDIT: ICD-10-PCS | Mod: S$GLB,,, | Performed by: NURSE PRACTITIONER

## 2021-03-29 PROCEDURE — 3288F PR FALLS RISK ASSESSMENT DOCUMENTED: ICD-10-PCS | Mod: CPTII,S$GLB,, | Performed by: NURSE PRACTITIONER

## 2021-03-29 PROCEDURE — 3078F PR MOST RECENT DIASTOLIC BLOOD PRESSURE < 80 MM HG: ICD-10-PCS | Mod: CPTII,S$GLB,, | Performed by: NURSE PRACTITIONER

## 2021-03-29 RX ORDER — OXYCODONE AND ACETAMINOPHEN 10; 325 MG/1; MG/1
1 TABLET ORAL EVERY 8 HOURS PRN
Status: ON HOLD | COMMUNITY
Start: 2021-03-09 | End: 2022-08-18 | Stop reason: SDUPTHER

## 2021-03-29 RX ORDER — PANTOPRAZOLE SODIUM 40 MG/1
40 TABLET, DELAYED RELEASE ORAL
COMMUNITY
Start: 2021-03-12 | End: 2021-03-29 | Stop reason: SDUPTHER

## 2021-03-29 RX ORDER — LANOLIN ALCOHOL/MO/W.PET/CERES
400 CREAM (GRAM) TOPICAL
COMMUNITY
Start: 2021-03-12

## 2021-03-29 RX ORDER — ATORVASTATIN CALCIUM 10 MG/1
10 TABLET, FILM COATED ORAL DAILY
COMMUNITY
Start: 2021-03-12 | End: 2021-06-03 | Stop reason: SDUPTHER

## 2021-03-29 RX ORDER — VALSARTAN AND HYDROCHLOROTHIAZIDE 160; 12.5 MG/1; MG/1
1 TABLET, FILM COATED ORAL DAILY
COMMUNITY
Start: 2021-03-12 | End: 2021-06-03 | Stop reason: SDUPTHER

## 2021-03-29 RX ORDER — PANTOPRAZOLE SODIUM 40 MG/1
40 TABLET, DELAYED RELEASE ORAL DAILY
Qty: 90 TABLET | Refills: 1 | Status: SHIPPED | OUTPATIENT
Start: 2021-03-29 | End: 2021-06-03 | Stop reason: SDUPTHER

## 2021-03-29 RX ORDER — SERTRALINE HYDROCHLORIDE 100 MG/1
100 TABLET, FILM COATED ORAL DAILY
Qty: 90 TABLET | Refills: 1 | Status: SHIPPED | OUTPATIENT
Start: 2021-03-29 | End: 2021-06-03

## 2021-03-29 RX ORDER — SERTRALINE HYDROCHLORIDE 100 MG/1
100 TABLET, FILM COATED ORAL
COMMUNITY
Start: 2021-03-12 | End: 2021-03-29 | Stop reason: SDUPTHER

## 2021-03-29 RX ORDER — ALPRAZOLAM 0.25 MG/1
0.25 TABLET ORAL NIGHTLY
Qty: 30 TABLET | Refills: 0 | Status: SHIPPED | OUTPATIENT
Start: 2021-03-31 | End: 2021-04-28

## 2021-03-29 RX ORDER — AMITRIPTYLINE HYDROCHLORIDE 50 MG/1
50 TABLET, FILM COATED ORAL DAILY
COMMUNITY
Start: 2021-03-12 | End: 2021-06-03 | Stop reason: SDUPTHER

## 2021-04-28 DIAGNOSIS — F41.9 CHRONIC ANXIETY: ICD-10-CM

## 2021-04-28 RX ORDER — ALPRAZOLAM 0.25 MG/1
TABLET ORAL
Qty: 30 TABLET | Refills: 0 | Status: SHIPPED | OUTPATIENT
Start: 2021-04-28 | End: 2021-05-21

## 2021-05-11 DIAGNOSIS — F41.9 CHRONIC ANXIETY: ICD-10-CM

## 2021-05-11 RX ORDER — ALPRAZOLAM 0.25 MG/1
0.25 TABLET ORAL NIGHTLY
Qty: 30 TABLET | Refills: 0 | OUTPATIENT
Start: 2021-05-11

## 2021-05-31 ENCOUNTER — PATIENT OUTREACH (OUTPATIENT)
Dept: ADMINISTRATIVE | Facility: OTHER | Age: 76
End: 2021-05-31

## 2021-06-02 ENCOUNTER — TELEPHONE (OUTPATIENT)
Dept: INTERNAL MEDICINE | Facility: CLINIC | Age: 76
End: 2021-06-02

## 2021-06-03 ENCOUNTER — OFFICE VISIT (OUTPATIENT)
Dept: INTERNAL MEDICINE | Facility: CLINIC | Age: 76
End: 2021-06-03
Payer: MEDICARE

## 2021-06-03 VITALS
WEIGHT: 172.38 LBS | SYSTOLIC BLOOD PRESSURE: 130 MMHG | RESPIRATION RATE: 16 BRPM | DIASTOLIC BLOOD PRESSURE: 82 MMHG | OXYGEN SATURATION: 98 % | BODY MASS INDEX: 29.43 KG/M2 | HEIGHT: 64 IN | TEMPERATURE: 98 F | HEART RATE: 89 BPM

## 2021-06-03 DIAGNOSIS — F41.9 CHRONIC ANXIETY: ICD-10-CM

## 2021-06-03 DIAGNOSIS — G89.29 OTHER CHRONIC PAIN: ICD-10-CM

## 2021-06-03 DIAGNOSIS — Z12.31 ENCOUNTER FOR SCREENING MAMMOGRAM FOR BREAST CANCER: ICD-10-CM

## 2021-06-03 DIAGNOSIS — I10 ESSENTIAL HYPERTENSION: Primary | ICD-10-CM

## 2021-06-03 DIAGNOSIS — M32.9 SYSTEMIC LUPUS ERYTHEMATOSUS, UNSPECIFIED SLE TYPE, UNSPECIFIED ORGAN INVOLVEMENT STATUS: ICD-10-CM

## 2021-06-03 DIAGNOSIS — F32.1 MODERATE MAJOR DEPRESSION: ICD-10-CM

## 2021-06-03 DIAGNOSIS — Z87.891 PERSONAL HISTORY OF NICOTINE DEPENDENCE: ICD-10-CM

## 2021-06-03 DIAGNOSIS — K21.9 GASTROESOPHAGEAL REFLUX DISEASE, UNSPECIFIED WHETHER ESOPHAGITIS PRESENT: ICD-10-CM

## 2021-06-03 PROCEDURE — 1159F PR MEDICATION LIST DOCUMENTED IN MEDICAL RECORD: ICD-10-PCS | Mod: S$GLB,,, | Performed by: FAMILY MEDICINE

## 2021-06-03 PROCEDURE — 1126F AMNT PAIN NOTED NONE PRSNT: CPT | Mod: S$GLB,,, | Performed by: FAMILY MEDICINE

## 2021-06-03 PROCEDURE — 99999 PR PBB SHADOW E&M-EST. PATIENT-LVL IV: ICD-10-PCS | Mod: PBBFAC,,, | Performed by: FAMILY MEDICINE

## 2021-06-03 PROCEDURE — 3288F PR FALLS RISK ASSESSMENT DOCUMENTED: ICD-10-PCS | Mod: CPTII,S$GLB,, | Performed by: FAMILY MEDICINE

## 2021-06-03 PROCEDURE — 99499 RISK ADDL DX/OHS AUDIT: ICD-10-PCS | Mod: HCNC,S$GLB,, | Performed by: FAMILY MEDICINE

## 2021-06-03 PROCEDURE — 3288F FALL RISK ASSESSMENT DOCD: CPT | Mod: CPTII,S$GLB,, | Performed by: FAMILY MEDICINE

## 2021-06-03 PROCEDURE — 1101F PT FALLS ASSESS-DOCD LE1/YR: CPT | Mod: CPTII,S$GLB,, | Performed by: FAMILY MEDICINE

## 2021-06-03 PROCEDURE — 99214 PR OFFICE/OUTPT VISIT, EST, LEVL IV, 30-39 MIN: ICD-10-PCS | Mod: S$GLB,,, | Performed by: FAMILY MEDICINE

## 2021-06-03 PROCEDURE — 99214 OFFICE O/P EST MOD 30 MIN: CPT | Mod: S$GLB,,, | Performed by: FAMILY MEDICINE

## 2021-06-03 PROCEDURE — 3008F PR BODY MASS INDEX (BMI) DOCUMENTED: ICD-10-PCS | Mod: CPTII,S$GLB,, | Performed by: FAMILY MEDICINE

## 2021-06-03 PROCEDURE — 1159F MED LIST DOCD IN RCRD: CPT | Mod: S$GLB,,, | Performed by: FAMILY MEDICINE

## 2021-06-03 PROCEDURE — 99499 UNLISTED E&M SERVICE: CPT | Mod: HCNC,S$GLB,, | Performed by: FAMILY MEDICINE

## 2021-06-03 PROCEDURE — 99999 PR PBB SHADOW E&M-EST. PATIENT-LVL IV: CPT | Mod: PBBFAC,,, | Performed by: FAMILY MEDICINE

## 2021-06-03 PROCEDURE — 1101F PR PT FALLS ASSESS DOC 0-1 FALLS W/OUT INJ PAST YR: ICD-10-PCS | Mod: CPTII,S$GLB,, | Performed by: FAMILY MEDICINE

## 2021-06-03 PROCEDURE — 3008F BODY MASS INDEX DOCD: CPT | Mod: CPTII,S$GLB,, | Performed by: FAMILY MEDICINE

## 2021-06-03 PROCEDURE — 1126F PR PAIN SEVERITY QUANTIFIED, NO PAIN PRESENT: ICD-10-PCS | Mod: S$GLB,,, | Performed by: FAMILY MEDICINE

## 2021-06-03 RX ORDER — PANTOPRAZOLE SODIUM 40 MG/1
40 TABLET, DELAYED RELEASE ORAL DAILY
Qty: 90 TABLET | Refills: 3 | Status: SHIPPED | OUTPATIENT
Start: 2021-06-03 | End: 2022-07-16

## 2021-06-03 RX ORDER — ATORVASTATIN CALCIUM 10 MG/1
10 TABLET, FILM COATED ORAL DAILY
Qty: 90 TABLET | Refills: 3 | Status: SHIPPED | OUTPATIENT
Start: 2021-06-03 | End: 2022-06-29

## 2021-06-03 RX ORDER — AMITRIPTYLINE HYDROCHLORIDE 50 MG/1
50 TABLET, FILM COATED ORAL DAILY
Qty: 90 TABLET | Refills: 3 | Status: SHIPPED | OUTPATIENT
Start: 2021-06-03 | End: 2022-04-22

## 2021-06-03 RX ORDER — VALSARTAN AND HYDROCHLOROTHIAZIDE 160; 12.5 MG/1; MG/1
1 TABLET, FILM COATED ORAL DAILY
Qty: 90 TABLET | Refills: 3 | Status: SHIPPED | OUTPATIENT
Start: 2021-06-03 | End: 2022-06-29

## 2021-06-08 ENCOUNTER — TELEPHONE (OUTPATIENT)
Dept: ADMINISTRATIVE | Facility: HOSPITAL | Age: 76
End: 2021-06-08

## 2021-06-16 ENCOUNTER — OFFICE VISIT (OUTPATIENT)
Dept: OPHTHALMOLOGY | Facility: CLINIC | Age: 76
End: 2021-06-16
Payer: MEDICARE

## 2021-06-16 DIAGNOSIS — H40.1133 PRIMARY OPEN ANGLE GLAUCOMA (POAG) OF BOTH EYES, SEVERE STAGE: Primary | ICD-10-CM

## 2021-06-16 DIAGNOSIS — H25.13 AGE-RELATED NUCLEAR CATARACT OF BOTH EYES: ICD-10-CM

## 2021-06-16 PROCEDURE — 92020 GONIOSCOPY: CPT | Mod: S$GLB,,, | Performed by: STUDENT IN AN ORGANIZED HEALTH CARE EDUCATION/TRAINING PROGRAM

## 2021-06-16 PROCEDURE — 99999 PR PBB SHADOW E&M-EST. PATIENT-LVL II: CPT | Mod: PBBFAC,,, | Performed by: STUDENT IN AN ORGANIZED HEALTH CARE EDUCATION/TRAINING PROGRAM

## 2021-06-16 PROCEDURE — 92002 INTRM OPH EXAM NEW PATIENT: CPT | Mod: S$GLB,,, | Performed by: STUDENT IN AN ORGANIZED HEALTH CARE EDUCATION/TRAINING PROGRAM

## 2021-06-16 PROCEDURE — 92133 POSTERIOR SEGMENT OCT OPTIC NERVE(OCULAR COHERENCE TOMOGRAPHY) - OU - BOTH EYES: ICD-10-PCS | Mod: S$GLB,,, | Performed by: STUDENT IN AN ORGANIZED HEALTH CARE EDUCATION/TRAINING PROGRAM

## 2021-06-16 PROCEDURE — 92133 CPTRZD OPH DX IMG PST SGM ON: CPT | Mod: S$GLB,,, | Performed by: STUDENT IN AN ORGANIZED HEALTH CARE EDUCATION/TRAINING PROGRAM

## 2021-06-16 PROCEDURE — 92020 PR SPECIAL EYE EVAL,GONIOSCOPY: ICD-10-PCS | Mod: S$GLB,,, | Performed by: STUDENT IN AN ORGANIZED HEALTH CARE EDUCATION/TRAINING PROGRAM

## 2021-06-16 PROCEDURE — 92002 PR EYE EXAM, NEW PATIENT,INTERMED: ICD-10-PCS | Mod: S$GLB,,, | Performed by: STUDENT IN AN ORGANIZED HEALTH CARE EDUCATION/TRAINING PROGRAM

## 2021-06-16 PROCEDURE — 99999 PR PBB SHADOW E&M-EST. PATIENT-LVL II: ICD-10-PCS | Mod: PBBFAC,,, | Performed by: STUDENT IN AN ORGANIZED HEALTH CARE EDUCATION/TRAINING PROGRAM

## 2021-06-16 RX ORDER — LATANOPROST 50 UG/ML
1 SOLUTION/ DROPS OPHTHALMIC NIGHTLY
Qty: 2.5 ML | Refills: 2 | Status: SHIPPED | OUTPATIENT
Start: 2021-06-16 | End: 2021-09-29

## 2021-06-16 RX ORDER — TIMOLOL MALEATE 2.5 MG/ML
1 SOLUTION/ DROPS OPHTHALMIC 2 TIMES DAILY
Qty: 15 ML | Refills: 1 | Status: SHIPPED | OUTPATIENT
Start: 2021-06-16 | End: 2022-02-11

## 2021-06-21 ENCOUNTER — APPOINTMENT (OUTPATIENT)
Dept: LAB | Facility: HOSPITAL | Age: 76
End: 2021-06-21
Attending: FAMILY MEDICINE
Payer: MEDICARE

## 2021-06-21 DIAGNOSIS — F41.9 CHRONIC ANXIETY: ICD-10-CM

## 2021-06-21 RX ORDER — ALPRAZOLAM 0.25 MG/1
0.25 TABLET ORAL NIGHTLY
Qty: 30 TABLET | Refills: 0 | Status: SHIPPED | OUTPATIENT
Start: 2021-06-21 | End: 2021-07-21

## 2021-07-01 ENCOUNTER — TELEPHONE (OUTPATIENT)
Dept: ADMINISTRATIVE | Facility: HOSPITAL | Age: 76
End: 2021-07-01

## 2021-07-21 ENCOUNTER — PATIENT OUTREACH (OUTPATIENT)
Dept: ADMINISTRATIVE | Facility: OTHER | Age: 76
End: 2021-07-21

## 2021-09-07 ENCOUNTER — OFFICE VISIT (OUTPATIENT)
Dept: OPHTHALMOLOGY | Facility: CLINIC | Age: 76
End: 2021-09-07
Payer: MEDICARE

## 2021-09-07 DIAGNOSIS — H25.11 NUCLEAR SCLEROSIS OF RIGHT EYE: ICD-10-CM

## 2021-09-07 DIAGNOSIS — H25.12 NUCLEAR SCLEROSIS OF LEFT EYE: ICD-10-CM

## 2021-09-07 DIAGNOSIS — H40.1133 PRIMARY OPEN ANGLE GLAUCOMA (POAG) OF BOTH EYES, SEVERE STAGE: Primary | ICD-10-CM

## 2021-09-07 PROCEDURE — 99499 RISK ADDL DX/OHS AUDIT: ICD-10-PCS | Mod: HCNC,S$GLB,, | Performed by: STUDENT IN AN ORGANIZED HEALTH CARE EDUCATION/TRAINING PROGRAM

## 2021-09-07 PROCEDURE — 99999 PR PBB SHADOW E&M-EST. PATIENT-LVL I: CPT | Mod: PBBFAC,,, | Performed by: STUDENT IN AN ORGANIZED HEALTH CARE EDUCATION/TRAINING PROGRAM

## 2021-09-07 PROCEDURE — 1159F PR MEDICATION LIST DOCUMENTED IN MEDICAL RECORD: ICD-10-PCS | Mod: CPTII,S$GLB,, | Performed by: STUDENT IN AN ORGANIZED HEALTH CARE EDUCATION/TRAINING PROGRAM

## 2021-09-07 PROCEDURE — 1160F PR REVIEW ALL MEDS BY PRESCRIBER/CLIN PHARMACIST DOCUMENTED: ICD-10-PCS | Mod: CPTII,S$GLB,, | Performed by: STUDENT IN AN ORGANIZED HEALTH CARE EDUCATION/TRAINING PROGRAM

## 2021-09-07 PROCEDURE — 1160F RVW MEDS BY RX/DR IN RCRD: CPT | Mod: CPTII,S$GLB,, | Performed by: STUDENT IN AN ORGANIZED HEALTH CARE EDUCATION/TRAINING PROGRAM

## 2021-09-07 PROCEDURE — 99499 UNLISTED E&M SERVICE: CPT | Mod: HCNC,S$GLB,, | Performed by: STUDENT IN AN ORGANIZED HEALTH CARE EDUCATION/TRAINING PROGRAM

## 2021-09-07 PROCEDURE — 92014 COMPRE OPH EXAM EST PT 1/>: CPT | Mod: S$GLB,,, | Performed by: STUDENT IN AN ORGANIZED HEALTH CARE EDUCATION/TRAINING PROGRAM

## 2021-09-07 PROCEDURE — 99999 PR PBB SHADOW E&M-EST. PATIENT-LVL I: ICD-10-PCS | Mod: PBBFAC,,, | Performed by: STUDENT IN AN ORGANIZED HEALTH CARE EDUCATION/TRAINING PROGRAM

## 2021-09-07 PROCEDURE — 92014 PR EYE EXAM, EST PATIENT,COMPREHESV: ICD-10-PCS | Mod: S$GLB,,, | Performed by: STUDENT IN AN ORGANIZED HEALTH CARE EDUCATION/TRAINING PROGRAM

## 2021-09-07 PROCEDURE — 1159F MED LIST DOCD IN RCRD: CPT | Mod: CPTII,S$GLB,, | Performed by: STUDENT IN AN ORGANIZED HEALTH CARE EDUCATION/TRAINING PROGRAM

## 2021-09-24 ENCOUNTER — PES CALL (OUTPATIENT)
Dept: ADMINISTRATIVE | Facility: CLINIC | Age: 76
End: 2021-09-24

## 2021-09-24 ENCOUNTER — DOCUMENTATION ONLY (OUTPATIENT)
Dept: OPHTHALMOLOGY | Facility: CLINIC | Age: 76
End: 2021-09-24

## 2021-09-24 ENCOUNTER — TELEPHONE (OUTPATIENT)
Dept: PAIN MEDICINE | Facility: CLINIC | Age: 76
End: 2021-09-24

## 2021-09-24 ENCOUNTER — OFFICE VISIT (OUTPATIENT)
Dept: OPHTHALMOLOGY | Facility: CLINIC | Age: 76
End: 2021-09-24
Payer: MEDICARE

## 2021-09-24 DIAGNOSIS — H25.12 NUCLEAR SCLEROSIS OF LEFT EYE: ICD-10-CM

## 2021-09-24 DIAGNOSIS — H40.1133 PRIMARY OPEN ANGLE GLAUCOMA (POAG) OF BOTH EYES, SEVERE STAGE: Primary | ICD-10-CM

## 2021-09-24 DIAGNOSIS — H25.11 NUCLEAR SCLEROSIS OF RIGHT EYE: ICD-10-CM

## 2021-09-24 PROCEDURE — 99999 PR PBB SHADOW E&M-EST. PATIENT-LVL II: CPT | Mod: PBBFAC,,, | Performed by: STUDENT IN AN ORGANIZED HEALTH CARE EDUCATION/TRAINING PROGRAM

## 2021-09-24 PROCEDURE — 92136 OPHTHALMIC BIOMETRY: CPT | Mod: RT,S$GLB,, | Performed by: STUDENT IN AN ORGANIZED HEALTH CARE EDUCATION/TRAINING PROGRAM

## 2021-09-24 PROCEDURE — 1159F MED LIST DOCD IN RCRD: CPT | Mod: CPTII,S$GLB,, | Performed by: STUDENT IN AN ORGANIZED HEALTH CARE EDUCATION/TRAINING PROGRAM

## 2021-09-24 PROCEDURE — 99999 PR PBB SHADOW E&M-EST. PATIENT-LVL II: ICD-10-PCS | Mod: PBBFAC,,, | Performed by: STUDENT IN AN ORGANIZED HEALTH CARE EDUCATION/TRAINING PROGRAM

## 2021-09-24 PROCEDURE — 1160F RVW MEDS BY RX/DR IN RCRD: CPT | Mod: CPTII,S$GLB,, | Performed by: STUDENT IN AN ORGANIZED HEALTH CARE EDUCATION/TRAINING PROGRAM

## 2021-09-24 PROCEDURE — 92012 PR EYE EXAM, EST PATIENT,INTERMED: ICD-10-PCS | Mod: S$GLB,,, | Performed by: STUDENT IN AN ORGANIZED HEALTH CARE EDUCATION/TRAINING PROGRAM

## 2021-09-24 PROCEDURE — 92012 INTRM OPH EXAM EST PATIENT: CPT | Mod: S$GLB,,, | Performed by: STUDENT IN AN ORGANIZED HEALTH CARE EDUCATION/TRAINING PROGRAM

## 2021-09-24 PROCEDURE — 92025 CPTRIZED CORNEAL TOPOGRAPHY: CPT | Mod: S$GLB,,, | Performed by: STUDENT IN AN ORGANIZED HEALTH CARE EDUCATION/TRAINING PROGRAM

## 2021-09-24 PROCEDURE — 1159F PR MEDICATION LIST DOCUMENTED IN MEDICAL RECORD: ICD-10-PCS | Mod: CPTII,S$GLB,, | Performed by: STUDENT IN AN ORGANIZED HEALTH CARE EDUCATION/TRAINING PROGRAM

## 2021-09-24 PROCEDURE — 92136 IOL MASTER - OD - RIGHT EYE: ICD-10-PCS | Mod: RT,S$GLB,, | Performed by: STUDENT IN AN ORGANIZED HEALTH CARE EDUCATION/TRAINING PROGRAM

## 2021-09-24 PROCEDURE — 92025 CORNEAL TOPOGRAPHY - OU - BOTH EYES: ICD-10-PCS | Mod: S$GLB,,, | Performed by: STUDENT IN AN ORGANIZED HEALTH CARE EDUCATION/TRAINING PROGRAM

## 2021-09-24 PROCEDURE — 1160F PR REVIEW ALL MEDS BY PRESCRIBER/CLIN PHARMACIST DOCUMENTED: ICD-10-PCS | Mod: CPTII,S$GLB,, | Performed by: STUDENT IN AN ORGANIZED HEALTH CARE EDUCATION/TRAINING PROGRAM

## 2021-09-24 RX ORDER — DUREZOL 0.5 MG/ML
1 EMULSION OPHTHALMIC 4 TIMES DAILY
Qty: 1 BOTTLE | Refills: 1 | Status: SHIPPED | OUTPATIENT
Start: 2021-09-24 | End: 2021-10-24

## 2021-10-12 NOTE — DISCHARGE INSTRUCTIONS
Diverticulosis    Diverticulosis means that small pouches have formed in the wall of your large intestine (colon). Most often, this problem causes no symptoms and is common as people age. But the pouches in the colon are at risk of becoming infected. When this happens, the condition is called diverticulitis. Although most people with diverticulosis never develop diverticulitis, it is still not uncommon. Rectal bleeding can also occur and in less common situations, a type of colon inflammation called colitis.  While most people do not have symptoms, some people with diverticulosis may have:  · Abdominal cramps and pain  · Bloating  · Constipation  · Change in bowel habits  Causes  The exact cause of diverticulosis (and diverticulitis) has not been proved, but a few things are associated with the condition:  · Low-fiber diet  · Constipation  · Lack of exercise  Your healthcare provider will talk with you about how to manage your condition. Diet changes may be all that are needed to help control diverticulosis and prevent progression to diverticulitis. If you develop diverticulitis, you will likely need other treatments.  Home care  You may be told to take fiber supplements daily. Fiber adds bulk to the stool so that it passes through the colon more easily. Stool softeners may be recommended. You may also be given medications for pain relief. Be sure to take all medications as directed.  In the past, people were told to avoid corn, nuts, and seeds. This is no longer necessary.  Follow these guidelines when caring for yourself at home:  · Eat unprocessed foods that are high in fiber. Whole grains, fruits, and vegetables are good choices.  · Drink 6 to 8 glasses of water every day unless your healthcare provider has you limit how much fluid you should have.  · Watch for changes in your bowel movements. Tell your provider if you notice any changes.  · Begin an exercise program. Ask your provider how to get started.  [LVSF ___%] : LV Shortening Fraction [unfilled]% Generally, walking is the best.  · Get plenty of rest and sleep.  Follow-up care  Follow up with your healthcare provider, or as advised. Regular visits may be needed to check on your health. Sometimes special procedures such as colonoscopy, are needed after an episode of diverticulitis or blooding. Be sure to keep all your appointments.  If a stool sample was taken, or cultures were done, you should be told if they are positive, or if your treatment needs to be changed. You can call as directed for the results.  If X-rays were done, a radiologist will look at them. You will be told if there is a change in your treatment.  If antibiotics were prescribed, be sure to finish them all.  When to seek medical advice  Call your healthcare provider right away if any of these occur:  · Fever of 100.4°F (38°C) or higher, or as directed by your healthcare provider  · Severe cramps in the lower left side of the abdomen or pain that is getting worse  · Tenderness in the lower left side of the abdomen or worsening pain throughout the abdomen  · Diarrhea or constipation that doesn't get better within 24 hours  · Nausea and vomiting  · Bleeding from the rectum  Call 911  Call emergency services if any of the following occur:  · Trouble breathing  · Confusion  · Very drowsy or trouble awakening  · Fainting or loss of consciousness  · Rapid heart rate  · Chest pain  Date Last Reviewed: 12/30/2015 © 2000-2016 [employer]. [address] [place] [address], [place] [address]. All rights reserved. This information is not intended as a substitute for professional medical care. Always follow your healthcare professional's instructions.         [Today's Date] : [unfilled] [FreeTextEntry1] : An electrocardiogram today shows a normal sinus rhythm at a rate of 77 bpm, with first-degree AV block. There was a normal axis and normal ventricular forces.  The ST–T wave changes were WNL.  The measured intervals were normal. There was no ectopy seen on the surface electrocardiogram.  No interval change. [FreeTextEntry2] : A two-dimensional echocardiogram with Doppler evaluation was notable for normal cardiac architecture and mild mitral valve prolapse with mild mitral regurgitation, and NO mitral stenosis. Status post aortic valve sparing, aortic root replacement with a 28 mm graft.  The parasternal and apical views show an unobstructed aortic root and proximal ascending aorta.  At the site of the anastomosis of the aortic graft with the native distal ascending aorta, there was acceleration of Doppler flow velocity of  2.8 m/s, consistent with very mild ascending aortic stenosis (PSIG=32 mmHg, mean gradient of 15 mmHG).  There was no evidence of valvar aortic stenosis and only trivial aortic regurgitation. No pericardial effusion was noted. The left ventricular ejection fraction by the 5/6*A*L method was normal at 64%. [de-identified] : pending\par \par September 30, 2020: This 24-hour Holter monitor revealed a predominant normal sinus rhythm with first-degree AV block. The heart rate ranged from 70 - 167 bpm, with an average heart rate of 101 bpm.  The NM interval shortened at higher heart rates.  One premature atrial contraction was seen.  Rare isolated, late cycle unifocal premature ventricular contractions were noted.\par \par April 4, 2018: This 24-hour Holter monitor revealed a predominant normal sinus rhythm with a heart rate range of  bpm, with an average heart rate of 84 bpm. No ventricular ectopy was seen. Rare premature atrial contractions were noted. [de-identified] : July 19, 2021 [de-identified] : The patient performed a maximal exercise stress test.  The baseline rhythm was normal sinus with profound first-degree AV block.  The heart rate response was normal.  The blood pressure response was normal.  During the exercise phase of the study, isolated PVCs were noted and suppressed at a rate of 181 bpm.  Of note, the [place] interval shortened appropriately with exercise.  No other ectopy was recorded.  There was no evidence of higher grade AV block. [de-identified] : September 23, 2020 [FreeTextEntry4] : clear lungs and resolution of the right-sided apical pneumothorax.   [de-identified] : Marfan syndrome.  Status post aortic valve sparing aortic root replacement with a 28 mm graft.  The aortic root measured 3.2 cm in systole.  Trivial aortic regurgitation was noted (regurgitant fraction of 8%).  There was mild narrowing at the site of the anastomosis between the aortic graft and the native distal ascending aorta with a dephasing jet artifact.  The ascending aorta measured 2.45 cm in cross-section.  The transverse aortic arch and aortic isthmus appeared normal in caliber.  There was a slight increase in the caliber of the proximal thoracic descending aorta (level of the ductal ampulla).  There was mild mitral valve prolapse with mild regurgitation.  There were normal biventricular volumes with a low normal left ventricular ejection fraction of 55%, and a normal right ventricular ejection fraction of 53%.\par \par \par May 20, 2020:\par Cardiac MRI/MRA.  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  Trivial aortic regurgitation was seen.  The ascending aortic dimension was 3.05 cm in cross section consistent with a normal Z score of 1.25.  The distal transverse arch and aortic isthmus were normal.  There was a slight increase in the caliber of the proximal thoracic descending aorta (level of the ductal ampulla).  Mild mitral valve prolapse with mild mitral regurgitation was seen.  The left ventricular ejection fraction was normal at 61%.  The right ventricular ejection fraction was normal at 53%. [de-identified] : June 30, 2021 [de-identified] : December 2, 2020 [de-identified] : CBC and CMP pending\par \par 10/01/2020:  CBC -  WBC 11,980 with no left shift.    Hb 10.7 gm/dl; Hct 34.7%\par CMP - WNL; ESR = 44 mm/hr - mildly increased;  procalcitonin = 0.07 ng/ml (WNL)\par Viral panel, including COVID testing - Negative\par \par 9/30/2020: CBC -  WBC 11,060 with no left shift.    Hb 11 gm/dl; Hct 35.9%\par CMP - WNL\par CRP: 3.67, down from 8.47 on 9/23/20.\par \par 9/23/2020:COVID testing - NEGATIVE.\par CBC -  WBC 11,680. 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 The identified variant was: [id number] (also called [id number]).\par \par His mother was also tested and found to be positive for this same familial pathogenic Fibrillin1 mutation. Ailyn has no other siblings.\par \par

## 2021-10-29 ENCOUNTER — LAB VISIT (OUTPATIENT)
Dept: LAB | Facility: HOSPITAL | Age: 76
End: 2021-10-29
Attending: NURSE PRACTITIONER
Payer: MEDICARE

## 2021-10-29 ENCOUNTER — OFFICE VISIT (OUTPATIENT)
Dept: INTERNAL MEDICINE | Facility: CLINIC | Age: 76
End: 2021-10-29
Payer: MEDICARE

## 2021-10-29 VITALS
SYSTOLIC BLOOD PRESSURE: 134 MMHG | HEIGHT: 64 IN | BODY MASS INDEX: 29.74 KG/M2 | OXYGEN SATURATION: 97 % | WEIGHT: 174.19 LBS | DIASTOLIC BLOOD PRESSURE: 76 MMHG | HEART RATE: 81 BPM | TEMPERATURE: 98 F

## 2021-10-29 DIAGNOSIS — D56.0 ALPHA THALASSEMIA: ICD-10-CM

## 2021-10-29 DIAGNOSIS — M85.80 OSTEOPENIA, UNSPECIFIED LOCATION: ICD-10-CM

## 2021-10-29 DIAGNOSIS — M32.9 SYSTEMIC LUPUS ERYTHEMATOSUS, UNSPECIFIED SLE TYPE, UNSPECIFIED ORGAN INVOLVEMENT STATUS: ICD-10-CM

## 2021-10-29 DIAGNOSIS — I10 ESSENTIAL HYPERTENSION: ICD-10-CM

## 2021-10-29 DIAGNOSIS — F32.1 MODERATE MAJOR DEPRESSION: ICD-10-CM

## 2021-10-29 DIAGNOSIS — I10 ESSENTIAL HYPERTENSION: Primary | ICD-10-CM

## 2021-10-29 DIAGNOSIS — Z78.0 POSTMENOPAUSAL: ICD-10-CM

## 2021-10-29 DIAGNOSIS — I70.0 ATHEROSCLEROSIS OF AORTA: ICD-10-CM

## 2021-10-29 DIAGNOSIS — G89.29 OTHER CHRONIC PAIN: ICD-10-CM

## 2021-10-29 DIAGNOSIS — F41.9 CHRONIC ANXIETY: ICD-10-CM

## 2021-10-29 DIAGNOSIS — J45.909 CHRONIC ASTHMA WITHOUT COMPLICATION, UNSPECIFIED ASTHMA SEVERITY, UNSPECIFIED WHETHER PERSISTENT: ICD-10-CM

## 2021-10-29 LAB
ALBUMIN SERPL BCP-MCNC: 3.5 G/DL (ref 3.5–5.2)
ALP SERPL-CCNC: 81 U/L (ref 55–135)
ALT SERPL W/O P-5'-P-CCNC: 10 U/L (ref 10–44)
ANION GAP SERPL CALC-SCNC: 10 MMOL/L (ref 8–16)
AST SERPL-CCNC: 15 U/L (ref 10–40)
BASOPHILS # BLD AUTO: 0.03 K/UL (ref 0–0.2)
BASOPHILS NFR BLD: 0.4 % (ref 0–1.9)
BILIRUB SERPL-MCNC: 0.3 MG/DL (ref 0.1–1)
BUN SERPL-MCNC: 33 MG/DL (ref 8–23)
CALCIUM SERPL-MCNC: 9.7 MG/DL (ref 8.7–10.5)
CHLORIDE SERPL-SCNC: 106 MMOL/L (ref 95–110)
CO2 SERPL-SCNC: 26 MMOL/L (ref 23–29)
CREAT SERPL-MCNC: 1.1 MG/DL (ref 0.5–1.4)
DIFFERENTIAL METHOD: ABNORMAL
EOSINOPHIL # BLD AUTO: 0.3 K/UL (ref 0–0.5)
EOSINOPHIL NFR BLD: 3.9 % (ref 0–8)
ERYTHROCYTE [DISTWIDTH] IN BLOOD BY AUTOMATED COUNT: 16.4 % (ref 11.5–14.5)
EST. GFR  (AFRICAN AMERICAN): 56.8 ML/MIN/1.73 M^2
EST. GFR  (NON AFRICAN AMERICAN): 49.2 ML/MIN/1.73 M^2
GLUCOSE SERPL-MCNC: 103 MG/DL (ref 70–110)
HCT VFR BLD AUTO: 37.3 % (ref 37–48.5)
HGB BLD-MCNC: 11.4 G/DL (ref 12–16)
IMM GRANULOCYTES # BLD AUTO: 0.03 K/UL (ref 0–0.04)
IMM GRANULOCYTES NFR BLD AUTO: 0.4 % (ref 0–0.5)
LYMPHOCYTES # BLD AUTO: 1.9 K/UL (ref 1–4.8)
LYMPHOCYTES NFR BLD: 27.3 % (ref 18–48)
MCH RBC QN AUTO: 21.6 PG (ref 27–31)
MCHC RBC AUTO-ENTMCNC: 30.6 G/DL (ref 32–36)
MCV RBC AUTO: 71 FL (ref 82–98)
MONOCYTES # BLD AUTO: 0.4 K/UL (ref 0.3–1)
MONOCYTES NFR BLD: 6.4 % (ref 4–15)
NEUTROPHILS # BLD AUTO: 4.2 K/UL (ref 1.8–7.7)
NEUTROPHILS NFR BLD: 61.6 % (ref 38–73)
NRBC BLD-RTO: 0 /100 WBC
PLATELET # BLD AUTO: 354 K/UL (ref 150–450)
PMV BLD AUTO: 9.2 FL (ref 9.2–12.9)
POTASSIUM SERPL-SCNC: 4.1 MMOL/L (ref 3.5–5.1)
PROT SERPL-MCNC: 8 G/DL (ref 6–8.4)
RBC # BLD AUTO: 5.29 M/UL (ref 4–5.4)
SODIUM SERPL-SCNC: 142 MMOL/L (ref 136–145)
WBC # BLD AUTO: 6.86 K/UL (ref 3.9–12.7)

## 2021-10-29 PROCEDURE — 3075F PR MOST RECENT SYSTOLIC BLOOD PRESS GE 130-139MM HG: ICD-10-PCS | Mod: HCNC,CPTII,S$GLB, | Performed by: NURSE PRACTITIONER

## 2021-10-29 PROCEDURE — 99214 PR OFFICE/OUTPT VISIT, EST, LEVL IV, 30-39 MIN: ICD-10-PCS | Mod: HCNC,S$GLB,, | Performed by: NURSE PRACTITIONER

## 2021-10-29 PROCEDURE — 3288F PR FALLS RISK ASSESSMENT DOCUMENTED: ICD-10-PCS | Mod: HCNC,CPTII,S$GLB, | Performed by: NURSE PRACTITIONER

## 2021-10-29 PROCEDURE — 99214 OFFICE O/P EST MOD 30 MIN: CPT | Mod: HCNC,S$GLB,, | Performed by: NURSE PRACTITIONER

## 2021-10-29 PROCEDURE — 3078F DIAST BP <80 MM HG: CPT | Mod: HCNC,CPTII,S$GLB, | Performed by: NURSE PRACTITIONER

## 2021-10-29 PROCEDURE — 99999 PR PBB SHADOW E&M-EST. PATIENT-LVL IV: CPT | Mod: PBBFAC,HCNC,, | Performed by: NURSE PRACTITIONER

## 2021-10-29 PROCEDURE — 99999 PR PBB SHADOW E&M-EST. PATIENT-LVL IV: ICD-10-PCS | Mod: PBBFAC,HCNC,, | Performed by: NURSE PRACTITIONER

## 2021-10-29 PROCEDURE — 3288F FALL RISK ASSESSMENT DOCD: CPT | Mod: HCNC,CPTII,S$GLB, | Performed by: NURSE PRACTITIONER

## 2021-10-29 PROCEDURE — 1159F PR MEDICATION LIST DOCUMENTED IN MEDICAL RECORD: ICD-10-PCS | Mod: HCNC,CPTII,S$GLB, | Performed by: NURSE PRACTITIONER

## 2021-10-29 PROCEDURE — 1101F PT FALLS ASSESS-DOCD LE1/YR: CPT | Mod: HCNC,CPTII,S$GLB, | Performed by: NURSE PRACTITIONER

## 2021-10-29 PROCEDURE — 1159F MED LIST DOCD IN RCRD: CPT | Mod: HCNC,CPTII,S$GLB, | Performed by: NURSE PRACTITIONER

## 2021-10-29 PROCEDURE — 3078F PR MOST RECENT DIASTOLIC BLOOD PRESSURE < 80 MM HG: ICD-10-PCS | Mod: HCNC,CPTII,S$GLB, | Performed by: NURSE PRACTITIONER

## 2021-10-29 PROCEDURE — 36415 COLL VENOUS BLD VENIPUNCTURE: CPT | Mod: HCNC,PO | Performed by: NURSE PRACTITIONER

## 2021-10-29 PROCEDURE — 1160F PR REVIEW ALL MEDS BY PRESCRIBER/CLIN PHARMACIST DOCUMENTED: ICD-10-PCS | Mod: HCNC,CPTII,S$GLB, | Performed by: NURSE PRACTITIONER

## 2021-10-29 PROCEDURE — 1125F AMNT PAIN NOTED PAIN PRSNT: CPT | Mod: HCNC,CPTII,S$GLB, | Performed by: NURSE PRACTITIONER

## 2021-10-29 PROCEDURE — 85025 COMPLETE CBC W/AUTO DIFF WBC: CPT | Mod: HCNC,PO | Performed by: NURSE PRACTITIONER

## 2021-10-29 PROCEDURE — 1101F PR PT FALLS ASSESS DOC 0-1 FALLS W/OUT INJ PAST YR: ICD-10-PCS | Mod: HCNC,CPTII,S$GLB, | Performed by: NURSE PRACTITIONER

## 2021-10-29 PROCEDURE — 1125F PR PAIN SEVERITY QUANTIFIED, PAIN PRESENT: ICD-10-PCS | Mod: HCNC,CPTII,S$GLB, | Performed by: NURSE PRACTITIONER

## 2021-10-29 PROCEDURE — 3075F SYST BP GE 130 - 139MM HG: CPT | Mod: HCNC,CPTII,S$GLB, | Performed by: NURSE PRACTITIONER

## 2021-10-29 PROCEDURE — 99499 UNLISTED E&M SERVICE: CPT | Mod: S$GLB,,, | Performed by: NURSE PRACTITIONER

## 2021-10-29 PROCEDURE — 80053 COMPREHEN METABOLIC PANEL: CPT | Mod: HCNC,PO | Performed by: NURSE PRACTITIONER

## 2021-10-29 PROCEDURE — 1160F RVW MEDS BY RX/DR IN RCRD: CPT | Mod: HCNC,CPTII,S$GLB, | Performed by: NURSE PRACTITIONER

## 2021-10-29 PROCEDURE — 99499 RISK ADDL DX/OHS AUDIT: ICD-10-PCS | Mod: S$GLB,,, | Performed by: NURSE PRACTITIONER

## 2021-12-01 DIAGNOSIS — H40.1133 PRIMARY OPEN ANGLE GLAUCOMA (POAG) OF BOTH EYES, SEVERE STAGE: Primary | ICD-10-CM

## 2021-12-01 DIAGNOSIS — H25.11 NUCLEAR SCLEROSIS OF RIGHT EYE: ICD-10-CM

## 2021-12-02 ENCOUNTER — PES CALL (OUTPATIENT)
Dept: ADMINISTRATIVE | Facility: CLINIC | Age: 76
End: 2021-12-02
Payer: MEDICARE

## 2021-12-22 DIAGNOSIS — F41.9 CHRONIC ANXIETY: ICD-10-CM

## 2021-12-22 RX ORDER — ALPRAZOLAM 0.25 MG/1
TABLET ORAL
Qty: 30 TABLET | Refills: 0 | Status: SHIPPED | OUTPATIENT
Start: 2021-12-22 | End: 2022-01-25

## 2021-12-27 ENCOUNTER — TELEPHONE (OUTPATIENT)
Dept: INTERNAL MEDICINE | Facility: CLINIC | Age: 76
End: 2021-12-27
Payer: MEDICARE

## 2021-12-29 ENCOUNTER — OFFICE VISIT (OUTPATIENT)
Dept: INTERNAL MEDICINE | Facility: CLINIC | Age: 76
End: 2021-12-29
Payer: MEDICARE

## 2021-12-29 ENCOUNTER — HOSPITAL ENCOUNTER (OUTPATIENT)
Dept: RADIOLOGY | Facility: HOSPITAL | Age: 76
Discharge: HOME | End: 2021-12-29
Attending: NURSE PRACTITIONER
Payer: MEDICARE

## 2021-12-29 VITALS
BODY MASS INDEX: 30.3 KG/M2 | TEMPERATURE: 98 F | HEART RATE: 92 BPM | RESPIRATION RATE: 19 BRPM | HEIGHT: 64 IN | SYSTOLIC BLOOD PRESSURE: 136 MMHG | DIASTOLIC BLOOD PRESSURE: 66 MMHG | OXYGEN SATURATION: 97 % | WEIGHT: 177.5 LBS

## 2021-12-29 DIAGNOSIS — R06.2 WHEEZES: ICD-10-CM

## 2021-12-29 DIAGNOSIS — R05.9 COUGH: ICD-10-CM

## 2021-12-29 DIAGNOSIS — I10 ESSENTIAL HYPERTENSION: ICD-10-CM

## 2021-12-29 DIAGNOSIS — J45.909 CHRONIC ASTHMA WITHOUT COMPLICATION, UNSPECIFIED ASTHMA SEVERITY, UNSPECIFIED WHETHER PERSISTENT: ICD-10-CM

## 2021-12-29 DIAGNOSIS — J06.9 URI WITH COUGH AND CONGESTION: Primary | ICD-10-CM

## 2021-12-29 DIAGNOSIS — I70.0 ATHEROSCLEROSIS OF AORTA: ICD-10-CM

## 2021-12-29 LAB
CTP QC/QA: YES
SARS-COV-2 RDRP RESP QL NAA+PROBE: NEGATIVE

## 2021-12-29 PROCEDURE — U0002 COVID-19 LAB TEST NON-CDC: HCPCS | Mod: QW,HCNC,S$GLB, | Performed by: NURSE PRACTITIONER

## 2021-12-29 PROCEDURE — 1160F RVW MEDS BY RX/DR IN RCRD: CPT | Mod: HCNC,CPTII,S$GLB, | Performed by: NURSE PRACTITIONER

## 2021-12-29 PROCEDURE — 71046 X-RAY EXAM CHEST 2 VIEWS: CPT | Mod: TC,HCNC,PO

## 2021-12-29 PROCEDURE — 99999 PR PBB SHADOW E&M-EST. PATIENT-LVL IV: CPT | Mod: PBBFAC,HCNC,, | Performed by: NURSE PRACTITIONER

## 2021-12-29 PROCEDURE — U0002: ICD-10-PCS | Mod: QW,HCNC,S$GLB, | Performed by: NURSE PRACTITIONER

## 2021-12-29 PROCEDURE — 99214 OFFICE O/P EST MOD 30 MIN: CPT | Mod: HCNC,S$GLB,, | Performed by: NURSE PRACTITIONER

## 2021-12-29 PROCEDURE — 1159F MED LIST DOCD IN RCRD: CPT | Mod: HCNC,CPTII,S$GLB, | Performed by: NURSE PRACTITIONER

## 2021-12-29 PROCEDURE — 1160F PR REVIEW ALL MEDS BY PRESCRIBER/CLIN PHARMACIST DOCUMENTED: ICD-10-PCS | Mod: HCNC,CPTII,S$GLB, | Performed by: NURSE PRACTITIONER

## 2021-12-29 PROCEDURE — 99214 PR OFFICE/OUTPT VISIT, EST, LEVL IV, 30-39 MIN: ICD-10-PCS | Mod: HCNC,S$GLB,, | Performed by: NURSE PRACTITIONER

## 2021-12-29 PROCEDURE — 1101F PT FALLS ASSESS-DOCD LE1/YR: CPT | Mod: HCNC,CPTII,S$GLB, | Performed by: NURSE PRACTITIONER

## 2021-12-29 PROCEDURE — 1159F PR MEDICATION LIST DOCUMENTED IN MEDICAL RECORD: ICD-10-PCS | Mod: HCNC,CPTII,S$GLB, | Performed by: NURSE PRACTITIONER

## 2021-12-29 PROCEDURE — 99999 PR PBB SHADOW E&M-EST. PATIENT-LVL IV: ICD-10-PCS | Mod: PBBFAC,HCNC,, | Performed by: NURSE PRACTITIONER

## 2021-12-29 PROCEDURE — 1125F AMNT PAIN NOTED PAIN PRSNT: CPT | Mod: HCNC,CPTII,S$GLB, | Performed by: NURSE PRACTITIONER

## 2021-12-29 PROCEDURE — 71046 X-RAY EXAM CHEST 2 VIEWS: CPT | Mod: 26,HCNC,, | Performed by: RADIOLOGY

## 2021-12-29 PROCEDURE — 3288F FALL RISK ASSESSMENT DOCD: CPT | Mod: HCNC,CPTII,S$GLB, | Performed by: NURSE PRACTITIONER

## 2021-12-29 PROCEDURE — 3078F DIAST BP <80 MM HG: CPT | Mod: HCNC,CPTII,S$GLB, | Performed by: NURSE PRACTITIONER

## 2021-12-29 PROCEDURE — 3078F PR MOST RECENT DIASTOLIC BLOOD PRESSURE < 80 MM HG: ICD-10-PCS | Mod: HCNC,CPTII,S$GLB, | Performed by: NURSE PRACTITIONER

## 2021-12-29 PROCEDURE — 3075F PR MOST RECENT SYSTOLIC BLOOD PRESS GE 130-139MM HG: ICD-10-PCS | Mod: HCNC,CPTII,S$GLB, | Performed by: NURSE PRACTITIONER

## 2021-12-29 PROCEDURE — 1125F PR PAIN SEVERITY QUANTIFIED, PAIN PRESENT: ICD-10-PCS | Mod: HCNC,CPTII,S$GLB, | Performed by: NURSE PRACTITIONER

## 2021-12-29 PROCEDURE — 3075F SYST BP GE 130 - 139MM HG: CPT | Mod: HCNC,CPTII,S$GLB, | Performed by: NURSE PRACTITIONER

## 2021-12-29 PROCEDURE — 3288F PR FALLS RISK ASSESSMENT DOCUMENTED: ICD-10-PCS | Mod: HCNC,CPTII,S$GLB, | Performed by: NURSE PRACTITIONER

## 2021-12-29 PROCEDURE — 71046 XR CHEST PA AND LATERAL: ICD-10-PCS | Mod: 26,HCNC,, | Performed by: RADIOLOGY

## 2021-12-29 PROCEDURE — 1101F PR PT FALLS ASSESS DOC 0-1 FALLS W/OUT INJ PAST YR: ICD-10-PCS | Mod: HCNC,CPTII,S$GLB, | Performed by: NURSE PRACTITIONER

## 2021-12-29 RX ORDER — ALBUTEROL SULFATE 90 UG/1
2 AEROSOL, METERED RESPIRATORY (INHALATION) EVERY 6 HOURS PRN
Qty: 18 G | Refills: 5 | Status: SHIPPED | OUTPATIENT
Start: 2021-12-29 | End: 2022-03-07

## 2021-12-29 RX ORDER — PREDNISOLONE ACETATE 10 MG/ML
SUSPENSION/ DROPS OPHTHALMIC
COMMUNITY
Start: 2021-11-17 | End: 2022-01-27

## 2021-12-29 RX ORDER — PROMETHAZINE HYDROCHLORIDE AND DEXTROMETHORPHAN HYDROBROMIDE 6.25; 15 MG/5ML; MG/5ML
5 SYRUP ORAL EVERY 4 HOURS PRN
Qty: 240 ML | Refills: 0 | Status: SHIPPED | OUTPATIENT
Start: 2021-12-29 | End: 2022-01-08

## 2021-12-29 RX ORDER — AZITHROMYCIN 250 MG/1
TABLET, FILM COATED ORAL
Qty: 6 TABLET | Refills: 0 | Status: SHIPPED | OUTPATIENT
Start: 2021-12-29 | End: 2022-03-07

## 2021-12-29 RX ORDER — PREDNISONE 20 MG/1
TABLET ORAL
Qty: 9 TABLET | Refills: 0 | Status: SHIPPED | OUTPATIENT
Start: 2021-12-29 | End: 2022-01-05
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
## 2022-01-10 PROBLEM — J06.9 URI WITH COUGH AND CONGESTION: Status: ACTIVE | Noted: 2022-01-10

## 2022-01-11 NOTE — ASSESSMENT & PLAN NOTE
CXR, steroid. Continue inhalers. Continue to monitor for worsening symptoms. Discussed when to go to ED for further treatment.

## 2022-01-11 NOTE — ASSESSMENT & PLAN NOTE
Tylenol/ibuprofen for pain and fever.   Hand hygiene.   Maintain adequate hydration.   Antihistamine and flonase for nasal congestion and upper respiratory symptoms.   Rest.   Xray to rule out pneumonia with significant wheezes. Negative COVID test. Treating with abx and steroids to cover.

## 2022-01-14 ENCOUNTER — TELEPHONE (OUTPATIENT)
Dept: OPHTHALMOLOGY | Facility: CLINIC | Age: 77
End: 2022-01-14
Payer: MEDICARE

## 2022-01-14 NOTE — TELEPHONE ENCOUNTER
Spoke with patient gave surgery arrival time for 1/20 and direction to facility with post op times

## 2022-01-20 ENCOUNTER — OUTSIDE PLACE OF SERVICE (OUTPATIENT)
Dept: OPHTHALMOLOGY | Facility: CLINIC | Age: 77
End: 2022-01-20
Payer: MEDICARE

## 2022-01-20 PROCEDURE — 65820 PR RELIEVE INNER EYE PRESSURE: ICD-10-PCS | Mod: RT,,, | Performed by: STUDENT IN AN ORGANIZED HEALTH CARE EDUCATION/TRAINING PROGRAM

## 2022-01-20 PROCEDURE — 66984 PR REMOVAL, CATARACT, W/INSRT INTRAOC LENS, W/O ENDO CYCLO: ICD-10-PCS | Mod: 51,RT,, | Performed by: STUDENT IN AN ORGANIZED HEALTH CARE EDUCATION/TRAINING PROGRAM

## 2022-01-20 PROCEDURE — 65820 GONIOTOMY: CPT | Mod: RT,,, | Performed by: STUDENT IN AN ORGANIZED HEALTH CARE EDUCATION/TRAINING PROGRAM

## 2022-01-20 PROCEDURE — 66984 XCAPSL CTRC RMVL W/O ECP: CPT | Mod: 51,RT,, | Performed by: STUDENT IN AN ORGANIZED HEALTH CARE EDUCATION/TRAINING PROGRAM

## 2022-01-24 ENCOUNTER — TELEPHONE (OUTPATIENT)
Dept: OPHTHALMOLOGY | Facility: CLINIC | Age: 77
End: 2022-01-24
Payer: MEDICARE

## 2022-01-24 DIAGNOSIS — F41.9 CHRONIC ANXIETY: ICD-10-CM

## 2022-01-24 NOTE — TELEPHONE ENCOUNTER
No new care gaps identified.  Powered by [employer]. Reference number: [id number].   1/24/2022 2:56:03 PM CST

## 2022-01-24 NOTE — TELEPHONE ENCOUNTER
Spoke to pt about not coming in for her 1 day post-op. Pt states her son left and couldn't bring her in. Pt states the bridge is closed and she cant come in on Friday. Pt is scheduled for Tuesday.

## 2022-01-25 RX ORDER — ALPRAZOLAM 0.25 MG/1
TABLET ORAL
Qty: 30 TABLET | Refills: 0 | Status: SHIPPED | OUTPATIENT
Start: 2022-01-25 | End: 2022-02-24

## 2022-01-26 ENCOUNTER — OFFICE VISIT (OUTPATIENT)
Dept: OPHTHALMOLOGY | Facility: CLINIC | Age: 77
End: 2022-01-26
Payer: MEDICARE

## 2022-01-26 DIAGNOSIS — Z98.41 CATARACT EXTRACTION STATUS OF EYE, RIGHT: ICD-10-CM

## 2022-01-26 DIAGNOSIS — H40.1133 PRIMARY OPEN ANGLE GLAUCOMA (POAG) OF BOTH EYES, SEVERE STAGE: ICD-10-CM

## 2022-01-26 DIAGNOSIS — Z98.890 POST-OPERATIVE STATE: Primary | ICD-10-CM

## 2022-01-26 PROCEDURE — 99024 PR POST-OP FOLLOW-UP VISIT: ICD-10-PCS | Mod: HCNC,S$GLB,, | Performed by: STUDENT IN AN ORGANIZED HEALTH CARE EDUCATION/TRAINING PROGRAM

## 2022-01-26 PROCEDURE — 99999 PR PBB SHADOW E&M-EST. PATIENT-LVL III: CPT | Mod: PBBFAC,HCNC,, | Performed by: STUDENT IN AN ORGANIZED HEALTH CARE EDUCATION/TRAINING PROGRAM

## 2022-01-26 PROCEDURE — 99999 PR PBB SHADOW E&M-EST. PATIENT-LVL III: ICD-10-PCS | Mod: PBBFAC,HCNC,, | Performed by: STUDENT IN AN ORGANIZED HEALTH CARE EDUCATION/TRAINING PROGRAM

## 2022-01-26 PROCEDURE — 1159F PR MEDICATION LIST DOCUMENTED IN MEDICAL RECORD: ICD-10-PCS | Mod: HCNC,CPTII,S$GLB, | Performed by: STUDENT IN AN ORGANIZED HEALTH CARE EDUCATION/TRAINING PROGRAM

## 2022-01-26 PROCEDURE — 1160F PR REVIEW ALL MEDS BY PRESCRIBER/CLIN PHARMACIST DOCUMENTED: ICD-10-PCS | Mod: HCNC,CPTII,S$GLB, | Performed by: STUDENT IN AN ORGANIZED HEALTH CARE EDUCATION/TRAINING PROGRAM

## 2022-01-26 PROCEDURE — 1159F MED LIST DOCD IN RCRD: CPT | Mod: HCNC,CPTII,S$GLB, | Performed by: STUDENT IN AN ORGANIZED HEALTH CARE EDUCATION/TRAINING PROGRAM

## 2022-01-26 PROCEDURE — 99024 POSTOP FOLLOW-UP VISIT: CPT | Mod: HCNC,S$GLB,, | Performed by: STUDENT IN AN ORGANIZED HEALTH CARE EDUCATION/TRAINING PROGRAM

## 2022-01-26 PROCEDURE — 1160F RVW MEDS BY RX/DR IN RCRD: CPT | Mod: HCNC,CPTII,S$GLB, | Performed by: STUDENT IN AN ORGANIZED HEALTH CARE EDUCATION/TRAINING PROGRAM

## 2022-01-26 NOTE — PROGRESS NOTES
HPI     Pt in today with a 1 week post-op of the right eye with gonio. Pt states   her vision is blurry in her right eye. Pt denies any ocular pain, but   states there is a strain on her right eye. Pt states she's been compliant   with her drops.      1. Coag severe stage   2. NS OS  PCIOL OD W/ Gonio- complex    OD- Pred QID  Latanoprost qhs OU  Timolol BID OU      Last edited by [place] Thompson on 1/26/2022 10:35 AM. (History)            Assessment /Plan     For exam results, see Encounter Report.    Post-operative state  Cataract extraction status of eye, right- S/P CEIOL OD Doing well. Mild edema    Continue gtts to operative eye:  Pred QID    *Patient is very confused on eye drops, will D/C latanoprost and Timolol and use Prednisolone QID ONLY, written instructions given to patient today    Reinstructed in importance of absolute compliance with Post-OP instructions including medications, shield at bedtime, protective glasses during the day, and limitation of activities. Follow up appointments in approximately one and six weeks or call immediately for increased pain, redness or vision loss.     RTC 1 week. MOCT if PH worse than 20/25      Primary open angle glaucoma (POAG) of both eyes, severe stage- IOP doing well      Return to clinic in 2 week PO

## 2022-02-10 ENCOUNTER — OFFICE VISIT (OUTPATIENT)
Dept: OPHTHALMOLOGY | Facility: CLINIC | Age: 77
End: 2022-02-10
Payer: MEDICARE

## 2022-02-10 DIAGNOSIS — H40.1133 PRIMARY OPEN ANGLE GLAUCOMA (POAG) OF BOTH EYES, SEVERE STAGE: ICD-10-CM

## 2022-02-10 DIAGNOSIS — Z98.890 POST-OPERATIVE STATE: Primary | ICD-10-CM

## 2022-02-10 DIAGNOSIS — Z98.41 CATARACT EXTRACTION STATUS OF EYE, RIGHT: ICD-10-CM

## 2022-02-10 PROCEDURE — 99024 PR POST-OP FOLLOW-UP VISIT: ICD-10-PCS | Mod: HCNC,S$GLB,, | Performed by: STUDENT IN AN ORGANIZED HEALTH CARE EDUCATION/TRAINING PROGRAM

## 2022-02-10 PROCEDURE — 1160F PR REVIEW ALL MEDS BY PRESCRIBER/CLIN PHARMACIST DOCUMENTED: ICD-10-PCS | Mod: HCNC,CPTII,S$GLB, | Performed by: STUDENT IN AN ORGANIZED HEALTH CARE EDUCATION/TRAINING PROGRAM

## 2022-02-10 PROCEDURE — 1160F RVW MEDS BY RX/DR IN RCRD: CPT | Mod: HCNC,CPTII,S$GLB, | Performed by: STUDENT IN AN ORGANIZED HEALTH CARE EDUCATION/TRAINING PROGRAM

## 2022-02-10 PROCEDURE — 1159F PR MEDICATION LIST DOCUMENTED IN MEDICAL RECORD: ICD-10-PCS | Mod: HCNC,CPTII,S$GLB, | Performed by: STUDENT IN AN ORGANIZED HEALTH CARE EDUCATION/TRAINING PROGRAM

## 2022-02-10 PROCEDURE — 1159F MED LIST DOCD IN RCRD: CPT | Mod: HCNC,CPTII,S$GLB, | Performed by: STUDENT IN AN ORGANIZED HEALTH CARE EDUCATION/TRAINING PROGRAM

## 2022-02-10 PROCEDURE — 99999 PR PBB SHADOW E&M-EST. PATIENT-LVL III: ICD-10-PCS | Mod: PBBFAC,HCNC,, | Performed by: STUDENT IN AN ORGANIZED HEALTH CARE EDUCATION/TRAINING PROGRAM

## 2022-02-10 PROCEDURE — 99999 PR PBB SHADOW E&M-EST. PATIENT-LVL III: CPT | Mod: PBBFAC,HCNC,, | Performed by: STUDENT IN AN ORGANIZED HEALTH CARE EDUCATION/TRAINING PROGRAM

## 2022-02-10 PROCEDURE — 99024 POSTOP FOLLOW-UP VISIT: CPT | Mod: HCNC,S$GLB,, | Performed by: STUDENT IN AN ORGANIZED HEALTH CARE EDUCATION/TRAINING PROGRAM

## 2022-02-10 NOTE — PROGRESS NOTES
HPI     Pt in today for a 2 week post-op of the right eye. Pt states her vision   is still blurry. Pt denies any ocular pain or discomfort. Pt states she's   compliant with her drops.      1. Coag severe stage   2. NS OS *Will do dropless  PCIOL OD W/ Gonio- complex    OD- Pred QID  Latanoprost qhs OU discontinued 01/26/2022  Timolol BID OU discontinued 01/26/2022        Last edited by [place] Thompson on 2/10/2022 10:20 AM. (History)            Assessment /Plan     For exam results, see Encounter Report.    Post-operative state  Cataract extraction status of eye, right- Impression/Plan  S/P CEIOL OD : Doing well with no evidence of infection. Persistent inflammation. Will continue steroids.    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 Additional treatment required.  Discussed options, risks, and benefits of additional medication, SLT laser, or incisional glaucoma surgery.     Start  Latanoprost QHS OU   Timolol BID OU    Reviewed importance of continued compliance with treatment and follow up.  Patient was given written instructions today      Return to clinic in 2-3 weeks IOP check and PO

## 2022-02-23 DIAGNOSIS — D84.9 IMMUNOSUPPRESSED STATUS: ICD-10-CM

## 2022-03-07 ENCOUNTER — OFFICE VISIT (OUTPATIENT)
Dept: INTERNAL MEDICINE | Facility: CLINIC | Age: 77
End: 2022-03-07
Payer: MEDICARE

## 2022-03-07 VITALS
BODY MASS INDEX: 29.59 KG/M2 | RESPIRATION RATE: 18 BRPM | HEART RATE: 88 BPM | SYSTOLIC BLOOD PRESSURE: 136 MMHG | TEMPERATURE: 97 F | OXYGEN SATURATION: 98 % | DIASTOLIC BLOOD PRESSURE: 72 MMHG | WEIGHT: 172.38 LBS

## 2022-03-07 DIAGNOSIS — F32.1 MODERATE MAJOR DEPRESSION: Primary | ICD-10-CM

## 2022-03-07 DIAGNOSIS — N18.31 CHRONIC KIDNEY DISEASE, STAGE 3A: ICD-10-CM

## 2022-03-07 DIAGNOSIS — M32.9 SYSTEMIC LUPUS ERYTHEMATOSUS, UNSPECIFIED SLE TYPE, UNSPECIFIED ORGAN INVOLVEMENT STATUS: ICD-10-CM

## 2022-03-07 DIAGNOSIS — F41.9 CHRONIC ANXIETY: ICD-10-CM

## 2022-03-07 DIAGNOSIS — I10 ESSENTIAL HYPERTENSION: ICD-10-CM

## 2022-03-07 DIAGNOSIS — D56.0 ALPHA THALASSEMIA: ICD-10-CM

## 2022-03-07 DIAGNOSIS — I70.0 ATHEROSCLEROSIS OF AORTA: ICD-10-CM

## 2022-03-07 PROBLEM — J06.9 URI WITH COUGH AND CONGESTION: Status: RESOLVED | Noted: 2022-01-10 | Resolved: 2022-03-07

## 2022-03-07 PROCEDURE — 99499 UNLISTED E&M SERVICE: CPT | Mod: HCNC,S$GLB,, | Performed by: FAMILY MEDICINE

## 2022-03-07 PROCEDURE — 3288F FALL RISK ASSESSMENT DOCD: CPT | Mod: CPTII,S$GLB,, | Performed by: FAMILY MEDICINE

## 2022-03-07 PROCEDURE — 1101F PT FALLS ASSESS-DOCD LE1/YR: CPT | Mod: CPTII,S$GLB,, | Performed by: FAMILY MEDICINE

## 2022-03-07 PROCEDURE — 1126F AMNT PAIN NOTED NONE PRSNT: CPT | Mod: CPTII,S$GLB,, | Performed by: FAMILY MEDICINE

## 2022-03-07 PROCEDURE — 3078F PR MOST RECENT DIASTOLIC BLOOD PRESSURE < 80 MM HG: ICD-10-PCS | Mod: CPTII,S$GLB,, | Performed by: FAMILY MEDICINE

## 2022-03-07 PROCEDURE — 1159F PR MEDICATION LIST DOCUMENTED IN MEDICAL RECORD: ICD-10-PCS | Mod: CPTII,S$GLB,, | Performed by: FAMILY MEDICINE

## 2022-03-07 PROCEDURE — 3078F DIAST BP <80 MM HG: CPT | Mod: CPTII,S$GLB,, | Performed by: FAMILY MEDICINE

## 2022-03-07 PROCEDURE — 3075F SYST BP GE 130 - 139MM HG: CPT | Mod: CPTII,S$GLB,, | Performed by: FAMILY MEDICINE

## 2022-03-07 PROCEDURE — 99214 OFFICE O/P EST MOD 30 MIN: CPT | Mod: S$GLB,,, | Performed by: FAMILY MEDICINE

## 2022-03-07 PROCEDURE — 99214 PR OFFICE/OUTPT VISIT, EST, LEVL IV, 30-39 MIN: ICD-10-PCS | Mod: S$GLB,,, | Performed by: FAMILY MEDICINE

## 2022-03-07 PROCEDURE — 1126F PR PAIN SEVERITY QUANTIFIED, NO PAIN PRESENT: ICD-10-PCS | Mod: CPTII,S$GLB,, | Performed by: FAMILY MEDICINE

## 2022-03-07 PROCEDURE — 3075F PR MOST RECENT SYSTOLIC BLOOD PRESS GE 130-139MM HG: ICD-10-PCS | Mod: CPTII,S$GLB,, | Performed by: FAMILY MEDICINE

## 2022-03-07 PROCEDURE — 1159F MED LIST DOCD IN RCRD: CPT | Mod: CPTII,S$GLB,, | Performed by: FAMILY MEDICINE

## 2022-03-07 PROCEDURE — 99999 PR PBB SHADOW E&M-EST. PATIENT-LVL IV: ICD-10-PCS | Mod: PBBFAC,,, | Performed by: FAMILY MEDICINE

## 2022-03-07 PROCEDURE — 99999 PR PBB SHADOW E&M-EST. PATIENT-LVL IV: CPT | Mod: PBBFAC,,, | Performed by: FAMILY MEDICINE

## 2022-03-07 PROCEDURE — 3288F PR FALLS RISK ASSESSMENT DOCUMENTED: ICD-10-PCS | Mod: CPTII,S$GLB,, | Performed by: FAMILY MEDICINE

## 2022-03-07 PROCEDURE — 1101F PR PT FALLS ASSESS DOC 0-1 FALLS W/OUT INJ PAST YR: ICD-10-PCS | Mod: CPTII,S$GLB,, | Performed by: FAMILY MEDICINE

## 2022-03-07 PROCEDURE — 99499 RISK ADDL DX/OHS AUDIT: ICD-10-PCS | Mod: HCNC,S$GLB,, | Performed by: FAMILY MEDICINE

## 2022-03-07 RX ORDER — ALPRAZOLAM 0.25 MG/1
0.25 TABLET ORAL 2 TIMES DAILY PRN
Qty: 30 TABLET | Refills: 1 | Status: SHIPPED | OUTPATIENT
Start: 2022-03-07 | End: 2022-06-29

## 2022-03-07 NOTE — PROGRESS NOTES
Subjective:       Patient ID: Tea Ring is a 76 y.o. female.    Chief Complaint: Follow-up (HTN)    HPI  Came in today to follow-up on hypertension and other chronic conditions.  She was requesting increased amount alprazolam.  She has been taking 0.25 mg twice a day since her refill couple weeks ago.  I emphasized the importance not taking this medication frequently than once a day especially considering that she takes Percocet multiple times a day for chronic pain.  We discussed the risk of this medication with advanced age and combination other medications the fact that it may lead to falls.    Family History   Problem Relation Age of Onset    Diabetes Mother     Diabetes Father     Breast cancer Maternal Aunt        Current Outpatient Medications:     amitriptyline (ELAVIL) 50 MG tablet, Take 1 tablet (50 mg total) by mouth once daily., Disp: 90 tablet, Rfl: 3    baclofen (LIORESAL) 10 MG tablet, Take 0.5 tablets (5 mg total) by mouth 2 (two) times daily., Disp: 30 tablet, Rfl: 0    oxyCODONE-acetaminophen (PERCOCET)  mg per tablet, Take 1 tablet by mouth every 8 (eight) hours as needed. , Disp: , Rfl:     pantoprazole (PROTONIX) 40 MG tablet, Take 1 tablet (40 mg total) by mouth once daily., Disp: 90 tablet, Rfl: 3    prednisoLONE acetate (PRED FORTE) 1 % DrpS, INSTILL 1 DROP IN RIGHT EYE 4 TIMES A DAY, Disp: 5 mL, Rfl: 1    timolol maleate 0.25% (TIMOPTIC) 0.25 % Drop, PLACE 1 DROP IN EACH EYE TWICE A DAY, Disp: 15 mL, Rfl: 1    valsartan-hydrochlorothiazide (DIOVAN-HCT) 160-12.5 mg per tablet, Take 1 tablet by mouth once daily., Disp: 90 tablet, Rfl: 3    ALPRAZolam (XANAX) 0.25 MG tablet, Take 1 tablet (0.25 mg total) by mouth 2 (two) times daily as needed for Anxiety., Disp: 30 tablet, Rfl: 1    atorvastatin (LIPITOR) 10 MG tablet, Take 1 tablet (10 mg total) by mouth once daily., Disp: 90 tablet, Rfl: 3    latanoprost 0.005 % ophthalmic solution, PLACE 1 DROP IN EACH EYE EVERY EVENING,  Disp: 2.5 mL, Rfl: 2    magnesium oxide (MAG-OX) 400 mg (241.3 mg magnesium) tablet, Take 400 mg by mouth., Disp: , Rfl:     Review of Systems   Constitutional: Negative for chills and fever.   Respiratory: Negative for cough and shortness of breath.    Cardiovascular: Negative for chest pain.   Gastrointestinal: Negative for abdominal pain.   Skin: Negative for rash.   Neurological: Negative for dizziness.   Psychiatric/Behavioral: Positive for sleep disturbance.       Objective:   /72   Pulse 88   Temp 97 °F (36.1 °C)   Resp 18   Wt 78.2 kg (172 lb 6.4 oz)   SpO2 98%   BMI 29.59 kg/m²      Physical Exam  Vitals reviewed.   Constitutional:       Appearance: She is well-developed.   HENT:      Head: Normocephalic and atraumatic.   Eyes:      Conjunctiva/sclera: Conjunctivae normal.   Cardiovascular:      Rate and Rhythm: Normal rate.   Pulmonary:      Effort: Pulmonary effort is normal. No respiratory distress.   Skin:     General: Skin is warm and dry.      Findings: No rash.   Neurological:      Mental Status: She is alert and oriented to person, place, and time.      Coordination: Coordination normal.   Psychiatric:         Behavior: Behavior normal.         Assessment & Plan     Problem List Items Addressed This Visit        Psychiatric    Chronic anxiety    Current Assessment & Plan     Advised not to take more 0.25 mg daily.  Sending a new refill today since she will run out of the medication early.           Relevant Medications    ALPRAZolam (XANAX) 0.25 MG tablet    Moderate major depression - Primary       Cardiac/Vascular    Atherosclerosis of aorta (Chronic)    Overview     7/25/16 CT chest:..Aorta and vasculature: Atherosclerosis including coronary arteries...           Essential hypertension    Current Assessment & Plan     Blood pressure well controlled.  Continue on same medications                Renal/    Chronic kidney disease, stage 3a    Current Assessment & Plan     Has been  stable over the last several exams.              Immunology/Multi System    Systemic lupus erythematosus    Current Assessment & Plan     Continue f/u with rheum              Oncology    Alpha thalassemia (Chronic)    Overview     10/13/15 Hbg electrophoresis           Current Assessment & Plan     Hemoglobin 11.4 in October 2021                   Immunizations Administered on Date of Encounter - 3/7/2022     No immunizations on file.           No follow-ups on file.    Disclaimer:  This note may have been prepared using voice recognition software, it may have not been extensively proofed, as such there could be errors within the text such as sound alike errors.

## 2022-03-07 NOTE — ASSESSMENT & PLAN NOTE
Advised not to take more 0.25 mg daily.  Sending a new refill today since she will run out of the medication early.

## 2022-03-11 ENCOUNTER — OFFICE VISIT (OUTPATIENT)
Dept: OPHTHALMOLOGY | Facility: CLINIC | Age: 77
End: 2022-03-11
Payer: MEDICARE

## 2022-03-11 DIAGNOSIS — H40.1133 PRIMARY OPEN ANGLE GLAUCOMA (POAG) OF BOTH EYES, SEVERE STAGE: ICD-10-CM

## 2022-03-11 DIAGNOSIS — Z98.890 POST-OPERATIVE STATE: Primary | ICD-10-CM

## 2022-03-11 DIAGNOSIS — Z98.41 CATARACT EXTRACTION STATUS OF EYE, RIGHT: ICD-10-CM

## 2022-03-11 DIAGNOSIS — Z91.199 NON-COMPLIANCE WITH TREATMENT: ICD-10-CM

## 2022-03-11 DIAGNOSIS — M35.01 KERATOCONJUNCTIVITIS SICCA: ICD-10-CM

## 2022-03-11 PROCEDURE — 99024 POSTOP FOLLOW-UP VISIT: CPT | Mod: S$GLB,,, | Performed by: STUDENT IN AN ORGANIZED HEALTH CARE EDUCATION/TRAINING PROGRAM

## 2022-03-11 PROCEDURE — 99999 PR PBB SHADOW E&M-EST. PATIENT-LVL II: ICD-10-PCS | Mod: PBBFAC,,, | Performed by: STUDENT IN AN ORGANIZED HEALTH CARE EDUCATION/TRAINING PROGRAM

## 2022-03-11 PROCEDURE — 1159F PR MEDICATION LIST DOCUMENTED IN MEDICAL RECORD: ICD-10-PCS | Mod: CPTII,S$GLB,, | Performed by: STUDENT IN AN ORGANIZED HEALTH CARE EDUCATION/TRAINING PROGRAM

## 2022-03-11 PROCEDURE — 1160F PR REVIEW ALL MEDS BY PRESCRIBER/CLIN PHARMACIST DOCUMENTED: ICD-10-PCS | Mod: CPTII,S$GLB,, | Performed by: STUDENT IN AN ORGANIZED HEALTH CARE EDUCATION/TRAINING PROGRAM

## 2022-03-11 PROCEDURE — 1159F MED LIST DOCD IN RCRD: CPT | Mod: CPTII,S$GLB,, | Performed by: STUDENT IN AN ORGANIZED HEALTH CARE EDUCATION/TRAINING PROGRAM

## 2022-03-11 PROCEDURE — 99999 PR PBB SHADOW E&M-EST. PATIENT-LVL II: CPT | Mod: PBBFAC,,, | Performed by: STUDENT IN AN ORGANIZED HEALTH CARE EDUCATION/TRAINING PROGRAM

## 2022-03-11 PROCEDURE — 99024 PR POST-OP FOLLOW-UP VISIT: ICD-10-PCS | Mod: S$GLB,,, | Performed by: STUDENT IN AN ORGANIZED HEALTH CARE EDUCATION/TRAINING PROGRAM

## 2022-03-11 PROCEDURE — 1160F RVW MEDS BY RX/DR IN RCRD: CPT | Mod: CPTII,S$GLB,, | Performed by: STUDENT IN AN ORGANIZED HEALTH CARE EDUCATION/TRAINING PROGRAM

## 2022-03-11 NOTE — PROGRESS NOTES
HPI     Post-op Evaluation      Additional comments: Pt reports for 3 week post op PCIOL OD w/ gonio. Pt   notes OD va is still blurry, but no pain. 100% compliant with gtts.   Currently taking Pred OD QID and Timolol BID OU.               Comments     1. Coag severe stage   2. NS OS *Will do dropless  PCIOL OD W/ Gonio- complex    OD- Pred QID  Latanoprost qhs OU discontinued 01/26/2022  Timolol BID OU discontinued 01/26/2022            Last edited by Shorty Hu on 3/11/2022  1:44 PM. (History)            Assessment /Plan     For exam results, see Encounter Report.    Post-operative state  Cataract extraction status of eye, right- Impression/Plan  S/P CEIOL OD : Doing well with no evidence of infection. Persistent inflammation. Will continue steroids.    PF QID until next visit    Primary open angle glaucoma (POAG) of both eyes, severe stage- Intraocular pressure is not within an acceptable range relative to target pressure. Increases risk of irreversible visual loss due to inadequate compliance discussed. Lengthy discussion regarding importance of absolute compliance with treatment regimen.    Discussed options, risks, and benefits of additional medication, SLT laser, or incisional glaucoma surgery without improved compliance.     Patient chooses drop compliance   USE:   Latanoprost QHS OU  Timolol BID OU    Keratoconjunctivitis sicca- - ATs QID and lid hygiene w/ baby shampoo  - Sigel 3 Fish Oils    PCO, right eye- Will plan for YAG OD    Patient wanted glasses Rx, discussed with patient these glasses will not improve her vision and after her yag appointment her vision will change and MRx will change again, patient understands     Non-compliance with treatment- Discussed drop compliance and importance with patient       Return to clinic in 6-7 weeks for IOP check and Yag OD

## 2022-04-01 ENCOUNTER — OFFICE VISIT (OUTPATIENT)
Dept: OPHTHALMOLOGY | Facility: CLINIC | Age: 77
End: 2022-04-01
Payer: MEDICARE

## 2022-04-01 DIAGNOSIS — Z91.199 NON-COMPLIANCE WITH TREATMENT: ICD-10-CM

## 2022-04-01 DIAGNOSIS — H40.1133 PRIMARY OPEN ANGLE GLAUCOMA (POAG) OF BOTH EYES, SEVERE STAGE: ICD-10-CM

## 2022-04-01 DIAGNOSIS — Z98.41 CATARACT EXTRACTION STATUS OF EYE, RIGHT: ICD-10-CM

## 2022-04-01 DIAGNOSIS — Z98.890 POST-OPERATIVE STATE: Primary | ICD-10-CM

## 2022-04-01 PROCEDURE — 99999 PR PBB SHADOW E&M-EST. PATIENT-LVL II: CPT | Mod: PBBFAC,,, | Performed by: STUDENT IN AN ORGANIZED HEALTH CARE EDUCATION/TRAINING PROGRAM

## 2022-04-01 PROCEDURE — 99024 PR POST-OP FOLLOW-UP VISIT: ICD-10-PCS | Mod: S$GLB,,, | Performed by: STUDENT IN AN ORGANIZED HEALTH CARE EDUCATION/TRAINING PROGRAM

## 2022-04-01 PROCEDURE — 1160F RVW MEDS BY RX/DR IN RCRD: CPT | Mod: CPTII,S$GLB,, | Performed by: STUDENT IN AN ORGANIZED HEALTH CARE EDUCATION/TRAINING PROGRAM

## 2022-04-01 PROCEDURE — 99999 PR PBB SHADOW E&M-EST. PATIENT-LVL II: ICD-10-PCS | Mod: PBBFAC,,, | Performed by: STUDENT IN AN ORGANIZED HEALTH CARE EDUCATION/TRAINING PROGRAM

## 2022-04-01 PROCEDURE — 1159F PR MEDICATION LIST DOCUMENTED IN MEDICAL RECORD: ICD-10-PCS | Mod: CPTII,S$GLB,, | Performed by: STUDENT IN AN ORGANIZED HEALTH CARE EDUCATION/TRAINING PROGRAM

## 2022-04-01 PROCEDURE — 1159F MED LIST DOCD IN RCRD: CPT | Mod: CPTII,S$GLB,, | Performed by: STUDENT IN AN ORGANIZED HEALTH CARE EDUCATION/TRAINING PROGRAM

## 2022-04-01 PROCEDURE — 99024 POSTOP FOLLOW-UP VISIT: CPT | Mod: S$GLB,,, | Performed by: STUDENT IN AN ORGANIZED HEALTH CARE EDUCATION/TRAINING PROGRAM

## 2022-04-01 PROCEDURE — 1160F PR REVIEW ALL MEDS BY PRESCRIBER/CLIN PHARMACIST DOCUMENTED: ICD-10-PCS | Mod: CPTII,S$GLB,, | Performed by: STUDENT IN AN ORGANIZED HEALTH CARE EDUCATION/TRAINING PROGRAM

## 2022-04-01 RX ORDER — BRIMONIDINE TARTRATE 2 MG/ML
1 SOLUTION/ DROPS OPHTHALMIC 2 TIMES DAILY
Qty: 10 ML | Refills: 4 | Status: SHIPPED | OUTPATIENT
Start: 2022-04-01 | End: 2022-09-21 | Stop reason: SDUPTHER

## 2022-04-01 NOTE — PROGRESS NOTES
HPI     Pt in today with complaints of blurred vision in her right eye. Pt states   her vision in her right eye is blurry and she can't see. Pt states she's   compliant with her drops.    1. Coag severe stage   2. NS OS *Will do dropless  PCIOL OD 1/25/22 W/ Gonio- complex    OD- Pred QID  Latanoprost qhs OU   Timolol BID OU      Last edited by [place] Thompson on 4/1/2022  8:28 AM. (History)            Assessment /Plan     For exam results, see Encounter Report.    Post-operative state  Cataract extraction status of eye, right- Impression/Plan  S/P CEIOL OD : Doing well with no evidence of infection. Healing well    PF Taper 4-3-2-1 then stop    Pt given and instructed in one week postop instructions. Can resume normal activitites and d/c eye shield. OTC reading glasses can be used until evaluated for final MR. Follow up in one month or PRN pain, redness, vision loss, or other concerns.      Primary open angle glaucoma (POAG) of both eyes, severe stage- IOP elevated with risk of irreversible visual loss. Additional treatment required.  Discussed options, risks, and benefits of additional medication, SLT laser, or incisional glaucoma surgery.       Start  Brimonidine BID OU    Continue    Latanoprost qHS OU  Timolol QHS OU    Reviewed importance of continued compliance with treatment and follow up.      Non-compliance with treatment- Hand written drop instructions given      Return to clinic as partha. For YAG OD

## 2022-04-22 RX ORDER — AMITRIPTYLINE HYDROCHLORIDE 50 MG/1
TABLET, FILM COATED ORAL
Qty: 90 TABLET | Refills: 3 | Status: SHIPPED | OUTPATIENT
Start: 2022-04-22 | End: 2024-01-30

## 2022-04-22 NOTE — TELEPHONE ENCOUNTER
Refill Routing Note   Medication(s) are not appropriate for processing by [facility] for the following reason(s):      - Non-participating provider    [facility] action(s):  Route          Medication reconciliation completed: No     Appointments  past 12m or future 3m with PCP    Date Provider   Last Visit   12/29/2021 Marilyn Odonnell NP   Next Visit   5/4/2022 Marilyn Odonnell NP   ED visits in past 90 days: 0        Note composed:12:47 PM 04/22/2022

## 2022-04-28 ENCOUNTER — PATIENT OUTREACH (OUTPATIENT)
Dept: ADMINISTRATIVE | Facility: OTHER | Age: 77
End: 2022-04-28
Payer: MEDICARE

## 2022-04-29 ENCOUNTER — TELEPHONE (OUTPATIENT)
Dept: OPHTHALMOLOGY | Facility: CLINIC | Age: 77
End: 2022-04-29
Payer: MEDICARE

## 2022-04-29 NOTE — PROGRESS NOTES
Health Maintenance Due   Topic Date Due    LDCT Lung Screen  08/25/2017    Shingles Vaccine (1 of 2) 12/21/2017    DEXA Scan  01/07/2018    Mammogram  11/19/2020     Updates were requested from care everywhere.  Chart was reviewed for overdue Proactive [facility] Encounters (DIPAK) topics (CRS, Breast Cancer Screening, Eye exam)  Health Maintenance has been updated.  LINKS immunization registry triggered.  Immunizations were reconciled.

## 2022-04-29 NOTE — TELEPHONE ENCOUNTER
attempted to call pt. and let her know we can see her this morning as  will be out of the office this afternoon or we will R/S her appt., a V/M was left

## 2022-05-03 ENCOUNTER — OFFICE VISIT (OUTPATIENT)
Dept: OPHTHALMOLOGY | Facility: CLINIC | Age: 77
End: 2022-05-03
Payer: MEDICARE

## 2022-05-03 DIAGNOSIS — H26.491 PCO (POSTERIOR CAPSULE OPACIFICATION), RIGHT: Primary | ICD-10-CM

## 2022-05-03 PROCEDURE — 99999 PR PBB SHADOW E&M-EST. PATIENT-LVL II: CPT | Mod: PBBFAC,,, | Performed by: OPHTHALMOLOGY

## 2022-05-03 PROCEDURE — 99214 OFFICE O/P EST MOD 30 MIN: CPT | Mod: 25,S$GLB,, | Performed by: OPHTHALMOLOGY

## 2022-05-03 PROCEDURE — 99999 PR PBB SHADOW E&M-EST. PATIENT-LVL II: ICD-10-PCS | Mod: PBBFAC,,, | Performed by: OPHTHALMOLOGY

## 2022-05-03 PROCEDURE — 99214 PR OFFICE/OUTPT VISIT, EST, LEVL IV, 30-39 MIN: ICD-10-PCS | Mod: 25,S$GLB,, | Performed by: OPHTHALMOLOGY

## 2022-05-03 RX ORDER — PREDNISOLONE ACETATE 10 MG/ML
1 SUSPENSION/ DROPS OPHTHALMIC 4 TIMES DAILY
Qty: 10 ML | Refills: 0 | Status: SHIPPED | OUTPATIENT
Start: 2022-05-03 | End: 2022-05-10

## 2022-05-03 NOTE — PROGRESS NOTES
HPI     Pt in today for a YAG OD. Pt states her vision has gotten better in her   right eye since her last visit. Pt denies any ocular pain or discomfort.   Pt states she's using her drops.    1. Coag severe stage   2. NS OS *Will do dropless  PCIOL OD 1/25/22 W/ Gonio- complex  3. PCO OD      Latanoprost qhs OU   Timolol BID OU  Brimonidine BID OU      Last edited by [place] Thompson on 5/3/2022 11:04 AM. (History)            Assessment /Plan     For exam results, see Encounter Report.      ICD-10-CM ICD-9-CM    1. PCO (posterior capsule opacification), right  H26.491 366.50 Yag Operative Procedure Note    76 y.o. year old patient experiencing a symptomatic decrease in vision OD with inability to perform activities of daily living including reading.      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 Including risks of retinal detachment (1-3%), macular edema, dislocated implant, pain, inflammation elevated intraocular pressure and vision loss. Consent signed.  Time out procedure form completed prior to laser.    Medications given:  Alphagan 0.15%  Tetracaine    Laser energy settings:    3.7  energy per shot  99  bursts  1 to 1 ratio per shot     Central capsular opening created without difficulty         Return to clinic 3-4 weeks with ABR   Start Pred QID X 1 week

## 2022-05-04 ENCOUNTER — OFFICE VISIT (OUTPATIENT)
Dept: INTERNAL MEDICINE | Facility: CLINIC | Age: 77
End: 2022-05-04
Payer: MEDICARE

## 2022-05-04 VITALS
DIASTOLIC BLOOD PRESSURE: 70 MMHG | SYSTOLIC BLOOD PRESSURE: 128 MMHG | BODY MASS INDEX: 28.68 KG/M2 | TEMPERATURE: 97 F | OXYGEN SATURATION: 98 % | HEART RATE: 75 BPM | RESPIRATION RATE: 18 BRPM | WEIGHT: 167.13 LBS

## 2022-05-04 DIAGNOSIS — I10 ESSENTIAL HYPERTENSION: ICD-10-CM

## 2022-05-04 DIAGNOSIS — F41.9 CHRONIC ANXIETY: ICD-10-CM

## 2022-05-04 DIAGNOSIS — F32.1 MODERATE MAJOR DEPRESSION: Primary | ICD-10-CM

## 2022-05-04 DIAGNOSIS — G89.29 OTHER CHRONIC PAIN: ICD-10-CM

## 2022-05-04 PROCEDURE — 1101F PT FALLS ASSESS-DOCD LE1/YR: CPT | Mod: CPTII,S$GLB,, | Performed by: NURSE PRACTITIONER

## 2022-05-04 PROCEDURE — 99999 PR PBB SHADOW E&M-EST. PATIENT-LVL IV: CPT | Mod: PBBFAC,,, | Performed by: NURSE PRACTITIONER

## 2022-05-04 PROCEDURE — 1160F RVW MEDS BY RX/DR IN RCRD: CPT | Mod: CPTII,S$GLB,, | Performed by: NURSE PRACTITIONER

## 2022-05-04 PROCEDURE — 1125F PR PAIN SEVERITY QUANTIFIED, PAIN PRESENT: ICD-10-PCS | Mod: CPTII,S$GLB,, | Performed by: NURSE PRACTITIONER

## 2022-05-04 PROCEDURE — 1159F PR MEDICATION LIST DOCUMENTED IN MEDICAL RECORD: ICD-10-PCS | Mod: CPTII,S$GLB,, | Performed by: NURSE PRACTITIONER

## 2022-05-04 PROCEDURE — 3288F PR FALLS RISK ASSESSMENT DOCUMENTED: ICD-10-PCS | Mod: CPTII,S$GLB,, | Performed by: NURSE PRACTITIONER

## 2022-05-04 PROCEDURE — 3074F PR MOST RECENT SYSTOLIC BLOOD PRESSURE < 130 MM HG: ICD-10-PCS | Mod: CPTII,S$GLB,, | Performed by: NURSE PRACTITIONER

## 2022-05-04 PROCEDURE — 99214 PR OFFICE/OUTPT VISIT, EST, LEVL IV, 30-39 MIN: ICD-10-PCS | Mod: S$GLB,,, | Performed by: NURSE PRACTITIONER

## 2022-05-04 PROCEDURE — 99999 PR PBB SHADOW E&M-EST. PATIENT-LVL IV: ICD-10-PCS | Mod: PBBFAC,,, | Performed by: NURSE PRACTITIONER

## 2022-05-04 PROCEDURE — 99214 OFFICE O/P EST MOD 30 MIN: CPT | Mod: S$GLB,,, | Performed by: NURSE PRACTITIONER

## 2022-05-04 PROCEDURE — 3078F DIAST BP <80 MM HG: CPT | Mod: CPTII,S$GLB,, | Performed by: NURSE PRACTITIONER

## 2022-05-04 PROCEDURE — 1101F PR PT FALLS ASSESS DOC 0-1 FALLS W/OUT INJ PAST YR: ICD-10-PCS | Mod: CPTII,S$GLB,, | Performed by: NURSE PRACTITIONER

## 2022-05-04 PROCEDURE — 3074F SYST BP LT 130 MM HG: CPT | Mod: CPTII,S$GLB,, | Performed by: NURSE PRACTITIONER

## 2022-05-04 PROCEDURE — 3288F FALL RISK ASSESSMENT DOCD: CPT | Mod: CPTII,S$GLB,, | Performed by: NURSE PRACTITIONER

## 2022-05-04 PROCEDURE — 1125F AMNT PAIN NOTED PAIN PRSNT: CPT | Mod: CPTII,S$GLB,, | Performed by: NURSE PRACTITIONER

## 2022-05-04 PROCEDURE — 1160F PR REVIEW ALL MEDS BY PRESCRIBER/CLIN PHARMACIST DOCUMENTED: ICD-10-PCS | Mod: CPTII,S$GLB,, | Performed by: NURSE PRACTITIONER

## 2022-05-04 PROCEDURE — 1159F MED LIST DOCD IN RCRD: CPT | Mod: CPTII,S$GLB,, | Performed by: NURSE PRACTITIONER

## 2022-05-04 PROCEDURE — 3078F PR MOST RECENT DIASTOLIC BLOOD PRESSURE < 80 MM HG: ICD-10-PCS | Mod: CPTII,S$GLB,, | Performed by: NURSE PRACTITIONER

## 2022-05-04 RX ORDER — CITALOPRAM 20 MG/1
20 TABLET, FILM COATED ORAL DAILY
Qty: 30 TABLET | Refills: 11 | Status: SHIPPED | OUTPATIENT
Start: 2022-05-04 | End: 2023-03-24

## 2022-05-04 NOTE — ASSESSMENT & PLAN NOTE
Adding Celexa since she felt that Zoloft is no longer helping.  Stop Zoloft 1 her own last year.  Cymbalta would be a good option with her having chronic pain, but noted in her allergy list to make her feel bad.  Patient does not remember taking this.  Discussed possibility of increasing quantity of Xanax to 45 daily, but do not agree with increasing her dose due to other chronic sedating medications.  Previously noted in chart the Dr. Dominguez advised her not to take more than 0.25 mg daily.
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
## 2022-05-04 NOTE — ASSESSMENT & PLAN NOTE
Followed by pain management.  Discussed the concern with adding a medication to her list her sleep due to multiple sedate of medications on her medication list.  Continues to take Percocet as prescribed by Pain Management.

## 2022-05-04 NOTE — ASSESSMENT & PLAN NOTE
Adding Celexa since she felt that Zoloft is no longer helping.  Stop Zoloft 1 her own last year.  Cymbalta would be a good option with her having chronic pain, but noted in her allergy list to make her feel bad.  Patient does not remember taking this.  Discussed possibility of increasing quantity of Xanax to 45 daily, but do not agree with increasing her dose due to other chronic sedating medications.

## 2022-05-05 ENCOUNTER — TELEPHONE (OUTPATIENT)
Dept: INTERNAL MEDICINE | Facility: CLINIC | Age: 77
End: 2022-05-05
Payer: MEDICARE

## 2022-05-05 NOTE — TELEPHONE ENCOUNTER
----- Message from Mari Murdock sent at 5/5/2022  1:02 PM CDT -----  Contact: RUIZ [phone]  PT  calling to speak with the new regarding a medication. (No another information was given) Please call to advise.

## 2022-05-05 NOTE — TELEPHONE ENCOUNTER
Pt called and stated she was suppose to have a Rx for diflucan sent over on yesterday and would like it sent to local pharmacy.

## 2022-05-31 ENCOUNTER — OFFICE VISIT (OUTPATIENT)
Dept: OPHTHALMOLOGY | Facility: CLINIC | Age: 77
End: 2022-05-31
Payer: MEDICARE

## 2022-05-31 DIAGNOSIS — H40.1133 PRIMARY OPEN ANGLE GLAUCOMA (POAG) OF BOTH EYES, SEVERE STAGE: Primary | ICD-10-CM

## 2022-05-31 PROCEDURE — 1159F PR MEDICATION LIST DOCUMENTED IN MEDICAL RECORD: ICD-10-PCS | Mod: CPTII,S$GLB,, | Performed by: STUDENT IN AN ORGANIZED HEALTH CARE EDUCATION/TRAINING PROGRAM

## 2022-05-31 PROCEDURE — 99999 PR PBB SHADOW E&M-EST. PATIENT-LVL II: CPT | Mod: PBBFAC,,, | Performed by: STUDENT IN AN ORGANIZED HEALTH CARE EDUCATION/TRAINING PROGRAM

## 2022-05-31 PROCEDURE — 99214 OFFICE O/P EST MOD 30 MIN: CPT | Mod: S$GLB,,, | Performed by: STUDENT IN AN ORGANIZED HEALTH CARE EDUCATION/TRAINING PROGRAM

## 2022-05-31 PROCEDURE — 99214 PR OFFICE/OUTPT VISIT, EST, LEVL IV, 30-39 MIN: ICD-10-PCS | Mod: S$GLB,,, | Performed by: STUDENT IN AN ORGANIZED HEALTH CARE EDUCATION/TRAINING PROGRAM

## 2022-05-31 PROCEDURE — 1160F RVW MEDS BY RX/DR IN RCRD: CPT | Mod: CPTII,S$GLB,, | Performed by: STUDENT IN AN ORGANIZED HEALTH CARE EDUCATION/TRAINING PROGRAM

## 2022-05-31 PROCEDURE — 99999 PR PBB SHADOW E&M-EST. PATIENT-LVL II: ICD-10-PCS | Mod: PBBFAC,,, | Performed by: STUDENT IN AN ORGANIZED HEALTH CARE EDUCATION/TRAINING PROGRAM

## 2022-05-31 PROCEDURE — 1159F MED LIST DOCD IN RCRD: CPT | Mod: CPTII,S$GLB,, | Performed by: STUDENT IN AN ORGANIZED HEALTH CARE EDUCATION/TRAINING PROGRAM

## 2022-05-31 PROCEDURE — 1160F PR REVIEW ALL MEDS BY PRESCRIBER/CLIN PHARMACIST DOCUMENTED: ICD-10-PCS | Mod: CPTII,S$GLB,, | Performed by: STUDENT IN AN ORGANIZED HEALTH CARE EDUCATION/TRAINING PROGRAM

## 2022-05-31 RX ORDER — DORZOLAMIDE HCL 20 MG/ML
1 SOLUTION/ DROPS OPHTHALMIC 2 TIMES DAILY
Qty: 10 ML | Refills: 3 | Status: SHIPPED | OUTPATIENT
Start: 2022-05-31 | End: 2022-09-21

## 2022-05-31 NOTE — PROGRESS NOTES
HPI     Pt in today for a 4 week post-op of the right eye. Pt states her vision   is doing a lot better today. Denies any ocular pain or discomfort.     1. Coag severe stage   2. NS OS *Will do dropless  PCIOL OD 1/25/22 W/ Gonio- complex  3. PCO OD      Latanoprost qhs OU   Timolol BID OU  Brimonidine BID OU      Last edited by Florida Thompson on 5/31/2022  2:28 PM. (History)            Assessment /Plan     For exam results, see Encounter Report.    Primary open angle glaucoma (POAG) of both eyes, severe stage-   Intraocular pressure is not within an acceptable range relative to target pressure. Increases risk of irreversible visual loss due to inadequate compliance discussed. Lengthy discussion regarding importance of absolute compliance with treatment regimen.    Discussed options, risks, and benefits of additional medication, SLT laser, or incisional glaucoma surgery without improved compliance.     Patient chooses Drop Compliance    Taper-  Pred 2-1 then STOP    Continue-  Latanoprost qhs OU   Timolol BID OU  Brimonidine BID OU    Start-  Dorzolamide BID OU    *Hand written instructions for eye drops given to patient today and reviewed, discussed importance of using eyedrops correctly and every day      Return to clinic in 2 week IOP check

## 2022-06-21 ENCOUNTER — OFFICE VISIT (OUTPATIENT)
Dept: OPHTHALMOLOGY | Facility: CLINIC | Age: 77
End: 2022-06-21
Payer: MEDICARE

## 2022-06-21 DIAGNOSIS — H40.1133 PRIMARY OPEN ANGLE GLAUCOMA (POAG) OF BOTH EYES, SEVERE STAGE: Primary | ICD-10-CM

## 2022-06-21 PROCEDURE — 99999 PR PBB SHADOW E&M-EST. PATIENT-LVL I: ICD-10-PCS | Mod: PBBFAC,,, | Performed by: STUDENT IN AN ORGANIZED HEALTH CARE EDUCATION/TRAINING PROGRAM

## 2022-06-21 PROCEDURE — 99214 OFFICE O/P EST MOD 30 MIN: CPT | Mod: S$GLB,,, | Performed by: STUDENT IN AN ORGANIZED HEALTH CARE EDUCATION/TRAINING PROGRAM

## 2022-06-21 PROCEDURE — 1159F PR MEDICATION LIST DOCUMENTED IN MEDICAL RECORD: ICD-10-PCS | Mod: CPTII,S$GLB,, | Performed by: STUDENT IN AN ORGANIZED HEALTH CARE EDUCATION/TRAINING PROGRAM

## 2022-06-21 PROCEDURE — 1160F RVW MEDS BY RX/DR IN RCRD: CPT | Mod: CPTII,S$GLB,, | Performed by: STUDENT IN AN ORGANIZED HEALTH CARE EDUCATION/TRAINING PROGRAM

## 2022-06-21 PROCEDURE — 1159F MED LIST DOCD IN RCRD: CPT | Mod: CPTII,S$GLB,, | Performed by: STUDENT IN AN ORGANIZED HEALTH CARE EDUCATION/TRAINING PROGRAM

## 2022-06-21 PROCEDURE — 99999 PR PBB SHADOW E&M-EST. PATIENT-LVL I: CPT | Mod: PBBFAC,,, | Performed by: STUDENT IN AN ORGANIZED HEALTH CARE EDUCATION/TRAINING PROGRAM

## 2022-06-21 PROCEDURE — 1160F PR REVIEW ALL MEDS BY PRESCRIBER/CLIN PHARMACIST DOCUMENTED: ICD-10-PCS | Mod: CPTII,S$GLB,, | Performed by: STUDENT IN AN ORGANIZED HEALTH CARE EDUCATION/TRAINING PROGRAM

## 2022-06-21 PROCEDURE — 99214 PR OFFICE/OUTPT VISIT, EST, LEVL IV, 30-39 MIN: ICD-10-PCS | Mod: S$GLB,,, | Performed by: STUDENT IN AN ORGANIZED HEALTH CARE EDUCATION/TRAINING PROGRAM

## 2022-06-21 NOTE — PROGRESS NOTES
HPI     Glaucoma      Additional comments: The patient states her eyes               Comments     1. Coag severe stage   2. NS OS *Will do dropless/ plan for [facility] 360  PCIOL OD 1/25/22 W/ Gonio- complex  Yag OD      Latanoprost qhs OU   Timolol BID OU  Brimonidine BID OU  Dorzolamide BID OU            Last edited by Quiana Pitts MD on 6/21/2022 10:58 AM. (History)            Assessment /Plan     For exam results, see Encounter Report.    Primary open angle glaucoma (POAG) of both eyes, severe stage- IOP Controlled with no evidence of progression. Continue current treatment. Reviewed importance of continued compliance with treatment and follow up.   Continue-  Latanoprost qhs OU   Timolol BID OU  Brimonidine BID OU  Dorzolamide BID OU        Return to clinic in 3M IOP check

## 2022-06-26 NOTE — TELEPHONE ENCOUNTER
----- Message from Mary Herrera sent at 8/16/2017  9:30 AM CDT -----  States her blood pressure medication is making her feel dizzy. States would like to discuss with nurse. Please adv/call [phone].//thanks. cw  
No answer and unable to leave message.   
fall

## 2022-06-28 DIAGNOSIS — F41.9 CHRONIC ANXIETY: ICD-10-CM

## 2022-06-28 NOTE — TELEPHONE ENCOUNTER
Refill Routing Note   Medication(s) are not appropriate for processing by [facility] for the following reason(s):      - Non-participating provider    [facility] action(s):  Route          Medication reconciliation completed: No     Appointments  past 12m or future 3m with PCP    Date Provider   Last Visit   5/4/2022 Marilyn Odonnell NP   Next Visit   Visit date not found Marilyn Odonnell NP   ED visits in past 90 days: 0        Note composed:6:40 PM 06/28/2022

## 2022-06-29 RX ORDER — ATORVASTATIN CALCIUM 10 MG/1
TABLET, FILM COATED ORAL
Qty: 90 TABLET | Refills: 3 | Status: SHIPPED | OUTPATIENT
Start: 2022-06-29

## 2022-06-29 RX ORDER — ALPRAZOLAM 0.25 MG/1
TABLET ORAL
Qty: 30 TABLET | Refills: 1 | Status: SHIPPED | OUTPATIENT
Start: 2022-06-29 | End: 2022-09-20

## 2022-06-29 RX ORDER — VALSARTAN AND HYDROCHLOROTHIAZIDE 160; 12.5 MG/1; MG/1
TABLET, FILM COATED ORAL
Qty: 90 TABLET | Refills: 3 | Status: SHIPPED | OUTPATIENT
Start: 2022-06-29

## 2022-08-17 ENCOUNTER — HOSPITAL ENCOUNTER (OUTPATIENT)
Facility: HOSPITAL | Age: 77
Discharge: HOME | End: 2022-08-18
Attending: EMERGENCY MEDICINE | Admitting: FAMILY MEDICINE
Payer: MEDICARE

## 2022-08-17 DIAGNOSIS — N17.9 ACUTE KIDNEY INJURY: ICD-10-CM

## 2022-08-17 DIAGNOSIS — M79.603 ARM PAIN: ICD-10-CM

## 2022-08-17 DIAGNOSIS — M25.569 KNEE PAIN: ICD-10-CM

## 2022-08-17 DIAGNOSIS — R53.1 WEAKNESS: ICD-10-CM
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
## 2022-08-17 DIAGNOSIS — T79.6XXA TRAUMATIC RHABDOMYOLYSIS, INITIAL ENCOUNTER: ICD-10-CM

## 2022-08-17 DIAGNOSIS — N30.00 ACUTE CYSTITIS WITHOUT HEMATURIA: Primary | ICD-10-CM

## 2022-08-17 DIAGNOSIS — M25.511 RIGHT SHOULDER PAIN: ICD-10-CM

## 2022-08-17 LAB
ALBUMIN SERPL BCP-MCNC: 3.6 G/DL (ref 3.5–5.2)
ALP SERPL-CCNC: 73 U/L (ref 55–135)
ALT SERPL W/O P-5'-P-CCNC: 29 U/L (ref 10–44)
ANION GAP SERPL CALC-SCNC: 15 MMOL/L (ref 8–16)
AST SERPL-CCNC: 54 U/L (ref 10–40)
BACTERIA #/AREA URNS AUTO: ABNORMAL /HPF
BASOPHILS # BLD AUTO: 0.04 K/UL (ref 0–0.2)
BASOPHILS NFR BLD: 0.2 % (ref 0–1.9)
BILIRUB SERPL-MCNC: 0.8 MG/DL (ref 0.1–1)
BILIRUB UR QL STRIP: ABNORMAL
BUN SERPL-MCNC: 36 MG/DL (ref 8–23)
CALCIUM SERPL-MCNC: 10.5 MG/DL (ref 8.7–10.5)
CHLORIDE SERPL-SCNC: 109 MMOL/L (ref 95–110)
CK SERPL-CCNC: 1429 U/L (ref 20–180)
CLARITY UR REFRACT.AUTO: ABNORMAL
CO2 SERPL-SCNC: 18 MMOL/L (ref 23–29)
COLOR UR AUTO: ABNORMAL
CREAT SERPL-MCNC: 1.5 MG/DL (ref 0.5–1.4)
CTP QC/QA: YES
DIFFERENTIAL METHOD: ABNORMAL
EOSINOPHIL # BLD AUTO: 0 K/UL (ref 0–0.5)
EOSINOPHIL NFR BLD: 0 % (ref 0–8)
ERYTHROCYTE [DISTWIDTH] IN BLOOD BY AUTOMATED COUNT: 16.1 % (ref 11.5–14.5)
EST. GFR  (NO RACE VARIABLE): 36.1 ML/MIN/1.73 M^2
GLUCOSE SERPL-MCNC: 113 MG/DL (ref 70–110)
GLUCOSE UR QL STRIP: NEGATIVE
HCT VFR BLD AUTO: 40.5 % (ref 37–48.5)
HGB BLD-MCNC: 12.5 G/DL (ref 12–16)
HGB UR QL STRIP: ABNORMAL
IMM GRANULOCYTES # BLD AUTO: 0.1 K/UL (ref 0–0.04)
IMM GRANULOCYTES NFR BLD AUTO: 0.5 % (ref 0–0.5)
KETONES UR QL STRIP: NEGATIVE
LEUKOCYTE ESTERASE UR QL STRIP: ABNORMAL
LYMPHOCYTES # BLD AUTO: 1.6 K/UL (ref 1–4.8)
LYMPHOCYTES NFR BLD: 8.5 % (ref 18–48)
MCH RBC QN AUTO: 21.9 PG (ref 27–31)
MCHC RBC AUTO-ENTMCNC: 30.9 G/DL (ref 32–36)
MCV RBC AUTO: 71 FL (ref 82–98)
MICROSCOPIC COMMENT: ABNORMAL
MONOCYTES # BLD AUTO: 1.1 K/UL (ref 0.3–1)
MONOCYTES NFR BLD: 6 % (ref 4–15)
NEUTROPHILS # BLD AUTO: 16.2 K/UL (ref 1.8–7.7)
NEUTROPHILS NFR BLD: 84.8 % (ref 38–73)
NITRITE UR QL STRIP: POSITIVE
NRBC BLD-RTO: 0 /100 WBC
PH UR STRIP: 6 [PH] (ref 5–8)
PLATELET # BLD AUTO: 413 K/UL (ref 150–450)
PMV BLD AUTO: 9.9 FL (ref 9.2–12.9)
POTASSIUM SERPL-SCNC: 3.8 MMOL/L (ref 3.5–5.1)
PROT SERPL-MCNC: 8.8 G/DL (ref 6–8.4)
PROT UR QL STRIP: ABNORMAL
RBC # BLD AUTO: 5.7 M/UL (ref 4–5.4)
RBC #/AREA URNS AUTO: 3 /HPF (ref 0–4)
SARS-COV-2 RDRP RESP QL NAA+PROBE: NEGATIVE
SODIUM SERPL-SCNC: 142 MMOL/L (ref 136–145)
SP GR UR STRIP: 1.01 (ref 1–1.03)
SQUAMOUS #/AREA URNS AUTO: 4 /HPF
TROPONIN I SERPL DL<=0.01 NG/ML-MCNC: 0.02 NG/ML (ref 0–0.03)
URN SPEC COLLECT METH UR: ABNORMAL
UROBILINOGEN UR STRIP-ACNC: NEGATIVE EU/DL
WBC # BLD AUTO: 19.1 K/UL (ref 3.9–12.7)
WBC #/AREA URNS AUTO: 15 /HPF (ref 0–5)
WBC CLUMPS UR QL AUTO: ABNORMAL

## 2022-08-17 PROCEDURE — 99291 CRITICAL CARE FIRST HOUR: CPT | Mod: 25,CS,ER

## 2022-08-17 PROCEDURE — 80053 COMPREHEN METABOLIC PANEL: CPT | Mod: ER | Performed by: EMERGENCY MEDICINE

## 2022-08-17 PROCEDURE — 63600175 PHARM REV CODE 636 W HCPCS: Mod: ER | Performed by: EMERGENCY MEDICINE

## 2022-08-17 PROCEDURE — G0378 HOSPITAL OBSERVATION PER HR: HCPCS | Mod: ER

## 2022-08-17 PROCEDURE — 82550 ASSAY OF CK (CPK): CPT | Mod: ER | Performed by: EMERGENCY MEDICINE

## 2022-08-17 PROCEDURE — 84484 ASSAY OF TROPONIN QUANT: CPT | Mod: ER | Performed by: EMERGENCY MEDICINE

## 2022-08-17 PROCEDURE — 87088 URINE BACTERIA CULTURE: CPT | Performed by: EMERGENCY MEDICINE

## 2022-08-17 PROCEDURE — 96365 THER/PROPH/DIAG IV INF INIT: CPT | Mod: ER

## 2022-08-17 PROCEDURE — 87186 SC STD MICRODIL/AGAR DIL: CPT | Performed by: EMERGENCY MEDICINE

## 2022-08-17 PROCEDURE — 93010 ELECTROCARDIOGRAM REPORT: CPT | Mod: ,,, | Performed by: INTERNAL MEDICINE

## 2022-08-17 PROCEDURE — 93005 ELECTROCARDIOGRAM TRACING: CPT | Mod: ER

## 2022-08-17 PROCEDURE — 85025 COMPLETE CBC W/AUTO DIFF WBC: CPT | Mod: ER | Performed by: EMERGENCY MEDICINE

## 2022-08-17 PROCEDURE — 87077 CULTURE AEROBIC IDENTIFY: CPT | Performed by: EMERGENCY MEDICINE

## 2022-08-17 PROCEDURE — U0002 COVID-19 LAB TEST NON-CDC: HCPCS | Mod: ER | Performed by: EMERGENCY MEDICINE

## 2022-08-17 PROCEDURE — 87086 URINE CULTURE/COLONY COUNT: CPT | Performed by: EMERGENCY MEDICINE

## 2022-08-17 PROCEDURE — 96361 HYDRATE IV INFUSION ADD-ON: CPT | Mod: ER

## 2022-08-17 PROCEDURE — 25000003 PHARM REV CODE 250: Mod: ER | Performed by: EMERGENCY MEDICINE

## 2022-08-17 PROCEDURE — 93010 EKG 12-LEAD: ICD-10-PCS | Mod: ,,, | Performed by: INTERNAL MEDICINE

## 2022-08-17 PROCEDURE — 81000 URINALYSIS NONAUTO W/SCOPE: CPT | Mod: ER | Performed by: EMERGENCY MEDICINE

## 2022-08-17 RX ORDER — HYDROMORPHONE HYDROCHLORIDE 2 MG/ML
0.5 INJECTION, SOLUTION INTRAMUSCULAR; INTRAVENOUS; SUBCUTANEOUS
Status: COMPLETED | OUTPATIENT
Start: 2022-08-17 | End: 2022-08-17

## 2022-08-17 RX ORDER — OXYCODONE AND ACETAMINOPHEN 10; 325 MG/1; MG/1
1 TABLET ORAL
Status: COMPLETED | OUTPATIENT
Start: 2022-08-17 | End: 2022-08-17

## 2022-08-17 RX ORDER — SODIUM CHLORIDE 9 MG/ML
1000 INJECTION, SOLUTION INTRAVENOUS CONTINUOUS
Status: ACTIVE | OUTPATIENT
Start: 2022-08-17 | End: 2022-08-18

## 2022-08-17 RX ORDER — BACLOFEN 10 MG/1
10 TABLET ORAL 2 TIMES DAILY PRN
COMMUNITY
Start: 2022-08-09

## 2022-08-17 RX ADMIN — CEFTRIAXONE 1 G: 1 INJECTION, SOLUTION INTRAVENOUS at 05:08

## 2022-08-17 RX ADMIN — OXYCODONE AND ACETAMINOPHEN 1 TABLET: 10; 325 TABLET ORAL at 04:08

## 2022-08-17 RX ADMIN — SODIUM CHLORIDE 1000 ML: 0.9 INJECTION, SOLUTION INTRAVENOUS at 06:08

## 2022-08-17 RX ADMIN — HYDROMORPHONE HYDROCHLORIDE 0.5 MG: 2 INJECTION INTRAMUSCULAR; INTRAVENOUS; SUBCUTANEOUS at 10:08

## 2022-08-17 RX ADMIN — SODIUM CHLORIDE 500 ML: 0.9 INJECTION, SOLUTION INTRAVENOUS at 05:08

## 2022-08-17 NOTE — ED PROVIDER NOTES
History     Chief Complaint   Patient presents with    Fall     Pt brought in by [facility]. Pt fell on Monday at home. Was unable to get herself off of the floor due to general weakness. Pt was found by family on floor today. No AMS, no LOC. Pt gcs 15. C/o right shoulder, right knee, and some neck pain following fall. Pt has been incontinent on floor at home and cleaned up by family prior to aasi transport.        Review of patient's allergies indicates:   Allergen Reactions    Morphine Shortness Of Breath    Duloxetine      Feels bad       History of Present Illness   HPI    8/17/2022, 3:56 PM  The history is provided by the patient and AASI    Tea Ring is a 76 y.o. female presenting to the ED for AASI.  Patient is a somewhat reliable historian.  Patient reportedly had fallen.  Patient reports that she was in her wheelchair mopping.  She states that she had fallen.  No loss of consciousness.  She had been on the ground since Monday.(48 hours prior).  Patient was too weak to get up off the floor.  She reports that she has got right shoulder pain, right knee pain, and neck discomfort after the fall.  She denies any numbness or tingling to the upper extremities, chest pain, chest pressure, shortness of breath, difficulty breathing, shortness of breath, nausea, vomiting, diarrhea.  She believes she might have had a fever when she was on the floor.      Arrival mode:  [facility]       PCP: Marilyn Odonnell NP     Allergies:  Review of patient's allergies indicates:   Allergen Reactions    Morphine Shortness Of Breath    Duloxetine      Feels bad       Past Medical History:  Past Medical History:   Diagnosis Date    Alpha thalassemia     Asthma     resolved per patient    Cataract     Chronic anxiety     years tid xanax 1mg    Chronic knee pain     Chronic pain of left ankle     Clotting disorder     Cutaneous lupus erythematosus     dr henry derm    Depression     Depression     dr radha hughes previously     Ex-smoker     quit fall 1    Hypertension     Osteopenia  rec       Past Surgical History:  Past Surgical History:   Procedure Laterality Date    ANKLE SURGERY Left     BACK SURGERY      CATARACT EXTRACTION Right     COLONOSCOPY N/A 2017    Procedure: COLONOSCOPY;  Surgeon: Virgil Oliva MD;  [facility] ENDO;  Service: Endoscopy;  Laterality: N/A;    HYSTERECTOMY      INJECTION OF ANESTHETIC AGENT AROUND MEDIAL BRANCH NERVES INNERVATING LUMBAR FACET JOINT Bilateral 2020    Procedure: Bilateral L3-5 MBB;  Surgeon: Yury Moore MD;  Location: [facility] PAIN MGT;  Service: Pain Management;  Laterality: Bilateral;    INJECTION OF ANESTHETIC AGENT AROUND NERVE Bilateral 2020    Procedure: Bilateral Genicular nerve block with RN IV sedation Covid testing day of procedure PT does not drive;  Surgeon: Yury Moore MD;  Location: [facility] PAIN MGT;  Service: Pain Management;  Laterality: Bilateral;    KNEE ARTHROSCOPY      both knees twice    OOPHORECTOMY      RADIOFREQUENCY THERMOCOAGULATION Right 2020    Procedure: Right L3-5 Lumbar RFA;  Surgeon: Yury Moore MD;  Location: [facility] PAIN MGT;  Service: Pain Management;  Laterality: Right;    RADIOFREQUENCY THERMOCOAGULATION Left 2020    Procedure: Left L3-5 Lumbar RFA;  Surgeon: Yury Moore MD;  Location: [facility] PAIN MGT;  Service: Pain Management;  Laterality: Left;         Family History:  Family History   Problem Relation Age of Onset    Diabetes Mother     Diabetes Father     Breast cancer Maternal Aunt        Social History:  Social History     Tobacco Use    Smoking status: Former Smoker     Packs/day: 1.00     Years: 30.00     Pack years: 30.00     Types: Cigarettes     Quit date: 2014     Years since quittin.9    Smokeless tobacco: Never Used    Tobacco comment: smoke last cigarette 9/15   Substance and Sexual Activity    Alcohol use: Yes     Alcohol/week: 1.0 standard drink     Types: 1  Glasses of wine per week     Comment: 3 times a week    Drug use: No    Sexual activity: Not Currently     Partners: Male        Review of Systems   Review of Systems   Constitutional: Positive for fever.   HENT: Negative for sore throat.    Respiratory: Negative for cough and shortness of breath.    Cardiovascular: Negative for chest pain.   Gastrointestinal: Negative for nausea and vomiting.   Genitourinary: Negative for dysuria.   Musculoskeletal: Positive for arthralgias (Right forearm, right knee), back pain and neck pain.   Skin: Negative for rash.   Neurological: Positive for weakness (Generalized weakness). Negative for dizziness, speech difficulty, light-headedness, numbness and headaches.   Hematological: Does not bruise/bleed easily.          Physical Exam     Initial Vitals   BP Pulse Resp Temp SpO2   08/17/22 1730 08/17/22 1527 08/17/22 1522 08/17/22 1522 08/17/22 1556   139/80 94 18 98.3 °F (36.8 °C) 99 %      MAP       --                 Physical Exam    Nursing Notes and Vital Signs Reviewed.  Constitutional: Patient is in no apparent distress. Well-developed and well-nourished.  Head: Atraumatic. Normocephalic.  Eyes: PERRL. EOM intact. Conjunctivae are not pale. No scleral icterus.  ENT: Mucous membranes are moist. Oropharynx is clear and symmetric.  no hemotympanum.  No irregular hematoma.  No posterior auricular hematoma.  No septal hematoma.  There is tenderness palpation along the right zygoma.  Neck: Supple. Full ROM. No lymphadenopathy.  No midline cervical neck tenderness.  There is paraspinal tenderness noted.  Cardiovascular: Regular rate. Regular rhythm. No murmurs, rubs, or gallops. Distal pulses are 2+ and symmetric.  Pulmonary/Chest: No respiratory distress. Clear to auscultation bilaterally. No wheezing or rales.  Abdominal: Soft and non-distended.  There is no tenderness.  No rebound, guarding, or rigidity. Good bowel sounds.  Genitourinary: No CVA tenderness  Musculoskeletal:  Moves all extremities. No obvious deformities. No edema. No calf tenderness.  Right shoulder:  No bony step-off.  Tenderness palpation along the right shoulder.  Intact ulnar, median, and radial nerves both motor and sensory.  Right elbow:  Full range of motion of the right elbow.  Intact supination and pronation.  Right forearm:  There is tense palpation along the right distal radius.  No bony step-off.  No deformity noted.  No bruising noted.  Right hand:  No carpal bone tenderness.  No snuffbox tenderness.  Intact median, ulnar, and radial nerves.  Right upper extremity:  No firm compartments noted on exam  Left upper extremity:  No firm compartments noted on exam  Right lower extremity:  No firm compartments noted on exam  Left lower extremity:  No firm compartments noted on exam  Right hip: Full range of motion of the right hip.  Right femur:  Non tenderness palpation  Right knee:  There is tenderness palpation along the medial and lateral aspects of the right knee.  Slight swelling is noted.  No ecchymosis.  Right ankle:  Nontender to palpation.  No contusions noted  Skin: Warm and dry.  Neurological:  Alert, awake, and appropriate.  Normal speech.  No acute focal neurological deficits are appreciated.  Cranial nerves 2-12 intact.  GCS 15.  Psychiatric: Normal affect. Good eye contact. Appropriate in content.     ED Course     ED Procedures:  Critical Care    Date/Time: 8/17/2022 3:56 PM  Performed by: Radha Linda DO  Authorized by: Radha Linda DO   Direct patient critical care time: 15 minutes  Additional history critical care time: 2 minutes  Ordering / reviewing critical care time: 2 minutes  Documentation critical care time: 10 minutes  Consulting other physicians critical care time: 5 minutes  Total critical care time (exclusive of procedural time) : 34 minutes  Critical care time was exclusive of separately billable procedures and treating other patients.  Critical care was necessary to  "treat or prevent imminent or life-threatening deterioration of the following conditions: trauma and renal failure.  Critical care was time spent personally by me on the following activities: blood draw for specimens, development of treatment plan with patient or surrogate, interpretation of cardiac output measurements, evaluation of patient's response to treatment, examination of patient, obtaining history from patient or surrogate, ordering and performing treatments and interventions, ordering and review of laboratory studies, ordering and review of radiographic studies, pulse oximetry, re-evaluation of patient's condition, review of old charts and vascular access procedures.          ED Vital Signs:  Vitals:    08/17/22 1522 08/17/22 1527 08/17/22 1528 08/17/22 1556   BP:       Pulse:  94 94 99   Resp: 18  18 (!) 22   Temp: 98.3 °F (36.8 °C)      TempSrc: Oral      SpO2:    99%   Weight: 73.9 kg (163 lb)      Height: 5' 2" (1.575 m)       08/17/22 1631 08/17/22 1730   BP:  139/80   Pulse:  98   Resp: 18 18   Temp:     TempSrc:     SpO2:  100%   Weight:     Height:         Abnormal Lab Results:  Labs Reviewed   CBC W/ AUTO DIFFERENTIAL - Abnormal; Notable for the following components:       Result Value    WBC 19.10 (*)     RBC 5.70 (*)     MCV 71 (*)     MCH 21.9 (*)     MCHC 30.9 (*)     RDW 16.1 (*)     Gran # (ANC) 16.2 (*)     Immature Grans (Abs) 0.10 (*)     Mono # 1.1 (*)     Gran % 84.8 (*)     Lymph % 8.5 (*)     All other components within normal limits   COMPREHENSIVE METABOLIC PANEL - Abnormal; Notable for the following components:    CO2 18 (*)     Glucose 113 (*)     BUN 36 (*)     Creatinine 1.5 (*)     Total Protein 8.8 (*)     AST 54 (*)     eGFR 36.1 (*)     All other components within normal limits   URINALYSIS, REFLEX TO URINE CULTURE - Abnormal; Notable for the following components:    Appearance, UA Hazy (*)     Protein, UA Trace (*)     Bilirubin (UA) 1+ (*)     Occult Blood UA 1+ (*)     " Nitrite, UA Positive (*)     Leukocytes, UA 2+ (*)     All other components within normal limits    Narrative:     Specimen Source->Urine   CK - Abnormal; Notable for the following components:    CPK 1429 (*)     All other components within normal limits   URINALYSIS MICROSCOPIC - Abnormal; Notable for the following components:    WBC, UA 15 (*)     WBC Clumps, UA Occasional (*)     Bacteria Many (*)     All other components within normal limits    Narrative:     Specimen Source->Urine   CULTURE, URINE   TROPONIN I   SARS-COV-2 RDRP GENE    Narrative:     This test utilizes isothermal nucleic acid amplification   technology to detect the SARS-CoV-2 RdRp nucleic acid segment.   The analytical sensitivity (limit of detection) is 125 genome   equivalents/mL.   A POSITIVE result implies infection with the SARS-CoV-2 virus;   the patient is presumed to be contagious.     A NEGATIVE result means that SARS-CoV-2 nucleic acids are not   present above the limit of detection. A NEGATIVE result should be   treated as presumptive. It does not rule out the possibility of   COVID-19 and should not be the sole basis for treatment decisions.   If COVID-19 is strongly suspected based on clinical and exposure   history, re-testing using an alternate molecular assay should be   considered.   This test is only for use under the Food and Drug   Administration s Emergency Use Authorization (EUA).   Commercial kits are provided by [employer].   Performance characteristics of the EUA have been independently   verified by [facility] Department of   Pathology and Laboratory Medicine.   _________________________________________________________________   The authorized Fact Sheet for Healthcare Providers and the authorized Fact   Sheet for Patients of the ID NOW COVID-19 are available on the FDA   website:     https://www.fda.gov/media/627852/download  https://www.fda.gov/media/704512/download               All Lab  Results:  Results for orders placed or performed during the hospital encounter of 08/17/22   CBC auto differential   Result Value Ref Range    WBC 19.10 (H) 3.90 - 12.70 K/uL    RBC 5.70 (H) 4.00 - 5.40 M/uL    Hemoglobin 12.5 12.0 - 16.0 g/dL    Hematocrit 40.5 37.0 - 48.5 %    MCV 71 (L) 82 - 98 fL    MCH 21.9 (L) 27.0 - 31.0 pg    MCHC 30.9 (L) 32.0 - 36.0 g/dL    RDW 16.1 (H) 11.5 - 14.5 %    Platelets 413 150 - 450 K/uL    MPV 9.9 9.2 - 12.9 fL    Immature Granulocytes 0.5 0.0 - 0.5 %    Gran # (ANC) 16.2 (H) 1.8 - 7.7 K/uL    Immature Grans (Abs) 0.10 (H) 0.00 - 0.04 K/uL    Lymph # 1.6 1.0 - 4.8 K/uL    Mono # 1.1 (H) 0.3 - 1.0 K/uL    Eos # 0.0 0.0 - 0.5 K/uL    Baso # 0.04 0.00 - 0.20 K/uL    nRBC 0 0 /100 WBC    Gran % 84.8 (H) 38.0 - 73.0 %    Lymph % 8.5 (L) 18.0 - 48.0 %    Mono % 6.0 4.0 - 15.0 %    Eosinophil % 0.0 0.0 - 8.0 %    Basophil % 0.2 0.0 - 1.9 %    Differential Method Automated    Comprehensive metabolic panel   Result Value Ref Range    Sodium 142 136 - 145 mmol/L    Potassium 3.8 3.5 - 5.1 mmol/L    Chloride 109 95 - 110 mmol/L    CO2 18 (L) 23 - 29 mmol/L    Glucose 113 (H) 70 - 110 mg/dL    BUN 36 (H) 8 - 23 mg/dL    Creatinine 1.5 (H) 0.5 - 1.4 mg/dL    Calcium 10.5 8.7 - 10.5 mg/dL    Total Protein 8.8 (H) 6.0 - 8.4 g/dL    Albumin 3.6 3.5 - 5.2 g/dL    Total Bilirubin 0.8 0.1 - 1.0 mg/dL    Alkaline Phosphatase 73 55 - 135 U/L    AST 54 (H) 10 - 40 U/L    ALT 29 10 - 44 U/L    Anion Gap 15 8 - 16 mmol/L    eGFR 36.1 (A) >60 mL/min/1.73 m^2   Troponin I   Result Value Ref Range    Troponin I 0.021 0.000 - 0.026 ng/mL   Urinalysis, Reflex to Urine Culture Urine, Clean Catch    Specimen: Urine   Result Value Ref Range    Specimen UA Urine, Clean Catch     Color, UA Staci Yellow, Straw, Staci    Appearance, UA Hazy (A) Clear    pH, UA 6.0 5.0 - 8.0    Specific Gravity, UA 1.015 1.005 - 1.030    Protein, UA Trace (A) Negative    Glucose, UA Negative Negative    Ketones, UA Negative  Negative    Bilirubin (UA) 1+ (A) Negative    Occult Blood UA 1+ (A) Negative    Nitrite, UA Positive (A) Negative    Urobilinogen, UA Negative <2.0 EU/dL    Leukocytes, UA 2+ (A) Negative   CK   Result Value Ref Range    CPK 1429 (H) 20 - 180 U/L   Urinalysis Microscopic   Result Value Ref Range    RBC, UA 3 0 - 4 /hpf    WBC, UA 15 (H) 0 - 5 /hpf    WBC Clumps, UA Occasional (A) None-Rare    Bacteria Many (A) None-Occ /hpf    Squam Epithel, UA 4 /hpf    Microscopic Comment SEE COMMENT    POCT COVID-19 Rapid Screening   Result Value Ref Range    POC Rapid COVID Negative Negative     Acceptable Yes          The EKG was ordered, reviewed, and independently interpreted by the ED provider.  Rate is 95 beats per minute.  Left axis deviation.  No ST segment elevation.  There is inverted T-waves in V1, V2, V3.  Prolonged QT    ECG Results          EKG 12-lead (In process)  Result time 08/17/22 15:50:13    In process by Interface, Lab In [facility] (08/17/22 15:50:13)                 Narrative:    Test Reason : W19.XXXA,    Vent. Rate : 095 BPM     Atrial Rate : 095 BPM     P-R Int : 182 ms          QRS Dur : 074 ms      QT Int : 384 ms       P-R-T Axes : 020 -08 011 degrees     QTc Int : 482 ms    Normal sinus rhythm  Possible Left atrial enlargement  T wave abnormality, consider anterior ischemia  Prolonged QT  Abnormal ECG  When compared with ECG of 14-AUG-2019 16:16,  Inverted T waves have replaced nonspecific T wave abnormality in Anterior  leads  Nonspecific T wave abnormality, improved in Lateral leads    Referred By: [email or web address]   SELF           Confirmed By:                               Imaging Results:  Imaging Results          X-Ray Knee 3 View Right (Final result)  Result time 08/17/22 17:33:48    Final result by Adair Anderson MD (08/17/22 17:33:48)                 Impression:      As above      Electronically signed by: Justin Borrero  Date:    08/17/2022  Time:    17:33             Narrative:     EXAMINATION:  XR KNEE 3 VIEW RIGHT    CLINICAL HISTORY:  Pain in unspecified knee    TECHNIQUE:  AP, lateral, and Merchant views of the left knee were performed.    COMPARISON:  None    FINDINGS:  No acute fracture or dislocation.  Satisfactory right knee arthroplasty with long medullary rome in the femur.  Vascular calcification.                               X-Ray Shoulder Trauma Right (Final result)  Result time 08/17/22 17:33:01    Final result by Adair Anderson MD (08/17/22 17:33:01)                 Impression:      As above      Electronically signed by: Justin Borrero  Date:    08/17/2022  Time:    17:33             Narrative:    EXAMINATION:  XR SHOULDER TRAUMA 3 VIEW RIGHT    CLINICAL HISTORY:  Pain in right shoulder    TECHNIQUE:  Three or four views of the right shoulder were performed.    COMPARISON:  None    FINDINGS:  No acute fracture or dislocation.  Degenerative joint disease.  Decreased bone mineral density.  AC joint hypertrophy.  Senescent changes.                               X-Ray Forearm Right (Final result)  Result time 08/17/22 17:30:11    Final result by Adair Anderson MD (08/17/22 17:30:11)                 Impression:      As above      Electronically signed by: Justin Borrero  Date:    08/17/2022  Time:    17:30             Narrative:    EXAMINATION:  XR FOREARM RIGHT    CLINICAL HISTORY:  Pain in arm, unspecified    TECHNIQUE:  AP and lateral views of the right forearm were performed.    COMPARISON:  None    FINDINGS:  No evidence for fracture or dislocation.  Multiple small the calcific densities adjacent to the joint space may relate to ossification within the joint space or osteophytes.  Decreased bone mineral density.  Atherosclerotic changes.  Small linear density along the radial distal aspect of the forearm soft tissues may relate to foreign body.  Mild degenerative joint disease of the radiocarpal joint                               X-Ray Chest AP Portable (Final result)  Result  time 08/17/22 17:26:34    Final result by Adair Anderson MD (08/17/22 17:26:34)                 Impression:      No acute abnormality.      Electronically signed by: Justin Borrero  Date:    08/17/2022  Time:    17:26             Narrative:    EXAMINATION:  XR CHEST AP PORTABLE    CLINICAL HISTORY:  Unspecified fall, initial encounter    TECHNIQUE:  Single frontal view of the chest was performed.    COMPARISON:  None    FINDINGS:  Mild basilar reticular opacities consistent with scarring or atelectasis.The lungs are otherwise clear, with normal appearance of pulmonary vasculature and no pleural effusion or pneumothorax.    The cardiac silhouette is normal in size. The hilar and mediastinal contours are unremarkable.    Bones are intact.                               CT Thoracic Spine Without Contrast (Final result)  Result time 08/17/22 17:24:38    Final result by Adair Anderson MD (08/17/22 17:24:38)                 Impression:      As above    All CT scans at this facility use dose modulation, iterative reconstruction and/or weight based dosing when appropriate to reduce radiation dose to as low as reasonably achievable.      Electronically signed by: Justin Borrero  Date:    08/17/2022  Time:    17:24             Narrative:    EXAMINATION:  CT THORACIC SPINE WITHOUT CONTRAST    CLINICAL HISTORY:  Back trauma, no prior imaging (Age >= 16y);    TECHNIQUE:  5 mm axial images were acquired using helical CT technique from the lung apices through costophrenic sulci.  No intravenous contrast was administered.    FINDINGS:  Exuberant anterior flowing osteophytes consistent with diffuse idiopathic skeletal hyperostoses.  Moderate multilevel degenerative joint disease.  Left thyroid prominence compared to the right.  No acute fracture or dislocation.  Coronary artery calcification                               CT Lumbar Spine Without Contrast (Final result)  Result time 08/17/22 17:50:31    Final result by Adair Anderson MD  (08/17/22 17:50:31)                 Impression:      No acute fracture or dislocation    Moderate multilevel degenerative disc disease    Senescent changes including atherosclerotic changes    Focus of gas along the superficial aspect of the posterior element along the left lamina at L5 likely related to vacuum disc.  Correlate to point tenderness    All CT scans at this facility are performed  using dose modulation techniques as appropriate to performed exam including the following:  automated exposure control; adjustment of mA and/or kV according to the patients size (this includes techniques or standardized protocols for targeted exams where dose is matched to indication/reason for exam: i.e. extremities or head);  iterative reconstruction technique.      Electronically signed by: Justin Borrero  Date:    08/17/2022  Time:    17:50             Narrative:    EXAMINATION:  CT LUMBAR SPINE WITHOUT CONTRAST    CLINICAL HISTORY:  Low back pain, trauma;    TECHNIQUE:  CT lumbar spine without    COMPARISON:  None    FINDINGS:  No vertebral body compression deformity.  Moderate multilevel degenerative disc disease.  Vacuum disc phenomena at L4-L5.  Schmorl's node a noted.  Decreased bone mineral density.  Atherosclerotic changes.  Focus of gas along the superficial aspect of the posterior element along the left lamina at L5 likely related to vacuum disc.  Correlate to point tenderness                               CT Cervical Spine Without Contrast (Final result)  Result time 08/17/22 17:21:14    Final result by Adair Anderson MD (08/17/22 17:21:14)                 Impression:      No acute fracture or dislocation.    Moderate multilevel degenerative disc disease.    All CT scans   are performed using dose optimization techniques including the following: automated exposure control; adjustment of the mA and/or kV; use of iterative reconstruction technique.  Dose modulation was employed for [facility] by means of: Automated  exposure control; adjustment of the mA and/or kV according to patient size (this includes techniques or standardized protocols for targeted exams where dose is matched to indication/reason for exam; i.e. extremities or head); and/or use of iterative reconstructive technique.      Electronically signed by: Justin Borrero  Date:    08/17/2022  Time:    17:21             Narrative:    EXAMINATION:  CT CERVICAL SPINE WITHOUT CONTRAST    CLINICAL HISTORY:  Neck trauma (Age >= 65y);neck pain;    TECHNIQUE:  Low dose axial images, sagittal and coronal reformations were performed though the cervical spine.  Contrast was not administered.    COMPARISON:  None    FINDINGS:  Normal vertebral body heights without evidence for spondylolisthesis.  Moderate multilevel degenerative disc disease.  No prevertebral soft tissue swelling.  Facet joints are congruent.  Normal bone mineral density.  Small focus of calcification at C3 along the right og lamina likely related to dural calcification.  Atherosclerotic changes.                               CT Maxillofacial Without Contrast (Final result)  Result time 08/17/22 17:39:39    Final result by Adair Anderson MD (08/17/22 17:39:39)                 Impression:      No acute fracture or dislocation of the facial bones as far seen      Electronically signed by: Justin Borrero  Date:    08/17/2022  Time:    17:39             Narrative:    EXAMINATION:  CT MAXILLOFACIAL WITHOUT CONTRAST    CLINICAL HISTORY:  Facial trauma, blunt;zygoma pain;    TECHNIQUE:  Low dose axial images, sagittal and coronal reformations were obtained through the face.  Contrast was not administered.    COMPARISON:  None    FINDINGS:  No acute fracture or dislocation.  Visualized paranasal sinuses are unremarkable.  Mastoid air cells are clear.  No significant nasal bone fracture.  Zygomatic arch is intact.  No orbital floor fracture.  Cervical spine degenerative joint disease noted.  Atherosclerotic changes of  the intracranial vasculature.                               CT Head Without Contrast (Final result)  Result time 08/17/22 17:05:44    Final result by Neda Larkin MD (08/17/22 17:05:44)                 Impression:      No acute intracranial abnormality.    Chronic microvascular ischemic change.      Electronically signed by: Neda Larkin  Date:    08/17/2022  Time:    17:05             Narrative:    EXAMINATION:  CT HEAD WITHOUT CONTRAST    CLINICAL HISTORY:  Facial trauma, blunt; Unspecified fall, initial encounter    TECHNIQUE:  Low dose axial CT images obtained throughout the head without intravenous contrast. Sagittal and coronal reconstructions were performed.  All CT scans at this facility are performed using dose optimization techniques including the following: automated exposure control; adjustment of the mA and/or kV; use of iterative reconstruction technique.    COMPARISON:  CT head without contrast 03/08/2021    FINDINGS:  Intracranial compartment:    Ventricles and sulci are normal in size for age without evidence of hydrocephalus. No extra-axial blood or fluid collections.    There is patchy hypoattenuation within the supratentorial white matter, most commonly seen in the setting of chronic microvascular ischemic change.  No parenchymal mass, hemorrhage, edema or major vascular distribution infarct.    Skull/extracranial contents (limited evaluation): No depressed skull fracture.  Mastoid air cells and paranasal sinuses are essentially clear.                                      The Emergency Provider reviewed the vital signs and test results, which are outlined above.     ED Discussion     ED Course as of 08/17/22 1828   Wed Aug 17, 2022   1711 Bacteria, UA(!): Many [LB]   1711 WBC Clumps, UA(!): Occasional [LB]   1711 NITRITE UA(!): Positive [LB]   1711 WBC(!): 19.10 [LB]   1711 BUN(!): 36 [LB]   1711 Creatinine(!): 1.5 [LB]   1711 CPK(!): 1429 [LB]   1715 Occult Blood UA(!): 1+ [LB]    1735 Updated patient on findings on labs.  Also discussed need for observation.  Patient is requesting [facility]. [LB]   1736 06/21/21 11:39  BUN: 26 (H)  Creatinine: 1.2    10/29/21 11:29  BUN: 33 (H)  Creatinine: 1.1    08/17/22 16:16  BUN: 36 (H)  Creatinine: 1.5 (H)      (H): Data is abnormally high [LB]      ED Course User Index  [LB] Radha Linda DO     6:28 PM      All historical, clinical, radiographic, and laboratory findings were reviewed with the patient/family in detail.  I discussed the indications and treatment need: (Internal Medicine) .    Patient/Family requests transfer to: [facility]    Patient/Family understands that [facility] [place] [facility] does provide (Internal Medicine) services.     Patient/family verbalized understanding.   All remaining questions and concerns were addressed at that time and the patient/family agrees to proceed accordingly.  Similarly all pertinent details of the encounter were discussed with Velma Marquis NP at [facility] . Keron Ambriz MD agrees to accept the patient in transfer based on the needs/patient preferences outlined above.  [facility] , secondary to a need for ongoing Fluids en route.  Risks: of transfer:    loss of vitals signs, permanent neurologic damage, MVC,  resulting in death, or loss of neurologic function.  Benefits of transfer: Internal Medicine.  Patient and family agree and verbalize understanding.     Radha Linda DO,  FACEP          ED Medication(s):  Medications   sodium chloride 0.9% bolus 500 mL (500 mLs Intravenous New Bag 8/17/22 1734)   0.9%  NaCl infusion (has no administration in time range)   oxyCODONE-acetaminophen  mg per tablet 1 tablet (1 tablet Oral Given 8/17/22 1631)   cefTRIAXone (ROCEPHIN) 1 g/50 mL D5W IVPB (0 g Intravenous Stopped 8/17/22 1810)             MIPS Measures     Smoker? No     Hypertension:  "Pre-hypertension/Hypertension: The pt has been informed that they may have pre-hypertension or hypertension based on a blood pressure reading in the ED. I recommend that the pt call the PCP listed on their discharge instructions or a physician of their choice this week to arrange f/u for further evaluation of possible pre-hypertension or hypertension.     MIPS Measure #415:  Emergency Department Utilization of CT for Minor Blunt Head Trauma for Patients age 18 Years  and  Older.    Measure exclusions:  · The patient has a ventricular shunt?   No  · The patient has a brain tumor? No  · The patient has multi-system trauma?  No  · The patient is pregnant? N/A  · The patient is taking anti platelet medication excluding aspirin?  No  · Age 65 years and older?  Yes  Patient is 76 y.o.    A head CT was ordered? Yes:   The CT was ordered by the emergency provider?  Yes           Medical Decision Making                 MDM  Reviewed: vitals and nursing note  Interpretation: labs, ECG, x-ray and CT scan  Total time providing critical care: 30-74 minutes. This excludes time spent performing separately reportable procedures and services.  Consults: primary care provider          Portions of this note may have been created with voice recognition software. Occasional "wrong-word" or "sound-a-like" substitutions may have occurred due to the inherent limitations of voice recognition software. Please, read the note carefully and recognize, using context, where substitutions have occurred.            Clinical Impression       ICD-10-CM ICD-9-CM   1. Acute cystitis without hematuria  N30.00 595.0   2. Fall  W19.XXXA E888.9   3. Weakness  R53.1 780.79   4. Knee pain, right  M25.569 719.46   5. Arm pain, right forearm  M79.603 729.5   6. Right shoulder pain  M25.511 719.41   7. Traumatic rhabdomyolysis, initial encounter  T79.6XXA 958.6   8. Acute kidney injury  N17.9 584.9         ED Disposition  Disposition:   Disposition: Placed in " Observation  Condition: Fair  Reason for referral: Med/surg             Radha Linda,   08/17/22 1821

## 2022-08-18 VITALS
SYSTOLIC BLOOD PRESSURE: 154 MMHG | HEART RATE: 76 BPM | OXYGEN SATURATION: 99 % | WEIGHT: 167.75 LBS | RESPIRATION RATE: 16 BRPM | DIASTOLIC BLOOD PRESSURE: 79 MMHG | TEMPERATURE: 98 F | HEIGHT: 62 IN | BODY MASS INDEX: 30.87 KG/M2

## 2022-08-18 PROBLEM — M62.82 NON-TRAUMATIC RHABDOMYOLYSIS: Status: ACTIVE | Noted: 2022-08-18

## 2022-08-18 PROBLEM — N39.0 UTI (URINARY TRACT INFECTION): Status: ACTIVE | Noted: 2022-08-18

## 2022-08-18 PROBLEM — I10 ESSENTIAL HYPERTENSION: Chronic | Status: ACTIVE | Noted: 2019-08-14

## 2022-08-18 PROBLEM — D72.829 LEUKOCYTOSIS: Status: ACTIVE | Noted: 2022-08-18

## 2022-08-18 PROBLEM — N17.9 AKI (ACUTE KIDNEY INJURY): Status: ACTIVE | Noted: 2022-08-18

## 2022-08-18 LAB
ALBUMIN SERPL BCP-MCNC: 3 G/DL (ref 3.5–5.2)
ALP SERPL-CCNC: 66 U/L (ref 55–135)
ALT SERPL W/O P-5'-P-CCNC: 20 U/L (ref 10–44)
ANION GAP SERPL CALC-SCNC: 9 MMOL/L (ref 8–16)
AST SERPL-CCNC: 41 U/L (ref 10–40)
BASOPHILS # BLD AUTO: 0.04 K/UL (ref 0–0.2)
BASOPHILS NFR BLD: 0.3 % (ref 0–1.9)
BILIRUB SERPL-MCNC: 0.5 MG/DL (ref 0.1–1)
BUN SERPL-MCNC: 38 MG/DL (ref 8–23)
CALCIUM SERPL-MCNC: 8.8 MG/DL (ref 8.7–10.5)
CHLORIDE SERPL-SCNC: 113 MMOL/L (ref 95–110)
CK SERPL-CCNC: 703 U/L (ref 20–180)
CO2 SERPL-SCNC: 20 MMOL/L (ref 23–29)
CREAT SERPL-MCNC: 1.3 MG/DL (ref 0.5–1.4)
DIFFERENTIAL METHOD: ABNORMAL
EOSINOPHIL # BLD AUTO: 0.1 K/UL (ref 0–0.5)
EOSINOPHIL NFR BLD: 0.5 % (ref 0–8)
ERYTHROCYTE [DISTWIDTH] IN BLOOD BY AUTOMATED COUNT: 16.4 % (ref 11.5–14.5)
EST. GFR  (NO RACE VARIABLE): 43 ML/MIN/1.73 M^2
GLUCOSE SERPL-MCNC: 94 MG/DL (ref 70–110)
HCT VFR BLD AUTO: 37.8 % (ref 37–48.5)
HGB BLD-MCNC: 11.2 G/DL (ref 12–16)
IMM GRANULOCYTES # BLD AUTO: 0.07 K/UL (ref 0–0.04)
IMM GRANULOCYTES NFR BLD AUTO: 0.5 % (ref 0–0.5)
LYMPHOCYTES # BLD AUTO: 2.3 K/UL (ref 1–4.8)
LYMPHOCYTES NFR BLD: 17.1 % (ref 18–48)
MCH RBC QN AUTO: 22.1 PG (ref 27–31)
MCHC RBC AUTO-ENTMCNC: 29.6 G/DL (ref 32–36)
MCV RBC AUTO: 75 FL (ref 82–98)
MONOCYTES # BLD AUTO: 0.8 K/UL (ref 0.3–1)
MONOCYTES NFR BLD: 6.1 % (ref 4–15)
NEUTROPHILS # BLD AUTO: 10 K/UL (ref 1.8–7.7)
NEUTROPHILS NFR BLD: 75.5 % (ref 38–73)
NRBC BLD-RTO: 0 /100 WBC
PLATELET # BLD AUTO: 327 K/UL (ref 150–450)
PMV BLD AUTO: 10.4 FL (ref 9.2–12.9)
POTASSIUM SERPL-SCNC: 4.3 MMOL/L (ref 3.5–5.1)
PROT SERPL-MCNC: 6.8 G/DL (ref 6–8.4)
RBC # BLD AUTO: 5.07 M/UL (ref 4–5.4)
SODIUM SERPL-SCNC: 142 MMOL/L (ref 136–145)
WBC # BLD AUTO: 13.19 K/UL (ref 3.9–12.7)

## 2022-08-18 PROCEDURE — 82550 ASSAY OF CK (CPK): CPT | Performed by: NURSE PRACTITIONER

## 2022-08-18 PROCEDURE — 96361 HYDRATE IV INFUSION ADD-ON: CPT

## 2022-08-18 PROCEDURE — 96372 THER/PROPH/DIAG INJ SC/IM: CPT | Performed by: FAMILY MEDICINE

## 2022-08-18 PROCEDURE — G0378 HOSPITAL OBSERVATION PER HR: HCPCS | Mod: ER

## 2022-08-18 PROCEDURE — 25000003 PHARM REV CODE 250: Performed by: NURSE PRACTITIONER

## 2022-08-18 PROCEDURE — 25000003 PHARM REV CODE 250: Performed by: EMERGENCY MEDICINE

## 2022-08-18 PROCEDURE — 36415 COLL VENOUS BLD VENIPUNCTURE: CPT | Performed by: NURSE PRACTITIONER

## 2022-08-18 PROCEDURE — 63600175 PHARM REV CODE 636 W HCPCS: Performed by: FAMILY MEDICINE

## 2022-08-18 PROCEDURE — 25000003 PHARM REV CODE 250: Performed by: FAMILY MEDICINE

## 2022-08-18 PROCEDURE — G0378 HOSPITAL OBSERVATION PER HR: HCPCS

## 2022-08-18 PROCEDURE — 85025 COMPLETE CBC W/AUTO DIFF WBC: CPT | Performed by: NURSE PRACTITIONER

## 2022-08-18 PROCEDURE — 80053 COMPREHEN METABOLIC PANEL: CPT | Performed by: NURSE PRACTITIONER

## 2022-08-18 RX ORDER — PROMETHAZINE HYDROCHLORIDE 25 MG/1
25 TABLET ORAL EVERY 6 HOURS PRN
Status: DISCONTINUED | OUTPATIENT
Start: 2022-08-18 | End: 2022-08-18 | Stop reason: HOSPADM

## 2022-08-18 RX ORDER — AMOXICILLIN AND CLAVULANATE POTASSIUM 500; 125 MG/1; MG/1
1 TABLET, FILM COATED ORAL 2 TIMES DAILY
Qty: 10 TABLET | Refills: 0 | Status: SHIPPED | OUTPATIENT
Start: 2022-08-18 | End: 2022-08-23

## 2022-08-18 RX ORDER — PANTOPRAZOLE SODIUM 40 MG/1
40 TABLET, DELAYED RELEASE ORAL DAILY
Status: DISCONTINUED | OUTPATIENT
Start: 2022-08-18 | End: 2022-08-18 | Stop reason: HOSPADM

## 2022-08-18 RX ORDER — OXYCODONE AND ACETAMINOPHEN 10; 325 MG/1; MG/1
1 TABLET ORAL EVERY 8 HOURS PRN
Qty: 12 TABLET | Refills: 0 | Status: ON HOLD | OUTPATIENT
Start: 2022-08-18 | End: 2023-11-03 | Stop reason: HOSPADM

## 2022-08-18 RX ORDER — DORZOLAMIDE HCL 20 MG/ML
1 SOLUTION/ DROPS OPHTHALMIC 2 TIMES DAILY
Status: DISCONTINUED | OUTPATIENT
Start: 2022-08-18 | End: 2022-08-18 | Stop reason: HOSPADM

## 2022-08-18 RX ORDER — OXYCODONE AND ACETAMINOPHEN 10; 325 MG/1; MG/1
1 TABLET ORAL ONCE
Status: COMPLETED | OUTPATIENT
Start: 2022-08-18 | End: 2022-08-18

## 2022-08-18 RX ORDER — HEPARIN SODIUM 5000 [USP'U]/ML
5000 INJECTION, SOLUTION INTRAVENOUS; SUBCUTANEOUS EVERY 8 HOURS
Status: DISCONTINUED | OUTPATIENT
Start: 2022-08-18 | End: 2022-08-18 | Stop reason: HOSPADM

## 2022-08-18 RX ORDER — SODIUM CHLORIDE 0.9 % (FLUSH) 0.9 %
10 SYRINGE (ML) INJECTION EVERY 12 HOURS PRN
Status: DISCONTINUED | OUTPATIENT
Start: 2022-08-18 | End: 2022-08-18 | Stop reason: HOSPADM

## 2022-08-18 RX ORDER — IPRATROPIUM BROMIDE AND ALBUTEROL SULFATE 2.5; .5 MG/3ML; MG/3ML
3 SOLUTION RESPIRATORY (INHALATION) EVERY 4 HOURS PRN
Status: DISCONTINUED | OUTPATIENT
Start: 2022-08-18 | End: 2022-08-18 | Stop reason: HOSPADM

## 2022-08-18 RX ORDER — MAG HYDROX/ALUMINUM HYD/SIMETH 200-200-20
30 SUSPENSION, ORAL (FINAL DOSE FORM) ORAL 4 TIMES DAILY PRN
Status: DISCONTINUED | OUTPATIENT
Start: 2022-08-18 | End: 2022-08-18 | Stop reason: HOSPADM

## 2022-08-18 RX ORDER — TALC
6 POWDER (GRAM) TOPICAL NIGHTLY PRN
Status: DISCONTINUED | OUTPATIENT
Start: 2022-08-18 | End: 2022-08-18 | Stop reason: HOSPADM

## 2022-08-18 RX ORDER — AMITRIPTYLINE HYDROCHLORIDE 50 MG/1
50 TABLET, FILM COATED ORAL DAILY
Status: DISCONTINUED | OUTPATIENT
Start: 2022-08-18 | End: 2022-08-18 | Stop reason: HOSPADM

## 2022-08-18 RX ORDER — LATANOPROST 50 UG/ML
1 SOLUTION/ DROPS OPHTHALMIC NIGHTLY
Status: DISCONTINUED | OUTPATIENT
Start: 2022-08-18 | End: 2022-08-18 | Stop reason: HOSPADM

## 2022-08-18 RX ORDER — SODIUM CHLORIDE 0.9 % (FLUSH) 0.9 %
10 SYRINGE (ML) INJECTION
Status: DISCONTINUED | OUTPATIENT
Start: 2022-08-18 | End: 2022-08-18 | Stop reason: HOSPADM

## 2022-08-18 RX ORDER — ONDANSETRON 2 MG/ML
4 INJECTION INTRAMUSCULAR; INTRAVENOUS EVERY 8 HOURS PRN
Status: DISCONTINUED | OUTPATIENT
Start: 2022-08-18 | End: 2022-08-18 | Stop reason: HOSPADM

## 2022-08-18 RX ORDER — CITALOPRAM 20 MG/1
20 TABLET, FILM COATED ORAL DAILY
Status: DISCONTINUED | OUTPATIENT
Start: 2022-08-18 | End: 2022-08-18 | Stop reason: HOSPADM

## 2022-08-18 RX ORDER — HYDRALAZINE HYDROCHLORIDE 20 MG/ML
10 INJECTION INTRAMUSCULAR; INTRAVENOUS EVERY 8 HOURS PRN
Status: DISCONTINUED | OUTPATIENT
Start: 2022-08-18 | End: 2022-08-18 | Stop reason: HOSPADM

## 2022-08-18 RX ORDER — BRIMONIDINE TARTRATE 2 MG/ML
1 SOLUTION/ DROPS OPHTHALMIC 2 TIMES DAILY
Status: DISCONTINUED | OUTPATIENT
Start: 2022-08-18 | End: 2022-08-18 | Stop reason: HOSPADM

## 2022-08-18 RX ORDER — OXYCODONE HYDROCHLORIDE 5 MG/1
5 TABLET ORAL EVERY 6 HOURS PRN
Status: DISCONTINUED | OUTPATIENT
Start: 2022-08-18 | End: 2022-08-18 | Stop reason: HOSPADM

## 2022-08-18 RX ORDER — NALOXONE HCL 0.4 MG/ML
0.02 VIAL (ML) INJECTION
Status: DISCONTINUED | OUTPATIENT
Start: 2022-08-18 | End: 2022-08-18 | Stop reason: HOSPADM

## 2022-08-18 RX ORDER — SODIUM CHLORIDE 9 MG/ML
1000 INJECTION, SOLUTION INTRAVENOUS CONTINUOUS
Status: DISCONTINUED | OUTPATIENT
Start: 2022-08-18 | End: 2022-08-18 | Stop reason: HOSPADM

## 2022-08-18 RX ADMIN — SODIUM CHLORIDE 1000 ML: 0.9 INJECTION, SOLUTION INTRAVENOUS at 05:08

## 2022-08-18 RX ADMIN — OXYCODONE HYDROCHLORIDE 5 MG: 5 TABLET ORAL at 01:08

## 2022-08-18 RX ADMIN — SODIUM CHLORIDE 1000 ML: 0.9 INJECTION, SOLUTION INTRAVENOUS at 01:08

## 2022-08-18 RX ADMIN — SODIUM CHLORIDE 1000 ML: 0.9 INJECTION, SOLUTION INTRAVENOUS at 02:08

## 2022-08-18 RX ADMIN — OXYCODONE AND ACETAMINOPHEN 1 TABLET: 10; 325 TABLET ORAL at 02:08

## 2022-08-18 RX ADMIN — AMITRIPTYLINE HYDROCHLORIDE 50 MG: 50 TABLET, FILM COATED ORAL at 08:08

## 2022-08-18 RX ADMIN — LATANOPROST 1 DROP: 50 SOLUTION OPHTHALMIC at 01:08

## 2022-08-18 RX ADMIN — DORZOLAMIDE HYDROCHLORIDE 1 DROP: 20 SOLUTION/ DROPS OPHTHALMIC at 08:08

## 2022-08-18 RX ADMIN — OXYCODONE HYDROCHLORIDE 5 MG: 5 TABLET ORAL at 09:08

## 2022-08-18 RX ADMIN — DORZOLAMIDE HYDROCHLORIDE 1 DROP: 20 SOLUTION/ DROPS OPHTHALMIC at 02:08

## 2022-08-18 RX ADMIN — BRIMONIDINE TARTRATE 1 DROP: 2 SOLUTION/ DROPS OPHTHALMIC at 08:08

## 2022-08-18 RX ADMIN — CITALOPRAM HYDROBROMIDE 20 MG: 20 TABLET ORAL at 08:08

## 2022-08-18 RX ADMIN — HEPARIN SODIUM 5000 UNITS: 5000 INJECTION INTRAVENOUS; SUBCUTANEOUS at 05:08

## 2022-08-18 RX ADMIN — MELATONIN TAB 3 MG 6 MG: 3 TAB at 01:08

## 2022-08-18 RX ADMIN — HEPARIN SODIUM 5000 UNITS: 5000 INJECTION INTRAVENOUS; SUBCUTANEOUS at 02:08

## 2022-08-18 RX ADMIN — PANTOPRAZOLE SODIUM 40 MG: 40 TABLET, DELAYED RELEASE ORAL at 08:08

## 2022-08-18 RX ADMIN — BRIMONIDINE TARTRATE 1 DROP: 2 SOLUTION/ DROPS OPHTHALMIC at 02:08

## 2022-08-18 NOTE — H&P
[facility] Medicine  History & Physical    Patient Name: Tea Ring  MRN: [id number]  Patient Class: OP- Observation  Admission Date: 8/17/2022  Attending Physician: Keron Ambriz MD   Primary Care Provider: Marilyn Odonnell NP         Patient information was obtained from patient, past medical records and ER records.     Subjective:     Principal Problem:CORA (acute kidney injury)    Chief Complaint:   Chief Complaint   Patient presents with    Fall     Pt brought in by aasi. Pt fell on Monday at home. Was unable to get herself off of the floor due to general weakness. Pt was found by family on floor today. No AMS, no LOC. Pt gcs 15. C/o right shoulder, right knee, and some neck pain following fall. Pt has been incontinent on floor at home and cleaned up by family prior to [facility]asi transport.         HPI: Tea Ring is a 76 y.o. female with a PMHx of anxiety, depression, chronic pain, debility, and HTN who presented to [facility] ED in [facility] s/p ground-level fall 2 days PTA. Patient reports she was in her wheelchair mopping when she lost her balance and fell out of chair onto the floor. No loss of consciousness. She reports being unable to get up off floor due to generalized weakness, laying on the ground since Monday (48 hours prior). Associated right shoulder pain, right knee pain, neck discomfort, and subjective fever. No aggravating or alleviating factors. She denies any AMS, HA, lightheadedness, dizziness, syncope, numbness or tingling, decreased ROM, myalgias, CP, SOB, ABD pain, N/V/D, dysuria, hematuria, or chills. Work-up in the ED showed: WBC 19, 0% bands, creatinine 1.5, BUN 36, stable LFTs, CPK 1429, COVID negative, UA consistent with UTI, unremarkable CXR. XR of right forearm, shoulder, and knee were negative for acute process. CT of head, maxillofacial, cervical spine, lumbar spine, and thoracic spine were all negative for acute process. Patient transferred to [facility] and placed in  Observation.       Past Medical History:   Diagnosis Date    Alpha thalassemia     Asthma     resolved per patient    Cataract     Chronic anxiety     years tid xanax 1mg    Chronic knee pain     Chronic pain of left ankle     Clotting disorder     Cutaneous lupus erythematosus     dr henry derm    Depression     Depression     dr radha hughes previously    Ex-smoker     quit fall 1    Hypertension     Osteopenia 1/16 reck1/18       Past Surgical History:   Procedure Laterality Date    ANKLE SURGERY Left     BACK SURGERY      CATARACT EXTRACTION Right     COLONOSCOPY N/A 8/2/2017    Procedure: COLONOSCOPY;  Surgeon: Virgil Oliva MD;  Location: [facility] ENDO;  Service: Endoscopy;  Laterality: N/A;    HYSTERECTOMY      INJECTION OF ANESTHETIC AGENT AROUND MEDIAL BRANCH NERVES INNERVATING LUMBAR FACET JOINT Bilateral 6/17/2020    Procedure: Bilateral L3-5 MBB;  Surgeon: Yury Moore MD;  Location: [facility] PAIN MGT;  Service: Pain Management;  Laterality: Bilateral;    INJECTION OF ANESTHETIC AGENT AROUND NERVE Bilateral 5/19/2020    Procedure: Bilateral Genicular nerve block with RN IV sedation Covid testing day of procedure PT does not drive;  Surgeon: Yury Moore MD;  Location: [facility] PAIN MGT;  Service: Pain Management;  Laterality: Bilateral;    KNEE ARTHROSCOPY      both knees twice    OOPHORECTOMY      RADIOFREQUENCY THERMOCOAGULATION Right 7/14/2020    Procedure: Right L3-5 Lumbar RFA;  Surgeon: Yury Moore MD;  Location: [facility] PAIN MGT;  Service: Pain Management;  Laterality: Right;    RADIOFREQUENCY THERMOCOAGULATION Left 8/6/2020    Procedure: Left L3-5 Lumbar RFA;  Surgeon: Yury Moore MD;  Location: [facility] PAIN MGT;  Service: Pain Management;  Laterality: Left;       Review of patient's allergies indicates:   Allergen Reactions    Morphine Shortness Of Breath    Duloxetine      Feels bad       No current facility-administered medications on file prior to encounter.      Current Outpatient Medications on File Prior to Encounter   Medication Sig    ALPRAZolam (XANAX) 0.25 MG tablet TAKE ONE TABLET BY MOUTH TWICE A DAY AS NEEDED FOR ANXIETY    amitriptyline (ELAVIL) 50 MG tablet TAKE ONE TABLET BY MOUTH ONCE DAILY    atorvastatin (LIPITOR) 10 MG tablet TAKE ONE TABLET BY MOUTH ONCE DAILY    baclofen (LIORESAL) 10 MG tablet Take 10 mg by mouth 2 (two) times daily as needed.    brimonidine 0.2% (ALPHAGAN) 0.2 % Drop Place 1 drop into both eyes 2 (two) times a day.    citalopram (CELEXA) 20 MG tablet Take 1 tablet (20 mg total) by mouth once daily.    dorzolamide (TRUSOPT) 2 % ophthalmic solution Place 1 drop into both eyes 2 (two) times a day.    latanoprost 0.005 % ophthalmic solution PLACE 1 DROP IN EACH EYE EVERY EVENING    magnesium oxide (MAG-OX) 400 mg (241.3 mg magnesium) tablet Take 400 mg by mouth.    oxyCODONE-acetaminophen (PERCOCET)  mg per tablet Take 1 tablet by mouth every 8 (eight) hours as needed.     pantoprazole (PROTONIX) 40 MG tablet TAKE ONE TABLET BY MOUTH ONCE DAILY    prednisoLONE acetate (PRED FORTE) 1 % DrpS INSTILL 1 DROP IN RIGHT EYE 4 TIMES A DAY    timolol maleate 0.25% (TIMOPTIC) 0.25 % Drop PLACE 1 DROP IN EACH EYE TWICE A DAY    valsartan-hydrochlorothiazide (DIOVAN-HCT) 160-12.5 mg per tablet TAKE ONE TABLET BY MOUTH ONCE DAILY     Family History       Problem Relation (Age of Onset)    Breast cancer Maternal Aunt    Diabetes Mother, Father          Tobacco Use    Smoking status: Former Smoker     Packs/day: 1.00     Years: 30.00     Pack years: 30.00     Types: Cigarettes     Quit date: 2014     Years since quittin.9    Smokeless tobacco: Never Used    Tobacco comment: smoke last cigarette 9/15   Substance and Sexual Activity    Alcohol use: Yes     Alcohol/week: 1.0 standard drink     Types: 1 Glasses of wine per week     Comment: 3 times a week    Drug use: No    Sexual activity: Not Currently     Partners: Male      Review of Systems   Constitutional:  Positive for fever. Negative for chills, diaphoresis and fatigue.   HENT:  Negative for congestion and sore throat.    Respiratory:  Negative for cough, shortness of breath and wheezing.    Cardiovascular:  Negative for chest pain, palpitations and leg swelling.   Gastrointestinal:  Negative for abdominal pain, constipation, diarrhea, nausea and vomiting.   Genitourinary:  Negative for decreased urine volume, difficulty urinating, dysuria, flank pain, frequency, hematuria and urgency.   Musculoskeletal:  Positive for arthralgias, back pain, gait problem and neck pain. Negative for joint swelling and myalgias.   Skin:  Negative for pallor and rash.   Neurological:  Positive for weakness (generalized). Negative for dizziness, syncope, light-headedness, numbness and headaches.   Psychiatric/Behavioral:  Negative for confusion. The patient is not nervous/anxious.    All other systems reviewed and are negative.  Objective:     Vital Signs (Most Recent):  Temp: 98.3 °F (36.8 °C) (08/18/22 0016)  Pulse: 85 (08/18/22 0016)  Resp: 18 (08/18/22 0016)  BP: (!) 166/94 (08/18/22 0016)  SpO2: 97 % (08/18/22 0016)   Vital Signs (24h Range):  Temp:  [98.3 °F (36.8 °C)] 98.3 °F (36.8 °C)  Pulse:  [85-99] 85  Resp:  [15-22] 18  SpO2:  [96 %-100 %] 97 %  BP: (139-181)/() 166/94     Weight: 76.1 kg (167 lb 12.3 oz)  Body mass index is 30.69 kg/m².    Physical Exam  Vitals and nursing note reviewed.   Constitutional:       General: She is awake. She is not in acute distress.     Appearance: Normal appearance. She is well-developed. She is not diaphoretic.   HENT:      Head: Normocephalic and atraumatic.   Eyes:      Conjunctiva/sclera: Conjunctivae normal.      Comments: PERRL; EOM intact.   Cardiovascular:      Rate and Rhythm: Normal rate and regular rhythm. No extrasystoles are present.     Heart sounds: S1 normal and S2 normal. No murmur heard.  Pulmonary:      Effort: Pulmonary effort  is normal. No tachypnea.      Breath sounds: Normal breath sounds and air entry. No wheezing, rhonchi or rales.   Abdominal:      General: Bowel sounds are normal. There is no distension.      Palpations: Abdomen is soft.      Tenderness: There is no abdominal tenderness.   Musculoskeletal:         General: No tenderness or deformity. Normal range of motion.      Cervical back: Normal range of motion and neck supple.      Right lower leg: No edema.      Left lower leg: No edema.   Skin:     General: Skin is warm and dry.      Capillary Refill: Capillary refill takes less than 2 seconds.      Findings: No erythema or rash.   Neurological:      General: No focal deficit present.      Mental Status: She is alert and oriented to person, place, and time.   Psychiatric:         Mood and Affect: Mood and affect normal.         Behavior: Behavior normal. Behavior is cooperative.           Significant Labs:  Results for orders placed or performed during the hospital encounter of 08/17/22   CBC auto differential   Result Value Ref Range    WBC 19.10 (H) 3.90 - 12.70 K/uL    RBC 5.70 (H) 4.00 - 5.40 M/uL    Hemoglobin 12.5 12.0 - 16.0 g/dL    Hematocrit 40.5 37.0 - 48.5 %    MCV 71 (L) 82 - 98 fL    MCH 21.9 (L) 27.0 - 31.0 pg    MCHC 30.9 (L) 32.0 - 36.0 g/dL    RDW 16.1 (H) 11.5 - 14.5 %    Platelets 413 150 - 450 K/uL    MPV 9.9 9.2 - 12.9 fL    Immature Granulocytes 0.5 0.0 - 0.5 %    Gran # (ANC) 16.2 (H) 1.8 - 7.7 K/uL    Immature Grans (Abs) 0.10 (H) 0.00 - 0.04 K/uL    Lymph # 1.6 1.0 - 4.8 K/uL    Mono # 1.1 (H) 0.3 - 1.0 K/uL    Eos # 0.0 0.0 - 0.5 K/uL    Baso # 0.04 0.00 - 0.20 K/uL    nRBC 0 0 /100 WBC    Gran % 84.8 (H) 38.0 - 73.0 %    Lymph % 8.5 (L) 18.0 - 48.0 %    Mono % 6.0 4.0 - 15.0 %    Eosinophil % 0.0 0.0 - 8.0 %    Basophil % 0.2 0.0 - 1.9 %    Differential Method Automated    Comprehensive metabolic panel   Result Value Ref Range    Sodium 142 136 - 145 mmol/L    Potassium 3.8 3.5 - 5.1 mmol/L     Chloride 109 95 - 110 mmol/L    CO2 18 (L) 23 - 29 mmol/L    Glucose 113 (H) 70 - 110 mg/dL    BUN 36 (H) 8 - 23 mg/dL    Creatinine 1.5 (H) 0.5 - 1.4 mg/dL    Calcium 10.5 8.7 - 10.5 mg/dL    Total Protein 8.8 (H) 6.0 - 8.4 g/dL    Albumin 3.6 3.5 - 5.2 g/dL    Total Bilirubin 0.8 0.1 - 1.0 mg/dL    Alkaline Phosphatase 73 55 - 135 U/L    AST 54 (H) 10 - 40 U/L    ALT 29 10 - 44 U/L    Anion Gap 15 8 - 16 mmol/L    eGFR 36.1 (A) >60 mL/min/1.73 m^2   Troponin I   Result Value Ref Range    Troponin I 0.021 0.000 - 0.026 ng/mL   Urinalysis, Reflex to Urine Culture Urine, Clean Catch    Specimen: Urine   Result Value Ref Range    Specimen UA Urine, Clean Catch     Color, UA Staci Yellow, Straw, Staci    Appearance, UA Hazy (A) Clear    pH, UA 6.0 5.0 - 8.0    Specific Gravity, UA 1.015 1.005 - 1.030    Protein, UA Trace (A) Negative    Glucose, UA Negative Negative    Ketones, UA Negative Negative    Bilirubin (UA) 1+ (A) Negative    Occult Blood UA 1+ (A) Negative    Nitrite, UA Positive (A) Negative    Urobilinogen, UA Negative <2.0 EU/dL    Leukocytes, UA 2+ (A) Negative   CK   Result Value Ref Range    CPK 1429 (H) 20 - 180 U/L   Urinalysis Microscopic   Result Value Ref Range    RBC, UA 3 0 - 4 /hpf    WBC, UA 15 (H) 0 - 5 /hpf    WBC Clumps, UA Occasional (A) None-Rare    Bacteria Many (A) None-Occ /hpf    Squam Epithel, UA 4 /hpf    Microscopic Comment SEE COMMENT    POCT COVID-19 Rapid Screening   Result Value Ref Range    POC Rapid COVID Negative Negative     Acceptable Yes       All pertinent labs within the past 24 hours have been reviewed.    Significant Imaging:  Imaging Results              X-Ray Knee 3 View Right (Final result)  Result time 08/17/22 17:33:48      Final result by Adair Anderson MD (08/17/22 17:33:48)                   Impression:      As above      Electronically signed by: Justin Borrero  Date:    08/17/2022  Time:    17:33               Narrative:    EXAMINATION:  XR KNEE  3 VIEW RIGHT    CLINICAL HISTORY:  Pain in unspecified knee    TECHNIQUE:  AP, lateral, and Merchant views of the left knee were performed.    COMPARISON:  None    FINDINGS:  No acute fracture or dislocation.  Satisfactory right knee arthroplasty with long medullary rome in the femur.  Vascular calcification.                                       X-Ray Shoulder Trauma Right (Final result)  Result time 08/17/22 17:33:01      Final result by Adair Anderson MD (08/17/22 17:33:01)                   Impression:      As above      Electronically signed by: Justin Borrero  Date:    08/17/2022  Time:    17:33               Narrative:    EXAMINATION:  XR SHOULDER TRAUMA 3 VIEW RIGHT    CLINICAL HISTORY:  Pain in right shoulder    TECHNIQUE:  Three or four views of the right shoulder were performed.    COMPARISON:  None    FINDINGS:  No acute fracture or dislocation.  Degenerative joint disease.  Decreased bone mineral density.  AC joint hypertrophy.  Senescent changes.                                       X-Ray Forearm Right (Final result)  Result time 08/17/22 17:30:11      Final result by Adair Anderson MD (08/17/22 17:30:11)                   Impression:      As above      Electronically signed by: Justin Borrero  Date:    08/17/2022  Time:    17:30               Narrative:    EXAMINATION:  XR FOREARM RIGHT    CLINICAL HISTORY:  Pain in arm, unspecified    TECHNIQUE:  AP and lateral views of the right forearm were performed.    COMPARISON:  None    FINDINGS:  No evidence for fracture or dislocation.  Multiple small the calcific densities adjacent to the joint space may relate to ossification within the joint space or osteophytes.  Decreased bone mineral density.  Atherosclerotic changes.  Small linear density along the radial distal aspect of the forearm soft tissues may relate to foreign body.  Mild degenerative joint disease of the radiocarpal joint                                       X-Ray Chest AP Portable (Final  result)  Result time 08/17/22 17:26:34      Final result by Adair Anderson MD (08/17/22 17:26:34)                   Impression:      No acute abnormality.      Electronically signed by: Justin Borrero  Date:    08/17/2022  Time:    17:26               Narrative:    EXAMINATION:  XR CHEST AP PORTABLE    CLINICAL HISTORY:  Unspecified fall, initial encounter    TECHNIQUE:  Single frontal view of the chest was performed.    COMPARISON:  None    FINDINGS:  Mild basilar reticular opacities consistent with scarring or atelectasis.The lungs are otherwise clear, with normal appearance of pulmonary vasculature and no pleural effusion or pneumothorax.    The cardiac silhouette is normal in size. The hilar and mediastinal contours are unremarkable.    Bones are intact.                                       CT Thoracic Spine Without Contrast (Final result)  Result time 08/17/22 17:24:38      Final result by Adair Anderson MD (08/17/22 17:24:38)                   Impression:      As above    All CT scans at this facility use dose modulation, iterative reconstruction and/or weight based dosing when appropriate to reduce radiation dose to as low as reasonably achievable.      Electronically signed by: Justin Borrero  Date:    08/17/2022  Time:    17:24               Narrative:    EXAMINATION:  CT THORACIC SPINE WITHOUT CONTRAST    CLINICAL HISTORY:  Back trauma, no prior imaging (Age >= 16y);    TECHNIQUE:  5 mm axial images were acquired using helical CT technique from the lung apices through costophrenic sulci.  No intravenous contrast was administered.    FINDINGS:  Exuberant anterior flowing osteophytes consistent with diffuse idiopathic skeletal hyperostoses.  Moderate multilevel degenerative joint disease.  Left thyroid prominence compared to the right.  No acute fracture or dislocation.  Coronary artery calcification                                       CT Lumbar Spine Without Contrast (Final result)  Result time 08/17/22  17:50:31      Final result by Adair Anderson MD (08/17/22 17:50:31)                   Impression:      No acute fracture or dislocation    Moderate multilevel degenerative disc disease    Senescent changes including atherosclerotic changes    Focus of gas along the superficial aspect of the posterior element along the left lamina at L5 likely related to vacuum disc.  Correlate to point tenderness    All CT scans at this facility are performed  using dose modulation techniques as appropriate to performed exam including the following:  automated exposure control; adjustment of mA and/or kV according to the patients size (this includes techniques or standardized protocols for targeted exams where dose is matched to indication/reason for exam: i.e. extremities or head);  iterative reconstruction technique.      Electronically signed by: Justin Borrero  Date:    08/17/2022  Time:    17:50               Narrative:    EXAMINATION:  CT LUMBAR SPINE WITHOUT CONTRAST    CLINICAL HISTORY:  Low back pain, trauma;    TECHNIQUE:  CT lumbar spine without    COMPARISON:  None    FINDINGS:  No vertebral body compression deformity.  Moderate multilevel degenerative disc disease.  Vacuum disc phenomena at L4-L5.  Schmorl's node a noted.  Decreased bone mineral density.  Atherosclerotic changes.  Focus of gas along the superficial aspect of the posterior element along the left lamina at L5 likely related to vacuum disc.  Correlate to point tenderness                                       CT Cervical Spine Without Contrast (Final result)  Result time 08/17/22 17:21:14      Final result by Adair Anderson MD (08/17/22 17:21:14)                   Impression:      No acute fracture or dislocation.    Moderate multilevel degenerative disc disease.    All CT scans   are performed using dose optimization techniques including the following: automated exposure control; adjustment of the mA and/or kV; use of iterative reconstruction technique.   Dose modulation was employed for ALARA by means of: Automated exposure control; adjustment of the mA and/or kV according to patient size (this includes techniques or standardized protocols for targeted exams where dose is matched to indication/reason for exam; i.e. extremities or head); and/or use of iterative reconstructive technique.      Electronically signed by: Justin Borrero  Date:    08/17/2022  Time:    17:21               Narrative:    EXAMINATION:  CT CERVICAL SPINE WITHOUT CONTRAST    CLINICAL HISTORY:  Neck trauma (Age >= 65y);neck pain;    TECHNIQUE:  Low dose axial images, sagittal and coronal reformations were performed though the cervical spine.  Contrast was not administered.    COMPARISON:  None    FINDINGS:  Normal vertebral body heights without evidence for spondylolisthesis.  Moderate multilevel degenerative disc disease.  No prevertebral soft tissue swelling.  Facet joints are congruent.  Normal bone mineral density.  Small focus of calcification at C3 along the right og lamina likely related to dural calcification.  Atherosclerotic changes.                                       CT Maxillofacial Without Contrast (Final result)  Result time 08/17/22 17:39:39      Final result by Adair Anderson MD (08/17/22 17:39:39)                   Impression:      No acute fracture or dislocation of the facial bones as far seen      Electronically signed by: Justin Borrero  Date:    08/17/2022  Time:    17:39               Narrative:    EXAMINATION:  CT MAXILLOFACIAL WITHOUT CONTRAST    CLINICAL HISTORY:  Facial trauma, blunt;zygoma pain;    TECHNIQUE:  Low dose axial images, sagittal and coronal reformations were obtained through the face.  Contrast was not administered.    COMPARISON:  None    FINDINGS:  No acute fracture or dislocation.  Visualized paranasal sinuses are unremarkable.  Mastoid air cells are clear.  No significant nasal bone fracture.  Zygomatic arch is intact.  No orbital floor fracture.   Cervical spine degenerative joint disease noted.  Atherosclerotic changes of the intracranial vasculature.                                       CT Head Without Contrast (Final result)  Result time 08/17/22 17:05:44      Final result by Neda Larkin MD (08/17/22 17:05:44)                   Impression:      No acute intracranial abnormality.    Chronic microvascular ischemic change.      Electronically signed by: Neda Lakrin  Date:    08/17/2022  Time:    17:05               Narrative:    EXAMINATION:  CT HEAD WITHOUT CONTRAST    CLINICAL HISTORY:  Facial trauma, blunt; Unspecified fall, initial encounter    TECHNIQUE:  Low dose axial CT images obtained throughout the head without intravenous contrast. Sagittal and coronal reconstructions were performed.  All CT scans at this facility are performed using dose optimization techniques including the following: automated exposure control; adjustment of the mA and/or kV; use of iterative reconstruction technique.    COMPARISON:  CT head without contrast 03/08/2021    FINDINGS:  Intracranial compartment:    Ventricles and sulci are normal in size for age without evidence of hydrocephalus. No extra-axial blood or fluid collections.    There is patchy hypoattenuation within the supratentorial white matter, most commonly seen in the setting of chronic microvascular ischemic change.  No parenchymal mass, hemorrhage, edema or major vascular distribution infarct.    Skull/extracranial contents (limited evaluation): No depressed skull fracture.  Mastoid air cells and paranasal sinuses are essentially clear.                                     I have reviewed all pertinent imaging results/findings within the past 24 hours.              Assessment/Plan:     * CORA on CKD, stage III  - Creatinine 1.5 (baseline 1.1), likely due to IVVD + rhabdo.  - IV hydration.  - Hold home ARB and diuretic.  - Avoid nephrotoxic agents.  - Follow labs.       UTI (urinary tract  infection)  - Urine culture obtained in the ED. Continue empiric IV Rocephin for now.       Non-traumatic rhabdomyolysis  - CPK 1429. LFTs stable. Creatinine slightly bumped from baseline.  - IV hydration.  - Avoid nephro/hepatotoxic agents.   - Follow labs.       Essential hypertension  - Hold home valsartan-HCTZ due to CORA.  - IV hydralazine PRN.         VTE Risk Mitigation (From admission, onward)         Ordered     heparin (porcine) injection 5,000 Units  Every 8 hours         08/18/22 0043     IP VTE HIGH RISK PATIENT  Once         08/18/22 0043     Place sequential compression device  Until discontinued         08/18/22 0027                   Lisa Corley NP  Department of Hospital Medicine   [facility] Telemetry ([facility])

## 2022-08-18 NOTE — PLAN OF CARE
[facility] - Telemetry (Hospital)  Discharge Final Note    Primary Care Provider: Marilyn Odonnell NP    Expected Discharge Date: 8/18/2022    Final Discharge Note (most recent)     Final Note - 08/18/22 1448        Final Note    Assessment Type Final Discharge Note     Anticipated Discharge Disposition Home or Self Care     Hospital Resources/Appts/Education Provided Appointments scheduled by Navigator/Coordinator                 Important Message from Medicare             Contact Info     Marilyn Odonnell NP   Specialty: Family Medicine   Relationship: PCP - General    [address] [place] [id number]   Phone: [phone]       Next Steps: Follow up in 3 day(s)    Instructions: for hospital follow-up of CORA, UTI, Rhabdo        DC Dispo: home  PCP f/u: Monday, August 22nd.

## 2022-08-18 NOTE — PLAN OF CARE
[facility] - Telemetry (Hospital)  Initial Discharge Assessment       Primary Care Provider: Marilyn Odonnell NP    Admission Diagnosis: Arm pain [M79.603]  Knee pain [M25.569]  Weakness [R53.1]  Fall [W19.XXXA]  Right shoulder pain [M25.511]  Acute cystitis without hematuria [N30.00]  Acute kidney injury [N17.9]  Traumatic rhabdomyolysis, initial encounter [T79.6XXA]    Admission Date: 8/17/2022  Expected Discharge Date:     Discharge Barriers Identified: None    Payor: HUMANA MANAGED MEDICARE / Plan: HUMANA TOTAL CARE ADVANTAGE / Product Type: Medicare Advantage /     Extended Emergency Contact Information  Primary Emergency Contact: Zoie Ring   [place]  Mobile Phone: [phone]  Relation: Daughter  Secondary Emergency Contact: Bhavani Ring  Mobile Phone: [phone]  Relation: Daughter  Preferred language: English   needed? No    Discharge Plan A: Home Health  Discharge Plan B: Skilled Nursing Facility      [facility] - [place] - [address]  [address]  [place] [address]  Phone: [phone] Fax: [phone]    [facility] (Now [facility]) - [facility] [address]  [address] [address]  Phone: [phone] Fax: [phone]      Initial Assessment (most recent)     Adult Discharge Assessment - 08/18/22 1115        Discharge Assessment    Assessment Type Discharge Planning Assessment     Confirmed/corrected address, phone number and insurance Yes     Confirmed Demographics Correct on Facesheet     Source of Information patient     Communicated YIMI with patient/caregiver Date not available/Unable to determine     Reason For Admission s/p fall at home, CORA     Lives With alone     Facility Arrived From: home     Do you expect to return to your current living situation? Yes     Do you have help at home or someone to help you manage your care at home? Yes     Prior to  hospitilization cognitive status: Alert/Oriented     Current cognitive status: Alert/Oriented     Walking or Climbing Stairs Difficulty ambulation difficulty, requires equipment     Dressing/Bathing Difficulty bathing difficulty, requires equipment     Equipment Currently Used at Home wheelchair;walker, rolling;grab bar     Readmission within 30 days? No     Patient currently being followed by outpatient case management? No     Do you currently have service(s) that help you manage your care at home? No     Do you take prescription medications? Yes     Do you have prescription coverage? Yes     Do you have any problems affording any of your prescribed medications? No     Is the patient taking medications as prescribed? yes     How do you get to doctors appointments? family or friend will provide     Are you on dialysis? No     Discharge Plan A Home Health     Discharge Plan B Skilled Nursing Facility     DME Needed Upon Discharge  none     Discharge Plan discussed with: Patient     Discharge Barriers Identified None               Anticipated DC Dispo: home with HHC vs SNF  Prior Level of Function: lives alone. Use of wheelchair and walker at home.  PCP: Marilyn Segovia NP  Comments:  CM met with patient at bedside to introduce role and discuss d/c planning. Patient lives alone, states daughters check on her every few days. Patient states she fell out of w/c onto floor while mopping and was down x2 days. Patient states family will be able to provide transportation home upon d/c. CM updated white board with CM contact info.

## 2022-08-18 NOTE — PLAN OF CARE
Plan of care reviewed with pt, verbalized understanding. A&O x3, disoriented to place and also has intermittent confusion. IV intact, dry, and clean. Medications given with no complications. NS infusing at 125 mL/hr. On room air. Pain monitored and treated per order. Pt ambulates with assistance x1, turns in bed independently. Subq heparin given for VTE. NS on monitor. Bed alarm on. Instructed to call for assistance. Hourly rounding completed.

## 2022-08-18 NOTE — HOSPITAL COURSE
75 y/o female admitted for CORA, rhabdomyolysis, and UTI. Patient was treated with IVFs and rocephin. Renal function back to baseline this morning. CPK trending down. Patient seen and examined on the date of discharge and found stable for discharge. Patient to follow-up with PCP in 3 days for hospital follow-up.

## 2022-08-18 NOTE — DISCHARGE SUMMARY
[facility] Medicine  Discharge Summary      Patient Name: Tea Ring  MRN: [id number]  Patient Class: OP- Observation  Admission Date: 8/17/2022  Hospital Length of Stay: 0 days  Discharge Date and Time:  08/18/2022 3:05 PM  Attending Physician: Santiago Castro MD   Discharging Provider: Christina Hunter NP  Primary Care Provider: Marilyn Odonnell NP      HPI:   Tea Ring is a 76 y.o. female with a PMHx of anxiety, depression, chronic pain, debility, and HTN who presented to [facility] ED in [facility] s/p ground-level fall 2 days PTA. Patient reports she was in her wheelchair mopping when she lost her balance and fell out of chair onto the floor. No loss of consciousness. She reports being unable to get up off floor due to generalized weakness, laying on the ground since Monday (48 hours prior). Associated right shoulder pain, right knee pain, neck discomfort, and subjective fever. No aggravating or alleviating factors. She denies any AMS, HA, lightheadedness, dizziness, syncope, numbness or tingling, decreased ROM, myalgias, CP, SOB, ABD pain, N/V/D, dysuria, hematuria, or chills. Work-up in the ED showed: WBC 19, 0% bands, creatinine 1.5, BUN 36, stable LFTs, CPK 1429, COVID negative, UA consistent with UTI, unremarkable CXR. XR of right forearm, shoulder, and knee were negative for acute process. CT of head, maxillofacial, cervical spine, lumbar spine, and thoracic spine were all negative for acute process. Patient transferred to [facility] and placed in Observation.       * No surgery found *      Hospital Course:   77 y/o female admitted for CORA, rhabdomyolysis, and UTI. Patient was treated with IVFs and rocephin. Renal function back to baseline this morning. CPK trending down. Patient seen and examined on the date of discharge and found stable for discharge. Patient to follow-up with PCP in 3 days for hospital follow-up.        Goals of Care Treatment Preferences:  Code Status:  Full Code      Consults:     No new Assessment & Plan notes have been filed under this hospital service since the last note was generated.  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    Specialty: Family Medicine  Why: for hospital follow-up of CORA, UTI, Rhabdo  Contact information:  [address] [address] [address] [phone]                       Patient Instructions:      Notify your health care provider if you experience any of the following:  temperature >100.4     Notify your health care provider if you experience any of the following:  severe uncontrolled pain     Notify your health care provider if you experience any of the following:  difficulty breathing or increased cough     Notify your health care provider if you experience any of the following:  persistent dizziness, light-headedness, or visual disturbances     Notify your health care provider if you experience any of the following:  increased confusion or weakness     Activity as tolerated       Significant Diagnostic Studies: Labs:   BMP:   Recent Labs   Lab 08/17/22  1616 08/18/22  0457   * 94    142   K 3.8 4.3    113*   CO2 18* 20*   BUN 36* 38*   CREATININE 1.5* 1.3   CALCIUM 10.5 8.8   , CMP   Recent Labs   Lab 08/17/22  1616 08/18/22  0457    142   K 3.8 4.3    113*   CO2 18* 20*   * 94   BUN 36* 38*   CREATININE 1.5* 1.3   CALCIUM 10.5 8.8   PROT 8.8* 6.8   ALBUMIN 3.6 3.0*   BILITOT 0.8 0.5   ALKPHOS 73 66   AST 54* 41*    ALT 29 20   ANIONGAP 15 9   , CBC   Recent Labs   Lab 08/17/22  1616 08/18/22  0457   WBC 19.10* 13.19*   HGB 12.5 11.2*   HCT 40.5 37.8    327    and All labs within the past 24 hours have been reviewed    Pending Diagnostic Studies:     None         Medications:  Reconciled Home Medications:      Medication List      START taking these medications    amoxicillin-clavulanate 500-125mg 500-125 mg Tab  Commonly known as: AUGMENTIN  Take 1 tablet (500 mg total) by mouth 2 (two) times daily. for 5 days        CHANGE how you take these medications    oxyCODONE-acetaminophen  mg per tablet  Commonly known as: PERCOCET  Take 1 tablet by mouth every 8 (eight) hours as needed for Pain.  What changed: reasons to take this        CONTINUE taking these medications    ALPRAZolam 0.25 MG tablet  Commonly known as: XANAX  TAKE ONE TABLET BY MOUTH TWICE A DAY AS NEEDED FOR ANXIETY     amitriptyline 50 MG tablet  Commonly known as: ELAVIL  TAKE ONE TABLET BY MOUTH ONCE DAILY     atorvastatin 10 MG tablet  Commonly known as: LIPITOR  TAKE ONE TABLET BY MOUTH ONCE DAILY     baclofen 10 MG tablet  Commonly known as: LIORESAL  Take 10 mg by mouth 2 (two) times daily as needed.     brimonidine 0.2% 0.2 % Drop  Commonly known as: ALPHAGAN  Place 1 drop into both eyes 2 (two) times a day.     citalopram 20 MG tablet  Commonly known as: CeleXA  Take 1 tablet (20 mg total) by mouth once daily.     dorzolamide 2 % ophthalmic solution  Commonly known as: TRUSOPT  Place 1 drop into both eyes 2 (two) times a day.     latanoprost 0.005 % ophthalmic solution  PLACE 1 DROP IN EACH EYE EVERY EVENING     magnesium oxide 400 mg (241.3 mg magnesium) tablet  Commonly known as: MAG-OX  Take 400 mg by mouth.     pantoprazole 40 MG tablet  Commonly known as: PROTONIX  TAKE ONE TABLET BY MOUTH ONCE DAILY     prednisoLONE acetate 1 % Drps  Commonly known as: PRED FORTE  INSTILL 1 DROP IN RIGHT EYE 4 TIMES A DAY     timolol maleate 0.25% 0.25  % Drop  Commonly known as: TIMOPTIC  PLACE 1 DROP IN EACH EYE TWICE A DAY     valsartan-hydrochlorothiazide 160-12.5 mg per tablet  Commonly known as: DIOVAN-HCT  TAKE ONE TABLET BY MOUTH ONCE DAILY            Indwelling Lines/Drains at time of discharge:   Lines/Drains/Airways     Drain  Duration           Female External Urinary Catheter 08/18/22 0846 <1 day                Time spent on the discharge of patient: 45 minutes         Christina Hunter NP  Department of Hospital Medicine  [facility] - Telemetry ([facility])

## 2022-08-18 NOTE — HPI
Tea Ring is a 76 y.o. female with a PMHx of anxiety, depression, chronic pain, debility, and HTN who presented to [facility] ED in [place] s/p ground-level fall 2 days PTA. Patient reports she was in her wheelchair mopping when she lost her balance and fell out of chair onto the floor. No loss of consciousness. She reports being unable to get up off floor due to generalized weakness, laying on the ground since Monday (48 hours prior). Associated right shoulder pain, right knee pain, neck discomfort, and subjective fever. No aggravating or alleviating factors. She denies any AMS, HA, lightheadedness, dizziness, syncope, numbness or tingling, decreased ROM, myalgias, CP, SOB, ABD pain, N/V/D, dysuria, hematuria, or chills. Work-up in the ED showed: WBC 19, 0% bands, creatinine 1.5, BUN 36, stable LFTs, CPK 1429, COVID negative, UA consistent with UTI, unremarkable CXR. XR of right forearm, shoulder, and knee were negative for acute process. CT of head, maxillofacial, cervical spine, lumbar spine, and thoracic spine were all negative for acute process. Patient transferred to [facility] and placed in Observation.

## 2022-08-18 NOTE — NURSING
Pt arrived via EMS on stretcher and was transferred to bed. VS taken. Tele monitor placed. Pain 10/10 to right shoulder and arm. Oriented to room. Bed alarm on. Bed low and wheels locked, side rails up x2, call light in reach. Instructed to call for assistance.

## 2022-08-18 NOTE — PLAN OF CARE
Bedside Discharge summary and instructions given to patient.  Prescribed medications delivered at bedside.   IV catheter removed no complications noted.   Cardiac monitor removed and returned to the monitor room.  Reminded of follow-up appointments . Verbalized understanding.   Left the unit per wheelchair. Accompanied by family  With personal belongings at hand.

## 2022-08-18 NOTE — ASSESSMENT & PLAN NOTE
- Creatinine 1.5 (baseline 1.1), likely due to IVVD + rhabdo.  - IV hydration.  - Hold home ARB and diuretic.  - Avoid nephrotoxic agents.  - Follow labs.

## 2022-08-18 NOTE — ASSESSMENT & PLAN NOTE
- CPK 1429. LFTs stable. Creatinine slightly bumped from baseline.  - IV hydration.  - Avoid nephro/hepatotoxic agents.   - Follow labs.

## 2022-08-18 NOTE — SIGNIFICANT EVENT
77 y/o female admitted for CORA, rhabdomyolysis, and UTI. Patient started on IVFs and rocephin. Renal function back to baseline this morning. CPK trending down. Will continue current medical management plan.

## 2022-08-19 ENCOUNTER — PATIENT OUTREACH (OUTPATIENT)
Dept: ADMINISTRATIVE | Facility: HOSPITAL | Age: 77
End: 2022-08-19
Payer: MEDICARE

## 2022-08-19 NOTE — PROGRESS NOTES
HOSPITAL FOLLOW UP:  Called patient and confirmed her hospital follow up on Monday 8/22/22 with Marilyn Díaz NP.

## 2022-08-20 LAB — BACTERIA UR CULT: ABNORMAL

## 2022-08-22 ENCOUNTER — LAB VISIT (OUTPATIENT)
Dept: LAB | Facility: HOSPITAL | Age: 77
End: 2022-08-22
Attending: NURSE PRACTITIONER
Payer: MEDICARE

## 2022-08-22 ENCOUNTER — OFFICE VISIT (OUTPATIENT)
Dept: INTERNAL MEDICINE | Facility: CLINIC | Age: 77
End: 2022-08-22
Payer: MEDICARE

## 2022-08-22 ENCOUNTER — TELEPHONE (OUTPATIENT)
Dept: INTERNAL MEDICINE | Facility: CLINIC | Age: 77
End: 2022-08-22
Payer: MEDICARE

## 2022-08-22 VITALS
SYSTOLIC BLOOD PRESSURE: 128 MMHG | OXYGEN SATURATION: 96 % | BODY MASS INDEX: 30.87 KG/M2 | DIASTOLIC BLOOD PRESSURE: 68 MMHG | TEMPERATURE: 99 F | HEIGHT: 62 IN | HEART RATE: 76 BPM | WEIGHT: 167.75 LBS

## 2022-08-22 DIAGNOSIS — I10 ESSENTIAL HYPERTENSION: ICD-10-CM

## 2022-08-22 DIAGNOSIS — N17.9 AKI (ACUTE KIDNEY INJURY): ICD-10-CM

## 2022-08-22 DIAGNOSIS — D72.829 LEUKOCYTOSIS, UNSPECIFIED TYPE: ICD-10-CM

## 2022-08-22 DIAGNOSIS — Z09 HOSPITAL DISCHARGE FOLLOW-UP: ICD-10-CM

## 2022-08-22 DIAGNOSIS — N18.31 CHRONIC KIDNEY DISEASE, STAGE 3A: ICD-10-CM

## 2022-08-22 DIAGNOSIS — M62.82 NON-TRAUMATIC RHABDOMYOLYSIS: ICD-10-CM

## 2022-08-22 DIAGNOSIS — I70.0 ATHEROSCLEROSIS OF AORTA: Chronic | ICD-10-CM

## 2022-08-22 DIAGNOSIS — J45.909 CHRONIC ASTHMA WITHOUT COMPLICATION, UNSPECIFIED ASTHMA SEVERITY, UNSPECIFIED WHETHER PERSISTENT: Chronic | ICD-10-CM

## 2022-08-22 DIAGNOSIS — L93.2 CUTANEOUS LUPUS ERYTHEMATOSUS: ICD-10-CM

## 2022-08-22 DIAGNOSIS — Z09 HOSPITAL DISCHARGE FOLLOW-UP: Primary | ICD-10-CM

## 2022-08-22 DIAGNOSIS — R53.81 DEBILITY: ICD-10-CM

## 2022-08-22 DIAGNOSIS — N39.0 URINARY TRACT INFECTION WITHOUT HEMATURIA, SITE UNSPECIFIED: ICD-10-CM

## 2022-08-22 LAB
ALBUMIN SERPL BCP-MCNC: 3.3 G/DL (ref 3.5–5.2)
ALP SERPL-CCNC: 61 U/L (ref 55–135)
ALT SERPL W/O P-5'-P-CCNC: 22 U/L (ref 10–44)
ANION GAP SERPL CALC-SCNC: 9 MMOL/L (ref 8–16)
AST SERPL-CCNC: 22 U/L (ref 10–40)
BASOPHILS # BLD AUTO: 0.04 K/UL (ref 0–0.2)
BASOPHILS NFR BLD: 0.6 % (ref 0–1.9)
BILIRUB SERPL-MCNC: 0.3 MG/DL (ref 0.1–1)
BUN SERPL-MCNC: 26 MG/DL (ref 8–23)
CALCIUM SERPL-MCNC: 9.8 MG/DL (ref 8.7–10.5)
CHLORIDE SERPL-SCNC: 109 MMOL/L (ref 95–110)
CO2 SERPL-SCNC: 23 MMOL/L (ref 23–29)
CREAT SERPL-MCNC: 1.2 MG/DL (ref 0.5–1.4)
DIFFERENTIAL METHOD: ABNORMAL
EOSINOPHIL # BLD AUTO: 0.4 K/UL (ref 0–0.5)
EOSINOPHIL NFR BLD: 6.1 % (ref 0–8)
ERYTHROCYTE [DISTWIDTH] IN BLOOD BY AUTOMATED COUNT: 15.9 % (ref 11.5–14.5)
EST. GFR  (NO RACE VARIABLE): 47.2 ML/MIN/1.73 M^2
GLUCOSE SERPL-MCNC: 87 MG/DL (ref 70–110)
HCT VFR BLD AUTO: 34.4 % (ref 37–48.5)
HGB BLD-MCNC: 10.3 G/DL (ref 12–16)
IMM GRANULOCYTES # BLD AUTO: 0.03 K/UL (ref 0–0.04)
IMM GRANULOCYTES NFR BLD AUTO: 0.4 % (ref 0–0.5)
LYMPHOCYTES # BLD AUTO: 2 K/UL (ref 1–4.8)
LYMPHOCYTES NFR BLD: 28.8 % (ref 18–48)
MCH RBC QN AUTO: 22.1 PG (ref 27–31)
MCHC RBC AUTO-ENTMCNC: 29.9 G/DL (ref 32–36)
MCV RBC AUTO: 74 FL (ref 82–98)
MONOCYTES # BLD AUTO: 0.5 K/UL (ref 0.3–1)
MONOCYTES NFR BLD: 7.1 % (ref 4–15)
NEUTROPHILS # BLD AUTO: 4 K/UL (ref 1.8–7.7)
NEUTROPHILS NFR BLD: 57 % (ref 38–73)
NRBC BLD-RTO: 0 /100 WBC
PLATELET # BLD AUTO: 359 K/UL (ref 150–450)
PMV BLD AUTO: 9.6 FL (ref 9.2–12.9)
POTASSIUM SERPL-SCNC: 3.9 MMOL/L (ref 3.5–5.1)
PROT SERPL-MCNC: 7.6 G/DL (ref 6–8.4)
RBC # BLD AUTO: 4.66 M/UL (ref 4–5.4)
SODIUM SERPL-SCNC: 141 MMOL/L (ref 136–145)
WBC # BLD AUTO: 6.94 K/UL (ref 3.9–12.7)

## 2022-08-22 PROCEDURE — 99999 PR PBB SHADOW E&M-EST. PATIENT-LVL V: CPT | Mod: PBBFAC,,, | Performed by: NURSE PRACTITIONER

## 2022-08-22 PROCEDURE — 1159F MED LIST DOCD IN RCRD: CPT | Mod: CPTII,S$GLB,, | Performed by: NURSE PRACTITIONER

## 2022-08-22 PROCEDURE — 1100F PTFALLS ASSESS-DOCD GE2>/YR: CPT | Mod: CPTII,S$GLB,, | Performed by: NURSE PRACTITIONER

## 2022-08-22 PROCEDURE — 1125F PR PAIN SEVERITY QUANTIFIED, PAIN PRESENT: ICD-10-PCS | Mod: CPTII,S$GLB,, | Performed by: NURSE PRACTITIONER

## 2022-08-22 PROCEDURE — 36415 COLL VENOUS BLD VENIPUNCTURE: CPT | Mod: PO | Performed by: NURSE PRACTITIONER

## 2022-08-22 PROCEDURE — 3074F SYST BP LT 130 MM HG: CPT | Mod: CPTII,S$GLB,, | Performed by: NURSE PRACTITIONER

## 2022-08-22 PROCEDURE — 3288F PR FALLS RISK ASSESSMENT DOCUMENTED: ICD-10-PCS | Mod: CPTII,S$GLB,, | Performed by: NURSE PRACTITIONER

## 2022-08-22 PROCEDURE — 99495 TRANSJ CARE MGMT MOD F2F 14D: CPT | Mod: S$GLB,,, | Performed by: NURSE PRACTITIONER

## 2022-08-22 PROCEDURE — 1160F PR REVIEW ALL MEDS BY PRESCRIBER/CLIN PHARMACIST DOCUMENTED: ICD-10-PCS | Mod: CPTII,S$GLB,, | Performed by: NURSE PRACTITIONER

## 2022-08-22 PROCEDURE — 80053 COMPREHEN METABOLIC PANEL: CPT | Mod: PO | Performed by: NURSE PRACTITIONER

## 2022-08-22 PROCEDURE — 3078F DIAST BP <80 MM HG: CPT | Mod: CPTII,S$GLB,, | Performed by: NURSE PRACTITIONER

## 2022-08-22 PROCEDURE — 85025 COMPLETE CBC W/AUTO DIFF WBC: CPT | Mod: PO | Performed by: NURSE PRACTITIONER

## 2022-08-22 PROCEDURE — 99499 UNLISTED E&M SERVICE: CPT | Mod: HCNC,S$GLB,, | Performed by: NURSE PRACTITIONER

## 2022-08-22 PROCEDURE — 1125F AMNT PAIN NOTED PAIN PRSNT: CPT | Mod: CPTII,S$GLB,, | Performed by: NURSE PRACTITIONER

## 2022-08-22 PROCEDURE — 99999 PR PBB SHADOW E&M-EST. PATIENT-LVL V: ICD-10-PCS | Mod: PBBFAC,,, | Performed by: NURSE PRACTITIONER

## 2022-08-22 PROCEDURE — 99499 RISK ADDL DX/OHS AUDIT: ICD-10-PCS | Mod: HCNC,S$GLB,, | Performed by: NURSE PRACTITIONER

## 2022-08-22 PROCEDURE — 1100F PR PT FALLS ASSESS DOC 2+ FALLS/FALL W/INJURY/YR: ICD-10-PCS | Mod: CPTII,S$GLB,, | Performed by: NURSE PRACTITIONER

## 2022-08-22 PROCEDURE — 3288F FALL RISK ASSESSMENT DOCD: CPT | Mod: CPTII,S$GLB,, | Performed by: NURSE PRACTITIONER

## 2022-08-22 PROCEDURE — 1159F PR MEDICATION LIST DOCUMENTED IN MEDICAL RECORD: ICD-10-PCS | Mod: CPTII,S$GLB,, | Performed by: NURSE PRACTITIONER

## 2022-08-22 PROCEDURE — 3078F PR MOST RECENT DIASTOLIC BLOOD PRESSURE < 80 MM HG: ICD-10-PCS | Mod: CPTII,S$GLB,, | Performed by: NURSE PRACTITIONER

## 2022-08-22 PROCEDURE — 99495 TCM SERVICES (MODERATE COMPLEXITY): ICD-10-PCS | Mod: S$GLB,,, | Performed by: NURSE PRACTITIONER

## 2022-08-22 PROCEDURE — 3074F PR MOST RECENT SYSTOLIC BLOOD PRESSURE < 130 MM HG: ICD-10-PCS | Mod: CPTII,S$GLB,, | Performed by: NURSE PRACTITIONER

## 2022-08-22 PROCEDURE — 1160F RVW MEDS BY RX/DR IN RCRD: CPT | Mod: CPTII,S$GLB,, | Performed by: NURSE PRACTITIONER

## 2022-08-22 NOTE — TELEPHONE ENCOUNTER
Call and left a message informing her that once the HH contact her she can cancel services. If have any question to contact office at [phone]
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
## 2022-08-22 NOTE — TELEPHONE ENCOUNTER
----- Message from Tali Hicks sent at 8/22/2022  2:14 PM CDT -----  Contact: Tea Metcalf called and stated she wants to post pone the Home Health services until she comes back to [place] and she stated she will call when she is ready to get it set up. Please call her back at [phone]

## 2022-09-02 ENCOUNTER — TELEPHONE (OUTPATIENT)
Dept: OPHTHALMOLOGY | Facility: CLINIC | Age: 77
End: 2022-09-02
Payer: MEDICARE

## 2022-09-02 NOTE — TELEPHONE ENCOUNTER
Message has been given to Dr Pitts, Dr Burnett not in clinic this pm - patient wishes to speak with  and not tech.      ----- Message from Dawn Herrera sent at 9/2/2022  3:05 PM CDT -----  The patient is requesting a call back from [facility] or [facility] staff she would like to be advised  her medical eye  . The patient says her condition is worsening. She is scheduled to see Dr Burnett 09/21     Please call Tea at [phone] (home)

## 2022-09-12 ENCOUNTER — OFFICE VISIT (OUTPATIENT)
Dept: INTERNAL MEDICINE | Facility: CLINIC | Age: 77
End: 2022-09-12
Payer: MEDICARE

## 2022-09-12 VITALS
BODY MASS INDEX: 31.57 KG/M2 | WEIGHT: 172.63 LBS | HEART RATE: 87 BPM | SYSTOLIC BLOOD PRESSURE: 132 MMHG | OXYGEN SATURATION: 94 % | DIASTOLIC BLOOD PRESSURE: 82 MMHG | TEMPERATURE: 97 F

## 2022-09-12 DIAGNOSIS — N18.31 CHRONIC KIDNEY DISEASE, STAGE 3A: ICD-10-CM

## 2022-09-12 DIAGNOSIS — M75.121 COMPLETE TEAR OF RIGHT ROTATOR CUFF, UNSPECIFIED WHETHER TRAUMATIC: ICD-10-CM

## 2022-09-12 DIAGNOSIS — M25.511 CHRONIC RIGHT SHOULDER PAIN: ICD-10-CM

## 2022-09-12 DIAGNOSIS — Z59.9 FINANCIAL DIFFICULTIES: ICD-10-CM

## 2022-09-12 DIAGNOSIS — G89.29 OTHER CHRONIC PAIN: ICD-10-CM

## 2022-09-12 DIAGNOSIS — G89.29 CHRONIC RIGHT SHOULDER PAIN: ICD-10-CM

## 2022-09-12 DIAGNOSIS — F41.9 CHRONIC ANXIETY: ICD-10-CM

## 2022-09-12 DIAGNOSIS — I10 ESSENTIAL HYPERTENSION: Primary | Chronic | ICD-10-CM

## 2022-09-12 DIAGNOSIS — H40.1133 PRIMARY OPEN ANGLE GLAUCOMA (POAG) OF BOTH EYES, SEVERE STAGE: ICD-10-CM

## 2022-09-12 PROCEDURE — 99214 OFFICE O/P EST MOD 30 MIN: CPT | Mod: S$GLB,,, | Performed by: NURSE PRACTITIONER

## 2022-09-12 PROCEDURE — 1101F PT FALLS ASSESS-DOCD LE1/YR: CPT | Mod: CPTII,S$GLB,, | Performed by: NURSE PRACTITIONER

## 2022-09-12 PROCEDURE — 99999 PR PBB SHADOW E&M-EST. PATIENT-LVL IV: CPT | Mod: PBBFAC,,, | Performed by: NURSE PRACTITIONER

## 2022-09-12 PROCEDURE — 1160F PR REVIEW ALL MEDS BY PRESCRIBER/CLIN PHARMACIST DOCUMENTED: ICD-10-PCS | Mod: CPTII,S$GLB,, | Performed by: NURSE PRACTITIONER

## 2022-09-12 PROCEDURE — 3075F SYST BP GE 130 - 139MM HG: CPT | Mod: CPTII,S$GLB,, | Performed by: NURSE PRACTITIONER

## 2022-09-12 PROCEDURE — 1159F PR MEDICATION LIST DOCUMENTED IN MEDICAL RECORD: ICD-10-PCS | Mod: CPTII,S$GLB,, | Performed by: NURSE PRACTITIONER

## 2022-09-12 PROCEDURE — 3079F DIAST BP 80-89 MM HG: CPT | Mod: CPTII,S$GLB,, | Performed by: NURSE PRACTITIONER

## 2022-09-12 PROCEDURE — 3288F FALL RISK ASSESSMENT DOCD: CPT | Mod: CPTII,S$GLB,, | Performed by: NURSE PRACTITIONER

## 2022-09-12 PROCEDURE — 3288F PR FALLS RISK ASSESSMENT DOCUMENTED: ICD-10-PCS | Mod: CPTII,S$GLB,, | Performed by: NURSE PRACTITIONER

## 2022-09-12 PROCEDURE — 3079F PR MOST RECENT DIASTOLIC BLOOD PRESSURE 80-89 MM HG: ICD-10-PCS | Mod: CPTII,S$GLB,, | Performed by: NURSE PRACTITIONER

## 2022-09-12 PROCEDURE — 3075F PR MOST RECENT SYSTOLIC BLOOD PRESS GE 130-139MM HG: ICD-10-PCS | Mod: CPTII,S$GLB,, | Performed by: NURSE PRACTITIONER

## 2022-09-12 PROCEDURE — 99999 PR PBB SHADOW E&M-EST. PATIENT-LVL IV: ICD-10-PCS | Mod: PBBFAC,,, | Performed by: NURSE PRACTITIONER

## 2022-09-12 PROCEDURE — 99214 PR OFFICE/OUTPT VISIT, EST, LEVL IV, 30-39 MIN: ICD-10-PCS | Mod: S$GLB,,, | Performed by: NURSE PRACTITIONER

## 2022-09-12 PROCEDURE — 1125F AMNT PAIN NOTED PAIN PRSNT: CPT | Mod: CPTII,S$GLB,, | Performed by: NURSE PRACTITIONER

## 2022-09-12 PROCEDURE — 1101F PR PT FALLS ASSESS DOC 0-1 FALLS W/OUT INJ PAST YR: ICD-10-PCS | Mod: CPTII,S$GLB,, | Performed by: NURSE PRACTITIONER

## 2022-09-12 PROCEDURE — 1125F PR PAIN SEVERITY QUANTIFIED, PAIN PRESENT: ICD-10-PCS | Mod: CPTII,S$GLB,, | Performed by: NURSE PRACTITIONER

## 2022-09-12 PROCEDURE — 1160F RVW MEDS BY RX/DR IN RCRD: CPT | Mod: CPTII,S$GLB,, | Performed by: NURSE PRACTITIONER

## 2022-09-12 PROCEDURE — 1159F MED LIST DOCD IN RCRD: CPT | Mod: CPTII,S$GLB,, | Performed by: NURSE PRACTITIONER

## 2022-09-12 SDOH — SOCIAL DETERMINANTS OF HEALTH (SDOH): PROBLEM RELATED TO HOUSING AND ECONOMIC CIRCUMSTANCES, UNSPECIFIED: Z59.9

## 2022-09-12 NOTE — ASSESSMENT & PLAN NOTE
Referral sent to Outpatient case management.  Discussed that I cannot necessarily help with the financial aspect of other facilities, but we may be able to assist with [facility] providers.
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
## 2022-09-12 NOTE — ASSESSMENT & PLAN NOTE
Patient Canceled home health physical therapy following hospital discharge visit.  Keep appointment with Orthopedics as scheduled.

## 2022-09-19 ENCOUNTER — OUTPATIENT CASE MANAGEMENT (OUTPATIENT)
Dept: ADMINISTRATIVE | Facility: OTHER | Age: 77
End: 2022-09-19
Payer: MEDICARE

## 2022-09-19 NOTE — LETTER
September 23, 2022    Tea Ring  [address] [place] [id number]             [facility] [address] [place] [address] Dear Ms. Ring:    I am writing from the Outpatient Complex Care Management Department at [facility].  I received a referral from Marilyn Segovia NP to contact you or your caregiver regarding any needs you may have. I have attempted to contact you or your cargeiver by phone two times unsuccessfully.  Please contact the Outpatient Complex Care Management Department at [phone] if you would like to discuss your needs.      Sincerely,         Evelia Layton, JUAN JOSEW

## 2022-09-21 ENCOUNTER — OFFICE VISIT (OUTPATIENT)
Dept: OPHTHALMOLOGY | Facility: CLINIC | Age: 77
End: 2022-09-21
Payer: MEDICARE

## 2022-09-21 DIAGNOSIS — H25.12 NUCLEAR SCLEROSIS OF LEFT EYE: ICD-10-CM

## 2022-09-21 DIAGNOSIS — M35.01 KERATOCONJUNCTIVITIS SICCA: ICD-10-CM

## 2022-09-21 DIAGNOSIS — Z98.41 CATARACT EXTRACTION STATUS OF EYE, RIGHT: ICD-10-CM

## 2022-09-21 DIAGNOSIS — H40.1133 PRIMARY OPEN ANGLE GLAUCOMA (POAG) OF BOTH EYES, SEVERE STAGE: Primary | ICD-10-CM

## 2022-09-21 DIAGNOSIS — H35.721 RPE (RETINAL PIGMENT EPITHELIUM) DETACHMENT, RIGHT: ICD-10-CM

## 2022-09-21 DIAGNOSIS — Z91.199 NON-COMPLIANCE WITH TREATMENT: ICD-10-CM

## 2022-09-21 PROCEDURE — 99999 PR PBB SHADOW E&M-EST. PATIENT-LVL III: ICD-10-PCS | Mod: PBBFAC,,, | Performed by: OPHTHALMOLOGY

## 2022-09-21 PROCEDURE — 1159F MED LIST DOCD IN RCRD: CPT | Mod: CPTII,S$GLB,, | Performed by: OPHTHALMOLOGY

## 2022-09-21 PROCEDURE — 1160F RVW MEDS BY RX/DR IN RCRD: CPT | Mod: CPTII,S$GLB,, | Performed by: OPHTHALMOLOGY

## 2022-09-21 PROCEDURE — 99215 OFFICE O/P EST HI 40 MIN: CPT | Mod: S$GLB,,, | Performed by: OPHTHALMOLOGY

## 2022-09-21 PROCEDURE — 1160F PR REVIEW ALL MEDS BY PRESCRIBER/CLIN PHARMACIST DOCUMENTED: ICD-10-PCS | Mod: CPTII,S$GLB,, | Performed by: OPHTHALMOLOGY

## 2022-09-21 PROCEDURE — 99999 PR PBB SHADOW E&M-EST. PATIENT-LVL III: CPT | Mod: PBBFAC,,, | Performed by: OPHTHALMOLOGY

## 2022-09-21 PROCEDURE — 99215 PR OFFICE/OUTPT VISIT, EST, LEVL V, 40-54 MIN: ICD-10-PCS | Mod: S$GLB,,, | Performed by: OPHTHALMOLOGY

## 2022-09-21 PROCEDURE — 1159F PR MEDICATION LIST DOCUMENTED IN MEDICAL RECORD: ICD-10-PCS | Mod: CPTII,S$GLB,, | Performed by: OPHTHALMOLOGY

## 2022-09-21 RX ORDER — BRIMONIDINE TARTRATE 2 MG/ML
1 SOLUTION/ DROPS OPHTHALMIC 2 TIMES DAILY
Qty: 10 ML | Refills: 4 | Status: SHIPPED | OUTPATIENT
Start: 2022-09-21 | End: 2023-09-12

## 2022-09-21 RX ORDER — DORZOLAMIDE HYDROCHLORIDE AND TIMOLOL MALEATE 20; 5 MG/ML; MG/ML
1 SOLUTION/ DROPS OPHTHALMIC 2 TIMES DAILY
Qty: 10 ML | Refills: 1 | Status: SHIPPED | OUTPATIENT
Start: 2022-09-21 | End: 2022-12-21 | Stop reason: SDUPTHER

## 2022-09-21 RX ORDER — LATANOPROST 50 UG/ML
1 SOLUTION/ DROPS OPHTHALMIC DAILY
Qty: 2.5 ML | Refills: 2 | Status: SHIPPED | OUTPATIENT
Start: 2022-09-21 | End: 2022-12-21 | Stop reason: SDUPTHER

## 2022-09-21 NOTE — PROGRESS NOTES
HPI     Glaucoma            Comments: 3M IOP          Comments    Patient feels that va OD is getting worse - hasn't been clear since   surgery in Jan 2022 - Using Latan/ Brim/ Dorz but has not been using Juan -   denies pain/ discomfort OU - occas dryness OS      1. Coag severe stage   2. NS OS *Will do dropless/ plan for OMNI 360  PCIOL OD 1/25/22 W/ Gonio- complex  Yag OD      Latanoprost qhs OU   Timolol BID OU  Brimonidine BID OU  Dorzolamide BID OU            Last edited by Dee Wynn MA on 9/21/2022 11:11 AM.            Assessment /Plan     For exam results, see Encounter Report.      ICD-10-CM ICD-9-CM    1. Primary open angle glaucoma (POAG) of both eyes, severe stage  H40.1133 365.11 brimonidine 0.2% (ALPHAGAN) 0.2 % Drop     365.73 dorzolamide-timolol 2-0.5% (COSOPT) 22.3-6.8 mg/mL ophthalmic solution      latanoprost 0.005 % ophthalmic solution      Ambulatory Referral to External Surgery    Uncontrolled glaucoma OD on maximum tolerated therapy: Significant risk of continued irreversible glaucomatous vision loss with current level of treatment. Therefore surgical options were reviewed at great length with the pt and Sx  recommended in hopes of reducing the IOP to a more tolerable level. A lengthy discussion of the risks, benefits and alternatives was presented to the pt including balancing the immediate risks of vision loss from surgery vs the likelihood of continued vision loss from the inadequately controlled glaucoma. Also discussed the need for frequent, careful follow-up exams for monitoring and other possible postop adjustments.     BOOK TRAB OD FOR 9/27   BLOCK   Pencil in now and request urgent auth   Order placed today only for auth       Pt will resume meds and bautista IOP on Friday   Will give surgery instructions after her Friday visit   Pt desires to be followed with [facility] at this time         2. RPE (retinal pigment epithelium) detachment, right  H35.721 362.42 New today on OCT, reviewed  scan along with Dr Lyn who does not consider it a contraindication to glaucoma surgery       3. Keratoconjunctivitis sicca  M35.01 710.2 And dry eye present   Significant   Follow at this time and resume coag meds since pt is confused on meds       4. Non-compliance with treatment  Z91.19 V15.81 Encouraged prior compliance and medication dosing       5. Cataract extraction status of eye, right  Z98.41 V45.61 Follow       6. Nuclear sclerosis of left eye  H25.12 366.16 Follow OS at this time           RETURN TO CLINIC 2 days IOP by [facility] and [facility] -   Cosopt BID OU   Latanoprost QHS OU   Brimonidine BID OU     Pt already stopped Timolol in error - will stop Dorzolamide today- use combo drop to help with compliance   Pt denies any SOB or recent asthma issues- pt states she does not have any breathing issues

## 2022-09-23 ENCOUNTER — OFFICE VISIT (OUTPATIENT)
Dept: OPHTHALMOLOGY | Facility: CLINIC | Age: 77
End: 2022-09-23
Payer: MEDICARE

## 2022-09-23 ENCOUNTER — TELEPHONE (OUTPATIENT)
Dept: INTERNAL MEDICINE | Facility: CLINIC | Age: 77
End: 2022-09-23
Payer: MEDICARE

## 2022-09-23 ENCOUNTER — DOCUMENTATION ONLY (OUTPATIENT)
Dept: OPHTHALMOLOGY | Facility: CLINIC | Age: 77
End: 2022-09-23

## 2022-09-23 DIAGNOSIS — H40.1133 PRIMARY OPEN ANGLE GLAUCOMA (POAG) OF BOTH EYES, SEVERE STAGE: Primary | ICD-10-CM

## 2022-09-23 DIAGNOSIS — Z91.199 NON-COMPLIANCE WITH TREATMENT: ICD-10-CM

## 2022-09-23 PROCEDURE — 1160F RVW MEDS BY RX/DR IN RCRD: CPT | Mod: CPTII,S$GLB,, | Performed by: OPHTHALMOLOGY

## 2022-09-23 PROCEDURE — 99999 PR PBB SHADOW E&M-EST. PATIENT-LVL III: CPT | Mod: PBBFAC,,, | Performed by: OPHTHALMOLOGY

## 2022-09-23 PROCEDURE — 99213 PR OFFICE/OUTPT VISIT, EST, LEVL III, 20-29 MIN: ICD-10-PCS | Mod: S$GLB,,, | Performed by: OPHTHALMOLOGY

## 2022-09-23 PROCEDURE — 1160F PR REVIEW ALL MEDS BY PRESCRIBER/CLIN PHARMACIST DOCUMENTED: ICD-10-PCS | Mod: CPTII,S$GLB,, | Performed by: OPHTHALMOLOGY

## 2022-09-23 PROCEDURE — 99999 PR PBB SHADOW E&M-EST. PATIENT-LVL III: ICD-10-PCS | Mod: PBBFAC,,, | Performed by: OPHTHALMOLOGY

## 2022-09-23 PROCEDURE — 99213 OFFICE O/P EST LOW 20 MIN: CPT | Mod: S$GLB,,, | Performed by: OPHTHALMOLOGY

## 2022-09-23 PROCEDURE — 1159F MED LIST DOCD IN RCRD: CPT | Mod: CPTII,S$GLB,, | Performed by: OPHTHALMOLOGY

## 2022-09-23 PROCEDURE — 1159F PR MEDICATION LIST DOCUMENTED IN MEDICAL RECORD: ICD-10-PCS | Mod: CPTII,S$GLB,, | Performed by: OPHTHALMOLOGY

## 2022-09-23 RX ORDER — PREDNISOLONE ACETATE 10 MG/ML
1 SUSPENSION/ DROPS OPHTHALMIC 4 TIMES DAILY
Qty: 5 ML | Refills: 1 | Status: ON HOLD | OUTPATIENT
Start: 2022-09-23 | End: 2023-11-03 | Stop reason: HOSPADM

## 2022-09-23 RX ORDER — KETOROLAC TROMETHAMINE 5 MG/ML
1 SOLUTION OPHTHALMIC 4 TIMES DAILY
Qty: 5 ML | Refills: 0 | Status: SHIPPED | OUTPATIENT
Start: 2022-09-23 | End: 2023-06-23 | Stop reason: SDUPTHER

## 2022-09-23 RX ORDER — POLYMYXIN B SULFATE AND TRIMETHOPRIM 1; 10000 MG/ML; [USP'U]/ML
1 SOLUTION OPHTHALMIC 4 TIMES DAILY
Qty: 10 ML | Refills: 0 | Status: SHIPPED | OUTPATIENT
Start: 2022-09-23 | End: 2022-10-23

## 2022-09-23 NOTE — PROGRESS NOTES
HPI     Glaucoma            Comments: 2 days IOP          Comments    Patient using Brim OU BID/ Latan OU QHS/ Dorz OU BID - hasn't been able to   get cosopt due to pharmacy having to order drop - no pain/ discomfort OU -   no va changes       1. Coag severe stage   2. NS OS *Will do dropless/ plan for OMNI 360  PCIOL OD 1/25/22 W/ Gonio- complex  Yag OD 5-3-22 by [facility] since Dr Pitts was out that visit       Latanoprost qhs OU   Timolol BID OU - pt not using timolol   Brimonidine BID OU  Dorzolamide BID OU            Last edited by Dee Wynn MA on 9/23/2022 11:30 AM.            Assessment /Plan     For exam results, see Encounter Report.      ICD-10-CM ICD-9-CM    1. Primary open angle glaucoma (POAG) of both eyes, severe stage  H40.1133 365.11 IOP improvement today, though due to low CGL and HVF loss will proceed with surgery      IOP not within acceptable range relative to target IOP with risk of irreversible visual loss. Additional treatment required.  Discussed options, risks, and benefits of additional medication, SLT laser, or incisional glaucoma surgery.     Recommend Xen or Trab OD  Block  Pred/Keto/Poly QID OD    Patient chooses the above     Reviewed importance of continued compliance with treatment and follow up    Uncontrolled glaucoma OD on maximum tolerated therapy: Significant risk of continued irreversible glaucomatous vision loss with current level of treatment. Therefore surgical options were reviewed at great length with the pt and xen/trab OD recommended in hopes of reducing the IOP to a more tolerable level. A lengthy discussion of the risks, benefits and alternatives was presented to the pt including balancing the immediate risks of vision loss from surgery vs the likelihood of continued vision loss from the inadequately controlled glaucoma. Also discussed the need for frequent, careful follow-up exams for monitoring and other possible postop adjustments.      365.73       2.  Non-compliance with treatment  Z91.19 V15.81 Discussed importance of medication compliance

## 2022-09-23 NOTE — PROGRESS NOTES
Short Stay Record    CC: Uncontrolled glaucoma right eye    HPI:  Tea Ring is a 76 y.o. female who presents for evaluation prior to ophthalmic surgery, right eye. Patient presents with uncontrolled glaucoma with intraocular pressure above target with significant risk of irreversible vision loss which requires surgical intervention.      Past Surgical History:   Procedure Laterality Date    ANKLE SURGERY Left     BACK SURGERY      CATARACT EXTRACTION Right     COLONOSCOPY N/A 2017    Procedure: COLONOSCOPY;  Surgeon: Virgil Oliva MD;  Location: [facility] ENDO;  Service: Endoscopy;  Laterality: N/A;    HYSTERECTOMY      INJECTION OF ANESTHETIC AGENT AROUND MEDIAL BRANCH NERVES INNERVATING LUMBAR FACET JOINT Bilateral 2020    Procedure: Bilateral L3-5 MBB;  Surgeon: Yury Moore MD;  Location: [facility] PAIN MGT;  Service: Pain Management;  Laterality: Bilateral;    INJECTION OF ANESTHETIC AGENT AROUND NERVE Bilateral 2020    Procedure: Bilateral Genicular nerve block with RN IV sedation Covid testing day of procedure PT does not drive;  Surgeon: Yury Moore MD;  Location: [facility] PAIN MGT;  Service: Pain Management;  Laterality: Bilateral;    KNEE ARTHROSCOPY      both knees twice    OOPHORECTOMY      RADIOFREQUENCY THERMOCOAGULATION Right 2020    Procedure: Right L3-5 Lumbar RFA;  Surgeon: Yury Moore MD;  Location: [facility] PAIN MGT;  Service: Pain Management;  Laterality: Right;    RADIOFREQUENCY THERMOCOAGULATION Left 2020    Procedure: Left L3-5 Lumbar RFA;  Surgeon: Yury Moore MD;  Location: [facility]IN [facility]GT;  Service: Pain Management;  Laterality: Left;     Social History     Tobacco Use    Smoking status: Former     Packs/day: 1.00     Years: 30.00     Pack years: 30.00     Types: Cigarettes     Quit date: 2014     Years since quittin.0    Smokeless tobacco: Never    Tobacco comments:     smoke last cigarette 9/15   Substance Use Topics    Alcohol use: Yes      Alcohol/week: 1.0 standard drink     Types: 1 Glasses of wine per week     Comment: 3 times a week     Family History   Problem Relation Age of Onset    Diabetes Mother     Diabetes Father     Breast cancer Maternal Aunt      Review of patient's allergies indicates:   Allergen Reactions    Morphine Shortness Of Breath    Duloxetine      Feels bad         Current Outpatient Medications:     ALPRAZolam (XANAX) 0.25 MG tablet, TAKE ONE TABLET BY MOUTH TWICE A DAY AS NEEDED FOR ANXIETY, Disp: 30 tablet, Rfl: 1    amitriptyline (ELAVIL) 50 MG tablet, TAKE ONE TABLET BY MOUTH ONCE DAILY, Disp: 90 tablet, Rfl: 3    atorvastatin (LIPITOR) 10 MG tablet, TAKE ONE TABLET BY MOUTH ONCE DAILY, Disp: 90 tablet, Rfl: 3    baclofen (LIORESAL) 10 MG tablet, Take 10 mg by mouth 2 (two) times daily as needed., Disp: , Rfl:     brimonidine 0.2% (ALPHAGAN) 0.2 % Drop, Place 1 drop into both eyes 2 (two) times a day., Disp: 10 mL, Rfl: 4    citalopram (CELEXA) 20 MG tablet, Take 1 tablet (20 mg total) by mouth once daily., Disp: 30 tablet, Rfl: 11    dorzolamide-timolol 2-0.5% (COSOPT) 22.3-6.8 mg/mL ophthalmic solution, Place 1 drop into both eyes 2 (two) times daily., Disp: 10 mL, Rfl: 1    ketorolac 0.5% (ACULAR) 0.5 % Drop, Place 1 drop into the right eye 4 (four) times daily., Disp: 5 mL, Rfl: 0    latanoprost 0.005 % ophthalmic solution, Place 1 drop into both eyes once daily., Disp: 2.5 mL, Rfl: 2    magnesium oxide (MAG-OX) 400 mg (241.3 mg magnesium) tablet, Take 400 mg by mouth., Disp: , Rfl:     oxyCODONE-acetaminophen (PERCOCET)  mg per tablet, Take 1 tablet by mouth every 8 (eight) hours as needed for Pain., Disp: 12 tablet, Rfl: 0    pantoprazole (PROTONIX) 40 MG tablet, TAKE ONE TABLET BY MOUTH ONCE DAILY, Disp: 90 tablet, Rfl: 3    polymyxin B sulf-trimethoprim (POLYTRIM) 10,000 unit- 1 mg/mL Drop, Place 1 drop into the right eye 4 (four) times daily., Disp: 10 mL, Rfl: 0    prednisoLONE acetate (PRED FORTE) 1 %  DrpS, INSTILL 1 DROP IN RIGHT EYE 4 TIMES A DAY (Patient not taking: Reported on 9/12/2022), Disp: 5 mL, Rfl: 1    prednisoLONE acetate (PRED FORTE) 1 % DrpS, Place 1 drop into the right eye 4 (four) times daily., Disp: 5 mL, Rfl: 1    valsartan-hydrochlorothiazide (DIOVAN-HCT) 160-12.5 mg per tablet, TAKE ONE TABLET BY MOUTH ONCE DAILY, Disp: 90 tablet, Rfl: 3    Review of Systems:  A comprehensive review of systems was negative.    Physical Exam:  General Appearance:    A&Ox3, no distress, appears stated age   Head:    Normocephalic, without obvious abnormality, atraumatic   Eyes:    PERRL, EOM's intact   Back:     Symmetric, no curvature   Lungs:     Respirations unlabored   Chest Wall:    No tenderness or deformity    Heart:  Abdomen:  Extremities:  Skin:    S1 and S2 present    Soft, non-tender    Extremities normal, atraumatic    Skin color, texture, turgor normal      Right Left    External Normal Normal   LAST IOP 19 ou    Right Left   Lids/Lashes Normal Normal   Conjunctiva/Sclera Nasal 2+ Lissamine green stain Nasal 2+ Lissamine green stain   Cornea PEEs, Decreased TBUT PEEs, Decreased TBUT   Anterior Chamber Deep and quiet Deep and quiet   Iris Round and reactive Round and reactive   Lens Posterior chamber intraocular lens, Open posterior capsule 2+ Nuclear sclerosis   Vitreous Normal Normal      Right Left   Disc rim throughout, Some temporal pallor rim throughout, some temporal pallor   C/D Ratio 0.65 0.55   Macula Retinal pigment epithelial detachment Normal   Vessels Normal Normal   Periphery Normal Normal     REFRACTION    Sphere Cylinder Axis    Right Emerson Sphere  20/50   Left -0.25 +1.25 010 20/40                   Impression: Primary Open Angle Glaucoma severe  stage : Right eye      Planned Procedure:        Book Trab vs Xen OD               BLOCK               PRED, POLY. KETO                   Patient cleared for ophthalmic surgery.     Discharge Summary:    Admitting Diagnosis: Primary open  angle glaucoma SEVERE  stage OD    Discharge Diagnosis: same    Procedure: s/p glaucoma surgery as per dictation    Complications: None  Discharge Condition: Stable

## 2022-09-23 NOTE — PROGRESS NOTES
This LCSW attempted to reach patient/caregiver to provide resource and left msg requesting a return call.  Letter with contact information was sent via US mail to patient/caregiver. Referral source notified.

## 2022-10-04 ENCOUNTER — OUTPATIENT CASE MANAGEMENT (OUTPATIENT)
Dept: ADMINISTRATIVE | Facility: OTHER | Age: 77
End: 2022-10-04
Payer: MEDICARE

## 2022-10-04 NOTE — PROGRESS NOTES
Outpatient Care Management   - Low Risk Patient Assessment    Patient: Tea Ring  MRN:  [id number]  Date of Service:  10/4/2022  Completed by:  Evelia Layton LCSW  Referral Date: 09/12/2022    Reason for Visit   Patient presents with    Social Work Assessment - Low/Mod Risk    Plan Of Care     Brief Summary: Sw received initial referral for patient from patient's PCP office on 9/12/22  and case was closed due to unsuccessful attempts to reach Pt. Sw received return phone call from Pt. Sw completed low risk assessment with Pt via telephone. Pt reports living alone and reports needing assistance with ADLs/IADLs. Pt uses cane and walker to get around. Pt reports having some financial difficulty. She reports trouble affording food, medications, and medical care. Sw will complete search of community for food resources and utility assistance. Sw will assist with getting connected to [facility] financial assistance program. Pt reports her daughter provides transportation for her to medical appointments. She reports daughter checks on her regularly follow recent fall. Pt reports she has emergency alert button at home. Pt reports she is also interested in getting in-home assistance ADLs and household chores. Pt reports monthly income is $1500. Pt advised that she will need to apply for CCW to be evaluated for their home based services as she is over income for (full Medicaid benefits) LT-PCS. Pt voiced understanding and in agreement with Akshat completing follow up with next week to contact the [facility] office with Pt.     Patient Summary     OPCM Social Work Assessment (Low/Moderate Risk)    General  Level of Caregiver support: Member independent and does not need caregiver assistance  Have you had to make a decision between paying for any of the following in the last 2 months?: Food, Medication, Medical Care  Transportation means: Family  Employment status: Disabled, Retired and not working  Current symptoms: Memory  problems  Assessments  Was the PHQ Depression Screening completed this visit?: No  Was the VIET-7 Screening completed this visit?: No         Complex Care Plan     Care plan was discussed and completed today with input from patient and/or caregiver.    Patient Instructions     No follow-ups on file.    Todays OPCM Self-Management Care Plan was developed with the patients/caregivers input and was based on identified barriers from todays assessment.  Goals were written today with the patient/caregiver and the patient has agreed to work towards these goals to improve his/her overall well-being. Patient verbalized understanding of the care plan, goals, and all of today's instructions. Encouraged patient/caregiver to communicate with his/her physician and health care team about health conditions and the treatment plan.  Provided my contact information today and encouraged patient/caregiver to call me with any questions as needed.

## 2022-10-12 ENCOUNTER — OUTPATIENT CASE MANAGEMENT (OUTPATIENT)
Dept: ADMINISTRATIVE | Facility: OTHER | Age: 77
End: 2022-10-12
Payer: MEDICARE

## 2022-10-12 NOTE — PROGRESS NOTES
1st Attempt to complete SW follow-up for Outpatient Care Management; left message requesting return call.  LCSW will reattempt at a later date.

## 2022-10-12 NOTE — LETTER
October 21, 2022    Tea Ring  [address] [place] [address]             [facility] [address] [place] [address] Dear Ms. Ring:    I am writing from the Outpatient Complex Care Management Department at [facility].  I received a referral from your provider to contact you or your caregiver regarding any needs you may have. I have attempted to contact you or your cargeiver by phone two times unsuccessfully. Please contact the Outpatient Complex Care Management Department at [phone] if you would like to discuss your needs.      Sincerely,         Evelia Layton, LCSW

## 2022-10-13 ENCOUNTER — OUTSIDE PLACE OF SERVICE (OUTPATIENT)
Dept: OPHTHALMOLOGY | Facility: CLINIC | Age: 77
End: 2022-10-13
Payer: MEDICARE

## 2022-10-13 PROCEDURE — 66183 INSERT ANT DRAINAGE DEVICE: CPT | Mod: RT,,, | Performed by: OPHTHALMOLOGY

## 2022-10-13 PROCEDURE — 66183 PR INSERT ANT SEGMENT DRAIN WO RESERVOIR, EXTERNAL APPROACH: ICD-10-PCS | Mod: RT,,, | Performed by: OPHTHALMOLOGY

## 2022-10-14 ENCOUNTER — OFFICE VISIT (OUTPATIENT)
Dept: OPHTHALMOLOGY | Facility: CLINIC | Age: 77
End: 2022-10-14
Payer: MEDICARE

## 2022-10-14 DIAGNOSIS — H25.12 NUCLEAR SCLEROSIS OF LEFT EYE: ICD-10-CM

## 2022-10-14 DIAGNOSIS — H40.1133 PRIMARY OPEN ANGLE GLAUCOMA (POAG) OF BOTH EYES, SEVERE STAGE: ICD-10-CM

## 2022-10-14 DIAGNOSIS — Z98.890 POST-OPERATIVE STATE: Primary | ICD-10-CM

## 2022-10-14 DIAGNOSIS — Z98.41 CATARACT EXTRACTION STATUS OF EYE, RIGHT: ICD-10-CM

## 2022-10-14 PROCEDURE — 1159F PR MEDICATION LIST DOCUMENTED IN MEDICAL RECORD: ICD-10-PCS | Mod: CPTII,S$GLB,, | Performed by: OPHTHALMOLOGY

## 2022-10-14 PROCEDURE — 1160F PR REVIEW ALL MEDS BY PRESCRIBER/CLIN PHARMACIST DOCUMENTED: ICD-10-PCS | Mod: CPTII,S$GLB,, | Performed by: OPHTHALMOLOGY

## 2022-10-14 PROCEDURE — 1160F RVW MEDS BY RX/DR IN RCRD: CPT | Mod: CPTII,S$GLB,, | Performed by: OPHTHALMOLOGY

## 2022-10-14 PROCEDURE — 99999 PR PBB SHADOW E&M-EST. PATIENT-LVL I: ICD-10-PCS | Mod: PBBFAC,,, | Performed by: OPHTHALMOLOGY

## 2022-10-14 PROCEDURE — 1159F MED LIST DOCD IN RCRD: CPT | Mod: CPTII,S$GLB,, | Performed by: OPHTHALMOLOGY

## 2022-10-14 PROCEDURE — 99024 PR POST-OP FOLLOW-UP VISIT: ICD-10-PCS | Mod: S$GLB,,, | Performed by: OPHTHALMOLOGY

## 2022-10-14 PROCEDURE — 99024 POSTOP FOLLOW-UP VISIT: CPT | Mod: S$GLB,,, | Performed by: OPHTHALMOLOGY

## 2022-10-14 PROCEDURE — 99999 PR PBB SHADOW E&M-EST. PATIENT-LVL I: CPT | Mod: PBBFAC,,, | Performed by: OPHTHALMOLOGY

## 2022-10-14 NOTE — PROGRESS NOTES
HPI    1. Coag severe stage   2. NS OS *Will do dropless/ plan for OMNI 360  PCIOL OD 1/25/22 W/ Gonio- complex  Yag OD 5-3-22 by M since Dr Pitts was out that visit     XEN OD 10/14/2022     Latanoprost qhs OU   Timolol BID OU - pt not using timolol   Brimonidine BID OU  Dorzolamide BID OU    Pred/Keto/Poly QID OD  Last edited by Shorty Burnett MD on 10/14/2022 12:13 PM.            Assessment /Plan     For exam results, see Encounter Report.      ICD-10-CM ICD-9-CM    1. Post-operative state  Z98.890 V45.89       2. Primary open angle glaucoma (POAG) of both eyes, severe stage  H40.1133 365.11      365.73       3. Cataract extraction status of eye, right  Z98.41 V45.61       4. Nuclear sclerosis of left eye  H25.12 366.16           PO Day 1 S/P XEN  right eye,   bandage scls in place   Doing well.    Use Prednisolone Acetate QID AND Polytrim QID OD     Will stop Keto today as pt states it is burning her eyes-      Latanoprost qhs OS  Brimonidine BID OS  Dorzolamide BID OS       No coag meds OD at this time, pt was given written instructions on dosing     Reinstructed in importance of absolute compliance with Post-OP instructions including medications, shield at bedtime, and limitation of activities. Follow up appointments in approximately one and six weeks or call immediately for increased pain, redness or vision loss.       RETURN TO CLINIC 1 week

## 2022-10-18 ENCOUNTER — OFFICE VISIT (OUTPATIENT)
Dept: OPHTHALMOLOGY | Facility: CLINIC | Age: 77
End: 2022-10-18
Payer: MEDICARE

## 2022-10-18 DIAGNOSIS — H40.1133 PRIMARY OPEN ANGLE GLAUCOMA (POAG) OF BOTH EYES, SEVERE STAGE: ICD-10-CM

## 2022-10-18 DIAGNOSIS — Z98.890 POST-OPERATIVE STATE: Primary | ICD-10-CM

## 2022-10-18 PROCEDURE — 99024 POSTOP FOLLOW-UP VISIT: CPT | Mod: S$GLB,,, | Performed by: OPHTHALMOLOGY

## 2022-10-18 PROCEDURE — 1159F PR MEDICATION LIST DOCUMENTED IN MEDICAL RECORD: ICD-10-PCS | Mod: CPTII,S$GLB,, | Performed by: OPHTHALMOLOGY

## 2022-10-18 PROCEDURE — 1160F PR REVIEW ALL MEDS BY PRESCRIBER/CLIN PHARMACIST DOCUMENTED: ICD-10-PCS | Mod: CPTII,S$GLB,, | Performed by: OPHTHALMOLOGY

## 2022-10-18 PROCEDURE — 1160F RVW MEDS BY RX/DR IN RCRD: CPT | Mod: CPTII,S$GLB,, | Performed by: OPHTHALMOLOGY

## 2022-10-18 PROCEDURE — 99999 PR PBB SHADOW E&M-EST. PATIENT-LVL III: CPT | Mod: PBBFAC,,, | Performed by: OPHTHALMOLOGY

## 2022-10-18 PROCEDURE — 1159F MED LIST DOCD IN RCRD: CPT | Mod: CPTII,S$GLB,, | Performed by: OPHTHALMOLOGY

## 2022-10-18 PROCEDURE — 99999 PR PBB SHADOW E&M-EST. PATIENT-LVL III: ICD-10-PCS | Mod: PBBFAC,,, | Performed by: OPHTHALMOLOGY

## 2022-10-18 PROCEDURE — 99024 PR POST-OP FOLLOW-UP VISIT: ICD-10-PCS | Mod: S$GLB,,, | Performed by: OPHTHALMOLOGY

## 2022-10-18 NOTE — PROGRESS NOTES
HPI     Post-op Evaluation            Comments: XEN OD 10/13/2022     Patient states OD has a constant aching pain thought it that has been   there since the surgery.  Also having a sharp paing on the top right side   of her neck that goes all the down her back and has been there since the   surgery.          Comments    1. Coag severe stage   XEN OD 10/13/2022   2. NS OS *Will do dropless/ plan for OMNI 360  PCIOL OD 1/25/22 W/ Gonio- complex  Yag OD 5-3-22 by [facility] since Dr Pitts was out that visit          Latanoprost qhs OS  Brimonidine BID OS  Dorzolamide BID OS   Pred/Poly QID OD          Last edited by Citlalli Jernigan, Patient Care Assistant on 10/18/2022 11:50   AM.            Assessment /Plan     For exam results, see Encounter Report.      ICD-10-CM ICD-9-CM    1. Post-operative state  Z98.890 V45.89       2. Primary open angle glaucoma (POAG) of both eyes, severe stage  H40.1133 365.11      365.73           S/p xen gel OD  Doing well  Scls in place      Return to clinic Tuesday      Latanoprost qhs OS  Brimonidine BID OS  Dorzolamide BID OS   Pred/Poly QID OD

## 2022-10-21 NOTE — PROGRESS NOTES
Attempt: #2  This LCSW attempted to reach patient/caregiver to provide resource and left msg requesting a return call.  Letter with contact information was sent via US mail to patient/caregiver.

## 2022-10-25 ENCOUNTER — OFFICE VISIT (OUTPATIENT)
Dept: OPHTHALMOLOGY | Facility: CLINIC | Age: 77
End: 2022-10-25
Payer: MEDICARE

## 2022-10-25 DIAGNOSIS — H40.1133 PRIMARY OPEN ANGLE GLAUCOMA (POAG) OF BOTH EYES, SEVERE STAGE: ICD-10-CM

## 2022-10-25 DIAGNOSIS — Z98.890 POST-OPERATIVE STATE: Primary | ICD-10-CM

## 2022-10-25 PROCEDURE — 99024 PR POST-OP FOLLOW-UP VISIT: ICD-10-PCS | Mod: S$GLB,,, | Performed by: OPHTHALMOLOGY

## 2022-10-25 PROCEDURE — 1125F PR PAIN SEVERITY QUANTIFIED, PAIN PRESENT: ICD-10-PCS | Mod: CPTII,S$GLB,, | Performed by: OPHTHALMOLOGY

## 2022-10-25 PROCEDURE — 1125F AMNT PAIN NOTED PAIN PRSNT: CPT | Mod: CPTII,S$GLB,, | Performed by: OPHTHALMOLOGY

## 2022-10-25 PROCEDURE — 99999 PR PBB SHADOW E&M-EST. PATIENT-LVL III: CPT | Mod: PBBFAC,,, | Performed by: OPHTHALMOLOGY

## 2022-10-25 PROCEDURE — 99024 POSTOP FOLLOW-UP VISIT: CPT | Mod: S$GLB,,, | Performed by: OPHTHALMOLOGY

## 2022-10-25 PROCEDURE — 1159F PR MEDICATION LIST DOCUMENTED IN MEDICAL RECORD: ICD-10-PCS | Mod: CPTII,S$GLB,, | Performed by: OPHTHALMOLOGY

## 2022-10-25 PROCEDURE — 99999 PR PBB SHADOW E&M-EST. PATIENT-LVL III: ICD-10-PCS | Mod: PBBFAC,,, | Performed by: OPHTHALMOLOGY

## 2022-10-25 PROCEDURE — 1160F PR REVIEW ALL MEDS BY PRESCRIBER/CLIN PHARMACIST DOCUMENTED: ICD-10-PCS | Mod: CPTII,S$GLB,, | Performed by: OPHTHALMOLOGY

## 2022-10-25 PROCEDURE — 1160F RVW MEDS BY RX/DR IN RCRD: CPT | Mod: CPTII,S$GLB,, | Performed by: OPHTHALMOLOGY

## 2022-10-25 PROCEDURE — 1159F MED LIST DOCD IN RCRD: CPT | Mod: CPTII,S$GLB,, | Performed by: OPHTHALMOLOGY

## 2022-10-25 NOTE — PROGRESS NOTES
HPI     Post-op Evaluation            Comments: Patient here for 1W post-op OD           Comments    Patient states pain OD has been an achy feeling on and off since surgery.   Patient rates pain this morning at a 7. Patient states VA stable. Patient   states VA is better at night time. No other ocular complaints at this   time.     Patient has been compliant with gtts.    Latanoprost qhs OS  Brimonidine BID OS  Dorzolamide BID OS   Pred/Poly QID OD            Last edited by Juli Ashby MA on 10/25/2022 11:15 AM.            Assessment /Plan     For exam results, see Encounter Report.      ICD-10-CM ICD-9-CM    1. Post-operative state  Z98.890 V45.89       2. Primary open angle glaucoma (POAG) of both eyes, severe stage  H40.1133 365.11      365.73           S/P xen gel OD 10/13/22  Removed BCLS in office today (no leak post removal)  Recommend Systane Hydration for irritation      Return to clinic 1 week      Latanoprost qhs OS  Brimonidine BID OS  Dorzolamide BID OS   Pred/Poly QID OD

## 2022-11-04 ENCOUNTER — OFFICE VISIT (OUTPATIENT)
Dept: OPHTHALMOLOGY | Facility: CLINIC | Age: 77
End: 2022-11-04
Payer: MEDICARE

## 2022-11-04 DIAGNOSIS — Z98.890 POST-OPERATIVE STATE: Primary | ICD-10-CM

## 2022-11-04 DIAGNOSIS — H40.1133 PRIMARY OPEN ANGLE GLAUCOMA (POAG) OF BOTH EYES, SEVERE STAGE: ICD-10-CM

## 2022-11-04 PROCEDURE — 99999 PR PBB SHADOW E&M-EST. PATIENT-LVL III: CPT | Mod: PBBFAC,,, | Performed by: OPHTHALMOLOGY

## 2022-11-04 PROCEDURE — 1159F PR MEDICATION LIST DOCUMENTED IN MEDICAL RECORD: ICD-10-PCS | Mod: CPTII,S$GLB,, | Performed by: OPHTHALMOLOGY

## 2022-11-04 PROCEDURE — 99024 PR POST-OP FOLLOW-UP VISIT: ICD-10-PCS | Mod: S$GLB,,, | Performed by: OPHTHALMOLOGY

## 2022-11-04 PROCEDURE — 1160F RVW MEDS BY RX/DR IN RCRD: CPT | Mod: CPTII,S$GLB,, | Performed by: OPHTHALMOLOGY

## 2022-11-04 PROCEDURE — 1160F PR REVIEW ALL MEDS BY PRESCRIBER/CLIN PHARMACIST DOCUMENTED: ICD-10-PCS | Mod: CPTII,S$GLB,, | Performed by: OPHTHALMOLOGY

## 2022-11-04 PROCEDURE — 1159F MED LIST DOCD IN RCRD: CPT | Mod: CPTII,S$GLB,, | Performed by: OPHTHALMOLOGY

## 2022-11-04 PROCEDURE — 99024 POSTOP FOLLOW-UP VISIT: CPT | Mod: S$GLB,,, | Performed by: OPHTHALMOLOGY

## 2022-11-04 PROCEDURE — 99999 PR PBB SHADOW E&M-EST. PATIENT-LVL III: ICD-10-PCS | Mod: PBBFAC,,, | Performed by: OPHTHALMOLOGY

## 2022-11-04 NOTE — PROGRESS NOTES
"HPI     Post-op Evaluation            Comments: S/p Xen OD 10/13/22          Comments    Patient states that she is using " green top once a day OS/ Blue top 2 xs   a day OS/ Purple top 2xs a day OS and " two white tops QID OD" - va seems   the same, no worse and no better - occas sharp pain OS      1. Coag severe stage   XEN OD 10/13/2022   2. NS OS *Will do dropless/ plan for OMNI 360  PCIOL OD 1/25/22 W/ Gonio- complex  Yag OD 5-3-22 by [facility] since Dr Pitts was out that visit          Latanoprost qhs OS  Brimonidine BID OS  Dorzolamide BID OS   Pred/Poly QID OD            Last edited by Dee Wynn MA on 11/4/2022  9:55 AM.           Procedure Note:  Perop Diagnosis: Glaucoma with bleb formation with risk of failure - right eye  Post op Diagnosis: same  Procedure: Injection of 0.05ml's of 0.4mg/ml Mitomycin C to the right eye  Complications: none  Surgeon Shorty Burnett  Anesthesia: topical  Procedure in Detail:  After obtaining informed consent, the patient underwent a sterile prep and drape of the right eye. The patient gazed down and the superior conjunctival surface was examined. A small bleb of lidocaine was injected with epi. This was followed by 0.05ml's of MMC subconjunctivally. Care was taken to move the bleb of MMC posteriorly to reduce the chance of the medication from entering the ac. The patient tolerated the procedure well and was discharged to home in satisfactory condition.     [facility]       Assessment /Plan     For exam results, see Encounter Report.      ICD-10-CM ICD-9-CM    1. Post-operative state  Z98.890 V45.89       2. Primary open angle glaucoma (POAG) of both eyes, severe stage  H40.1133 365.11      365.73           Doing well  Will start massage for 30 seconds three times a day  Latanoprost qhs OS  Brimonidine BID OS  Dorzolamide BID OS   Pred/Poly QID OD  Return to clinic 7-10 days                "

## 2022-11-11 ENCOUNTER — OFFICE VISIT (OUTPATIENT)
Dept: OPHTHALMOLOGY | Facility: CLINIC | Age: 77
End: 2022-11-11
Payer: MEDICARE

## 2022-11-11 DIAGNOSIS — H40.1133 PRIMARY OPEN ANGLE GLAUCOMA (POAG) OF BOTH EYES, SEVERE STAGE: ICD-10-CM

## 2022-11-11 DIAGNOSIS — Z98.890 POST-OPERATIVE STATE: Primary | ICD-10-CM

## 2022-11-11 PROCEDURE — 1160F RVW MEDS BY RX/DR IN RCRD: CPT | Mod: CPTII,S$GLB,, | Performed by: OPHTHALMOLOGY

## 2022-11-11 PROCEDURE — 99024 POSTOP FOLLOW-UP VISIT: CPT | Mod: S$GLB,,, | Performed by: OPHTHALMOLOGY

## 2022-11-11 PROCEDURE — 99999 PR PBB SHADOW E&M-EST. PATIENT-LVL III: ICD-10-PCS | Mod: PBBFAC,,, | Performed by: OPHTHALMOLOGY

## 2022-11-11 PROCEDURE — 99999 PR PBB SHADOW E&M-EST. PATIENT-LVL III: CPT | Mod: PBBFAC,,, | Performed by: OPHTHALMOLOGY

## 2022-11-11 PROCEDURE — 1159F MED LIST DOCD IN RCRD: CPT | Mod: CPTII,S$GLB,, | Performed by: OPHTHALMOLOGY

## 2022-11-11 PROCEDURE — 1160F PR REVIEW ALL MEDS BY PRESCRIBER/CLIN PHARMACIST DOCUMENTED: ICD-10-PCS | Mod: CPTII,S$GLB,, | Performed by: OPHTHALMOLOGY

## 2022-11-11 PROCEDURE — 1159F PR MEDICATION LIST DOCUMENTED IN MEDICAL RECORD: ICD-10-PCS | Mod: CPTII,S$GLB,, | Performed by: OPHTHALMOLOGY

## 2022-11-11 PROCEDURE — 99024 PR POST-OP FOLLOW-UP VISIT: ICD-10-PCS | Mod: S$GLB,,, | Performed by: OPHTHALMOLOGY

## 2022-11-11 RX ORDER — ATROPINE SULFATE 10 MG/ML
1 SOLUTION/ DROPS OPHTHALMIC 2 TIMES DAILY
Qty: 5 ML | Refills: 1 | Status: SHIPPED | OUTPATIENT
Start: 2022-11-11 | End: 2022-11-11

## 2022-11-11 RX ORDER — ATROPINE SULFATE 10 MG/ML
1 SOLUTION/ DROPS OPHTHALMIC 2 TIMES DAILY
Qty: 5 ML | Refills: 1 | Status: SHIPPED | OUTPATIENT
Start: 2022-11-11

## 2022-11-11 NOTE — PROGRESS NOTES
HPI    7-10 days glaucoma check  Her right eye is sore today  Pain in her right eye since Tuesday (10 on the pain scale)  Her right eye gets red   Light sensitivity right eye  Patient states that she does not feel comfortable to go out anymore.  She had a fall in September.  Blurry in her right eye     1. Coag severe stage   XEN OD 10/13/2022  MMC OD 11/4/2022 ( 0.5ml)    2. NS OS *Will do dropless/ plan for OMNI 360  PCIOL OD 1/25/22 W/ Gonio- complex  Yag OD 5-3-22 by [facility] since Dr Pitts was out that visit       Massage TID 30 sec OD  Latanoprost qhs OS  Brimonidine BID OS  Dorzolamide / Timolol BID OS   Pred/Poly QID OD  Last edited by Monika Srinivasan MA on 11/11/2022 12:13 PM.            Assessment /Plan     For exam results, see Encounter Report.      ICD-10-CM ICD-9-CM    1. Post-operative state  Z98.890 V45.89       2. Primary open angle glaucoma (POAG) of both eyes, severe stage  H40.1133 365.11      365.73           S/p xen gel OD   Doing well  D/c Poly today  Add Atropine BID OD   Increase Pred 6xs daily due to aching      Return to clinic next week with MOCT      Massage TID 30 sec OD  Latanoprost qhs OS  Brimonidine BID OS  Dorzolamide BID OS  Pred 6xs OD  Atropine BID OD

## 2022-11-21 PROBLEM — N39.0 UTI (URINARY TRACT INFECTION): Status: RESOLVED | Noted: 2022-08-18 | Resolved: 2022-11-21

## 2022-11-21 PROBLEM — N17.9 AKI (ACUTE KIDNEY INJURY): Status: RESOLVED | Noted: 2022-08-18 | Resolved: 2022-11-21

## 2022-11-29 ENCOUNTER — OFFICE VISIT (OUTPATIENT)
Dept: OPHTHALMOLOGY | Facility: CLINIC | Age: 77
End: 2022-11-29
Payer: MEDICARE

## 2022-11-29 DIAGNOSIS — H40.1133 PRIMARY OPEN ANGLE GLAUCOMA (POAG) OF BOTH EYES, SEVERE STAGE: ICD-10-CM

## 2022-11-29 DIAGNOSIS — Z98.890 POST-OPERATIVE STATE: Primary | ICD-10-CM

## 2022-11-29 PROCEDURE — 1160F RVW MEDS BY RX/DR IN RCRD: CPT | Mod: CPTII,S$GLB,, | Performed by: OPHTHALMOLOGY

## 2022-11-29 PROCEDURE — 99024 PR POST-OP FOLLOW-UP VISIT: ICD-10-PCS | Mod: S$GLB,,, | Performed by: OPHTHALMOLOGY

## 2022-11-29 PROCEDURE — 1160F PR REVIEW ALL MEDS BY PRESCRIBER/CLIN PHARMACIST DOCUMENTED: ICD-10-PCS | Mod: CPTII,S$GLB,, | Performed by: OPHTHALMOLOGY

## 2022-11-29 PROCEDURE — 99999 PR PBB SHADOW E&M-EST. PATIENT-LVL III: ICD-10-PCS | Mod: PBBFAC,,, | Performed by: OPHTHALMOLOGY

## 2022-11-29 PROCEDURE — 1159F PR MEDICATION LIST DOCUMENTED IN MEDICAL RECORD: ICD-10-PCS | Mod: CPTII,S$GLB,, | Performed by: OPHTHALMOLOGY

## 2022-11-29 PROCEDURE — 1159F MED LIST DOCD IN RCRD: CPT | Mod: CPTII,S$GLB,, | Performed by: OPHTHALMOLOGY

## 2022-11-29 PROCEDURE — 99024 POSTOP FOLLOW-UP VISIT: CPT | Mod: S$GLB,,, | Performed by: OPHTHALMOLOGY

## 2022-11-29 PROCEDURE — 99999 PR PBB SHADOW E&M-EST. PATIENT-LVL III: CPT | Mod: PBBFAC,,, | Performed by: OPHTHALMOLOGY

## 2022-11-29 RX ORDER — KETOROLAC TROMETHAMINE 5 MG/ML
1 SOLUTION OPHTHALMIC 4 TIMES DAILY
Qty: 5 ML | Refills: 3 | Status: SHIPPED | OUTPATIENT
Start: 2022-11-29 | End: 2023-02-01 | Stop reason: SDUPTHER

## 2022-11-29 NOTE — PROGRESS NOTES
Vc HPI     Glaucoma            Comments: Patient reports for 1 week IOP check. Lost to follow up, missed   last 2 appointment.Using gtts as advised. Denies pain or irritation at   this time. Reports of blurred vision OD since last visit.             Comments    1. Coag severe stage   XEN OD 10/13/2022  MMC OD 11/4/2022 ( 0.5ml)    2. NS OS *Will do dropless/ plan for OMNI 360  PCIOL OD 1/25/22 W/ Gonio- complex  Yag OD 5-3-22 by [facility] since Dr Pitts was out that visit       Massage TID 30 sec OD  Latanoprost qhs OS  Brimonidine BID OS  Dorzolamide BID OS  Pred 4xs OD (advised 6xs)  Atropine 4xs OD (advised 2xs)            Last edited by Shorty Burnett MD on 11/29/2022 10:09 AM.            Assessment /Plan     For exam results, see Encounter Report.      ICD-10-CM ICD-9-CM    1. Post-operative state  Z98.890 V45.89       2. Primary open angle glaucoma (POAG) of both eyes, severe stage  H40.1133 365.11      365.73       S/p xen OD - small bleb - pt missed all drops this am OU       New Me present  today on oct OD     RETURN TO CLINIC 3 weeks and repeat MOCT     Massage TID 30 sec OD    Latanoprost qhs OS  Brimonidine BID OS  Dorzolamide BID OS  Keto QID OD (resume today due to new ME)   Pred 4xs OD (advised 6xs)  Atropine 4xs OD

## 2022-12-07 ENCOUNTER — OUTPATIENT CASE MANAGEMENT (OUTPATIENT)
Dept: ADMINISTRATIVE | Facility: OTHER | Age: 77
End: 2022-12-07
Payer: MEDICARE

## 2022-12-07 NOTE — PROGRESS NOTES
Sw received incoming voice message from patient requesting Sw assistance. Sw attempted to contact patient to complete Sw low risk assessment and left voice message. Sw will attempt to reach patient at a later date.

## 2022-12-13 NOTE — PROGRESS NOTES
This LCSW attempted to reach patient/caregiver to provide resource and left msg requesting a return call.  Letter with contact information was sent via portal to patient/caregiver.  Referral source notified.

## 2022-12-21 ENCOUNTER — OFFICE VISIT (OUTPATIENT)
Dept: OPHTHALMOLOGY | Facility: CLINIC | Age: 77
End: 2022-12-21
Payer: MEDICARE

## 2022-12-21 DIAGNOSIS — H35.351 CME (CYSTOID MACULAR EDEMA), RIGHT: ICD-10-CM

## 2022-12-21 DIAGNOSIS — H40.1133 PRIMARY OPEN ANGLE GLAUCOMA (POAG) OF BOTH EYES, SEVERE STAGE: ICD-10-CM

## 2022-12-21 DIAGNOSIS — Z98.890 POST-OPERATIVE STATE: Primary | ICD-10-CM

## 2022-12-21 PROCEDURE — 1159F MED LIST DOCD IN RCRD: CPT | Mod: HCNC,CPTII,S$GLB, | Performed by: OPHTHALMOLOGY

## 2022-12-21 PROCEDURE — 92134 CPTRZ OPH DX IMG PST SGM RTA: CPT | Mod: HCNC,S$GLB,, | Performed by: OPHTHALMOLOGY

## 2022-12-21 PROCEDURE — 1159F PR MEDICATION LIST DOCUMENTED IN MEDICAL RECORD: ICD-10-PCS | Mod: HCNC,CPTII,S$GLB, | Performed by: OPHTHALMOLOGY

## 2022-12-21 PROCEDURE — 99024 PR POST-OP FOLLOW-UP VISIT: ICD-10-PCS | Mod: HCNC,S$GLB,, | Performed by: OPHTHALMOLOGY

## 2022-12-21 PROCEDURE — 1160F PR REVIEW ALL MEDS BY PRESCRIBER/CLIN PHARMACIST DOCUMENTED: ICD-10-PCS | Mod: HCNC,CPTII,S$GLB, | Performed by: OPHTHALMOLOGY

## 2022-12-21 PROCEDURE — 1160F RVW MEDS BY RX/DR IN RCRD: CPT | Mod: HCNC,CPTII,S$GLB, | Performed by: OPHTHALMOLOGY

## 2022-12-21 PROCEDURE — 99024 POSTOP FOLLOW-UP VISIT: CPT | Mod: HCNC,S$GLB,, | Performed by: OPHTHALMOLOGY

## 2022-12-21 PROCEDURE — 99999 PR PBB SHADOW E&M-EST. PATIENT-LVL III: CPT | Mod: PBBFAC,HCNC,, | Performed by: OPHTHALMOLOGY

## 2022-12-21 PROCEDURE — 99999 PR PBB SHADOW E&M-EST. PATIENT-LVL III: ICD-10-PCS | Mod: PBBFAC,HCNC,, | Performed by: OPHTHALMOLOGY

## 2022-12-21 PROCEDURE — 92134 POSTERIOR SEGMENT OCT RETINA (OCULAR COHERENCE TOMOGRAPHY)-BOTH EYES: ICD-10-PCS | Mod: HCNC,S$GLB,, | Performed by: OPHTHALMOLOGY

## 2022-12-21 RX ORDER — ACETAZOLAMIDE 125 MG/1
125 TABLET ORAL 4 TIMES DAILY
Qty: 120 TABLET | Refills: 11 | Status: SHIPPED | OUTPATIENT
Start: 2022-12-21 | End: 2023-07-14 | Stop reason: SDUPTHER

## 2022-12-21 RX ORDER — LOTEPREDNOL ETABONATE 3.8 MG/G
1 GEL OPHTHALMIC 4 TIMES DAILY
Qty: 5 G | Refills: 1 | Status: SHIPPED | OUTPATIENT
Start: 2022-12-21

## 2022-12-21 RX ORDER — LATANOPROST 50 UG/ML
1 SOLUTION/ DROPS OPHTHALMIC DAILY
Qty: 2.5 ML | Refills: 6 | Status: SHIPPED | OUTPATIENT
Start: 2022-12-21 | End: 2023-02-01 | Stop reason: SDUPTHER

## 2022-12-21 RX ORDER — DORZOLAMIDE HYDROCHLORIDE AND TIMOLOL MALEATE 20; 5 MG/ML; MG/ML
1 SOLUTION/ DROPS OPHTHALMIC 2 TIMES DAILY
Qty: 10 ML | Refills: 3 | Status: SHIPPED | OUTPATIENT
Start: 2022-12-21 | End: 2023-10-24

## 2022-12-21 RX ORDER — ACETAZOLAMIDE 250 MG/1
TABLET ORAL
Qty: 60 TABLET | Refills: 1 | Status: ON HOLD | OUTPATIENT
Start: 2022-12-21 | End: 2023-11-01 | Stop reason: CLARIF

## 2022-12-21 NOTE — PROGRESS NOTES
HPI     Post-op Evaluation            Comments: Patient states glare is still bothersome that she has to wear   sunglasses to watch TV, and states the VA is not very blurry. Patient   needs Dorz/holland sent to the pharmacy.        S/P: XEN OD 10/13/2022 with MOCT  S/P: MMC OD 11/4/2022 ( 0.5ml)             Comments    1. Coag severe stage   XEN OD 10/13/2022  MMC OD 11/4/2022 ( 0.5ml)  2. NS OS *Will do dropless/ plan for OMNI 360  PCIOL OD 1/25/22 W/ Gonio- complex  Yag OD 5-3-22 by [facility] [place] Pitts was out that visit       Massage TID 30 sec OD    Latanoprost qhs OS  Brimonidine BID OS  Dorzolamide BID OS  Keto QID OD   Pred 4xs OD (advised 6xs)  Atropine 4xs OD          Last edited by Citlalli Jernigan, Patient Care Assistant on 12/21/2022 10:43   AM.            Assessment /Plan     For exam results, see Encounter Report.      ICD-10-CM ICD-9-CM    1. Post-operative state  Z98.890 V45.89       2. Primary open angle glaucoma (POAG) of both eyes, severe stage  H40.1133 365.11 S/p Xen OD = iop rising - will address, cannot use Lotemax due to $99 cost.      365.73       3. CME (cystoid macular edema), right  H35.351 362.53 Posterior Segment OCT Retina-Both eyes          Stop  Atropine    Pred QID OD   Keto QID OD   Add  mg 1/2 pill qid - will work dosing up as instructed in medcard     Latanoprost qhs OS  Brimonidine BID OS  Dorzolamide / Timolol BID OU    Return to clinic 2 weeks

## 2023-01-11 ENCOUNTER — OFFICE VISIT (OUTPATIENT)
Dept: OPHTHALMOLOGY | Facility: CLINIC | Age: 78
End: 2023-01-11
Payer: MEDICARE

## 2023-01-11 DIAGNOSIS — H40.1133 PRIMARY OPEN ANGLE GLAUCOMA (POAG) OF BOTH EYES, SEVERE STAGE: ICD-10-CM

## 2023-01-11 DIAGNOSIS — H35.351 CME (CYSTOID MACULAR EDEMA), RIGHT: ICD-10-CM

## 2023-01-11 DIAGNOSIS — Z98.890 POST-OPERATIVE STATE: Primary | ICD-10-CM

## 2023-01-11 PROCEDURE — 1159F PR MEDICATION LIST DOCUMENTED IN MEDICAL RECORD: ICD-10-PCS | Mod: HCNC,CPTII,S$GLB, | Performed by: OPHTHALMOLOGY

## 2023-01-11 PROCEDURE — 1159F MED LIST DOCD IN RCRD: CPT | Mod: HCNC,CPTII,S$GLB, | Performed by: OPHTHALMOLOGY

## 2023-01-11 PROCEDURE — 1160F RVW MEDS BY RX/DR IN RCRD: CPT | Mod: HCNC,CPTII,S$GLB, | Performed by: OPHTHALMOLOGY

## 2023-01-11 PROCEDURE — 99024 POSTOP FOLLOW-UP VISIT: CPT | Mod: HCNC,S$GLB,, | Performed by: OPHTHALMOLOGY

## 2023-01-11 PROCEDURE — 99999 PR PBB SHADOW E&M-EST. PATIENT-LVL III: CPT | Mod: PBBFAC,HCNC,, | Performed by: OPHTHALMOLOGY

## 2023-01-11 PROCEDURE — 99024 PR POST-OP FOLLOW-UP VISIT: ICD-10-PCS | Mod: HCNC,S$GLB,, | Performed by: OPHTHALMOLOGY

## 2023-01-11 PROCEDURE — 99999 PR PBB SHADOW E&M-EST. PATIENT-LVL III: ICD-10-PCS | Mod: PBBFAC,HCNC,, | Performed by: OPHTHALMOLOGY

## 2023-01-11 PROCEDURE — 1160F PR REVIEW ALL MEDS BY PRESCRIBER/CLIN PHARMACIST DOCUMENTED: ICD-10-PCS | Mod: HCNC,CPTII,S$GLB, | Performed by: OPHTHALMOLOGY

## 2023-01-11 NOTE — PROGRESS NOTES
HPI    Pt in today for a 2 week follow-up for an IOP check. Pt states her vision   has been the same since her last visit. Pt denies any ocular pain or   discomfort. Pt states she's using her drops.    1. Coag severe stage   XEN OD 10/13/2022  MMC OD 11/4/2022 ( 0.5ml)  2. NS OS *Will do dropless/ plan for OMNI 360  PCIOL OD 1/25/22 W/ Gonio- complex  Yag OD 5-3-22 by [facility] since Dr Pitts was out that visit       Massage TID 30 sec OD      Pred QID OD   Keto QID OD   Add  mg 1/2 pill QID     Latanoprost qhs OS  Brimonidine BID OS  Dorzolamide / Timolol BID OU    Last edited by Florida Thompson on 1/11/2023  2:24 PM.            Assessment /Plan     For exam results, see Encounter Report.      ICD-10-CM ICD-9-CM    1. Post-operative state  Z98.890 V45.89 S/p xen gel OD 10/13/22, IOP still above target  D/c DMX, pt unable to tolerate medication  Taper Pred BID OD    IOP not within acceptable range relative to target IOP with risk of irreversible visual loss. Additional treatment required.  Discussed options, risks, and benefits of additional medication, SLT laser, or incisional glaucoma surgery.     Recommend 250 BV w/ rip chord OD    Patient defers surgical intervention at this time    Reviewed importance of continued compliance with treatment and follow up.       2. Primary open angle glaucoma (POAG) of both eyes, severe stage  H40.1133 365.11 As above.     365.73       3. CME (cystoid macular edema), right  H35.351 362.53           Return to clinic 2 week for IOP (recommend pt return next week, with possibility of surgery on 1/24/23 but she refuses. Pt understands the RBA of waiting/deferring surgery at this time)  Will pencil in for surgery 02/06/23        Pred BID OD   Keto QID OD      Latanoprost qhs OU  Brimonidine BID OU  Dorzolamide / Timolol BID OU

## 2023-01-30 NOTE — INTERVAL H&P NOTE
The patient has been examined and the H&P has been reviewed:    I concur with the findings and no changes have occurred since H&P was written.    Anesthesia/Surgery risks, benefits and alternative options discussed and understood by patient/family.          There are no hospital problems to display for this patient.     Depth Of Biopsy: dermis

## 2023-02-01 ENCOUNTER — OFFICE VISIT (OUTPATIENT)
Dept: OPHTHALMOLOGY | Facility: CLINIC | Age: 78
End: 2023-02-01
Payer: MEDICARE

## 2023-02-01 DIAGNOSIS — Z98.41 CATARACT EXTRACTION STATUS OF EYE, RIGHT: ICD-10-CM

## 2023-02-01 DIAGNOSIS — H35.351 CME (CYSTOID MACULAR EDEMA), RIGHT: Primary | ICD-10-CM

## 2023-02-01 DIAGNOSIS — H40.1133 PRIMARY OPEN ANGLE GLAUCOMA (POAG) OF BOTH EYES, SEVERE STAGE: ICD-10-CM

## 2023-02-01 DIAGNOSIS — Z91.199 NON-COMPLIANCE WITH TREATMENT: ICD-10-CM

## 2023-02-01 PROCEDURE — 99999 PR PBB SHADOW E&M-EST. PATIENT-LVL III: CPT | Mod: PBBFAC,HCNC,, | Performed by: OPHTHALMOLOGY

## 2023-02-01 PROCEDURE — 99999 PR PBB SHADOW E&M-EST. PATIENT-LVL III: ICD-10-PCS | Mod: PBBFAC,HCNC,, | Performed by: OPHTHALMOLOGY

## 2023-02-01 PROCEDURE — 1159F PR MEDICATION LIST DOCUMENTED IN MEDICAL RECORD: ICD-10-PCS | Mod: HCNC,CPTII,S$GLB, | Performed by: OPHTHALMOLOGY

## 2023-02-01 PROCEDURE — 1159F MED LIST DOCD IN RCRD: CPT | Mod: HCNC,CPTII,S$GLB, | Performed by: OPHTHALMOLOGY

## 2023-02-01 PROCEDURE — 99024 POSTOP FOLLOW-UP VISIT: CPT | Mod: HCNC,S$GLB,, | Performed by: OPHTHALMOLOGY

## 2023-02-01 PROCEDURE — 99024 PR POST-OP FOLLOW-UP VISIT: ICD-10-PCS | Mod: HCNC,S$GLB,, | Performed by: OPHTHALMOLOGY

## 2023-02-01 RX ORDER — LATANOPROST 50 UG/ML
1 SOLUTION/ DROPS OPHTHALMIC DAILY
Qty: 2.5 ML | Refills: 6 | Status: SHIPPED | OUTPATIENT
Start: 2023-02-01 | End: 2023-06-23 | Stop reason: SDUPTHER

## 2023-02-01 RX ORDER — KETOROLAC TROMETHAMINE 5 MG/ML
1 SOLUTION OPHTHALMIC 4 TIMES DAILY
Qty: 5 ML | Refills: 3 | Status: SHIPPED | OUTPATIENT
Start: 2023-02-01 | End: 2023-06-23 | Stop reason: SDUPTHER

## 2023-02-01 NOTE — PROGRESS NOTES
HPI    Patient states that she is using and tolerating Pred BID / Keto QID OD -   using Latan/ Brim/ Cosopt OU - states that she is out of Latan and other   bottle rolled under bed where she can't reach - will go by pharm to get   refills on Latan and keto - denies pain/discomfort OU - va still very   blurry  Pt ran out of the L      1. Coag severe stage   XEN OD 10/13/2022  MMC OD 11/4/2022 ( 0.5ml)  2. NS OS *Will do dropless/ plan for OMNI 360  PCIOL OD 1/25/22 W/ Gonio- complex  Yag OD 5-3-22 by MG since Dr Pitts was out that visit       Massage TID 30 sec OD      Pred BID OD   Keto QID OD      Latanoprost qhs OU  Brimonidine BID OU  Dorzolamide / Timolol BID OU    Last edited by Shorty Burnett MD on 2/1/2023  1:45 PM.            Assessment /Plan     For exam results, see Encounter Report.      ICD-10-CM ICD-9-CM    1. CME (cystoid macular edema), right  H35.351 362.53 Improved today on exam       2. Primary open angle glaucoma (POAG) of both eyes, severe stage  H40.1133 365.11 IOP better today off meds OD     Iop elevated OS today, but missed meds this am      365.73       3. Non-compliance with treatment  Z91.199 V15.81       4. Cataract extraction status of eye, right  Z98.41 V45.61           Taper Pred QD OD     Keto QID OD      Latanoprost qhs OU  Brimonidine BID OU  Dorzolamide / Timolol BID OU    Pt will still try to take 1/2 pill of Diamox at bedtime   RETURN TO CLINIC 2 weeks IOP     Will hold on surgery for now

## 2023-02-16 DIAGNOSIS — F41.9 CHRONIC ANXIETY: ICD-10-CM

## 2023-02-16 RX ORDER — ALPRAZOLAM 0.25 MG/1
TABLET ORAL
Qty: 30 TABLET | Refills: 1 | OUTPATIENT
Start: 2023-02-16

## 2023-02-16 NOTE — TELEPHONE ENCOUNTER
Pt stated she will check her schedule and call back with her availability and if not she'll keep her mitch. 03/13/23

## 2023-02-23 NOTE — TELEPHONE ENCOUNTER
Patient notified Xanax was refilled on 3/14/18 60 pills with 1 refill and states ok, patient states never mind the cream  
Adult

## 2023-02-23 NOTE — SUBJECTIVE & OBJECTIVE
Medication refills ordered this visit: famotidine, melatonin, abilify order #[id number]    Medications reconciled and all medications are available in the home this visit. The following education was provided regarding medications, medication interactins and adverse effects. Response to teaching: Elizabeth Reina understanding. Medications are effective at this time. Supplies by type and quantity ordered this visit include: large/medium gloves, wipes, sanitizing wipes, glycerin swabs. Order number #[id number]    Instructed patient/family/caregiver on 24-hour hospice availability and phone number. Plan for next visit:  Next Tuesday. Past Medical History:   Diagnosis Date    Alpha thalassemia     Asthma     resolved per patient    Cataract     Chronic anxiety     years tid xanax 1mg    Chronic knee pain     Chronic pain of left ankle     Clotting disorder     Cutaneous lupus erythematosus     dr henry derm    Depression     Depression     dr radha hughes previously    Ex-smoker     quit fall 1    Hypertension     Osteopenia 1/16 reck1/18       Past Surgical History:   Procedure Laterality Date    ANKLE SURGERY Left     BACK SURGERY      CATARACT EXTRACTION Right     COLONOSCOPY N/A 8/2/2017    Procedure: COLONOSCOPY;  Surgeon: Virgil Oliva MD;  Location: [facility] ENDO;  Service: Endoscopy;  Laterality: N/A;    HYSTERECTOMY      INJECTION OF ANESTHETIC AGENT AROUND MEDIAL BRANCH NERVES INNERVATING LUMBAR FACET JOINT Bilateral 6/17/2020    Procedure: Bilateral L3-5 MBB;  Surgeon: Yury Moore MD;  Location: [facility] PAIN MGT;  Service: Pain Management;  Laterality: Bilateral;    INJECTION OF ANESTHETIC AGENT AROUND NERVE Bilateral 5/19/2020    Procedure: Bilateral Genicular nerve block with RN IV sedation Covid testing day of procedure PT does not drive;  Surgeon: Yury Moore MD;  Location: [facility] PAIN MGT;  Service: Pain Management;  Laterality: Bilateral;    KNEE ARTHROSCOPY      both knees twice    OOPHORECTOMY      RADIOFREQUENCY THERMOCOAGULATION Right 7/14/2020    Procedure: Right L3-5 Lumbar RFA;  Surgeon: Yury Moore MD;  Location: [facility] PAIN MGT;  Service: Pain Management;  Laterality: Right;    RADIOFREQUENCY THERMOCOAGULATION Left 8/6/2020    Procedure: Left L3-5 Lumbar RFA;  Surgeon: Yury Moore MD;  Location: [facility] PAIN MGT;  Service: Pain Management;  Laterality: Left;       Review of patient's allergies indicates:   Allergen Reactions    Morphine Shortness Of Breath    Duloxetine      Feels bad       No current facility-administered medications on file prior to encounter.     Current Outpatient Medications on File Prior  to Encounter   Medication Sig    ALPRAZolam (XANAX) 0.25 MG tablet TAKE ONE TABLET BY MOUTH TWICE A DAY AS NEEDED FOR ANXIETY    amitriptyline (ELAVIL) 50 MG tablet TAKE ONE TABLET BY MOUTH ONCE DAILY    atorvastatin (LIPITOR) 10 MG tablet TAKE ONE TABLET BY MOUTH ONCE DAILY    baclofen (LIORESAL) 10 MG tablet Take 10 mg by mouth 2 (two) times daily as needed.    brimonidine 0.2% (ALPHAGAN) 0.2 % Drop Place 1 drop into both eyes 2 (two) times a day.    citalopram (CELEXA) 20 MG tablet Take 1 tablet (20 mg total) by mouth once daily.    dorzolamide (TRUSOPT) 2 % ophthalmic solution Place 1 drop into both eyes 2 (two) times a day.    latanoprost 0.005 % ophthalmic solution PLACE 1 DROP IN EACH EYE EVERY EVENING    magnesium oxide (MAG-OX) 400 mg (241.3 mg magnesium) tablet Take 400 mg by mouth.    oxyCODONE-acetaminophen (PERCOCET)  mg per tablet Take 1 tablet by mouth every 8 (eight) hours as needed.     pantoprazole (PROTONIX) 40 MG tablet TAKE ONE TABLET BY MOUTH ONCE DAILY    prednisoLONE acetate (PRED FORTE) 1 % DrpS INSTILL 1 DROP IN RIGHT EYE 4 TIMES A DAY    timolol maleate 0.25% (TIMOPTIC) 0.25 % Drop PLACE 1 DROP IN EACH EYE TWICE A DAY    valsartan-hydrochlorothiazide (DIOVAN-HCT) 160-12.5 mg per tablet TAKE ONE TABLET BY MOUTH ONCE DAILY     Family History       Problem Relation (Age of Onset)    Breast cancer Maternal Aunt    Diabetes Mother, Father          Tobacco Use    Smoking status: Former Smoker     Packs/day: 1.00     Years: 30.00     Pack years: 30.00     Types: Cigarettes     Quit date: 2014     Years since quittin.9    Smokeless tobacco: Never Used    Tobacco comment: smoke last cigarette 9/15   Substance and Sexual Activity    Alcohol use: Yes     Alcohol/week: 1.0 standard drink     Types: 1 Glasses of wine per week     Comment: 3 times a week    Drug use: No    Sexual activity: Not Currently     Partners: Male     Review of Systems   Constitutional:  Positive for fever.  Negative for chills, diaphoresis and fatigue.   HENT:  Negative for congestion and sore throat.    Respiratory:  Negative for cough, shortness of breath and wheezing.    Cardiovascular:  Negative for chest pain, palpitations and leg swelling.   Gastrointestinal:  Negative for abdominal pain, constipation, diarrhea, nausea and vomiting.   Genitourinary:  Negative for decreased urine volume, difficulty urinating, dysuria, flank pain, frequency, hematuria and urgency.   Musculoskeletal:  Positive for arthralgias, back pain, gait problem and neck pain. Negative for joint swelling and myalgias.   Skin:  Negative for pallor and rash.   Neurological:  Positive for weakness (generalized). Negative for dizziness, syncope, light-headedness, numbness and headaches.   Psychiatric/Behavioral:  Negative for confusion. The patient is not nervous/anxious.    All other systems reviewed and are negative.  Objective:     Vital Signs (Most Recent):  Temp: 98.3 °F (36.8 °C) (08/18/22 0016)  Pulse: 85 (08/18/22 0016)  Resp: 18 (08/18/22 0016)  BP: (!) 166/94 (08/18/22 0016)  SpO2: 97 % (08/18/22 0016)   Vital Signs (24h Range):  Temp:  [98.3 °F (36.8 °C)] 98.3 °F (36.8 °C)  Pulse:  [85-99] 85  Resp:  [15-22] 18  SpO2:  [96 %-100 %] 97 %  BP: (139-181)/() 166/94     Weight: 76.1 kg (167 lb 12.3 oz)  Body mass index is 30.69 kg/m².    Physical Exam  Vitals and nursing note reviewed.   Constitutional:       General: She is awake. She is not in acute distress.     Appearance: Normal appearance. She is well-developed. She is not diaphoretic.   HENT:      Head: Normocephalic and atraumatic.   Eyes:      Conjunctiva/sclera: Conjunctivae normal.      Comments: PERRL; EOM intact.   Cardiovascular:      Rate and Rhythm: Normal rate and regular rhythm. No extrasystoles are present.     Heart sounds: S1 normal and S2 normal. No murmur heard.  Pulmonary:      Effort: Pulmonary effort is normal. No tachypnea.      Breath sounds: Normal breath  sounds and air entry. No wheezing, rhonchi or rales.   Abdominal:      General: Bowel sounds are normal. There is no distension.      Palpations: Abdomen is soft.      Tenderness: There is no abdominal tenderness.   Musculoskeletal:         General: No tenderness or deformity. Normal range of motion.      Cervical back: Normal range of motion and neck supple.      Right lower leg: No edema.      Left lower leg: No edema.   Skin:     General: Skin is warm and dry.      Capillary Refill: Capillary refill takes less than 2 seconds.      Findings: No erythema or rash.   Neurological:      General: No focal deficit present.      Mental Status: She is alert and oriented to person, place, and time.   Psychiatric:         Mood and Affect: Mood and affect normal.         Behavior: Behavior normal. Behavior is cooperative.           Significant Labs:  Results for orders placed or performed during the hospital encounter of 08/17/22   CBC auto differential   Result Value Ref Range    WBC 19.10 (H) 3.90 - 12.70 K/uL    RBC 5.70 (H) 4.00 - 5.40 M/uL    Hemoglobin 12.5 12.0 - 16.0 g/dL    Hematocrit 40.5 37.0 - 48.5 %    MCV 71 (L) 82 - 98 fL    MCH 21.9 (L) 27.0 - 31.0 pg    MCHC 30.9 (L) 32.0 - 36.0 g/dL    RDW 16.1 (H) 11.5 - 14.5 %    Platelets 413 150 - 450 K/uL    MPV 9.9 9.2 - 12.9 fL    Immature Granulocytes 0.5 0.0 - 0.5 %    Gran # (ANC) 16.2 (H) 1.8 - 7.7 K/uL    Immature Grans (Abs) 0.10 (H) 0.00 - 0.04 K/uL    Lymph # 1.6 1.0 - 4.8 K/uL    Mono # 1.1 (H) 0.3 - 1.0 K/uL    Eos # 0.0 0.0 - 0.5 K/uL    Baso # 0.04 0.00 - 0.20 K/uL    nRBC 0 0 /100 WBC    Gran % 84.8 (H) 38.0 - 73.0 %    Lymph % 8.5 (L) 18.0 - 48.0 %    Mono % 6.0 4.0 - 15.0 %    Eosinophil % 0.0 0.0 - 8.0 %    Basophil % 0.2 0.0 - 1.9 %    Differential Method Automated    Comprehensive metabolic panel   Result Value Ref Range    Sodium 142 136 - 145 mmol/L    Potassium 3.8 3.5 - 5.1 mmol/L    Chloride 109 95 - 110 mmol/L    CO2 18 (L) 23 - 29 mmol/L     Glucose 113 (H) 70 - 110 mg/dL    BUN 36 (H) 8 - 23 mg/dL    Creatinine 1.5 (H) 0.5 - 1.4 mg/dL    Calcium 10.5 8.7 - 10.5 mg/dL    Total Protein 8.8 (H) 6.0 - 8.4 g/dL    Albumin 3.6 3.5 - 5.2 g/dL    Total Bilirubin 0.8 0.1 - 1.0 mg/dL    Alkaline Phosphatase 73 55 - 135 U/L    AST 54 (H) 10 - 40 U/L    ALT 29 10 - 44 U/L    Anion Gap 15 8 - 16 mmol/L    eGFR 36.1 (A) >60 mL/min/1.73 m^2   Troponin I   Result Value Ref Range    Troponin I 0.021 0.000 - 0.026 ng/mL   Urinalysis, Reflex to Urine Culture Urine, Clean Catch    Specimen: Urine   Result Value Ref Range    Specimen UA Urine, Clean Catch     Color, UA Staci Yellow, Straw, Staci    Appearance, UA Hazy (A) Clear    pH, UA 6.0 5.0 - 8.0    Specific Gravity, UA 1.015 1.005 - 1.030    Protein, UA Trace (A) Negative    Glucose, UA Negative Negative    Ketones, UA Negative Negative    Bilirubin (UA) 1+ (A) Negative    Occult Blood UA 1+ (A) Negative    Nitrite, UA Positive (A) Negative    Urobilinogen, UA Negative <2.0 EU/dL    Leukocytes, UA 2+ (A) Negative   CK   Result Value Ref Range    CPK 1429 (H) 20 - 180 U/L   Urinalysis Microscopic   Result Value Ref Range    RBC, UA 3 0 - 4 /hpf    WBC, UA 15 (H) 0 - 5 /hpf    WBC Clumps, UA Occasional (A) None-Rare    Bacteria Many (A) None-Occ /hpf    Squam Epithel, UA 4 /hpf    Microscopic Comment SEE COMMENT    POCT COVID-19 Rapid Screening   Result Value Ref Range    POC Rapid COVID Negative Negative     Acceptable Yes       All pertinent labs within the past 24 hours have been reviewed.    Significant Imaging:  Imaging Results              X-Ray Knee 3 View Right (Final result)  Result time 08/17/22 17:33:48      Final result by Adair Anderson MD (08/17/22 17:33:48)                   Impression:      As above      Electronically signed by: Justin Borrero  Date:    08/17/2022  Time:    17:33               Narrative:    EXAMINATION:  XR KNEE 3 VIEW RIGHT    CLINICAL HISTORY:  Pain in unspecified  knee    TECHNIQUE:  AP, lateral, and Merchant views of the left knee were performed.    COMPARISON:  None    FINDINGS:  No acute fracture or dislocation.  Satisfactory right knee arthroplasty with long medullary rome in the femur.  Vascular calcification.                                       X-Ray Shoulder Trauma Right (Final result)  Result time 08/17/22 17:33:01      Final result by Adair Anderson MD (08/17/22 17:33:01)                   Impression:      As above      Electronically signed by: Justin Borrero  Date:    08/17/2022  Time:    17:33               Narrative:    EXAMINATION:  XR SHOULDER TRAUMA 3 VIEW RIGHT    CLINICAL HISTORY:  Pain in right shoulder    TECHNIQUE:  Three or four views of the right shoulder were performed.    COMPARISON:  None    FINDINGS:  No acute fracture or dislocation.  Degenerative joint disease.  Decreased bone mineral density.  AC joint hypertrophy.  Senescent changes.                                       X-Ray Forearm Right (Final result)  Result time 08/17/22 17:30:11      Final result by Adair Anderson MD (08/17/22 17:30:11)                   Impression:      As above      Electronically signed by: Justin Borrero  Date:    08/17/2022  Time:    17:30               Narrative:    EXAMINATION:  XR FOREARM RIGHT    CLINICAL HISTORY:  Pain in arm, unspecified    TECHNIQUE:  AP and lateral views of the right forearm were performed.    COMPARISON:  None    FINDINGS:  No evidence for fracture or dislocation.  Multiple small the calcific densities adjacent to the joint space may relate to ossification within the joint space or osteophytes.  Decreased bone mineral density.  Atherosclerotic changes.  Small linear density along the radial distal aspect of the forearm soft tissues may relate to foreign body.  Mild degenerative joint disease of the radiocarpal joint                                       X-Ray Chest AP Portable (Final result)  Result time 08/17/22 17:26:34      Final result by  Adair Anderson MD (08/17/22 17:26:34)                   Impression:      No acute abnormality.      Electronically signed by: Justin Borrero  Date:    08/17/2022  Time:    17:26               Narrative:    EXAMINATION:  XR CHEST AP PORTABLE    CLINICAL HISTORY:  Unspecified fall, initial encounter    TECHNIQUE:  Single frontal view of the chest was performed.    COMPARISON:  None    FINDINGS:  Mild basilar reticular opacities consistent with scarring or atelectasis.The lungs are otherwise clear, with normal appearance of pulmonary vasculature and no pleural effusion or pneumothorax.    The cardiac silhouette is normal in size. The hilar and mediastinal contours are unremarkable.    Bones are intact.                                       CT Thoracic Spine Without Contrast (Final result)  Result time 08/17/22 17:24:38      Final result by Adair Anderson MD (08/17/22 17:24:38)                   Impression:      As above    All CT scans at this facility use dose modulation, iterative reconstruction and/or weight based dosing when appropriate to reduce radiation dose to as low as reasonably achievable.      Electronically signed by: Justin Borrero  Date:    08/17/2022  Time:    17:24               Narrative:    EXAMINATION:  CT THORACIC SPINE WITHOUT CONTRAST    CLINICAL HISTORY:  Back trauma, no prior imaging (Age >= 16y);    TECHNIQUE:  5 mm axial images were acquired using helical CT technique from the lung apices through costophrenic sulci.  No intravenous contrast was administered.    FINDINGS:  Exuberant anterior flowing osteophytes consistent with diffuse idiopathic skeletal hyperostoses.  Moderate multilevel degenerative joint disease.  Left thyroid prominence compared to the right.  No acute fracture or dislocation.  Coronary artery calcification                                       CT Lumbar Spine Without Contrast (Final result)  Result time 08/17/22 17:50:31      Final result by Adair Anderson MD (08/17/22  17:50:31)                   Impression:      No acute fracture or dislocation    Moderate multilevel degenerative disc disease    Senescent changes including atherosclerotic changes    Focus of gas along the superficial aspect of the posterior element along the left lamina at L5 likely related to vacuum disc.  Correlate to point tenderness    All CT scans at this facility are performed  using dose modulation techniques as appropriate to performed exam including the following:  automated exposure control; adjustment of mA and/or kV according to the patients size (this includes techniques or standardized protocols for targeted exams where dose is matched to indication/reason for exam: i.e. extremities or head);  iterative reconstruction technique.      Electronically signed by: Justin Borrero  Date:    08/17/2022  Time:    17:50               Narrative:    EXAMINATION:  CT LUMBAR SPINE WITHOUT CONTRAST    CLINICAL HISTORY:  Low back pain, trauma;    TECHNIQUE:  CT lumbar spine without    COMPARISON:  None    FINDINGS:  No vertebral body compression deformity.  Moderate multilevel degenerative disc disease.  Vacuum disc phenomena at L4-L5.  Schmorl's node a noted.  Decreased bone mineral density.  Atherosclerotic changes.  Focus of gas along the superficial aspect of the posterior element along the left lamina at L5 likely related to vacuum disc.  Correlate to point tenderness                                       CT Cervical Spine Without Contrast (Final result)  Result time 08/17/22 17:21:14      Final result by Adair Anderson MD (08/17/22 17:21:14)                   Impression:      No acute fracture or dislocation.    Moderate multilevel degenerative disc disease.    All CT scans   are performed using dose optimization techniques including the following: automated exposure control; adjustment of the mA and/or kV; use of iterative reconstruction technique.  Dose modulation was employed for [facility] by means of:  Automated exposure control; adjustment of the mA and/or kV according to patient size (this includes techniques or standardized protocols for targeted exams where dose is matched to indication/reason for exam; i.e. extremities or head); and/or use of iterative reconstructive technique.      Electronically signed by: Justin Borrero  Date:    08/17/2022  Time:    17:21               Narrative:    EXAMINATION:  CT CERVICAL SPINE WITHOUT CONTRAST    CLINICAL HISTORY:  Neck trauma (Age >= 65y);neck pain;    TECHNIQUE:  Low dose axial images, sagittal and coronal reformations were performed though the cervical spine.  Contrast was not administered.    COMPARISON:  None    FINDINGS:  Normal vertebral body heights without evidence for spondylolisthesis.  Moderate multilevel degenerative disc disease.  No prevertebral soft tissue swelling.  Facet joints are congruent.  Normal bone mineral density.  Small focus of calcification at C3 along the right og lamina likely related to dural calcification.  Atherosclerotic changes.                                       CT Maxillofacial Without Contrast (Final result)  Result time 08/17/22 17:39:39      Final result by Adair Anderson MD (08/17/22 17:39:39)                   Impression:      No acute fracture or dislocation of the facial bones as far seen      Electronically signed by: Justin Borrero  Date:    08/17/2022  Time:    17:39               Narrative:    EXAMINATION:  CT MAXILLOFACIAL WITHOUT CONTRAST    CLINICAL HISTORY:  Facial trauma, blunt;zygoma pain;    TECHNIQUE:  Low dose axial images, sagittal and coronal reformations were obtained through the face.  Contrast was not administered.    COMPARISON:  None    FINDINGS:  No acute fracture or dislocation.  Visualized paranasal sinuses are unremarkable.  Mastoid air cells are clear.  No significant nasal bone fracture.  Zygomatic arch is intact.  No orbital floor fracture.  Cervical spine degenerative joint disease noted.   Atherosclerotic changes of the intracranial vasculature.                                       CT Head Without Contrast (Final result)  Result time 08/17/22 17:05:44      Final result by Neda Larkin MD (08/17/22 17:05:44)                   Impression:      No acute intracranial abnormality.    Chronic microvascular ischemic change.      Electronically signed by: Neda Larkin  Date:    08/17/2022  Time:    17:05               Narrative:    EXAMINATION:  CT HEAD WITHOUT CONTRAST    CLINICAL HISTORY:  Facial trauma, blunt; Unspecified fall, initial encounter    TECHNIQUE:  Low dose axial CT images obtained throughout the head without intravenous contrast. Sagittal and coronal reconstructions were performed.  All CT scans at this facility are performed using dose optimization techniques including the following: automated exposure control; adjustment of the mA and/or kV; use of iterative reconstruction technique.    COMPARISON:  CT head without contrast 03/08/2021    FINDINGS:  Intracranial compartment:    Ventricles and sulci are normal in size for age without evidence of hydrocephalus. No extra-axial blood or fluid collections.    There is patchy hypoattenuation within the supratentorial white matter, most commonly seen in the setting of chronic microvascular ischemic change.  No parenchymal mass, hemorrhage, edema or major vascular distribution infarct.    Skull/extracranial contents (limited evaluation): No depressed skull fracture.  Mastoid air cells and paranasal sinuses are essentially clear.                                     I have reviewed all pertinent imaging results/findings within the past 24 hours.

## 2023-02-24 ENCOUNTER — OFFICE VISIT (OUTPATIENT)
Dept: OPHTHALMOLOGY | Facility: CLINIC | Age: 78
End: 2023-02-24
Payer: MEDICARE

## 2023-02-24 DIAGNOSIS — Z98.890 POST-OPERATIVE STATE: Primary | ICD-10-CM

## 2023-02-24 DIAGNOSIS — H40.1133 PRIMARY OPEN ANGLE GLAUCOMA (POAG) OF BOTH EYES, SEVERE STAGE: ICD-10-CM

## 2023-02-24 DIAGNOSIS — H35.351 CME (CYSTOID MACULAR EDEMA), RIGHT: ICD-10-CM

## 2023-02-24 DIAGNOSIS — Z91.199 NON-COMPLIANCE WITH TREATMENT: ICD-10-CM

## 2023-02-24 PROCEDURE — 1159F MED LIST DOCD IN RCRD: CPT | Mod: HCNC,CPTII,S$GLB, | Performed by: OPHTHALMOLOGY

## 2023-02-24 PROCEDURE — 1160F RVW MEDS BY RX/DR IN RCRD: CPT | Mod: HCNC,CPTII,S$GLB, | Performed by: OPHTHALMOLOGY

## 2023-02-24 PROCEDURE — 99999 PR PBB SHADOW E&M-EST. PATIENT-LVL III: ICD-10-PCS | Mod: PBBFAC,HCNC,, | Performed by: OPHTHALMOLOGY

## 2023-02-24 PROCEDURE — 1160F PR REVIEW ALL MEDS BY PRESCRIBER/CLIN PHARMACIST DOCUMENTED: ICD-10-PCS | Mod: HCNC,CPTII,S$GLB, | Performed by: OPHTHALMOLOGY

## 2023-02-24 PROCEDURE — 99024 POSTOP FOLLOW-UP VISIT: CPT | Mod: HCNC,S$GLB,, | Performed by: OPHTHALMOLOGY

## 2023-02-24 PROCEDURE — 99024 PR POST-OP FOLLOW-UP VISIT: ICD-10-PCS | Mod: HCNC,S$GLB,, | Performed by: OPHTHALMOLOGY

## 2023-02-24 PROCEDURE — 99999 PR PBB SHADOW E&M-EST. PATIENT-LVL III: CPT | Mod: PBBFAC,HCNC,, | Performed by: OPHTHALMOLOGY

## 2023-02-24 PROCEDURE — 1159F PR MEDICATION LIST DOCUMENTED IN MEDICAL RECORD: ICD-10-PCS | Mod: HCNC,CPTII,S$GLB, | Performed by: OPHTHALMOLOGY

## 2023-02-24 RX ORDER — LOTEPREDNOL ETABONATE 3.8 MG/G
1 GEL OPHTHALMIC 2 TIMES DAILY
Qty: 5 G | Refills: 3 | Status: ON HOLD | OUTPATIENT
Start: 2023-02-24 | End: 2023-11-03 | Stop reason: HOSPADM

## 2023-02-24 NOTE — PROGRESS NOTES
HPI     Post-op Evaluation            Comments: 2 week IOP check    Patient states the Diamox makes her sleepy and at times OU feels dry.          Comments    1. Coag severe stage   XEN OD 10/13/2022  [facility] OD 11/4/2022 ( 0.5ml)  2. NS OS *Will do dropless/ plan for [facility] 360  PCIOL OD 1/25/22 W/ Gonio- complex  Yag OD 5-3-22 by MG since Dr Pitts was out that visit       Massage TID 30 sec OD      Pred QD OD    Keto QID OD   Latanoprost qhs OU  Brimonidine BID OU  Dorzolamide / Timolol BID OU  1/2 pill of Diamox at bedtime          Last edited by Citlalli Jernigan, Patient Care Assistant on 2/24/2023 11:52   AM.            Assessment /Plan     For exam results, see Encounter Report.      ICD-10-CM ICD-9-CM    1. Post-operative state  Z98.890 V45.89       2. Primary open angle glaucoma (POAG) of both eyes, severe stage  H40.1133 365.11      365.73       3. CME (cystoid macular edema), right  H35.351 362.53       4. Non-compliance with treatment  Z91.199 V15.81         S/p PCIOL OD w/ gonio  IOP rising OD, pt not using Pred at this time  Start Lotemax BID OD  Advised likely to need surgery OS in the future      Return to clinic 2 weeks for IOP check and HVF        Lotemax BID OD    Keto QID OD   Latanoprost qhs OU  Brimonidine BID OU  Dorzolamide / Timolol BID OU  1/2 pill of Diamox at bedtime

## 2023-03-08 ENCOUNTER — PES CALL (OUTPATIENT)
Dept: ADMINISTRATIVE | Facility: OTHER | Age: 78
End: 2023-03-08
Payer: MEDICARE

## 2023-03-24 ENCOUNTER — OFFICE VISIT (OUTPATIENT)
Dept: INTERNAL MEDICINE | Facility: CLINIC | Age: 78
End: 2023-03-24
Payer: MEDICARE

## 2023-03-24 ENCOUNTER — HOSPITAL ENCOUNTER (OUTPATIENT)
Dept: RADIOLOGY | Facility: HOSPITAL | Age: 78
Discharge: HOME | End: 2023-03-24
Attending: NURSE PRACTITIONER
Payer: MEDICARE

## 2023-03-24 VITALS
BODY MASS INDEX: 31.36 KG/M2 | DIASTOLIC BLOOD PRESSURE: 70 MMHG | OXYGEN SATURATION: 97 % | HEIGHT: 62 IN | HEART RATE: 77 BPM | WEIGHT: 170.44 LBS | TEMPERATURE: 97 F | SYSTOLIC BLOOD PRESSURE: 128 MMHG

## 2023-03-24 DIAGNOSIS — J45.909 CHRONIC ASTHMA WITHOUT COMPLICATION, UNSPECIFIED ASTHMA SEVERITY, UNSPECIFIED WHETHER PERSISTENT: Chronic | ICD-10-CM

## 2023-03-24 DIAGNOSIS — I10 ESSENTIAL HYPERTENSION: Primary | Chronic | ICD-10-CM

## 2023-03-24 DIAGNOSIS — L93.2 CUTANEOUS LUPUS ERYTHEMATOSUS: ICD-10-CM

## 2023-03-24 DIAGNOSIS — F32.1 MODERATE MAJOR DEPRESSION: ICD-10-CM

## 2023-03-24 DIAGNOSIS — M35.01 KERATOCONJUNCTIVITIS SICCA: ICD-10-CM

## 2023-03-24 DIAGNOSIS — D56.0 ALPHA THALASSEMIA: ICD-10-CM

## 2023-03-24 DIAGNOSIS — F41.9 CHRONIC ANXIETY: ICD-10-CM

## 2023-03-24 DIAGNOSIS — Z12.31 BREAST CANCER SCREENING BY MAMMOGRAM: ICD-10-CM

## 2023-03-24 DIAGNOSIS — I70.0 ATHEROSCLEROSIS OF AORTA: ICD-10-CM

## 2023-03-24 DIAGNOSIS — N18.31 CHRONIC KIDNEY DISEASE, STAGE 3A: ICD-10-CM

## 2023-03-24 DIAGNOSIS — M32.9 SYSTEMIC LUPUS ERYTHEMATOSUS, UNSPECIFIED SLE TYPE, UNSPECIFIED ORGAN INVOLVEMENT STATUS: ICD-10-CM

## 2023-03-24 DIAGNOSIS — G89.29 OTHER CHRONIC PAIN: ICD-10-CM

## 2023-03-24 DIAGNOSIS — R35.0 URINARY FREQUENCY: ICD-10-CM

## 2023-03-24 PROBLEM — H16.229 KERATOCONJUNCTIVITIS SICCA: Status: ACTIVE | Noted: 2023-03-24

## 2023-03-24 PROCEDURE — 3074F PR MOST RECENT SYSTOLIC BLOOD PRESSURE < 130 MM HG: ICD-10-PCS | Mod: HCNC,CPTII,S$GLB, | Performed by: NURSE PRACTITIONER

## 2023-03-24 PROCEDURE — 99214 PR OFFICE/OUTPT VISIT, EST, LEVL IV, 30-39 MIN: ICD-10-PCS | Mod: HCNC,S$GLB,, | Performed by: NURSE PRACTITIONER

## 2023-03-24 PROCEDURE — 3288F FALL RISK ASSESSMENT DOCD: CPT | Mod: HCNC,CPTII,S$GLB, | Performed by: NURSE PRACTITIONER

## 2023-03-24 PROCEDURE — 1101F PR PT FALLS ASSESS DOC 0-1 FALLS W/OUT INJ PAST YR: ICD-10-PCS | Mod: HCNC,CPTII,S$GLB, | Performed by: NURSE PRACTITIONER

## 2023-03-24 PROCEDURE — 1101F PT FALLS ASSESS-DOCD LE1/YR: CPT | Mod: HCNC,CPTII,S$GLB, | Performed by: NURSE PRACTITIONER

## 2023-03-24 PROCEDURE — 3078F PR MOST RECENT DIASTOLIC BLOOD PRESSURE < 80 MM HG: ICD-10-PCS | Mod: HCNC,CPTII,S$GLB, | Performed by: NURSE PRACTITIONER

## 2023-03-24 PROCEDURE — 1126F PR PAIN SEVERITY QUANTIFIED, NO PAIN PRESENT: ICD-10-PCS | Mod: HCNC,CPTII,S$GLB, | Performed by: NURSE PRACTITIONER

## 2023-03-24 PROCEDURE — 1159F PR MEDICATION LIST DOCUMENTED IN MEDICAL RECORD: ICD-10-PCS | Mod: HCNC,CPTII,S$GLB, | Performed by: NURSE PRACTITIONER

## 2023-03-24 PROCEDURE — 1160F PR REVIEW ALL MEDS BY PRESCRIBER/CLIN PHARMACIST DOCUMENTED: ICD-10-PCS | Mod: HCNC,CPTII,S$GLB, | Performed by: NURSE PRACTITIONER

## 2023-03-24 PROCEDURE — 99214 OFFICE O/P EST MOD 30 MIN: CPT | Mod: HCNC,S$GLB,, | Performed by: NURSE PRACTITIONER

## 2023-03-24 PROCEDURE — 77067 SCR MAMMO BI INCL CAD: CPT | Mod: 26,HCNC,, | Performed by: RADIOLOGY

## 2023-03-24 PROCEDURE — 77063 MAMMO DIGITAL SCREENING BILAT WITH TOMO: ICD-10-PCS | Mod: 26,HCNC,, | Performed by: RADIOLOGY

## 2023-03-24 PROCEDURE — 1160F RVW MEDS BY RX/DR IN RCRD: CPT | Mod: HCNC,CPTII,S$GLB, | Performed by: NURSE PRACTITIONER

## 2023-03-24 PROCEDURE — 3074F SYST BP LT 130 MM HG: CPT | Mod: HCNC,CPTII,S$GLB, | Performed by: NURSE PRACTITIONER

## 2023-03-24 PROCEDURE — 3078F DIAST BP <80 MM HG: CPT | Mod: HCNC,CPTII,S$GLB, | Performed by: NURSE PRACTITIONER

## 2023-03-24 PROCEDURE — 77067 SCR MAMMO BI INCL CAD: CPT | Mod: TC,HCNC,PO

## 2023-03-24 PROCEDURE — 1126F AMNT PAIN NOTED NONE PRSNT: CPT | Mod: HCNC,CPTII,S$GLB, | Performed by: NURSE PRACTITIONER

## 2023-03-24 PROCEDURE — 3288F PR FALLS RISK ASSESSMENT DOCUMENTED: ICD-10-PCS | Mod: HCNC,CPTII,S$GLB, | Performed by: NURSE PRACTITIONER

## 2023-03-24 PROCEDURE — 77067 MAMMO DIGITAL SCREENING BILAT WITH TOMO: ICD-10-PCS | Mod: 26,HCNC,, | Performed by: RADIOLOGY

## 2023-03-24 PROCEDURE — 77063 BREAST TOMOSYNTHESIS BI: CPT | Mod: 26,HCNC,, | Performed by: RADIOLOGY

## 2023-03-24 PROCEDURE — 99999 PR PBB SHADOW E&M-EST. PATIENT-LVL IV: ICD-10-PCS | Mod: PBBFAC,HCNC,, | Performed by: NURSE PRACTITIONER

## 2023-03-24 PROCEDURE — 1159F MED LIST DOCD IN RCRD: CPT | Mod: HCNC,CPTII,S$GLB, | Performed by: NURSE PRACTITIONER

## 2023-03-24 PROCEDURE — 99999 PR PBB SHADOW E&M-EST. PATIENT-LVL IV: CPT | Mod: PBBFAC,HCNC,, | Performed by: NURSE PRACTITIONER

## 2023-03-24 RX ORDER — CITALOPRAM 10 MG/1
10 TABLET ORAL DAILY
Qty: 30 TABLET | Refills: 11 | Status: SHIPPED | OUTPATIENT
Start: 2023-03-24 | End: 2024-01-25

## 2023-03-24 RX ORDER — ALPRAZOLAM 0.25 MG/1
TABLET ORAL
Qty: 30 TABLET | Refills: 2 | Status: SHIPPED | OUTPATIENT
Start: 2023-03-24 | End: 2023-06-27
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
## 2023-03-24 NOTE — ASSESSMENT & PLAN NOTE
Felt that 20 mg of Celexa was too strong and made her sleep too much so has not been taking for past month.  Decreasing dose of 10 mg to see if this helps with anxiety, but not have as significant of sedation.  Xanax p.r.n.  reviewed.

## 2023-03-26 DIAGNOSIS — N30.00 ACUTE CYSTITIS WITHOUT HEMATURIA: Primary | ICD-10-CM

## 2023-03-26 RX ORDER — NITROFURANTOIN 25; 75 MG/1; MG/1
100 CAPSULE ORAL 2 TIMES DAILY
Qty: 10 CAPSULE | Refills: 0 | Status: SHIPPED | OUTPATIENT
Start: 2023-03-26 | End: 2023-03-31

## 2023-03-27 ENCOUNTER — TELEPHONE (OUTPATIENT)
Dept: INTERNAL MEDICINE | Facility: CLINIC | Age: 78
End: 2023-03-27
Payer: MEDICARE

## 2023-04-19 ENCOUNTER — OFFICE VISIT (OUTPATIENT)
Dept: OPHTHALMOLOGY | Facility: CLINIC | Age: 78
End: 2023-04-19
Payer: MEDICARE

## 2023-04-19 DIAGNOSIS — H25.12 NUCLEAR SCLEROSIS OF LEFT EYE: ICD-10-CM

## 2023-04-19 DIAGNOSIS — Z91.199 NON-COMPLIANCE WITH TREATMENT: ICD-10-CM

## 2023-04-19 DIAGNOSIS — H35.351 CME (CYSTOID MACULAR EDEMA), RIGHT: ICD-10-CM

## 2023-04-19 DIAGNOSIS — H40.1133 PRIMARY OPEN ANGLE GLAUCOMA (POAG) OF BOTH EYES, SEVERE STAGE: Primary | ICD-10-CM

## 2023-04-19 DIAGNOSIS — Z98.41 CATARACT EXTRACTION STATUS OF EYE, RIGHT: ICD-10-CM

## 2023-04-19 PROCEDURE — 1160F PR REVIEW ALL MEDS BY PRESCRIBER/CLIN PHARMACIST DOCUMENTED: ICD-10-PCS | Mod: HCNC,CPTII,S$GLB, | Performed by: OPHTHALMOLOGY

## 2023-04-19 PROCEDURE — 99214 PR OFFICE/OUTPT VISIT, EST, LEVL IV, 30-39 MIN: ICD-10-PCS | Mod: HCNC,S$GLB,, | Performed by: OPHTHALMOLOGY

## 2023-04-19 PROCEDURE — 92083 EXTENDED VISUAL FIELD XM: CPT | Mod: HCNC,S$GLB,, | Performed by: OPHTHALMOLOGY

## 2023-04-19 PROCEDURE — 92083 HUMPHREY VISUAL FIELD - OU - BOTH EYES: ICD-10-PCS | Mod: HCNC,S$GLB,, | Performed by: OPHTHALMOLOGY

## 2023-04-19 PROCEDURE — 99214 OFFICE O/P EST MOD 30 MIN: CPT | Mod: HCNC,S$GLB,, | Performed by: OPHTHALMOLOGY

## 2023-04-19 PROCEDURE — 99999 PR PBB SHADOW E&M-EST. PATIENT-LVL III: CPT | Mod: PBBFAC,HCNC,, | Performed by: OPHTHALMOLOGY

## 2023-04-19 PROCEDURE — 99999 PR PBB SHADOW E&M-EST. PATIENT-LVL III: ICD-10-PCS | Mod: PBBFAC,HCNC,, | Performed by: OPHTHALMOLOGY

## 2023-04-19 PROCEDURE — 1159F MED LIST DOCD IN RCRD: CPT | Mod: HCNC,CPTII,S$GLB, | Performed by: OPHTHALMOLOGY

## 2023-04-19 PROCEDURE — 1159F PR MEDICATION LIST DOCUMENTED IN MEDICAL RECORD: ICD-10-PCS | Mod: HCNC,CPTII,S$GLB, | Performed by: OPHTHALMOLOGY

## 2023-04-19 PROCEDURE — 1160F RVW MEDS BY RX/DR IN RCRD: CPT | Mod: HCNC,CPTII,S$GLB, | Performed by: OPHTHALMOLOGY

## 2023-04-19 NOTE — PROGRESS NOTES
HPI     Glaucoma            Comments: Pt reports for HVF and IOP check. Denies any pain or   irritation. Va stable. Compliant with gtts, but only using latanoprost OD   only, not OU. No longer using diamox.           Comments    1. Coag severe stage   XEN OD 10/13/2022  [facility] OD 11/4/2022 ( 0.5ml)  2. NS OS *Will do dropless/ plan for OMNI 360  PCIOL OD 1/25/22 W/ Gonio- complex  Yag OD 5-3-22 by [facility] since Dr Pitts was out that visit       Massage TID 30 sec OD      Lotemax BID OD    Keto QID OD   Latanoprost qhs OU  Brimonidine BID OU  Dorzolamide / Timolol BID OU  1/2 pill of Diamox at bedtime (d/c)            Last edited by Shorty Hu on 4/19/2023  1:37 PM.            Assessment /Plan     For exam results, see Encounter Report.      ICD-10-CM ICD-9-CM    1. Primary open angle glaucoma (POAG) of both eyes, severe stage  H40.1133 365.11 Marquez Visual Field - OU - Extended - Both Eyes    IOP not within acceptable range relative to target IOP with risk of irreversible visual loss. Additional treatment required.  Discussed options, risks, and benefits of additional medication, SLT laser, or incisional glaucoma surgery.     Recommend BV w/ rip chord OU (OS needs PHACO BV)    2nd opinion was offered today    Patient declines surgical intervention     Reviewed importance of continued compliance with treatment and follow up.      365.73       2. CME (cystoid macular edema), right  H35.351 362.53 Resolved on OCT       3. Cataract extraction status of eye, right  Z98.41 V45.61 Monitor       4. Nuclear sclerosis of left eye  H25.12 366.16 Needs PHACO BV      5. Non-compliance with treatment  Z91.199 V15.81           Pt defers 1 week appt and wishes to come in 3 weeks     Rhopressa qd OU  Latanoprost qhs OU  Brimonidine BID OU  Dorzolamide / Timolol BID OU

## 2023-04-21 ENCOUNTER — TELEPHONE (OUTPATIENT)
Dept: OPHTHALMOLOGY | Facility: CLINIC | Age: 78
End: 2023-04-21
Payer: MEDICARE

## 2023-04-21 NOTE — TELEPHONE ENCOUNTER
Resume Lotemax OD per [facility], pt having recurrent iritis symptoms    ----- Message from Dawn Herrera sent at 4/21/2023  8:32 AM CDT -----  Pt would like to consult with a nurse in regards to medication she was taken off of due to having surgery. Pt stated that her eyes are red and painful. Please call pt back  at [phone]. Thanks

## 2023-05-10 ENCOUNTER — OFFICE VISIT (OUTPATIENT)
Dept: OPHTHALMOLOGY | Facility: CLINIC | Age: 78
End: 2023-05-10
Payer: MEDICARE

## 2023-05-10 DIAGNOSIS — Z91.199 NON-COMPLIANCE WITH TREATMENT: ICD-10-CM

## 2023-05-10 DIAGNOSIS — H40.1133 PRIMARY OPEN ANGLE GLAUCOMA (POAG) OF BOTH EYES, SEVERE STAGE: Primary | ICD-10-CM

## 2023-05-10 DIAGNOSIS — H35.351 CME (CYSTOID MACULAR EDEMA), RIGHT: ICD-10-CM

## 2023-05-10 DIAGNOSIS — H25.12 NUCLEAR SCLEROSIS OF LEFT EYE: ICD-10-CM

## 2023-05-10 DIAGNOSIS — Z98.41 CATARACT EXTRACTION STATUS OF EYE, RIGHT: ICD-10-CM

## 2023-05-10 PROCEDURE — 99999 PR PBB SHADOW E&M-EST. PATIENT-LVL III: CPT | Mod: PBBFAC,,, | Performed by: OPHTHALMOLOGY

## 2023-05-10 PROCEDURE — 1159F PR MEDICATION LIST DOCUMENTED IN MEDICAL RECORD: ICD-10-PCS | Mod: CPTII,S$GLB,, | Performed by: OPHTHALMOLOGY

## 2023-05-10 PROCEDURE — 1159F MED LIST DOCD IN RCRD: CPT | Mod: CPTII,S$GLB,, | Performed by: OPHTHALMOLOGY

## 2023-05-10 PROCEDURE — 99214 OFFICE O/P EST MOD 30 MIN: CPT | Mod: S$GLB,,, | Performed by: OPHTHALMOLOGY

## 2023-05-10 PROCEDURE — 99999 PR PBB SHADOW E&M-EST. PATIENT-LVL III: ICD-10-PCS | Mod: PBBFAC,,, | Performed by: OPHTHALMOLOGY

## 2023-05-10 PROCEDURE — 99214 PR OFFICE/OUTPT VISIT, EST, LEVL IV, 30-39 MIN: ICD-10-PCS | Mod: S$GLB,,, | Performed by: OPHTHALMOLOGY

## 2023-05-10 PROCEDURE — 1160F PR REVIEW ALL MEDS BY PRESCRIBER/CLIN PHARMACIST DOCUMENTED: ICD-10-PCS | Mod: CPTII,S$GLB,, | Performed by: OPHTHALMOLOGY

## 2023-05-10 PROCEDURE — 1160F RVW MEDS BY RX/DR IN RCRD: CPT | Mod: CPTII,S$GLB,, | Performed by: OPHTHALMOLOGY

## 2023-05-10 NOTE — PROGRESS NOTES
HPI     Glaucoma            Comments: Pt reports for 3wk IOP check. Denies any pain but eyes feel   tired after drops. Va blurry. 100% compliant with gtts.    Pt states lotemax/pink cap cost over $198.00         Comments    1. Coag severe stage   XEN OD 10/13/2022  [facility]MC OD 11/4/2022 ( 0.5ml)  2. NS OS *Will do dropless/ plan for OMNI 360  PCIOL OD 1/25/22 W/ Gonio- complex  Yag OD 5-3-22 by [facility] since Dr Pitts was out that visit       Massage TID 30 sec OD    Lotemax OD BID   Rhopressa qd OU  Latanoprost qhs OU  Brimonidine BID OU  Dorzolamide / Timolol BID OU            Last edited by Shorty Hu on 5/10/2023  9:28 AM.            Assessment /Plan     For exam results, see Encounter Report.      ICD-10-CM ICD-9-CM    1. Primary open angle glaucoma (POAG) of both eyes, severe stage  H40.1133 365.11 Iop much better today      365.73       2. CME (cystoid macular edema), right  H35.351 362.53 H/o- use keto ou       3. Cataract extraction status of eye, right  Z98.41 V45.61       4. Nuclear sclerosis of left eye  H25.12 366.16 Follow OS       5. Non-compliance with treatment  Z91.199 V15.81 H/o               Keto QID OU   Lotemax taper OD once daily  until then out then she can stop   Rhopressa qd OU  Latanoprost qhs OU  Brimonidine BID OU  Dorzolamide / Timolol BID OU    RETURN TO CLINIC 6-8 weeks IOP.icare-  Moct and goct

## 2023-05-11 ENCOUNTER — PES CALL (OUTPATIENT)
Dept: ADMINISTRATIVE | Facility: CLINIC | Age: 78
End: 2023-05-11
Payer: MEDICARE

## 2023-06-23 ENCOUNTER — OFFICE VISIT (OUTPATIENT)
Dept: OPHTHALMOLOGY | Facility: CLINIC | Age: 78
End: 2023-06-23
Payer: MEDICARE

## 2023-06-23 ENCOUNTER — TELEPHONE (OUTPATIENT)
Dept: OPHTHALMOLOGY | Facility: CLINIC | Age: 78
End: 2023-06-23
Payer: MEDICARE

## 2023-06-23 DIAGNOSIS — H35.351 CME (CYSTOID MACULAR EDEMA), RIGHT: ICD-10-CM

## 2023-06-23 DIAGNOSIS — Z91.199 NON-COMPLIANCE WITH TREATMENT: ICD-10-CM

## 2023-06-23 DIAGNOSIS — Z98.41 CATARACT EXTRACTION STATUS OF EYE, RIGHT: ICD-10-CM

## 2023-06-23 DIAGNOSIS — H25.12 NUCLEAR SCLEROSIS OF LEFT EYE: ICD-10-CM

## 2023-06-23 DIAGNOSIS — H40.1133 PRIMARY OPEN ANGLE GLAUCOMA (POAG) OF BOTH EYES, SEVERE STAGE: Primary | ICD-10-CM

## 2023-06-23 PROCEDURE — 99214 OFFICE O/P EST MOD 30 MIN: CPT | Mod: HCNC,S$GLB,, | Performed by: OPHTHALMOLOGY

## 2023-06-23 PROCEDURE — 1159F MED LIST DOCD IN RCRD: CPT | Mod: HCNC,CPTII,S$GLB, | Performed by: OPHTHALMOLOGY

## 2023-06-23 PROCEDURE — 92133 POSTERIOR SEGMENT OCT OPTIC NERVE(OCULAR COHERENCE TOMOGRAPHY) - OU - BOTH EYES: ICD-10-PCS | Mod: HCNC,S$GLB,, | Performed by: OPHTHALMOLOGY

## 2023-06-23 PROCEDURE — 99999 PR PBB SHADOW E&M-EST. PATIENT-LVL III: CPT | Mod: PBBFAC,HCNC,, | Performed by: OPHTHALMOLOGY

## 2023-06-23 PROCEDURE — 92133 CPTRZD OPH DX IMG PST SGM ON: CPT | Mod: HCNC,S$GLB,, | Performed by: OPHTHALMOLOGY

## 2023-06-23 PROCEDURE — 1159F PR MEDICATION LIST DOCUMENTED IN MEDICAL RECORD: ICD-10-PCS | Mod: HCNC,CPTII,S$GLB, | Performed by: OPHTHALMOLOGY

## 2023-06-23 PROCEDURE — 99214 PR OFFICE/OUTPT VISIT, EST, LEVL IV, 30-39 MIN: ICD-10-PCS | Mod: HCNC,S$GLB,, | Performed by: OPHTHALMOLOGY

## 2023-06-23 PROCEDURE — 1160F RVW MEDS BY RX/DR IN RCRD: CPT | Mod: HCNC,CPTII,S$GLB, | Performed by: OPHTHALMOLOGY

## 2023-06-23 PROCEDURE — 1160F PR REVIEW ALL MEDS BY PRESCRIBER/CLIN PHARMACIST DOCUMENTED: ICD-10-PCS | Mod: HCNC,CPTII,S$GLB, | Performed by: OPHTHALMOLOGY

## 2023-06-23 PROCEDURE — 99999 PR PBB SHADOW E&M-EST. PATIENT-LVL III: ICD-10-PCS | Mod: PBBFAC,HCNC,, | Performed by: OPHTHALMOLOGY

## 2023-06-23 RX ORDER — KETOROLAC TROMETHAMINE 5 MG/ML
1 SOLUTION OPHTHALMIC 4 TIMES DAILY
Qty: 5 ML | Refills: 3 | Status: SHIPPED | OUTPATIENT
Start: 2023-06-23 | End: 2024-06-22

## 2023-06-23 RX ORDER — LATANOPROST 50 UG/ML
1 SOLUTION/ DROPS OPHTHALMIC DAILY
Qty: 2.5 ML | Refills: 6 | Status: SHIPPED | OUTPATIENT
Start: 2023-06-23 | End: 2024-02-28 | Stop reason: SDUPTHER

## 2023-06-23 NOTE — TELEPHONE ENCOUNTER
----- Message from Marlene Franciscotroynaa sent at 6/23/2023 10:24 AM CDT -----  Contact: pt  Pt is calling in regard to being late for her appt, approx time of arrival 10:35am.  Please call her back at [phone] thanks/mpd

## 2023-06-23 NOTE — PROGRESS NOTES
HPI     Glaucoma            Comments: Pt reports for 6-8wk IOP with [facility] and G/MOCT. Denies any pain   or irritation, OD feels likeVa stable. 100% compliant with gtts.           Comments    1. Coag severe stage   XEN OD 10/13/2022  [facility] OD 11/4/2022 ( 0.5ml)  2. NS OS *Will do dropless/ plan for OMNI 360  PCIOL OD 1/25/22 W/ Gonio- complex  Yag OD 5-3-22 by [facility] since Dr Pitts was out that visit       Massage TID 30 sec OD         Keto QID OU   Rhopressa qd OU  Latanoprost qhs OU  Brimonidine BID OU  Dorzolamide / Timolol BID OU            Last edited by Shorty Hu on 6/23/2023 11:06 AM.            Assessment /Plan     For exam results, see Encounter Report.      ICD-10-CM ICD-9-CM    1. Primary open angle glaucoma (POAG) of both eyes, severe stage  H40.1133 365.11 Posterior Segment OCT Optic Nerve- Both eyes     365.73 latanoprost 0.005 % ophthalmic solution    IOP OS rising, though pt is using Polymyxin in place of Rhopressa in error. Will resume Rhopressa, d/c Polymyxin.     Advised will likely need xen gel OS despite drop use.       2. CME (cystoid macular edema), right  H35.351 362.53 ketorolac 0.5% (ACULAR) 0.5 % Drop      3. Cataract extraction status of eye, right  Z98.41 V45.61 Monitor       4. Nuclear sclerosis of left eye  H25.12 366.16 You were found to have an early cataract in your eye(s) today. However the cataract is not affecting your activities of daily living, such as reading and driving. You do not need surgery at this time. We will recheck your cataract at your next visit. You are welcome to call for an earlier appointment if your vision gets worse.       5. Non-compliance with treatment  Z91.199 V15.81           Return to clinic 3 weeks for IOP, likely to book xen gel at that time      Keto QID OU   Rhopressa qd OU  Latanoprost qhs OU  Brimonidine BID OU  Dorzolamide / Timolol BID OU

## 2023-06-27 DIAGNOSIS — F41.9 CHRONIC ANXIETY: ICD-10-CM

## 2023-06-27 RX ORDER — ALPRAZOLAM 0.25 MG/1
TABLET ORAL
Qty: 30 TABLET | Refills: 2 | Status: SHIPPED | OUTPATIENT
Start: 2023-06-27 | End: 2023-10-16

## 2023-06-27 NOTE — TELEPHONE ENCOUNTER
Refill Routing Note   Medication(s) are not appropriate for processing by [facility] for the following reason(s):      Non-participating provider    [facility] action(s):  Route None identified          Appointments  past 12m or future 3m with PCP    Date Provider   Last Visit   3/24/2023 Marilyn Odonnell NP   Next Visit   Visit date not found Marilyn Odonnell NP   ED visits in past 90 days: 0        Note composed:10:12 AM 06/27/2023

## 2023-07-06 ENCOUNTER — OFFICE VISIT (OUTPATIENT)
Dept: INTERNAL MEDICINE | Facility: CLINIC | Age: 78
End: 2023-07-06
Payer: MEDICARE

## 2023-07-06 VITALS
HEIGHT: 62 IN | TEMPERATURE: 99 F | OXYGEN SATURATION: 99 % | BODY MASS INDEX: 32.22 KG/M2 | DIASTOLIC BLOOD PRESSURE: 72 MMHG | HEART RATE: 66 BPM | WEIGHT: 175.06 LBS | SYSTOLIC BLOOD PRESSURE: 134 MMHG

## 2023-07-06 DIAGNOSIS — I70.0 ATHEROSCLEROSIS OF AORTA: ICD-10-CM

## 2023-07-06 DIAGNOSIS — F32.1 MODERATE MAJOR DEPRESSION: Primary | ICD-10-CM

## 2023-07-06 DIAGNOSIS — I10 ESSENTIAL HYPERTENSION: ICD-10-CM

## 2023-07-06 DIAGNOSIS — N18.31 CHRONIC KIDNEY DISEASE, STAGE 3A: ICD-10-CM

## 2023-07-06 DIAGNOSIS — F41.9 CHRONIC ANXIETY: ICD-10-CM

## 2023-07-06 DIAGNOSIS — M75.121 COMPLETE TEAR OF RIGHT ROTATOR CUFF, UNSPECIFIED WHETHER TRAUMATIC: ICD-10-CM

## 2023-07-06 DIAGNOSIS — H40.1133 PRIMARY OPEN ANGLE GLAUCOMA (POAG) OF BOTH EYES, SEVERE STAGE: ICD-10-CM

## 2023-07-06 PROCEDURE — 3078F DIAST BP <80 MM HG: CPT | Mod: HCNC,CPTII,S$GLB, | Performed by: STUDENT IN AN ORGANIZED HEALTH CARE EDUCATION/TRAINING PROGRAM

## 2023-07-06 PROCEDURE — 3288F PR FALLS RISK ASSESSMENT DOCUMENTED: ICD-10-PCS | Mod: HCNC,CPTII,S$GLB, | Performed by: STUDENT IN AN ORGANIZED HEALTH CARE EDUCATION/TRAINING PROGRAM

## 2023-07-06 PROCEDURE — 1125F AMNT PAIN NOTED PAIN PRSNT: CPT | Mod: HCNC,CPTII,S$GLB, | Performed by: STUDENT IN AN ORGANIZED HEALTH CARE EDUCATION/TRAINING PROGRAM

## 2023-07-06 PROCEDURE — 3075F SYST BP GE 130 - 139MM HG: CPT | Mod: HCNC,CPTII,S$GLB, | Performed by: STUDENT IN AN ORGANIZED HEALTH CARE EDUCATION/TRAINING PROGRAM

## 2023-07-06 PROCEDURE — 99999 PR PBB SHADOW E&M-EST. PATIENT-LVL V: ICD-10-PCS | Mod: PBBFAC,HCNC,, | Performed by: STUDENT IN AN ORGANIZED HEALTH CARE EDUCATION/TRAINING PROGRAM

## 2023-07-06 PROCEDURE — 99999 PR PBB SHADOW E&M-EST. PATIENT-LVL V: CPT | Mod: PBBFAC,HCNC,, | Performed by: STUDENT IN AN ORGANIZED HEALTH CARE EDUCATION/TRAINING PROGRAM

## 2023-07-06 PROCEDURE — 1101F PT FALLS ASSESS-DOCD LE1/YR: CPT | Mod: HCNC,CPTII,S$GLB, | Performed by: STUDENT IN AN ORGANIZED HEALTH CARE EDUCATION/TRAINING PROGRAM

## 2023-07-06 PROCEDURE — 1159F MED LIST DOCD IN RCRD: CPT | Mod: HCNC,CPTII,S$GLB, | Performed by: STUDENT IN AN ORGANIZED HEALTH CARE EDUCATION/TRAINING PROGRAM

## 2023-07-06 PROCEDURE — 1125F PR PAIN SEVERITY QUANTIFIED, PAIN PRESENT: ICD-10-PCS | Mod: HCNC,CPTII,S$GLB, | Performed by: STUDENT IN AN ORGANIZED HEALTH CARE EDUCATION/TRAINING PROGRAM

## 2023-07-06 PROCEDURE — 1101F PR PT FALLS ASSESS DOC 0-1 FALLS W/OUT INJ PAST YR: ICD-10-PCS | Mod: HCNC,CPTII,S$GLB, | Performed by: STUDENT IN AN ORGANIZED HEALTH CARE EDUCATION/TRAINING PROGRAM

## 2023-07-06 PROCEDURE — 3288F FALL RISK ASSESSMENT DOCD: CPT | Mod: HCNC,CPTII,S$GLB, | Performed by: STUDENT IN AN ORGANIZED HEALTH CARE EDUCATION/TRAINING PROGRAM

## 2023-07-06 PROCEDURE — 99213 OFFICE O/P EST LOW 20 MIN: CPT | Mod: HCNC,25,S$GLB, | Performed by: STUDENT IN AN ORGANIZED HEALTH CARE EDUCATION/TRAINING PROGRAM

## 2023-07-06 PROCEDURE — 3075F PR MOST RECENT SYSTOLIC BLOOD PRESS GE 130-139MM HG: ICD-10-PCS | Mod: HCNC,CPTII,S$GLB, | Performed by: STUDENT IN AN ORGANIZED HEALTH CARE EDUCATION/TRAINING PROGRAM

## 2023-07-06 PROCEDURE — 1159F PR MEDICATION LIST DOCUMENTED IN MEDICAL RECORD: ICD-10-PCS | Mod: HCNC,CPTII,S$GLB, | Performed by: STUDENT IN AN ORGANIZED HEALTH CARE EDUCATION/TRAINING PROGRAM

## 2023-07-06 PROCEDURE — 99213 PR OFFICE/OUTPT VISIT, EST, LEVL III, 20-29 MIN: ICD-10-PCS | Mod: HCNC,25,S$GLB, | Performed by: STUDENT IN AN ORGANIZED HEALTH CARE EDUCATION/TRAINING PROGRAM

## 2023-07-06 PROCEDURE — 3078F PR MOST RECENT DIASTOLIC BLOOD PRESSURE < 80 MM HG: ICD-10-PCS | Mod: HCNC,CPTII,S$GLB, | Performed by: STUDENT IN AN ORGANIZED HEALTH CARE EDUCATION/TRAINING PROGRAM

## 2023-07-06 PROCEDURE — 96372 PR INJECTION,THERAP/PROPH/DIAG2ST, IM OR SUBCUT: ICD-10-PCS | Mod: HCNC,S$GLB,, | Performed by: STUDENT IN AN ORGANIZED HEALTH CARE EDUCATION/TRAINING PROGRAM

## 2023-07-06 PROCEDURE — 96372 THER/PROPH/DIAG INJ SC/IM: CPT | Mod: HCNC,S$GLB,, | Performed by: STUDENT IN AN ORGANIZED HEALTH CARE EDUCATION/TRAINING PROGRAM

## 2023-07-06 RX ORDER — KETOROLAC TROMETHAMINE 30 MG/ML
30 INJECTION, SOLUTION INTRAMUSCULAR; INTRAVENOUS
Status: COMPLETED | OUTPATIENT
Start: 2023-07-06 | End: 2023-07-06

## 2023-07-06 RX ADMIN — KETOROLAC TROMETHAMINE 30 MG: 30 INJECTION, SOLUTION INTRAMUSCULAR; INTRAVENOUS at 12:07
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
## 2023-07-14 ENCOUNTER — OFFICE VISIT (OUTPATIENT)
Dept: OPHTHALMOLOGY | Facility: CLINIC | Age: 78
End: 2023-07-14
Payer: MEDICARE

## 2023-07-14 DIAGNOSIS — H40.1133 PRIMARY OPEN ANGLE GLAUCOMA (POAG) OF BOTH EYES, SEVERE STAGE: Primary | ICD-10-CM

## 2023-07-14 DIAGNOSIS — Z91.199 NON-COMPLIANCE WITH TREATMENT: ICD-10-CM

## 2023-07-14 DIAGNOSIS — H35.351 CME (CYSTOID MACULAR EDEMA), RIGHT: ICD-10-CM

## 2023-07-14 DIAGNOSIS — M35.01 KERATOCONJUNCTIVITIS SICCA: ICD-10-CM

## 2023-07-14 DIAGNOSIS — H25.12 NUCLEAR SCLEROSIS OF LEFT EYE: ICD-10-CM

## 2023-07-14 DIAGNOSIS — L93.2 CUTANEOUS LUPUS ERYTHEMATOSUS: ICD-10-CM

## 2023-07-14 DIAGNOSIS — Z98.41 CATARACT EXTRACTION STATUS OF EYE, RIGHT: ICD-10-CM

## 2023-07-14 DIAGNOSIS — H04.553 NLDO, ACQUIRED (NASOLACRIMAL DUCT OBSTRUCTION), BILATERAL: ICD-10-CM

## 2023-07-14 PROCEDURE — 1160F PR REVIEW ALL MEDS BY PRESCRIBER/CLIN PHARMACIST DOCUMENTED: ICD-10-PCS | Mod: CPTII,S$GLB,, | Performed by: OPHTHALMOLOGY

## 2023-07-14 PROCEDURE — 1159F MED LIST DOCD IN RCRD: CPT | Mod: CPTII,S$GLB,, | Performed by: OPHTHALMOLOGY

## 2023-07-14 PROCEDURE — 99999 PR PBB SHADOW E&M-EST. PATIENT-LVL III: ICD-10-PCS | Mod: PBBFAC,HCNC,, | Performed by: OPHTHALMOLOGY

## 2023-07-14 PROCEDURE — 99214 PR OFFICE/OUTPT VISIT, EST, LEVL IV, 30-39 MIN: ICD-10-PCS | Mod: S$GLB,,, | Performed by: OPHTHALMOLOGY

## 2023-07-14 PROCEDURE — 99999 PR PBB SHADOW E&M-EST. PATIENT-LVL III: CPT | Mod: PBBFAC,HCNC,, | Performed by: OPHTHALMOLOGY

## 2023-07-14 PROCEDURE — 1160F RVW MEDS BY RX/DR IN RCRD: CPT | Mod: CPTII,S$GLB,, | Performed by: OPHTHALMOLOGY

## 2023-07-14 PROCEDURE — 1159F PR MEDICATION LIST DOCUMENTED IN MEDICAL RECORD: ICD-10-PCS | Mod: CPTII,S$GLB,, | Performed by: OPHTHALMOLOGY

## 2023-07-14 PROCEDURE — 99214 OFFICE O/P EST MOD 30 MIN: CPT | Mod: S$GLB,,, | Performed by: OPHTHALMOLOGY

## 2023-07-14 RX ORDER — ACETAZOLAMIDE 125 MG/1
125 TABLET ORAL 4 TIMES DAILY
Qty: 120 TABLET | Refills: 11 | Status: ON HOLD | OUTPATIENT
Start: 2023-07-14 | End: 2023-11-03 | Stop reason: SDUPTHER

## 2023-07-14 NOTE — PROGRESS NOTES
HPI     Glaucoma            Comments: Pt reports for 3wk IOP check. Denies any pain or irritation. Va   stable. 100% complaint with gtts.           Comments    1. Coag severe stage   XEN OD 10/13/2022  [facility] OD 11/4/2022 ( 0.5ml)  2. NS OS *Will do dropless/ plan for OMNI 360  PCIOL OD 1/25/22 W/ Gonio- complex  Yag OD 5-3-22 by [facility] since Dr Pitts was out that visit       Massage TID 30 sec OD         Keto QID OU   Rhopressa qd OU  Latanoprost qhs OU  Brimonidine BID OU  Dorzolamide / Timolol BID OU            Last edited by Shorty Hu on 7/14/2023 10:50 AM.            Assessment /Plan     For exam results, see Encounter Report.      ICD-10-CM ICD-9-CM    1. Primary open angle glaucoma (POAG) of both eyes, severe stage  H40.1133 365.11 IOP not within acceptable range relative to target IOP - pt defers surgery at this time   Recommend: phaco with BV OS     Pt defers surgery   Will then start DMX 125mg 1 tablet QID  Pt will move slowly to increase dose to QID      Patient chooses the above     Reviewed importance of continued compliance with treatment and follow up.        365.73       2. Nuclear sclerosis of left eye  H25.12 366.16 Pt desires to follow       3. CME (cystoid macular edema), right  H35.351 362.53 Follow       4. Nldo, acquired (nasolacrimal duct obstruction), bilateral  H04.553 375.56 Follow - would likely need sx in the future if needed       5. Cataract extraction status of eye, right  Z98.41 V45.61       6. Non-compliance with treatment  Z91.199 V15.81       7. Keratoconjunctivitis sicca  M35.01 710.2       8. Cutaneous lupus erythematosus  L93.2 695.4           RETURN TO CLINIC 1 month - will dialte and likely book sx then - pt defers sx now as she states she also needs rotator cuff surgery soon   Start DMX 125mg QID PO   Keto QID OU   Rhopressa qd OU  Latanoprost qhs OU  Brimonidine BID OU  Dorzolamide / Timolol BID OU

## 2023-07-25 ENCOUNTER — TELEPHONE (OUTPATIENT)
Dept: OPHTHALMOLOGY | Facility: CLINIC | Age: 78
End: 2023-07-25
Payer: MEDICARE

## 2023-07-25 NOTE — TELEPHONE ENCOUNTER
Returned patient call twice and each time she said hello and could not hear me, soulded like she was driving and unable to  use her blue tooth to hear me.        ----- Message from Arina Hunter sent at 7/25/2023 11:21 AM CDT -----  Contact: Tea Metcalf is calling to speak to the nurse regarding her scheduled appointment for tomorrow on 07/26, she stated its suppose to be scheduled in August. Please give her a call at [phone]    Thanks  LJ

## 2023-09-01 ENCOUNTER — OFFICE VISIT (OUTPATIENT)
Dept: OPHTHALMOLOGY | Facility: CLINIC | Age: 78
End: 2023-09-01
Payer: MEDICARE

## 2023-09-01 DIAGNOSIS — H40.1133 PRIMARY OPEN ANGLE GLAUCOMA (POAG) OF BOTH EYES, SEVERE STAGE: Primary | ICD-10-CM

## 2023-09-01 PROCEDURE — 1160F RVW MEDS BY RX/DR IN RCRD: CPT | Mod: HCNC,CPTII,S$GLB, | Performed by: OPHTHALMOLOGY

## 2023-09-01 PROCEDURE — 1159F PR MEDICATION LIST DOCUMENTED IN MEDICAL RECORD: ICD-10-PCS | Mod: HCNC,CPTII,S$GLB, | Performed by: OPHTHALMOLOGY

## 2023-09-01 PROCEDURE — 99999 PR PBB SHADOW E&M-EST. PATIENT-LVL III: CPT | Mod: PBBFAC,HCNC,, | Performed by: OPHTHALMOLOGY

## 2023-09-01 PROCEDURE — 99999 PR PBB SHADOW E&M-EST. PATIENT-LVL III: ICD-10-PCS | Mod: PBBFAC,HCNC,, | Performed by: OPHTHALMOLOGY

## 2023-09-01 PROCEDURE — 1159F MED LIST DOCD IN RCRD: CPT | Mod: HCNC,CPTII,S$GLB, | Performed by: OPHTHALMOLOGY

## 2023-09-01 PROCEDURE — 1160F PR REVIEW ALL MEDS BY PRESCRIBER/CLIN PHARMACIST DOCUMENTED: ICD-10-PCS | Mod: HCNC,CPTII,S$GLB, | Performed by: OPHTHALMOLOGY

## 2023-09-01 PROCEDURE — 99213 PR OFFICE/OUTPT VISIT, EST, LEVL III, 20-29 MIN: ICD-10-PCS | Mod: HCNC,S$GLB,, | Performed by: OPHTHALMOLOGY

## 2023-09-01 PROCEDURE — 99213 OFFICE O/P EST LOW 20 MIN: CPT | Mod: HCNC,S$GLB,, | Performed by: OPHTHALMOLOGY

## 2023-09-01 NOTE — PROGRESS NOTES
HPI     Glaucoma            Comments: Pt here for 1m IOP chk. No pain or discomfort. VA stable. 100%   compliant with gtts. Pt has been taking her Diamox TID.          Comments    1. Coag severe stage   XEN OD 10/13/2022  [facility] OD 11/4/2022 ( 0.5ml)  2. NS OS *Will do dropless/ plan for OMNI 360  PCIOL OD 1/25/22 W/ Gonio- complex  [place] [facility]       Massage TID 30 sec OD         Start DMX 125mg QID PO   Keto QID OU   Rhopressa qd OU  Latanoprost qhs OU  Brimonidine BID OU  Dorzolamide / Timolol BID OU            Last edited by Cornelius Michelle MA on 9/1/2023  9:51 AM.            Assessment /Plan     For exam results, see Encounter Report.      ICD-10-CM ICD-9-CM    1. Primary open angle glaucoma (POAG) of both eyes, severe stage  H40.1133 365.11 IOP controlled today on DMX. Pt defers surgery at this time     365.73           Return to clinic 2 months for IOP        DMX 125mg QID PO   Keto QID OU   Rhopressa qd OU  Latanoprost qhs OU  Brimonidine BID OU  Dorzolamide / Timolol BID OU

## 2023-09-12 DIAGNOSIS — H40.1133 PRIMARY OPEN ANGLE GLAUCOMA (POAG) OF BOTH EYES, SEVERE STAGE: ICD-10-CM

## 2023-09-12 RX ORDER — BRIMONIDINE TARTRATE 2 MG/ML
SOLUTION/ DROPS OPHTHALMIC
Qty: 10 ML | Refills: 4 | Status: SHIPPED | OUTPATIENT
Start: 2023-09-12 | End: 2024-02-28 | Stop reason: SDUPTHER

## 2023-10-16 DIAGNOSIS — F41.9 CHRONIC ANXIETY: ICD-10-CM

## 2023-10-16 RX ORDER — ALPRAZOLAM 0.25 MG/1
TABLET ORAL
Qty: 30 TABLET | Refills: 0 | Status: SHIPPED | OUTPATIENT
Start: 2023-10-16 | End: 2024-01-30

## 2023-10-24 DIAGNOSIS — H40.1133 PRIMARY OPEN ANGLE GLAUCOMA (POAG) OF BOTH EYES, SEVERE STAGE: ICD-10-CM

## 2023-10-24 RX ORDER — DORZOLAMIDE HYDROCHLORIDE AND TIMOLOL MALEATE 20; 5 MG/ML; MG/ML
SOLUTION/ DROPS OPHTHALMIC
Qty: 10 ML | Refills: 3 | Status: SHIPPED | OUTPATIENT
Start: 2023-10-24 | End: 2024-02-28 | Stop reason: SDUPTHER

## 2023-10-31 ENCOUNTER — HOSPITAL ENCOUNTER (INPATIENT)
Facility: HOSPITAL | Age: 78
LOS: 2 days | Discharge: HOME | DRG: 683 | End: 2023-11-03
Attending: EMERGENCY MEDICINE | Admitting: STUDENT IN AN ORGANIZED HEALTH CARE EDUCATION/TRAINING PROGRAM
Payer: MEDICARE

## 2023-10-31 DIAGNOSIS — R07.9 CHEST PAIN: ICD-10-CM
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
## 2023-10-31 DIAGNOSIS — H40.1133 PRIMARY OPEN ANGLE GLAUCOMA (POAG) OF BOTH EYES, SEVERE STAGE: ICD-10-CM

## 2023-10-31 DIAGNOSIS — R55 SYNCOPE: ICD-10-CM

## 2023-10-31 DIAGNOSIS — I95.9 HYPOTENSION, UNSPECIFIED HYPOTENSION TYPE: ICD-10-CM

## 2023-10-31 DIAGNOSIS — I21.4 NSTEMI (NON-ST ELEVATED MYOCARDIAL INFARCTION): ICD-10-CM

## 2023-10-31 DIAGNOSIS — R79.89 ELEVATED PROCALCITONIN: ICD-10-CM

## 2023-10-31 DIAGNOSIS — N17.9 AKI (ACUTE KIDNEY INJURY): Primary | ICD-10-CM

## 2023-10-31 LAB
ALBUMIN SERPL BCP-MCNC: 2.9 G/DL (ref 3.5–5.2)
ALP SERPL-CCNC: 96 U/L (ref 55–135)
ALT SERPL W/O P-5'-P-CCNC: 40 U/L (ref 10–44)
ANION GAP SERPL CALC-SCNC: 15 MMOL/L (ref 8–16)
APTT PPP: 26.3 SEC (ref 21–32)
AST SERPL-CCNC: 32 U/L (ref 10–40)
BACTERIA #/AREA URNS HPF: ABNORMAL /HPF
BASOPHILS # BLD AUTO: 0.05 K/UL (ref 0–0.2)
BASOPHILS NFR BLD: 0.3 % (ref 0–1.9)
BILIRUB SERPL-MCNC: 0.9 MG/DL (ref 0.1–1)
BILIRUB UR QL STRIP: ABNORMAL
BNP SERPL-MCNC: 48 PG/ML (ref 0–99)
BUN SERPL-MCNC: 68 MG/DL (ref 8–23)
CALCIUM SERPL-MCNC: 9.5 MG/DL (ref 8.7–10.5)
CHLORIDE SERPL-SCNC: 106 MMOL/L (ref 95–110)
CLARITY UR: ABNORMAL
CO2 SERPL-SCNC: 17 MMOL/L (ref 23–29)
COLOR UR: YELLOW
CREAT SERPL-MCNC: 3.5 MG/DL (ref 0.5–1.4)
D DIMER PPP IA.FEU-MCNC: 4.68 MG/L FEU
DIFFERENTIAL METHOD: ABNORMAL
EOSINOPHIL # BLD AUTO: 0 K/UL (ref 0–0.5)
EOSINOPHIL NFR BLD: 0.2 % (ref 0–8)
ERYTHROCYTE [DISTWIDTH] IN BLOOD BY AUTOMATED COUNT: 16 % (ref 11.5–14.5)
EST. GFR  (NO RACE VARIABLE): 13 ML/MIN/1.73 M^2
GLUCOSE SERPL-MCNC: 88 MG/DL (ref 70–110)
GLUCOSE UR QL STRIP: NEGATIVE
HCT VFR BLD AUTO: 35.9 % (ref 37–48.5)
HGB BLD-MCNC: 11.1 G/DL (ref 12–16)
HGB UR QL STRIP: NEGATIVE
HYALINE CASTS #/AREA URNS LPF: 73 /LPF
IMM GRANULOCYTES # BLD AUTO: 0.07 K/UL (ref 0–0.04)
IMM GRANULOCYTES NFR BLD AUTO: 0.5 % (ref 0–0.5)
INR PPP: 1.1 (ref 0.8–1.2)
KETONES UR QL STRIP: ABNORMAL
LACTATE SERPL-SCNC: 0.8 MMOL/L (ref 0.5–2.2)
LACTATE SERPL-SCNC: 1 MMOL/L (ref 0.5–2.2)
LEUKOCYTE ESTERASE UR QL STRIP: NEGATIVE
LIPASE SERPL-CCNC: 13 U/L (ref 4–60)
LYMPHOCYTES # BLD AUTO: 3.2 K/UL (ref 1–4.8)
LYMPHOCYTES NFR BLD: 22.2 % (ref 18–48)
MAGNESIUM SERPL-MCNC: 2.1 MG/DL (ref 1.6–2.6)
MCH RBC QN AUTO: 22.2 PG (ref 27–31)
MCHC RBC AUTO-ENTMCNC: 30.9 G/DL (ref 32–36)
MCV RBC AUTO: 72 FL (ref 82–98)
MICROSCOPIC COMMENT: ABNORMAL
MONOCYTES # BLD AUTO: 1.6 K/UL (ref 0.3–1)
MONOCYTES NFR BLD: 11.1 % (ref 4–15)
NEUTROPHILS # BLD AUTO: 9.5 K/UL (ref 1.8–7.7)
NEUTROPHILS NFR BLD: 65.7 % (ref 38–73)
NITRITE UR QL STRIP: NEGATIVE
NRBC BLD-RTO: 0 /100 WBC
PH UR STRIP: 5 [PH] (ref 5–8)
PHOSPHATE SERPL-MCNC: 3.2 MG/DL (ref 2.7–4.5)
PLATELET # BLD AUTO: 369 K/UL (ref 150–450)
PMV BLD AUTO: 10.2 FL (ref 9.2–12.9)
POTASSIUM SERPL-SCNC: 3.5 MMOL/L (ref 3.5–5.1)
PROCALCITONIN SERPL IA-MCNC: 2.08 NG/ML
PROT SERPL-MCNC: 7.4 G/DL (ref 6–8.4)
PROT UR QL STRIP: ABNORMAL
PROTHROMBIN TIME: 11.8 SEC (ref 9–12.5)
RBC # BLD AUTO: 4.99 M/UL (ref 4–5.4)
RBC #/AREA URNS HPF: 2 /HPF (ref 0–4)
SODIUM SERPL-SCNC: 138 MMOL/L (ref 136–145)
SP GR UR STRIP: 1.02 (ref 1–1.03)
SQUAMOUS #/AREA URNS HPF: 1 /HPF
TROPONIN I SERPL DL<=0.01 NG/ML-MCNC: 0.04 NG/ML (ref 0–0.03)
TSH SERPL DL<=0.005 MIU/L-ACNC: 0.45 UIU/ML (ref 0.4–4)
URN SPEC COLLECT METH UR: ABNORMAL
UROBILINOGEN UR STRIP-ACNC: ABNORMAL EU/DL
WBC # BLD AUTO: 14.53 K/UL (ref 3.9–12.7)
WBC #/AREA URNS HPF: 4 /HPF (ref 0–5)

## 2023-10-31 PROCEDURE — 84484 ASSAY OF TROPONIN QUANT: CPT | Mod: HCNC | Performed by: EMERGENCY MEDICINE

## 2023-10-31 PROCEDURE — 93010 EKG 12-LEAD: ICD-10-PCS | Mod: HCNC,,, | Performed by: INTERNAL MEDICINE

## 2023-10-31 PROCEDURE — 81000 URINALYSIS NONAUTO W/SCOPE: CPT | Mod: HCNC | Performed by: EMERGENCY MEDICINE

## 2023-10-31 PROCEDURE — 83735 ASSAY OF MAGNESIUM: CPT | Mod: HCNC | Performed by: EMERGENCY MEDICINE

## 2023-10-31 PROCEDURE — 85025 COMPLETE CBC W/AUTO DIFF WBC: CPT | Mod: HCNC | Performed by: EMERGENCY MEDICINE

## 2023-10-31 PROCEDURE — 84100 ASSAY OF PHOSPHORUS: CPT | Mod: HCNC | Performed by: EMERGENCY MEDICINE

## 2023-10-31 PROCEDURE — 87040 BLOOD CULTURE FOR BACTERIA: CPT | Mod: HCNC | Performed by: EMERGENCY MEDICINE

## 2023-10-31 PROCEDURE — 93005 ELECTROCARDIOGRAM TRACING: CPT | Mod: HCNC

## 2023-10-31 PROCEDURE — 96361 HYDRATE IV INFUSION ADD-ON: CPT | Mod: HCNC

## 2023-10-31 PROCEDURE — 84145 PROCALCITONIN (PCT): CPT | Mod: HCNC | Performed by: EMERGENCY MEDICINE

## 2023-10-31 PROCEDURE — 83690 ASSAY OF LIPASE: CPT | Mod: HCNC | Performed by: EMERGENCY MEDICINE

## 2023-10-31 PROCEDURE — 84443 ASSAY THYROID STIM HORMONE: CPT | Mod: HCNC | Performed by: EMERGENCY MEDICINE

## 2023-10-31 PROCEDURE — 25000003 PHARM REV CODE 250: Mod: HCNC | Performed by: EMERGENCY MEDICINE

## 2023-10-31 PROCEDURE — 83880 ASSAY OF NATRIURETIC PEPTIDE: CPT | Mod: HCNC | Performed by: EMERGENCY MEDICINE

## 2023-10-31 PROCEDURE — 83605 ASSAY OF LACTIC ACID: CPT | Mod: 91,HCNC | Performed by: EMERGENCY MEDICINE

## 2023-10-31 PROCEDURE — 80053 COMPREHEN METABOLIC PANEL: CPT | Mod: HCNC | Performed by: EMERGENCY MEDICINE

## 2023-10-31 PROCEDURE — 93010 ELECTROCARDIOGRAM REPORT: CPT | Mod: HCNC,,, | Performed by: INTERNAL MEDICINE

## 2023-10-31 PROCEDURE — 85379 FIBRIN DEGRADATION QUANT: CPT | Mod: HCNC | Performed by: EMERGENCY MEDICINE

## 2023-10-31 PROCEDURE — 85610 PROTHROMBIN TIME: CPT | Mod: HCNC | Performed by: EMERGENCY MEDICINE

## 2023-10-31 PROCEDURE — 96360 HYDRATION IV INFUSION INIT: CPT | Mod: HCNC

## 2023-10-31 PROCEDURE — 85730 THROMBOPLASTIN TIME PARTIAL: CPT | Mod: HCNC | Performed by: EMERGENCY MEDICINE

## 2023-10-31 RX ADMIN — SODIUM CHLORIDE 1503 ML: 9 INJECTION, SOLUTION INTRAVENOUS at 09:10

## 2023-10-31 RX ADMIN — SODIUM CHLORIDE 1000 ML: 9 INJECTION, SOLUTION INTRAVENOUS at 08:10

## 2023-10-31 NOTE — Clinical Note
Diagnosis: CORA (acute kidney injury) [[id number]]   Admitting Provider:: JOSEFINA WEAVER [[id number]]   Reason for IP Medical Treatment  (Clinical interventions that can only be accomplished in the IP setting? ) :: cora   I certify that Inpatient services for greater than or equal to 2 midnights are medically necessary:: Yes   Plans for Post-Acute care--if anticipated (pick the single best option):: C. Discharge home with home health services

## 2023-11-01 ENCOUNTER — TELEPHONE (OUTPATIENT)
Dept: PSYCHIATRY | Facility: CLINIC | Age: 78
End: 2023-11-01
Payer: MEDICARE

## 2023-11-01 PROBLEM — R29.6 FREQUENT FALLS: Status: ACTIVE | Noted: 2023-11-01

## 2023-11-01 PROBLEM — Z79.899 POLYPHARMACY: Status: ACTIVE | Noted: 2023-11-01

## 2023-11-01 PROBLEM — R55 SYNCOPE: Status: ACTIVE | Noted: 2023-11-01

## 2023-11-01 PROBLEM — I95.9 HYPOTENSION: Status: ACTIVE | Noted: 2023-11-01

## 2023-11-01 LAB
ALBUMIN SERPL BCP-MCNC: 2.7 G/DL (ref 3.5–5.2)
ALP SERPL-CCNC: 91 U/L (ref 55–135)
ALT SERPL W/O P-5'-P-CCNC: 35 U/L (ref 10–44)
ANION GAP SERPL CALC-SCNC: 14 MMOL/L (ref 8–16)
AORTIC ROOT ANNULUS: 2.7 CM
ASCENDING AORTA: 3.36 CM
AST SERPL-CCNC: 30 U/L (ref 10–40)
AV INDEX (PROSTH): 0.73
AV MEAN GRADIENT: 3 MMHG
AV PEAK GRADIENT: 6 MMHG
AV VALVE AREA BY VELOCITY RATIO: 2.04 CM²
AV VALVE AREA: 2.12 CM²
AV VELOCITY RATIO: 0.71
BASOPHILS # BLD AUTO: 0.06 K/UL (ref 0–0.2)
BASOPHILS NFR BLD: 0.5 % (ref 0–1.9)
BILIRUB SERPL-MCNC: 0.8 MG/DL (ref 0.1–1)
BUN SERPL-MCNC: 70 MG/DL (ref 8–23)
CALCIUM SERPL-MCNC: 9.3 MG/DL (ref 8.7–10.5)
CHLORIDE SERPL-SCNC: 111 MMOL/L (ref 95–110)
CK SERPL-CCNC: 215 U/L (ref 20–180)
CO2 SERPL-SCNC: 15 MMOL/L (ref 23–29)
CREAT SERPL-MCNC: 2.6 MG/DL (ref 0.5–1.4)
CV ECHO LV RWT: 0.71 CM
DIFFERENTIAL METHOD: ABNORMAL
DOP CALC AO PEAK VEL: 1.19 M/S
DOP CALC AO VTI: 24.6 CM
DOP CALC LVOT AREA: 2.9 CM2
DOP CALC LVOT DIAMETER: 1.92 CM
DOP CALC LVOT PEAK VEL: 0.84 M/S
DOP CALC LVOT STROKE VOLUME: 52.09 CM3
DOP CALC RVOT PEAK VEL: 0.87 M/S
DOP CALC RVOT VTI: 18.2 CM
DOP CALCLVOT PEAK VEL VTI: 18 CM
E WAVE DECELERATION TIME: 244.22 MSEC
E/A RATIO: 0.57
E/E' RATIO: 8.17 M/S
ECHO LV POSTERIOR WALL: 1.12 CM (ref 0.6–1.1)
EOSINOPHIL # BLD AUTO: 0.1 K/UL (ref 0–0.5)
EOSINOPHIL NFR BLD: 1.1 % (ref 0–8)
ERYTHROCYTE [DISTWIDTH] IN BLOOD BY AUTOMATED COUNT: 16.2 % (ref 11.5–14.5)
EST. GFR  (NO RACE VARIABLE): 18 ML/MIN/1.73 M^2
FRACTIONAL SHORTENING: 30 % (ref 28–44)
GLUCOSE SERPL-MCNC: 71 MG/DL (ref 70–110)
HCT VFR BLD AUTO: 35.8 % (ref 37–48.5)
HGB BLD-MCNC: 10.6 G/DL (ref 12–16)
IMM GRANULOCYTES # BLD AUTO: 0.06 K/UL (ref 0–0.04)
IMM GRANULOCYTES NFR BLD AUTO: 0.5 % (ref 0–0.5)
INTERVENTRICULAR SEPTUM: 1.36 CM (ref 0.6–1.1)
IVC DIAMETER: 1.67 CM
LA MAJOR: 5.67 CM
LA MINOR: 5.15 CM
LA WIDTH: 3.1 CM
LEFT ATRIUM SIZE: 2.47 CM
LEFT ATRIUM VOLUME INDEX: 19.1 ML/M2
LEFT ATRIUM VOLUME: 35.13 CM3
LEFT INTERNAL DIMENSION IN SYSTOLE: 2.22 CM (ref 2.1–4)
LEFT VENTRICLE DIASTOLIC VOLUME INDEX: 21.54 ML/M2
LEFT VENTRICLE DIASTOLIC VOLUME: 39.64 ML
LEFT VENTRICLE MASS INDEX: 67 G/M2
LEFT VENTRICLE SYSTOLIC VOLUME INDEX: 9 ML/M2
LEFT VENTRICLE SYSTOLIC VOLUME: 16.64 ML
LEFT VENTRICULAR INTERNAL DIMENSION IN DIASTOLE: 3.16 CM (ref 3.5–6)
LEFT VENTRICULAR MASS: 123.61 G
LV LATERAL E/E' RATIO: 8.17 M/S
LV SEPTAL E/E' RATIO: 8.17 M/S
LVOT MG: 1.76 MMHG
LVOT MV: 0.63 CM/S
LYMPHOCYTES # BLD AUTO: 2.8 K/UL (ref 1–4.8)
LYMPHOCYTES NFR BLD: 24.7 % (ref 18–48)
MAGNESIUM SERPL-MCNC: 2 MG/DL (ref 1.6–2.6)
MCH RBC QN AUTO: 21.9 PG (ref 27–31)
MCHC RBC AUTO-ENTMCNC: 29.6 G/DL (ref 32–36)
MCV RBC AUTO: 74 FL (ref 82–98)
MONOCYTES # BLD AUTO: 1.4 K/UL (ref 0.3–1)
MONOCYTES NFR BLD: 12.2 % (ref 4–15)
MV PEAK A VEL: 0.86 M/S
MV PEAK E VEL: 0.49 M/S
MV STENOSIS PRESSURE HALF TIME: 70.82 MS
MV VALVE AREA P 1/2 METHOD: 3.11 CM2
NEUTROPHILS # BLD AUTO: 6.9 K/UL (ref 1.8–7.7)
NEUTROPHILS NFR BLD: 61 % (ref 38–73)
NRBC BLD-RTO: 0 /100 WBC
PHOSPHATE SERPL-MCNC: 3 MG/DL (ref 2.7–4.5)
PISA TR MAX VEL: 1.93 M/S
PLATELET # BLD AUTO: 325 K/UL (ref 150–450)
PMV BLD AUTO: 10.1 FL (ref 9.2–12.9)
POTASSIUM SERPL-SCNC: 3.5 MMOL/L (ref 3.5–5.1)
PROT SERPL-MCNC: 6.8 G/DL (ref 6–8.4)
PV MEAN GRADIENT: 1 MMHG
PV PEAK GRADIENT: 4 MMHG
PV PEAK VELOCITY: 1.03 M/S
RA MAJOR: 4.69 CM
RA PRESSURE ESTIMATED: 3 MMHG
RBC # BLD AUTO: 4.85 M/UL (ref 4–5.4)
RIGHT VENTRICULAR END-DIASTOLIC DIMENSION: 3.63 CM
RV TB RVSP: 5 MMHG
SODIUM SERPL-SCNC: 140 MMOL/L (ref 136–145)
STJ: 3.08 CM
TDI LATERAL: 0.06 M/S
TDI SEPTAL: 0.06 M/S
TDI: 0.06 M/S
TR MAX PG: 15 MMHG
TROPONIN I SERPL DL<=0.01 NG/ML-MCNC: 0.02 NG/ML (ref 0–0.03)
TROPONIN I SERPL DL<=0.01 NG/ML-MCNC: 0.02 NG/ML (ref 0–0.03)
TROPONIN I SERPL DL<=0.01 NG/ML-MCNC: 0.03 NG/ML (ref 0–0.03)
TV REST PULMONARY ARTERY PRESSURE: 18 MMHG
WBC # BLD AUTO: 11.32 K/UL (ref 3.9–12.7)
Z-SCORE OF LEFT VENTRICULAR DIMENSION IN END DIASTOLE: -4.8
Z-SCORE OF LEFT VENTRICULAR DIMENSION IN END SYSTOLE: -2.77

## 2023-11-01 PROCEDURE — 82550 ASSAY OF CK (CPK): CPT | Mod: HCNC | Performed by: NURSE PRACTITIONER

## 2023-11-01 PROCEDURE — 11000001 HC ACUTE MED/SURG PRIVATE ROOM: Mod: HCNC

## 2023-11-01 PROCEDURE — 63600175 PHARM REV CODE 636 W HCPCS: Mod: HCNC | Performed by: NURSE PRACTITIONER

## 2023-11-01 PROCEDURE — 84484 ASSAY OF TROPONIN QUANT: CPT | Mod: 91,HCNC | Performed by: NURSE PRACTITIONER

## 2023-11-01 PROCEDURE — 83735 ASSAY OF MAGNESIUM: CPT | Mod: HCNC | Performed by: NURSE PRACTITIONER

## 2023-11-01 PROCEDURE — 97166 OT EVAL MOD COMPLEX 45 MIN: CPT | Mod: HCNC

## 2023-11-01 PROCEDURE — 97530 THERAPEUTIC ACTIVITIES: CPT | Mod: HCNC

## 2023-11-01 PROCEDURE — 97162 PT EVAL MOD COMPLEX 30 MIN: CPT | Mod: HCNC

## 2023-11-01 PROCEDURE — 25000003 PHARM REV CODE 250: Mod: HCNC | Performed by: NURSE PRACTITIONER

## 2023-11-01 PROCEDURE — 63600175 PHARM REV CODE 636 W HCPCS: Mod: HCNC | Performed by: STUDENT IN AN ORGANIZED HEALTH CARE EDUCATION/TRAINING PROGRAM

## 2023-11-01 PROCEDURE — 99291 CRITICAL CARE FIRST HOUR: CPT | Mod: HCNC

## 2023-11-01 PROCEDURE — 36415 COLL VENOUS BLD VENIPUNCTURE: CPT | Mod: HCNC | Performed by: NURSE PRACTITIONER

## 2023-11-01 PROCEDURE — 80053 COMPREHEN METABOLIC PANEL: CPT | Mod: HCNC | Performed by: NURSE PRACTITIONER

## 2023-11-01 PROCEDURE — 25000003 PHARM REV CODE 250: Mod: HCNC | Performed by: EMERGENCY MEDICINE

## 2023-11-01 PROCEDURE — 85025 COMPLETE CBC W/AUTO DIFF WBC: CPT | Mod: HCNC | Performed by: NURSE PRACTITIONER

## 2023-11-01 PROCEDURE — 84100 ASSAY OF PHOSPHORUS: CPT | Mod: HCNC | Performed by: NURSE PRACTITIONER

## 2023-11-01 RX ORDER — SODIUM CHLORIDE, SODIUM LACTATE, POTASSIUM CHLORIDE, CALCIUM CHLORIDE 600; 310; 30; 20 MG/100ML; MG/100ML; MG/100ML; MG/100ML
INJECTION, SOLUTION INTRAVENOUS CONTINUOUS
Status: DISCONTINUED | OUTPATIENT
Start: 2023-11-01 | End: 2023-11-03

## 2023-11-01 RX ORDER — PROCHLORPERAZINE EDISYLATE 5 MG/ML
5 INJECTION INTRAMUSCULAR; INTRAVENOUS EVERY 6 HOURS PRN
Status: DISCONTINUED | OUTPATIENT
Start: 2023-11-01 | End: 2023-11-03 | Stop reason: HOSPADM

## 2023-11-01 RX ORDER — ACETAMINOPHEN 325 MG/1
650 TABLET ORAL EVERY 4 HOURS PRN
Status: DISCONTINUED | OUTPATIENT
Start: 2023-11-01 | End: 2023-11-03 | Stop reason: HOSPADM

## 2023-11-01 RX ORDER — POLYETHYLENE GLYCOL 3350 17 G/17G
17 POWDER, FOR SOLUTION ORAL 2 TIMES DAILY PRN
Status: DISCONTINUED | OUTPATIENT
Start: 2023-11-01 | End: 2023-11-03 | Stop reason: HOSPADM

## 2023-11-01 RX ORDER — TALC
6 POWDER (GRAM) TOPICAL NIGHTLY PRN
Status: DISCONTINUED | OUTPATIENT
Start: 2023-11-01 | End: 2023-11-03 | Stop reason: HOSPADM

## 2023-11-01 RX ORDER — IBUPROFEN 200 MG
24 TABLET ORAL
Status: DISCONTINUED | OUTPATIENT
Start: 2023-11-01 | End: 2023-11-03 | Stop reason: HOSPADM

## 2023-11-01 RX ORDER — SIMETHICONE 80 MG
1 TABLET,CHEWABLE ORAL 4 TIMES DAILY PRN
Status: DISCONTINUED | OUTPATIENT
Start: 2023-11-01 | End: 2023-11-03 | Stop reason: HOSPADM

## 2023-11-01 RX ORDER — GLUCAGON 1 MG
1 KIT INJECTION
Status: DISCONTINUED | OUTPATIENT
Start: 2023-11-01 | End: 2023-11-03 | Stop reason: HOSPADM

## 2023-11-01 RX ORDER — HYDROCODONE BITARTRATE AND ACETAMINOPHEN 5; 325 MG/1; MG/1
1 TABLET ORAL
Status: COMPLETED | OUTPATIENT
Start: 2023-11-01 | End: 2023-11-01

## 2023-11-01 RX ORDER — ENOXAPARIN SODIUM 100 MG/ML
30 INJECTION SUBCUTANEOUS EVERY 24 HOURS
Status: DISCONTINUED | OUTPATIENT
Start: 2023-11-01 | End: 2023-11-03

## 2023-11-01 RX ORDER — CITALOPRAM 10 MG/1
10 TABLET ORAL DAILY
Status: DISCONTINUED | OUTPATIENT
Start: 2023-11-01 | End: 2023-11-03 | Stop reason: HOSPADM

## 2023-11-01 RX ORDER — LATANOPROST 50 UG/ML
1 SOLUTION/ DROPS OPHTHALMIC DAILY
Status: DISCONTINUED | OUTPATIENT
Start: 2023-11-01 | End: 2023-11-03 | Stop reason: HOSPADM

## 2023-11-01 RX ORDER — IBUPROFEN 200 MG
16 TABLET ORAL
Status: DISCONTINUED | OUTPATIENT
Start: 2023-11-01 | End: 2023-11-03 | Stop reason: HOSPADM

## 2023-11-01 RX ORDER — NALOXONE HCL 0.4 MG/ML
0.02 VIAL (ML) INJECTION
Status: DISCONTINUED | OUTPATIENT
Start: 2023-11-01 | End: 2023-11-03 | Stop reason: HOSPADM

## 2023-11-01 RX ORDER — SODIUM CHLORIDE 9 MG/ML
INJECTION, SOLUTION INTRAVENOUS CONTINUOUS
Status: DISCONTINUED | OUTPATIENT
Start: 2023-11-01 | End: 2023-11-01

## 2023-11-01 RX ORDER — ALPRAZOLAM 0.25 MG/1
0.25 TABLET ORAL 2 TIMES DAILY PRN
Status: DISCONTINUED | OUTPATIENT
Start: 2023-11-01 | End: 2023-11-03 | Stop reason: HOSPADM

## 2023-11-01 RX ORDER — AMOXICILLIN 250 MG
1 CAPSULE ORAL 2 TIMES DAILY
Status: DISCONTINUED | OUTPATIENT
Start: 2023-11-01 | End: 2023-11-03 | Stop reason: HOSPADM

## 2023-11-01 RX ORDER — AMITRIPTYLINE HYDROCHLORIDE 50 MG/1
50 TABLET, FILM COATED ORAL NIGHTLY
Status: DISCONTINUED | OUTPATIENT
Start: 2023-11-01 | End: 2023-11-01

## 2023-11-01 RX ORDER — LANOLIN ALCOHOL/MO/W.PET/CERES
800 CREAM (GRAM) TOPICAL
Status: DISCONTINUED | OUTPATIENT
Start: 2023-11-01 | End: 2023-11-01

## 2023-11-01 RX ORDER — ONDANSETRON 2 MG/ML
4 INJECTION INTRAMUSCULAR; INTRAVENOUS EVERY 6 HOURS PRN
Status: DISCONTINUED | OUTPATIENT
Start: 2023-11-01 | End: 2023-11-03 | Stop reason: HOSPADM

## 2023-11-01 RX ORDER — IPRATROPIUM BROMIDE AND ALBUTEROL SULFATE 2.5; .5 MG/3ML; MG/3ML
3 SOLUTION RESPIRATORY (INHALATION) EVERY 6 HOURS PRN
Status: DISCONTINUED | OUTPATIENT
Start: 2023-11-01 | End: 2023-11-03 | Stop reason: HOSPADM

## 2023-11-01 RX ORDER — OXYCODONE AND ACETAMINOPHEN 5; 325 MG/1; MG/1
1 TABLET ORAL EVERY 6 HOURS PRN
Status: DISCONTINUED | OUTPATIENT
Start: 2023-11-01 | End: 2023-11-03 | Stop reason: HOSPADM

## 2023-11-01 RX ORDER — AMITRIPTYLINE HYDROCHLORIDE 25 MG/1
25 TABLET, FILM COATED ORAL NIGHTLY
Status: DISCONTINUED | OUTPATIENT
Start: 2023-11-01 | End: 2023-11-03 | Stop reason: HOSPADM

## 2023-11-01 RX ORDER — SODIUM CHLORIDE 0.9 % (FLUSH) 0.9 %
10 SYRINGE (ML) INJECTION EVERY 8 HOURS PRN
Status: DISCONTINUED | OUTPATIENT
Start: 2023-11-01 | End: 2023-11-03 | Stop reason: HOSPADM

## 2023-11-01 RX ORDER — MAG HYDROX/ALUMINUM HYD/SIMETH 200-200-20
30 SUSPENSION, ORAL (FINAL DOSE FORM) ORAL 4 TIMES DAILY PRN
Status: DISCONTINUED | OUTPATIENT
Start: 2023-11-01 | End: 2023-11-03 | Stop reason: HOSPADM

## 2023-11-01 RX ORDER — PANTOPRAZOLE SODIUM 40 MG/1
40 TABLET, DELAYED RELEASE ORAL DAILY
Status: DISCONTINUED | OUTPATIENT
Start: 2023-11-01 | End: 2023-11-03 | Stop reason: HOSPADM

## 2023-11-01 RX ADMIN — AMITRIPTYLINE HYDROCHLORIDE 25 MG: 25 TABLET, FILM COATED ORAL at 08:11

## 2023-11-01 RX ADMIN — SENNOSIDES AND DOCUSATE SODIUM 1 TABLET: 8.6; 5 TABLET ORAL at 08:11

## 2023-11-01 RX ADMIN — HYDROCODONE BITARTRATE AND ACETAMINOPHEN 1 TABLET: 5; 325 TABLET ORAL at 12:11

## 2023-11-01 RX ADMIN — SODIUM CHLORIDE: 9 INJECTION, SOLUTION INTRAVENOUS at 01:11

## 2023-11-01 RX ADMIN — SODIUM CHLORIDE, POTASSIUM CHLORIDE, SODIUM LACTATE AND CALCIUM CHLORIDE: 600; 310; 30; 20 INJECTION, SOLUTION INTRAVENOUS at 08:11

## 2023-11-01 RX ADMIN — PANTOPRAZOLE SODIUM 40 MG: 40 TABLET, DELAYED RELEASE ORAL at 08:11

## 2023-11-01 RX ADMIN — ALPRAZOLAM 0.25 MG: 0.25 TABLET ORAL at 10:11

## 2023-11-01 RX ADMIN — OXYCODONE HYDROCHLORIDE AND ACETAMINOPHEN 1 TABLET: 5; 325 TABLET ORAL at 05:11

## 2023-11-01 RX ADMIN — LATANOPROST 1 DROP: 50 SOLUTION OPHTHALMIC at 08:11

## 2023-11-01 RX ADMIN — ENOXAPARIN SODIUM 30 MG: 30 INJECTION SUBCUTANEOUS at 04:11

## 2023-11-01 RX ADMIN — OXYCODONE HYDROCHLORIDE AND ACETAMINOPHEN 1 TABLET: 5; 325 TABLET ORAL at 10:11

## 2023-11-01 RX ADMIN — CITALOPRAM HYDROBROMIDE 10 MG: 10 TABLET ORAL at 08:11

## 2023-11-01 RX ADMIN — OXYCODONE HYDROCHLORIDE AND ACETAMINOPHEN 1 TABLET: 5; 325 TABLET ORAL at 11:11

## 2023-11-01 RX ADMIN — ALPRAZOLAM 0.25 MG: 0.25 TABLET ORAL at 01:11

## 2023-11-01 RX ADMIN — AMITRIPTYLINE HYDROCHLORIDE 25 MG: 25 TABLET, FILM COATED ORAL at 01:11

## 2023-11-01 RX ADMIN — SODIUM CHLORIDE, POTASSIUM CHLORIDE, SODIUM LACTATE AND CALCIUM CHLORIDE: 600; 310; 30; 20 INJECTION, SOLUTION INTRAVENOUS at 11:11

## 2023-11-01 NOTE — HPI
The patient is a 79 yo female with CKD3, HTN, Depression, Anxiety, Asthma, Chronic pain (shoulders, back and right knee), Glaucoma, GERD who presented to ED with syncope and frequent falls. Pt's last fall occurred PTA. On EMS arrival, the pt was hypotensive at 63/40 and placed a left tibia IO in which she received IVFs. The pt reports head trauma but denies LOC after this most recent fall. She reports approx 2 weeks ago, she noticed increased fatigue, generalized weakness and decreased appetite. She states that she has fallen 5-6 times over the past week with episodes of syncope. She has chronic pain to her shoulders R>L, right knee, and lower back which have worsened since falls. She also injured her left wrist. Pt states she lives alone. However, today, her daughter witnessed her fall and called 911.   Of note, the pt was recently placed on Diamox 125mg 4 times daily for Glaucoma. (Along with home med Diovan HCT). She states she also has recently increased her Percocet 10mg to 4 times daily.       In the ED, BP 85/54- improved with IVFs, Afebrile, Labs significant for WBC 14, D dimer 4.6, Bicarb 17, BUN 68, Serum Cr 3.5, Trop 0.042, Procalcitonin 2.08, UA unremarkable, CXR clear. EKG showed NSR with nonspecific ST and T wave abnormalities. VQ scan showed low probability for PE. CT head showed nothing acute, CT cervical/thoracic/lumbar spine showed DDD and multilevel spondylosis, nothing acute. Xray bilateral hips showed degenerative changes, nothing acute.

## 2023-11-01 NOTE — ED PROVIDER NOTES
SCRIBE #1 NOTE: I, Aisha Santiago, am scribing for, and in the presence of, Virginia Brown DO. I have scribed the entire note.       History     Chief Complaint   Patient presents with    Fall     Frequent falls over the last few days with intermittent confusion. Foul smelling urine. Hypotensive at 63/40 on [facility] arrival. Pt arrived with IO.     Review of patient's allergies indicates:  No Known Allergies        History of Present Illness     HPI    10/31/2023, 8:14 PM  History obtained from the patient      History of Present Illness: Tea Ring is a 78 y.o. female patient with a PMHx of alpha thalassemia, asthma, clotting disorder, cutaneous lupus erythematosus, and HTN who presents to the Emergency Department via [facility] for evaluation following a fall which occurred PTA. The pt was hypotensive at 63/40 and arrives to the ER with a left tibia IO in place. The pt reports head trauma but denies LOC. She states that she has fallen 5-6 times over the past week. She reports decreased appetite with decreased food and water intake over the past 1-2 months, but she does note that she tries to drink up to 10 Ensures a day. Symptoms are constant and moderate in severity. No mitigating or exacerbating factors reported. Associated sxs include malodorous urine, dysuria, N/V/D, lightheadedness, dizziness, near-syncope, SOB, and chronic right shoulder pain. Patient denies CP and abdominal pain. No prior Tx reported. No further complaints or concerns at this time.       Arrival mode: [facility]    PCP: Marilyn Odonnell NP        Past Medical History:  Past Medical History:   Diagnosis Date    Alpha thalassemia     Asthma     resolved per patient    Cataract     Chronic anxiety     years tid xanax 1mg    Chronic knee pain     Chronic pain of left ankle     Clotting disorder     Cutaneous lupus erythematosus     dr henry derm    Depression     Depression     dr radha hughes previously    Ex-smoker     quit fall 1    Hypertension      Osteopenia  rec       Past Surgical History:  Past Surgical History:   Procedure Laterality Date    ANKLE SURGERY Left     BACK SURGERY      CATARACT EXTRACTION Right     COLONOSCOPY N/A 2017    Procedure: COLONOSCOPY;  Surgeon: Virgil Oliva MD;  Location: [facility] ENDO;  Service: Endoscopy;  Laterality: N/A;    HYSTERECTOMY      INJECTION OF ANESTHETIC AGENT AROUND MEDIAL BRANCH NERVES INNERVATING LUMBAR FACET JOINT Bilateral 2020    Procedure: Bilateral L3-5 MBB;  Surgeon: Yury Moore MD;  Location: [facility] PAIN MGT;  Service: Pain Management;  Laterality: Bilateral;    INJECTION OF ANESTHETIC AGENT AROUND NERVE Bilateral 2020    Procedure: Bilateral Genicular nerve block with RN IV sedation Covid testing day of procedure PT does not drive;  Surgeon: Yury Moore MD;  Location: [facility] PAIN MGT;  Service: Pain Management;  Laterality: Bilateral;    KNEE ARTHROSCOPY      both knees twice    OOPHORECTOMY      RADIOFREQUENCY THERMOCOAGULATION Right 2020    Procedure: Right L3-5 Lumbar RFA;  Surgeon: Yury Moore MD;  Location: [facility] PAIN MGT;  Service: Pain Management;  Laterality: Right;    RADIOFREQUENCY THERMOCOAGULATION Left 2020    Procedure: Left L3-5 Lumbar RFA;  Surgeon: Yury Moore MD;  Location: [facility] PAIN MGT;  Service: Pain Management;  Laterality: Left;         Family History:  Family History   Problem Relation Age of Onset    Diabetes Mother     Diabetes Father     Breast cancer Maternal Aunt        Social History:  Social History     Tobacco Use    Smoking status: Former     Current packs/day: 0.00     Average packs/day: 1 pack/day for 30.0 years (30.0 ttl pk-yrs)     Types: Cigarettes     Start date: 1984     Quit date: 2014     Years since quittin.1    Smokeless tobacco: Never    Tobacco comments:     smoke last cigarette 9/15   Substance and Sexual Activity    Alcohol use: Yes     Alcohol/week: 1.0 standard drink of alcohol     Types: 1  Glasses of wine per week     Comment: 3 times a week    Drug use: No    Sexual activity: Not Currently     Partners: Male        Review of Systems     Review of Systems   Constitutional:  Positive for appetite change (decreased food and water intake).   Respiratory:  Positive for shortness of breath.    Cardiovascular:  Negative for chest pain.   Gastrointestinal:  Positive for diarrhea, nausea and vomiting. Negative for abdominal pain.   Genitourinary:  Positive for dysuria.        [+] malodorous urine   Musculoskeletal:  Positive for arthralgias (right shoulder, chronic), back pain and neck pain.   Neurological:  Positive for dizziness, weakness and light-headedness.      Physical Exam     Initial Vitals   BP Pulse Resp Temp SpO2   10/31/23 1935 10/31/23 1935 10/31/23 1935 10/31/23 1936 10/31/23 1936   (!) 129/99 92 18 98 °F (36.7 °C) 98 %      MAP       --                 Physical Exam  Nursing Notes and Vital Signs Reviewed.  Constitutional: Patient is in no acute distress. Well-developed and well-nourished.  Head: Atraumatic. Normocephalic.  Eyes: PERRL. EOM intact. Conjunctivae are not pale. No scleral icterus.  ENT: Mucous membranes are moist. Oropharynx is clear and symmetric.    Neck: Supple. Full ROM. No lymphadenopathy. Right sided paraspinal cervical tenderness. No cervical midline bony tenderness, deformities, or step-offs.   Cardiovascular: Regular rate. Regular rhythm. No murmurs, rubs, or gallops. Distal pulses are 2+ and symmetric.  Pulmonary/Chest: No respiratory distress. Clear to auscultation bilaterally. No wheezing or rales.  Abdominal: Soft and non-distended.  There is no tenderness.  No rebound, guarding, or rigidity. Good bowel sounds.  Genitourinary: No CVA tenderness  Musculoskeletal: Moves all extremities. No obvious deformities. No edema. No calf tenderness. IO in place in the left tibia.   Back: Midline lumbar spinal tenderness. No deformities or step-offs of the T-spine or L-spine.  Skin appears normal without abrasions or bruising. No erythema, induration, or fluctuance.   Skin: Warm and dry.  Pelvis stable and nontender.  Neurological:  Alert, awake, and appropriate.  Normal speech. 4/5 strength to the LUE, 5/5 strength to all other extremities. Normal straight leg raise bilaterally. No acute focal neurological deficits are appreciated.  Psychiatric: Normal affect. Good eye contact. Appropriate in content.     ED Course   Critical Care    Date/Time: 10/31/2023 9:21 PM    Performed by: Virginia Brown DO  Authorized by: Virginia Brown DO  Direct patient critical care time: 17 minutes  Ordering / reviewing critical care time: 10 minutes  Documentation critical care time: 5 minutes  Consulting other physicians critical care time: 7 minutes  Total critical care time (exclusive of procedural time) : 39 minutes  Critical care time was exclusive of separately billable procedures and treating other patients and teaching time.  Critical care was necessary to treat or prevent imminent or life-threatening deterioration of the following conditions: renal failure (CORA).  Critical care was time spent personally by me on the following activities: blood draw for specimens, development of treatment plan with patient or surrogate, discussions with consultants, interpretation of cardiac output measurements, evaluation of patient's response to treatment, examination of patient, obtaining history from patient or surrogate, ordering and performing treatments and interventions, ordering and review of laboratory studies, ordering and review of radiographic studies, pulse oximetry, review of old charts and re-evaluation of patient's condition.        ED Vital Signs:  Vitals:    10/31/23 2032 10/31/23 2034 10/31/23 2036 10/31/23 2127   BP:    119/70   Pulse: 88   86   Resp:    17   Temp:       TempSrc:       SpO2:    97%   Weight:  83.3 kg (183 lb 10.3 oz) 83.3 kg (183 lb 10.3 oz)     10/31/23 2143 10/31/23 2145  10/31/23 2200 10/31/23 2215   BP: 119/70 105/68  98/64   Pulse: 86 85 84 83   Resp: 16 17 16 18   Temp: 98.2 °F (36.8 °C)      TempSrc: Oral      SpO2: 98% 95% 98% 99%   Weight:        10/31/23 2230 10/31/23 2245 10/31/23 2357 11/01/23 0012   BP: 124/67 115/67 114/71    Pulse: 82 82 83    Resp: 19 18 17 17   Temp:       TempSrc:       SpO2: 97% 97% 96%    Weight:        11/01/23 0039 11/01/23 0106 11/01/23 0111   BP: 115/72 131/70    Pulse: 81 86 84   Resp: 16 18    Temp: 98 °F (36.7 °C) 98.1 °F (36.7 °C)    TempSrc: Oral Oral    SpO2: 100% 100%    Weight:          Abnormal Lab Results:  Labs Reviewed   CBC W/ AUTO DIFFERENTIAL - Abnormal; Notable for the following components:       Result Value    WBC 14.53 (*)     Hemoglobin 11.1 (*)     Hematocrit 35.9 (*)     MCV 72 (*)     MCH 22.2 (*)     MCHC 30.9 (*)     RDW 16.0 (*)     Gran # (ANC) 9.5 (*)     Immature Grans (Abs) 0.07 (*)     Mono # 1.6 (*)     All other components within normal limits   COMPREHENSIVE METABOLIC PANEL - Abnormal; Notable for the following components:    CO2 17 (*)     BUN 68 (*)     Creatinine 3.5 (*)     Albumin 2.9 (*)     eGFR 13 (*)     All other components within normal limits   URINALYSIS, REFLEX TO URINE CULTURE - Abnormal; Notable for the following components:    Appearance, UA Hazy (*)     Protein, UA 1+ (*)     Ketones, UA Trace (*)     Bilirubin (UA) 1+ (*)     Urobilinogen, UA 2.0-3.0 (*)     All other components within normal limits    Narrative:     Specimen Source->Urine   TROPONIN I - Abnormal; Notable for the following components:    Troponin I 0.042 (*)     All other components within normal limits   PROCALCITONIN - Abnormal; Notable for the following components:    Procalcitonin 2.08 (*)     All other components within normal limits   D DIMER, QUANTITATIVE - Abnormal; Notable for the following components:    D-Dimer 4.68 (*)     All other components within normal limits   URINALYSIS MICROSCOPIC - Abnormal; Notable for  the following components:    Hyaline Casts, UA 73 (*)     All other components within normal limits    Narrative:     Specimen Source->Urine   CULTURE, BLOOD   CULTURE, BLOOD   LACTIC ACID, PLASMA   MAGNESIUM   PHOSPHORUS   LIPASE   B-TYPE NATRIURETIC PEPTIDE   PROTIME-INR   APTT   TSH   LACTIC ACID, PLASMA        All Lab Results:  Results for orders placed or performed during the hospital encounter of 10/31/23   CBC auto differential   Result Value Ref Range    WBC 14.53 (H) 3.90 - 12.70 K/uL    RBC 4.99 4.00 - 5.40 M/uL    Hemoglobin 11.1 (L) 12.0 - 16.0 g/dL    Hematocrit 35.9 (L) 37.0 - 48.5 %    MCV 72 (L) 82 - 98 fL    MCH 22.2 (L) 27.0 - 31.0 pg    MCHC 30.9 (L) 32.0 - 36.0 g/dL    RDW 16.0 (H) 11.5 - 14.5 %    Platelets 369 150 - 450 K/uL    MPV 10.2 9.2 - 12.9 fL    Immature Granulocytes 0.5 0.0 - 0.5 %    Gran # (ANC) 9.5 (H) 1.8 - 7.7 K/uL    Immature Grans (Abs) 0.07 (H) 0.00 - 0.04 K/uL    Lymph # 3.2 1.0 - 4.8 K/uL    Mono # 1.6 (H) 0.3 - 1.0 K/uL    Eos # 0.0 0.0 - 0.5 K/uL    Baso # 0.05 0.00 - 0.20 K/uL    nRBC 0 0 /100 WBC    Gran % 65.7 38.0 - 73.0 %    Lymph % 22.2 18.0 - 48.0 %    Mono % 11.1 4.0 - 15.0 %    Eosinophil % 0.2 0.0 - 8.0 %    Basophil % 0.3 0.0 - 1.9 %    Differential Method Automated    Comprehensive metabolic panel   Result Value Ref Range    Sodium 138 136 - 145 mmol/L    Potassium 3.5 3.5 - 5.1 mmol/L    Chloride 106 95 - 110 mmol/L    CO2 17 (L) 23 - 29 mmol/L    Glucose 88 70 - 110 mg/dL    BUN 68 (H) 8 - 23 mg/dL    Creatinine 3.5 (H) 0.5 - 1.4 mg/dL    Calcium 9.5 8.7 - 10.5 mg/dL    Total Protein 7.4 6.0 - 8.4 g/dL    Albumin 2.9 (L) 3.5 - 5.2 g/dL    Total Bilirubin 0.9 0.1 - 1.0 mg/dL    Alkaline Phosphatase 96 55 - 135 U/L    AST 32 10 - 40 U/L    ALT 40 10 - 44 U/L    eGFR 13 (A) >60 mL/min/1.73 m^2    Anion Gap 15 8 - 16 mmol/L   Lactic acid, plasma #1   Result Value Ref Range    Lactate (Lactic Acid) 1.0 0.5 - 2.2 mmol/L   Urinalysis, Reflex to Urine Culture Urine,  Catheterized    Specimen: Urine   Result Value Ref Range    Specimen UA Urine, Catheterized     Color, UA Yellow Yellow, Straw, Staci    Appearance, UA Hazy (A) Clear    pH, UA 5.0 5.0 - 8.0    Specific Gravity, UA 1.025 1.005 - 1.030    Protein, UA 1+ (A) Negative    Glucose, UA Negative Negative    Ketones, UA Trace (A) Negative    Bilirubin (UA) 1+ (A) Negative    Occult Blood UA Negative Negative    Nitrite, UA Negative Negative    Urobilinogen, UA 2.0-3.0 (A) <2.0 EU/dL    Leukocytes, UA Negative Negative   Magnesium   Result Value Ref Range    Magnesium 2.1 1.6 - 2.6 mg/dL   Phosphorus   Result Value Ref Range    Phosphorus 3.2 2.7 - 4.5 mg/dL   Lipase   Result Value Ref Range    Lipase 13 4 - 60 U/L   Brain natriuretic peptide   Result Value Ref Range    BNP 48 0 - 99 pg/mL   Protime-INR   Result Value Ref Range    Prothrombin Time 11.8 9.0 - 12.5 sec    INR 1.1 0.8 - 1.2   APTT   Result Value Ref Range    aPTT 26.3 21.0 - 32.0 sec   Troponin I   Result Value Ref Range    Troponin I 0.042 (H) 0.000 - 0.026 ng/mL   Procalcitonin   Result Value Ref Range    Procalcitonin 2.08 (H) <0.25 ng/mL   D dimer, quantitative   Result Value Ref Range    D-Dimer 4.68 (H) <0.50 mg/L FEU   TSH   Result Value Ref Range    TSH 0.445 0.400 - 4.000 uIU/mL   Lactic acid, plasma #2   Result Value Ref Range    Lactate (Lactic Acid) 0.8 0.5 - 2.2 mmol/L   Urinalysis Microscopic   Result Value Ref Range    RBC, UA 2 0 - 4 /hpf    WBC, UA 4 0 - 5 /hpf    Bacteria None None-Occ /hpf    Squam Epithel, UA 1 /hpf    Hyaline Casts, UA 73 (A) 0-1/lpf /lpf    Microscopic Comment SEE COMMENT    CK   Result Value Ref Range     (H) 20 - 180 U/L         Imaging Results:  Imaging Results              NM Lung Scan Ventilation Perfusion (Final result)  Result time 10/31/23 23:51:12      Final result by Federica Norwood MD (10/31/23 23:51:12)                   Impression:      Low probability for PE per modified [facility]ED   protocol      Electronically signed by: Federica Norwood  Date:    10/31/2023  Time:    23:51               Narrative:    EXAMINATION:  NM LUNG VENTILATION AND PERFUSION IMAGING    CLINICAL HISTORY:  Pulmonary embolism (PE) suspected, positive D-dimer;    COMPARISON:  Radiographic correlation    Technique    Radiotracer    One mCi of technetium 9 9 M DTPA was used for ventilation imaging.    5.4 mCi of Tc-99m MAA was administered intravenously for the perfusion portion of the exam.  Anterior, lateral, posterior, and oblique images reveal    FINDINGS:  No sizable peripheral perfusion ventilation mismatch defect identified.                                       CT Lumbar Spine Without Contrast (Final result)  Result time 10/31/23 21:36:45      Final result by Raul Gregory MD (10/31/23 21:36:45)                   Impression:      No fracture or traumatic malalignment of the lumbar spine.    Multifocal degenerative changes.    All CT scans at this facility are performed  using dose modulation techniques as appropriate to performed exam including the following:  automated exposure control; adjustment of mA and/or kV according to the patients size (this includes techniques or standardized protocols for targeted exams where dose is matched to indication/reason for exam: i.e. extremities or head);  iterative reconstruction technique.      Electronically signed by: Raul Gregory  Date:    10/31/2023  Time:    21:36               Narrative:    EXAMINATION:  CT LUMBAR SPINE WITHOUT CONTRAST    CLINICAL HISTORY:  Back trauma, no prior imaging (Age >= 16y);    TECHNIQUE:  Low-dose CT images obtained throughout the region of the lumbar spine.  Axial, sagittal and coronal reformations were performed.  Contrast was not administered.    COMPARISON:  Multiple priors.    FINDINGS:  Some vertebral body height loss stable from the priors.  No definite acute vertebral body compression fracture.    Healing left 11th rib fracture.   No additional fractures identified.  Bulky facet arthropathy throughout.  Flowing anterior syndesmophytes.    Normal sagittal alignment is preserved.  No spondylolisthesis.    Moderate spinal canal stenosis L3-4 and moderate severe spinal canal stenosis L4-5.  Other levels are better preserved.    Mild to moderate bilateral neural foraminal narrowing L2-3 and L3-4.  Moderate to severe bilateral neural foraminal narrowing L4-5.  Other levels are better preserved.    Limited evaluation of the intraspinal contents demonstrates no hematoma or mass.    Paraspinal soft tissues exhibit no acute abnormalities.                                       CT Thoracic Spine Without Contrast (Final result)  Result time 10/31/23 21:42:42      Final result by Federica Norwood MD (10/31/23 21:42:42)                   Impression:      NO ACUTE OR ADVERSE OSSEOUS THORACIC SPINE FINDING      Electronically signed by: Federica Norwood  Date:    10/31/2023  Time:    21:42               Narrative:    EXAMINATION:  CT THORACIC SPINE WITHOUT CONTRAST    CLINICAL HISTORY:  Back trauma, no prior imaging (Age >= 16y);    TECHNIQUE serial axial images were obtained of the thoracic spine without the administration of intravenous contrast. Both soft tissue and bone windows were obtained.    COMPARISON:  08/17/2022    FINDINGS:  VERTEBRAL BODY HEIGHT AND ALIGNMENT UNCHANGED.    MULTILEVEL SPONDYLOSIS SIMILAR TO PRIOR.  NO ADVERSE OSSEOUS FINDING                                       CT Head Without Contrast (Final result)  Result time 10/31/23 21:24:11      Final result by Federica Norwood MD (10/31/23 21:24:11)                   Impression:      No acute or adverse intracranial finding      Electronically signed by: Federica Norwood  Date:    10/31/2023  Time:    21:24               Narrative:    EXAMINATION:  CT HEAD WITHOUT CONTRAST    CLINICAL HISTORY:  Head trauma, minor (Age >= 65y);    TECHNIQUE:  Low dose axial images were obtained  through the head.  Coronal and sagittal reformations were also performed. Contrast was not administered.    COMPARISON:  08/17/2022    FINDINGS:  No acute intracranial hemorrhage or mass effect.  Ventricles similar caliber.  No displaced calvarial fracture                                       CT Cervical Spine Without Contrast (Final result)  Result time 10/31/23 21:29:37      Final result by Federica Norwood MD (10/31/23 21:29:37)                   Impression:      No acute or adverse osseous finding; ligamentous injury not excluded by CT      Electronically signed by: Federica Norwood  Date:    10/31/2023  Time:    21:29               Narrative:    EXAMINATION:  CT CERVICAL SPINE WITHOUT CONTRAST    CLINICAL HISTORY:  Neck trauma (Age >= 65y);    TECHNIQUE:  Low dose axial images, sagittal and coronal reformations were performed though the cervical spine.  Contrast was not administered.    COMPARISON:  08/17/2022    FINDINGS:  No acute fracture or traumatic malalignment seen.  Multilevel spondylosis and alignment similar to prior exam.                                       X-Ray Hips Bilateral 2 View Incl AP Pelvis (Final result)  Result time 10/31/23 21:19:07      Final result by Raul Gregory MD (10/31/23 21:19:07)                   Impression:      As above.      Electronically signed by: Raul Gregory  Date:    10/31/2023  Time:    21:19               Narrative:    EXAMINATION:  XR HIPS BILATERAL 2 VIEW INCL AP PELVIS    CLINICAL HISTORY:  Unspecified fall, initial encounter    TECHNIQUE:  AP view of the pelvis and frogleg lateral views of both hips were performed.    COMPARISON:  None.    FINDINGS:  No acute displaced fracture identified.  No traumatic malalignment.  Moderate degenerative changes.  If high clinical concern remains consider further evaluation with CT.                                       X-Ray Chest AP Portable (Final result)  Result time 10/31/23 21:22:19      Final result by  Raul Gregory MD (10/31/23 21:22:19)                   Impression:      No acute abnormality.      Electronically signed by: Raul Gregory  Date:    10/31/2023  Time:    21:22               Narrative:    EXAMINATION:  XR CHEST AP PORTABLE    CLINICAL HISTORY:  Sepsis;    TECHNIQUE:  Single frontal view of the chest was performed.    COMPARISON:  Multiple priors.    FINDINGS:  The lungs are clear, with normal appearance of pulmonary vasculature and no pleural effusion or pneumothorax.    The cardiac silhouette is normal in size. The hilar and mediastinal contours are unremarkable.    Bones are intact.                                       The EKG was ordered, reviewed, and independently interpreted by the ED provider.  Interpretation time: 20:24  Rate: 87 BPM  Rhythm: normal sinus rhythm  Interpretation: T wave abnormality, consider anterior ischemia. No STEMI.           The Emergency Provider reviewed the vital signs and test results, which are outlined above.     ED Discussion     12:13 AM: Discussed case with Paulette Michelle NP ([facility]). Dr. Rodriguez agrees with current care and management of pt and accepts admission.   Admitting Service: Hospital Medicine  Admitting Physician: Dr. Rodriguez  Admit to: Inpatient med/tele    12:13 AM: Re-evaluated pt. I have discussed test results, shared treatment plan, and the need for admission with patient and family at bedside. Pt and family express understanding at this time and agree with all information. All questions answered. Pt and family have no further questions or concerns at this time. Pt is ready for admit.      ED Course as of 11/01/23 0121   Tue Oct 31, 2023   2113 Procalcitonin(!): 2.08 [NF]   2113 Troponin I(!): 0.042 [NF]   2113 WBC(!): 14.53 [NF]   2113 Creatinine(!): 3.5 [NF]   2206 D-Dimer(!): 4.68 [NF]   Wed Nov 01, 2023   0006 78-year-old female presents with multiple falls that started last week.  She also reports shortness of breath as well  as nausea, vomiting, diarrhea, decreased oral intake, back pain, malodorous urine, head injury and near-syncope.  She has an CORA, fluids infusing.  EKG shows T-wave inversions in the anterior leads otherwise no STEMI.  Troponin acutely elevated at 0.042, possibly type 2.  She has leukocytosis and was hypotensive with no fever or tachycardia.  She is responding to fluids.  Procalcitonin elevated but lactic acid normal.  No signs of UTI or pneumonia.  D-dimer elevated, V/Q scan negative for PE.  No spinal fractures noted.  No signs of intracranial hemorrhage on CT.  No pelvic fractures.  BNP negative for CHF.  TSH normal.  Lipase negative for acute pancreatitis.  Electrolytes are normal.   [NF]      ED Course User Index  [NF] Virginia Brown, DO     Medical Decision Making  Amount and/or Complexity of Data Reviewed  Labs: ordered. Decision-making details documented in ED Course.  Radiology: ordered. Decision-making details documented in ED Course.  ECG/medicine tests: ordered and independent interpretation performed. Decision-making details documented in ED Course.    Risk  Prescription drug management.  Decision regarding hospitalization.    Critical Care  Total time providing critical care: 39 minutes                ED Medication(s):  Medications   0.9%  NaCl infusion (has no administration in time range)   ALPRAZolam tablet 0.25 mg (has no administration in time range)   citalopram tablet 10 mg (has no administration in time range)   latanoprost 0.005 % ophthalmic solution 1 drop (has no administration in time range)   pantoprazole EC tablet 40 mg (has no administration in time range)   sodium chloride 0.9% flush 10 mL (has no administration in time range)   albuterol-ipratropium 2.5 mg-0.5 mg/3 mL nebulizer solution 3 mL (has no administration in time range)   melatonin tablet 6 mg (has no administration in time range)   ondansetron injection 4 mg (has no administration in time range)   prochlorperazine injection  Soln 5 mg (has no administration in time range)   polyethylene glycol packet 17 g (has no administration in time range)   senna-docusate 8.6-50 mg per tablet 1 tablet (has no administration in time range)   simethicone chewable tablet 80 mg (has no administration in time range)   aluminum-magnesium hydroxide-simethicone 200-200-20 mg/5 mL suspension 30 mL (has no administration in time range)   acetaminophen tablet 650 mg (has no administration in time range)   naloxone 0.4 mg/mL injection 0.02 mg (has no administration in time range)   glucose chewable tablet 16 g (has no administration in time range)   glucose chewable tablet 24 g (has no administration in time range)   glucagon (human recombinant) injection 1 mg (has no administration in time range)   enoxaparin injection 30 mg (has no administration in time range)   dextrose 10% bolus 125 mL 125 mL (has no administration in time range)   dextrose 10% bolus 250 mL 250 mL (has no administration in time range)   amitriptyline tablet 25 mg (has no administration in time range)   oxyCODONE-acetaminophen 5-325 mg per tablet 1 tablet (has no administration in time range)   sodium chloride 0.9% bolus 1,503 mL 1,503 mL (0 mLs Intravenous Stopped 10/31/23 2342)   HYDROcodone-acetaminophen 5-325 mg per tablet 1 tablet (1 tablet Oral Given 11/1/23 0012)       Current Discharge Medication List                  Scribe Attestation:   Scribe #1: I performed the above scribed service and the documentation accurately describes the services I performed. I attest to the accuracy of the note.     Attending:   Physician Attestation Statement for Scribe #1: I, Virginia Brown DO, personally performed the services described in this documentation, as scribed by Aisha Santiago, in my presence, and it is both accurate and complete.           Clinical Impression       ICD-10-CM ICD-9-CM   1. CORA (acute kidney injury)  N17.9 584.9   2. Falls  W19.XXXA E888.9   3. Chest pain  R07.9 786.50   4.  Syncope  R55 780.2   5. NSTEMI (non-ST elevated myocardial infarction)  I21.4 410.70   6. Elevated procalcitonin  R79.89 790.99   7. Hypotension, unspecified hypotension type  I95.9 458.9       Disposition:   Disposition: Admitted  Condition: Virginia Marcus DO  11/01/23 0121

## 2023-11-01 NOTE — SUBJECTIVE & OBJECTIVE
Past Medical History:   Diagnosis Date    Alpha thalassemia     Asthma     resolved per patient    Cataract     Chronic anxiety     years tid xanax 1mg    Chronic knee pain     Chronic pain of left ankle     Clotting disorder     Cutaneous lupus erythematosus     dr henry derm    Depression     Depression     dr radha hughes previously    Ex-smoker     quit fall 1    Hypertension     Osteopenia 1/16 reck1/18       Past Surgical History:   Procedure Laterality Date    ANKLE SURGERY Left     BACK SURGERY      CATARACT EXTRACTION Right     COLONOSCOPY N/A 8/2/2017    Procedure: COLONOSCOPY;  Surgeon: Virgil Oliva MD;  Location: [facility] ENDO;  Service: Endoscopy;  Laterality: N/A;    HYSTERECTOMY      INJECTION OF ANESTHETIC AGENT AROUND MEDIAL BRANCH NERVES INNERVATING LUMBAR FACET JOINT Bilateral 6/17/2020    Procedure: Bilateral L3-5 MBB;  Surgeon: Yury Moore MD;  Location: [facility];  Service: Pain Management;  Laterality: Bilateral;    INJECTION OF ANESTHETIC AGENT AROUND NERVE Bilateral 5/19/2020    Procedure: Bilateral Genicular nerve block with RN IV sedation Covid testing day of procedure PT does not drive;  Surgeon: Yury Moore MD;  Location: [facility];  Service: Pain Management;  Laterality: Bilateral;    KNEE ARTHROSCOPY      both knees twice    OOPHORECTOMY      RADIOFREQUENCY THERMOCOAGULATION Right 7/14/2020    Procedure: Right L3-5 Lumbar RFA;  Surgeon: Yury Moore MD;  Location: [facility];  Service: Pain Management;  Laterality: Right;    RADIOFREQUENCY THERMOCOAGULATION Left 8/6/2020    Procedure: Left L3-5 Lumbar RFA;  Surgeon: Yury Moore MD;  Location: [facility];  Service: Pain Management;  Laterality: Left;       Review of patient's allergies indicates:  No Known Allergies    No current facility-administered medications on file prior to encounter.     Current Outpatient Medications on File Prior to Encounter   Medication Sig    acetaZOLAMIDE (DIAMOX) 125 MG  Tab Take 1 tablet (125 mg total) by mouth 4 (four) times daily.    ALPRAZolam (XANAX) 0.25 MG tablet TAKE ONE TABLET BY MOUTH TWICE A DAY AS NEEDED FOR ANXIETY    amitriptyline (ELAVIL) 50 MG tablet TAKE ONE TABLET BY MOUTH ONCE DAILY    atorvastatin (LIPITOR) 10 MG tablet TAKE ONE TABLET BY MOUTH ONCE DAILY    atropine 1% (ISOPTO ATROPINE) 1 % Drop Place 1 drop into the right eye 2 (two) times a day.    baclofen (LIORESAL) 10 MG tablet Take 10 mg by mouth 2 (two) times daily as needed.    brimonidine 0.2% (ALPHAGAN) 0.2 % Drop PLACE 1 DROP IN EACH EYE TWICE A DAY    citalopram (CELEXA) 10 MG tablet Take 1 tablet (10 mg total) by mouth once daily.    dorzolamide-timolol 2-0.5% (COSOPT) 22.3-6.8 mg/mL ophthalmic solution PLACE 1 DROP IN EACH EYE TWICE A DAY    ketorolac 0.5% (ACULAR) 0.5 % Drop Place 1 drop into both eyes 4 (four) times daily.    latanoprost 0.005 % ophthalmic solution Place 1 drop into both eyes once daily.    oxyCODONE-acetaminophen (PERCOCET)  mg per tablet Take 1 tablet by mouth every 8 (eight) hours as needed for Pain. (Patient taking differently: Take 1 tablet by mouth 4 (four) times daily as needed for Pain.)    pantoprazole (PROTONIX) 40 MG tablet TAKE ONE TABLET BY MOUTH ONCE DAILY    loteprednol etabonate (LOTEMAX SM) 0.38 % DrpG Place 1 drop into the right eye 4 (four) times daily.    loteprednol etabonate (LOTEMAX SM) 0.38 % DrpG Place 1 drop into the right eye 2 (two) times a day.    magnesium oxide (MAG-OX) 400 mg (241.3 mg magnesium) tablet Take 400 mg by mouth.    polymyxin B sulf-trimethoprim (POLYTRIM) 10,000 unit- 1 mg/mL Drop PLACE ONE DROP INTO THE RIGHT EYE FOUR TIMES DAILY    prednisoLONE acetate (PRED FORTE) 1 % DrpS INSTILL 1 DROP IN RIGHT EYE 4 TIMES A DAY    prednisoLONE acetate (PRED FORTE) 1 % DrpS Place 1 drop into the right eye 4 (four) times daily.    valsartan-hydrochlorothiazide (DIOVAN-HCT) 160-12.5 mg per tablet TAKE ONE TABLET BY MOUTH ONCE DAILY     [DISCONTINUED] acetaZOLAMIDE (DIAMOX) 250 MG tablet Take 1/2 tablet by mouth bid x3 days, then 1/2 tablet TID  for 3 days, then take 1/2 tablet QID and hold at 4 x's  daily     Family History       Problem Relation (Age of Onset)    Breast cancer Maternal Aunt    Diabetes Mother, Father          Tobacco Use    Smoking status: Former     Current packs/day: 0.00     Average packs/day: 1 pack/day for 30.0 years (30.0 ttl pk-yrs)     Types: Cigarettes     Start date: 1984     Quit date: 2014     Years since quittin.1    Smokeless tobacco: Never    Tobacco comments:     smoke last cigarette 9/15   Substance and Sexual Activity    Alcohol use: Yes     Alcohol/week: 1.0 standard drink of alcohol     Types: 1 Glasses of wine per week     Comment: 3 times a week    Drug use: No    Sexual activity: Not Currently     Partners: Male     Review of Systems   Constitutional:  Positive for activity change, appetite change and fatigue. Negative for chills, diaphoresis, fever and unexpected weight change.   HENT:  Negative for congestion, nosebleeds, sinus pressure and sore throat.    Eyes:  Negative for pain, discharge and visual disturbance.   Respiratory:  Negative for cough, chest tightness, shortness of breath, wheezing and stridor.    Cardiovascular:  Negative for chest pain, palpitations and leg swelling.   Gastrointestinal:  Negative for abdominal distention, abdominal pain, blood in stool, constipation, diarrhea, nausea and vomiting.   Endocrine: Negative for cold intolerance and heat intolerance.   Genitourinary:  Negative for difficulty urinating, dysuria, flank pain, frequency and urgency.   Musculoskeletal:  Positive for arthralgias (chronic bilateral shoulder R>L pain, Chronic right knee pain), back pain (chronic) and gait problem (frequent falls). Negative for joint swelling, myalgias, neck pain and neck stiffness.        New left wrist pain    Skin:  Negative for rash and wound.   Allergic/Immunologic:  Negative for food allergies and immunocompromised state.   Neurological:  Positive for dizziness, syncope and weakness (generalized). Negative for seizures, facial asymmetry, speech difficulty, light-headedness, numbness and headaches.   Hematological:  Negative for adenopathy.   Psychiatric/Behavioral:  Negative for agitation, confusion and hallucinations.      Objective:     Vital Signs (Most Recent):  Temp: 98.1 °F (36.7 °C) (11/01/23 0106)  Pulse: 84 (11/01/23 0111)  Resp: 18 (11/01/23 0106)  BP: 131/70 (11/01/23 0106)  SpO2: 100 % (11/01/23 0106) Vital Signs (24h Range):  Temp:  [98 °F (36.7 °C)-98.2 °F (36.8 °C)] 98.1 °F (36.7 °C)  Pulse:  [81-92] 84  Resp:  [16-19] 18  SpO2:  [95 %-100 %] 100 %  BP: ()/(54-99) 131/70     Weight: 83.3 kg (183 lb 10.3 oz)  Body mass index is 33.59 kg/m².     Physical Exam  Constitutional:       General: She is not in acute distress.     Appearance: She is well-developed. She is not diaphoretic.   HENT:      Head: Normocephalic and atraumatic.      Nose: Nose normal.      Mouth/Throat:      Mouth: Mucous membranes are dry.   Eyes:      General: No scleral icterus.     Conjunctiva/sclera: Conjunctivae normal.   Neck:      Trachea: No tracheal deviation.   Cardiovascular:      Rate and Rhythm: Normal rate and regular rhythm.      Heart sounds: Normal heart sounds. No murmur heard.     No friction rub. No gallop.   Pulmonary:      Effort: Pulmonary effort is normal. No respiratory distress.      Breath sounds: Normal breath sounds. No stridor. No wheezing or rales.   Chest:      Chest wall: No tenderness.   Abdominal:      General: Bowel sounds are normal. There is no distension.      Palpations: Abdomen is soft. There is no mass.      Tenderness: There is no abdominal tenderness. There is no guarding or rebound.   Musculoskeletal:         General: Tenderness (left wrist) present. No deformity.      Cervical back: Normal range of motion and neck supple.      Comments: Pain  with ROM to right shoulder, right knee, and left wrist    Skin:     General: Skin is warm and dry.      Coloration: Skin is not pale.      Findings: No erythema or rash.   Neurological:      Mental Status: She is alert and oriented to person, place, and time.      Cranial Nerves: No cranial nerve deficit.      Motor: No abnormal muscle tone.      Coordination: Coordination normal.   Psychiatric:         Behavior: Behavior normal.         Thought Content: Thought content normal.                Significant Labs: All pertinent labs within the past 24 hours have been reviewed.    Significant Imaging: I have reviewed all pertinent imaging results/findings within the past 24 hours.

## 2023-11-01 NOTE — PROGRESS NOTES
Pharmacist Renal Dose Adjustment Note    Tea Ring is a 78 y.o. female being treated with the medication enoxaparin    Patient Data:    Vital Signs (Most Recent):  Temp: 98.1 °F (36.7 °C) (11/01/23 0106)  Pulse: 84 (11/01/23 0111)  Resp: 18 (11/01/23 0106)  BP: 131/70 (11/01/23 0106)  SpO2: 100 % (11/01/23 0106) Vital Signs (72h Range):  Temp:  [98 °F (36.7 °C)-98.2 °F (36.8 °C)]   Pulse:  [81-92]   Resp:  [16-19]   BP: ()/(54-99)   SpO2:  [95 %-100 %]      Recent Labs   Lab 10/31/23  2020   CREATININE 3.5*     Serum creatinine: 3.5 mg/dL (H) 10/31/23 2020  Estimated creatinine clearance: 13.5 mL/min (A)    Enoxaparin 40 mg q24h will be changed to enoxaparin 30 mg q24h for CrCl <30 mL/min.     Pharmacist's Name: Fozia Pate  Pharmacist's Extension: [phone]

## 2023-11-01 NOTE — ASSESSMENT & PLAN NOTE
Pt home medications include Xanax BID, Percocet 10mg QID, Elavil 50mg nightly, and Baclofen prn.    pt to take Xanax BID prn  D/C baclofen   Decrease Elavil to 25mg nightly  Decrease percocet to 5mg every 6 hours prn

## 2023-11-01 NOTE — ASSESSMENT & PLAN NOTE
likely 2/2 dehydration   Pt takes HCTZ and was recently prescribed Diamox QID for her Glaucoma   Hold Losartan/HCTZ and Diamox.   IVfs   Monitor

## 2023-11-01 NOTE — ASSESSMENT & PLAN NOTE
procalcitonin 2.08  No obvious source of infection   CXR/UA unremarkable   Blood cultures pending

## 2023-11-01 NOTE — CARE UPDATE
The patient is a 79 yo female with CKD3, HTN, Depression, Anxiety, Asthma, Chronic pain (shoulders, back and right knee), Glaucoma, GERD who presented to ED with syncope and frequent falls. Pt's last fall occurred PTA. On EMS arrival, the pt was hypotensive at 63/40 and placed a left tibia IO in which she received IVFs. The pt reports head trauma but denies LOC after this most recent fall. She reports approx 2 weeks ago, she noticed increased fatigue, generalized weakness and decreased appetite. She states that she has fallen 5-6 times over the past week with episodes of syncope. She has chronic pain to her shoulders R>L, right knee, and lower back which have worsened since falls. She also injured her left wrist. Pt states she lives alone. However, today, her daughter witnessed her fall and called 911.   Of note, the pt was recently placed on Diamox 125mg 4 times daily for Glaucoma. (Along with home med Diovan HCT). She states she also has recently increased her Percocet 10mg to 4 times daily.         In the ED, BP 85/54- improved with IVFs, Afebrile, Labs significant for WBC 14, D dimer 4.6, Bicarb 17, BUN 68, Serum Cr 3.5, Trop 0.042, Procalcitonin 2.08, UA unremarkable, CXR clear. EKG showed NSR with nonspecific ST and T wave abnormalities. VQ scan showed low probability for PE. CT head showed nothing acute, CT cervical/thoracic/lumbar spine showed DDD and multilevel spondylosis, nothing acute. Xray bilateral hips showed degenerative changes, nothing acute.     11/1   Examination done at bedside, patient stated slight improvement in dizziness, generalized weakness.    Complaining of upper extremity swelling, we will follow up on x-ray/imaging.    Hemodynamically stable   Creatinine slightly trended down to 2.6, we will continue IV fluids, avoid nephrotoxins.    Ultrasound carotid did not show significant stenosis  Await PT OT recommendations

## 2023-11-01 NOTE — PLAN OF CARE
[facility] - Med Surg  Initial Discharge Assessment       Primary Care Provider: Marilyn Odonnell NP    Admission Diagnosis: LAST (acute kidney injury) [N17.9]  Falls [W19.XXXA]    Admission Date: 10/31/2023  Expected Discharge Date: per attending         Payor: HUMANA MANAGED MEDICARE / Plan: HUMANA TOTAL CARE ADVANTAGE / Product Type: Medicare Advantage /     Extended Emergency Contact Information  Primary Emergency Contact: Zoie Ring   [place]  Mobile Phone: [phone]  Relation: Daughter  Secondary Emergency Contact: Bhavani Ring  Mobile Phone: [phone]  Relation: Daughter  Preferred language: English   needed? No    Discharge Plan A: Home         [facility] - [place], [place] - [address]  [address]  [place] [address]  Phone: [phone] Fax: [phone]    [facility] - [facility] [address]  [address] [facility] [address]  Phone: [phone] Fax: [phone]      Initial Assessment (most recent)       Adult Discharge Assessment - 11/01/23 1113          Discharge Assessment    Assessment Type Discharge Planning Assessment     Confirmed/corrected address, phone number and insurance Yes     Confirmed Demographics Correct on Facesheet     Source of Information patient     When was your last doctors appointment? --   2 years    Communicated YIMI with patient/caregiver Date not available/Unable to determine     Reason For Admission last     People in Home alone     Do you expect to return to your current living situation? Yes     Do you have help at home or someone to help you manage your care at home? No     Prior to hospitilization cognitive status: Alert/Oriented     Current cognitive status: Alert/Oriented     Equipment Currently Used at Home walker, rolling;cane, straight;bedside commode     Readmission within 30 days? No     Patient currently being followed by outpatient case management? No     Do you  currently have service(s) that help you manage your care at home? No     Do you take prescription medications? Yes     Do you have prescription coverage? Yes     Coverage human     Do you have any problems affording any of your prescribed medications? No     Is the patient taking medications as prescribed? yes     Who is going to help you get home at discharge? daughter     How do you get to doctors appointments? family or friend will provide     Are you on dialysis? No     Do you take coumadin? No     Discharge Plan A Home                   Pt stated to only call her daughter Zoie for things. Bhavani is not to be contacted.

## 2023-11-01 NOTE — PT/OT/SLP EVAL
"Occupational Therapy Evaluation and Treatment    Name: Tea Ring  MRN: [id number]  Admitting Diagnosis: CORA (acute kidney injury)  Recent Surgery: * No surgery found *      Recommendations:     Discharge Recommendations: Moderate Intensity Therapy  Level of Assistance Recommended: 24 hours significant assistance  Discharge Equipment Recommendations: to be determined by next level of care  Barriers to discharge: Decreased caregiver support    Assessment:     Tea Ring is a 78 y.o. female with a medical diagnosis of CORA (acute kidney injury). She presents with performance deficits affecting function including weakness, impaired endurance, impaired self care skills, gait instability, impaired functional mobility, impaired balance, decreased coordination, impaired cognition, decreased upper extremity function, decreased lower extremity function, decreased safety awareness, pain, decreased ROM, edema, impaired cardiopulmonary response to activity.     Rehab Prognosis: Fair; patient would benefit from acute OT services to address these deficits and reach maximum level of function.    Plan:     Patient to be seen 2 x/week to address the above listed problems via self-care/home management, therapeutic exercises, therapeutic activities  Plan of Care Expires: 11/15/23  Plan of Care Reviewed with: patient, daughter    Subjective     Chief Complaint: Pain in left side  Patient Comments/Goals: Pt stated "I want to walk"  Pain/Comfort:  Pain Rating 1: 10/10  Location - Side 1: Left  Location - Orientation 1: generalized  Location 1: arm  Pain Addressed 1: Distraction, Reposition (activity pacing)  Pain Rating Post-Intervention 1: 10/10    Occupational Therapy:  Living Environment: Patient lives alone in a single story home with  not steps to enter the  home.  Prior Level of Function: Prior to admission, patient was modified independent with ADLs using rollator for mobility for home and community mobility.  Roles and Routines: " Patient was driving prior to admission.  Equipment Used at Home: cane, straight, shower chair, rollator, bedside commode  DME owned (not currently used): none  Assistance Upon Discharge: family    Objective:     Communicated with pt's nurse, [place] DARBY, and completed a chart  review via Epic prior to session. Patient found supine with peripheral IV, telemetry, PureWick upon OT entry to room. Pt's daughter present in room upon entry.    General Precautions: Standard, fall   Orthopedic Precautions: N/A   Braces: N/A    Respiratory Status: Room air    Occupational Performance  Gait belt applied - Yes    Bed Mobility:   Rolling/Turning to Left with maximal assistance  Supine to sit from left side of bed with maximal assistance  Pt reports feeling lightheaded following Sup>EOB. Pt BP vitals 111/55    Functional Mobility/Transfers:  Sit > Stand Transfer with maximal assistance and 2 persons with hand-held assist  Functional Mobility: Pt completed 3 side steps to the left along EOB with Max A x 2 with HHA for increase in activity tolerance for ADL completion.  Pt completed a stand>sit transfer to EOB with Max A x 2 with HHA.    Activities of Daily Living:  Upper Body Dressing: pt donned hospital gown with Mod A while seated EOB.    Cognitive/Visual Perceptual:  Cognitive/Psychosocial Skills:    -     Follows Commands/attention: Easily distracted  -     Communication: clear/fluent  -     Safety awareness/insight to disability: impaired  -     Mood/Affect/Coping skills/emotional control: Appropriate to situation and Guarded  Visual/Perceptual:    -     Wears glasses    Physical Exam:  Balance:    -     Sitting: contact guard assistance  -     Standing: maximal assistance and of 2 persons  Edema:  Moderate swelling observed on LUE  Dominant hand: Right  Upper Extremity Range of Motion:     -       Right Upper Extremity: WFL  -       Left Upper Extremity: Not formally assessed due to pain and swelling.   Upper Extremity  Strength:    -       Right Upper Extremity: grossly 3+/5. Questionable if cognitively understood testing strength.  -       Left Upper Extremity: Not formally assessed due to pain and swelling   Strength:    -       Right Upper Extremity: fair  -       Left Upper Extremity: poor    AMPAC 6 Click ADL:  AMPAC Total Score: 16    Treatment & Education:  Therapist provided facilitation and instruction of proper body mechanics, energy conservation, and fall prevention strategies during tasks listed above  Patient educated on role of OT, POC, and goals for therapy  Patient educated on importance of OOB activities with staff member assistance and sitting OOB majority of the day  Pt educated on call don't fall policy, Pt encouraged use of call button to meet needs.    Patient left HOB elevated with all lines intact, call button in reach, bed alarm on, and daughter present.    GOALS:   Multidisciplinary Problems       Occupational Therapy Goals          Problem: Occupational Therapy    Goal Priority Disciplines Outcome Interventions   Occupational Therapy Goal     OT, PT/OT     Description: Goals to be met by: 11/15/23     Patient will increase functional independence with ADLs by performing:    UE Dressing with Contact Guard Assistance.  Step transfer with Minimal Assistance  Increased functional strength to BUE grossly by 1/2 MMT grades.                         History:     Past Medical History:   Diagnosis Date    Alpha thalassemia     Asthma     resolved per patient    Cataract     Chronic anxiety     years tid xanax 1mg    Chronic knee pain     Chronic pain of left ankle     Clotting disorder     Cutaneous lupus erythematosus     dr henry derm    Depression     Depression     dr radha hughes previously    Ex-smoker     quit fall 1    Hypertension     Osteopenia 1/16 reck1/18         Past Surgical History:   Procedure Laterality Date    ANKLE SURGERY Left     BACK SURGERY      CATARACT EXTRACTION Right     COLONOSCOPY  N/A 8/2/2017    Procedure: COLONOSCOPY;  Surgeon: Virgil Oliva MD;  Location: [facility] ENDO;  Service: Endoscopy;  Laterality: N/A;    HYSTERECTOMY      INJECTION OF ANESTHETIC AGENT AROUND MEDIAL BRANCH NERVES INNERVATING LUMBAR FACET JOINT Bilateral 6/17/2020    Procedure: Bilateral L3-5 MBB;  Surgeon: Yury Moore MD;  Location: [facility]IN [facility]GT;  Service: Pain Management;  Laterality: Bilateral;    INJECTION OF ANESTHETIC AGENT AROUND NERVE Bilateral 5/19/2020    Procedure: Bilateral Genicular nerve block with RN IV sedation Covid testing day of procedure PT does not drive;  Surgeon: Yury Moore MD;  Location: [facility];  Service: Pain Management;  Laterality: Bilateral;    KNEE ARTHROSCOPY      both knees twice    OOPHORECTOMY      RADIOFREQUENCY THERMOCOAGULATION Right 7/14/2020    Procedure: Right L3-5 Lumbar RFA;  Surgeon: Yury Moore MD;  Location: [facility]GT;  Service: Pain Management;  Laterality: Right;    RADIOFREQUENCY THERMOCOAGULATION Left 8/6/2020    Procedure: Left L3-5 Lumbar RFA;  Surgeon: Yury Moore MD;  Location: [facility]IN [facility]GT;  Service: Pain Management;  Laterality: Left;       Time Tracking:     OT Date of Treatment: 11/01/23  OT Start Time: 1005  OT Stop Time: 1035  OT Total Time (min): 30 min    Billable Minutes: Evaluation 15 and Therapeutic Activity 15  DIMA Campbell  11/1/2023

## 2023-11-01 NOTE — PT/OT/SLP EVAL
Physical Therapy Evaluation    Patient Name:  Tea Ring   MRN:  [id number]    Recommendations:     Discharge Recommendations: Moderate Intensity Therapy   Discharge Equipment Recommendations: to be determined by next level of care   Barriers to discharge: Decreased caregiver support    Assessment:     Tea Rnig is a 78 y.o. female admitted with a medical diagnosis of CORA (acute kidney injury).  She presents with the following impairments/functional limitations: weakness, impaired endurance, impaired self care skills, impaired functional mobility, gait instability, impaired balance, pain, decreased safety awareness, decreased lower extremity function, decreased upper extremity function, decreased coordination, decreased ROM .    Rehab Prognosis: Good; patient would benefit from acute skilled PT services to address these deficits and reach maximum level of function.    Recent Surgery: * No surgery found *      Plan:     During this hospitalization, patient to be seen 3 x/week to address the identified rehab impairments via gait training, therapeutic activities, therapeutic exercises and progress toward the following goals:    Plan of Care Expires:  11/15/23    Subjective     Chief Complaint: PAIN L UE   Patient/Family Comments/goals: DEC PAIN AND INC MOBILITY   Pain/Comfort:  Pain Rating 1: 10/10  Location - Side 1: Left  Location 1: arm  Pain Rating Post-Intervention 1: 10/10    Patients cultural, spiritual, [employer] conflicts given the current situation:      Living Environment:  PT LIVES AT HOME ALONE IN A ONE STORY HOME WITH NO STEPS TO ENTER. PT REPORTED USING SPC AND ROLLATOR AT TIMES. PT ALSO DRIVES HOWEVER HAS BEEN HAVING LOTS OF FALLS.   Prior to admission, patients level of function was MOD I WITH ROLLATOR .  Equipment used at home: cane, straight, shower chair, rollator.  DME owned (not currently used): bedside commode and wheelchair.  Upon discharge, patient will have assistance from UNKNOWN  ".    Objective:     Communicated with NURSE [place] AND [facility] CHART REVIEW  prior to session.  Patient found supine with peripheral IV, PureWick, telemetry  upon PT entry to room.    General Precautions: Standard, fall  Orthopedic Precautions:N/A   Braces: N/A  Respiratory Status: Room air    Exams:  Cognitive Exam:  Patient is oriented to Person, Time, and Situation  RLE ROM: LIMITED  RLE Strength: 3-/5 GROSSLY   LLE ROM: LIMITED  LLE Strength: 3-/5 GROSSLY     Functional Mobility:  Bed Mobility:     Rolling Left:  maximal assistance  Supine to Sit: maximal assistance  Transfers:     Sit to Stand:  maximal assistance and of 2 persons with hand-held assist  Bed to Chair: NT with  NT  using  NT  Gait: PT TOOK 3 SIDE STEPS TO LEFT TO HOB WITH MAX A X 2 HHA.       AM-PAC 6 CLICK MOBILITY  Total Score:11       Treatment & Education:  PT WITH INITIAL POST LEAN AND C/O DIZZINESS SEATED EOB WITH INC PAIN OF L UE. PT BP TAKE @ 111/55. PT SCOOTED TO EOB WITH MOD A AND WAS ABLE TO SUPPORT SELF BETTER WITH B LE PLANTED ON GROUND. PT SWELLING NOTED AND PAIN IN L UE. P.T. CONTACTED MD AS NO XRAY OF HUMERUS HAD BEEN COMPLETED TO RULE OUT FX. PT STOOD WITH HHA AND MAX A X2 WITH FF POSTURE AND POST LEAN FOR 3 SIDE STEPS TO HOB. PT SUP IN BED WITH MAX A AND SCOOTED TO HOB WITH MAX A X2. Pt educated on the following: Pt verbally agreed in understanding.   Role of P.T. in the acute care setting.  Sit in chair throughout the day to increase functional strength and activity tolerance and decrease risk of blood clots and secondary infection.   "Call, don't fall" procedure of pressing red button on call bell for all transfers.      Patient left HOB elevated with call button in reach.    GOALS:   Multidisciplinary Problems       Physical Therapy Goals          Problem: Physical Therapy    Goal Priority Disciplines Outcome Goal Variances Interventions   Physical Therapy Goal     PT, PT/OT      Description: LT/15/23  1. PT WILL COMPLETE " BED MOBILITY WITH MIN A TO PROGRESS BED MOBILITY  2. PT WILL STAND T/F TO CHAIR WITH RW AND MIN A FOR OOB TOLERANCE  3. PT WILL GT TRAIN X 50' WITH RW AND MIN A TO PROGRESS GT.   4. PT WILL INC AMPAC SCORE BY 2 POINTS TO PROGRESS GROSS FUNC MOBILITY.                          History:     Past Medical History:   Diagnosis Date    Alpha thalassemia     Asthma     resolved per patient    Cataract     Chronic anxiety     years tid xanax 1mg    Chronic knee pain     Chronic pain of left ankle     Clotting disorder     Cutaneous lupus erythematosus     dr henry derm    Depression     Depression     dr radha hughes previously    Ex-smoker     quit fall 1    Hypertension     Osteopenia 1/16 reck1/18       Past Surgical History:   Procedure Laterality Date    ANKLE SURGERY Left     BACK SURGERY      CATARACT EXTRACTION Right     COLONOSCOPY N/A 8/2/2017    Procedure: COLONOSCOPY;  Surgeon: Virgil Oliva MD;  Location: [facility];  Service: Endoscopy;  Laterality: N/A;    HYSTERECTOMY      INJECTION OF ANESTHETIC AGENT AROUND MEDIAL BRANCH NERVES INNERVATING LUMBAR FACET JOINT Bilateral 6/17/2020    Procedure: Bilateral L3-5 MBB;  Surgeon: Yury Moore MD;  Location: [facility] PAIN MGT;  Service: Pain Management;  Laterality: Bilateral;    INJECTION OF ANESTHETIC AGENT AROUND NERVE Bilateral 5/19/2020    Procedure: Bilateral Genicular nerve block with RN IV sedation Covid testing day of procedure PT does not drive;  Surgeon: Yury Moore MD;  Location: [facility] PAIN MGT;  Service: Pain Management;  Laterality: Bilateral;    KNEE ARTHROSCOPY      both knees twice    OOPHORECTOMY      RADIOFREQUENCY THERMOCOAGULATION Right 7/14/2020    Procedure: Right L3-5 Lumbar RFA;  Surgeon: Yury Moore MD;  Location: [facility] PAIN MGT;  Service: Pain Management;  Laterality: Right;    RADIOFREQUENCY THERMOCOAGULATION Left 8/6/2020    Procedure: Left L3-5 Lumbar RFA;  Surgeon: Yury Moore MD;  Location: [facility] PAIN MGT;  Service:  Pain Management;  Laterality: Left;       Time Tracking:     PT Received On: 11/01/23  PT Start Time: 1000     PT Stop Time: 1038  PT Total Time (min): 38 min     Billable Minutes: Evaluation 15 and Therapeutic Activity 23 11/01/2023

## 2023-11-01 NOTE — ASSESSMENT & PLAN NOTE
Patient with acute kidney injury/acute renal failure likely due to pre-renal azotemia due to dehydration CORA is currently worsening. Baseline creatinine 1.2 - Labs reviewed- Renal function/electrolytes with Estimated Creatinine Clearance: 13.5 mL/min (A) (based on SCr of 3.5 mg/dL (H)). according to latest data. Monitor urine output and serial BMP and adjust therapy as needed. Avoid nephrotoxins and renally dose meds for GFR listed above.    Hold Diamox and Losartan HCTZ  IVfs  Monitor

## 2023-11-01 NOTE — PLAN OF CARE
OT ivon completed. Pt presents with significant LUE swelling. Painful to the touch. Pt bed mobility Max A, Sup> EOB Max A. Pt completed EOB> stand transfer max a x 2 persons. Pt completed 3 side steps  to the L along EOB with max A with HHA x 2 persons. Pt EOB>supine and scoot to HOB Max A x 2.    ADLs: Pt donned hospital gown with Mod A while seated EOB.    D/C rec: Moderate Intensity Therapy

## 2023-11-01 NOTE — H&P
[facility] Medicine  History & Physical    Patient Name: Tea Ring  MRN: [id number]  Patient Class: IP- Inpatient  Admission Date: 10/31/2023  Attending Physician: Dr. Rodriguez  Primary Care Provider: Marilyn Odonnell NP         Patient information was obtained from patient and ER records.     Subjective:     Principal Problem:CORA (acute kidney injury)    Chief Complaint:   Chief Complaint   Patient presents with    Fall     Frequent falls over the last few days with intermittent confusion. Foul smelling urine. Hypotensive at 63/40 on [facility] arrival. Pt arrived with IO.        HPI: The patient is a 77 yo female with CKD3, HTN, Depression, Anxiety, Asthma, Chronic pain (shoulders, back and right knee), Glaucoma, GERD who presented to ED with syncope and frequent falls. Pt's last fall occurred PTA. On EMS arrival, the pt was hypotensive at 63/40 and placed a left tibia IO in which she received IVFs. The pt reports head trauma but denies LOC after this most recent fall. She reports approx 2 weeks ago, she noticed increased fatigue, generalized weakness and decreased appetite. She states that she has fallen 5-6 times over the past week with episodes of syncope. She has chronic pain to her shoulders R>L, right knee, and lower back which have worsened since falls. She also injured her left wrist. Pt states she lives alone. However, today, her daughter witnessed her fall and called 911.   Of note, the pt was recently placed on Diamox 125mg 4 times daily for Glaucoma. (Along with home med Diovan HCT). She states she also has recently increased her Percocet 10mg to 4 times daily.       In the ED, BP 85/54- improved with IVFs, Afebrile, Labs significant for WBC 14, D dimer 4.6, Bicarb 17, BUN 68, Serum Cr 3.5, Trop 0.042, Procalcitonin 2.08, UA unremarkable, CXR clear. EKG showed NSR with nonspecific ST and T wave abnormalities. VQ scan showed low probability for PE. CT head showed nothing acute, CT  cervical/thoracic/lumbar spine showed DDD and multilevel spondylosis, nothing acute. Xray bilateral hips showed degenerative changes, nothing acute.         Past Medical History:   Diagnosis Date    Alpha thalassemia     Asthma     resolved per patient    Cataract     Chronic anxiety     years tid xanax 1mg    Chronic knee pain     Chronic pain of left ankle     Clotting disorder     Cutaneous lupus erythematosus     dr henry derm    Depression     Depression     dr radha hughes previously    Ex-smoker     quit fall 1    Hypertension     Osteopenia 1/16 reck1/18       Past Surgical History:   Procedure Laterality Date    ANKLE SURGERY Left     BACK SURGERY      CATARACT EXTRACTION Right     COLONOSCOPY N/A 8/2/2017    Procedure: COLONOSCOPY;  Surgeon: Virgil Oliva MD;  Location: [facility] ENDO;  Service: Endoscopy;  Laterality: N/A;    HYSTERECTOMY      INJECTION OF ANESTHETIC AGENT AROUND MEDIAL BRANCH NERVES INNERVATING LUMBAR FACET JOINT Bilateral 6/17/2020    Procedure: Bilateral L3-5 MBB;  Surgeon: Yury Moore MD;  Location: [facility] PAIN MGT;  Service: Pain Management;  Laterality: Bilateral;    INJECTION OF ANESTHETIC AGENT AROUND NERVE Bilateral 5/19/2020    Procedure: Bilateral Genicular nerve block with RN IV sedation Covid testing day of procedure PT does not drive;  Surgeon: Yury Moore MD;  Location: [facility] PAIN MGT;  Service: Pain Management;  Laterality: Bilateral;    KNEE ARTHROSCOPY      both knees twice    OOPHORECTOMY      RADIOFREQUENCY THERMOCOAGULATION Right 7/14/2020    Procedure: Right L3-5 Lumbar RFA;  Surgeon: Yury Moore MD;  Location: [facility] PAIN MGT;  Service: Pain Management;  Laterality: Right;    RADIOFREQUENCY THERMOCOAGULATION Left 8/6/2020    Procedure: Left L3-5 Lumbar RFA;  Surgeon: Yury Moore MD;  Location: [facility] PAIN MGT;  Service: Pain Management;  Laterality: Left;       Review of patient's allergies indicates:  No Known Allergies    No  current facility-administered medications on file prior to encounter.     Current Outpatient Medications on File Prior to Encounter   Medication Sig    acetaZOLAMIDE (DIAMOX) 125 MG Tab Take 1 tablet (125 mg total) by mouth 4 (four) times daily.    ALPRAZolam (XANAX) 0.25 MG tablet TAKE ONE TABLET BY MOUTH TWICE A DAY AS NEEDED FOR ANXIETY    amitriptyline (ELAVIL) 50 MG tablet TAKE ONE TABLET BY MOUTH ONCE DAILY    atorvastatin (LIPITOR) 10 MG tablet TAKE ONE TABLET BY MOUTH ONCE DAILY    atropine 1% (ISOPTO ATROPINE) 1 % Drop Place 1 drop into the right eye 2 (two) times a day.    baclofen (LIORESAL) 10 MG tablet Take 10 mg by mouth 2 (two) times daily as needed.    brimonidine 0.2% (ALPHAGAN) 0.2 % Drop PLACE 1 DROP IN EACH EYE TWICE A DAY    citalopram (CELEXA) 10 MG tablet Take 1 tablet (10 mg total) by mouth once daily.    dorzolamide-timolol 2-0.5% (COSOPT) 22.3-6.8 mg/mL ophthalmic solution PLACE 1 DROP IN EACH EYE TWICE A DAY    ketorolac 0.5% (ACULAR) 0.5 % Drop Place 1 drop into both eyes 4 (four) times daily.    latanoprost 0.005 % ophthalmic solution Place 1 drop into both eyes once daily.    oxyCODONE-acetaminophen (PERCOCET)  mg per tablet Take 1 tablet by mouth every 8 (eight) hours as needed for Pain. (Patient taking differently: Take 1 tablet by mouth 4 (four) times daily as needed for Pain.)    pantoprazole (PROTONIX) 40 MG tablet TAKE ONE TABLET BY MOUTH ONCE DAILY    loteprednol etabonate (LOTEMAX SM) 0.38 % DrpG Place 1 drop into the right eye 4 (four) times daily.    loteprednol etabonate (LOTEMAX SM) 0.38 % DrpG Place 1 drop into the right eye 2 (two) times a day.    magnesium oxide (MAG-OX) 400 mg (241.3 mg magnesium) tablet Take 400 mg by mouth.    polymyxin B sulf-trimethoprim (POLYTRIM) 10,000 unit- 1 mg/mL Drop PLACE ONE DROP INTO THE RIGHT EYE FOUR TIMES DAILY    prednisoLONE acetate (PRED FORTE) 1 % DrpS INSTILL 1 DROP IN RIGHT EYE 4 TIMES A DAY    prednisoLONE  acetate (PRED FORTE) 1 % DrpS Place 1 drop into the right eye 4 (four) times daily.    valsartan-hydrochlorothiazide (DIOVAN-HCT) 160-12.5 mg per tablet TAKE ONE TABLET BY MOUTH ONCE DAILY    [DISCONTINUED] acetaZOLAMIDE (DIAMOX) 250 MG tablet Take 1/2 tablet by mouth bid x3 days, then 1/2 tablet TID  for 3 days, then take 1/2 tablet QID and hold at 4 x's  daily     Family History       Problem Relation (Age of Onset)    Breast cancer Maternal Aunt    Diabetes Mother, Father          Tobacco Use    Smoking status: Former     Current packs/day: 0.00     Average packs/day: 1 pack/day for 30.0 years (30.0 ttl pk-yrs)     Types: Cigarettes     Start date: 1984     Quit date: 2014     Years since quittin.1    Smokeless tobacco: Never    Tobacco comments:     smoke last cigarette 9/15   Substance and Sexual Activity    Alcohol use: Yes     Alcohol/week: 1.0 standard drink of alcohol     Types: 1 Glasses of wine per week     Comment: 3 times a week    Drug use: No    Sexual activity: Not Currently     Partners: Male     Review of Systems   Constitutional:  Positive for activity change, appetite change and fatigue. Negative for chills, diaphoresis, fever and unexpected weight change.   HENT:  Negative for congestion, nosebleeds, sinus pressure and sore throat.    Eyes:  Negative for pain, discharge and visual disturbance.   Respiratory:  Negative for cough, chest tightness, shortness of breath, wheezing and stridor.    Cardiovascular:  Negative for chest pain, palpitations and leg swelling.   Gastrointestinal:  Negative for abdominal distention, abdominal pain, blood in stool, constipation, diarrhea, nausea and vomiting.   Endocrine: Negative for cold intolerance and heat intolerance.   Genitourinary:  Negative for difficulty urinating, dysuria, flank pain, frequency and urgency.   Musculoskeletal:  Positive for arthralgias (chronic bilateral shoulder R>L pain, Chronic right knee pain), back pain  (chronic) and gait problem (frequent falls). Negative for joint swelling, myalgias, neck pain and neck stiffness.        New left wrist pain    Skin:  Negative for rash and wound.   Allergic/Immunologic: Negative for food allergies and immunocompromised state.   Neurological:  Positive for dizziness, syncope and weakness (generalized). Negative for seizures, facial asymmetry, speech difficulty, light-headedness, numbness and headaches.   Hematological:  Negative for adenopathy.   Psychiatric/Behavioral:  Negative for agitation, confusion and hallucinations.      Objective:     Vital Signs (Most Recent):  Temp: 98.1 °F (36.7 °C) (11/01/23 0106)  Pulse: 84 (11/01/23 0111)  Resp: 18 (11/01/23 0106)  BP: 131/70 (11/01/23 0106)  SpO2: 100 % (11/01/23 0106) Vital Signs (24h Range):  Temp:  [98 °F (36.7 °C)-98.2 °F (36.8 °C)] 98.1 °F (36.7 °C)  Pulse:  [81-92] 84  Resp:  [16-19] 18  SpO2:  [95 %-100 %] 100 %  BP: ()/(54-99) 131/70     Weight: 83.3 kg (183 lb 10.3 oz)  Body mass index is 33.59 kg/m².     Physical Exam  Constitutional:       General: She is not in acute distress.     Appearance: She is well-developed. She is not diaphoretic.   HENT:      Head: Normocephalic and atraumatic.      Nose: Nose normal.      Mouth/Throat:      Mouth: Mucous membranes are dry.   Eyes:      General: No scleral icterus.     Conjunctiva/sclera: Conjunctivae normal.   Neck:      Trachea: No tracheal deviation.   Cardiovascular:      Rate and Rhythm: Normal rate and regular rhythm.      Heart sounds: Normal heart sounds. No murmur heard.     No friction rub. No gallop.   Pulmonary:      Effort: Pulmonary effort is normal. No respiratory distress.      Breath sounds: Normal breath sounds. No stridor. No wheezing or rales.   Chest:      Chest wall: No tenderness.   Abdominal:      General: Bowel sounds are normal. There is no distension.      Palpations: Abdomen is soft. There is no mass.      Tenderness: There is no abdominal  tenderness. There is no guarding or rebound.   Musculoskeletal:         General: Tenderness (left wrist) present. No deformity.      Cervical back: Normal range of motion and neck supple.      Comments: Pain with ROM to right shoulder, right knee, and left wrist    Skin:     General: Skin is warm and dry.      Coloration: Skin is not pale.      Findings: No erythema or rash.   Neurological:      Mental Status: She is alert and oriented to person, place, and time.      Cranial Nerves: No cranial nerve deficit.      Motor: No abnormal muscle tone.      Coordination: Coordination normal.   Psychiatric:         Behavior: Behavior normal.         Thought Content: Thought content normal.                Significant Labs: All pertinent labs within the past 24 hours have been reviewed.    Significant Imaging: I have reviewed all pertinent imaging results/findings within the past 24 hours.    Assessment/Plan:     * CORA on CKD, stage III  Patient with acute kidney injury/acute renal failure likely due to pre-renal azotemia due to dehydration CORA is currently worsening. Baseline creatinine 1.2 - Labs reviewed- Renal function/electrolytes with Estimated Creatinine Clearance: 13.5 mL/min (A) (based on SCr of 3.5 mg/dL (H)). according to latest data. Monitor urine output and serial BMP and adjust therapy as needed. Avoid nephrotoxins and renally dose meds for GFR listed above.    Hold Diamox and Losartan HCTZ  IVfs  Monitor     Hypotension  likely 2/2 dehydration   Pt takes HCTZ and was recently prescribed Diamox QID for her Glaucoma   Hold Losartan/HCTZ and Diamox.   IVfs   Monitor         Syncope  Likely 2/2 dehydration/hypotension    Although polypharmacy may also be attributing to falls and syncope   CT head showed nothing acute   Echo   Serial Troponin   Carotid U/S  Neuro checks   Orthostatics   IVFs  Decrease sedating home medications       Frequent falls  PT/OT  Decrease sedating home medications   IVfs for  Dehydration/hypotension       Polypharmacy  Pt home medications include Xanax BID, Percocet 10mg QID, Elavil 50mg nightly, and Baclofen prn.    pt to take Xanax BID prn  D/C baclofen   Decrease Elavil to 25mg nightly  Decrease percocet to 5mg every 6 hours prn      Leukocytosis  procalcitonin 2.08  No obvious source of infection   CXR/UA unremarkable   Blood cultures pending       Primary open angle glaucoma (POAG) of both eyes, severe stage  Cont home eye drops   Hold Diamox       Moderate major depression  Patient has persistent depression which is mild and is currently controlled. Will Continue anti-depressant medications. We will not consult psychiatry at this time. Patient does not display psychosis at this time. Continue to monitor closely and adjust plan of care as needed.        Gait instability  PT/OT  Home health       Chronic pain  See plan above         VTE Risk Mitigation (From admission, onward)         Ordered     enoxaparin injection 30 mg  Daily         11/01/23 0115     IP VTE HIGH RISK PATIENT  Once         11/01/23 0115     Place sequential compression device  Until discontinued         11/01/23 0115                               Paulette Michelle NP  Department of Hospital Medicine  [facility] - Med Surg

## 2023-11-01 NOTE — ASSESSMENT & PLAN NOTE
Likely 2/2 dehydration/hypotension    Although polypharmacy may also be attributing to falls and syncope   CT head showed nothing acute   Echo   Serial Troponin   Carotid U/S  Neuro checks   Orthostatics   IVFs  Decrease sedating home medications

## 2023-11-01 NOTE — PLAN OF CARE
VSS. IVF. Neuro checks per order. Pt complains of pain to left wrist and right shoulder. Bed alarm activated.       Problem: Adult Inpatient Plan of Care  Goal: Plan of Care Review  Outcome: Ongoing, Progressing  Goal: Patient-Specific Goal (Individualized)  Outcome: Ongoing, Progressing  Goal: Absence of Hospital-Acquired Illness or Injury  Outcome: Ongoing, Progressing  Goal: Optimal Comfort and Wellbeing  Outcome: Ongoing, Progressing  Goal: Readiness for Transition of Care  Outcome: Ongoing, Progressing

## 2023-11-02 LAB
ALBUMIN SERPL BCP-MCNC: 2.4 G/DL (ref 3.5–5.2)
ALP SERPL-CCNC: 82 U/L (ref 55–135)
ALT SERPL W/O P-5'-P-CCNC: 30 U/L (ref 10–44)
ANION GAP SERPL CALC-SCNC: 9 MMOL/L (ref 8–16)
AST SERPL-CCNC: 26 U/L (ref 10–40)
BASOPHILS # BLD AUTO: 0.05 K/UL (ref 0–0.2)
BASOPHILS NFR BLD: 0.7 % (ref 0–1.9)
BILIRUB SERPL-MCNC: 0.4 MG/DL (ref 0.1–1)
BUN SERPL-MCNC: 59 MG/DL (ref 8–23)
CALCIUM SERPL-MCNC: 8.6 MG/DL (ref 8.7–10.5)
CHLORIDE SERPL-SCNC: 112 MMOL/L (ref 95–110)
CO2 SERPL-SCNC: 19 MMOL/L (ref 23–29)
CREAT SERPL-MCNC: 1.6 MG/DL (ref 0.5–1.4)
DIFFERENTIAL METHOD: ABNORMAL
EOSINOPHIL # BLD AUTO: 0.3 K/UL (ref 0–0.5)
EOSINOPHIL NFR BLD: 3.6 % (ref 0–8)
ERYTHROCYTE [DISTWIDTH] IN BLOOD BY AUTOMATED COUNT: 16.2 % (ref 11.5–14.5)
EST. GFR  (NO RACE VARIABLE): 33 ML/MIN/1.73 M^2
GLUCOSE SERPL-MCNC: 119 MG/DL (ref 70–110)
HCT VFR BLD AUTO: 30.5 % (ref 37–48.5)
HGB BLD-MCNC: 9.4 G/DL (ref 12–16)
IMM GRANULOCYTES # BLD AUTO: 0.02 K/UL (ref 0–0.04)
IMM GRANULOCYTES NFR BLD AUTO: 0.3 % (ref 0–0.5)
LYMPHOCYTES # BLD AUTO: 2.1 K/UL (ref 1–4.8)
LYMPHOCYTES NFR BLD: 28.2 % (ref 18–48)
MAGNESIUM SERPL-MCNC: 1.8 MG/DL (ref 1.6–2.6)
MCH RBC QN AUTO: 22.4 PG (ref 27–31)
MCHC RBC AUTO-ENTMCNC: 30.8 G/DL (ref 32–36)
MCV RBC AUTO: 73 FL (ref 82–98)
MONOCYTES # BLD AUTO: 0.7 K/UL (ref 0.3–1)
MONOCYTES NFR BLD: 8.9 % (ref 4–15)
NEUTROPHILS # BLD AUTO: 4.4 K/UL (ref 1.8–7.7)
NEUTROPHILS NFR BLD: 58.3 % (ref 38–73)
NRBC BLD-RTO: 0 /100 WBC
PHOSPHATE SERPL-MCNC: 2.4 MG/DL (ref 2.7–4.5)
PLATELET # BLD AUTO: 315 K/UL (ref 150–450)
PMV BLD AUTO: 10 FL (ref 9.2–12.9)
POTASSIUM SERPL-SCNC: 3.5 MMOL/L (ref 3.5–5.1)
PROT SERPL-MCNC: 6 G/DL (ref 6–8.4)
RBC # BLD AUTO: 4.2 M/UL (ref 4–5.4)
SODIUM SERPL-SCNC: 140 MMOL/L (ref 136–145)
WBC # BLD AUTO: 7.45 K/UL (ref 3.9–12.7)

## 2023-11-02 PROCEDURE — 11000001 HC ACUTE MED/SURG PRIVATE ROOM: Mod: HCNC

## 2023-11-02 PROCEDURE — 97110 THERAPEUTIC EXERCISES: CPT | Mod: HCNC

## 2023-11-02 PROCEDURE — 84100 ASSAY OF PHOSPHORUS: CPT | Mod: HCNC | Performed by: NURSE PRACTITIONER

## 2023-11-02 PROCEDURE — 25000003 PHARM REV CODE 250: Mod: HCNC | Performed by: NURSE PRACTITIONER

## 2023-11-02 PROCEDURE — 97535 SELF CARE MNGMENT TRAINING: CPT | Mod: HCNC

## 2023-11-02 PROCEDURE — 36415 COLL VENOUS BLD VENIPUNCTURE: CPT | Mod: HCNC | Performed by: NURSE PRACTITIONER

## 2023-11-02 PROCEDURE — 80053 COMPREHEN METABOLIC PANEL: CPT | Mod: HCNC | Performed by: NURSE PRACTITIONER

## 2023-11-02 PROCEDURE — 97530 THERAPEUTIC ACTIVITIES: CPT | Mod: HCNC

## 2023-11-02 PROCEDURE — 83735 ASSAY OF MAGNESIUM: CPT | Mod: HCNC | Performed by: NURSE PRACTITIONER

## 2023-11-02 PROCEDURE — 25000003 PHARM REV CODE 250: Mod: HCNC | Performed by: STUDENT IN AN ORGANIZED HEALTH CARE EDUCATION/TRAINING PROGRAM

## 2023-11-02 PROCEDURE — 85025 COMPLETE CBC W/AUTO DIFF WBC: CPT | Mod: HCNC | Performed by: NURSE PRACTITIONER

## 2023-11-02 PROCEDURE — 63600175 PHARM REV CODE 636 W HCPCS: Mod: HCNC | Performed by: NURSE PRACTITIONER

## 2023-11-02 RX ORDER — SODIUM,POTASSIUM PHOSPHATES 280-250MG
1 POWDER IN PACKET (EA) ORAL ONCE
Status: COMPLETED | OUTPATIENT
Start: 2023-11-02 | End: 2023-11-02

## 2023-11-02 RX ORDER — KETOROLAC TROMETHAMINE 30 MG/ML
15 INJECTION, SOLUTION INTRAMUSCULAR; INTRAVENOUS ONCE
Status: COMPLETED | OUTPATIENT
Start: 2023-11-03 | End: 2023-11-03

## 2023-11-02 RX ADMIN — OXYCODONE HYDROCHLORIDE AND ACETAMINOPHEN 1 TABLET: 5; 325 TABLET ORAL at 09:11

## 2023-11-02 RX ADMIN — LATANOPROST 1 DROP: 50 SOLUTION OPHTHALMIC at 08:11

## 2023-11-02 RX ADMIN — SENNOSIDES AND DOCUSATE SODIUM 1 TABLET: 8.6; 5 TABLET ORAL at 09:11

## 2023-11-02 RX ADMIN — PANTOPRAZOLE SODIUM 40 MG: 40 TABLET, DELAYED RELEASE ORAL at 08:11

## 2023-11-02 RX ADMIN — Medication 1 PACKET: at 08:11

## 2023-11-02 RX ADMIN — ENOXAPARIN SODIUM 30 MG: 30 INJECTION SUBCUTANEOUS at 04:11

## 2023-11-02 RX ADMIN — AMITRIPTYLINE HYDROCHLORIDE 25 MG: 25 TABLET, FILM COATED ORAL at 09:11

## 2023-11-02 RX ADMIN — OXYCODONE HYDROCHLORIDE AND ACETAMINOPHEN 1 TABLET: 5; 325 TABLET ORAL at 05:11

## 2023-11-02 RX ADMIN — CITALOPRAM HYDROBROMIDE 10 MG: 10 TABLET ORAL at 08:11

## 2023-11-02 RX ADMIN — SENNOSIDES AND DOCUSATE SODIUM 1 TABLET: 8.6; 5 TABLET ORAL at 08:11

## 2023-11-02 NOTE — PLAN OF CARE
Problem: Adult Inpatient Plan of Care  Goal: Plan of Care Review  Outcome: Ongoing, Progressing  Goal: Patient-Specific Goal (Individualized)  Outcome: Ongoing, Progressing  Goal: Absence of Hospital-Acquired Illness or Injury  Outcome: Ongoing, Progressing  Goal: Optimal Comfort and Wellbeing  Outcome: Ongoing, Progressing  Goal: Readiness for Transition of Care  Outcome: Ongoing, Progressing

## 2023-11-02 NOTE — SUBJECTIVE & OBJECTIVE
Interval History:     No acute events overnight   Hemodynamically stable   Renal function improving   Noted to have improvement in swelling over bilateral upper extremities   Continue to monitor   Follow up patient/daughter regarding skilled nursing facility placement    Review of Systems    Constitutional:  Positive for activity change, appetite change and fatigue. Negative for chills, diaphoresis, fever and unexpected weight change.   HENT:  Negative for congestion, nosebleeds, sinus pressure and sore throat.    Eyes:  Negative for pain, discharge and visual disturbance.   Respiratory:  Negative for cough, chest tightness, shortness of breath, wheezing and stridor.    Cardiovascular:  Negative for chest pain, palpitations and leg swelling.   Gastrointestinal:  Negative for abdominal distention, abdominal pain, blood in stool, constipation, diarrhea, nausea and vomiting.   Endocrine: Negative for cold intolerance and heat intolerance.   Genitourinary:  Negative for difficulty urinating, dysuria, flank pain, frequency and urgency.   Musculoskeletal:  Positive for arthralgias (chronic bilateral shoulder R>L pain, Chronic right knee pain), back pain (chronic) and gait problem (frequent falls). Negative for joint swelling, myalgias, neck pain and neck stiffness.        New left wrist pain    Skin:  Negative for rash and wound.   Allergic/Immunologic: Negative for food allergies and immunocompromised state.   Neurological:  Positive for dizziness (improving) and weakness (generalized). Negative for seizures, facial asymmetry, speech difficulty, light-headedness, numbness and headaches.   Hematological:  Negative for adenopathy.   Psychiatric/Behavioral:  Negative for agitation, confusion and hallucinations.    Objective:     Vital Signs (Most Recent):  Temp: 98 °F (36.7 °C) (11/02/23 0713)  Pulse: 76 (11/02/23 0713)  Resp: 16 (11/02/23 0713)  BP: 135/61 (11/02/23 0713)  SpO2: 96 % (11/02/23 0713) Vital Signs (24h  Range):  Temp:  [97.7 °F (36.5 °C)-98.7 °F (37.1 °C)] 98 °F (36.7 °C)  Pulse:  [67-83] 76  Resp:  [16-18] 16  SpO2:  [94 %-99 %] 96 %  BP: (107-148)/(56-77) 135/61     Weight: 76.8 kg (169 lb 5 oz)  Body mass index is 30.97 kg/m².    Intake/Output Summary (Last 24 hours) at 11/2/2023 1042  Last data filed at 11/2/2023 0800  Gross per 24 hour   Intake 1513.41 ml   Output 1100 ml   Net 413.41 ml         Physical Exam      Constitutional:       General: She is not in acute distress.     Appearance: She is well-developed. She is not diaphoretic.   HENT:      Head: Normocephalic and atraumatic.      Nose: Nose normal.      Mouth/Throat:      Mouth: Mucous membranes are dry.   Eyes:      General: No scleral icterus.     Conjunctiva/sclera: Conjunctivae normal.   Neck:      Trachea: No tracheal deviation.   Cardiovascular:      Rate and Rhythm: Normal rate and regular rhythm.      Heart sounds: Normal heart sounds. No murmur heard.     No friction rub. No gallop.   Pulmonary:      Effort: Pulmonary effort is normal. No respiratory distress.      Breath sounds: Normal breath sounds. No stridor. No wheezing or rales.   Chest:      Chest wall: No tenderness.   Abdominal:      General: Bowel sounds are normal. There is no distension.      Palpations: Abdomen is soft. There is no mass.      Tenderness: There is no abdominal tenderness. There is no guarding or rebound.   Musculoskeletal:         General: Tenderness (left wrist) present. No deformity.      Cervical back: Normal range of motion and neck supple.      Comments: Pain with ROM to right shoulder, right knee, and left wrist    Skin:     General: Skin is warm and dry.      Coloration: Skin is not pale.      Findings: No erythema or rash.   Neurological:      Mental Status: She is alert and oriented to person, place, and time.      Cranial Nerves: No cranial nerve deficit.      Motor: No abnormal muscle tone.      Coordination: Coordination normal.   Psychiatric:          Behavior: Behavior normal.         Thought Content: Thought content normal.   Significant Labs: All pertinent labs within the past 24 hours have been reviewed.  CBC:   Recent Labs   Lab 10/31/23  2020 11/01/23  0528 11/02/23  0559   WBC 14.53* 11.32 7.45   HGB 11.1* 10.6* 9.4*   HCT 35.9* 35.8* 30.5*    325 315     CMP:   Recent Labs   Lab 10/31/23  2020 11/01/23  0528 11/02/23  0559    140 140   K 3.5 3.5 3.5    111* 112*   CO2 17* 15* 19*   GLU 88 71 119*   BUN 68* 70* 59*   CREATININE 3.5* 2.6* 1.6*   CALCIUM 9.5 9.3 8.6*   PROT 7.4 6.8 6.0   ALBUMIN 2.9* 2.7* 2.4*   BILITOT 0.9 0.8 0.4   ALKPHOS 96 91 82   AST 32 30 26   ALT 40 35 30   ANIONGAP 15 14 9       Significant Imaging:     Imaging Results              NM Lung Scan Ventilation Perfusion (Final result)  Result time 10/31/23 23:51:12      Final result by Federica Norwood MD (10/31/23 23:51:12)                   Impression:      Low probability for PE per modified [facility]OPED  protocol      Electronically signed by: Federica Norwood  Date:    10/31/2023  Time:    23:51               Narrative:    EXAMINATION:  NM LUNG VENTILATION AND PERFUSION IMAGING    CLINICAL HISTORY:  Pulmonary embolism (PE) suspected, positive D-dimer;    COMPARISON:  Radiographic correlation    Technique    Radiotracer    One mCi of technetium 9 9 M DTPA was used for ventilation imaging.    5.4 mCi of Tc-99m MAA was administered intravenously for the perfusion portion of the exam.  Anterior, lateral, posterior, and oblique images reveal    FINDINGS:  No sizable peripheral perfusion ventilation mismatch defect identified.                                       CT Lumbar Spine Without Contrast (Final result)  Result time 10/31/23 21:36:45      Final result by Raul Gregory MD (10/31/23 21:36:45)                   Impression:      No fracture or traumatic malalignment of the lumbar spine.    Multifocal degenerative changes.    All CT scans at this facility are  performed  using dose modulation techniques as appropriate to performed exam including the following:  automated exposure control; adjustment of mA and/or kV according to the patients size (this includes techniques or standardized protocols for targeted exams where dose is matched to indication/reason for exam: i.e. extremities or head);  iterative reconstruction technique.      Electronically signed by: Raul Gregory  Date:    10/31/2023  Time:    21:36               Narrative:    EXAMINATION:  CT LUMBAR SPINE WITHOUT CONTRAST    CLINICAL HISTORY:  Back trauma, no prior imaging (Age >= 16y);    TECHNIQUE:  Low-dose CT images obtained throughout the region of the lumbar spine.  Axial, sagittal and coronal reformations were performed.  Contrast was not administered.    COMPARISON:  Multiple priors.    FINDINGS:  Some vertebral body height loss stable from the priors.  No definite acute vertebral body compression fracture.    Healing left 11th rib fracture.  No additional fractures identified.  Bulky facet arthropathy throughout.  Flowing anterior syndesmophytes.    Normal sagittal alignment is preserved.  No spondylolisthesis.    Moderate spinal canal stenosis L3-4 and moderate severe spinal canal stenosis L4-5.  Other levels are better preserved.    Mild to moderate bilateral neural foraminal narrowing L2-3 and L3-4.  Moderate to severe bilateral neural foraminal narrowing L4-5.  Other levels are better preserved.    Limited evaluation of the intraspinal contents demonstrates no hematoma or mass.    Paraspinal soft tissues exhibit no acute abnormalities.                                       CT Thoracic Spine Without Contrast (Final result)  Result time 10/31/23 21:42:42      Final result by Federica Norwood MD (10/31/23 21:42:42)                   Impression:      NO ACUTE OR ADVERSE OSSEOUS THORACIC SPINE FINDING      Electronically signed by: Federica Norwood  Date:    10/31/2023  Time:    21:42                Narrative:    EXAMINATION:  CT THORACIC SPINE WITHOUT CONTRAST    CLINICAL HISTORY:  Back trauma, no prior imaging (Age >= 16y);    TECHNIQUE serial axial images were obtained of the thoracic spine without the administration of intravenous contrast. Both soft tissue and bone windows were obtained.    COMPARISON:  08/17/2022    FINDINGS:  VERTEBRAL BODY HEIGHT AND ALIGNMENT UNCHANGED.    MULTILEVEL SPONDYLOSIS SIMILAR TO PRIOR.  NO ADVERSE OSSEOUS FINDING                                       CT Head Without Contrast (Final result)  Result time 10/31/23 21:24:11      Final result by Federica Norwood MD (10/31/23 21:24:11)                   Impression:      No acute or adverse intracranial finding      Electronically signed by: Federica Norwood  Date:    10/31/2023  Time:    21:24               Narrative:    EXAMINATION:  CT HEAD WITHOUT CONTRAST    CLINICAL HISTORY:  Head trauma, minor (Age >= 65y);    TECHNIQUE:  Low dose axial images were obtained through the head.  Coronal and sagittal reformations were also performed. Contrast was not administered.    COMPARISON:  08/17/2022    FINDINGS:  No acute intracranial hemorrhage or mass effect.  Ventricles similar caliber.  No displaced calvarial fracture                                       CT Cervical Spine Without Contrast (Final result)  Result time 10/31/23 21:29:37      Final result by Federica Norwood MD (10/31/23 21:29:37)                   Impression:      No acute or adverse osseous finding; ligamentous injury not excluded by CT      Electronically signed by: Federica Norwood  Date:    10/31/2023  Time:    21:29               Narrative:    EXAMINATION:  CT CERVICAL SPINE WITHOUT CONTRAST    CLINICAL HISTORY:  Neck trauma (Age >= 65y);    TECHNIQUE:  Low dose axial images, sagittal and coronal reformations were performed though the cervical spine.  Contrast was not administered.    COMPARISON:  08/17/2022    FINDINGS:  No acute fracture or  traumatic malalignment seen.  Multilevel spondylosis and alignment similar to prior exam.                                       X-Ray Hips Bilateral 2 View Incl AP Pelvis (Final result)  Result time 10/31/23 21:19:07      Final result by Raul Gregory MD (10/31/23 21:19:07)                   Impression:      As above.      Electronically signed by: Raul Gregory  Date:    10/31/2023  Time:    21:19               Narrative:    EXAMINATION:  XR HIPS BILATERAL 2 VIEW INCL AP PELVIS    CLINICAL HISTORY:  Unspecified fall, initial encounter    TECHNIQUE:  AP view of the pelvis and frogleg lateral views of both hips were performed.    COMPARISON:  None.    FINDINGS:  No acute displaced fracture identified.  No traumatic malalignment.  Moderate degenerative changes.  If high clinical concern remains consider further evaluation with CT.                                       X-Ray Chest AP Portable (Final result)  Result time 10/31/23 21:22:19      Final result by Raul Gregory MD (10/31/23 21:22:19)                   Impression:      No acute abnormality.      Electronically signed by: Raul Gregory  Date:    10/31/2023  Time:    21:22               Narrative:    EXAMINATION:  XR CHEST AP PORTABLE    CLINICAL HISTORY:  Sepsis;    TECHNIQUE:  Single frontal view of the chest was performed.    COMPARISON:  Multiple priors.    FINDINGS:  The lungs are clear, with normal appearance of pulmonary vasculature and no pleural effusion or pneumothorax.    The cardiac silhouette is normal in size. The hilar and mediastinal contours are unremarkable.    Bones are intact.

## 2023-11-02 NOTE — PLAN OF CARE
Patient remained free of any injury throughout shift. VSS. Pain control. LR @ 75mL/hr. PT/OT following. Call light in reach and side rails x2. Will continue with plan of care.     Problem: Infection  Goal: Absence of Infection Signs and Symptoms  Outcome: Ongoing, Progressing     Problem: Adult Inpatient Plan of Care  Goal: Optimal Comfort and Wellbeing  Outcome: Ongoing, Progressing     Problem: Adult Inpatient Plan of Care  Goal: Absence of Hospital-Acquired Illness or Injury  Outcome: Ongoing, Progressing

## 2023-11-02 NOTE — PT/OT/SLP PROGRESS
"Physical Therapy  Treatment    Tea [facility]   MRN: [id number]   Admitting Diagnosis: CORA (acute kidney injury)    PT Received On: 11/02/23  PT Start Time: 0820     PT Stop Time: 0845    PT Total Time (min): 25 min       Billable Minutes:  Therapeutic Activity 15 and Therapeutic Exercise 10    Treatment Type: Treatment  PT/PTA: PT     Number of PTA visits since last PT visit: 0       General Precautions: Standard, fall  Orthopedic Precautions: N/A  Braces: N/A  Respiratory Status: Room air         Subjective:  Communicated with NURSE [place] AND [facility] CHART REVIEW  prior to session.   PT AGREED TO TX     Pain/Comfort  Pain Rating 1: 6/10  Location - Side 1: Left  Location 1: arm  Pain Addressed 1: Reposition  Pain Rating Post-Intervention 1: 6/10    Objective:   Patient found with: peripheral IV, telemetry, PureWick    Functional Mobility:  PT MET IN RM SUP IN BED. PT REPORTED, " I NEED TO GET HOME. " P.T. EDUCATED PT ON ROLE OF P.T. AND PT COMPLETED SUP>SIT EOB WITH M IN A AND INC TIME. PT WITH POST LEAN AND INC CUES FOR ANT WT SHIFT AND SCOOTING TO EOB WITH MIN A. PT COMPLETED B LE TE X 10 REPS OF AP, TKE, AND MIP WITH INC CUES AND LIMITED ROM. PT ATTEMPTED SIT>STAND X 2 TRIALS WITH INC MOMENTUM AND MAX A WITH VERBAL CUES. PT STOOD AND COMPLETED T/F TO CHAIR WITH MAX A. PT SEATED IN CHAIR WITH DEC ECCENTRIC CONTROL. PT EDUCATED ON IMPORTANCE OF OOB TO CHAIR .    Treatment and Education:  PT EDUCATED AND REPORTED UNDERSTANDING:   Sit in chair throughout the day to increase functional strength and activity tolerance and decrease risk of blood clots and secondary infection.   "Call, don't fall" procedure of pressing red button on call bell for all transfers.       AM-PAC 6 CLICK MOBILITY  How much help from another person does this patient currently need?   1 = Unable, Total/Dependent Assistance  2 = A lot, Maximum/Moderate Assistance  3 = A little, Minimum/Contact Guard/Supervision  4 = None, Modified " [place]/Independent    Turning over in bed (including adjusting bedclothes, sheets and blankets)?: 3  Sitting down on and standing up from a chair with arms (e.g., wheelchair, bedside commode, etc.): 2  Moving from lying on back to sitting on the side of the bed?: 3  Moving to and from a bed to a chair (including a wheelchair)?: 2  Need to walk in hospital room?: 1  Climbing 3-5 steps with a railing?: 1  Basic Mobility Total Score: 12    AM-PAC Raw Score CMS G-Code Modifier Level of Impairment Assistance   6 % Total / Unable   7 - 9 CM 80 - 100% Maximal Assist   10 - 14 CL 60 - 80% Moderate Assist   15 - 19 CK 40 - 60% Moderate Assist   20 - 22 CJ 20 - 40% Minimal Assist   23 CI 1-20% SBA / CGA   24 CH 0% Independent/ Mod I     Patient left up in chair with call button in reach.    Assessment:  PT ROD TX WITH INC ASSIST FOR GROSS FUNC MOBILITY REQUIRED. PT WILL CONT TO BENEFIT FROM P.T. TO PROGRESS FUNC MOBILITY @ D/C     Rehab identified problem list/impairments: weakness, impaired endurance, impaired balance, gait instability, decreased lower extremity function, decreased upper extremity function, decreased ROM, impaired coordination, decreased safety awareness, pain, impaired cognition, impaired self care skills, impaired functional mobility, decreased coordination, edema    Rehab potential is good.    Activity tolerance: Fair    Discharge recommendations: Moderate Intensity Therapy      Barriers to discharge:      Equipment recommendations: to be determined by next level of care     GOALS:   Multidisciplinary Problems       Physical Therapy Goals          Problem: Physical Therapy    Goal Priority Disciplines Outcome Goal Variances Interventions   Physical Therapy Goal     PT, PT/OT Ongoing, Progressing     Description: LT/15/23  1. PT WILL COMPLETE BED MOBILITY WITH MIN A TO PROGRESS BED MOBILITY  2. PT WILL STAND T/F TO CHAIR WITH RW AND MIN A FOR OOB TOLERANCE  3. PT WILL GT TRAIN X 50' WITH  RW AND MIN A TO PROGRESS GT.   4. PT WILL INC AMPAC SCORE BY 2 POINTS TO PROGRESS GROSS FUNC MOBILITY.                          PLAN:    Patient to be seen 3 x/week to address the above listed problems via gait training, therapeutic activities, therapeutic exercises  Plan of Care expires: 11/15/23  Plan of Care reviewed with: patient         11/02/2023

## 2023-11-02 NOTE — PLAN OF CARE
11/02/23 1323   Post-Acute Status   Post-Acute Authorization Home Health   Home Health Status Referrals Sent   Coverage [employer]   Discharge Plan   Discharge Plan A Home Health     Pending [facility] to accept.  [facility] has accepted pt.

## 2023-11-02 NOTE — PLAN OF CARE
SW spoke with pt's daughter regarding snf. Pt's daughter stated the last time pt went to snf didn't turn out good. Pt is not willing to go to a snf facility. CHEPE and MD will speak with pt and daughter.

## 2023-11-02 NOTE — HOSPITAL COURSE
The patient is a 77 yo female with CKD3, HTN, Depression, Anxiety, Asthma, Chronic pain (shoulders, back and right knee), Glaucoma, GERD who presented to ED with syncope and frequent falls. Pt's last fall occurred PTA. On EMS arrival, the pt was hypotensive at 63/40 and placed a left tibia IO in which she received IVFs. The pt reports head trauma but denies LOC after this most recent fall. She reports approx 2 weeks ago, she noticed increased fatigue, generalized weakness and decreased appetite. She states that she has fallen 5-6 times over the past week with episodes of syncope. She has chronic pain to her shoulders R>L, right knee, and lower back which have worsened since falls. She also injured her left wrist. Pt states she lives alone. However, today, her daughter witnessed her fall and called 911.   Of note, the pt was recently placed on Diamox 125mg 4 times daily for Glaucoma. (Along with home med Diovan HCT). She states she also has recently increased her Percocet 10mg to 4 times daily.         In the ED, BP 85/54- improved with IVFs, Afebrile, Labs significant for WBC 14, D dimer 4.6, Bicarb 17, BUN 68, Serum Cr 3.5, Trop 0.042, Procalcitonin 2.08, UA unremarkable, CXR clear. EKG showed NSR with nonspecific ST and T wave abnormalities. VQ scan showed low probability for PE. CT head showed nothing acute, CT cervical/thoracic/lumbar spine showed DDD and multilevel spondylosis, nothing acute. Xray bilateral hips showed degenerative changes, nothing acute.   X-ray left humerus did not show acute findings/fracture.  Ultrasound arterial left upper extremity did not show acute findings;    Echo showed- Left Ventricle: The left ventricle is normal in size. Normal wall thickness. There is concentric remodeling. Normal wall motion. There is normal systolic function with a visually estimated ejection fraction of 60 - 65%. There is normal diastolic function.    Right Ventricle: Normal right ventricular cavity size.  Wall thickness is normal. Right ventricle wall motion  is normal. Systolic function is normal.    Pulmonary Artery: The estimated pulmonary artery systolic pressure is 18 mmHg.     11/1   Examination done at bedside, patient stated slight improvement in dizziness, generalized weakness.    Complaining of upper extremity swelling, we will follow up on x-ray/imaging.    Hemodynamically stable   Creatinine slightly trended down to 2.6, we will continue IV fluids, avoid nephrotoxins.    Ultrasound carotid did not show significant stenosis  Await PT OT recommendations     11/2  Stated improvement in dizziness,; creatinine improving, trended down to 1.6, to continue IV fluids.    PT OT evaluated and recommended skilled nursing facility,  follow up       11/3  Examination of patient done at bedside, patient denied acute issues overnight, alert and oriented, currently at baseline.  Denied headache, dizziness, chest pain shortness O breath, nausea, vomiting, bowel or bladder issues.    Hemodynamically stable, creatinine trended down, currently within normal limits.    PT OT evaluated and recommended skilled nursing facility, discussed with patient/daughter- preferred to go home with home health, daughter stated that she is going to stay with her mother for few weeks, stated that she is going to provide 24 hour supervision, help with medications, outpatient follow up visits.  Patient stated that she is been on valsartan, hydrochlorothiazide for long time, however acetazolamide has been started recently back Ophthalmology, stated that she has upcoming appointment in 3 days, stated that she was going to discuss regarding the new medication and to adjust regimen accordingly.  Considering clinical and hemodynamic stability, planning to discharge patient, emphasized on compliance with medications, outpatient follow up with PCP to undergo repeat labs including BUN/creatinine, adjust blood pressure medications  accordingly, outpatient follow up with Ophthalmology regarding acetazolamide, emphasized on hydration, home PT OT, fall risk - patient/daughter agreed to the plan, discharge accordingly today.

## 2023-11-02 NOTE — PLAN OF CARE
Pt requesting to return to bed at start of treatment session. OT encouraged patient to sit up until lunch (one more hour). Pt declined. Assisted patient back to bed max A with max verbal cues for technique and safety.   Donned clean brief in supine. Left with nursing staff.   Rec mod intensity therapy at [place]

## 2023-11-02 NOTE — PROGRESS NOTES
[facility]  Progress Note    Patient Name: Tea Ring  MRN: [id number]  Patient Class: IP- Inpatient   Admission Date: 10/31/2023  Length of Stay: 1 days  Attending Physician: Leonides Burnette,*  Primary Care Provider: Marilyn Odonnell NP        Subjective:     Principal Problem:CORA (acute kidney injury)        HPI:  The patient is a 79 yo female with CKD3, HTN, Depression, Anxiety, Asthma, Chronic pain (shoulders, back and right knee), Glaucoma, GERD who presented to ED with syncope and frequent falls. Pt's last fall occurred PTA. On EMS arrival, the pt was hypotensive at 63/40 and placed a left tibia IO in which she received IVFs. The pt reports head trauma but denies LOC after this most recent fall. She reports approx 2 weeks ago, she noticed increased fatigue, generalized weakness and decreased appetite. She states that she has fallen 5-6 times over the past week with episodes of syncope. She has chronic pain to her shoulders R>L, right knee, and lower back which have worsened since falls. She also injured her left wrist. Pt states she lives alone. However, today, her daughter witnessed her fall and called 911.   Of note, the pt was recently placed on Diamox 125mg 4 times daily for Glaucoma. (Along with home med Diovan HCT). She states she also has recently increased her Percocet 10mg to 4 times daily.       In the ED, BP 85/54- improved with IVFs, Afebrile, Labs significant for WBC 14, D dimer 4.6, Bicarb 17, BUN 68, Serum Cr 3.5, Trop 0.042, Procalcitonin 2.08, UA unremarkable, CXR clear. EKG showed NSR with nonspecific ST and T wave abnormalities. VQ scan showed low probability for PE. CT head showed nothing acute, CT cervical/thoracic/lumbar spine showed DDD and multilevel spondylosis, nothing acute. Xray bilateral hips showed degenerative changes, nothing acute.         Overview/Hospital Course:   The patient is a 79 yo female with CKD3, HTN, Depression, Anxiety, Asthma,  Chronic pain (shoulders, back and right knee), Glaucoma, GERD who presented to ED with syncope and frequent falls. Pt's last fall occurred PTA. On EMS arrival, the pt was hypotensive at 63/40 and placed a left tibia IO in which she received IVFs. The pt reports head trauma but denies LOC after this most recent fall. She reports approx 2 weeks ago, she noticed increased fatigue, generalized weakness and decreased appetite. She states that she has fallen 5-6 times over the past week with episodes of syncope. She has chronic pain to her shoulders R>L, right knee, and lower back which have worsened since falls. She also injured her left wrist. Pt states she lives alone. However, today, her daughter witnessed her fall and called 911.   Of note, the pt was recently placed on Diamox 125mg 4 times daily for Glaucoma. (Along with home med Diovan HCT). She states she also has recently increased her Percocet 10mg to 4 times daily.         In the ED, BP 85/54- improved with IVFs, Afebrile, Labs significant for WBC 14, D dimer 4.6, Bicarb 17, BUN 68, Serum Cr 3.5, Trop 0.042, Procalcitonin 2.08, UA unremarkable, CXR clear. EKG showed NSR with nonspecific ST and T wave abnormalities. VQ scan showed low probability for PE. CT head showed nothing acute, CT cervical/thoracic/lumbar spine showed DDD and multilevel spondylosis, nothing acute. Xray bilateral hips showed degenerative changes, nothing acute.   X-ray left humerus did not show acute findings/fracture.  Ultrasound arterial left upper extremity did not show acute findings     11/1   Examination done at bedside, patient stated slight improvement in dizziness, generalized weakness.    Complaining of upper extremity swelling, we will follow up on x-ray/imaging.    Hemodynamically stable   Creatinine slightly trended down to 2.6, we will continue IV fluids, avoid nephrotoxins.    Ultrasound carotid did not show significant stenosis  Await PT OT recommendations     11/2  Stated  improvement in dizziness,; creatinine improving, trended down to 1.6, to continue IV fluids.    PT OT evaluated and recommended skilled nursing facility,  follow up         Interval History:     No acute events overnight   Hemodynamically stable   Renal function improving   Noted to have improvement in swelling over bilateral upper extremities   Continue to monitor   Follow up patient/daughter regarding skilled nursing facility placement    Review of Systems    Constitutional:  Positive for activity change, appetite change and fatigue. Negative for chills, diaphoresis, fever and unexpected weight change.   HENT:  Negative for congestion, nosebleeds, sinus pressure and sore throat.    Eyes:  Negative for pain, discharge and visual disturbance.   Respiratory:  Negative for cough, chest tightness, shortness of breath, wheezing and stridor.    Cardiovascular:  Negative for chest pain, palpitations and leg swelling.   Gastrointestinal:  Negative for abdominal distention, abdominal pain, blood in stool, constipation, diarrhea, nausea and vomiting.   Endocrine: Negative for cold intolerance and heat intolerance.   Genitourinary:  Negative for difficulty urinating, dysuria, flank pain, frequency and urgency.   Musculoskeletal:  Positive for arthralgias (chronic bilateral shoulder R>L pain, Chronic right knee pain), back pain (chronic) and gait problem (frequent falls). Negative for joint swelling, myalgias, neck pain and neck stiffness.        New left wrist pain    Skin:  Negative for rash and wound.   Allergic/Immunologic: Negative for food allergies and immunocompromised state.   Neurological:  Positive for dizziness (improving) and weakness (generalized). Negative for seizures, facial asymmetry, speech difficulty, light-headedness, numbness and headaches.   Hematological:  Negative for adenopathy.   Psychiatric/Behavioral:  Negative for agitation, confusion and hallucinations.    Objective:     Vital Signs  (Most Recent):  Temp: 98 °F (36.7 °C) (11/02/23 0713)  Pulse: 76 (11/02/23 0713)  Resp: 16 (11/02/23 0713)  BP: 135/61 (11/02/23 0713)  SpO2: 96 % (11/02/23 0713) Vital Signs (24h Range):  Temp:  [97.7 °F (36.5 °C)-98.7 °F (37.1 °C)] 98 °F (36.7 °C)  Pulse:  [67-83] 76  Resp:  [16-18] 16  SpO2:  [94 %-99 %] 96 %  BP: (107-148)/(56-77) 135/61     Weight: 76.8 kg (169 lb 5 oz)  Body mass index is 30.97 kg/m².    Intake/Output Summary (Last 24 hours) at 11/2/2023 1042  Last data filed at 11/2/2023 0800  Gross per 24 hour   Intake 1513.41 ml   Output 1100 ml   Net 413.41 ml         Physical Exam      Constitutional:       General: She is not in acute distress.     Appearance: She is well-developed. She is not diaphoretic.   HENT:      Head: Normocephalic and atraumatic.      Nose: Nose normal.      Mouth/Throat:      Mouth: Mucous membranes are dry.   Eyes:      General: No scleral icterus.     Conjunctiva/sclera: Conjunctivae normal.   Neck:      Trachea: No tracheal deviation.   Cardiovascular:      Rate and Rhythm: Normal rate and regular rhythm.      Heart sounds: Normal heart sounds. No murmur heard.     No friction rub. No gallop.   Pulmonary:      Effort: Pulmonary effort is normal. No respiratory distress.      Breath sounds: Normal breath sounds. No stridor. No wheezing or rales.   Chest:      Chest wall: No tenderness.   Abdominal:      General: Bowel sounds are normal. There is no distension.      Palpations: Abdomen is soft. There is no mass.      Tenderness: There is no abdominal tenderness. There is no guarding or rebound.   Musculoskeletal:         General: Tenderness (left wrist) present. No deformity.      Cervical back: Normal range of motion and neck supple.      Comments: Pain with ROM to right shoulder, right knee, and left wrist    Skin:     General: Skin is warm and dry.      Coloration: Skin is not pale.      Findings: No erythema or rash.   Neurological:      Mental Status: She is alert and  oriented to person, place, and time.      Cranial Nerves: No cranial nerve deficit.      Motor: No abnormal muscle tone.      Coordination: Coordination normal.   Psychiatric:         Behavior: Behavior normal.         Thought Content: Thought content normal.   Significant Labs: All pertinent labs within the past 24 hours have been reviewed.  CBC:   Recent Labs   Lab 10/31/23  2020 11/01/23  0528 11/02/23  0559   WBC 14.53* 11.32 7.45   HGB 11.1* 10.6* 9.4*   HCT 35.9* 35.8* 30.5*    325 315     CMP:   Recent Labs   Lab 10/31/23  2020 11/01/23  0528 11/02/23  0559    140 140   K 3.5 3.5 3.5    111* 112*   CO2 17* 15* 19*   GLU 88 71 119*   BUN 68* 70* 59*   CREATININE 3.5* 2.6* 1.6*   CALCIUM 9.5 9.3 8.6*   PROT 7.4 6.8 6.0   ALBUMIN 2.9* 2.7* 2.4*   BILITOT 0.9 0.8 0.4   ALKPHOS 96 91 82   AST 32 30 26   ALT 40 35 30   ANIONGAP 15 14 9       Significant Imaging:     Imaging Results              NM Lung Scan Ventilation Perfusion (Final result)  Result time 10/31/23 23:51:12      Final result by Federica Norwood MD (10/31/23 23:51:12)                   Impression:      Low probability for PE per modified PIOPED  protocol      Electronically signed by: Federica Norwood  Date:    10/31/2023  Time:    23:51               Narrative:    EXAMINATION:  NM LUNG VENTILATION AND PERFUSION IMAGING    CLINICAL HISTORY:  Pulmonary embolism (PE) suspected, positive D-dimer;    COMPARISON:  Radiographic correlation    Technique    Radiotracer    One mCi of technetium 9 9 M DTPA was used for ventilation imaging.    5.4 mCi of Tc-99m MAA was administered intravenously for the perfusion portion of the exam.  Anterior, lateral, posterior, and oblique images reveal    FINDINGS:  No sizable peripheral perfusion ventilation mismatch defect identified.                                       CT Lumbar Spine Without Contrast (Final result)  Result time 10/31/23 21:36:45      Final result by Raul Gregory MD  (10/31/23 21:36:45)                   Impression:      No fracture or traumatic malalignment of the lumbar spine.    Multifocal degenerative changes.    All CT scans at this facility are performed  using dose modulation techniques as appropriate to performed exam including the following:  automated exposure control; adjustment of mA and/or kV according to the patients size (this includes techniques or standardized protocols for targeted exams where dose is matched to indication/reason for exam: i.e. extremities or head);  iterative reconstruction technique.      Electronically signed by: Raul Gregory  Date:    10/31/2023  Time:    21:36               Narrative:    EXAMINATION:  CT LUMBAR SPINE WITHOUT CONTRAST    CLINICAL HISTORY:  Back trauma, no prior imaging (Age >= 16y);    TECHNIQUE:  Low-dose CT images obtained throughout the region of the lumbar spine.  Axial, sagittal and coronal reformations were performed.  Contrast was not administered.    COMPARISON:  Multiple priors.    FINDINGS:  Some vertebral body height loss stable from the priors.  No definite acute vertebral body compression fracture.    Healing left 11th rib fracture.  No additional fractures identified.  Bulky facet arthropathy throughout.  Flowing anterior syndesmophytes.    Normal sagittal alignment is preserved.  No spondylolisthesis.    Moderate spinal canal stenosis L3-4 and moderate severe spinal canal stenosis L4-5.  Other levels are better preserved.    Mild to moderate bilateral neural foraminal narrowing L2-3 and L3-4.  Moderate to severe bilateral neural foraminal narrowing L4-5.  Other levels are better preserved.    Limited evaluation of the intraspinal contents demonstrates no hematoma or mass.    Paraspinal soft tissues exhibit no acute abnormalities.                                       CT Thoracic Spine Without Contrast (Final result)  Result time 10/31/23 21:42:42      Final result by Federica Norwood MD (10/31/23 21:42:42)                    Impression:      NO ACUTE OR ADVERSE OSSEOUS THORACIC SPINE FINDING      Electronically signed by: Federica Norwood  Date:    10/31/2023  Time:    21:42               Narrative:    EXAMINATION:  CT THORACIC SPINE WITHOUT CONTRAST    CLINICAL HISTORY:  Back trauma, no prior imaging (Age >= 16y);    TECHNIQUE serial axial images were obtained of the thoracic spine without the administration of intravenous contrast. Both soft tissue and bone windows were obtained.    COMPARISON:  08/17/2022    FINDINGS:  VERTEBRAL BODY HEIGHT AND ALIGNMENT UNCHANGED.    MULTILEVEL SPONDYLOSIS SIMILAR TO PRIOR.  NO ADVERSE OSSEOUS FINDING                                       CT Head Without Contrast (Final result)  Result time 10/31/23 21:24:11      Final result by Federica Norwood MD (10/31/23 21:24:11)                   Impression:      No acute or adverse intracranial finding      Electronically signed by: Federica Norwood  Date:    10/31/2023  Time:    21:24               Narrative:    EXAMINATION:  CT HEAD WITHOUT CONTRAST    CLINICAL HISTORY:  Head trauma, minor (Age >= 65y);    TECHNIQUE:  Low dose axial images were obtained through the head.  Coronal and sagittal reformations were also performed. Contrast was not administered.    COMPARISON:  08/17/2022    FINDINGS:  No acute intracranial hemorrhage or mass effect.  Ventricles similar caliber.  No displaced calvarial fracture                                       CT Cervical Spine Without Contrast (Final result)  Result time 10/31/23 21:29:37      Final result by Federica Norwood MD (10/31/23 21:29:37)                   Impression:      No acute or adverse osseous finding; ligamentous injury not excluded by CT      Electronically signed by: Federica Norwood  Date:    10/31/2023  Time:    21:29               Narrative:    EXAMINATION:  CT CERVICAL SPINE WITHOUT CONTRAST    CLINICAL HISTORY:  Neck trauma (Age >= 65y);    TECHNIQUE:  Low dose axial  images, sagittal and coronal reformations were performed though the cervical spine.  Contrast was not administered.    COMPARISON:  08/17/2022    FINDINGS:  No acute fracture or traumatic malalignment seen.  Multilevel spondylosis and alignment similar to prior exam.                                       X-Ray Hips Bilateral 2 View Incl AP Pelvis (Final result)  Result time 10/31/23 21:19:07      Final result by Raul Gregory MD (10/31/23 21:19:07)                   Impression:      As above.      Electronically signed by: Raul Gregory  Date:    10/31/2023  Time:    21:19               Narrative:    EXAMINATION:  XR HIPS BILATERAL 2 VIEW INCL AP PELVIS    CLINICAL HISTORY:  Unspecified fall, initial encounter    TECHNIQUE:  AP view of the pelvis and frogleg lateral views of both hips were performed.    COMPARISON:  None.    FINDINGS:  No acute displaced fracture identified.  No traumatic malalignment.  Moderate degenerative changes.  If high clinical concern remains consider further evaluation with CT.                                       X-Ray Chest AP Portable (Final result)  Result time 10/31/23 21:22:19      Final result by Raul Gregory MD (10/31/23 21:22:19)                   Impression:      No acute abnormality.      Electronically signed by: Raul Gregory  Date:    10/31/2023  Time:    21:22               Narrative:    EXAMINATION:  XR CHEST AP PORTABLE    CLINICAL HISTORY:  Sepsis;    TECHNIQUE:  Single frontal view of the chest was performed.    COMPARISON:  Multiple priors.    FINDINGS:  The lungs are clear, with normal appearance of pulmonary vasculature and no pleural effusion or pneumothorax.    The cardiac silhouette is normal in size. The hilar and mediastinal contours are unremarkable.    Bones are intact.                                         Assessment/Plan:      * CORA on CKD, stage III  Patient with acute kidney injury/acute renal failure likely due to pre-renal azotemia due to  dehydration CORA is currently worsening. Baseline creatinine 1.2 - Labs reviewed- Renal function/electrolytes with Estimated Creatinine Clearance: 13.5 mL/min (A) (based on SCr of 3.5 mg/dL (H)). according to latest data. Monitor urine output and serial BMP and adjust therapy as needed. Avoid nephrotoxins and renally dose meds for GFR listed above.    Hold Diamox and Losartan HCTZ  IVfs  Monitor     Polypharmacy  Pt home medications include Xanax BID, Percocet 10mg QID, Elavil 50mg nightly, and Baclofen prn.    pt to take Xanax BID prn  D/C baclofen   Decrease Elavil to 25mg nightly  Decrease percocet to 5mg every 6 hours prn      Hypotension  likely 2/2 dehydration   Pt takes HCTZ and was recently prescribed Diamox QID for her Glaucoma   Hold Losartan/HCTZ and Diamox.   IVfs   Monitor         Frequent falls  PT/OT  Decrease sedating home medications   IVfs for Dehydration/hypotension       Syncope  Likely 2/2 dehydration/hypotension    Although polypharmacy may also be attributing to falls and syncope   CT head showed nothing acute   Echo   Serial Troponin   Carotid U/S  Neuro checks   Orthostatics   IVFs  Decrease sedating home medications       Leukocytosis  procalcitonin 2.08  No obvious source of infection   CXR/UA unremarkable   Blood cultures pending       Primary open angle glaucoma (POAG) of both eyes, severe stage  Cont home eye drops   Hold Diamox       Moderate major depression  Patient has persistent depression which is mild and is currently controlled. Will Continue anti-depressant medications. We will not consult psychiatry at this time. Patient does not display psychosis at this time. Continue to monitor closely and adjust plan of care as needed.        Gait instability  PT/OT  Home health       Chronic pain  See plan above         VTE Risk Mitigation (From admission, onward)         Ordered     enoxaparin injection 30 mg  Daily         11/01/23 0115     IP VTE HIGH RISK PATIENT  Once          11/01/23 0115     Place sequential compression device  Until discontinued         11/01/23 0115                Discharge Planning   YIMI:      Code Status: Full Code   Is the patient medically ready for discharge?:     Reason for patient still in hospital (select all that apply): Patient trending condition, PT / OT recommendations and Pending disposition  Discharge Plan A: Home                  Leonides Burnette MD  Department of Hospital Medicine   [facility]

## 2023-11-02 NOTE — PT/OT/SLP PROGRESS
Occupational Therapy   Treatment    Name: Tea Ring  MRN: [id number]  Admitting Diagnosis:  CORA (acute kidney injury)       Recommendations:     Discharge Recommendations: Moderate Intensity Therapy  Discharge Equipment Recommendations:  to be determined by next level of care  Barriers to discharge:  Decreased caregiver support    Assessment:     Tea Ring is a 78 y.o. female with a medical diagnosis of CORA (acute kidney injury).  Performance deficits affecting function are weakness, gait instability, decreased upper extremity function, decreased ROM, impaired endurance, impaired balance, decreased lower extremity function, decreased safety awareness, impaired self care skills, impaired functional mobility, decreased coordination.     Rehab Prognosis:  Good; patient would benefit from acute skilled OT services to address these deficits and reach maximum level of function.       Plan:     Patient to be seen 2 x/week to address the above listed problems via self-care/home management, therapeutic activities, therapeutic exercises  Plan of Care Expires: 11/15/23  Plan of Care Reviewed with: patient    Subjective     Chief Complaint: tired  Patient/Family Comments/goals: return to PLOF  Pain/Comfort:  Pain Rating 1: 0/10  Pain Rating Post-Intervention 1: 0/10    Objective:     Communicated with: nurse prior to session.  Patient found up in chair with peripheral IV, telemetry, PureWick upon OT entry to room.    General Precautions: Standard, fall    Orthopedic Precautions:N/A  Braces: N/A  Respiratory Status: Room air     Occupational Performance:     Bed Mobility:    Patient completed Rolling/Turning to Left with  maximal assistance  Patient completed Rolling/Turning to Right with maximal assistance  Patient completed Scooting/Bridging with maximal assistance and 2 persons  Patient completed Sit to Supine with minimum assistance     Functional Mobility/Transfers:  Patient completed Bed <> Chair Transfer using Squat  Pivot technique with maximal assistance with no assistive device      Activities of Daily Living:  Toileting: total A to landry clean brief bed level      [facility] 6 Click ADL: 16    Treatment & Education:  Pt requesting to return to bed at start of treatment session. OT encouraged patient to sit up until lunch (one more hour). Pt declined. Assisted patient back to bed max A with max verbal cues for technique and safety, increased time.  Donned clean brief in supine. With patient rolling L/R with max A. Supine scoot towards HOB max A x2 staff. Left with nursing staff.   Encouraged patient to eat meals OOB and to call nursing to assist  back to chair. Pt verbalized understanding.      Patient left HOB elevated with all lines intact, call button in reach, and nurse present    GOALS:   Multidisciplinary Problems       Occupational Therapy Goals          Problem: Occupational Therapy    Goal Priority Disciplines Outcome Interventions   Occupational Therapy Goal     OT, PT/OT Ongoing, Progressing    Description: Goals to be met by: 11/15/23     Patient will increase functional independence with ADLs by performing:    UE Dressing with Contact Guard Assistance.  Step transfer with Minimal Assistance  Increased functional strength to BUE grossly by 1/2 MMT grades.                         Time Tracking:     OT Date of Treatment: 11/02/23  OT Start Time: 1015  OT Stop Time: 1040  OT Total Time (min): 25 min    Billable Minutes:Self Care/Home Management 10  Therapeutic Activity 15    GABRIEL Velásquez  OT/RADHA: OT          11/2/2023

## 2023-11-03 VITALS
RESPIRATION RATE: 16 BRPM | DIASTOLIC BLOOD PRESSURE: 72 MMHG | TEMPERATURE: 99 F | SYSTOLIC BLOOD PRESSURE: 159 MMHG | OXYGEN SATURATION: 97 % | WEIGHT: 169.31 LBS | HEART RATE: 84 BPM | BODY MASS INDEX: 31.16 KG/M2 | HEIGHT: 62 IN

## 2023-11-03 LAB
ALBUMIN SERPL BCP-MCNC: 2.8 G/DL (ref 3.5–5.2)
ALP SERPL-CCNC: 82 U/L (ref 55–135)
ALT SERPL W/O P-5'-P-CCNC: 25 U/L (ref 10–44)
ANION GAP SERPL CALC-SCNC: 9 MMOL/L (ref 8–16)
AST SERPL-CCNC: 22 U/L (ref 10–40)
BASOPHILS # BLD AUTO: 0.04 K/UL (ref 0–0.2)
BASOPHILS NFR BLD: 0.6 % (ref 0–1.9)
BILIRUB SERPL-MCNC: 0.4 MG/DL (ref 0.1–1)
BUN SERPL-MCNC: 31 MG/DL (ref 8–23)
CALCIUM SERPL-MCNC: 9.1 MG/DL (ref 8.7–10.5)
CHLORIDE SERPL-SCNC: 110 MMOL/L (ref 95–110)
CO2 SERPL-SCNC: 24 MMOL/L (ref 23–29)
CREAT SERPL-MCNC: 1 MG/DL (ref 0.5–1.4)
DIFFERENTIAL METHOD: ABNORMAL
EOSINOPHIL # BLD AUTO: 0.3 K/UL (ref 0–0.5)
EOSINOPHIL NFR BLD: 4.6 % (ref 0–8)
ERYTHROCYTE [DISTWIDTH] IN BLOOD BY AUTOMATED COUNT: 16.2 % (ref 11.5–14.5)
EST. GFR  (NO RACE VARIABLE): 58 ML/MIN/1.73 M^2
GLUCOSE SERPL-MCNC: 82 MG/DL (ref 70–110)
HCT VFR BLD AUTO: 31.8 % (ref 37–48.5)
HGB BLD-MCNC: 9.8 G/DL (ref 12–16)
IMM GRANULOCYTES # BLD AUTO: 0.01 K/UL (ref 0–0.04)
IMM GRANULOCYTES NFR BLD AUTO: 0.2 % (ref 0–0.5)
LYMPHOCYTES # BLD AUTO: 2.2 K/UL (ref 1–4.8)
LYMPHOCYTES NFR BLD: 34.2 % (ref 18–48)
MAGNESIUM SERPL-MCNC: 1.5 MG/DL (ref 1.6–2.6)
MCH RBC QN AUTO: 21.9 PG (ref 27–31)
MCHC RBC AUTO-ENTMCNC: 30.8 G/DL (ref 32–36)
MCV RBC AUTO: 71 FL (ref 82–98)
MONOCYTES # BLD AUTO: 0.7 K/UL (ref 0.3–1)
MONOCYTES NFR BLD: 10.7 % (ref 4–15)
NEUTROPHILS # BLD AUTO: 3.1 K/UL (ref 1.8–7.7)
NEUTROPHILS NFR BLD: 49.7 % (ref 38–73)
NRBC BLD-RTO: 0 /100 WBC
PHOSPHATE SERPL-MCNC: 2.1 MG/DL (ref 2.7–4.5)
PLATELET # BLD AUTO: 373 K/UL (ref 150–450)
PMV BLD AUTO: 9.7 FL (ref 9.2–12.9)
POTASSIUM SERPL-SCNC: 3.9 MMOL/L (ref 3.5–5.1)
PROT SERPL-MCNC: 6.9 G/DL (ref 6–8.4)
RBC # BLD AUTO: 4.47 M/UL (ref 4–5.4)
SODIUM SERPL-SCNC: 143 MMOL/L (ref 136–145)
WBC # BLD AUTO: 6.28 K/UL (ref 3.9–12.7)

## 2023-11-03 PROCEDURE — 85025 COMPLETE CBC W/AUTO DIFF WBC: CPT | Mod: HCNC | Performed by: NURSE PRACTITIONER

## 2023-11-03 PROCEDURE — 25000003 PHARM REV CODE 250: Mod: HCNC | Performed by: NURSE PRACTITIONER

## 2023-11-03 PROCEDURE — 63600175 PHARM REV CODE 636 W HCPCS: Mod: HCNC | Performed by: STUDENT IN AN ORGANIZED HEALTH CARE EDUCATION/TRAINING PROGRAM

## 2023-11-03 PROCEDURE — 25000003 PHARM REV CODE 250: Mod: HCNC | Performed by: STUDENT IN AN ORGANIZED HEALTH CARE EDUCATION/TRAINING PROGRAM

## 2023-11-03 PROCEDURE — 83735 ASSAY OF MAGNESIUM: CPT | Mod: HCNC | Performed by: NURSE PRACTITIONER

## 2023-11-03 PROCEDURE — 36415 COLL VENOUS BLD VENIPUNCTURE: CPT | Mod: HCNC | Performed by: NURSE PRACTITIONER

## 2023-11-03 PROCEDURE — 80053 COMPREHEN METABOLIC PANEL: CPT | Mod: HCNC | Performed by: NURSE PRACTITIONER

## 2023-11-03 PROCEDURE — 84100 ASSAY OF PHOSPHORUS: CPT | Mod: HCNC | Performed by: NURSE PRACTITIONER

## 2023-11-03 RX ORDER — AMLODIPINE BESYLATE 5 MG/1
5 TABLET ORAL DAILY
Status: DISCONTINUED | OUTPATIENT
Start: 2023-11-03 | End: 2023-11-03

## 2023-11-03 RX ORDER — ACETAZOLAMIDE 125 MG/1
125 TABLET ORAL 3 TIMES DAILY
Qty: 90 TABLET | Refills: 0
Start: 2023-11-03 | End: 2023-12-03

## 2023-11-03 RX ORDER — ENOXAPARIN SODIUM 100 MG/ML
40 INJECTION SUBCUTANEOUS EVERY 24 HOURS
Status: DISCONTINUED | OUTPATIENT
Start: 2023-11-03 | End: 2023-11-03 | Stop reason: HOSPADM

## 2023-11-03 RX ORDER — VALSARTAN 160 MG/1
160 TABLET ORAL DAILY
Status: DISCONTINUED | OUTPATIENT
Start: 2023-11-03 | End: 2023-11-03 | Stop reason: HOSPADM

## 2023-11-03 RX ORDER — HYDROCHLOROTHIAZIDE 12.5 MG/1
12.5 TABLET ORAL DAILY
Status: DISCONTINUED | OUTPATIENT
Start: 2023-11-03 | End: 2023-11-03 | Stop reason: HOSPADM

## 2023-11-03 RX ORDER — HYDRALAZINE HYDROCHLORIDE 20 MG/ML
10 INJECTION INTRAMUSCULAR; INTRAVENOUS EVERY 6 HOURS PRN
Status: DISCONTINUED | OUTPATIENT
Start: 2023-11-03 | End: 2023-11-03 | Stop reason: HOSPADM

## 2023-11-03 RX ADMIN — LATANOPROST 1 DROP: 50 SOLUTION OPHTHALMIC at 08:11

## 2023-11-03 RX ADMIN — HYDRALAZINE HYDROCHLORIDE 10 MG: 20 INJECTION, SOLUTION INTRAMUSCULAR; INTRAVENOUS at 01:11

## 2023-11-03 RX ADMIN — SENNOSIDES AND DOCUSATE SODIUM 1 TABLET: 8.6; 5 TABLET ORAL at 08:11

## 2023-11-03 RX ADMIN — OXYCODONE HYDROCHLORIDE AND ACETAMINOPHEN 1 TABLET: 5; 325 TABLET ORAL at 12:11

## 2023-11-03 RX ADMIN — VALSARTAN 160 MG: 160 TABLET, FILM COATED ORAL at 11:11

## 2023-11-03 RX ADMIN — HYDROCHLOROTHIAZIDE 12.5 MG: 12.5 TABLET ORAL at 08:11

## 2023-11-03 RX ADMIN — CITALOPRAM HYDROBROMIDE 10 MG: 10 TABLET ORAL at 09:11

## 2023-11-03 RX ADMIN — OXYCODONE HYDROCHLORIDE AND ACETAMINOPHEN 1 TABLET: 5; 325 TABLET ORAL at 04:11

## 2023-11-03 RX ADMIN — PANTOPRAZOLE SODIUM 40 MG: 40 TABLET, DELAYED RELEASE ORAL at 08:11

## 2023-11-03 RX ADMIN — KETOROLAC TROMETHAMINE 15 MG: 30 INJECTION, SOLUTION INTRAMUSCULAR; INTRAVENOUS at 12:11

## 2023-11-03 RX ADMIN — SODIUM CHLORIDE, POTASSIUM CHLORIDE, SODIUM LACTATE AND CALCIUM CHLORIDE: 600; 310; 30; 20 INJECTION, SOLUTION INTRAVENOUS at 06:11

## 2023-11-03 RX ADMIN — HYDRALAZINE HYDROCHLORIDE 10 MG: 20 INJECTION, SOLUTION INTRAMUSCULAR; INTRAVENOUS at 08:11

## 2023-11-03 NOTE — PT/OT/SLP PROGRESS
Physical Therapy      Patient Name:  Tea Ring   MRN:  [id number]    Chart review performed but patient not seen today secondary to Patient discharged prior to initiation of treatment session.

## 2023-11-03 NOTE — DISCHARGE INSTRUCTIONS
Recommended outpatient follow up with PCP/Cardiology within 1 week upon discharge to undergo repeat labs including BMP/BUN/creatinine  Recommended outpatient follow up with Ophthalmology regarding acetazolamide;  Recommend follow up with PCP to monitor for renal function while patient being on valsartan, hydrochlorothiazide, acetazolamide, and adjust regimen accordingly

## 2023-11-03 NOTE — NURSING
D/C instructions and AVS reviewed with patient and daughter Zoie;  Verbalized Understanding.  IV access removed, no redness or swelling noted.  Awaiting pt escort.

## 2023-11-03 NOTE — DISCHARGE SUMMARY
[facility] Medicine  Discharge Summary      Patient Name: Tea Ring  MRN: [id number]  [facility]: [id number]  Patient Class: IP- Inpatient  Admission Date: 10/31/2023  Hospital Length of Stay: 2 days  Discharge Date and Time: 11/3/2023  Attending Physician: Leonides Burnette,*   Discharging Provider: Leonides Burnette MD  Primary Care Provider: Marilyn Odonnell NP    Primary Care Team: Networked reference to record PCT     HPI:   The patient is a 79 yo female with CKD3, HTN, Depression, Anxiety, Asthma, Chronic pain (shoulders, back and right knee), Glaucoma, GERD who presented to ED with syncope and frequent falls. Pt's last fall occurred PTA. On EMS arrival, the pt was hypotensive at 63/40 and placed a left tibia IO in which she received IVFs. The pt reports head trauma but denies LOC after this most recent fall. She reports approx 2 weeks ago, she noticed increased fatigue, generalized weakness and decreased appetite. She states that she has fallen 5-6 times over the past week with episodes of syncope. She has chronic pain to her shoulders R>L, right knee, and lower back which have worsened since falls. She also injured her left wrist. Pt states she lives alone. However, today, her daughter witnessed her fall and called 911.   Of note, the pt was recently placed on Diamox 125mg 4 times daily for Glaucoma. (Along with home med Diovan HCT). She states she also has recently increased her Percocet 10mg to 4 times daily.       In the ED, BP 85/54- improved with IVFs, Afebrile, Labs significant for WBC 14, D dimer 4.6, Bicarb 17, BUN 68, Serum Cr 3.5, Trop 0.042, Procalcitonin 2.08, UA unremarkable, CXR clear. EKG showed NSR with nonspecific ST and T wave abnormalities. VQ scan showed low probability for PE. CT head showed nothing acute, CT cervical/thoracic/lumbar spine showed DDD and multilevel spondylosis, nothing acute. Xray bilateral hips showed degenerative changes, nothing acute.          * No surgery found *      Hospital Course:    The patient is a 79 yo female with CKD3, HTN, Depression, Anxiety, Asthma, Chronic pain (shoulders, back and right knee), Glaucoma, GERD who presented to ED with syncope and frequent falls. Pt's last fall occurred PTA. On EMS arrival, the pt was hypotensive at 63/40 and placed a left tibia IO in which she received IVFs. The pt reports head trauma but denies LOC after this most recent fall. She reports approx 2 weeks ago, she noticed increased fatigue, generalized weakness and decreased appetite. She states that she has fallen 5-6 times over the past week with episodes of syncope. She has chronic pain to her shoulders R>L, right knee, and lower back which have worsened since falls. She also injured her left wrist. Pt states she lives alone. However, today, her daughter witnessed her fall and called 911.   Of note, the pt was recently placed on Diamox 125mg 4 times daily for Glaucoma. (Along with home med Diovan HCT). She states she also has recently increased her Percocet 10mg to 4 times daily.         In the ED, BP 85/54- improved with IVFs, Afebrile, Labs significant for WBC 14, D dimer 4.6, Bicarb 17, BUN 68, Serum Cr 3.5, Trop 0.042, Procalcitonin 2.08, UA unremarkable, CXR clear. EKG showed NSR with nonspecific ST and T wave abnormalities. VQ scan showed low probability for PE. CT head showed nothing acute, CT cervical/thoracic/lumbar spine showed DDD and multilevel spondylosis, nothing acute. Xray bilateral hips showed degenerative changes, nothing acute.   X-ray left humerus did not show acute findings/fracture.  Ultrasound arterial left upper extremity did not show acute findings;    Echo showed- Left Ventricle: The left ventricle is normal in size. Normal wall thickness. There is concentric remodeling. Normal wall motion. There is normal systolic function with a visually estimated ejection fraction of 60 - 65%. There is normal diastolic function.     Right Ventricle: Normal right ventricular cavity size. Wall thickness is normal. Right ventricle wall motion  is normal. Systolic function is normal.    Pulmonary Artery: The estimated pulmonary artery systolic pressure is 18 mmHg.     11/1   Examination done at bedside, patient stated slight improvement in dizziness, generalized weakness.    Complaining of upper extremity swelling, we will follow up on x-ray/imaging.    Hemodynamically stable   Creatinine slightly trended down to 2.6, we will continue IV fluids, avoid nephrotoxins.    Ultrasound carotid did not show significant stenosis  Await PT OT recommendations     11/2  Stated improvement in dizziness,; creatinine improving, trended down to 1.6, to continue IV fluids.    PT OT evaluated and recommended skilled nursing facility,  follow up       11/3  Examination of patient done at bedside, patient denied acute issues overnight, alert and oriented, currently at baseline.  Denied headache, dizziness, chest pain shortness O breath, nausea, vomiting, bowel or bladder issues.    Hemodynamically stable, creatinine trended down, currently within normal limits.    PT OT evaluated and recommended skilled nursing facility, discussed with patient/daughter- preferred to go home with home health, daughter stated that she is going to stay with her mother for few weeks, stated that she is going to provide 24 hour supervision, help with medications, outpatient follow up visits.  Patient stated that she is been on valsartan, hydrochlorothiazide for long time, however acetazolamide has been started recently back Ophthalmology, stated that she has upcoming appointment in 3 days, stated that she was going to discuss regarding the new medication and to adjust regimen accordingly.  Considering clinical and hemodynamic stability, planning to discharge patient, emphasized on compliance with medications, outpatient follow up with PCP to undergo repeat labs including  BUN/creatinine, adjust blood pressure medications accordingly, outpatient follow up with Ophthalmology regarding acetazolamide, emphasized on hydration, home PT OT, fall risk - patient/daughter agreed to the plan, discharge accordingly today.               Review of Systems     Constitutional:   Negative for chills, diaphoresis, fever and unexpected weight change.   HENT:  Negative for congestion, nosebleeds, sinus pressure and sore throat.    Eyes:  Negative for pain, discharge and visual disturbance.   Respiratory:  Negative for cough, chest tightness, shortness of breath, wheezing and stridor.    Cardiovascular:  Negative for chest pain, palpitations and leg swelling.   Gastrointestinal:  Negative for abdominal distention, abdominal pain, blood in stool, constipation, diarrhea, nausea and vomiting.   Endocrine: Negative for cold intolerance and heat intolerance.   Genitourinary:  Negative for difficulty urinating, dysuria, flank pain, frequency and urgency.   Musculoskeletal:  Negative for joint swelling, myalgias, neck pain and neck stiffness.       Skin:  Negative for rash and wound.   Allergic/Immunologic: Negative for food allergies and immunocompromised state.   Neurological:   Negative for seizures, facial asymmetry, speech difficulty, light-headedness, numbness and headaches.   Hematological:  Negative for adenopathy.   Psychiatric/Behavioral:  Negative for agitation, confusion and hallucinations.      Physical Exam      Constitutional:       General: She is not in acute distress.     Appearance: She is well-developed. She is not diaphoretic.   HENT:      Head: Normocephalic and atraumatic.      Nose: Nose normal.      Mouth/Throat:      Mouth: Mucous membranes are dry.   Eyes:      General: No scleral icterus.     Conjunctiva/sclera: Conjunctivae normal.   Neck:      Trachea: No tracheal deviation.   Cardiovascular:      Rate and Rhythm: Normal rate and regular rhythm.      Heart sounds: Normal heart  sounds. No murmur heard.     No friction rub. No gallop.   Pulmonary:      Effort: Pulmonary effort is normal. No respiratory distress.      Breath sounds: Normal breath sounds. No stridor. No wheezing or rales.   Chest:      Chest wall: No tenderness.   Abdominal:      General: Bowel sounds are normal. There is no distension.      Palpations: Abdomen is soft. There is no mass.      Tenderness: There is no abdominal tenderness. There is no guarding or rebound.   Musculoskeletal:             Cervical back: Normal range of motion and neck supple.     Skin:     General: Skin is warm and dry.      Coloration: Skin is not pale.      Findings: No erythema or rash.   Neurological:      Mental Status: She is alert and oriented to person, place, and time.      Cranial Nerves: No cranial nerve deficit.      Motor: No abnormal muscle tone.      Coordination: Coordination normal.   Psychiatric:         Behavior: Behavior normal.         Thought Content: Thought content normal.       Goals of Care Treatment Preferences:  Code Status: Full Code      Consults:   Consults (From admission, onward)        Status Ordering Provider     Inpatient consult to Social Work  Once        Provider:  (Not yet assigned)    Completed ELSA DOYLE     IP consult to case management  Once        Provider:  (Not yet assigned)    Completed JOSEFINA WEAEVR          No new Assessment & Plan notes have been filed under this hospital service since the last note was generated.  Service: Hospital Medicine    Final Active Diagnoses:    Diagnosis Date Noted POA    PRINCIPAL PROBLEM:  CORA on CKD, stage III [N17.9] 08/18/2022 Yes    Syncope [R55] 11/01/2023 Yes    Frequent falls [R29.6] 11/01/2023 Not Applicable    Hypotension [I95.9] 11/01/2023 Yes    Polypharmacy [Z79.899] 11/01/2023 Not Applicable    Leukocytosis [D72.829] 08/18/2022 Yes    Primary open angle glaucoma (POAG) of both eyes, severe stage [H40.1133] 10/18/2018 Yes     Moderate major depression [F32.1] 10/08/2018 Yes    Gait instability [R26.81] 12/14/2017 Yes    Chronic pain [G89.29]  Yes      Problems Resolved During this Admission:       Discharged Condition: stable    Disposition: Home or Self Care    Follow Up:   Follow-up Information     Marilyn Odonnell NP Follow up in 1 week(s).    Specialty: Family Medicine  Contact information:  [address] [address] [address] [phone]             Salomon Wakefield MD Follow up in 1 week(s).    Specialties: Cardiology, Cardiovascular Disease  Contact information:  [address] [facility] [address] [phone]                       Patient Instructions:   No discharge procedures on file.    Significant Diagnostic Studies:     Results for orders placed or performed during the hospital encounter of 10/31/23   Blood culture x two cultures. Draw prior to antibiotics.    Specimen: Peripheral, Antecubital, Right; Blood   Result Value Ref Range    Blood Culture, Routine No Growth to date     Blood Culture, Routine No Growth to date     Blood Culture, Routine No Growth to date    Blood culture x two cultures. Draw prior to antibiotics.    Specimen: Peripheral, Antecubital, Right; Blood   Result Value Ref Range    Blood Culture, Routine No Growth to date     Blood Culture, Routine No Growth to date     Blood Culture, Routine No Growth to date    CBC auto differential   Result Value Ref Range    WBC 14.53 (H) 3.90 - 12.70 K/uL    RBC 4.99 4.00 - 5.40 M/uL    Hemoglobin 11.1 (L) 12.0 - 16.0 g/dL    Hematocrit 35.9 (L) 37.0 - 48.5 %    MCV 72 (L) 82 - 98 fL    MCH 22.2 (L) 27.0 - 31.0 pg    MCHC 30.9 (L) 32.0 - 36.0 g/dL    RDW 16.0 (H) 11.5 - 14.5 %    Platelets 369 150 - 450 K/uL    MPV 10.2 9.2 - 12.9 fL    Immature Granulocytes 0.5 0.0 - 0.5 %    Gran # (ANC) 9.5 (H) 1.8 - 7.7 K/uL    Immature Grans (Abs) 0.07 (H) 0.00 - 0.04 K/uL    Lymph # 3.2 1.0 - 4.8 K/uL    Mono # 1.6 (H) 0.3 - 1.0 K/uL    Eos # 0.0 0.0 - 0.5 K/uL     Baso # 0.05 0.00 - 0.20 K/uL    nRBC 0 0 /100 WBC    Gran % 65.7 38.0 - 73.0 %    Lymph % 22.2 18.0 - 48.0 %    Mono % 11.1 4.0 - 15.0 %    Eosinophil % 0.2 0.0 - 8.0 %    Basophil % 0.3 0.0 - 1.9 %    Differential Method Automated    Comprehensive metabolic panel   Result Value Ref Range    Sodium 138 136 - 145 mmol/L    Potassium 3.5 3.5 - 5.1 mmol/L    Chloride 106 95 - 110 mmol/L    CO2 17 (L) 23 - 29 mmol/L    Glucose 88 70 - 110 mg/dL    BUN 68 (H) 8 - 23 mg/dL    Creatinine 3.5 (H) 0.5 - 1.4 mg/dL    Calcium 9.5 8.7 - 10.5 mg/dL    Total Protein 7.4 6.0 - 8.4 g/dL    Albumin 2.9 (L) 3.5 - 5.2 g/dL    Total Bilirubin 0.9 0.1 - 1.0 mg/dL    Alkaline Phosphatase 96 55 - 135 U/L    AST 32 10 - 40 U/L    ALT 40 10 - 44 U/L    eGFR 13 (A) >60 mL/min/1.73 m^2    Anion Gap 15 8 - 16 mmol/L   Lactic acid, plasma #1   Result Value Ref Range    Lactate (Lactic Acid) 1.0 0.5 - 2.2 mmol/L   Urinalysis, Reflex to Urine Culture Urine, Catheterized    Specimen: Urine   Result Value Ref Range    Specimen UA Urine, Catheterized     Color, UA Yellow Yellow, Straw, Staci    Appearance, UA Hazy (A) Clear    pH, UA 5.0 5.0 - 8.0    Specific Gravity, UA 1.025 1.005 - 1.030    Protein, UA 1+ (A) Negative    Glucose, UA Negative Negative    Ketones, UA Trace (A) Negative    Bilirubin (UA) 1+ (A) Negative    Occult Blood UA Negative Negative    Nitrite, UA Negative Negative    Urobilinogen, UA 2.0-3.0 (A) <2.0 EU/dL    Leukocytes, UA Negative Negative   Magnesium   Result Value Ref Range    Magnesium 2.1 1.6 - 2.6 mg/dL   Phosphorus   Result Value Ref Range    Phosphorus 3.2 2.7 - 4.5 mg/dL   Lipase   Result Value Ref Range    Lipase 13 4 - 60 U/L   Brain natriuretic peptide   Result Value Ref Range    BNP 48 0 - 99 pg/mL   Protime-INR   Result Value Ref Range    Prothrombin Time 11.8 9.0 - 12.5 sec    INR 1.1 0.8 - 1.2   APTT   Result Value Ref Range    aPTT 26.3 21.0 - 32.0 sec   Troponin I   Result Value Ref Range    Troponin I  0.042 (H) 0.000 - 0.026 ng/mL   Procalcitonin   Result Value Ref Range    Procalcitonin 2.08 (H) <0.25 ng/mL   D dimer, quantitative   Result Value Ref Range    D-Dimer 4.68 (H) <0.50 mg/L FEU   TSH   Result Value Ref Range    TSH 0.445 0.400 - 4.000 uIU/mL   Lactic acid, plasma #2   Result Value Ref Range    Lactate (Lactic Acid) 0.8 0.5 - 2.2 mmol/L   Urinalysis Microscopic   Result Value Ref Range    RBC, UA 2 0 - 4 /hpf    WBC, UA 4 0 - 5 /hpf    Bacteria None None-Occ /hpf    Squam Epithel, UA 1 /hpf    Hyaline Casts, UA 73 (A) 0-1/lpf /lpf    Microscopic Comment SEE COMMENT    CK   Result Value Ref Range     (H) 20 - 180 U/L   Comprehensive Metabolic Panel (CMP)   Result Value Ref Range    Sodium 140 136 - 145 mmol/L    Potassium 3.5 3.5 - 5.1 mmol/L    Chloride 111 (H) 95 - 110 mmol/L    CO2 15 (L) 23 - 29 mmol/L    Glucose 71 70 - 110 mg/dL    BUN 70 (H) 8 - 23 mg/dL    Creatinine 2.6 (H) 0.5 - 1.4 mg/dL    Calcium 9.3 8.7 - 10.5 mg/dL    Total Protein 6.8 6.0 - 8.4 g/dL    Albumin 2.7 (L) 3.5 - 5.2 g/dL    Total Bilirubin 0.8 0.1 - 1.0 mg/dL    Alkaline Phosphatase 91 55 - 135 U/L    AST 30 10 - 40 U/L    ALT 35 10 - 44 U/L    eGFR 18 (A) >60 mL/min/1.73 m^2    Anion Gap 14 8 - 16 mmol/L   Magnesium   Result Value Ref Range    Magnesium 2.0 1.6 - 2.6 mg/dL   Phosphorus   Result Value Ref Range    Phosphorus 3.0 2.7 - 4.5 mg/dL   CBC with Automated Differential   Result Value Ref Range    WBC 11.32 3.90 - 12.70 K/uL    RBC 4.85 4.00 - 5.40 M/uL    Hemoglobin 10.6 (L) 12.0 - 16.0 g/dL    Hematocrit 35.8 (L) 37.0 - 48.5 %    MCV 74 (L) 82 - 98 fL    MCH 21.9 (L) 27.0 - 31.0 pg    MCHC 29.6 (L) 32.0 - 36.0 g/dL    RDW 16.2 (H) 11.5 - 14.5 %    Platelets 325 150 - 450 K/uL    MPV 10.1 9.2 - 12.9 fL    Immature Granulocytes 0.5 0.0 - 0.5 %    Gran # (ANC) 6.9 1.8 - 7.7 K/uL    Immature Grans (Abs) 0.06 (H) 0.00 - 0.04 K/uL    Lymph # 2.8 1.0 - 4.8 K/uL    Mono # 1.4 (H) 0.3 - 1.0 K/uL    Eos # 0.1 0.0 -  0.5 K/uL    Baso # 0.06 0.00 - 0.20 K/uL    nRBC 0 0 /100 WBC    Gran % 61.0 38.0 - 73.0 %    Lymph % 24.7 18.0 - 48.0 %    Mono % 12.2 4.0 - 15.0 %    Eosinophil % 1.1 0.0 - 8.0 %    Basophil % 0.5 0.0 - 1.9 %    Differential Method Automated    Troponin I   Result Value Ref Range    Troponin I 0.025 0.000 - 0.026 ng/mL   Troponin I   Result Value Ref Range    Troponin I 0.021 0.000 - 0.026 ng/mL   Troponin I   Result Value Ref Range    Troponin I 0.016 0.000 - 0.026 ng/mL   Comprehensive Metabolic Panel (CMP)   Result Value Ref Range    Sodium 140 136 - 145 mmol/L    Potassium 3.5 3.5 - 5.1 mmol/L    Chloride 112 (H) 95 - 110 mmol/L    CO2 19 (L) 23 - 29 mmol/L    Glucose 119 (H) 70 - 110 mg/dL    BUN 59 (H) 8 - 23 mg/dL    Creatinine 1.6 (H) 0.5 - 1.4 mg/dL    Calcium 8.6 (L) 8.7 - 10.5 mg/dL    Total Protein 6.0 6.0 - 8.4 g/dL    Albumin 2.4 (L) 3.5 - 5.2 g/dL    Total Bilirubin 0.4 0.1 - 1.0 mg/dL    Alkaline Phosphatase 82 55 - 135 U/L    AST 26 10 - 40 U/L    ALT 30 10 - 44 U/L    eGFR 33 (A) >60 mL/min/1.73 m^2    Anion Gap 9 8 - 16 mmol/L   Magnesium   Result Value Ref Range    Magnesium 1.8 1.6 - 2.6 mg/dL   Phosphorus   Result Value Ref Range    Phosphorus 2.4 (L) 2.7 - 4.5 mg/dL   CBC with Automated Differential   Result Value Ref Range    WBC 7.45 3.90 - 12.70 K/uL    RBC 4.20 4.00 - 5.40 M/uL    Hemoglobin 9.4 (L) 12.0 - 16.0 g/dL    Hematocrit 30.5 (L) 37.0 - 48.5 %    MCV 73 (L) 82 - 98 fL    MCH 22.4 (L) 27.0 - 31.0 pg    MCHC 30.8 (L) 32.0 - 36.0 g/dL    RDW 16.2 (H) 11.5 - 14.5 %    Platelets 315 150 - 450 K/uL    MPV 10.0 9.2 - 12.9 fL    Immature Granulocytes 0.3 0.0 - 0.5 %    Gran # (ANC) 4.4 1.8 - 7.7 K/uL    Immature Grans (Abs) 0.02 0.00 - 0.04 K/uL    Lymph # 2.1 1.0 - 4.8 K/uL    Mono # 0.7 0.3 - 1.0 K/uL    Eos # 0.3 0.0 - 0.5 K/uL    Baso # 0.05 0.00 - 0.20 K/uL    nRBC 0 0 /100 WBC    Gran % 58.3 38.0 - 73.0 %    Lymph % 28.2 18.0 - 48.0 %    Mono % 8.9 4.0 - 15.0 %    Eosinophil % 3.6  0.0 - 8.0 %    Basophil % 0.7 0.0 - 1.9 %    Differential Method Automated    Comprehensive Metabolic Panel (CMP)   Result Value Ref Range    Sodium 143 136 - 145 mmol/L    Potassium 3.9 3.5 - 5.1 mmol/L    Chloride 110 95 - 110 mmol/L    CO2 24 23 - 29 mmol/L    Glucose 82 70 - 110 mg/dL    BUN 31 (H) 8 - 23 mg/dL    Creatinine 1.0 0.5 - 1.4 mg/dL    Calcium 9.1 8.7 - 10.5 mg/dL    Total Protein 6.9 6.0 - 8.4 g/dL    Albumin 2.8 (L) 3.5 - 5.2 g/dL    Total Bilirubin 0.4 0.1 - 1.0 mg/dL    Alkaline Phosphatase 82 55 - 135 U/L    AST 22 10 - 40 U/L    ALT 25 10 - 44 U/L    eGFR 58 (A) >60 mL/min/1.73 m^2    Anion Gap 9 8 - 16 mmol/L   Magnesium   Result Value Ref Range    Magnesium 1.5 (L) 1.6 - 2.6 mg/dL   Phosphorus   Result Value Ref Range    Phosphorus 2.1 (L) 2.7 - 4.5 mg/dL   CBC with Automated Differential   Result Value Ref Range    WBC 6.28 3.90 - 12.70 K/uL    RBC 4.47 4.00 - 5.40 M/uL    Hemoglobin 9.8 (L) 12.0 - 16.0 g/dL    Hematocrit 31.8 (L) 37.0 - 48.5 %    MCV 71 (L) 82 - 98 fL    MCH 21.9 (L) 27.0 - 31.0 pg    MCHC 30.8 (L) 32.0 - 36.0 g/dL    RDW 16.2 (H) 11.5 - 14.5 %    Platelets 373 150 - 450 K/uL    MPV 9.7 9.2 - 12.9 fL    Immature Granulocytes 0.2 0.0 - 0.5 %    Gran # (ANC) 3.1 1.8 - 7.7 K/uL    Immature Grans (Abs) 0.01 0.00 - 0.04 K/uL    Lymph # 2.2 1.0 - 4.8 K/uL    Mono # 0.7 0.3 - 1.0 K/uL    Eos # 0.3 0.0 - 0.5 K/uL    Baso # 0.04 0.00 - 0.20 K/uL    nRBC 0 0 /100 WBC    Gran % 49.7 38.0 - 73.0 %    Lymph % 34.2 18.0 - 48.0 %    Mono % 10.7 4.0 - 15.0 %    Eosinophil % 4.6 0.0 - 8.0 %    Basophil % 0.6 0.0 - 1.9 %    Differential Method Automated    Echo   Result Value Ref Range    LVOT stroke volume 52.09 cm3    LVIDd 3.16 (A) 3.5 - 6.0 cm    LV Systolic Volume 16.64 mL    LV Systolic Volume Index 9.0 mL/m2    LVIDs 2.22 2.1 - 4.0 cm    LV Diastolic Volume 39.64 mL    LV Diastolic Volume Index 21.54 mL/m2    IVS 1.36 (A) 0.6 - 1.1 cm    LVOT diameter 1.92 cm    LVOT area 2.9 cm2     FS 30 28 - 44 %    Left Ventricle Relative Wall Thickness 0.71 cm    Posterior Wall 1.12 (A) 0.6 - 1.1 cm    LV mass 123.61 g    LV Mass Index 67 g/m2    MV Peak E Harley 0.49 m/s    TDI LATERAL 0.06 m/s    TDI SEPTAL 0.06 m/s    E/E' ratio 8.17 m/s    MV Peak A Harley 0.86 m/s    TR Max Harley 1.93 m/s    E/A ratio 0.57     E wave deceleration time 244.22 msec    LV SEPTAL E/E' RATIO 8.17 m/s    LV LATERAL E/E' RATIO 8.17 m/s    LVOT peak harley 0.84 m/s    Left Ventricular Outflow Tract Mean Velocity 0.63 cm/s    Left Ventricular Outflow Tract Mean Gradient 1.76 mmHg    RVDD 3.63 cm    RVOT peak VTI 18.2 cm    LA size 2.47 cm    Left Atrium Minor Axis 5.15 cm    Left Atrium Major Axis 5.67 cm    RA Major Axis 4.69 cm    AV mean gradient 3 mmHg    AV peak gradient 6 mmHg    Ao peak harley 1.19 m/s    Ao VTI 24.60 cm    LVOT peak VTI 18.00 cm    AV valve area 2.12 cm²    AV Velocity Ratio 0.71     AV index (prosthetic) 0.73     SHANIQUA by Velocity Ratio 2.04 cm²    MV stenosis pressure 1/2 time 70.82 ms    MV valve area p 1/2 method 3.11 cm2    Triscuspid Valve Regurgitation Peak Gradient 15 mmHg    PV mean gradient 1 mmHg    PV PEAK VELOCITY 1.03 m/s    PV peak gradient 4 mmHg    RVOT peak harley 0.87 m/s    Ao root annulus 2.70 cm    STJ 3.08 cm    Ascending aorta 3.36 cm    IVC diameter 1.67 cm    Mean e' 0.06 m/s    ZLVIDS -2.77     ZLVIDD -4.80     LA Volume Index 19.1 mL/m2    LA volume 35.13 cm3    LA WIDTH 3.1 cm    TV resting pulmonary artery pressure 18 mmHg    RV TB RVSP 5 mmHg    Est. RA pres 3 mmHg       Imaging Results          NM Lung Scan Ventilation Perfusion (Final result)  Result time 10/31/23 23:51:12    Final result by Federica Norwood MD (10/31/23 23:51:12)                 Impression:      Low probability for PE per modified PIOPED  protocol      Electronically signed by: Federica Ferrer:    10/31/2023  Time:    23:51             Narrative:    EXAMINATION:  NM LUNG VENTILATION AND PERFUSION  IMAGING    CLINICAL HISTORY:  Pulmonary embolism (PE) suspected, positive D-dimer;    COMPARISON:  Radiographic correlation    Technique    Radiotracer    One mCi of technetium 9 9 M DTPA was used for ventilation imaging.    5.4 mCi of Tc-99m MAA was administered intravenously for the perfusion portion of the exam.  Anterior, lateral, posterior, and oblique images reveal    FINDINGS:  No sizable peripheral perfusion ventilation mismatch defect identified.                               CT Lumbar Spine Without Contrast (Final result)  Result time 10/31/23 21:36:45    Final result by Raul Gregory MD (10/31/23 21:36:45)                 Impression:      No fracture or traumatic malalignment of the lumbar spine.    Multifocal degenerative changes.    All CT scans at this facility are performed  using dose modulation techniques as appropriate to performed exam including the following:  automated exposure control; adjustment of mA and/or kV according to the patients size (this includes techniques or standardized protocols for targeted exams where dose is matched to indication/reason for exam: i.e. extremities or head);  iterative reconstruction technique.      Electronically signed by: Raul Gregory  Date:    10/31/2023  Time:    21:36             Narrative:    EXAMINATION:  CT LUMBAR SPINE WITHOUT CONTRAST    CLINICAL HISTORY:  Back trauma, no prior imaging (Age >= 16y);    TECHNIQUE:  Low-dose CT images obtained throughout the region of the lumbar spine.  Axial, sagittal and coronal reformations were performed.  Contrast was not administered.    COMPARISON:  Multiple priors.    FINDINGS:  Some vertebral body height loss stable from the priors.  No definite acute vertebral body compression fracture.    Healing left 11th rib fracture.  No additional fractures identified.  Bulky facet arthropathy throughout.  Flowing anterior syndesmophytes.    Normal sagittal alignment is preserved.  No spondylolisthesis.    Moderate  spinal canal stenosis L3-4 and moderate severe spinal canal stenosis L4-5.  Other levels are better preserved.    Mild to moderate bilateral neural foraminal narrowing L2-3 and L3-4.  Moderate to severe bilateral neural foraminal narrowing L4-5.  Other levels are better preserved.    Limited evaluation of the intraspinal contents demonstrates no hematoma or mass.    Paraspinal soft tissues exhibit no acute abnormalities.                               CT Thoracic Spine Without Contrast (Final result)  Result time 10/31/23 21:42:42    Final result by Federica Norwood MD (10/31/23 21:42:42)                 Impression:      NO ACUTE OR ADVERSE OSSEOUS THORACIC SPINE FINDING      Electronically signed by: Federica Norwood  Date:    10/31/2023  Time:    21:42             Narrative:    EXAMINATION:  CT THORACIC SPINE WITHOUT CONTRAST    CLINICAL HISTORY:  Back trauma, no prior imaging (Age >= 16y);    TECHNIQUE serial axial images were obtained of the thoracic spine without the administration of intravenous contrast. Both soft tissue and bone windows were obtained.    COMPARISON:  08/17/2022    FINDINGS:  VERTEBRAL BODY HEIGHT AND ALIGNMENT UNCHANGED.    MULTILEVEL SPONDYLOSIS SIMILAR TO PRIOR.  NO ADVERSE OSSEOUS FINDING                               CT Head Without Contrast (Final result)  Result time 10/31/23 21:24:11    Final result by Federica Norwood MD (10/31/23 21:24:11)                 Impression:      No acute or adverse intracranial finding      Electronically signed by: Federica Norwood  Date:    10/31/2023  Time:    21:24             Narrative:    EXAMINATION:  CT HEAD WITHOUT CONTRAST    CLINICAL HISTORY:  Head trauma, minor (Age >= 65y);    TECHNIQUE:  Low dose axial images were obtained through the head.  Coronal and sagittal reformations were also performed. Contrast was not administered.    COMPARISON:  08/17/2022    FINDINGS:  No acute intracranial hemorrhage or mass effect.  Ventricles similar  caliber.  No displaced calvarial fracture                               CT Cervical Spine Without Contrast (Final result)  Result time 10/31/23 21:29:37    Final result by Federica Norwood MD (10/31/23 21:29:37)                 Impression:      No acute or adverse osseous finding; ligamentous injury not excluded by CT      Electronically signed by: Federica Norwood  Date:    10/31/2023  Time:    21:29             Narrative:    EXAMINATION:  CT CERVICAL SPINE WITHOUT CONTRAST    CLINICAL HISTORY:  Neck trauma (Age >= 65y);    TECHNIQUE:  Low dose axial images, sagittal and coronal reformations were performed though the cervical spine.  Contrast was not administered.    COMPARISON:  08/17/2022    FINDINGS:  No acute fracture or traumatic malalignment seen.  Multilevel spondylosis and alignment similar to prior exam.                               X-Ray Hips Bilateral 2 View Incl AP Pelvis (Final result)  Result time 10/31/23 21:19:07    Final result by Raul Gregory MD (10/31/23 21:19:07)                 Impression:      As above.      Electronically signed by: Raul Gregory  Date:    10/31/2023  Time:    21:19             Narrative:    EXAMINATION:  XR HIPS BILATERAL 2 VIEW INCL AP PELVIS    CLINICAL HISTORY:  Unspecified fall, initial encounter    TECHNIQUE:  AP view of the pelvis and frogleg lateral views of both hips were performed.    COMPARISON:  None.    FINDINGS:  No acute displaced fracture identified.  No traumatic malalignment.  Moderate degenerative changes.  If high clinical concern remains consider further evaluation with CT.                               X-Ray Chest AP Portable (Final result)  Result time 10/31/23 21:22:19    Final result by Raul Gregory MD (10/31/23 21:22:19)                 Impression:      No acute abnormality.      Electronically signed by: Raul Gregory  Date:    10/31/2023  Time:    21:22             Narrative:    EXAMINATION:  XR CHEST AP PORTABLE    CLINICAL  HISTORY:  Sepsis;    TECHNIQUE:  Single frontal view of the chest was performed.    COMPARISON:  Multiple priors.    FINDINGS:  The lungs are clear, with normal appearance of pulmonary vasculature and no pleural effusion or pneumothorax.    The cardiac silhouette is normal in size. The hilar and mediastinal contours are unremarkable.    Bones are intact.                                Pending Diagnostic Studies:     None         Medications:         Medication List      CHANGE how you take these medications    acetaZOLAMIDE 125 MG Tab  Commonly known as: DIAMOX  Take 1 tablet (125 mg total) by mouth 3 (three) times daily.  What changed: when to take this     LOTEMAX SM 0.38 % Drpg  Generic drug: loteprednol etabonate  Place 1 drop into the right eye 4 (four) times daily.  What changed: Another medication with the same name was removed. Continue taking this medication, and follow the directions you see here.     prednisoLONE acetate 1 % Drps  Commonly known as: PRED FORTE  INSTILL 1 DROP IN RIGHT EYE 4 TIMES A DAY  What changed: Another medication with the same name was removed. Continue taking this medication, and follow the directions you see here.        CONTINUE taking these medications    ALPRAZolam 0.25 MG tablet  Commonly known as: XANAX  TAKE ONE TABLET BY MOUTH TWICE A DAY AS NEEDED FOR ANXIETY     amitriptyline 50 MG tablet  Commonly known as: ELAVIL  TAKE ONE TABLET BY MOUTH ONCE DAILY     atorvastatin 10 MG tablet  Commonly known as: LIPITOR  TAKE ONE TABLET BY MOUTH ONCE DAILY     atropine 1% 1 % Drop  Commonly known as: ISOPTO ATROPINE  Place 1 drop into the right eye 2 (two) times a day.     baclofen 10 MG tablet  Commonly known as: LIORESAL     brimonidine 0.2% 0.2 % Drop  Commonly known as: ALPHAGAN  PLACE 1 DROP IN EACH EYE TWICE A DAY     citalopram 10 MG tablet  Commonly known as: CeleXA  Take 1 tablet (10 mg total) by mouth once daily.     dorzolamide-timolol 2-0.5% 22.3-6.8 mg/mL ophthalmic  solution  Commonly known as: COSOPT  PLACE 1 DROP IN EACH EYE TWICE A DAY     ketorolac 0.5% 0.5 % Drop  Commonly known as: ACULAR  Place 1 drop into both eyes 4 (four) times daily.     latanoprost 0.005 % ophthalmic solution  Place 1 drop into both eyes once daily.     magnesium oxide 400 mg (241.3 mg magnesium) tablet  Commonly known as: MAG-OX     pantoprazole 40 MG tablet  Commonly known as: PROTONIX  TAKE ONE TABLET BY MOUTH ONCE DAILY     polymyxin B sulf-trimethoprim 10,000 unit- 1 mg/mL Drop  Commonly known as: POLYTRIM  PLACE ONE DROP INTO THE RIGHT EYE FOUR TIMES DAILY     valsartan-hydrochlorothiazide 160-12.5 mg per tablet  Commonly known as: DIOVAN-HCT  TAKE ONE TABLET BY MOUTH ONCE DAILY        STOP taking these medications    oxyCODONE-acetaminophen  mg per tablet  Commonly known as: PERCOCET           Where to Get Your Medications      Information about where to get these medications is not yet available    Ask your nurse or doctor about these medications  · acetaZOLAMIDE 125 MG Tab         Indwelling Lines/Drains at time of discharge:   Lines/Drains/Airways     Drain  Duration           Female External Urinary Catheter 11/01/23 2 days                Time spent on the discharge of patient: 85 minutes         Leonides Burnette MD  Department of Hospital Medicine  [facility]

## 2023-11-03 NOTE — PROGRESS NOTES
Pharmacist Renal Dose Adjustment Note    Tea Ring is a 78 y.o. female being treated with the medication enoxaparin.    Patient Data:    Vital Signs (Most Recent):  Temp: 99.2 °F (37.3 °C) (11/03/23 1117)  Pulse: 79 (11/03/23 1300)  Resp: 16 (11/03/23 1229)  BP: (!) 188/90 (11/03/23 1300)  SpO2: 97 % (11/03/23 1117) Vital Signs (72h Range):  Temp:  [97.6 °F (36.4 °C)-99.2 °F (37.3 °C)]   Pulse:  [66-92]   Resp:  [16-19]   BP: ()/()   SpO2:  [93 %-100 %]      Recent Labs   Lab 11/01/23  0528 11/02/23  0559 11/03/23  0532   CREATININE 2.6* 1.6* 1.0     Serum creatinine: 1 mg/dL 11/03/23 0532  Estimated creatinine clearance: 45.2 mL/min    Enoxaparin 30 mg every 24 hours was adjusted to 40 mg every 24 hours according to [facility]'s renal dose adjustment policy.      Pharmacist's Name: Lien Choudhary, PharmD 11/3/2023 2:33 PM    Pharmacist's Extension: [phone]

## 2023-11-03 NOTE — PLAN OF CARE
[facility]  Discharge Final Note    Primary Care Provider: Marilyn Odonnell NP    Expected Discharge Date: 11/3/2023    Final Discharge Note (most recent)       Final Note - 11/03/23 0955          Final Note    Assessment Type Final Discharge Note     Anticipated Discharge Disposition Home or Self Care        Post-Acute Status    Post-Acute Authorization Home Health     Home Health Status Set-up Complete/Auth obtained     Coverage [employer]

## 2023-11-03 NOTE — PLAN OF CARE
Problem: Adult Inpatient Plan of Care  Goal: Plan of Care Review  Outcome: Ongoing, Progressing  Goal: Absence of Hospital-Acquired Illness or Injury  Outcome: Ongoing, Progressing  Goal: Optimal Comfort and Wellbeing  Outcome: Ongoing, Progressing  Goal: Readiness for Transition of Care  Outcome: Ongoing, Progressing     Problem: Infection  Goal: Absence of Infection Signs and Symptoms  Outcome: Ongoing, Progressing     Problem: Fluid and Electrolyte Imbalance (Acute Kidney Injury/Impairment)  Goal: Fluid and Electrolyte Balance  Outcome: Ongoing, Progressing     Problem: Oral Intake Inadequate (Acute Kidney Injury/Impairment)  Goal: Optimal Nutrition Intake  Outcome: Ongoing, Progressing     Problem: Renal Function Impairment (Acute Kidney Injury/Impairment)  Goal: Effective Renal Function  Outcome: Ongoing, Progressing     Problem: Fall Injury Risk  Goal: Absence of Fall and Fall-Related Injury  Outcome: Ongoing, Progressing

## 2023-11-06 LAB
BACTERIA BLD CULT: NORMAL
BACTERIA BLD CULT: NORMAL

## 2023-11-10 ENCOUNTER — TELEPHONE (OUTPATIENT)
Dept: INTERNAL MEDICINE | Facility: CLINIC | Age: 78
End: 2023-11-10
Payer: MEDICARE

## 2023-11-14 ENCOUNTER — TELEPHONE (OUTPATIENT)
Dept: INTERNAL MEDICINE | Facility: CLINIC | Age: 78
End: 2023-11-14
Payer: MEDICARE

## 2023-11-14 NOTE — TELEPHONE ENCOUNTER
----- Message from Julissa Heck LPN sent at 11/14/2023  7:33 AM CST -----  Regarding: FW: home health    ----- Message -----  From: Radha Naqvi  Sent: 11/13/2023   3:41 PM CST  To: Kenya Armendariz Staff  Subject: home health                                      Name of who is calling:   Rory / [facility]      What is the request in detail: Rory is requesting a call back in ref to Asking for a  consult. Pt having trouble getting to appt      Can the clinic reply by [facility]:no      What number to call back if not [facility]:[phone]       Yes

## 2023-11-17 ENCOUNTER — TELEPHONE (OUTPATIENT)
Dept: PSYCHIATRY | Facility: CLINIC | Age: 78
End: 2023-11-17
Payer: MEDICARE

## 2023-12-11 ENCOUNTER — TELEPHONE (OUTPATIENT)
Dept: PSYCHIATRY | Facility: CLINIC | Age: 78
End: 2023-12-11
Payer: MEDICARE

## 2024-01-14 ENCOUNTER — EXTERNAL HOME HEALTH (OUTPATIENT)
Dept: HOME HEALTH SERVICES | Facility: HOSPITAL | Age: 79
End: 2024-01-14
Payer: MEDICARE

## 2024-01-23 ENCOUNTER — TELEPHONE (OUTPATIENT)
Dept: INTERNAL MEDICINE | Facility: CLINIC | Age: 79
End: 2024-01-23
Payer: MEDICARE

## 2024-01-23 NOTE — TELEPHONE ENCOUNTER
Returned call to patient daughter,informed that result is with [facility], since they order it . We don't have those result. If patient is needs a prescription for UTI will need to schedule an appointment

## 2024-01-25 ENCOUNTER — OFFICE VISIT (OUTPATIENT)
Dept: INTERNAL MEDICINE | Facility: CLINIC | Age: 79
End: 2024-01-25
Payer: MEDICARE

## 2024-01-25 VITALS
BODY MASS INDEX: 28.07 KG/M2 | RESPIRATION RATE: 14 BRPM | DIASTOLIC BLOOD PRESSURE: 84 MMHG | SYSTOLIC BLOOD PRESSURE: 136 MMHG | OXYGEN SATURATION: 98 % | HEART RATE: 122 BPM | TEMPERATURE: 98 F | HEIGHT: 62 IN | WEIGHT: 152.56 LBS

## 2024-01-25 DIAGNOSIS — F32.1 MODERATE MAJOR DEPRESSION: ICD-10-CM

## 2024-01-25 DIAGNOSIS — F41.9 CHRONIC ANXIETY: ICD-10-CM

## 2024-01-25 DIAGNOSIS — R30.0 DYSURIA: Primary | ICD-10-CM

## 2024-01-25 DIAGNOSIS — F01.A2 MILD VASCULAR DEMENTIA WITH PSYCHOTIC DISTURBANCE: ICD-10-CM

## 2024-01-25 DIAGNOSIS — F11.20 UNCOMPLICATED OPIOID DEPENDENCE: ICD-10-CM

## 2024-01-25 DIAGNOSIS — I70.0 ATHEROSCLEROSIS OF AORTA: ICD-10-CM

## 2024-01-25 DIAGNOSIS — D56.0 ALPHA THALASSEMIA: ICD-10-CM

## 2024-01-25 PROBLEM — M32.9 SYSTEMIC LUPUS ERYTHEMATOSUS: Status: RESOLVED | Noted: 2021-06-03 | Resolved: 2024-01-25

## 2024-01-25 PROBLEM — N18.31 CHRONIC KIDNEY DISEASE, STAGE 3A: Status: RESOLVED | Noted: 2022-03-07 | Resolved: 2024-01-25

## 2024-01-25 PROBLEM — M35.01 KERATOCONJUNCTIVITIS SICCA: Status: RESOLVED | Noted: 2023-03-24 | Resolved: 2024-01-25

## 2024-01-25 PROBLEM — H16.229 KERATOCONJUNCTIVITIS SICCA: Status: RESOLVED | Noted: 2023-03-24 | Resolved: 2024-01-25

## 2024-01-25 LAB
BACTERIA #/AREA URNS AUTO: ABNORMAL /HPF
BILIRUB UR QL STRIP: ABNORMAL
CLARITY UR REFRACT.AUTO: ABNORMAL
COLOR UR AUTO: YELLOW
GLUCOSE UR QL STRIP: NEGATIVE
HGB UR QL STRIP: ABNORMAL
KETONES UR QL STRIP: NEGATIVE
LEUKOCYTE ESTERASE UR QL STRIP: ABNORMAL
MICROSCOPIC COMMENT: ABNORMAL
NITRITE UR QL STRIP: POSITIVE
PH UR STRIP: 6 [PH] (ref 5–8)
PROT UR QL STRIP: NEGATIVE
RBC #/AREA URNS AUTO: 5 /HPF (ref 0–4)
SP GR UR STRIP: >=1.03 (ref 1–1.03)
URN SPEC COLLECT METH UR: ABNORMAL
UROBILINOGEN UR STRIP-ACNC: <2 EU/DL
WBC #/AREA URNS AUTO: 40 /HPF (ref 0–5)

## 2024-01-25 PROCEDURE — 3075F SYST BP GE 130 - 139MM HG: CPT | Mod: HCNC,CPTII,S$GLB, | Performed by: STUDENT IN AN ORGANIZED HEALTH CARE EDUCATION/TRAINING PROGRAM

## 2024-01-25 PROCEDURE — 87077 CULTURE AEROBIC IDENTIFY: CPT | Mod: HCNC | Performed by: STUDENT IN AN ORGANIZED HEALTH CARE EDUCATION/TRAINING PROGRAM

## 2024-01-25 PROCEDURE — 3288F FALL RISK ASSESSMENT DOCD: CPT | Mod: HCNC,CPTII,S$GLB, | Performed by: STUDENT IN AN ORGANIZED HEALTH CARE EDUCATION/TRAINING PROGRAM

## 2024-01-25 PROCEDURE — 3079F DIAST BP 80-89 MM HG: CPT | Mod: HCNC,CPTII,S$GLB, | Performed by: STUDENT IN AN ORGANIZED HEALTH CARE EDUCATION/TRAINING PROGRAM

## 2024-01-25 PROCEDURE — 87086 URINE CULTURE/COLONY COUNT: CPT | Mod: HCNC | Performed by: STUDENT IN AN ORGANIZED HEALTH CARE EDUCATION/TRAINING PROGRAM

## 2024-01-25 PROCEDURE — 1101F PT FALLS ASSESS-DOCD LE1/YR: CPT | Mod: HCNC,CPTII,S$GLB, | Performed by: STUDENT IN AN ORGANIZED HEALTH CARE EDUCATION/TRAINING PROGRAM

## 2024-01-25 PROCEDURE — 1159F MED LIST DOCD IN RCRD: CPT | Mod: HCNC,CPTII,S$GLB, | Performed by: STUDENT IN AN ORGANIZED HEALTH CARE EDUCATION/TRAINING PROGRAM

## 2024-01-25 PROCEDURE — 99999 PR PBB SHADOW E&M-EST. PATIENT-LVL IV: CPT | Mod: PBBFAC,HCNC,, | Performed by: STUDENT IN AN ORGANIZED HEALTH CARE EDUCATION/TRAINING PROGRAM

## 2024-01-25 PROCEDURE — 81000 URINALYSIS NONAUTO W/SCOPE: CPT | Mod: HCNC,PO | Performed by: STUDENT IN AN ORGANIZED HEALTH CARE EDUCATION/TRAINING PROGRAM

## 2024-01-25 PROCEDURE — 99214 OFFICE O/P EST MOD 30 MIN: CPT | Mod: HCNC,S$GLB,, | Performed by: STUDENT IN AN ORGANIZED HEALTH CARE EDUCATION/TRAINING PROGRAM

## 2024-01-25 PROCEDURE — 1125F AMNT PAIN NOTED PAIN PRSNT: CPT | Mod: HCNC,CPTII,S$GLB, | Performed by: STUDENT IN AN ORGANIZED HEALTH CARE EDUCATION/TRAINING PROGRAM

## 2024-01-25 PROCEDURE — 87186 SC STD MICRODIL/AGAR DIL: CPT | Mod: HCNC | Performed by: STUDENT IN AN ORGANIZED HEALTH CARE EDUCATION/TRAINING PROGRAM

## 2024-01-25 PROCEDURE — 87088 URINE BACTERIA CULTURE: CPT | Mod: HCNC | Performed by: STUDENT IN AN ORGANIZED HEALTH CARE EDUCATION/TRAINING PROGRAM

## 2024-01-25 RX ORDER — CITALOPRAM 10 MG/1
10 TABLET ORAL DAILY
Qty: 30 TABLET | Refills: 11 | Status: SHIPPED | OUTPATIENT
Start: 2024-01-25 | End: 2025-01-24

## 2024-01-25 RX ORDER — NITROFURANTOIN 25; 75 MG/1; MG/1
100 CAPSULE ORAL 2 TIMES DAILY
Qty: 10 CAPSULE | Refills: 0 | Status: SHIPPED | OUTPATIENT
Start: 2024-01-25 | End: 2024-01-31
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
## 2024-01-27 LAB — BACTERIA UR CULT: ABNORMAL

## 2024-01-30 ENCOUNTER — OFFICE VISIT (OUTPATIENT)
Dept: INTERNAL MEDICINE | Facility: CLINIC | Age: 79
End: 2024-01-30
Payer: MEDICARE

## 2024-01-30 ENCOUNTER — HOSPITAL ENCOUNTER (OUTPATIENT)
Dept: RADIOLOGY | Facility: HOSPITAL | Age: 79
Discharge: HOME | End: 2024-01-30
Attending: STUDENT IN AN ORGANIZED HEALTH CARE EDUCATION/TRAINING PROGRAM
Payer: MEDICARE

## 2024-01-30 VITALS
SYSTOLIC BLOOD PRESSURE: 148 MMHG | BODY MASS INDEX: 28.88 KG/M2 | WEIGHT: 156.94 LBS | HEART RATE: 116 BPM | TEMPERATURE: 99 F | OXYGEN SATURATION: 97 % | HEIGHT: 62 IN | DIASTOLIC BLOOD PRESSURE: 80 MMHG

## 2024-01-30 DIAGNOSIS — N18.31 CHRONIC KIDNEY DISEASE, STAGE 3A: ICD-10-CM
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
## 2024-01-30 DIAGNOSIS — I10 ESSENTIAL HYPERTENSION: ICD-10-CM

## 2024-01-30 DIAGNOSIS — S52.501A CLOSED FRACTURE OF DISTAL END OF RIGHT RADIUS, UNSPECIFIED FRACTURE MORPHOLOGY, INITIAL ENCOUNTER: Primary | ICD-10-CM

## 2024-01-30 DIAGNOSIS — R41.3 MEMORY LOSS OR IMPAIRMENT: ICD-10-CM
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
## 2024-01-30 DIAGNOSIS — F41.9 CHRONIC ANXIETY: ICD-10-CM

## 2024-01-30 DIAGNOSIS — R30.0 DYSURIA: ICD-10-CM

## 2024-01-30 PROCEDURE — 1159F MED LIST DOCD IN RCRD: CPT | Mod: HCNC,CPTII,S$GLB, | Performed by: STUDENT IN AN ORGANIZED HEALTH CARE EDUCATION/TRAINING PROGRAM

## 2024-01-30 PROCEDURE — 99999 PR PBB SHADOW E&M-EST. PATIENT-LVL V: CPT | Mod: PBBFAC,HCNC,, | Performed by: STUDENT IN AN ORGANIZED HEALTH CARE EDUCATION/TRAINING PROGRAM

## 2024-01-30 PROCEDURE — 3288F FALL RISK ASSESSMENT DOCD: CPT | Mod: HCNC,CPTII,S$GLB, | Performed by: STUDENT IN AN ORGANIZED HEALTH CARE EDUCATION/TRAINING PROGRAM

## 2024-01-30 PROCEDURE — 73110 X-RAY EXAM OF WRIST: CPT | Mod: 26,HCNC,RT, | Performed by: RADIOLOGY

## 2024-01-30 PROCEDURE — 1125F AMNT PAIN NOTED PAIN PRSNT: CPT | Mod: HCNC,CPTII,S$GLB, | Performed by: STUDENT IN AN ORGANIZED HEALTH CARE EDUCATION/TRAINING PROGRAM

## 2024-01-30 PROCEDURE — 99214 OFFICE O/P EST MOD 30 MIN: CPT | Mod: HCNC,S$GLB,, | Performed by: STUDENT IN AN ORGANIZED HEALTH CARE EDUCATION/TRAINING PROGRAM

## 2024-01-30 PROCEDURE — 3077F SYST BP >= 140 MM HG: CPT | Mod: HCNC,CPTII,S$GLB, | Performed by: STUDENT IN AN ORGANIZED HEALTH CARE EDUCATION/TRAINING PROGRAM

## 2024-01-30 PROCEDURE — 1101F PT FALLS ASSESS-DOCD LE1/YR: CPT | Mod: HCNC,CPTII,S$GLB, | Performed by: STUDENT IN AN ORGANIZED HEALTH CARE EDUCATION/TRAINING PROGRAM

## 2024-01-30 PROCEDURE — 3079F DIAST BP 80-89 MM HG: CPT | Mod: HCNC,CPTII,S$GLB, | Performed by: STUDENT IN AN ORGANIZED HEALTH CARE EDUCATION/TRAINING PROGRAM

## 2024-01-30 PROCEDURE — 73110 X-RAY EXAM OF WRIST: CPT | Mod: TC,HCNC,PO,RT

## 2024-01-30 RX ORDER — OXYCODONE AND ACETAMINOPHEN 10; 325 MG/1; MG/1
1 TABLET ORAL 3 TIMES DAILY PRN
COMMUNITY
Start: 2024-01-30

## 2024-01-30 RX ORDER — OXYCODONE AND ACETAMINOPHEN 5; 325 MG/1; MG/1
1 TABLET ORAL EVERY 8 HOURS PRN
COMMUNITY
Start: 2023-12-13 | End: 2024-01-30

## 2024-01-30 RX ORDER — PANTOPRAZOLE SODIUM 40 MG/1
1 TABLET, DELAYED RELEASE ORAL EVERY MORNING
COMMUNITY
Start: 2024-01-30

## 2024-01-30 RX ORDER — MULTIVITAMIN
1 TABLET ORAL EVERY MORNING
COMMUNITY
Start: 2023-12-30

## 2024-01-30 RX ORDER — ASPIRIN 325 MG/1
1 TABLET, FILM COATED ORAL EVERY MORNING
COMMUNITY
Start: 2023-12-29

## 2024-01-30 RX ORDER — NITROFURANTOIN 25; 75 MG/1; MG/1
100 CAPSULE ORAL 2 TIMES DAILY
Qty: 10 CAPSULE | Refills: 0 | Status: SHIPPED | OUTPATIENT
Start: 2024-01-30 | End: 2024-02-04

## 2024-01-30 RX ORDER — AMOXICILLIN 250 MG
2 CAPSULE ORAL
COMMUNITY
Start: 2023-12-29 | End: 2024-12-28
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
## 2024-01-31 ENCOUNTER — TELEPHONE (OUTPATIENT)
Dept: INTERNAL MEDICINE | Facility: CLINIC | Age: 79
End: 2024-01-31
Payer: MEDICARE

## 2024-01-31 ENCOUNTER — OFFICE VISIT (OUTPATIENT)
Dept: ORTHOPEDICS | Facility: CLINIC | Age: 79
End: 2024-01-31
Payer: MEDICARE

## 2024-01-31 VITALS — WEIGHT: 156.94 LBS | BODY MASS INDEX: 28.88 KG/M2 | HEIGHT: 62 IN

## 2024-01-31 DIAGNOSIS — S52.501A CLOSED FRACTURE OF DISTAL END OF RIGHT RADIUS, UNSPECIFIED FRACTURE MORPHOLOGY, INITIAL ENCOUNTER: ICD-10-CM

## 2024-01-31 PROCEDURE — 29075 APPL CST ELBW FNGR SHORT ARM: CPT | Mod: HCNC,RT,S$GLB, | Performed by: STUDENT IN AN ORGANIZED HEALTH CARE EDUCATION/TRAINING PROGRAM

## 2024-01-31 PROCEDURE — 99203 OFFICE O/P NEW LOW 30 MIN: CPT | Mod: HCNC,25,S$GLB, | Performed by: STUDENT IN AN ORGANIZED HEALTH CARE EDUCATION/TRAINING PROGRAM

## 2024-01-31 PROCEDURE — 1125F AMNT PAIN NOTED PAIN PRSNT: CPT | Mod: HCNC,CPTII,S$GLB, | Performed by: STUDENT IN AN ORGANIZED HEALTH CARE EDUCATION/TRAINING PROGRAM

## 2024-01-31 PROCEDURE — 1159F MED LIST DOCD IN RCRD: CPT | Mod: HCNC,CPTII,S$GLB, | Performed by: STUDENT IN AN ORGANIZED HEALTH CARE EDUCATION/TRAINING PROGRAM

## 2024-01-31 PROCEDURE — 1100F PTFALLS ASSESS-DOCD GE2>/YR: CPT | Mod: HCNC,CPTII,S$GLB, | Performed by: STUDENT IN AN ORGANIZED HEALTH CARE EDUCATION/TRAINING PROGRAM

## 2024-01-31 PROCEDURE — 3288F FALL RISK ASSESSMENT DOCD: CPT | Mod: HCNC,CPTII,S$GLB, | Performed by: STUDENT IN AN ORGANIZED HEALTH CARE EDUCATION/TRAINING PROGRAM

## 2024-01-31 PROCEDURE — 99999 PR PBB SHADOW E&M-EST. PATIENT-LVL IV: CPT | Mod: PBBFAC,HCNC,, | Performed by: STUDENT IN AN ORGANIZED HEALTH CARE EDUCATION/TRAINING PROGRAM

## 2024-01-31 PROCEDURE — 1160F RVW MEDS BY RX/DR IN RCRD: CPT | Mod: HCNC,CPTII,S$GLB, | Performed by: STUDENT IN AN ORGANIZED HEALTH CARE EDUCATION/TRAINING PROGRAM

## 2024-01-31 NOTE — TELEPHONE ENCOUNTER
Rec'd call from pt dtr requesting results of urine. Pt advised that pt did have UTI and med was called in to pharmacy. Voiced understanding.

## 2024-01-31 NOTE — PROGRESS NOTES
Hand Surgery Clinic Note    Chief Complaint  Chief Complaint   Patient presents with    Right Hand - Pain, Injury    Right Wrist - Pain, Injury       History of Present Illness  78-year-old right-hand dominant female who is retired presents for evaluation of right wrist and hand pain.  Patient was sustained a fall yesterday.  She was seen by her primary care doctor where x-rays were obtained and she was diagnosed with a distal radius fracture.  She was placed in a splint.  No numbness or tingling.  Patient has a history of memory issues; she has not been diagnosed with dementia at this point but her daughter is working on getting her set up with a neurologist.  She was unable to give accurate answers to my questions.  When asked how she hurt her wrist, she stated that she was playing ball with the boys.  She lives alone and her family comes to visit her often to care for her.  She is never injured this wrist before.  She did recently have a right hip fracture which was fixed in December by Dr. oGnzales and she is currently in a wheelchair for this.  She has not previously been evaluated or diagnosed with osteoporosis.    Review of Systems  Review of systems negative for chest pain, shortness of breath, fevers, chills, nausea/vomiting.    Past Medical History  Past Medical History:   Diagnosis Date    Alpha thalassemia     Asthma     resolved per patient    Cataract     Chronic anxiety     years tid xanax 1mg    Chronic knee pain     Chronic pain of left ankle     Clotting disorder     Cutaneous lupus erythematosus     dr henry derm    Depression     Depression     dr radha hughes previously    Ex-smoker     quit fall 1    Hypertension     Osteopenia 1/16 reck1/18       Past Surgical History  Past Surgical History:   Procedure Laterality Date    ANKLE SURGERY Left     BACK SURGERY      CATARACT EXTRACTION Right     COLONOSCOPY N/A 8/2/2017    Procedure: COLONOSCOPY;  Surgeon: Virgil Oliva MD;  Location: [facility];   Service: Endoscopy;  Laterality: N/A;    HYSTERECTOMY      INJECTION OF ANESTHETIC AGENT AROUND MEDIAL BRANCH NERVES INNERVATING LUMBAR FACET JOINT Bilateral 2020    Procedure: Bilateral L3-5 MBB;  Surgeon: Yury Moore MD;  Location: [facility];  Service: Pain Management;  Laterality: Bilateral;    INJECTION OF ANESTHETIC AGENT AROUND NERVE Bilateral 2020    Procedure: Bilateral Genicular nerve block with RN IV sedation Covid testing day of procedure PT does not drive;  Surgeon: Yury Moore MD;  Location: [facility];  Service: Pain Management;  Laterality: Bilateral;    KNEE ARTHROSCOPY      both knees twice    OOPHORECTOMY      RADIOFREQUENCY THERMOCOAGULATION Right 2020    Procedure: Right L3-5 Lumbar RFA;  Surgeon: Yury Moore MD;  Location: [facility];  Service: Pain Management;  Laterality: Right;    RADIOFREQUENCY THERMOCOAGULATION Left 2020    Procedure: Left L3-5 Lumbar RFA;  Surgeon: Yury Moore MD;  Location[facility];  Service: Pain Management;  Laterality: Left;       Allergies  Review of patient's allergies indicates:  No Known Allergies    Family History  Family History   Problem Relation Age of Onset    Diabetes Mother     Diabetes Father     Breast cancer Maternal Aunt        Social History  Social History     Socioeconomic History    Marital status:      Spouse name:     Number of children: 3   Tobacco Use    Smoking status: Former     Current packs/day: 0.00     Average packs/day: 1 pack/day for 30.0 years (30.0 ttl pk-yrs)     Types: Cigarettes     Start date: 1984     Quit date: 2014     Years since quittin.4     Passive exposure: Never    Smokeless tobacco: Never    Tobacco comments:     smoke last cigarette 9/15   Substance and Sexual Activity    Alcohol use: Yes     Alcohol/week: 1.0 standard drink of alcohol     Types: 1 Glasses of wine per week     Comment: 3 times a week    Drug use: No    Sexual activity: Not  Currently     Partners: Male     Social Determinants of Health     Stress: No Stress Concern Present (10/8/2019)    [employer] - Occupational Stress Questionnaire     Feeling of Stress : Not at all       Vital Signs  There were no vitals filed for this visit.    Physical Exam  Constitutional: Appears well-developed and well-nourished. No distress.   HENT:   Head: Normocephalic.   Eyes: EOM are normal.   Pulmonary/Chest: Effort normal.   Neurological: Oriented to person, place, and time.   Psychiatric: Normal mood and affect.     Right Upper Extremity:  No abrasions, lacerations, wounds.  No ecchymosis.  There is moderate swelling about the wrist.  Patient has tenderness over the distal radius.  No tenderness over the distal ulna.  Patient was able to make a fist and extend all her fingers.  Sensation is intact in the median, radial, and ulnar nerve distributions.  Palpable radial pulse.    Imaging  Right wrist x-rays three views were obtained yesterday and independently reviewed by myself.  There is a an extra-articular fracture of the distal radius which is dorsally angulated and slightly dorsally displaced as well.  Patient has severe arthritic changes noted at the thumb CMC joint.    Assessment and Plan  78-year-old right-hand dominant female with memory issues and a recent right hip fracture in December presents with a right distal radius fracture after a fall.  I discussed with the patient and her daughter that this fracture would best be treated nonoperatively.  I also suggested that they follow up with her primary care doctor to be worked up for osteoporosis given this fragility fracture as well as her recent hip fracture.  Patient was placed in a short arm cast made of fiberglass material. Patient will be nonweightbearing to the right upper extremity in cast.  Keep cast clean and dry.  Follow up in 2 weeks for re-evaluation with repeat x-rays of the right wrist in  cast.    Nidhi Dickey MD  Orthopaedic Hand Surgery

## 2024-02-05 PROBLEM — N17.9 AKI (ACUTE KIDNEY INJURY): Status: RESOLVED | Noted: 2022-08-18 | Resolved: 2024-02-05

## 2024-02-13 DIAGNOSIS — S52.501A CLOSED FRACTURE OF DISTAL END OF RIGHT RADIUS, UNSPECIFIED FRACTURE MORPHOLOGY, INITIAL ENCOUNTER: Primary | ICD-10-CM

## 2024-02-14 ENCOUNTER — HOSPITAL ENCOUNTER (OUTPATIENT)
Dept: RADIOLOGY | Facility: HOSPITAL | Age: 79
Discharge: HOME | End: 2024-02-14
Attending: STUDENT IN AN ORGANIZED HEALTH CARE EDUCATION/TRAINING PROGRAM
Payer: MEDICARE

## 2024-02-14 ENCOUNTER — OFFICE VISIT (OUTPATIENT)
Dept: ORTHOPEDICS | Facility: CLINIC | Age: 79
End: 2024-02-14
Payer: MEDICARE

## 2024-02-14 VITALS — WEIGHT: 156.94 LBS | HEIGHT: 62 IN | BODY MASS INDEX: 28.88 KG/M2

## 2024-02-14 DIAGNOSIS — S52.501A CLOSED FRACTURE OF DISTAL END OF RIGHT RADIUS, UNSPECIFIED FRACTURE MORPHOLOGY, INITIAL ENCOUNTER: ICD-10-CM

## 2024-02-14 DIAGNOSIS — S52.501D CLOSED FRACTURE OF DISTAL END OF RIGHT RADIUS WITH ROUTINE HEALING, UNSPECIFIED FRACTURE MORPHOLOGY, SUBSEQUENT ENCOUNTER: Primary | ICD-10-CM

## 2024-02-14 PROCEDURE — 99213 OFFICE O/P EST LOW 20 MIN: CPT | Mod: HCNC,25,S$GLB, | Performed by: STUDENT IN AN ORGANIZED HEALTH CARE EDUCATION/TRAINING PROGRAM

## 2024-02-14 PROCEDURE — 73110 X-RAY EXAM OF WRIST: CPT | Mod: 26,HCNC,RT, | Performed by: RADIOLOGY

## 2024-02-14 PROCEDURE — 3288F FALL RISK ASSESSMENT DOCD: CPT | Mod: HCNC,CPTII,S$GLB, | Performed by: STUDENT IN AN ORGANIZED HEALTH CARE EDUCATION/TRAINING PROGRAM

## 2024-02-14 PROCEDURE — 1159F MED LIST DOCD IN RCRD: CPT | Mod: HCNC,CPTII,S$GLB, | Performed by: STUDENT IN AN ORGANIZED HEALTH CARE EDUCATION/TRAINING PROGRAM

## 2024-02-14 PROCEDURE — 1101F PT FALLS ASSESS-DOCD LE1/YR: CPT | Mod: HCNC,CPTII,S$GLB, | Performed by: STUDENT IN AN ORGANIZED HEALTH CARE EDUCATION/TRAINING PROGRAM

## 2024-02-14 PROCEDURE — 73110 X-RAY EXAM OF WRIST: CPT | Mod: TC,HCNC,RT

## 2024-02-14 PROCEDURE — 1160F RVW MEDS BY RX/DR IN RCRD: CPT | Mod: HCNC,CPTII,S$GLB, | Performed by: STUDENT IN AN ORGANIZED HEALTH CARE EDUCATION/TRAINING PROGRAM

## 2024-02-14 PROCEDURE — 99999 PR PBB SHADOW E&M-EST. PATIENT-LVL III: CPT | Mod: PBBFAC,HCNC,, | Performed by: STUDENT IN AN ORGANIZED HEALTH CARE EDUCATION/TRAINING PROGRAM

## 2024-02-14 PROCEDURE — 1125F AMNT PAIN NOTED PAIN PRSNT: CPT | Mod: HCNC,CPTII,S$GLB, | Performed by: STUDENT IN AN ORGANIZED HEALTH CARE EDUCATION/TRAINING PROGRAM

## 2024-02-14 PROCEDURE — 29085 APPL CAST HAND&LWR FOREARM: CPT | Mod: HCNC,RT,S$GLB, | Performed by: STUDENT IN AN ORGANIZED HEALTH CARE EDUCATION/TRAINING PROGRAM

## 2024-02-14 NOTE — PROGRESS NOTES
Hand Surgery Clinic Follow Up Note    Chief Complaint  Chief Complaint   Patient presents with    Right Wrist - Injury, Pain       History of Present Illness   78-year-old right-hand dominant female who is retired presents for  follow up.  She sustained a right distal radius fracture after a fall on 01/30/2024.  The fracture is being treated nonoperatively in a cast.  She has been doing well.  She does have some pain in her wrist.  She has been working on finger range of motion in her cast.  She is kept her cast clean and dry.  She has no numbness or tingling.  No new issues.    Review of Systems  Review of systems negative for chest pain, shortness of breath, fevers, chills, nausea/vomiting.    Vital Signs  There were no vitals filed for this visit.    Physical Exam  Constitutional: Appears well-developed and well-nourished. No distress.   HENT:   Head: Normocephalic.   Eyes: EOM are normal.   Pulmonary/Chest: Effort normal.   Neurological: Oriented to person, place, and time.   Psychiatric: Normal mood and affect.       Right Upper Extremity:   No abrasions, lacerations, wounds.  No swelling.  There is mild swelling in the hand and fingers.  Patient was tenderness over the distal radius.  No tenderness over the distal ulna.  Patient was able to make a fist and extend all her fingers.  Sensation is intact in the median, radial, and ulnar nerve distributions.  Palpable radial pulse.    Imaging    Right wrist x-rays five views were obtained today and independently reviewed by myself.  No change in alignment of patient's extra-articular distal radius fracture which is dorsally angulated and slightly dorsally displaced as well.  The patient was severe arthritic changes noted at the thumb CMC joint.    Assessment and Plan    78-year-old right-hand dominant female with memory issues and a recent right hip fracture in December presents  for follow up.  She sustained a right distal radius fracture after a fall 2 weeks ago.   Fracture is being treated nonoperatively in a cast.  No change in alignment on x-rays today.  Patient was placed in a new short-arm cast consisting of fiberglass material in clinic today.  She was nonweightbearing to the right upper extremity in cast.  Keep cast clean and dry.  Follow up in 4 weeks for re-evaluation with repeat x-rays of the right wrist out of cast.    Nidhi Dickey MD  Orthopaedic Hand Surgery

## 2024-02-16 DIAGNOSIS — M25.531 RIGHT WRIST PAIN: Primary | ICD-10-CM

## 2024-02-28 ENCOUNTER — DOCUMENTATION ONLY (OUTPATIENT)
Dept: OPHTHALMOLOGY | Facility: CLINIC | Age: 79
End: 2024-02-28

## 2024-02-28 ENCOUNTER — TELEPHONE (OUTPATIENT)
Dept: INTERNAL MEDICINE | Facility: CLINIC | Age: 79
End: 2024-02-28
Payer: MEDICARE

## 2024-02-28 ENCOUNTER — OFFICE VISIT (OUTPATIENT)
Dept: OPHTHALMOLOGY | Facility: CLINIC | Age: 79
End: 2024-02-28
Payer: MEDICARE

## 2024-02-28 ENCOUNTER — OFFICE VISIT (OUTPATIENT)
Dept: INTERNAL MEDICINE | Facility: CLINIC | Age: 79
End: 2024-02-28
Payer: MEDICARE

## 2024-02-28 VITALS
BODY MASS INDEX: 27.3 KG/M2 | OXYGEN SATURATION: 96 % | TEMPERATURE: 98 F | DIASTOLIC BLOOD PRESSURE: 82 MMHG | WEIGHT: 148.38 LBS | HEIGHT: 62 IN | HEART RATE: 98 BPM | SYSTOLIC BLOOD PRESSURE: 124 MMHG

## 2024-02-28 DIAGNOSIS — F01.A2 MILD VASCULAR DEMENTIA WITH PSYCHOTIC DISTURBANCE: ICD-10-CM

## 2024-02-28 DIAGNOSIS — H25.12 NUCLEAR SCLEROSIS OF LEFT EYE: ICD-10-CM

## 2024-02-28 DIAGNOSIS — N18.31 CHRONIC KIDNEY DISEASE, STAGE 3A: ICD-10-CM

## 2024-02-28 DIAGNOSIS — N39.0 FREQUENT UTI: Primary | ICD-10-CM

## 2024-02-28 DIAGNOSIS — H35.351 CME (CYSTOID MACULAR EDEMA), RIGHT: ICD-10-CM

## 2024-02-28 DIAGNOSIS — H40.1133 PRIMARY OPEN ANGLE GLAUCOMA (POAG) OF BOTH EYES, SEVERE STAGE: Primary | ICD-10-CM

## 2024-02-28 DIAGNOSIS — F41.9 CHRONIC ANXIETY: ICD-10-CM

## 2024-02-28 DIAGNOSIS — Z91.148 NON COMPLIANCE W MEDICATION REGIMEN: ICD-10-CM

## 2024-02-28 DIAGNOSIS — I10 ESSENTIAL HYPERTENSION: ICD-10-CM

## 2024-02-28 DIAGNOSIS — Z98.41 CATARACT EXTRACTION STATUS OF EYE, RIGHT: ICD-10-CM

## 2024-02-28 LAB
BILIRUB UR QL STRIP: NEGATIVE
CLARITY UR REFRACT.AUTO: CLEAR
COLOR UR AUTO: YELLOW
GLUCOSE UR QL STRIP: NEGATIVE
HGB UR QL STRIP: NEGATIVE
KETONES UR QL STRIP: NEGATIVE
LEUKOCYTE ESTERASE UR QL STRIP: ABNORMAL
MICROSCOPIC COMMENT: NORMAL
NITRITE UR QL STRIP: NEGATIVE
PH UR STRIP: 6 [PH] (ref 5–8)
PROT UR QL STRIP: NEGATIVE
SP GR UR STRIP: 1.02 (ref 1–1.03)
URN SPEC COLLECT METH UR: ABNORMAL
UROBILINOGEN UR STRIP-ACNC: <2 EU/DL
WBC #/AREA URNS AUTO: 3 /HPF (ref 0–5)

## 2024-02-28 PROCEDURE — 3074F SYST BP LT 130 MM HG: CPT | Mod: HCNC,CPTII,S$GLB, | Performed by: STUDENT IN AN ORGANIZED HEALTH CARE EDUCATION/TRAINING PROGRAM

## 2024-02-28 PROCEDURE — 99999 PR PBB SHADOW E&M-EST. PATIENT-LVL III: CPT | Mod: PBBFAC,HCNC,, | Performed by: OPHTHALMOLOGY

## 2024-02-28 PROCEDURE — 1160F RVW MEDS BY RX/DR IN RCRD: CPT | Mod: HCNC,CPTII,S$GLB, | Performed by: OPHTHALMOLOGY

## 2024-02-28 PROCEDURE — 99214 OFFICE O/P EST MOD 30 MIN: CPT | Mod: HCNC,S$GLB,, | Performed by: OPHTHALMOLOGY

## 2024-02-28 PROCEDURE — 81000 URINALYSIS NONAUTO W/SCOPE: CPT | Mod: HCNC,PO | Performed by: STUDENT IN AN ORGANIZED HEALTH CARE EDUCATION/TRAINING PROGRAM

## 2024-02-28 PROCEDURE — 1159F MED LIST DOCD IN RCRD: CPT | Mod: HCNC,CPTII,S$GLB, | Performed by: OPHTHALMOLOGY

## 2024-02-28 PROCEDURE — 99999 PR PBB SHADOW E&M-EST. PATIENT-LVL III: CPT | Mod: PBBFAC,HCNC,, | Performed by: STUDENT IN AN ORGANIZED HEALTH CARE EDUCATION/TRAINING PROGRAM

## 2024-02-28 PROCEDURE — 99214 OFFICE O/P EST MOD 30 MIN: CPT | Mod: HCNC,S$GLB,, | Performed by: STUDENT IN AN ORGANIZED HEALTH CARE EDUCATION/TRAINING PROGRAM

## 2024-02-28 PROCEDURE — 3079F DIAST BP 80-89 MM HG: CPT | Mod: HCNC,CPTII,S$GLB, | Performed by: STUDENT IN AN ORGANIZED HEALTH CARE EDUCATION/TRAINING PROGRAM

## 2024-02-28 PROCEDURE — 1125F AMNT PAIN NOTED PAIN PRSNT: CPT | Mod: HCNC,CPTII,S$GLB, | Performed by: STUDENT IN AN ORGANIZED HEALTH CARE EDUCATION/TRAINING PROGRAM

## 2024-02-28 RX ORDER — PREDNISOLONE ACETATE 10 MG/ML
1 SUSPENSION/ DROPS OPHTHALMIC 4 TIMES DAILY
Qty: 5 ML | Refills: 1 | Status: SHIPPED | OUTPATIENT
Start: 2024-02-28 | End: 2024-04-16

## 2024-02-28 RX ORDER — LEVOFLOXACIN 500 MG/1
500 TABLET, FILM COATED ORAL DAILY
Qty: 5 TABLET | Refills: 0 | Status: SHIPPED | OUTPATIENT
Start: 2024-02-28 | End: 2024-03-04

## 2024-02-28 RX ORDER — KETOROLAC TROMETHAMINE 5 MG/ML
1 SOLUTION OPHTHALMIC 4 TIMES DAILY
Qty: 5 ML | Refills: 1 | Status: SHIPPED | OUTPATIENT
Start: 2024-02-28 | End: 2025-02-27

## 2024-02-28 RX ORDER — DORZOLAMIDE HYDROCHLORIDE AND TIMOLOL MALEATE 20; 5 MG/ML; MG/ML
SOLUTION/ DROPS OPHTHALMIC
Qty: 10 ML | Refills: 6 | Status: SHIPPED | OUTPATIENT
Start: 2024-02-28

## 2024-02-28 RX ORDER — AMITRIPTYLINE HYDROCHLORIDE 50 MG/1
1 TABLET, FILM COATED ORAL NIGHTLY
COMMUNITY
Start: 2024-02-09 | End: 2024-03-18

## 2024-02-28 RX ORDER — MOXIFLOXACIN 5 MG/ML
1 SOLUTION/ DROPS OPHTHALMIC 4 TIMES DAILY
Qty: 3 ML | Refills: 1 | Status: SHIPPED | OUTPATIENT
Start: 2024-02-28

## 2024-02-28 RX ORDER — BRIMONIDINE TARTRATE 2 MG/ML
SOLUTION/ DROPS OPHTHALMIC
Qty: 10 ML | Refills: 6 | Status: SHIPPED | OUTPATIENT
Start: 2024-02-28

## 2024-02-28 RX ORDER — LATANOPROST 50 UG/ML
1 SOLUTION/ DROPS OPHTHALMIC DAILY
Qty: 2.5 ML | Refills: 6 | Status: SHIPPED | OUTPATIENT
Start: 2024-02-28

## 2024-02-28 NOTE — TELEPHONE ENCOUNTER
----- Message from Eladio Almeida sent at 2/28/2024  4:03 PM CST -----  Contact: [facility]/ Daughter  [facility] is calling requesting the results for the UTI test. Please call [phone]

## 2024-02-28 NOTE — PROGRESS NOTES
HPI     Glaucoma            Comments: Patient reports for 7 month IOP check. Has been off all drops   and pills for months. Has been in and out of hospital, due to broken hip   and broken wrist. Denies pain or irritation at this time. Patient reports   of visual stability since previous visit.             Comments    1. Coag severe stage   XEN OD 10/13/2022  MMC OD 11/4/2022 ( 0.5ml)  2. NS OS *Will do dropless/ plan for OMNI 360  PCIOL OD 1/25/22 W/ Gonio- complex  Yag OD 5-3-22 by MG since Dr Pitts was out that visit       DMX 125mg QID PO   Keto QID OU   Rhopressa qd OU  Latanoprost qhs OU  Brimonidine BID OU  Dorzolamide / Timolol BID OU            Last edited by Shorty Burnett MD on 2/28/2024 10:38 AM.            Assessment /Plan     For exam results, see Encounter Report.      ICD-10-CM ICD-9-CM    1. Primary open angle glaucoma (POAG) of both eyes, severe stage  H40.1133 365.11 IOP not within acceptable range relative to target IOP with risk of irreversible visual loss. Additional treatment required.  Discussed options, risks, and benefits of additional medication, SLT laser, or incisional glaucoma surgery.     Recommend: Medication compliance, resume Cosopt, Brimonidine & Latanoprost  Will proceed with Clearpath OD, complex due to previous surgery.    Pred/Keto/Moxi QID OD    Patient chooses the above     Reviewed importance of continued compliance with treatment and follow up.     Uncontrolled glaucoma of the Right eye(s) on maximum tolerated therapy: Significant risk of continued irreversible glaucomatous vision loss with current level of treatment. Therefore, surgical options were reviewed at great length with the patient, and Clearpath OD recommended in hopes of reducing the IOP to a more tolerable level.   Explained that trabeculectomy requires post operative laser treatment as a staged procedure(s) and Xengel surgery requires postoperative injection of Mitomycin to prevent scarring as a staged  procedure(s).    A lengthy discussion of the risks, benefits and alternatives was presented to the patient (including balancing the immediate risks of vision loss from surgery vs. the likelihood of continued vision loss from the inadequately controlled glaucoma). The need for frequent, careful follow-up exams for monitoring and other possible postop adjustments was also discussed.      365.73       2. Nuclear sclerosis of left eye  H25.12 366.16 You were found to have an early cataract in your eye(s) today. However the cataract is not affecting your activities of daily living, such as reading and driving. You do not need surgery at this time. We will recheck your cataract at your next visit. You are welcome to call for an earlier appointment if your vision gets worse.       3. Cataract extraction status of eye, right  Z98.41 V45.61 Monitor       4. CME (cystoid macular edema), right  H35.351 362.53 Remains on OCT, mild       5. Non compliance w medication regimen  Z91.148 V15.81 Discussed importance of medication compliance. Pt understands risks of non compliance, she could continue to lose vision over time.

## 2024-02-28 NOTE — PROGRESS NOTES
Short Stay Record    CC: Uncontrolled glaucoma right eye    HPI:  Tea Ring is a 78 y.o. female who presents for evaluation prior to ophthalmic surgery, right eye. Patient presents with uncontrolled glaucoma with intraocular pressure above target with significant risk of irreversible vision loss which requires surgical intervention.      Past Surgical History:   Procedure Laterality Date    ANKLE SURGERY Left     BACK SURGERY      CATARACT EXTRACTION Right     COLONOSCOPY N/A 8/2/2017    Procedure: COLONOSCOPY;  Surgeon: Virgil Oliva MD;  Location: [facility] ENDO;  Service: Endoscopy;  Laterality: N/A;    HYSTERECTOMY      INJECTION OF ANESTHETIC AGENT AROUND MEDIAL BRANCH NERVES INNERVATING LUMBAR FACET JOINT Bilateral 6/17/2020    Procedure: Bilateral L3-5 MBB;  Surgeon: Yury Moore MD;  Location: [facility] PAIN MGT;  Service: Pain Management;  Laterality: Bilateral;    INJECTION OF ANESTHETIC AGENT AROUND NERVE Bilateral 5/19/2020    Procedure: Bilateral Genicular nerve block with RN IV sedation Covid testing day of procedure PT does not drive;  Surgeon: Yury Moore MD;  Location: [facility];  Service: Pain Management;  Laterality: Bilateral;    KNEE ARTHROSCOPY      both knees twice    OOPHORECTOMY      RADIOFREQUENCY THERMOCOAGULATION Right 7/14/2020    Procedure: Right L3-5 Lumbar RFA;  Surgeon: Yury Moore MD;  Location: [facility];  Service: Pain Management;  Laterality: Right;    RADIOFREQUENCY THERMOCOAGULATION Left 8/6/2020    Procedure: Left L3-5 Lumbar RFA;  Surgeon: Yury Moore MD;  Location: [facility];  Service: Pain Management;  Laterality: Left;       Review of patient's allergies indicates:  No Known Allergies      Current Outpatient Medications:     acetaZOLAMIDE (DIAMOX) 125 MG Tab, Take 1 tablet (125 mg total) by mouth 3 (three) times daily., Disp: 90 tablet, Rfl: 0    atorvastatin (LIPITOR) 10 MG tablet, TAKE ONE TABLET BY MOUTH ONCE DAILY, Disp: 90 tablet, Rfl:  3    atropine 1% (ISOPTO ATROPINE) 1 % Drop, Place 1 drop into the right eye 2 (two) times a day., Disp: 5 mL, Rfl: 1    baclofen (LIORESAL) 10 MG tablet, Take 10 mg by mouth 2 (two) times daily as needed., Disp: , Rfl:     brimonidine 0.2% (ALPHAGAN) 0.2 % Drop, PLACE 1 DROP IN EACH EYE TWICE A DAY, Disp: 10 mL, Rfl: 6    citalopram (CELEXA) 10 MG tablet, Take 1 tablet (10 mg total) by mouth once daily., Disp: 30 tablet, Rfl: 11    dorzolamide-timolol 2-0.5% (COSOPT) 22.3-6.8 mg/mL ophthalmic solution, PLACE 1 DROP IN EACH EYE TWICE A DAY, Disp: 10 mL, Rfl: 6    ketorolac 0.5% (ACULAR) 0.5 % Drop, Place 1 drop into both eyes 4 (four) times daily., Disp: 5 mL, Rfl: 3    ketorolac 0.5% (ACULAR) 0.5 % Drop, Place 1 drop into the left eye 4 (four) times daily., Disp: 5 mL, Rfl: 1    latanoprost 0.005 % ophthalmic solution, Place 1 drop into both eyes once daily., Disp: 2.5 mL, Rfl: 6    loteprednol etabonate (LOTEMAX SM) 0.38 % DrpG, Place 1 drop into the right eye 4 (four) times daily., Disp: 5 g, Rfl: 1    magnesium oxide (MAG-OX) 400 mg (241.3 mg magnesium) tablet, Take 400 mg by mouth., Disp: , Rfl:     moxifloxacin (VIGAMOX) 0.5 % ophthalmic solution, Place 1 drop into the left eye 4 (four) times daily., Disp: 3 mL, Rfl: 1    multivitamin with folic acid 400 mcg Tab, Take 1 tablet by mouth every morning., Disp: , Rfl:     oxyCODONE-acetaminophen (PERCOCET)  mg per tablet, Take 1 tablet by mouth 3 (three) times daily as needed., Disp: , Rfl:     pantoprazole (PROTONIX) 40 MG tablet, Take 1 tablet by mouth every morning., Disp: , Rfl:     polymyxin B sulf-trimethoprim (POLYTRIM) 10,000 unit- 1 mg/mL Drop, PLACE ONE DROP INTO THE RIGHT EYE FOUR TIMES DAILY, Disp: 10 mL, Rfl: 2    prednisoLONE acetate (PRED FORTE) 1 % DrpS, INSTILL 1 DROP IN RIGHT EYE 4 TIMES A DAY, Disp: 5 mL, Rfl: 1    prednisoLONE acetate (PRED FORTE) 1 % DrpS, Place 1 drop into the left eye 4 (four) times daily., Disp: 5 mL, Rfl: 1     senna-docusate 8.6-50 mg (PERICOLACE) 8.6-50 mg per tablet, Take 2 tablets by mouth., Disp: , Rfl:     thiamine mononitrate, vit B1, (VITAMIN B-1, MONONITRATE,) 100 mg Tab, Take 1 tablet by mouth every morning., Disp: , Rfl:     valsartan-hydrochlorothiazide (DIOVAN-HCT) 160-12.5 mg per tablet, TAKE ONE TABLET BY MOUTH ONCE DAILY, Disp: 90 tablet, Rfl: 3    Review of Systems:  A comprehensive review of systems was negative.    Physical Exam:  General Appearance:    A&Ox3, no distress, appears stated age   Head:    Normocephalic, without obvious abnormality, atraumatic   Eyes:    PERRL, EOM's intact   Back:     Symmetric, no curvature   Lungs:     Respirations unlabored   Chest Wall:    No tenderness or deformity    Heart:  Abdomen:  Extremities:  Skin:    S1 and S2 present    Soft, non-tender    Extremities normal, atraumatic    Skin color, texture, turgor normal     External Exam  Last IOP 48/55    Right Left   External Normal Normal     Slit Lamp Exam     Right Left   Lids/Lashes Normal Normal   Conjunctiva/Sclera Nasal 2+ Lissamine green stain, trace bleb, increased lacrimal lake Nasal 2+ Lissamine green stain, increased lacrimal lake   Cornea PEEs, Decreased TBUT PEEs, Decreased TBUT   Anterior Chamber Deep and quiet Deep and quiet   Iris Round and reactive Round and reactive   Lens Posterior chamber intraocular lens, Open posterior capsule 2+ Nuclear sclerosis   Vitreous Normal Normal        Right Left   Disc rim throughout, Some temporal pallor rim throughout, some temporal pallor   C/D Ratio 0.65 0.55   Macula Retinal pigment epithelial detachment Normal   Vessels Normal Normal   Periphery Normal Normal       Impression: Primary Open Angle Glaucoma severe  stage : Right eye    Planned Procedure:        Clearpath OD, complex due to previous surgery.   pred/Keto/Moxi QID OD           Patient cleared for ophthalmic surgery.     Discharge Summary:    Admitting Diagnosis: Primary open angle glaucoma severe  stage  OD    Discharge Diagnosis: same    Procedure: s/p glaucoma surgery as per dictation    Complications: None  Discharge Condition: Stable
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
## 2024-02-28 NOTE — TELEPHONE ENCOUNTER
Called patient's daughter and advised her that Dr. Zambrano did not yet go over the results. I advised her that as soon as he is able her will result.

## 2024-03-04 ENCOUNTER — OUTSIDE PLACE OF SERVICE (OUTPATIENT)
Dept: OPHTHALMOLOGY | Facility: CLINIC | Age: 79
End: 2024-03-04
Payer: MEDICARE

## 2024-03-04 PROCEDURE — 66180 AQUEOUS SHUNT EYE W/GRAFT: CPT | Mod: 22,RT,, | Performed by: OPHTHALMOLOGY

## 2024-03-05 ENCOUNTER — OFFICE VISIT (OUTPATIENT)
Dept: OPHTHALMOLOGY | Facility: CLINIC | Age: 79
End: 2024-03-05
Payer: MEDICARE

## 2024-03-05 DIAGNOSIS — Z98.890 POST-OPERATIVE STATE: Primary | ICD-10-CM

## 2024-03-05 DIAGNOSIS — H40.1133 PRIMARY OPEN ANGLE GLAUCOMA (POAG) OF BOTH EYES, SEVERE STAGE: ICD-10-CM

## 2024-03-05 PROCEDURE — 99024 POSTOP FOLLOW-UP VISIT: CPT | Mod: HCNC,S$GLB,, | Performed by: OPHTHALMOLOGY

## 2024-03-05 PROCEDURE — 1160F RVW MEDS BY RX/DR IN RCRD: CPT | Mod: HCNC,CPTII,S$GLB, | Performed by: OPHTHALMOLOGY

## 2024-03-05 PROCEDURE — 99999 PR PBB SHADOW E&M-EST. PATIENT-LVL III: CPT | Mod: PBBFAC,HCNC,, | Performed by: OPHTHALMOLOGY

## 2024-03-05 PROCEDURE — 1159F MED LIST DOCD IN RCRD: CPT | Mod: HCNC,CPTII,S$GLB, | Performed by: OPHTHALMOLOGY

## 2024-03-05 NOTE — PROGRESS NOTES
HPI     Post-op Evaluation            Comments: S/p 1 day clearpath OD  0/10 pain scale          Comments    1. Coag severe stage   XEN OD 10/13/2022  [facility] OD 11/4/2022 ( 0.5ml)  2. NS OS *Will do dropless/ plan for OMNI 360  PCIOL OD 1/25/22 W/ Gonio- complex  Yag OD 5-3-22 by MG since Dr Pitts was out that visit         Latanoprost qhs OU  Brimonidine BID OU  Dorzolamide/Timolol BID OU    Pred/Keto/Moxi QID OD            Last edited by Panchito Gold on 3/5/2024  9:25 AM.            Assessment /Plan     For exam results, see Encounter Report.      ICD-10-CM ICD-9-CM    1. Post-operative state  Z98.890 V45.89       2. Primary open angle glaucoma (POAG) of both eyes, severe stage  H40.1133 365.11      365.73         S/p Clear Path OD - doing well 1 day PO   opening date in/around April 1-15   RETURN TO CLINIC 1 week     Latanoprost QHS only     Latanoprost qhs OS  Brimonidine BID OU  Dorzolamide/Timolol BID OU    Pred/Keto/Moxi QID OD

## 2024-03-07 ENCOUNTER — OFFICE VISIT (OUTPATIENT)
Dept: UROLOGY | Facility: CLINIC | Age: 79
End: 2024-03-07
Payer: MEDICARE

## 2024-03-07 VITALS
TEMPERATURE: 100 F | HEIGHT: 62 IN | DIASTOLIC BLOOD PRESSURE: 64 MMHG | RESPIRATION RATE: 18 BRPM | HEART RATE: 74 BPM | SYSTOLIC BLOOD PRESSURE: 108 MMHG | BODY MASS INDEX: 27.14 KG/M2

## 2024-03-07 DIAGNOSIS — N39.0 FREQUENT UTI: ICD-10-CM
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
## 2024-03-07 PROBLEM — I95.9 HYPOTENSION: Status: RESOLVED | Noted: 2023-11-01 | Resolved: 2024-03-07

## 2024-03-07 PROBLEM — R55 SYNCOPE: Status: RESOLVED | Noted: 2023-11-01 | Resolved: 2024-03-07

## 2024-03-07 PROCEDURE — 1101F PT FALLS ASSESS-DOCD LE1/YR: CPT | Mod: HCNC,CPTII,S$GLB, | Performed by: NURSE PRACTITIONER

## 2024-03-07 PROCEDURE — 1160F RVW MEDS BY RX/DR IN RCRD: CPT | Mod: HCNC,CPTII,S$GLB, | Performed by: NURSE PRACTITIONER

## 2024-03-07 PROCEDURE — 3078F DIAST BP <80 MM HG: CPT | Mod: HCNC,CPTII,S$GLB, | Performed by: NURSE PRACTITIONER

## 2024-03-07 PROCEDURE — 3288F FALL RISK ASSESSMENT DOCD: CPT | Mod: HCNC,CPTII,S$GLB, | Performed by: NURSE PRACTITIONER

## 2024-03-07 PROCEDURE — 87086 URINE CULTURE/COLONY COUNT: CPT | Mod: HCNC | Performed by: NURSE PRACTITIONER

## 2024-03-07 PROCEDURE — 3074F SYST BP LT 130 MM HG: CPT | Mod: HCNC,CPTII,S$GLB, | Performed by: NURSE PRACTITIONER

## 2024-03-07 PROCEDURE — 1126F AMNT PAIN NOTED NONE PRSNT: CPT | Mod: HCNC,CPTII,S$GLB, | Performed by: NURSE PRACTITIONER

## 2024-03-07 PROCEDURE — 99214 OFFICE O/P EST MOD 30 MIN: CPT | Mod: HCNC,S$GLB,, | Performed by: NURSE PRACTITIONER

## 2024-03-07 PROCEDURE — 87088 URINE BACTERIA CULTURE: CPT | Mod: HCNC | Performed by: NURSE PRACTITIONER

## 2024-03-07 PROCEDURE — 1159F MED LIST DOCD IN RCRD: CPT | Mod: HCNC,CPTII,S$GLB, | Performed by: NURSE PRACTITIONER

## 2024-03-07 PROCEDURE — 99999 PR PBB SHADOW E&M-EST. PATIENT-LVL V: CPT | Mod: PBBFAC,HCNC,, | Performed by: NURSE PRACTITIONER

## 2024-03-07 RX ORDER — CEPHALEXIN 250 MG/1
250 CAPSULE ORAL NIGHTLY
Qty: 90 CAPSULE | Refills: 0 | Status: SHIPPED | OUTPATIENT
Start: 2024-03-07 | End: 2024-06-05

## 2024-03-07 NOTE — PROGRESS NOTES
Chief Complaint:   Recurrent urinary tract infections    HPI:   Patient is a 78-year-old female that is presenting with recurrent E coli cystitis.  States that she broke her hip and is using a wheelchair to ambulate.  Uses a pad or diaper when she goes out of the house.  Urine in clinic indicates leukocytes, all other parameters are negative.  Denies pelvic or flank pain.  Denies gross hematuria.  Denies history of renal stones.    Allergies:  Patient has no known allergies.    Medications:  has a current medication list which includes the following prescription(s): amitriptyline, atorvastatin, atropine 1%, baclofen, brimonidine 0.2%, citalopram, dorzolamide-timolol 2-0.5%, ketorolac 0.5%, ketorolac 0.5%, latanoprost, lotemax sm, magnesium oxide, moxifloxacin, multivitamin with folic acid, oxycodone-acetaminophen, pantoprazole, polymyxin b sulf-trimethoprim, prednisolone acetate, prednisolone acetate, senna-docusate 8.6-50 mg, thiamine mononitrate (vit b1), valsartan-hydrochlorothiazide, acetazolamide, and cephalexin.    Review of Systems:  General: No fever, chills, fatigability, or weight loss.  Skin: No rashes, itching, or changes in color or texture of skin.  Chest: Denies WHIPPLE, cyanosis, wheezing, cough, and sputum production.  Abdomen: Appetite fine. No weight loss. Denies diarrhea, abdominal pain, hematemesis, or blood in stool.  Musculoskeletal: No joint stiffness or swelling. Denies back pain.  Using wheelchair to ambulate  : As above.  All other review of systems negative.    PMH:   has a past medical history of Alpha thalassemia, Asthma, Cataract, Chronic anxiety, Chronic knee pain, Chronic pain of left ankle, Clotting disorder, Cutaneous lupus erythematosus, Depression, Depression, Ex-smoker, Hypertension, and Osteopenia (1/16 reck1/18).    PSH:   has a past surgical history that includes Knee arthroscopy; Back surgery; Ankle surgery (Left); Colonoscopy (N/A, 8/2/2017); Hysterectomy; Oophorectomy;  Injection of anesthetic agent around nerve (Bilateral, 5/19/2020); Injection of anesthetic agent around medial branch nerves innervating lumbar facet joint (Bilateral, 6/17/2020); Radiofrequency thermocoagulation (Right, 7/14/2020); Radiofrequency thermocoagulation (Left, 8/6/2020); and Cataract extraction (Right).    FamHx: family history includes Breast cancer in her maternal aunt; Diabetes in her father and mother.    SocHx:  reports that she quit smoking about 9 years ago. Her smoking use included cigarettes. She started smoking about 39 years ago. She has a 30.0 pack-year smoking history. She has never been exposed to tobacco smoke. She has never used smokeless tobacco. She reports current alcohol use of about 1.0 standard drink of alcohol per week. She reports that she does not use drugs.      Physical Exam:  Vitals:    03/07/24 1302   BP: 108/64   Pulse: 74   Resp: 18   Temp: 99.6 °F (37.6 °C)     General: A&Ox3, no apparent distress, no deformities  Neck: No masses, normal thyroid  Lungs: normal inspiration, no use of accessory muscles  Heart: normal pulse, no arrhythmias  Abdomen: Soft, NT, ND, no masses, no hernias, no hepatosplenomegaly  Lymphatic: Neck and groin nodes negative  Skin: The skin is warm and dry. No jaundice.    Labs/Studies:   See HPI    Impression/Plan:   Recurrent E coli cystitis  Patient was educated on behavior modifications needed to decrease risk of E coli cystitis.  Will proceed with Keflex 250 mg once nightly for 3 months and patient to return to clinic for re-evaluation in 3 months.  Unable to use Hiprex secondary to elevated creatinine level.

## 2024-03-09 LAB — BACTERIA UR CULT: ABNORMAL

## 2024-03-11 RX ORDER — AMOXICILLIN AND CLAVULANATE POTASSIUM 875; 125 MG/1; MG/1
1 TABLET, FILM COATED ORAL 2 TIMES DAILY
Qty: 10 TABLET | Refills: 0 | Status: SHIPPED | OUTPATIENT
Start: 2024-03-11 | End: 2024-03-16

## 2024-03-14 ENCOUNTER — CLINICAL SUPPORT (OUTPATIENT)
Dept: UROLOGY | Facility: CLINIC | Age: 79
End: 2024-03-14
Payer: MEDICARE

## 2024-03-14 ENCOUNTER — TELEPHONE (OUTPATIENT)
Dept: UROLOGY | Facility: CLINIC | Age: 79
End: 2024-03-14
Payer: MEDICARE

## 2024-03-14 ENCOUNTER — PATIENT MESSAGE (OUTPATIENT)
Dept: UROLOGY | Facility: CLINIC | Age: 79
End: 2024-03-14
Payer: MEDICARE

## 2024-03-14 DIAGNOSIS — N39.0 FREQUENT UTI: Primary | ICD-10-CM

## 2024-03-14 PROCEDURE — 87086 URINE CULTURE/COLONY COUNT: CPT | Mod: HCNC | Performed by: NURSE PRACTITIONER

## 2024-03-14 NOTE — TELEPHONE ENCOUNTER
----- Message from Alisa Casiano sent at 3/13/2024  5:55 PM CDT -----  Type:  Patient Returning Call    Who Called:pt  Who Left Message for Patient:  Does the patient know what this is regarding?:  Would the patient rather a call back or a response via [facility]? call  Best Call Back Number:[phone]  Additional Information: has a question about medicine conflict

## 2024-03-14 NOTE — PROGRESS NOTES
Using sterile technique, patient was catheterized per orders of Radha Bernal NP due to c/o UTI.  Urine collected and sent to lab.

## 2024-03-16 LAB — BACTERIA UR CULT: NO GROWTH

## 2024-03-18 ENCOUNTER — HOSPITAL ENCOUNTER (OUTPATIENT)
Dept: RADIOLOGY | Facility: HOSPITAL | Age: 79
Discharge: HOME | End: 2024-03-18
Attending: STUDENT IN AN ORGANIZED HEALTH CARE EDUCATION/TRAINING PROGRAM
Payer: MEDICARE

## 2024-03-18 ENCOUNTER — HOSPITAL ENCOUNTER (EMERGENCY)
Facility: HOSPITAL | Age: 79
Discharge: HOME | End: 2024-03-18
Attending: EMERGENCY MEDICINE
Payer: MEDICARE

## 2024-03-18 ENCOUNTER — OFFICE VISIT (OUTPATIENT)
Dept: INTERNAL MEDICINE | Facility: CLINIC | Age: 79
End: 2024-03-18
Payer: MEDICARE

## 2024-03-18 VITALS
BODY MASS INDEX: 27.3 KG/M2 | HEIGHT: 62 IN | SYSTOLIC BLOOD PRESSURE: 124 MMHG | OXYGEN SATURATION: 96 % | HEART RATE: 85 BPM | TEMPERATURE: 97 F | DIASTOLIC BLOOD PRESSURE: 82 MMHG | WEIGHT: 148.38 LBS

## 2024-03-18 VITALS
HEART RATE: 87 BPM | RESPIRATION RATE: 19 BRPM | TEMPERATURE: 98 F | DIASTOLIC BLOOD PRESSURE: 79 MMHG | OXYGEN SATURATION: 100 % | SYSTOLIC BLOOD PRESSURE: 176 MMHG

## 2024-03-18 DIAGNOSIS — I10 ESSENTIAL HYPERTENSION: ICD-10-CM
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
## 2024-03-18 DIAGNOSIS — R41.82 ALTERED MENTAL STATUS, UNSPECIFIED ALTERED MENTAL STATUS TYPE: ICD-10-CM

## 2024-03-18 DIAGNOSIS — R41.82 AMS (ALTERED MENTAL STATUS): ICD-10-CM

## 2024-03-18 DIAGNOSIS — F41.9 CHRONIC ANXIETY: ICD-10-CM

## 2024-03-18 DIAGNOSIS — T88.7XXA MEDICATION SIDE EFFECT: ICD-10-CM
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
## 2024-03-18 DIAGNOSIS — S00.03XA CONTUSION OF SCALP, INITIAL ENCOUNTER: ICD-10-CM
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
## 2024-03-18 DIAGNOSIS — F01.A2 MILD VASCULAR DEMENTIA WITH PSYCHOTIC DISTURBANCE: ICD-10-CM

## 2024-03-18 LAB
ALBUMIN SERPL BCP-MCNC: 3.5 G/DL (ref 3.5–5.2)
ALP SERPL-CCNC: 100 U/L (ref 55–135)
ALT SERPL W/O P-5'-P-CCNC: 8 U/L (ref 10–44)
ANION GAP SERPL CALC-SCNC: 13 MMOL/L (ref 8–16)
AST SERPL-CCNC: 14 U/L (ref 10–40)
BASOPHILS # BLD AUTO: 0.04 K/UL (ref 0–0.2)
BASOPHILS NFR BLD: 0.4 % (ref 0–1.9)
BILIRUB SERPL-MCNC: 0.5 MG/DL (ref 0.1–1)
BILIRUB UR QL STRIP: NEGATIVE
BNP SERPL-MCNC: 295 PG/ML (ref 0–99)
BUN SERPL-MCNC: 26 MG/DL (ref 8–23)
CALCIUM SERPL-MCNC: 9.5 MG/DL (ref 8.7–10.5)
CHLORIDE SERPL-SCNC: 105 MMOL/L (ref 95–110)
CLARITY UR REFRACT.AUTO: CLEAR
CO2 SERPL-SCNC: 24 MMOL/L (ref 23–29)
COLOR UR AUTO: YELLOW
CREAT SERPL-MCNC: 1 MG/DL (ref 0.5–1.4)
DIFFERENTIAL METHOD BLD: ABNORMAL
EOSINOPHIL # BLD AUTO: 0.1 K/UL (ref 0–0.5)
EOSINOPHIL NFR BLD: 1.4 % (ref 0–8)
ERYTHROCYTE [DISTWIDTH] IN BLOOD BY AUTOMATED COUNT: 18.2 % (ref 11.5–14.5)
EST. GFR  (NO RACE VARIABLE): 58 ML/MIN/1.73 M^2
GLUCOSE SERPL-MCNC: 117 MG/DL (ref 70–110)
GLUCOSE UR QL STRIP: NEGATIVE
HCT VFR BLD AUTO: 36.9 % (ref 37–48.5)
HGB BLD-MCNC: 11.2 G/DL (ref 12–16)
HGB UR QL STRIP: NEGATIVE
IMM GRANULOCYTES # BLD AUTO: 0.03 K/UL (ref 0–0.04)
IMM GRANULOCYTES NFR BLD AUTO: 0.3 % (ref 0–0.5)
KETONES UR QL STRIP: NEGATIVE
LEUKOCYTE ESTERASE UR QL STRIP: NEGATIVE
LYMPHOCYTES # BLD AUTO: 2.2 K/UL (ref 1–4.8)
LYMPHOCYTES NFR BLD: 21.6 % (ref 18–48)
MCH RBC QN AUTO: 21.3 PG (ref 27–31)
MCHC RBC AUTO-ENTMCNC: 30.4 G/DL (ref 32–36)
MCV RBC AUTO: 70 FL (ref 82–98)
MONOCYTES # BLD AUTO: 0.7 K/UL (ref 0.3–1)
MONOCYTES NFR BLD: 6.5 % (ref 4–15)
NEUTROPHILS # BLD AUTO: 7.1 K/UL (ref 1.8–7.7)
NEUTROPHILS NFR BLD: 69.8 % (ref 38–73)
NITRITE UR QL STRIP: NEGATIVE
NRBC BLD-RTO: 0 /100 WBC
OHS QRS DURATION: 72 MS
OHS QTC CALCULATION: 450 MS
PH UR STRIP: 6 [PH] (ref 5–8)
PLATELET # BLD AUTO: 378 K/UL (ref 150–450)
PMV BLD AUTO: 9.5 FL (ref 9.2–12.9)
POCT GLUCOSE: 126 MG/DL (ref 70–110)
POTASSIUM SERPL-SCNC: 3.9 MMOL/L (ref 3.5–5.1)
PROT SERPL-MCNC: 8 G/DL (ref 6–8.4)
PROT UR QL STRIP: NEGATIVE
RBC # BLD AUTO: 5.27 M/UL (ref 4–5.4)
SODIUM SERPL-SCNC: 142 MMOL/L (ref 136–145)
SP GR UR STRIP: 1.02 (ref 1–1.03)
TROPONIN I SERPL DL<=0.01 NG/ML-MCNC: <0.006 NG/ML (ref 0–0.03)
URN SPEC COLLECT METH UR: NORMAL
UROBILINOGEN UR STRIP-ACNC: NEGATIVE EU/DL
WBC # BLD AUTO: 10.23 K/UL (ref 3.9–12.7)

## 2024-03-18 PROCEDURE — 25000003 PHARM REV CODE 250: Mod: HCNC,ER | Performed by: EMERGENCY MEDICINE

## 2024-03-18 PROCEDURE — 99499 UNLISTED E&M SERVICE: CPT | Mod: HCNC,S$GLB,, | Performed by: STUDENT IN AN ORGANIZED HEALTH CARE EDUCATION/TRAINING PROGRAM

## 2024-03-18 PROCEDURE — 82962 GLUCOSE BLOOD TEST: CPT | Mod: HCNC,ER

## 2024-03-18 PROCEDURE — 93010 ELECTROCARDIOGRAM REPORT: CPT | Mod: HCNC,,, | Performed by: INTERNAL MEDICINE

## 2024-03-18 PROCEDURE — 84484 ASSAY OF TROPONIN QUANT: CPT | Mod: HCNC,ER | Performed by: EMERGENCY MEDICINE

## 2024-03-18 PROCEDURE — 80053 COMPREHEN METABOLIC PANEL: CPT | Mod: HCNC,ER | Performed by: EMERGENCY MEDICINE

## 2024-03-18 PROCEDURE — 70450 CT HEAD/BRAIN W/O DYE: CPT | Mod: TC,HCNC,PO

## 2024-03-18 PROCEDURE — 83880 ASSAY OF NATRIURETIC PEPTIDE: CPT | Mod: HCNC,ER | Performed by: EMERGENCY MEDICINE

## 2024-03-18 PROCEDURE — 93005 ELECTROCARDIOGRAM TRACING: CPT | Mod: HCNC,ER

## 2024-03-18 PROCEDURE — 99999 PR PBB SHADOW E&M-EST. PATIENT-LVL V: CPT | Mod: PBBFAC,HCNC,, | Performed by: STUDENT IN AN ORGANIZED HEALTH CARE EDUCATION/TRAINING PROGRAM

## 2024-03-18 PROCEDURE — 81003 URINALYSIS AUTO W/O SCOPE: CPT | Mod: HCNC,ER | Performed by: EMERGENCY MEDICINE

## 2024-03-18 PROCEDURE — 94761 N-INVAS EAR/PLS OXIMETRY MLT: CPT | Mod: HCNC,ER

## 2024-03-18 PROCEDURE — 96374 THER/PROPH/DIAG INJ IV PUSH: CPT | Mod: HCNC,ER

## 2024-03-18 PROCEDURE — 99285 EMERGENCY DEPT VISIT HI MDM: CPT | Mod: 25,HCNC,ER

## 2024-03-18 PROCEDURE — 85025 COMPLETE CBC W/AUTO DIFF WBC: CPT | Mod: HCNC,ER | Performed by: EMERGENCY MEDICINE

## 2024-03-18 PROCEDURE — 96361 HYDRATE IV INFUSION ADD-ON: CPT | Mod: HCNC,ER

## 2024-03-18 PROCEDURE — 70450 CT HEAD/BRAIN W/O DYE: CPT | Mod: 26,HCNC,, | Performed by: RADIOLOGY

## 2024-03-18 PROCEDURE — 63600175 PHARM REV CODE 636 W HCPCS: Mod: HCNC,ER | Performed by: EMERGENCY MEDICINE

## 2024-03-18 RX ORDER — QUETIAPINE FUMARATE 25 MG/1
25 TABLET, FILM COATED ORAL DAILY
Qty: 30 TABLET | Refills: 0 | Status: SHIPPED | OUTPATIENT
Start: 2024-03-18 | End: 2024-04-15 | Stop reason: SDUPTHER

## 2024-03-18 RX ORDER — HYDRALAZINE HYDROCHLORIDE 20 MG/ML
10 INJECTION INTRAMUSCULAR; INTRAVENOUS
Status: COMPLETED | OUTPATIENT
Start: 2024-03-18 | End: 2024-03-18

## 2024-03-18 RX ADMIN — SODIUM CHLORIDE 500 ML: 9 INJECTION, SOLUTION INTRAVENOUS at 12:03

## 2024-03-18 RX ADMIN — HYDRALAZINE HYDROCHLORIDE 10 MG: 20 INJECTION, SOLUTION INTRAMUSCULAR; INTRAVENOUS at 01:03

## 2024-03-18 NOTE — ED PROVIDER NOTES
Encounter Date: 3/18/2024       History     Chief Complaint   Patient presents with    Altered Mental Status     78-year-old female with past medical history of asthma, alpha thalassemia, hypertension, multiple falls presents to the emergency department this morning with altered mental status.  She is accompanied by her daughter.  Patient was suspected of falling earlier this morning.  She has a abrasion to her right forehead with region of small swelling and tenderness.  She has not been sleeping recently and her daughter gave her a sleeping pill, amitriptyline, last night.  Patient was noted to be increasingly drowsy this morning at her doctor's appointment, slurring her words and being more disoriented than usual.    Chart review is revealing fall with hip fracture in December, and then subsequent right wrist fracture after a subsequent fall.  Patient additionally has been recently treated for urinary tract infections.  She was seen by her primary care doctor this morning and sent to the emergency department for further evaluation and treatment.  CT scan performed prior to arrival but has not been read yet.  Lactic acid was normal prior to arrival.  She is currently supposed to be taking Keflex nightly for urinary tract infections.    The history is provided by medical records and a caregiver. The history is limited by the condition of the patient.     Review of patient's allergies indicates:  No Known Allergies  Past Medical History:   Diagnosis Date    Alpha thalassemia     Asthma     resolved per patient    Cataract     Chronic anxiety     years tid xanax 1mg    Chronic knee pain     Chronic pain of left ankle     Clotting disorder     Cutaneous lupus erythematosus     dr henry derm    Depression     Depression     dr radha hughes previously    Ex-smoker     quit fall 1    Hypertension     Osteopenia 1/16 reck1/18     Past Surgical History:   Procedure Laterality Date    ANKLE SURGERY Left     BACK SURGERY       CATARACT EXTRACTION Right     COLONOSCOPY N/A 2017    Procedure: COLONOSCOPY;  Surgeon: Virgil Oliva MD;  Location: [facility] ENDO;  Service: Endoscopy;  Laterality: N/A;    HYSTERECTOMY      INJECTION OF ANESTHETIC AGENT AROUND MEDIAL BRANCH NERVES INNERVATING LUMBAR FACET JOINT Bilateral 2020    Procedure: Bilateral L3-5 MBB;  Surgeon: uYry Moore MD;  [facility];  Service: Pain Management;  Laterality: Bilateral;    INJECTION OF ANESTHETIC AGENT AROUND NERVE Bilateral 2020    Procedure: Bilateral Genicular nerve block with RN IV sedation Covid testing day of procedure PT does not drive;  Surgeon: Yury Moore MD;  Location: [facility] PAIN [facility]GT;  Service: Pain Management;  Laterality: Bilateral;    KNEE ARTHROSCOPY      both knees twice    OOPHORECTOMY      RADIOFREQUENCY THERMOCOAGULATION Right 2020    Procedure: Right L3-5 Lumbar RFA;  Surgeon: Yury Moore MD;  Location: [facility];  Service: Pain Management;  Laterality: Right;    RADIOFREQUENCY THERMOCOAGULATION Left 2020    Procedure: Left L3-5 Lumbar RFA;  Surgeon: Yury Moore MD;  Location: [facility];  Service: Pain Management;  Laterality: Left;     Family History   Problem Relation Age of Onset    Diabetes Mother     Diabetes Father     Breast cancer Maternal Aunt      Social History     Tobacco Use    Smoking status: Former     Current packs/day: 0.00     Average packs/day: 1 pack/day for 30.0 years (30.0 ttl pk-yrs)     Types: Cigarettes     Start date: 1984     Quit date: 2014     Years since quittin.5     Passive exposure: Never    Smokeless tobacco: Never    Tobacco comments:     smoke last cigarette 9/15   Substance Use Topics    Alcohol use: Yes     Alcohol/week: 1.0 standard drink of alcohol     Types: 1 Glasses of wine per week     Comment: 3 times a week    Drug use: No     Review of Systems   Unable to perform ROS: Mental status change       Physical Exam     Initial Vitals  [03/18/24 1006]   BP Pulse Resp Temp SpO2   (!) 176/99 88 20 98.2 °F (36.8 °C) 99 %      MAP       --         Physical Exam    Constitutional:   Elderly.  No acute distress.   HENT:   Abrasion and mild contusion to right forehead with no palpable skull fracture.  No other facial trauma noted.   Eyes: EOM are normal. Pupils are equal, round, and reactive to light.   Neck: No tracheal deviation present.   No midline spinal tenderness to palpation of the cervical spine.   Cardiovascular:  Normal rate and regular rhythm.           No murmur heard.  Pulmonary/Chest: No stridor. No respiratory distress. She has no wheezes. She has no rales.   Abdominal: Abdomen is soft. She exhibits no distension. There is no abdominal tenderness.   Musculoskeletal:      Comments: There is casting on the right wrist.  No obvious deformity of any other extremity.  No midline spinal tenderness to palpation in thoracic or lumbar spine.  No pelvic instability.     Neurological:   Patient is sleepy but awakens to conversation and will answer simple questions.  She is unaware of the month or day of week.  She is aware that it is 2024.  She is oriented to place.  She has not oriented to situation.  She is slurring her speech.  She is following commands in all 4 extremities.  Has sensation intact in all 4 extremities.         ED Course   Procedures  Labs Reviewed   COMPREHENSIVE METABOLIC PANEL - Abnormal; Notable for the following components:       Result Value    Glucose 117 (*)     BUN 26 (*)     ALT 8 (*)     eGFR 58.0 (*)     All other components within normal limits    Narrative:     Release to patient->Immediate   CBC W/ AUTO DIFFERENTIAL - Abnormal; Notable for the following components:    Hemoglobin 11.2 (*)     Hematocrit 36.9 (*)     MCV 70 (*)     MCH 21.3 (*)     MCHC 30.4 (*)     RDW 18.2 (*)     All other components within normal limits    Narrative:     Release to patient->Immediate   B-TYPE NATRIURETIC PEPTIDE - Abnormal; Notable  for the following components:     (*)     All other components within normal limits    Narrative:     Release to patient->Immediate   POCT GLUCOSE - Abnormal; Notable for the following components:    POCT Glucose 126 (*)     All other components within normal limits   URINALYSIS, REFLEX TO URINE CULTURE    Narrative:     Specimen Source->Urine   TROPONIN I    Narrative:     Release to patient->Immediate   POCT GLUCOSE MONITORING CONTINUOUS     EKG Readings: (Independently Interpreted)   Rate of 89 beats per minute.  Normal sinus rhythm.  Normal axis.  P.r., QRS and QTC intervals within normal limits.  No STEMI.  Left atrial enlargement.     ECG Results              EKG 12-lead (In process)        Collection Time Result Time QRS Duration OHS QTC Calculation    03/18/24 10:05:39 03/18/24 10:49:59 72 450                     In process by Interface, Lab In [facility] (03/18/24 10:50:04)                   Narrative:    Test Reason : R41.82,    Vent. Rate : 089 BPM     Atrial Rate : 089 BPM     P-R Int : 194 ms          QRS Dur : 072 ms      QT Int : 370 ms       P-R-T Axes : 038 006 038 degrees     QTc Int : 450 ms    Normal sinus rhythm  Possible Left atrial enlargement  Borderline Abnormal ECG  When compared with ECG of 31-OCT-2023 20:24,  No significant change was found    Referred By: [email or web address]   SELF           Confirmed By:                                   Imaging Results              CT Cervical Spine Without Contrast (Final result)  Result time 03/18/24 12:38:21      Final result by Mateo Bae MD (03/18/24 12:38:21)                   Impression:      No acute abnormality.  Stable CT as compared to 10/31/2023    All CT scans at this facility use dose modulation, iterative reconstruction and/or weight based dosing when appropriate to reduce radiation dose to as low as reasonably achievable.      Electronically signed by: Mateo Bae MD  Date:    03/18/2024  Time:    12:38                Narrative:    EXAMINATION:  CT CERVICAL SPINE WITHOUT CONTRAST    CLINICAL HISTORY:  Neck pain with neck trauma after a fall.Neck trauma (Age >= 65y);    TECHNIQUE:  Axial CT of the cervical spine with coronal and sagittal reformates.    All CT scans at this facility are performed  using dose modulation techniques as appropriate to performed exam including the following:  automated exposure control; adjustment of mA and/or kV according to the patients size (this includes techniques or standardized protocols for targeted exams where dose is matched to indication/reason for exam: i.e. extremities or head);  iterative reconstruction technique.    COMPARISON:  10/31/2023 CT scan    FINDINGS:  Prevertebral soft tissues are unchanged.  Alignment is unchanged.    There are large ventral osteophytes at C3-4, C4-5, C5-6 and C6-7.    There is stable anterolisthesis of C7 with respect to T1.    There is multilevel advanced facet arthrosis.    No acute fracture.                                       X-Ray Hip 2 or 3 views Right (with Pelvis when performed) (Final result)  Result time 03/18/24 11:34:22      Final result by Bismark Reno MD (03/18/24 11:34:22)                   Impression:      As above.      Electronically signed by: Bismark Reno MD  Date:    03/18/2024  Time:    11:34               Narrative:    EXAMINATION:  XR HIP WITH PELVIS WHEN PERFORMED, 2 OR 3  VIEWS RIGHT    CLINICAL HISTORY:  Right hip pain pain    TECHNIQUE:  Right hip, three views    COMPARISON:  10/31/2023    FINDINGS:  The right hip hardware is intact.  There is a healing right intertrochanteric fracture.  Mild bilateral hip DJD.  Alignment is satisfactory.  Advanced degenerative disc disease throughout the lower lumbar spine.                                       X-Ray Chest AP Portable (Final result)  Result time 03/18/24 11:31:07      Final result by Mateo Bae MD (03/18/24 11:31:07)                   Impression:      No acute  findings.      Electronically signed by: Mateo Bae MD  Date:    03/18/2024  Time:    11:31               Narrative:    EXAMINATION:  XR CHEST AP PORTABLE    CLINICAL HISTORY:  Respiratory distress, Altered mental status;    COMPARISON:  10/31/2023 x-ray    FINDINGS:  Heart size is normal. The lung fields are clear. No acute cardiopulmonary infiltrate.    Bilateral shoulder arthrosis.  Thoracic spondylosis.                                       Medications   sodium chloride 0.9% bolus 500 mL 500 mL (0 mLs Intravenous Stopped 3/18/24 1329)   hydrALAZINE injection 10 mg (10 mg Intravenous Given 3/18/24 1318)     Medical Decision Making  Differential diagnosis includes intracranial hemorrhage, medication side effect,  urinary tract infection , sepsis    Amount and/or Complexity of Data Reviewed  Independent Historian: caregiver     Details: See HPI  External Data Reviewed: notes.     Details: Extensive record reviewed, see HPI  Labs: ordered. Decision-making details documented in ED Course.  Radiology: ordered. Decision-making details documented in ED Course.    Risk  Prescription drug management.               ED Course as of 03/18/24 1519   Mon Mar 18, 2024   1302 1:02 PM Reassessment: I reassessed the pt.  The pt is resting comfortably and is NAD.  I discussed test results, shared treatment plan, specific conditions for return, and the need for f/u with the patient and family at bedside. I answered all questions at this time.  The patient's family understands and agrees to the outlined plan.  The pt has remained hemodynamically stable through ED course and is stable for discharge.      [BA]   7265 I discussed observation until patient returns to her baseline mental status. Patient's daughter says she is comfortable monitoring her at home, and would like to bring her home. She has not slept in several days, and the daughter did give sleeping medication last night which is likely the major reason patient is  altered this AM. Daughter understands that if patient were to not improve or to worsen, she would require hospitalization at that time. She understands and agree's with the plan. Patient does wake up with stimulation.  [BA]      ED Course User Index  [BA] Pawel Rodriguez MD                           Clinical Impression:  Final diagnoses:  [R41.82] AMS (altered mental status)  [W19.XXXA] Fall, initial encounter (Primary)  [T88.7XXA] Medication side effect  [S00.03XA] Contusion of scalp, initial encounter          ED Disposition Condition    Discharge Stable          ED Prescriptions    None       Follow-up Information       Follow up With Specialties Details Why Contact Info    Marcelol Zambrano MD Internal Medicine, Family Medicine Schedule an appointment as soon as possible for a visit in 2 days For re-evaluation and further treatment [address] [address] [address]  [phone]      [facility] Emergency Dept Emergency Medicine Go today If symptoms worsen, For re-evaluation and further treatment, As needed [address] [place] [address] [id number] [phone]             Pawel Rodriguez MD  03/18/24 8482

## 2024-03-18 NOTE — LETTER
March 18, 2024    Tea Ring  [address] [place] [address]             [facility] Internal Medicine  Internal Medicine  [address]  [place] [address]  Phone: [phone]  Fax: [phone]   March 18, 2024     Patient: Tea Ring   YOB: 1945   Date of Visit: 3/18/2024       To Whom it May Concern:    Alfredo Ring brought her mother Tea Ring in on 3/18/2024. She may return to work on 03/19/2023 .    Please excuse her from any classes or work missed.    If you have any questions or concerns, please don't hesitate to call.    Sincerely,         Marcello Zambrano MD

## 2024-03-19 ENCOUNTER — PATIENT OUTREACH (OUTPATIENT)
Dept: EMERGENCY MEDICINE | Facility: HOSPITAL | Age: 79
End: 2024-03-19
Payer: MEDICARE

## 2024-03-19 NOTE — PROGRESS NOTES
Anastasiia Valdovinos  ED Navigator  Emergency Department    Project: [facility] ED Navigator  Role: Community Health Worker    Date: 03/19/2024  Patient Name: Tea Ring  MRN: [id number]  PCP: Marcello Zambrano MD    Assessment:     Tea Ring is a 78 y.o. female who has presented to ED for AMS (altered mental status);Fall, initial encounter; medication side effect; Contusion of scalp, initial encounter. Patient has visited the ED 1 times in the past 3 months. Patient did not contact PCP.     ED Navigator Initial Assessment    ED Navigator Enrollment Documentation  Consent to Services  Does patient consent to completing the assessment?: Yes  Contact  Method of Initial Contact: Phone  Transportation  Does the patient have issues with Transportation?: No  Does the patient have transportation to and from healthcare appointments?: Yes  Insurance Coverage  Do you have coverage/adequate coverage?: Yes  Type/kind of coverage: [employer]  Is patient able to afford co-pays/deductibles?: Yes  Is patient able to afford HME or supplies?: Yes  Does patient have an established [facility] PCP?: Yes  Able to access?: Yes  Does the patient have a lack of adequate coverage?: No  Specialist Appointment  Did the patient come to the ED to see a specialist?: No  Does the patient have a pending specialist referral?: No  Does the patient have a specialist appointment made?: No  PCP Follow Up Appointment  Has the patient had an appointment with a primary care provider in the past year?: Yes  Approximate date: 3/18/24  Provider: Marcello Zambrano MD  Does the patient have a follow up appontment with a PCP?: No  When was the last time you saw your PCP?: 3/18/24  Why does the patient not have a follow up scheduled?: Other (see comments) (Comment: Pt was sent from the pcp's office to the ED)  Medications  Is patient able to afford medication?: Yes  Is patient unable to get medication due to lack of transportation?:  No  Psychological  Food  Communication/Education  Does the patient have limited English proficiency/English not primary language?: No  Does patient have low literacy and/or low health literacy?: Yes  Does patient have concerns with care?: No  Does patient have dissatisfaction with care?: No  Other Financial Concerns  Other Social Barriers/Concerns  Does the patient have any additional barriers or concerns?: Other (see comments) (Comment: Chronic Conditions)  Primary Barrier  Barriers identified: Cognitive barrier (health literacy, language and communication, etc.)  Root Cause of ED Utilization: Chronic Conditions  Plan to address Chronic Conditions: Schedule appointment for patient with their PCP/specialist per ED discharge instructions  Next steps: Provided Education  Additional Documentation: Pt was seen in the ED on 3/18/24 for AMS (altered mental status);Fall, initial encounter; medication side effect; Contusion of scalp, initial encounter. I spoke with pt's daughter/caregiver, Zoie Ring, for Post ED visit follow up navigation. Pt's daughter states that she had not yet contacted pcp to schedule a Post ED visit 7-day follow up appt because the pcp is the one who sent her to the ED.  I was unable to schedule appt for pt and sent a request for assistance to pcp.  Pt will be contacted directly for scheduling as pt's daughter provides transportation and will schedule as available. Pt has no additional needs at this time.    Anastasiia Valdovinos           Social History     Socioeconomic History    Marital status:      Spouse name:     Number of children: 3   Tobacco Use    Smoking status: Former     Current packs/day: 0.00     Average packs/day: 1 pack/day for 30.0 years (30.0 ttl pk-yrs)     Types: Cigarettes     Start date: 1984     Quit date: 2014     Years since quittin.5     Passive exposure: Never    Smokeless tobacco: Never    Tobacco comments:     smoke last cigarette 9/15    Substance and Sexual Activity    Alcohol use: Yes     Alcohol/week: 1.0 standard drink of alcohol     Types: 1 Glasses of wine per week     Comment: 3 times a week    Drug use: No    Sexual activity: Not Currently     Partners: Male     Social Determinants of Health     Stress: No Stress Concern Present (10/8/2019)    [employer] - Occupational Stress Questionnaire     Feeling of Stress : Not at all       Plan:     Pt was seen in the ED on 3/18/24 for AMS (altered mental status);Fall, initial encounter; medication side effect; Contusion of scalp, initial encounter. I spoke with pt's daughter/caregiver, Zoie Ring, for Post ED visit follow up navigation. Pt's daughter states that she had not yet contacted pcp to schedule a Post ED visit 7-day follow up appt because the pcp is the one who sent her to the ED.  I was unable to schedule appt for pt and sent a request for assistance to pcp.  Pt will be contacted directly for scheduling as pt's daughter provides transportation and will schedule as available. Pt has no additional needs at this time.    Anastasiia Valdovinos    Appointment made with: Marcello Zambrano MD

## 2024-03-19 NOTE — PROGRESS NOTES
Chief Complaint   Patient presents with    Fall     HPI: Tea Ring is a 78 y.o. female  with Pmhx listed below who presents to clinic for evaluation of fall. She is here with her daughter today. Daughter reports that she found her mother on the floor this morning. She is unsure if she hit her head. There appears to be bruises on her head. She is sleepy today and slept during the conversation. She wakes up to the command however. On her last visit with me, she was sent to Urology for repeated UTI. She is currently on keflex X 3 months and reports to be doing well. She recently had catheterized urine analysis done with urology, which was normal. Daughter reports that she has been giving her elavil to her her with sleep, she has not been sleeping lately, up until today. She was un amitriptyline in the past for insomnia. In addition, daughter reports that he has been having visual hallucination as well. She was sent ot neurology for the same reason In the past, but she has an appointment on 5/13/2024. She is currently on waiting list however.       Problem List:  Patient Active Problem List   Diagnosis    Essential hypertension    Chronic anxiety    Cutaneous lupus erythematosus    Alpha thalassemia    Osteopenia    Ex-smoker    Chronic asthma without complication    Lumbar spondylosis    Atherosclerosis of aorta    History of total right knee replacement    Right knee pain    Gait instability    Medication noncompliance due to cognitive impairment    Moderate major depression    Primary open angle glaucoma (POAG) of both eyes, severe stage    Arthritis of multiple sites    Adhesive capsulitis of right shoulder    Personal history of nicotine dependence     Non-traumatic rhabdomyolysis    Leukocytosis    Complete tear of right rotator cuff    Chronic right shoulder pain    Financial difficulties    Frequent falls    Polypharmacy    Mild vascular dementia with psychotic disturbance    Uncomplicated opioid dependence        ROS: Negative except as noted above.       Current Meds:  Current Outpatient Medications   Medication Sig Dispense Refill    atorvastatin (LIPITOR) 10 MG tablet TAKE ONE TABLET BY MOUTH ONCE DAILY 90 tablet 3    atropine 1% (ISOPTO ATROPINE) 1 % Drop Place 1 drop into the right eye 2 (two) times a day. 5 mL 1    brimonidine 0.2% (ALPHAGAN) 0.2 % Drop PLACE 1 DROP IN EACH EYE TWICE A DAY 10 mL 6    cephALEXin (KEFLEX) 250 MG capsule Take 1 capsule (250 mg total) by mouth every evening. 90 capsule 0    dorzolamide-timolol 2-0.5% (COSOPT) 22.3-6.8 mg/mL ophthalmic solution PLACE 1 DROP IN EACH EYE TWICE A DAY 10 mL 6    ketorolac 0.5% (ACULAR) 0.5 % Drop Place 1 drop into the left eye 4 (four) times daily. 5 mL 1    latanoprost 0.005 % ophthalmic solution Place 1 drop into both eyes once daily. 2.5 mL 6    loteprednol etabonate (LOTEMAX SM) 0.38 % DrpG Place 1 drop into the right eye 4 (four) times daily. 5 g 1    magnesium oxide (MAG-OX) 400 mg (241.3 mg magnesium) tablet Take 400 mg by mouth.      moxifloxacin (VIGAMOX) 0.5 % ophthalmic solution Place 1 drop into the left eye 4 (four) times daily. 3 mL 1    oxyCODONE-acetaminophen (PERCOCET)  mg per tablet Take 1 tablet by mouth 3 (three) times daily as needed.      polymyxin B sulf-trimethoprim (POLYTRIM) 10,000 unit- 1 mg/mL Drop PLACE ONE DROP INTO THE RIGHT EYE FOUR TIMES DAILY 10 mL 2    prednisoLONE acetate (PRED FORTE) 1 % DrpS Place 1 drop into the left eye 4 (four) times daily. 5 mL 1    senna-docusate 8.6-50 mg (PERICOLACE) 8.6-50 mg per tablet Take 2 tablets by mouth.      valsartan-hydrochlorothiazide (DIOVAN-HCT) 160-12.5 mg per tablet TAKE ONE TABLET BY MOUTH ONCE DAILY 90 tablet 3    acetaZOLAMIDE (DIAMOX) 125 MG Tab Take 1 tablet (125 mg total) by mouth 3 (three) times daily. 90 tablet 0    baclofen (LIORESAL) 10 MG tablet Take 10 mg by mouth 2 (two) times daily as needed.      citalopram (CELEXA) 10 MG tablet Take 1 tablet (10 mg total)  by mouth once daily. (Patient not taking: Reported on 3/18/2024) 30 tablet 11    ketorolac 0.5% (ACULAR) 0.5 % Drop Place 1 drop into both eyes 4 (four) times daily. 5 mL 3    multivitamin with folic acid 400 mcg Tab Take 1 tablet by mouth every morning.      pantoprazole (PROTONIX) 40 MG tablet Take 1 tablet by mouth every morning.      prednisoLONE acetate (PRED FORTE) 1 % DrpS INSTILL 1 DROP IN RIGHT EYE 4 TIMES A DAY 5 mL 1    QUEtiapine (SEROQUEL) 25 MG Tab Take 1 tablet (25 mg total) by mouth once daily. 30 tablet 0    thiamine mononitrate, vit B1, (VITAMIN B-1, MONONITRATE,) 100 mg Tab Take 1 tablet by mouth every morning.       No current facility-administered medications for this visit.      PE:  BP: 124/82  Pulse: 85     Temp: 97.2 °F (36.2 °C)  Weight: 67.3 kg (148 lb 5.9 oz) Body mass index is 27.14 kg/m².    Wt Readings from Last 5 Encounters:   03/18/24 67.3 kg (148 lb 5.9 oz)   02/28/24 67.3 kg (148 lb 5.9 oz)   02/14/24 71.2 kg (156 lb 15.5 oz)   01/31/24 71.2 kg (156 lb 15.5 oz)   01/30/24 71.2 kg (156 lb 15.5 oz)     General appearance: alert and cooperative, not in acute distress  Head: normocephalic, without obvious abnormality, atraumatic  Eyes: conjunctivae/corneas clear. PERRL, EOM's intact.  Ears: clear tympanic membranes   Neck: no adenopathy, supple, symmetrical, trachea midline and thyroid not enlarged, symmetric, no tenderness/mass/nodules, no JVD  Throat: lips, mucosa, and tongue normal; teeth and gums normal; no thrush  Chest: no reproducible chest pain   Heart: regular rate and rhythm, S1, S2 normal, no murmur, click, rub or gallop  Lungs: unlabored respiration, bilateral equal air entry, normal vesicular breath sound heard, no wheezing, rhonchi   Abdomen: soft, non-tender, non-distended; bowel sounds +; no masses,  no organomegaly, no ascites   Extremities: normal, atraumatic, no cyanosis or edema noted B/L upper and lower extremities.  Skin: skin color, texture, turgor normal. No  rashes or lesions noted.  Neurologic: Patient is sleepy but awakens to conversation and will answer simple questions.  She is unaware of the month or day of week.  She is aware that it is 2024.  She is oriented to place.  She has not oriented to situation.  She is slurring her speech.  She is following commands in all 4 extremities.  Has sensation intact in all 4 extremities.        Lab:  Lab Results   Component Value Date    WBC 10.23 03/18/2024    HGB 11.2 (L) 03/18/2024    HCT 36.9 (L) 03/18/2024    MCV 70 (L) 03/18/2024     03/18/2024     03/18/2024    K 3.9 03/18/2024     03/18/2024    CO2 24 03/18/2024    BUN 26 (H) 03/18/2024     (H) 03/18/2024    CALCIUM 9.5 03/18/2024    MG 1.5 (L) 11/03/2023    PHOS 2.1 (L) 11/03/2023    AST 14 03/18/2024    ALT 8 (L) 03/18/2024    CHOL 105 (L) 03/24/2023    HDL 39 (L) 03/24/2023    LDLCALC 50.0 (L) 03/24/2023    TRIG 80 03/24/2023    TSH 0.765 11/21/2023    INR 1.2 12/21/2023       Impression:    ICD-10-CM ICD-9-CM    1. Fall at home, initial encounter  W19.XXXA E888.9 CBC W/ AUTO DIFFERENTIAL    Y92.009 E849.0 COMPREHENSIVE METABOLIC PANEL      LACTIC ACID, PLASMA      C-REACTIVE PROTEIN      CT Head Without Contrast      2. Essential hypertension  I10 401.9       3. Mild vascular dementia with psychotic disturbance  F01.A2 290.40 QUEtiapine (SEROQUEL) 25 MG Tab      4. Chronic anxiety  F41.9 300.00       5. Altered mental status, unspecified altered mental status type  R41.82 780.97 CT Head Without Contrast        1. Fall at home, initial encounter    - CBC W/ AUTO DIFFERENTIAL; Future  - COMPREHENSIVE METABOLIC PANEL; Future  - LACTIC ACID, PLASMA; Future  - C-REACTIVE PROTEIN; Future  - CT Head Without Contrast; Future  Tried lab work along with CT scan , she remained sleepy. Spoke with labrotary technician who told me that, she is dry and needs iv fluid resusciation before getting lab  Sending her to ER for stabilizaiton    2. Essential  hypertension  3. Mild vascular dementia with psychotic disturbance  - QUEtiapine (SEROQUEL) 25 MG Tab; Take 1 tablet (25 mg total) by mouth once daily.  Dispense: 30 tablet; Refill: 0    4. Chronic anxiety  5. Altered mental status, unspecified altered mental status type  - CT Head Without Contrast; Future    Future Appointments   Date Time Provider Department Center   3/20/2024 11:00 AM Shorty Burnett MD [facility] OPHTHAL [facility]   3/20/2024 12:45 PM [facility] XR2 [facility] XRAY [facility]   3/20/2024  1:20 PM Nidhi Dickey MD [facility] ORTHO [facility]   5/13/2024  1:00 PM Mercy Rojas NP [facility] NEURO  [facility]   6/10/2024 10:00 AM Radha Bernal NP [facility] UROLOGY  [facility]             Marcello Zambrano MD

## 2024-03-20 ENCOUNTER — OFFICE VISIT (OUTPATIENT)
Dept: OPHTHALMOLOGY | Facility: CLINIC | Age: 79
End: 2024-03-20
Payer: MEDICARE

## 2024-03-20 ENCOUNTER — OFFICE VISIT (OUTPATIENT)
Dept: ORTHOPEDICS | Facility: CLINIC | Age: 79
End: 2024-03-20
Payer: MEDICARE

## 2024-03-20 ENCOUNTER — HOSPITAL ENCOUNTER (OUTPATIENT)
Dept: RADIOLOGY | Facility: HOSPITAL | Age: 79
Discharge: HOME | End: 2024-03-20
Attending: STUDENT IN AN ORGANIZED HEALTH CARE EDUCATION/TRAINING PROGRAM
Payer: MEDICARE

## 2024-03-20 VITALS — WEIGHT: 148.38 LBS | BODY MASS INDEX: 27.3 KG/M2 | HEIGHT: 62 IN

## 2024-03-20 DIAGNOSIS — H40.1133 PRIMARY OPEN ANGLE GLAUCOMA (POAG) OF BOTH EYES, SEVERE STAGE: ICD-10-CM

## 2024-03-20 DIAGNOSIS — M25.531 RIGHT WRIST PAIN: ICD-10-CM

## 2024-03-20 DIAGNOSIS — S52.501D CLOSED FRACTURE OF DISTAL END OF RIGHT RADIUS WITH ROUTINE HEALING, UNSPECIFIED FRACTURE MORPHOLOGY, SUBSEQUENT ENCOUNTER: Primary | ICD-10-CM

## 2024-03-20 DIAGNOSIS — Z98.890 POST-OPERATIVE STATE: Primary | ICD-10-CM

## 2024-03-20 PROCEDURE — 99024 POSTOP FOLLOW-UP VISIT: CPT | Mod: HCNC,S$GLB,, | Performed by: OPHTHALMOLOGY

## 2024-03-20 PROCEDURE — 1159F MED LIST DOCD IN RCRD: CPT | Mod: HCNC,CPTII,S$GLB, | Performed by: OPHTHALMOLOGY

## 2024-03-20 PROCEDURE — 1160F RVW MEDS BY RX/DR IN RCRD: CPT | Mod: HCNC,CPTII,S$GLB, | Performed by: OPHTHALMOLOGY

## 2024-03-20 PROCEDURE — 73110 X-RAY EXAM OF WRIST: CPT | Mod: TC,HCNC,RT

## 2024-03-20 PROCEDURE — 1125F AMNT PAIN NOTED PAIN PRSNT: CPT | Mod: HCNC,CPTII,S$GLB, | Performed by: STUDENT IN AN ORGANIZED HEALTH CARE EDUCATION/TRAINING PROGRAM

## 2024-03-20 PROCEDURE — 99999 PR PBB SHADOW E&M-EST. PATIENT-LVL IV: CPT | Mod: PBBFAC,HCNC,, | Performed by: STUDENT IN AN ORGANIZED HEALTH CARE EDUCATION/TRAINING PROGRAM

## 2024-03-20 PROCEDURE — 99213 OFFICE O/P EST LOW 20 MIN: CPT | Mod: HCNC,S$GLB,, | Performed by: STUDENT IN AN ORGANIZED HEALTH CARE EDUCATION/TRAINING PROGRAM

## 2024-03-20 PROCEDURE — 1101F PT FALLS ASSESS-DOCD LE1/YR: CPT | Mod: HCNC,CPTII,S$GLB, | Performed by: STUDENT IN AN ORGANIZED HEALTH CARE EDUCATION/TRAINING PROGRAM

## 2024-03-20 PROCEDURE — 99999 PR PBB SHADOW E&M-EST. PATIENT-LVL III: CPT | Mod: PBBFAC,HCNC,, | Performed by: OPHTHALMOLOGY

## 2024-03-20 PROCEDURE — 3288F FALL RISK ASSESSMENT DOCD: CPT | Mod: HCNC,CPTII,S$GLB, | Performed by: STUDENT IN AN ORGANIZED HEALTH CARE EDUCATION/TRAINING PROGRAM

## 2024-03-20 PROCEDURE — 73110 X-RAY EXAM OF WRIST: CPT | Mod: 26,HCNC,RT, | Performed by: RADIOLOGY

## 2024-03-20 PROCEDURE — 1159F MED LIST DOCD IN RCRD: CPT | Mod: HCNC,CPTII,S$GLB, | Performed by: STUDENT IN AN ORGANIZED HEALTH CARE EDUCATION/TRAINING PROGRAM

## 2024-03-20 PROCEDURE — 1160F RVW MEDS BY RX/DR IN RCRD: CPT | Mod: HCNC,CPTII,S$GLB, | Performed by: STUDENT IN AN ORGANIZED HEALTH CARE EDUCATION/TRAINING PROGRAM

## 2024-03-20 NOTE — PROGRESS NOTES
Hand Surgery Clinic Follow Up Note    Chief Complaint  Chief Complaint   Patient presents with    Right Wrist - Pain       History of Present Illness  78-year-old right-hand dominant female who is retired presents for follow up. She sustained a right distal radius fracture after a fall on 01/30/2024. The fracture is being treated nonoperatively in a cast.  She was 7 weeks out from injury.  She has kept her cast clean and dry.  No numbness or tingling.  Patient had minimal pain with the cast on.  The cast was removed today for x-rays and she states that her wrist is now hurting some.  Of note, patient did sustain a recent fall and was seen in the emergency department on 03/18/2024.  Imaging was obtained of her head, neck, chest, and hip; all imaging came back normal.  Patient has an appointment to see Neurology for concern for dementia in May.    Review of Systems  Review of systems negative for chest pain, shortness of breath, fevers, chills, nausea/vomiting.    Vital Signs  There were no vitals filed for this visit.    Physical Exam  Constitutional: Appears well-developed and well-nourished. No distress.   HENT:   Head: Normocephalic.   Eyes: EOM are normal.   Pulmonary/Chest: Effort normal.   Neurological: Oriented to person, place, and time.   Psychiatric: Normal mood and affect.     Right Upper Extremity:  No abrasions, lacerations, wounds.  No swelling.  Mild tenderness over the dorsal radiocarpal articulation.  Minimal tenderness over the distal radius.  No tenderness over the distal ulna.  Patient was able to make a fist and extend all her fingers.  Active wrist flexion to 50° and extension to 30°.  Full pronation and supination.  Sensation is intact in the median, radial, and ulnar nerve distributions.  Palpable radial pulse.    Imaging  Right wrist x-rays five views were obtained today and independently reviewed by myself.  No change in alignment of patient's extra-articular distal radius fracture which  is dorsally angulated as well as slightly dorsally displaced.  There has been interval callus formation at the fracture site and early bony healing.    Assessment and Plan  78-year-old right-hand dominant female with memory issues and a recent right hip fracture in December presents for follow up.  She sustained a right distal radius fracture 7 weeks ago.  Fracture has been treated nonoperatively in a cast up to this point.  At this point, patient can transition to a wrist brace to be worn when she was in public.  Otherwise I want the brace off and I want her to work on range of motion.  I placed a referral for home health therapy to help work on wrist range of motion and strengthening as well.  She can gradually progress to weight-bearing as tolerated.  Follow up in clinic in 6 weeks for re-evaluation with repeat x-rays of the right wrist.    At least 9 minutes were spent sizing, fitting, and educating for durable medical equipment application today.  This service was performed under the direction of Nidhi Dickey MD.  CPT 62226.      Nidhi Dickey MD  Orthopaedic Hand Surgery

## 2024-03-20 NOTE — PROGRESS NOTES
HPI    1 week check (s/p clear path OD 3/4/24)    Occasional pain in her right eye (8 on the pain scale)    1. Coag severe stage   Clearpath OD 3/4/24 - opening date in/around April 1-15     XEN OD 10/13/2022  MMC OD 11/4/2022 ( 0.5ml)  2. NS OS *Will do dropless/ plan for OMNI 360  PCIOL OD 1/25/22 W/ Gonio- complex  Yag OD 5-3-22 by [facility] since Dr Pitts was out that visit         Latanoprost qhs OS (using qd OS)  Brimonidine BID OU  Dorzolamide/Timolol BID OU    Pred/Keto/Moxi QID OD     Last edited by Moinka Srinivasan MA on 3/20/2024 11:42 AM.            Assessment /Plan     For exam results, see Encounter Report.      ICD-10-CM ICD-9-CM    1. Post-operative state  Z98.890 V45.89       2. Primary open angle glaucoma (POAG) of both eyes, severe stage  H40.1133 365.11      365.73           S/p clearpath OD 3/4/24  Doing well  Encourage compliance  Opening date 4/1/24  D/c Moxi      Return to clinic 2-3 weeks       Latanoprost qhs OS  Brimonidine BID OU  Dorzolamide/Timolol BID OU    Pred/Keto QID OD

## 2024-03-21 DIAGNOSIS — M25.531 RIGHT WRIST PAIN: Primary | ICD-10-CM

## 2024-03-27 ENCOUNTER — TELEPHONE (OUTPATIENT)
Dept: OPHTHALMOLOGY | Facility: CLINIC | Age: 79
End: 2024-03-27
Payer: MEDICARE

## 2024-03-27 NOTE — TELEPHONE ENCOUNTER
----- Message from Felix Squires sent at 3/27/2024  4:48 PM CDT -----  Regarding: pt callback  Name of Who is Calling:Pt         What is the request in detail: Requesting next open appt. Please advise           Can the clinic reply by [facility]: yes         What Number to Call Back [facility]:Telephone Information:  Mobile          [phone]

## 2024-04-04 ENCOUNTER — DOCUMENT SCAN (OUTPATIENT)
Dept: HOME HEALTH SERVICES | Facility: HOSPITAL | Age: 79
End: 2024-04-04
Payer: MEDICARE

## 2024-04-15 ENCOUNTER — TELEPHONE (OUTPATIENT)
Dept: OPHTHALMOLOGY | Facility: CLINIC | Age: 79
End: 2024-04-15
Payer: MEDICARE

## 2024-04-15 DIAGNOSIS — F01.A2 MILD VASCULAR DEMENTIA WITH PSYCHOTIC DISTURBANCE: ICD-10-CM

## 2024-04-15 DIAGNOSIS — H40.1133 PRIMARY OPEN ANGLE GLAUCOMA (POAG) OF BOTH EYES, SEVERE STAGE: ICD-10-CM

## 2024-04-15 RX ORDER — QUETIAPINE FUMARATE 25 MG/1
25 TABLET, FILM COATED ORAL DAILY
Qty: 30 TABLET | Refills: 2 | Status: SHIPPED | OUTPATIENT
Start: 2024-04-15 | End: 2024-07-14

## 2024-04-15 RX ORDER — ATORVASTATIN CALCIUM 10 MG/1
10 TABLET, FILM COATED ORAL DAILY
Qty: 90 TABLET | Refills: 3 | Status: SHIPPED | OUTPATIENT
Start: 2024-04-15

## 2024-04-15 RX ORDER — PREDNISOLONE ACETATE 10 MG/ML
SUSPENSION/ DROPS OPHTHALMIC
Qty: 5 ML | Refills: 1 | Status: CANCELLED | OUTPATIENT
Start: 2024-04-15

## 2024-04-15 RX ORDER — ATORVASTATIN CALCIUM 10 MG/1
10 TABLET, FILM COATED ORAL DAILY
Qty: 90 TABLET | Refills: 3 | Status: CANCELLED | OUTPATIENT
Start: 2024-04-15

## 2024-04-15 NOTE — TELEPHONE ENCOUNTER
No care due was identified.  [facility] Embedded Care Due Messages. Reference number: [id number].   4/15/2024 11:59:59 AM CDT

## 2024-04-15 NOTE — TELEPHONE ENCOUNTER
No care due was identified.  [facility] Embedded Care Due Messages. Reference number: [id number].   4/15/2024 12:01:02 PM CDT

## 2024-04-15 NOTE — TELEPHONE ENCOUNTER
Called pt, she is having transportation issues and was unable to keep her fu w/ [facility]. She is RS to 4/26 for her appt. Per the computer. Pt asked if she is to continue drops, I said yes, to continue as [facility] told her to use them and to keep appts SRB    ----- Message from Cee Craig sent at 4/15/2024  2:34 PM CDT -----  Contact: Tea Haskins is calling in regards to getting a call back to discuss concerns.  Please call back at .[phone]      Thanks

## 2024-04-16 RX ORDER — ATORVASTATIN CALCIUM 10 MG/1
10 TABLET, FILM COATED ORAL DAILY
Qty: 90 TABLET | Refills: 3 | OUTPATIENT
Start: 2024-04-16

## 2024-04-16 RX ORDER — PREDNISOLONE ACETATE 10 MG/ML
SUSPENSION/ DROPS OPHTHALMIC
Qty: 5 ML | Refills: 1 | Status: SHIPPED | OUTPATIENT
Start: 2024-04-16 | End: 2024-06-17 | Stop reason: SDUPTHER

## 2024-04-16 NOTE — TELEPHONE ENCOUNTER
Refill Decision Note   Tea Ring  is requesting a refill authorization.    Brief Assessment and Rationale for Refill:   Quick Discontinue       Medication Therapy Plan:   E-Prescribing Status: Receipt confirmed by pharmacy (4/15/2024 12:31 PM CDT)      Comments:     Note composed:8:51 AM 04/16/2024

## 2024-04-26 ENCOUNTER — OFFICE VISIT (OUTPATIENT)
Dept: OPHTHALMOLOGY | Facility: CLINIC | Age: 79
End: 2024-04-26
Payer: MEDICARE

## 2024-04-26 DIAGNOSIS — Z98.890 POST-OPERATIVE STATE: Primary | ICD-10-CM

## 2024-04-26 DIAGNOSIS — H25.12 NUCLEAR SCLEROSIS OF LEFT EYE: ICD-10-CM

## 2024-04-26 DIAGNOSIS — H40.1133 PRIMARY OPEN ANGLE GLAUCOMA (POAG) OF BOTH EYES, SEVERE STAGE: ICD-10-CM

## 2024-04-26 PROCEDURE — 1160F RVW MEDS BY RX/DR IN RCRD: CPT | Mod: HCNC,CPTII,S$GLB, | Performed by: OPHTHALMOLOGY

## 2024-04-26 PROCEDURE — 99999 PR PBB SHADOW E&M-EST. PATIENT-LVL III: CPT | Mod: PBBFAC,HCNC,, | Performed by: OPHTHALMOLOGY

## 2024-04-26 PROCEDURE — 99024 POSTOP FOLLOW-UP VISIT: CPT | Mod: HCNC,S$GLB,, | Performed by: OPHTHALMOLOGY

## 2024-04-26 PROCEDURE — 1159F MED LIST DOCD IN RCRD: CPT | Mod: HCNC,CPTII,S$GLB, | Performed by: OPHTHALMOLOGY

## 2024-04-26 PROCEDURE — 92133 CPTRZD OPH DX IMG PST SGM ON: CPT | Mod: HCNC,S$GLB,, | Performed by: OPHTHALMOLOGY

## 2024-04-26 RX ORDER — KETOROLAC TROMETHAMINE 5 MG/ML
1 SOLUTION OPHTHALMIC 4 TIMES DAILY
Qty: 5 ML | Refills: 1 | Status: CANCELLED | OUTPATIENT
Start: 2024-04-26 | End: 2025-04-26

## 2024-05-31 ENCOUNTER — OFFICE VISIT (OUTPATIENT)
Dept: OPHTHALMOLOGY | Facility: CLINIC | Age: 79
End: 2024-05-31
Payer: MEDICARE

## 2024-05-31 DIAGNOSIS — Z98.890 POST-OPERATIVE STATE: Primary | ICD-10-CM

## 2024-05-31 DIAGNOSIS — H40.1133 PRIMARY OPEN ANGLE GLAUCOMA (POAG) OF BOTH EYES, SEVERE STAGE: ICD-10-CM

## 2024-05-31 PROCEDURE — 99999 PR PBB SHADOW E&M-EST. PATIENT-LVL III: CPT | Mod: PBBFAC,HCNC,, | Performed by: OPHTHALMOLOGY

## 2024-05-31 PROCEDURE — 1159F MED LIST DOCD IN RCRD: CPT | Mod: HCNC,CPTII,S$GLB, | Performed by: OPHTHALMOLOGY

## 2024-05-31 PROCEDURE — 99024 POSTOP FOLLOW-UP VISIT: CPT | Mod: HCNC,S$GLB,, | Performed by: OPHTHALMOLOGY

## 2024-05-31 PROCEDURE — 1160F RVW MEDS BY RX/DR IN RCRD: CPT | Mod: HCNC,CPTII,S$GLB, | Performed by: OPHTHALMOLOGY

## 2024-05-31 NOTE — PROGRESS NOTES
HPI     Post-op Evaluation            Comments: Clearpath OD 3/4/24          Comments    1. Coag severe stage   Clearpath OD 3/4/24 - opening date in/around April 1-15   XEN OD 10/13/2022  MMC OD 11/4/2022 ( 0.5ml)  2. NS OS *Will do dropless/ plan for OMNI 360  PCIOL OD 1/25/22 W/ Gonio- complex  Yag OD 5-3-22 by [facility] since Dr Pitts was out that visit         Latanoprost qhs OS  Brimonidine BID OS  Dorzolamide/Timolol BID OS  Pred QID OD             Last edited by Citlalli Jernigan, Patient Care Assistant on 5/31/2024 11:13   AM.            Assessment /Plan     For exam results, see Encounter Report.      ICD-10-CM ICD-9-CM    1. Post-operative state  Z98.890 V45.89       2. Primary open angle glaucoma (POAG) of both eyes, severe stage  H40.1133 365.11      365.73           IOP OD controlled   IOP OS remains above target  Again discussed surgical intervention  Would benefit from phaco/clearpath OS      Return to clinic for cat eval      Latanoprost qhs OS  Brimonidine BID OS  Dorzolamide/Timolol BID OS  Pred QID OD

## 2024-06-10 ENCOUNTER — OFFICE VISIT (OUTPATIENT)
Dept: UROLOGY | Facility: CLINIC | Age: 79
End: 2024-06-10
Payer: MEDICARE

## 2024-06-10 VITALS
HEART RATE: 70 BPM | BODY MASS INDEX: 26.82 KG/M2 | WEIGHT: 145.75 LBS | HEIGHT: 62 IN | SYSTOLIC BLOOD PRESSURE: 134 MMHG | DIASTOLIC BLOOD PRESSURE: 81 MMHG

## 2024-06-10 DIAGNOSIS — N39.0 FREQUENT UTI: Primary | ICD-10-CM

## 2024-06-10 PROBLEM — Z59.9 FINANCIAL DIFFICULTIES: Status: RESOLVED | Noted: 2022-09-12 | Resolved: 2024-06-10

## 2024-06-10 PROBLEM — M25.561 RIGHT KNEE PAIN: Status: RESOLVED | Noted: 2017-12-07 | Resolved: 2024-06-10

## 2024-06-10 PROCEDURE — 99214 OFFICE O/P EST MOD 30 MIN: CPT | Mod: HCNC,S$GLB,, | Performed by: NURSE PRACTITIONER

## 2024-06-10 PROCEDURE — 3075F SYST BP GE 130 - 139MM HG: CPT | Mod: HCNC,CPTII,S$GLB, | Performed by: NURSE PRACTITIONER

## 2024-06-10 PROCEDURE — 1125F AMNT PAIN NOTED PAIN PRSNT: CPT | Mod: HCNC,CPTII,S$GLB, | Performed by: NURSE PRACTITIONER

## 2024-06-10 PROCEDURE — 3079F DIAST BP 80-89 MM HG: CPT | Mod: HCNC,CPTII,S$GLB, | Performed by: NURSE PRACTITIONER

## 2024-06-10 PROCEDURE — 99999 PR PBB SHADOW E&M-EST. PATIENT-LVL IV: CPT | Mod: PBBFAC,HCNC,, | Performed by: NURSE PRACTITIONER

## 2024-06-10 PROCEDURE — 1159F MED LIST DOCD IN RCRD: CPT | Mod: HCNC,CPTII,S$GLB, | Performed by: NURSE PRACTITIONER

## 2024-06-10 RX ORDER — ESTRADIOL 0.1 MG/G
1 CREAM VAGINAL
Qty: 24 G | Refills: 3 | Status: SHIPPED | OUTPATIENT
Start: 2024-06-10 | End: 2025-06-10

## 2024-06-10 NOTE — PROGRESS NOTES
Chief Complaint:    Recurrent urinary tract infections    HPI:    Patient is a 78-year-old female that is presenting with her daughter.  Patient was prescribed prophylactic antibiotics secondary to recurrent urinary tract infections for 3 months.  States that she has been asymptomatic since our last visit.  Has implemented behavior modifications, discussed at initial visit.  Urine in clinic indicates trace leukocytes, all other parameters are negative.  PVR was 14 mL.  Patient states that she has right hip pain that radiates to her right groin to her right knee.  Is concerned that this might be related to a urological symptoms.  Reports that she broke her right hip and has had surgery.  03/07/2024  Patient is a 78-year-old female that is presenting with recurrent E coli cystitis. States that she broke her hip and is using a wheelchair to ambulate. Uses a pad or diaper when she goes out of the house. Urine in clinic indicates leukocytes, all other parameters are negative. Denies pelvic or flank pain. Denies gross hematuria. Denies history of renal stones.   Allergies:  Patient has no known allergies.    Medications:  has a current medication list which includes the following prescription(s): acetazolamide, atorvastatin, atropine 1%, baclofen, brimonidine 0.2%, citalopram, dorzolamide-timolol 2-0.5%, ketorolac 0.5%, ketorolac 0.5%, latanoprost, lotemax sm, magnesium oxide, moxifloxacin, multivitamin with folic acid, oxycodone-acetaminophen, pantoprazole, polymyxin b sulf-trimethoprim, prednisolone acetate, prednisolone acetate, quetiapine, senna-docusate 8.6-50 mg, thiamine mononitrate (vit b1), and valsartan-hydrochlorothiazide.    Review of Systems:  General: No fever, chills, fatigability, or weight loss.  Skin: No rashes, itching, or changes in color or texture of skin.  Chest: Denies WHIPPLE, cyanosis, wheezing, cough, and sputum production.  Abdomen: Appetite fine. No weight loss. Denies diarrhea, abdominal pain,  hematemesis, or blood in stool.  Musculoskeletal:   See HPI  : As above.  All other review of systems negative.    PMH:   has a past medical history of Alpha thalassemia, Asthma, Cataract, Chronic anxiety, Chronic knee pain, Chronic pain of left ankle, Clotting disorder, Cutaneous lupus erythematosus, Depression, Depression, Ex-smoker, Hypertension, and Osteopenia (1/16 reck1/18).    PSH:   has a past surgical history that includes Knee arthroscopy; Back surgery; Ankle surgery (Left); Colonoscopy (N/A, 8/2/2017); Hysterectomy; Oophorectomy; Injection of anesthetic agent around nerve (Bilateral, 5/19/2020); Injection of anesthetic agent around medial branch nerves innervating lumbar facet joint (Bilateral, 6/17/2020); Radiofrequency thermocoagulation (Right, 7/14/2020); Radiofrequency thermocoagulation (Left, 8/6/2020); and Cataract extraction (Right).    FamHx: family history includes Breast cancer in her maternal aunt; Diabetes in her father and mother.    SocHx:  reports that she quit smoking about 9 years ago. Her smoking use included cigarettes. She started smoking about 39 years ago. She has a 30 pack-year smoking history. She has never been exposed to tobacco smoke. She has never used smokeless tobacco. She reports current alcohol use of about 1.0 standard drink of alcohol per week. She reports that she does not use drugs.      Physical Exam:  General: A&Ox3, no apparent distress, no deformities  Neck: No masses, normal thyroid  Lungs: normal inspiration, no use of accessory muscles  Heart: normal pulse, no arrhythmias  Abdomen: Soft, NT, ND, no masses, no hernias, no hepatosplenomegaly  Lymphatic: Neck and groin nodes negative  Skin: The skin is warm and dry. No jaundice.  Ext: No c/c/e.  :  normal external genitalia, no cystocele or rectocele with  Valsalva maneuver.  Vaginal atrophy.    Labs/Studies:     See HPI    Impression/Plan:    Patient to remain off of prophylactic antibiotics, is doing well and  is asymptomatic with a negative urine culture in clinic.  Daughter and patient was urged to follow up with orthopedic surgeon secondary to right hip pain that radiates to her right knee.  Vaginal atrophy, Estrace cream was prescribed and sent to her pharmacy.

## 2024-06-17 DIAGNOSIS — H40.1133 PRIMARY OPEN ANGLE GLAUCOMA (POAG) OF BOTH EYES, SEVERE STAGE: ICD-10-CM

## 2024-06-18 ENCOUNTER — TELEPHONE (OUTPATIENT)
Dept: OPHTHALMOLOGY | Facility: CLINIC | Age: 79
End: 2024-06-18

## 2024-06-18 ENCOUNTER — OFFICE VISIT (OUTPATIENT)
Dept: OPHTHALMOLOGY | Facility: CLINIC | Age: 79
End: 2024-06-18
Payer: MEDICARE

## 2024-06-18 ENCOUNTER — DOCUMENTATION ONLY (OUTPATIENT)
Dept: OPHTHALMOLOGY | Facility: CLINIC | Age: 79
End: 2024-06-18

## 2024-06-18 DIAGNOSIS — Z98.41 CATARACT EXTRACTION STATUS OF EYE, RIGHT: ICD-10-CM

## 2024-06-18 DIAGNOSIS — Z91.148 NON COMPLIANCE W MEDICATION REGIMEN: ICD-10-CM

## 2024-06-18 DIAGNOSIS — H35.351 CME (CYSTOID MACULAR EDEMA), RIGHT: ICD-10-CM

## 2024-06-18 DIAGNOSIS — H25.12 NUCLEAR SCLEROSIS OF LEFT EYE: ICD-10-CM

## 2024-06-18 DIAGNOSIS — H40.1133 PRIMARY OPEN ANGLE GLAUCOMA (POAG) OF BOTH EYES, SEVERE STAGE: Primary | ICD-10-CM

## 2024-06-18 PROCEDURE — 1160F RVW MEDS BY RX/DR IN RCRD: CPT | Mod: HCNC,CPTII,S$GLB, | Performed by: OPHTHALMOLOGY

## 2024-06-18 PROCEDURE — 99999 PR PBB SHADOW E&M-EST. PATIENT-LVL III: CPT | Mod: PBBFAC,HCNC,, | Performed by: OPHTHALMOLOGY

## 2024-06-18 PROCEDURE — 1126F AMNT PAIN NOTED NONE PRSNT: CPT | Mod: HCNC,CPTII,S$GLB, | Performed by: OPHTHALMOLOGY

## 2024-06-18 PROCEDURE — 92136 OPHTHALMIC BIOMETRY: CPT | Mod: HCNC,LT,S$GLB, | Performed by: OPHTHALMOLOGY

## 2024-06-18 PROCEDURE — 1159F MED LIST DOCD IN RCRD: CPT | Mod: HCNC,CPTII,S$GLB, | Performed by: OPHTHALMOLOGY

## 2024-06-18 PROCEDURE — 99214 OFFICE O/P EST MOD 30 MIN: CPT | Mod: HCNC,S$GLB,, | Performed by: OPHTHALMOLOGY

## 2024-06-18 RX ORDER — KETOROLAC TROMETHAMINE 5 MG/ML
1 SOLUTION OPHTHALMIC 4 TIMES DAILY
Qty: 5 ML | Refills: 0 | Status: SHIPPED | OUTPATIENT
Start: 2024-06-18 | End: 2025-06-18

## 2024-06-18 RX ORDER — PREDNISOLONE ACETATE 10 MG/ML
1 SUSPENSION/ DROPS OPHTHALMIC 4 TIMES DAILY
Qty: 5 ML | Refills: 1 | Status: SHIPPED | OUTPATIENT
Start: 2024-06-18

## 2024-06-18 NOTE — PROGRESS NOTES
HPI     Blurred Vision            Comments: Pt reports for Cataract evaluation OS w/Clearpath : Pt states   vision is blurry in both eyes. States her eyes get sore to the touch   sometimes.           Comments    1. Coag severe stage  GCL 21/22 4/26/24  Clearpath OD 3/4/24 - opening date in/around April 1-15  XEN OD 10/13/2022  [facility]MC OD 11/4/2022 ( 0.5ml)  2. NS OS  PCIOL OD 1/25/22 W/ Gonio- complex  Yag OD 5-3-22 by [facility] since Dr Pitts was out that visit        Latanoprost qhs OS  Brimonidine BID OS  Dorzolamide/Timolol BID OS  Pred QID OD               Last edited by Selena Fuentes on 6/18/2024  2:32 PM.            Assessment /Plan     For exam results, see Encounter Report.      ICD-10-CM ICD-9-CM    1. Primary open angle glaucoma (POAG) of both eyes, severe stage  H40.1133 365.11 Discussed with patient treatment options for glaucoma in conjunction with cataract surgery. Patient is currently treating glaucoma with topical medications and reports significant complaints regarding the tolerability of the medications with respect to cost and irritation. Specifically, the patient complains of redness, irritation, chronic discomfort as well as a financial burden associated with the use of glaucoma medications. Discussed treatment options including ECP, filtering procedures, iStent, Canaloplasty, Goniotomy or the continued use of glaucoma medications. Patient desires the undergo PHACO w/ clearpath in conjunction with cataract surgery in order to reduce dependence of glaucoma medications.     365.73       2. Nuclear sclerosis of left eye  H25.12 366.16 Visually Significant Cataract OS  Patient reports decreased vision in the fellow eye consistent with the clinical amount of lenticular opacity, which reaches the level of visual significance and affects activities of daily living including reading and glare. Risks, benefits, and alternatives to cataract surgery were discussed and patient desired to schedule cataract surgery.  Patient was consented and the biometry and lens options were reviewed.    Discussed IOL options and refractive outcomes for this patient.    Topography Reviewed: Yes  History of Refractive Surgery: No     Phaco left eye, +20.5 [id number]  W/ clearpath  Block  Monofocal - Distance  Prescriptions sent for preoperative medications  Prednisolone Acetate- 4xs daily  and Ketorolac- 4xs daily   Anticoagulant status: None      Explained that patient may need glasses after surgery.  Discussed that vision may be limited by: Glaucoma       Referral to [facility] for Ophthalmic surgery      Schedule post op visit #2 with [facility]      3. Cataract extraction status of eye, right  Z98.41 V45.61 S/p PCIOL OD, doing well       4. CME (cystoid macular edema), right  H35.351 362.53 Resolved       5. Non compliance w medication regimen  Z91.148 V15.81

## 2024-06-18 NOTE — PROGRESS NOTES
Short Stay Record      Base Eye Exam    Visual Acuity (Snellen - Linear)     Right Left   Dist cc 20/50 20/40   Dist ph cc 20/NI 20/NI   Correction: Glasses     Tonometry (Applanation, 2:44 PM)     Right Left   Pressure 6 28     Max Pressure     Right Left   Max 48 (2/28/2024) 55 (2/28/2024)    Pupils     Dark Light Shape React APD   Right 3 2 Round 1 0   Left 3 2 Round 1 0     Visual Fields     Right Left                      Extraocular Movement     Right Left    Full Full     Neuro/Psych    Oriented x3: Yes   Mood/Affect: Normal     Dilation    Both eyes: 1% Mydriacyl, 2.5% Phenylephrine @ 2:53 PM      Edited by: Selena Fuentes; Shorty Burnett MD        Additional Tests      Glare Testing (room lights)     Medium   Right    Left 20/400   Edited by: Shorty Burnett MD        Slit Lamp and Fundus Exam      External Exam     Right Left   External Normal Normal     Slit Lamp Exam     Right Left   Lids/Lashes Normal Normal   Conjunctiva/Sclera Clearpath tube in place Nasal 2+ Lissamine green stain, trace bleb, increased lacrimal lake Nasal 2+ Lissamine green stain, increased lacrimal lake   Cornea PEEs, Decreased TBUT PEEs, Decreased TBUT   Anterior Chamber Deep and quiet Deep and quiet   Iris Round and reactive Round and reactive   Lens Posterior chamber intraocular lens, Open posterior capsule 2+ Nuclear sclerosis   Vitreous Normal Normal     Fundus Exam     Right Left   Disc rim throughout, Some temporal pallor rim throughout, some temporal pallor   C/D Ratio 0.65 0.55   Macula Retinal pigment epithelial detachment Normal   Vessels Normal Normal   Periphery Normal Normal   Edited by: Shorty Burnett MD        Refraction      Manifest Refraction     Sphere Cylinder [place] Dist [facility]   Right [place]      Left -0.25 +1.25 010 20/NI   Edited by: Shorty Burnett MD         Impression:   Visually significant cataract with reasonable expectation for visual improvement Left Eye    Primary open angle glaucoma of the  Left eye severe stage    Planned Procedure:   Topography Reviewed: Yes  History of Refractive Surgery: No      Phaco left eye, +20.5 SY60WF  W/ clearpath  Block  Monofocal - Distance  Prescriptions sent for preoperative medications  Prednisolone Acetate- 4xs daily  and Ketorolac- 4xs daily   Anticoagulant status: None          Lens Selection verified        + _________           Previous IOL OD +21.5 SN60WF    Patient cleared for ophthalmic surgery.     Discharge Summary:    Admitting Diagnosis: Nuclear Sclerosis OS       Primary open angle glaucoma severe stage OS    Discharge Diagnosis: Pseudophakia OS    Procedure:     Phacoemulsification of cataract with intraocular lens implant OS    Minimally invasive glaucoma surgery OS    Complications: None  Discharge Condition: Stable    Discharge instructions: Follow post-operative discharge instruction sheet given by provider.    Follow-up: Return to clinic next day or as scheduled.     CC: Blurry Vision     HPI:  Tea Ring is a 78 y.o. female who presents for evaluation prior to ophthalmic surgery, left eye. Patient reports of blurred vision at distance and near with and without correction. Patient reports of glare at night reducing function and safety, and complains of needed additional light to read. Patient has history of glaucoma and desires to undergo a minimally invasive glaucoma procedure in order to reduce or eliminate dependence on glaucoma medications and thereby improve glaucoma control.    Past Medical History:   Diagnosis Date    Alpha thalassemia     Asthma     resolved per patient    Cataract     Chronic anxiety     years tid xanax 1mg    Chronic knee pain     Chronic pain of left ankle     Clotting disorder     Cutaneous lupus erythematosus     dr henry derm    Depression     Depression     dr radha hughes previously    Ex-smoker     quit fall 1    Hypertension     Osteopenia 1/16 reck1/18     Past Surgical History:   Procedure Laterality Date    ANKLE  SURGERY Left     BACK SURGERY      CATARACT EXTRACTION Right     COLONOSCOPY N/A 8/2/2017    Procedure: COLONOSCOPY;  Surgeon: Virgil Oliva MD;  Location: [facility] END[facility];  Service: Endoscopy;  Laterality: N/A;    HYSTERECTOMY      INJECTION OF ANESTHETIC AGENT AROUND MEDIAL BRANCH NERVES INNERVATING LUMBAR FACET JOINT Bilateral 6/17/2020    Procedure: Bilateral L3-5 MBB;  Surgeon: Yury Moore MD;  Location: [facility];  Service: Pain Management;  Laterality: Bilateral;    INJECTION OF ANESTHETIC AGENT AROUND NERVE Bilateral 5/19/2020    Procedure: Bilateral Genicular nerve block with RN IV sedation Covid testing day of procedure PT does not drive;  Surgeon: Yury Moore MD;  Location: [facility];  Service: Pain Management;  Laterality: Bilateral;    KNEE ARTHROSCOPY      both knees twice    OOPHORECTOMY      RADIOFREQUENCY THERMOCOAGULATION Right 7/14/2020    Procedure: Right L3-5 Lumbar RFA;  Surgeon: Yury Moore MD;  Location: [facility];  Service: Pain Management;  Laterality: Right;    RADIOFREQUENCY THERMOCOAGULATION Left 8/6/2020    Procedure: Left L3-5 Lumbar RFA;  Surgeon: Yury Moore MD;  Location: [facility];  Service: Pain Management;  Laterality: Left;     Review of patient's allergies indicates:  No Known Allergies    Current Outpatient Medications:     acetaZOLAMIDE (DIAMOX) 125 MG Tab, Take 1 tablet (125 mg total) by mouth 3 (three) times daily., Disp: 90 tablet, Rfl: 0    atorvastatin (LIPITOR) 10 MG tablet, Take 1 tablet (10 mg total) by mouth once daily., Disp: 90 tablet, Rfl: 3    atropine 1% (ISOPTO ATROPINE) 1 % Drop, Place 1 drop into the right eye 2 (two) times a day., Disp: 5 mL, Rfl: 1    baclofen (LIORESAL) 10 MG tablet, Take 10 mg by mouth 2 (two) times daily as needed., Disp: , Rfl:     brimonidine 0.2% (ALPHAGAN) 0.2 % Drop, PLACE 1 DROP IN EACH EYE TWICE A DAY, Disp: 10 mL, Rfl: 6    citalopram (CELEXA) 10 MG tablet, Take 1 tablet (10 mg total) by mouth  once daily., Disp: 30 tablet, Rfl: 11    dorzolamide-timolol 2-0.5% (COSOPT) 22.3-6.8 mg/mL ophthalmic solution, PLACE 1 DROP IN EACH EYE TWICE A DAY, Disp: 10 mL, Rfl: 6    estradioL (ESTRACE) 0.01 % (0.1 mg/gram) vaginal cream, Place 1 g vaginally twice a week., Disp: 24 g, Rfl: 3    ketorolac 0.5% (ACULAR) 0.5 % Drop, Place 1 drop into both eyes 4 (four) times daily. (Patient taking differently: Place 1 drop into the right eye 4 (four) times daily.), Disp: 5 mL, Rfl: 3    ketorolac 0.5% (ACULAR) 0.5 % Drop, Place 1 drop into the left eye 4 (four) times daily., Disp: 5 mL, Rfl: 1    ketorolac 0.5% (ACULAR) 0.5 % Drop, Place 1 drop into the left eye 4 (four) times daily., Disp: 5 mL, Rfl: 0    latanoprost 0.005 % ophthalmic solution, Place 1 drop into both eyes once daily. (Patient taking differently: Place 1 drop into the left eye once daily.), Disp: 2.5 mL, Rfl: 6    loteprednol etabonate (LOTEMAX SM) 0.38 % DrpG, Place 1 drop into the right eye 4 (four) times daily., Disp: 5 g, Rfl: 1    magnesium oxide (MAG-OX) 400 mg (241.3 mg magnesium) tablet, Take 400 mg by mouth., Disp: , Rfl:     moxifloxacin (VIGAMOX) 0.5 % ophthalmic solution, Place 1 drop into the left eye 4 (four) times daily. (Patient taking differently: Place 1 drop into the right eye 4 (four) times daily.), Disp: 3 mL, Rfl: 1    multivitamin with folic acid 400 mcg Tab, Take 1 tablet by mouth every morning., Disp: , Rfl:     oxyCODONE-acetaminophen (PERCOCET)  mg per tablet, Take 1 tablet by mouth 3 (three) times daily as needed., Disp: , Rfl:     pantoprazole (PROTONIX) 40 MG tablet, Take 1 tablet by mouth every morning., Disp: , Rfl:     polymyxin B sulf-trimethoprim (POLYTRIM) 10,000 unit- 1 mg/mL Drop, PLACE ONE DROP INTO THE RIGHT EYE FOUR TIMES DAILY, Disp: 10 mL, Rfl: 2    prednisoLONE acetate (PRED FORTE) 1 % DrpS, INSTILL 1 DROP IN RIGHT EYE 4 TIMES A DAY, Disp: 5 mL, Rfl: 1    prednisoLONE acetate (PRED FORTE) 1 % DrpS, Place 1 drop  into the right eye 4 (four) times daily., Disp: 5 mL, Rfl: 1    prednisoLONE acetate (PRED FORTE) 1 % DrpS, Place 1 drop into the left eye 4 (four) times daily., Disp: 5 mL, Rfl: 1    QUEtiapine (SEROQUEL) 25 MG Tab, Take 1 tablet (25 mg total) by mouth once daily., Disp: 30 tablet, Rfl: 2    senna-docusate 8.6-50 mg (PERICOLACE) 8.6-50 mg per tablet, Take 2 tablets by mouth., Disp: , Rfl:     thiamine mononitrate, vit B1, (VITAMIN B-1, MONONITRATE,) 100 mg Tab, Take 1 tablet by mouth every morning., Disp: , Rfl:     valsartan-hydrochlorothiazide (DIOVAN-HCT) 160-12.5 mg per tablet, TAKE ONE TABLET BY MOUTH ONCE DAILY, Disp: 90 tablet, Rfl: 3    Review of Systems:  A comprehensive review of systems was negative.    Physical Exam:  General Appearance:    A&Ox3, no distress, appears stated age   Head:    Normocephalic, without obvious abnormality, atraumatic   Eyes:    PERRL, EOM's intact   Back:     Symmetric, no curvature   Lungs:     Respirations unlabored   Chest Wall:    No tenderness or deformity    Heart:  Abdomen:  Extremities:  Skin:    S1 and S2 present    Soft, non-tender    Extremities normal, atraumatic    Skin color, texture, turgor normal

## 2024-07-24 ENCOUNTER — OFFICE VISIT (OUTPATIENT)
Dept: OPHTHALMOLOGY | Facility: CLINIC | Age: 79
End: 2024-07-24
Payer: MEDICARE

## 2024-07-24 DIAGNOSIS — H40.1133 PRIMARY OPEN ANGLE GLAUCOMA (POAG) OF BOTH EYES, SEVERE STAGE: ICD-10-CM

## 2024-07-24 DIAGNOSIS — Z98.890 POST-OPERATIVE STATE: Primary | ICD-10-CM

## 2024-07-24 DIAGNOSIS — Z98.42 CATARACT EXTRACTION STATUS, LEFT: ICD-10-CM

## 2024-07-24 PROCEDURE — 99024 POSTOP FOLLOW-UP VISIT: CPT | Mod: HCNC,S$GLB,, | Performed by: OPHTHALMOLOGY

## 2024-07-24 PROCEDURE — 1159F MED LIST DOCD IN RCRD: CPT | Mod: HCNC,CPTII,S$GLB, | Performed by: OPHTHALMOLOGY

## 2024-07-24 PROCEDURE — 99999 PR PBB SHADOW E&M-EST. PATIENT-LVL III: CPT | Mod: PBBFAC,HCNC,, | Performed by: OPHTHALMOLOGY

## 2024-07-24 RX ORDER — MOXIFLOXACIN 5 MG/ML
1 SOLUTION/ DROPS OPHTHALMIC 4 TIMES DAILY
Qty: 3 ML | Refills: 1 | Status: SHIPPED | OUTPATIENT
Start: 2024-07-24

## 2024-07-24 NOTE — PROGRESS NOTES
HPI     Pre-op Exam            Comments: PCIOL OS w/ clear path 7/23/24 +20.5 SY60WF/CDE:9.35    Patient states some pain last night that has worsened today, will start   drops now that patch was removed in the exam room.          Comments    1. Coag severe stage   GCL 21/22 4/26/24  Clearpath OD 3/4/24 - opening date in/around April 1-15   XEN OD 10/13/2022  [facility] OD 11/4/2022 ( 0.5ml)  2. PCIOL OS w/ clear path 7/23/24 +20.5 SY60WF/CDE:9.35  PCIOL OD 1/25/22 W/ Gonio- complex  Yag OD 5-3-22 by [facility] since Dr Pitts was out that visit         Latanoprost qhs OS  Brimonidine BID OS  Dorzolamide/Timolol BID OS  Pred QID OD  OS: Pred & Keto QID             Last edited by Citlalli Jernigan, Patient Care Assistant on 7/24/2024 10:53   AM.            Assessment /Plan     For exam results, see Encounter Report.      ICD-10-CM ICD-9-CM    1. Post-operative state  Z98.890 V45.89 PO Day 1 S/P Phaco/IOL left eye WITH CLEAR PATH     OPENING DATE AROUND 9/3/2024   Doing well.    Use Prednisolone Acetate- 4xs daily  and Ketorolac- 4xs daily  , add Moxi QID OS     No coag meds to the left eye at this time     Reinstructed in importance of absolute compliance with Post-OP instructions including medications, shield at bedtime, and limitation of activities. Follow up appointments in approximately one and six weeks or call immediately for increased pain, redness or vision loss.             2. Cataract extraction status, left  Z98.42 V45.61       3. Primary open angle glaucoma (POAG) of both eyes, severe stage  H40.1133 365.11 moxifloxacin (VIGAMOX) 0.5 % ophthalmic solution     365.73               Prednisolone Acetate- 4xs daily os   Moxi 4 ties daily and Ketorolac- 4xs daily os   No coag meds to the left eye at this time   Pred QID OD   Return to clinic 1 week

## 2024-07-29 DIAGNOSIS — H40.1133 PRIMARY OPEN ANGLE GLAUCOMA (POAG) OF BOTH EYES, SEVERE STAGE: ICD-10-CM

## 2024-07-29 DIAGNOSIS — F01.A2 MILD VASCULAR DEMENTIA WITH PSYCHOTIC DISTURBANCE: ICD-10-CM

## 2024-07-29 DIAGNOSIS — H25.12 NUCLEAR SCLEROSIS OF LEFT EYE: ICD-10-CM

## 2024-07-29 RX ORDER — QUETIAPINE FUMARATE 25 MG/1
25 TABLET, FILM COATED ORAL
Qty: 30 TABLET | Refills: 2 | Status: SHIPPED | OUTPATIENT
Start: 2024-07-29

## 2024-07-29 RX ORDER — PREDNISOLONE ACETATE 10 MG/ML
SUSPENSION/ DROPS OPHTHALMIC
Qty: 5 ML | Refills: 1 | Status: SHIPPED | OUTPATIENT
Start: 2024-07-29

## 2024-07-29 NOTE — TELEPHONE ENCOUNTER
Care Due:                  Date            Visit Type   Department     Provider  --------------------------------------------------------------------------------                                MYCHART                              FOLLOWUP/OF  [facility] INTER[facility]  Last Visit: 03-      [facility] VISIT   MEDICINE       Marcello Zambrano  Next Visit: None Scheduled  None         None Found                                                            Last  Test          Frequency    Reason                     Performed    Due Date  --------------------------------------------------------------------------------    Lipid Panel.  12 months..  atorvastatin.............  03- 03-    [facility] Embedded Care Due Messages. Reference number: [id number].   7/29/2024 10:17:26 AM CDT

## 2024-07-31 ENCOUNTER — OFFICE VISIT (OUTPATIENT)
Dept: OPHTHALMOLOGY | Facility: CLINIC | Age: 79
End: 2024-07-31
Payer: MEDICARE

## 2024-07-31 DIAGNOSIS — Z98.890 POST-OPERATIVE STATE: Primary | ICD-10-CM

## 2024-07-31 DIAGNOSIS — Z98.42 CATARACT EXTRACTION STATUS, LEFT: ICD-10-CM

## 2024-07-31 DIAGNOSIS — H40.1133 PRIMARY OPEN ANGLE GLAUCOMA (POAG) OF BOTH EYES, SEVERE STAGE: ICD-10-CM

## 2024-07-31 PROCEDURE — 1125F AMNT PAIN NOTED PAIN PRSNT: CPT | Mod: HCNC,CPTII,S$GLB, | Performed by: OPHTHALMOLOGY

## 2024-07-31 PROCEDURE — 99999 PR PBB SHADOW E&M-EST. PATIENT-LVL III: CPT | Mod: PBBFAC,HCNC,, | Performed by: OPHTHALMOLOGY

## 2024-07-31 PROCEDURE — 1159F MED LIST DOCD IN RCRD: CPT | Mod: HCNC,CPTII,S$GLB, | Performed by: OPHTHALMOLOGY

## 2024-07-31 PROCEDURE — 99024 POSTOP FOLLOW-UP VISIT: CPT | Mod: HCNC,S$GLB,, | Performed by: OPHTHALMOLOGY

## 2024-07-31 NOTE — PROGRESS NOTES
HPI     Post-op Evaluation            Comments: 1 Week S/P PC IOL OS w/ Clearpath OS 7/23/24: Pt states she is   still having pain on top of the left eyeball. States the only thing that   helps is a warm compress. States she is only using the postoperative   eyedrops. Her daughter, Zoie, helps her with eyedrops.          Comments    1. Coag severe stage   GCL 21/22 4/26/24  Clearpath OD 3/4/24 - opening date in/around April 1-15   XEN OD 10/13/2022  [facility] OD 11/4/2022 ( 0.5ml)  2. PCIOL OS w/ clear path 7/23/24 +20.5 SY60WF/CDE:9.35  OPENING DATE AROUND 9/3/2024   PCIOL OD 1/25/22 W/ Gonio- complex  Yag OD 5-3-22 by [facility] since Dr Pitts was out that visit       Moxi QID OS   Pred & Keto QID OS             Last edited by Selena Fuentes on 7/31/2024  8:21 AM.            Assessment /Plan     For exam results, see Encounter Report.      ICD-10-CM ICD-9-CM    1. Post-operative state  Z98.890 V45.89  PCIOL OS w/ clear path 7/23/24 +20.5 SY60WF/CDE:9.35  OPENING DATE AROUND 9/3/2024     Doing well     Stop moxi       2. Cataract extraction status, left  Z98.42 V45.61       3. Primary open angle glaucoma (POAG) of both eyes, severe stage  H40.1133 365.11      365.73         Return to clinic 2 weeks   Pred and Keto QID OS

## 2024-08-14 ENCOUNTER — OFFICE VISIT (OUTPATIENT)
Dept: OPHTHALMOLOGY | Facility: CLINIC | Age: 79
End: 2024-08-14
Payer: MEDICARE

## 2024-08-14 DIAGNOSIS — H40.1133 PRIMARY OPEN ANGLE GLAUCOMA (POAG) OF BOTH EYES, SEVERE STAGE: ICD-10-CM

## 2024-08-14 DIAGNOSIS — Z98.890 POST-OPERATIVE STATE: Primary | ICD-10-CM

## 2024-08-14 DIAGNOSIS — Z98.42 CATARACT EXTRACTION STATUS, LEFT: ICD-10-CM

## 2024-08-14 PROCEDURE — 99024 POSTOP FOLLOW-UP VISIT: CPT | Mod: S$GLB,,, | Performed by: OPHTHALMOLOGY

## 2024-08-14 PROCEDURE — 1160F RVW MEDS BY RX/DR IN RCRD: CPT | Mod: CPTII,S$GLB,, | Performed by: OPHTHALMOLOGY

## 2024-08-14 PROCEDURE — 99999 PR PBB SHADOW E&M-EST. PATIENT-LVL III: CPT | Mod: PBBFAC,,, | Performed by: OPHTHALMOLOGY

## 2024-08-14 PROCEDURE — 1159F MED LIST DOCD IN RCRD: CPT | Mod: CPTII,S$GLB,, | Performed by: OPHTHALMOLOGY

## 2024-08-14 RX ORDER — LOTEPREDNOL ETABONATE 5 MG/ML
1 SUSPENSION/ DROPS OPHTHALMIC 4 TIMES DAILY
Qty: 5 ML | Refills: 1 | Status: SHIPPED | OUTPATIENT
Start: 2024-08-14 | End: 2025-08-14

## 2024-08-14 NOTE — PROGRESS NOTES
HPI     Glaucoma            Comments: Patient reports for 3 week IOP check, s/p PCIOL OS w/ clear   path. Using gtts as advised. Patient reports of irritation OS. Patient   reports of visual stability since previous visit.             Comments    1. Coag severe stage   GCL 21/22 4/26/24  Clearpath OD 3/4/24 -   [facility] OD 11/4/2022 ( 0.5ml)  2. PCIOL OS w/ clear path 7/23/24 +20.5 SY60WF/CDE:9.35  OPENING DATE AROUND 9/3/2024   PCIOL OD 1/25/22 W/ Gonio- complex  Yag OD 5-3-22 by [facility] since Dr Pitts was out that visit       Pred & Keto QID OS - pt using both drops to OU             Last edited by Panchito Gold on 8/14/2024  9:38 AM.            Assessment /Plan     For exam results, see Encounter Report.      ICD-10-CM ICD-9-CM    1. Post-operative state  Z98.890 V45.89  PCIOL OS w/ clear path 7/23/24 +20.5 SY60WF/CDE:9.35  OPENING DATE AROUND 9/3/2024     Sutures removed today OS       2. Cataract extraction status, left  Z98.42 V45.61       3. Primary open angle glaucoma (POAG) of both eyes, severe stage  H40.1133 365.11      365.73           Pred OD QD , stop Keto OD   Resume cosopt BID OU   Stop Pred OS and change to lotemax BID OS   Keto QID OS   Written drop instructions given to pt     RETURN TO CLINIC 7-10 days

## 2024-08-19 ENCOUNTER — TELEPHONE (OUTPATIENT)
Dept: OPHTHALMOLOGY | Facility: CLINIC | Age: 79
End: 2024-08-19
Payer: MEDICARE

## 2024-08-19 NOTE — TELEPHONE ENCOUNTER
Returned patients call, R/S her appt to 09/03/24 which is a Tuesday and told her I would consult with [facility] in the morning then let her know what he said about the drop change.        ----- Message from Lucho Christensen sent at 8/19/2024  2:43 PM CDT -----  Contact: Mercy/daughter  Mercy Is requesting a call back regarding getting an alternative sent in for the loteprednol (LOTEMAX) 0.5 % ophthalmic suspension because the copay was $100. She  also would like to reschedule the appointment on 8/23 to  a Tuesday or Wednesday. Please give her a call at [phone]

## 2024-08-28 ENCOUNTER — TELEPHONE (OUTPATIENT)
Dept: OPHTHALMOLOGY | Facility: CLINIC | Age: 79
End: 2024-08-28
Payer: MEDICARE

## 2024-08-28 NOTE — TELEPHONE ENCOUNTER
Called patient and spoke to daughter. Advised her to stop Dorz/Juan OS on 8/31 since the opening date of the clearpath is 9/3.  Continue:   Cosopt bid OU until 8/31 - then go to OD only  Keto qid OS  Latanoprost qd OD  Lotemax bid OS

## 2024-09-03 ENCOUNTER — OFFICE VISIT (OUTPATIENT)
Dept: OPHTHALMOLOGY | Facility: CLINIC | Age: 79
End: 2024-09-03
Payer: MEDICARE

## 2024-09-03 DIAGNOSIS — H40.1133 PRIMARY OPEN ANGLE GLAUCOMA (POAG) OF BOTH EYES, SEVERE STAGE: ICD-10-CM

## 2024-09-03 DIAGNOSIS — H25.12 NUCLEAR SCLEROSIS OF LEFT EYE: ICD-10-CM

## 2024-09-03 DIAGNOSIS — Z98.42 CATARACT EXTRACTION STATUS, LEFT: ICD-10-CM

## 2024-09-03 DIAGNOSIS — Z98.890 POST-OPERATIVE STATE: Primary | ICD-10-CM

## 2024-09-03 PROCEDURE — 1160F RVW MEDS BY RX/DR IN RCRD: CPT | Mod: CPTII,S$GLB,, | Performed by: OPHTHALMOLOGY

## 2024-09-03 PROCEDURE — 1159F MED LIST DOCD IN RCRD: CPT | Mod: CPTII,S$GLB,, | Performed by: OPHTHALMOLOGY

## 2024-09-03 PROCEDURE — 99024 POSTOP FOLLOW-UP VISIT: CPT | Mod: S$GLB,,, | Performed by: OPHTHALMOLOGY

## 2024-09-03 PROCEDURE — 99999 PR PBB SHADOW E&M-EST. PATIENT-LVL IV: CPT | Mod: PBBFAC,,, | Performed by: OPHTHALMOLOGY

## 2024-09-03 RX ORDER — KETOROLAC TROMETHAMINE 5 MG/ML
SOLUTION OPHTHALMIC
Qty: 10 ML | Refills: 5 | Status: SHIPPED | OUTPATIENT
Start: 2024-09-03

## 2024-09-03 NOTE — PATIENT INSTRUCTIONS
Dorz/Juan 2 times a day right eye  Ketorolac four times a day left eye  Latanoprost once a day right eye  Prednisolone 4 times a day left eye and once a day in the right eye  stop lotemax

## 2024-09-03 NOTE — PROGRESS NOTES
HPI     Glaucoma            Comments: S/p clearpath OD 3/24/24. Denies pain or irritation. Va stable.   Using gtts as recommended.           Comments    1. Coag severe stage   GCL 21/22 4/26/24  Clearpath OD 3/4/24 -   [facility] OD 11/4/2022 ( 0.5ml)  2. PCIOL OS w/ clear path 7/23/24 +20.5 SY60WF/CDE:9.35  OPENING DATE AROUND 9/3/2024   PCIOL OD 1/25/22 W/ Gonio- complex  Yag OD 5-3-22 by [facility] since Dr Pitts was out that visit       Cosopt bid OU until 8/31 - then go to OD only  Keto qid OS  Latanoprost qd OD  Lotemax bid OS            Last edited by Panchito Gold on 9/3/2024  8:35 AM.            Assessment /Plan     For exam results, see Encounter Report.      ICD-10-CM ICD-9-CM    1. Post-operative state  Z98.890 V45.89       2. Cataract extraction status, left  Z98.42 V45.61 Doing well post Phaco/Clear path  Opened since last visit      3. Primary open angle glaucoma (POAG) of both eyes, severe stage  H40.1133 365.11      365.73           Cosopt bid OD only  Keto qid OS  Latanoprost qd OD  Pred qid OS and once a day OD  D/c lotemax    Return to clinic 2 weeks

## 2024-09-18 ENCOUNTER — OFFICE VISIT (OUTPATIENT)
Dept: OPHTHALMOLOGY | Facility: CLINIC | Age: 79
End: 2024-09-18
Payer: MEDICARE

## 2024-09-18 DIAGNOSIS — Z96.1 PSEUDOPHAKIA OF BOTH EYES: ICD-10-CM

## 2024-09-18 DIAGNOSIS — H40.1133 PRIMARY OPEN ANGLE GLAUCOMA (POAG) OF BOTH EYES, SEVERE STAGE: Primary | ICD-10-CM

## 2024-09-18 PROCEDURE — 1159F MED LIST DOCD IN RCRD: CPT | Mod: CPTII,S$GLB,, | Performed by: OPHTHALMOLOGY

## 2024-09-18 PROCEDURE — 99999 PR PBB SHADOW E&M-EST. PATIENT-LVL III: CPT | Mod: PBBFAC,,, | Performed by: OPHTHALMOLOGY

## 2024-09-18 PROCEDURE — 1160F RVW MEDS BY RX/DR IN RCRD: CPT | Mod: CPTII,S$GLB,, | Performed by: OPHTHALMOLOGY

## 2024-09-18 PROCEDURE — 99024 POSTOP FOLLOW-UP VISIT: CPT | Mod: S$GLB,,, | Performed by: OPHTHALMOLOGY

## 2024-09-18 NOTE — PROGRESS NOTES
HPI     Post-op Evaluation            Comments: Pt co pains OU bc her drops had not been filled for a week but   has subsided since receiving refills   Pt co not seeing very well at a distance   Pt using Cosopt BID OD   Keto QID OS   Latanoprost QD OD   Lotemax BID OS   Pred  QID OS and QD OD           Comments    1. Coag severe stage   GCL 21/22 4/26/24  Clearpath OD 3/4/24 -   [facility] OD 11/4/2022 ( 0.5ml)  2. PCIOL OS w/ clear path 7/23/24 +20.5 SY60WF/CDE:9.35  OPENING DATE AROUND 9/3/2024   PCIOL OD 1/25/22 W/ Gonio- complex  Yag OD 5-3-22 by [facility] since Dr Pitts was out that visit       Cosopt BID OD ONLY  Keto QID OS  Latanoprost qd OD  Pred QID OS   Pred qd OD             Last edited by Shorty Burnett MD on 9/18/2024  9:13 AM.            Assessment /Plan     For exam results, see Encounter Report.      ICD-10-CM ICD-9-CM    1. Primary open angle glaucoma (POAG) of both eyes, severe stage  H40.1133 365.11 Doing well post Clear path OU  vision limited by glaucoma     365.73       2. Pseudophakia of both eyes  Z96.1 V43.1           Cosopt BID OD ONLY  Keto QID OS  Latanoprost qd OD  Pred QID OS   Pred qd OD     Return to clinic 3 weeks

## 2024-10-16 ENCOUNTER — OFFICE VISIT (OUTPATIENT)
Dept: OPHTHALMOLOGY | Facility: CLINIC | Age: 79
End: 2024-10-16
Payer: MEDICARE

## 2024-10-16 DIAGNOSIS — Z96.1 PSEUDOPHAKIA OF BOTH EYES: ICD-10-CM

## 2024-10-16 DIAGNOSIS — H40.1133 PRIMARY OPEN ANGLE GLAUCOMA (POAG) OF BOTH EYES, SEVERE STAGE: Primary | ICD-10-CM

## 2024-10-16 PROCEDURE — 99999 PR PBB SHADOW E&M-EST. PATIENT-LVL III: CPT | Mod: PBBFAC,,, | Performed by: OPHTHALMOLOGY

## 2024-10-16 PROCEDURE — 1159F MED LIST DOCD IN RCRD: CPT | Mod: CPTII,S$GLB,, | Performed by: OPHTHALMOLOGY

## 2024-10-16 PROCEDURE — 99024 POSTOP FOLLOW-UP VISIT: CPT | Mod: S$GLB,,, | Performed by: OPHTHALMOLOGY

## 2024-10-16 NOTE — PROGRESS NOTES
HPI     Glaucoma            Comments: Pt here for 3 wk IOP check   Pt co being too sensitive to the sun and never being able to see as she   would like OU   Pt using Cosopt BID OD ONLY   Keto QID OS   Latanoprost QHS OD   Pred QID OS   Pred QD OD          Comments    1. Coag severe stage   GCL 21/22 4/26/24  Clearpath OD 3/4/24 -   [facility] OD 11/4/2022 ( 0.5ml)  2. PCIOL OS w/ clear path 7/23/24 +20.5 SY60WF/CDE:9.35  OPENING DATE AROUND 9/3/2024   PCIOL OD 1/25/22 W/ Gonio- complex  Yag OD 5-3-22 by [facility] since Dr Pitts was out that visit       Drop list:  Cosopt 2xs daily -- right eye  Ketorolac 4xs daily -- left eye   Latanoprost 1x daily -- right eye  Prednisolone 4xs daily -- left eye   Prednisolone 1x daily -- right eye             Last edited by Shorty Burnett MD on 10/16/2024  9:35 AM.            Assessment /Plan     For exam results, see Encounter Report.      ICD-10-CM ICD-9-CM    1. Primary open angle glaucoma (POAG) of both eyes, severe stage  H40.1133 365.11 Stop latanoprost today OD     Follow IOP      365.73       2. Pseudophakia of both eyes  Z96.1 V43.1               Drop list:  Cosopt 2xs daily -- right eye  Ketorolac 4xs daily -- left eye   Stop Latanoprost   Prednisolone 4xs daily -- left eye   Prednisolone 1x daily -- right eye     RETURN TO CLINIC 4 weeks IOP and MOCT/GOCT

## 2024-10-24 ENCOUNTER — OFFICE VISIT (OUTPATIENT)
Dept: INTERNAL MEDICINE | Facility: CLINIC | Age: 79
End: 2024-10-24
Payer: MEDICARE

## 2024-10-24 ENCOUNTER — HOSPITAL ENCOUNTER (OUTPATIENT)
Dept: RADIOLOGY | Facility: HOSPITAL | Age: 79
Discharge: HOME | End: 2024-10-24
Attending: STUDENT IN AN ORGANIZED HEALTH CARE EDUCATION/TRAINING PROGRAM
Payer: MEDICARE

## 2024-10-24 VITALS
HEART RATE: 77 BPM | TEMPERATURE: 97 F | OXYGEN SATURATION: 96 % | DIASTOLIC BLOOD PRESSURE: 64 MMHG | HEIGHT: 62 IN | WEIGHT: 149.69 LBS | BODY MASS INDEX: 27.55 KG/M2 | SYSTOLIC BLOOD PRESSURE: 110 MMHG

## 2024-10-24 DIAGNOSIS — J30.2 SEASONAL ALLERGIC RHINITIS, UNSPECIFIED TRIGGER: ICD-10-CM

## 2024-10-24 DIAGNOSIS — F01.A2 MILD VASCULAR DEMENTIA WITH PSYCHOTIC DISTURBANCE: ICD-10-CM

## 2024-10-24 DIAGNOSIS — F32.1 MODERATE MAJOR DEPRESSION: ICD-10-CM

## 2024-10-24 DIAGNOSIS — M25.511 CHRONIC RIGHT SHOULDER PAIN: ICD-10-CM

## 2024-10-24 DIAGNOSIS — Z12.31 BREAST CANCER SCREENING BY MAMMOGRAM: ICD-10-CM

## 2024-10-24 DIAGNOSIS — G89.29 CHRONIC RIGHT SHOULDER PAIN: ICD-10-CM

## 2024-10-24 DIAGNOSIS — F11.20 UNCOMPLICATED OPIOID DEPENDENCE: ICD-10-CM

## 2024-10-24 DIAGNOSIS — Z78.0 ASYMPTOMATIC POSTMENOPAUSAL STATE: ICD-10-CM

## 2024-10-24 DIAGNOSIS — Z87.891 EX-SMOKER: ICD-10-CM

## 2024-10-24 DIAGNOSIS — M81.0 OSTEOPOROSIS WITHOUT CURRENT PATHOLOGICAL FRACTURE, UNSPECIFIED OSTEOPOROSIS TYPE: ICD-10-CM

## 2024-10-24 DIAGNOSIS — I10 ESSENTIAL HYPERTENSION: Primary | ICD-10-CM

## 2024-10-24 DIAGNOSIS — N18.31 CHRONIC KIDNEY DISEASE, STAGE 3A: ICD-10-CM

## 2024-10-24 DIAGNOSIS — R79.9 ABNORMAL FINDING OF BLOOD CHEMISTRY, UNSPECIFIED: ICD-10-CM

## 2024-10-24 PROCEDURE — 99214 OFFICE O/P EST MOD 30 MIN: CPT | Mod: S$GLB,,, | Performed by: STUDENT IN AN ORGANIZED HEALTH CARE EDUCATION/TRAINING PROGRAM

## 2024-10-24 PROCEDURE — 3078F DIAST BP <80 MM HG: CPT | Mod: CPTII,S$GLB,, | Performed by: STUDENT IN AN ORGANIZED HEALTH CARE EDUCATION/TRAINING PROGRAM

## 2024-10-24 PROCEDURE — G2211 COMPLEX E/M VISIT ADD ON: HCPCS | Mod: S$GLB,,, | Performed by: STUDENT IN AN ORGANIZED HEALTH CARE EDUCATION/TRAINING PROGRAM

## 2024-10-24 PROCEDURE — 3288F FALL RISK ASSESSMENT DOCD: CPT | Mod: CPTII,S$GLB,, | Performed by: STUDENT IN AN ORGANIZED HEALTH CARE EDUCATION/TRAINING PROGRAM

## 2024-10-24 PROCEDURE — 1101F PT FALLS ASSESS-DOCD LE1/YR: CPT | Mod: CPTII,S$GLB,, | Performed by: STUDENT IN AN ORGANIZED HEALTH CARE EDUCATION/TRAINING PROGRAM

## 2024-10-24 PROCEDURE — 99999 PR PBB SHADOW E&M-EST. PATIENT-LVL V: CPT | Mod: PBBFAC,,, | Performed by: STUDENT IN AN ORGANIZED HEALTH CARE EDUCATION/TRAINING PROGRAM

## 2024-10-24 PROCEDURE — 1159F MED LIST DOCD IN RCRD: CPT | Mod: CPTII,S$GLB,, | Performed by: STUDENT IN AN ORGANIZED HEALTH CARE EDUCATION/TRAINING PROGRAM

## 2024-10-24 PROCEDURE — 1125F AMNT PAIN NOTED PAIN PRSNT: CPT | Mod: CPTII,S$GLB,, | Performed by: STUDENT IN AN ORGANIZED HEALTH CARE EDUCATION/TRAINING PROGRAM

## 2024-10-24 PROCEDURE — 3074F SYST BP LT 130 MM HG: CPT | Mod: CPTII,S$GLB,, | Performed by: STUDENT IN AN ORGANIZED HEALTH CARE EDUCATION/TRAINING PROGRAM

## 2024-10-24 RX ORDER — MINERAL OIL
180 ENEMA (ML) RECTAL DAILY
Qty: 30 TABLET | Refills: 0 | Status: SHIPPED | OUTPATIENT
Start: 2024-10-24 | End: 2025-10-24

## 2024-10-24 RX ORDER — FLUTICASONE PROPIONATE 50 MCG
1 SPRAY, SUSPENSION (ML) NASAL DAILY
Qty: 11.1 ML | Refills: 0 | Status: SHIPPED | OUTPATIENT
Start: 2024-10-24

## 2024-10-24 RX ORDER — QUETIAPINE FUMARATE 25 MG/1
50 TABLET, FILM COATED ORAL DAILY
Qty: 60 TABLET | Refills: 2 | Status: SHIPPED | OUTPATIENT
Start: 2024-10-24 | End: 2025-01-22

## 2024-10-24 NOTE — PROGRESS NOTES
Chief Complaint   Patient presents with    Health Maintenance     HPI: Tea Ring is a 79 y.o. female  with Pmhx listed below who presents to clinic for    History of Present Illness    CHIEF COMPLAINT:  - Ms. Ring presents for an annual wellness visit and to discuss various health concerns, including nasal congestion, difficulty walking, and shoulder pain.    HPI:  Ms. Ring reports nasal congestion with rhinorrhea and pharyngeal discomfort. She describes persistent nasal discharge and a sensation of pharyngeal obstruction, causing throat clearing and pruritus, which worsen at night. These symptoms occasionally induce a predominantly non-productive cough. Ms. Ring had intermittent low-grade fever, with temperatures around 99.2°F, occurring over the past week but not currently. She also reports ocular pruritus and lacrimation.    Ms. Ring has difficulty ambulating, requiring a walker for extended distances but can walk short distances unassisted. This mobility impairment appears related to a previous stroke, which the patient was unaware of until informed by another physician.    Ms. Ring has right-sided shoulder pain, attributed to a fall on cement while cycling. She struggles with tasks requiring  strength, such as opening containers.            Problem List:  Patient Active Problem List   Diagnosis    Essential hypertension    Chronic anxiety    Cutaneous lupus erythematosus    Alpha thalassemia    Osteopenia    Ex-smoker    Chronic asthma without complication    Lumbar spondylosis    Atherosclerosis of aorta    History of total right knee replacement    Gait instability    Medication noncompliance due to cognitive impairment    Moderate major depression    Primary open angle glaucoma (POAG) of both eyes, severe stage    Arthritis of multiple sites    Adhesive capsulitis of right shoulder    Personal history of nicotine dependence     Non-traumatic rhabdomyolysis    Leukocytosis    Complete tear of right  rotator cuff    Chronic right shoulder pain    Frequent falls    Polypharmacy    Mild vascular dementia with psychotic disturbance    Uncomplicated opioid dependence       ROS: Negative except as noted above.       Current Meds:  Current Outpatient Medications   Medication Sig Dispense Refill    atorvastatin (LIPITOR) 10 MG tablet Take 1 tablet (10 mg total) by mouth once daily. 90 tablet 3    atropine 1% (ISOPTO ATROPINE) 1 % Drop Place 1 drop into the right eye 2 (two) times a day. 5 mL 1    baclofen (LIORESAL) 10 MG tablet Take 10 mg by mouth 2 (two) times daily as needed.      brimonidine 0.2% (ALPHAGAN) 0.2 % Drop PLACE 1 DROP IN EACH EYE TWICE A DAY 10 mL 6    citalopram (CELEXA) 10 MG tablet Take 1 tablet (10 mg total) by mouth once daily. 30 tablet 11    dorzolamide-timolol 2-0.5% (COSOPT) 22.3-6.8 mg/mL ophthalmic solution PLACE 1 DROP IN EACH EYE TWICE A DAY 10 mL 6    estradioL (ESTRACE) 0.01 % (0.1 mg/gram) vaginal cream Place 1 g vaginally twice a week. 24 g 3    ketorolac 0.5% (ACULAR) 0.5 % Drop PLACE 1 DROP IN THE LEFT EYE FOUR TIMES DAILY 10 mL 5    latanoprost 0.005 % ophthalmic solution Place 1 drop into both eyes once daily. (Patient taking differently: Place 1 drop into the left eye once daily.) 2.5 mL 6    loteprednol (LOTEMAX) 0.5 % ophthalmic suspension Place 1 drop into the left eye 4 (four) times daily. 5 mL 1    loteprednol etabonate (LOTEMAX SM) 0.38 % DrpG Place 1 drop into the right eye 4 (four) times daily. 5 g 1    magnesium oxide (MAG-OX) 400 mg (241.3 mg magnesium) tablet Take 400 mg by mouth.      moxifloxacin (VIGAMOX) 0.5 % ophthalmic solution Place 1 drop into the left eye 4 (four) times daily. 3 mL 1    multivitamin with folic acid 400 mcg Tab Take 1 tablet by mouth every morning.      oxyCODONE-acetaminophen (PERCOCET)  mg per tablet Take 1 tablet by mouth 3 (three) times daily as needed.      pantoprazole (PROTONIX) 40 MG tablet Take 1 tablet by mouth every morning.       polymyxin B sulf-trimethoprim (POLYTRIM) 10,000 unit- 1 mg/mL Drop PLACE ONE DROP INTO THE RIGHT EYE FOUR TIMES DAILY 10 mL 2    prednisoLONE acetate (PRED FORTE) 1 % DrpS INSTILL 1 DROP IN RIGHT EYE 4 TIMES A DAY 5 mL 1    prednisoLONE acetate (PRED FORTE) 1 % DrpS Place 1 drop into the right eye 4 (four) times daily. 5 mL 1    senna-docusate 8.6-50 mg (PERICOLACE) 8.6-50 mg per tablet Take 2 tablets by mouth.      thiamine mononitrate, vit B1, (VITAMIN B-1, MONONITRATE,) 100 mg Tab Take 1 tablet by mouth every morning.      valsartan-hydrochlorothiazide (DIOVAN-HCT) 160-12.5 mg per tablet TAKE ONE TABLET BY MOUTH ONCE DAILY 90 tablet 3    acetaZOLAMIDE (DIAMOX) 125 MG Tab Take 1 tablet (125 mg total) by mouth 3 (three) times daily. 90 tablet 0    fexofenadine (ALLEGRA) 180 MG tablet Take 1 tablet (180 mg total) by mouth once daily. 30 tablet 0    fluticasone propionate (FLONASE) 50 mcg/actuation nasal spray 1 spray (50 mcg total) by Each Nostril route once daily. 11.1 mL 0    QUEtiapine (SEROQUEL) 25 MG Tab Take 2 tablets (50 mg total) by mouth once daily. 60 tablet 2     No current facility-administered medications for this visit.      PE:  BP: 110/64  Pulse: 77     Temp: 97 °F (36.1 °C)  Weight: 67.9 kg (149 lb 11.1 oz) Body mass index is 27.38 kg/m².    Wt Readings from Last 5 Encounters:   10/24/24 67.9 kg (149 lb 11.1 oz)   06/10/24 66.1 kg (145 lb 11.6 oz)   03/20/24 67.3 kg (148 lb 5.9 oz)   03/18/24 67.3 kg (148 lb 5.9 oz)   02/28/24 67.3 kg (148 lb 5.9 oz)     General appearance: alert and cooperative, not in acute distress  Head: normocephalic, without obvious abnormality, atraumatic  Eyes: conjunctivae/corneas clear. PERRL, EOM's intact.  Ears: clear tympanic membranes   Neck: no adenopathy, supple, symmetrical, trachea midline and thyroid not enlarged, symmetric, no tenderness/mass/nodules, no JVD  Throat: lips, mucosa, and tongue normal; teeth and gums normal; no thrush  Chest: no reproducible  chest pain   Heart: regular rate and rhythm, S1, S2 normal, no murmur, click, rub or gallop  Lungs: unlabored respiration, bilateral equal air entry, normal vesicular breath sound heard, no wheezing, rhonchi   Abdomen: soft, non-tender, non-distended; bowel sounds +; no masses,  no organomegaly, no ascites   Extremities: normal, atraumatic, no cyanosis or edema noted B/L upper and lower extremities.  Skin: skin color, texture, turgor normal. No rashes or lesions noted.  Neurologic: grossly intact      Lab:  Lab Results   Component Value Date    WBC 10.23 03/18/2024    HGB 11.2 (L) 03/18/2024    HCT 36.9 (L) 03/18/2024    MCV 70 (L) 03/18/2024     03/18/2024     03/18/2024    K 3.9 03/18/2024     03/18/2024    CO2 24 03/18/2024    BUN 26 (H) 03/18/2024     (H) 03/18/2024    CALCIUM 9.5 03/18/2024    MG 1.5 (L) 11/03/2023    PHOS 2.1 (L) 11/03/2023    AST 14 03/18/2024    ALT 8 (L) 03/18/2024    CHOL 105 (L) 03/24/2023    HDL 39 (L) 03/24/2023    LDLCALC 50.0 (L) 03/24/2023    TRIG 80 03/24/2023    TSH 0.765 11/21/2023    INR 1.2 12/21/2023       Impression:    ICD-10-CM ICD-9-CM    1. Essential hypertension  I10 401.9 CBC W/ AUTO DIFFERENTIAL      2. Mild vascular dementia with psychotic disturbance  F01.A2 290.40 HEMOGLOBIN A1C      LIPID PANEL      QUEtiapine (SEROQUEL) 25 MG Tab      3. Chronic kidney disease, stage 3a  N18.31 585.3 COMPREHENSIVE METABOLIC PANEL      4. Moderate major depression  F32.1 296.22 Ambulatory referral/consult to Psychiatry      5. Uncomplicated opioid dependence  F11.20 304.00       6. Breast cancer screening by mammogram  Z12.31 V76.12 Mammo Digital Screening Bilat w/ Nilesh      7. Ex-smoker  Z87.891 V15.82       8. Asymptomatic postmenopausal state  Z78.0 V49.81 DXA Bone Density Axial Skeleton 1 or more sites      9. Chronic right shoulder pain  M25.511 719.41 Ambulatory referral/consult to Orthopedics    G89.29 338.29       10. Osteoporosis without current  pathological fracture, unspecified osteoporosis type  M81.0 733.00 Ambulatory referral/consult to Rheumatology      11. Abnormal finding of blood chemistry, unspecified  R79.9 790.6 HEMOGLOBIN A1C      LIPID PANEL      12. Seasonal allergic rhinitis, unspecified trigger  J30.2 477.9 fexofenadine (ALLEGRA) 180 MG tablet      fluticasone propionate (FLONASE) 50 mcg/actuation nasal spray          Assessment & Plan    MOOD DISORDER:  - Assessed patient's improvement in mood and coping since previous visit.  - Evaluated effectiveness of Seroquel for sleep.  - Continued Seroquel at current dose of two tablets at night.  - Refilled Seroquel.  - Referred to [facility] psychiatrist in [place].    EYE SURGERY:  - Reviewed recent eye surgery and follow-up care.    URINARY SYMPTOMS:  - Assessed urinary symptoms and effectiveness of previous treatment.    OSTEOPOROSIS:  - Evaluated need for bone density scan due to history of osteoporosis and fall risk.  - Discussed osteoporosis and its impact on fracture risk.  - Ordered DEXA scan for bone density assessment.    SHOULDER PAIN:  - Considered orthopedic referral for shoulder pain, with preference for injection over surgery.  - Referred to orthopedics for shoulder injection, available locally on Tuesdays.      DEGENERATIVE SPINE DISEASE:  - Explained the connection between degenerative changes in the spine and nerve compression leading to pain.    LABS:  - Ordered fasting labs for cholesterol check.    MAMMOGRAM:  - Ordered mammogram.    RHEUMATOLOGY REFERRAL:  - Referred to rheumatology.           1. Mild vascular dementia with psychotic disturbance  - HEMOGLOBIN A1C; Future  - LIPID PANEL; Future  - QUEtiapine (SEROQUEL) 25 MG Tab; Take 2 tablets (50 mg total) by mouth once daily.  Dispense: 60 tablet; Refill: 2    2. Essential hypertension (Primary)  Normotensive  Low salt diet.  Enouraged to increase in physical activity at least 30 minutes of brisk walking/day , 5 days a  week  Advised weight loss    Advised for DASH diet - The Dietary Approaches to Stop Hypertension (DASH) is high in vegetables, fruits, low-fat dairy products, whole grains, poultry, fish, and nuts and low in sweets, sugar-sweetened beverages, and red meats   Stop smoking.  Limit caffeine intake  Limit alcohol intake <3/day for men and <2/day for women.  Advised to be compliant with medication.  Please monitor your blood pressure regularly at home   Return precaution provided  On Diovan to be continued.  - CBC W/ AUTO DIFFERENTIAL; Future    3. Chronic kidney disease, stage 3a  - counseled on increase water intake at least 3 litre of H20 to remain hydrated  - stay away from nephrotoxic drugs like Ibuprofen and NSAID  - Counseled on the need to control HTN and Blood glucose to prevent the disease progression  - low salt diet  - dietary modification: renal diet encouraged  Repeat renal function test today  - COMPREHENSIVE METABOLIC PANEL; Future    4. Moderate major depression  Was following [facility] Bishop at [facility], wants to see doctor at [facility] now  - Ambulatory referral/consult to Psychiatry; Future    5. Uncomplicated opioid dependence  Following dr. Michele bailey      6. Breast cancer screening by mammogram  - Mammo Digital Screening Bilat w/ Nilesh; Future    7. Ex-smoker  Quit smoking 10 years back    8. Asymptomatic postmenopausal state  - DXA Bone Density Axial Skeleton 1 or more sites; Future    9. Chronic right shoulder pain  -MRI 2022 Complete supraspinatus tear with retraction. There is moderate subacromial bursal fluid.   2. Prominent AC joint arthropathy with inferior projecting callus formation.   3. Prominent tendinopathy of the subscapularis.   4. Atrophy and poor delineation of the biceps tendon.   5. There is some marrow edema in the superior glenoid. This is nonspecific but could be associated with bone contusion.     Was told to have surgery which she declined, now wants to see  orthopedics as it is continuing to bother her more.  Pain management through percocet now  - Ambulatory referral/consult to Orthopedics; Future    10. Osteoporosis without current pathological fracture, unspecified osteoporosis type  - s/p fosamax from [id number] after which she self discontinued it  - now wants an infusion for the osteoporosis  - Ambulatory referral/consult to Rheumatology; Future    11. Abnormal finding of blood chemistry, unspecified  - HEMOGLOBIN A1C; Future  - LIPID PANEL; Future    12. Seasonal allergic rhinitis, unspecified trigger  - fexofenadine (ALLEGRA) 180 MG tablet; Take 1 tablet (180 mg total) by mouth once daily.  Dispense: 30 tablet; Refill: 0  - fluticasone propionate (FLONASE) 50 mcg/actuation nasal spray; 1 spray (50 mcg total) by Each Nostril route once daily.  Dispense: 11.1 mL; Refill: 0      Future Appointments   Date Time Provider Department Center   10/24/2024  1:40 PM [facility] [place]   10/24/2024  2:20 PM [facility] [place]   10/24/2024  2:30 PM [facility] DEXA1 [facility] [place]   11/5/2024 10:40 AM Shorty Islas MD [facility] [place]   11/15/2024  9:15 AM Shorty Burnett MD [facility] spent a total of 35 minutes on the day of the visit.This includes face to face time and non-face to face time preparing to see the patient (eg, review of tests), obtaining and/or reviewing separately obtained history, documenting clinical information in the electronic or other health record, independently interpreting results and communicating results to the patient/family/caregiver, or care coordinator.  Visit today included increased complexity associated with the care of the episodic problem   addressed and managing the longitudinal care of the patient due to the serious and/or complex managed problem(s) .     Marcello Zambrano MD    This note was generated with the assistance of ambient listening technology. Verbal consent was  obtained by the patient and accompanying visitor(s) for the recording of patient appointment to facilitate this note. I attest to having reviewed and edited the generated note for accuracy, though some syntax or spelling errors may persist. Please contact the author of this note for any clarification.

## 2024-10-29 DIAGNOSIS — M25.511 RIGHT SHOULDER PAIN, UNSPECIFIED CHRONICITY: Primary | ICD-10-CM

## 2024-11-06 DIAGNOSIS — I10 ESSENTIAL HYPERTENSION: Primary | ICD-10-CM

## 2024-11-06 RX ORDER — VALSARTAN AND HYDROCHLOROTHIAZIDE 160; 12.5 MG/1; MG/1
1 TABLET, FILM COATED ORAL DAILY
Qty: 90 TABLET | Refills: 3 | Status: SHIPPED | OUTPATIENT
Start: 2024-11-06

## 2024-11-06 NOTE — TELEPHONE ENCOUNTER
No care due was identified.  [facility] Embedded Care Due Messages. Reference number: [id number].   11/06/2024 2:05:34 PM CST

## 2024-11-15 ENCOUNTER — OFFICE VISIT (OUTPATIENT)
Dept: OPHTHALMOLOGY | Facility: CLINIC | Age: 79
End: 2024-11-15
Payer: MEDICARE

## 2024-11-15 DIAGNOSIS — H40.1133 PRIMARY OPEN ANGLE GLAUCOMA (POAG) OF BOTH EYES, SEVERE STAGE: Primary | ICD-10-CM

## 2024-11-15 DIAGNOSIS — Z96.1 PSEUDOPHAKIA OF BOTH EYES: ICD-10-CM

## 2024-11-15 DIAGNOSIS — L93.2 CUTANEOUS LUPUS ERYTHEMATOSUS: ICD-10-CM

## 2024-11-15 PROCEDURE — 99999 PR PBB SHADOW E&M-EST. PATIENT-LVL III: CPT | Mod: PBBFAC,,, | Performed by: OPHTHALMOLOGY

## 2024-11-15 NOTE — PROGRESS NOTES
HPI     Glaucoma            Comments: Pt reports for 4wk IOP GOCT. Denies any pain or irritation. Va   stable. Using drops as directed.           Comments    1. Coag severe stage   GCL 21/22 4/26/24  [facility] OD 3/4/24 -   [facility] OD 11/4/2022 ( 0.5ml)  2. PCIOL OS w/ [facility] 7/23/24 +20.5 SY60WF/CDE:9.35  OPENING DATE AROUND 9/3/2024   PCIOL OD 1/25/22 W/ Gonio- complex  Yag OD 5-3-22 by MG since Dr Pitts was out that visit       Drop list:  Cosopt 2xs daily -- right eye  Ketorolac 4xs daily -- left eye   Prednisolone 4xs daily -- left eye   Prednisolone 1x daily -- right eye             Last edited by Shorty Hu on 11/15/2024  9:21 AM.            Assessment /Plan     For exam results, see Encounter Report.      ICD-10-CM ICD-9-CM    1. Primary open angle glaucoma (POAG) of both eyes, severe stage  H40.1133 365.11 Posterior Segment OCT Optic Nerve- Both eyes Doing well - intraocular pressure is within acceptable range relative to target pressure with no evidence of progression.   Continue current treatment.  Reviewed importance of continued compliance with treatment and follow up.   Visit today included increased complexity associated with the care and management of glaucoma. Written instructions were placed in the portal for the patient upon closure of the encounter regarding specific use of the required medications.          365.73       2. Pseudophakia of both eyes  Z96.1 V43.1       3. Cutaneous lupus erythematosus  L93.2 695.4           Robe with pt and family that at this time she does not meet the criteria for driving - follow at this time   RETURN TO CLINIC  2  month IOP       Drop list:  Cosopt 2xs daily -- right eye  Ketorolac 4xs daily -- left eye   Prednisolone 4xs daily -- left eye   Prednisolone 1x daily -- right eye

## 2024-12-05 ENCOUNTER — HOSPITAL ENCOUNTER (OUTPATIENT)
Dept: RADIOLOGY | Facility: HOSPITAL | Age: 79
Discharge: HOME | End: 2024-12-05
Attending: STUDENT IN AN ORGANIZED HEALTH CARE EDUCATION/TRAINING PROGRAM
Payer: MEDICARE

## 2024-12-05 ENCOUNTER — OFFICE VISIT (OUTPATIENT)
Dept: INTERNAL MEDICINE | Facility: CLINIC | Age: 79
End: 2024-12-05
Payer: MEDICARE

## 2024-12-05 VITALS
HEIGHT: 62 IN | BODY MASS INDEX: 27.3 KG/M2 | OXYGEN SATURATION: 98 % | HEART RATE: 105 BPM | DIASTOLIC BLOOD PRESSURE: 70 MMHG | TEMPERATURE: 99 F | WEIGHT: 148.38 LBS | SYSTOLIC BLOOD PRESSURE: 120 MMHG
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
## 2024-12-05 DIAGNOSIS — S42.292A OTHER CLOSED DISPLACED FRACTURE OF PROXIMAL END OF LEFT HUMERUS, INITIAL ENCOUNTER: ICD-10-CM

## 2024-12-05 DIAGNOSIS — M47.816 LUMBAR SPONDYLOSIS: ICD-10-CM

## 2024-12-05 DIAGNOSIS — I10 ESSENTIAL HYPERTENSION: Chronic | ICD-10-CM

## 2024-12-05 DIAGNOSIS — F01.A2 MILD VASCULAR DEMENTIA WITH PSYCHOTIC DISTURBANCE: ICD-10-CM

## 2024-12-05 DIAGNOSIS — R30.0 DYSURIA: Primary | ICD-10-CM

## 2024-12-05 PROCEDURE — 73030 X-RAY EXAM OF SHOULDER: CPT | Mod: 26,LT,, | Performed by: RADIOLOGY

## 2024-12-05 PROCEDURE — 3288F FALL RISK ASSESSMENT DOCD: CPT | Mod: CPTII,S$GLB,, | Performed by: STUDENT IN AN ORGANIZED HEALTH CARE EDUCATION/TRAINING PROGRAM

## 2024-12-05 PROCEDURE — 3074F SYST BP LT 130 MM HG: CPT | Mod: CPTII,S$GLB,, | Performed by: STUDENT IN AN ORGANIZED HEALTH CARE EDUCATION/TRAINING PROGRAM

## 2024-12-05 PROCEDURE — 1100F PTFALLS ASSESS-DOCD GE2>/YR: CPT | Mod: CPTII,S$GLB,, | Performed by: STUDENT IN AN ORGANIZED HEALTH CARE EDUCATION/TRAINING PROGRAM

## 2024-12-05 PROCEDURE — 1159F MED LIST DOCD IN RCRD: CPT | Mod: CPTII,S$GLB,, | Performed by: STUDENT IN AN ORGANIZED HEALTH CARE EDUCATION/TRAINING PROGRAM

## 2024-12-05 PROCEDURE — 1125F AMNT PAIN NOTED PAIN PRSNT: CPT | Mod: CPTII,S$GLB,, | Performed by: STUDENT IN AN ORGANIZED HEALTH CARE EDUCATION/TRAINING PROGRAM

## 2024-12-05 PROCEDURE — 99999 PR PBB SHADOW E&M-EST. PATIENT-LVL V: CPT | Mod: PBBFAC,,, | Performed by: STUDENT IN AN ORGANIZED HEALTH CARE EDUCATION/TRAINING PROGRAM

## 2024-12-05 PROCEDURE — 1160F RVW MEDS BY RX/DR IN RCRD: CPT | Mod: CPTII,S$GLB,, | Performed by: STUDENT IN AN ORGANIZED HEALTH CARE EDUCATION/TRAINING PROGRAM

## 2024-12-05 PROCEDURE — 73030 X-RAY EXAM OF SHOULDER: CPT | Mod: TC,PO,LT

## 2024-12-05 PROCEDURE — 3078F DIAST BP <80 MM HG: CPT | Mod: CPTII,S$GLB,, | Performed by: STUDENT IN AN ORGANIZED HEALTH CARE EDUCATION/TRAINING PROGRAM

## 2024-12-05 PROCEDURE — G2211 COMPLEX E/M VISIT ADD ON: HCPCS | Mod: S$GLB,,, | Performed by: STUDENT IN AN ORGANIZED HEALTH CARE EDUCATION/TRAINING PROGRAM

## 2024-12-05 PROCEDURE — 99214 OFFICE O/P EST MOD 30 MIN: CPT | Mod: S$GLB,,, | Performed by: STUDENT IN AN ORGANIZED HEALTH CARE EDUCATION/TRAINING PROGRAM

## 2024-12-05 RX ORDER — NITROFURANTOIN 25; 75 MG/1; MG/1
100 CAPSULE ORAL 2 TIMES DAILY
Qty: 14 CAPSULE | Refills: 0 | Status: SHIPPED | OUTPATIENT
Start: 2024-12-05 | End: 2024-12-12

## 2024-12-06 ENCOUNTER — OFFICE VISIT (OUTPATIENT)
Dept: ORTHOPEDICS | Facility: CLINIC | Age: 79
End: 2024-12-06
Payer: MEDICARE

## 2024-12-06 VITALS — BODY MASS INDEX: 27.3 KG/M2 | WEIGHT: 148.38 LBS | HEIGHT: 62 IN

## 2024-12-06 DIAGNOSIS — S42.202A CLOSED FRACTURE OF PROXIMAL END OF LEFT HUMERUS, UNSPECIFIED FRACTURE MORPHOLOGY, INITIAL ENCOUNTER: Primary | ICD-10-CM

## 2024-12-06 DIAGNOSIS — S42.292A OTHER CLOSED DISPLACED FRACTURE OF PROXIMAL END OF LEFT HUMERUS, INITIAL ENCOUNTER: ICD-10-CM

## 2024-12-06 PROCEDURE — 99999 PR PBB SHADOW E&M-EST. PATIENT-LVL IV: CPT | Mod: PBBFAC,,, | Performed by: STUDENT IN AN ORGANIZED HEALTH CARE EDUCATION/TRAINING PROGRAM

## 2024-12-06 RX ORDER — METHOCARBAMOL 500 MG/1
500 TABLET, FILM COATED ORAL 3 TIMES DAILY PRN
Qty: 40 TABLET | Refills: 0 | Status: SHIPPED | OUTPATIENT
Start: 2024-12-06 | End: 2024-12-20

## 2024-12-06 NOTE — PROGRESS NOTES
Hand Surgery Clinic Follow Up Note    Chief Complaint  Chief Complaint   Patient presents with    Left Shoulder - Injury, Pain    Left Elbow - Injury, Pain       History of Present Illness  79-year-old right-hand dominant female who is retired presents for evaluation of a left shoulder injury.  She was previously treated for a right distal radius fracture.  She has recovered from this in his not having any issues.    Yesterday, patient was doing exercises when she fell and tripped.  She landed on her left shoulder.  She presented to the emergency department where x-rays were obtained.  They were contacted after they left and told that she has a proximal humerus fracture.  She is currently not in any immobilization.  She does have a history of a rotator cuff issue with this shoulder in the past.  She is experiencing muscle spasms.  Her pain level is a 10/10.  She does take Percocet at baseline for other pain issues.    Review of Systems  Review of systems negative for chest pain, shortness of breath, fevers, chills, nausea/vomiting.    Vital Signs  There were no vitals filed for this visit.    Physical Exam  Constitutional: Appears well-developed and well-nourished. No distress.   HENT:   Head: Normocephalic.   Eyes: EOM are normal.   Pulmonary/Chest: Effort normal.   Neurological: Oriented to person, place, and time.   Psychiatric: Normal mood and affect.     Left Upper Extremity:  No abrasions, lacerations, wounds.  Mild swelling about the shoulder.  No ecchymosis.  No erythema.  Patient is able to make a fist and extend all her fingers.  Motor intact AIN/PIN/ulnar/axillary.  Sensation intact median/radial/ulnar/axillary.  Palpable radial pulse.    Imaging  Left shoulder x-rays three views were obtained yesterday and independently reviewed by myself.  Patient has a mildly displaced comminuted proximal humeral neck fracture.  There is also evidence of arthritic changes at the AC joint and glenohumeral joint as well  as some ossification of the rotator cuff near its insertion.  No dislocation.    Assessment and Plan  79-year-old female presents with a left humeral neck fracture after a fall yesterday.  I discussed that if the fracture stays in its current alignment, this fracture can be treated nonoperatively.  Patient was fitted for a sling today. At least 10 minutes were spent sizing, fitting, and educating for durable medical equipment application today.  This service was performed under the direction of Nidhi Dickey MD.  CPT [id number].  Nonweightbearing to the left upper extremity.  Discussed that she should remove the sling 3 to 5 times a day to work on elbow range of motion.  Robaxin was sent to pharmacy for treatment of muscle spasms symptoms she is experiencing.  Otherwise, she has Percocet at home which she takes for other issues which she can take for pain.  For future follow up, she is going to see Dr. Machado as he is our shoulder specialist.  Obtain left shoulder x-rays at that visit.    Nidhi Dickey MD  Orthopaedic Hand Surgery

## 2024-12-17 ENCOUNTER — OFFICE VISIT (OUTPATIENT)
Dept: SPORTS MEDICINE | Facility: CLINIC | Age: 79
End: 2024-12-17
Payer: MEDICARE

## 2024-12-17 ENCOUNTER — HOSPITAL ENCOUNTER (OUTPATIENT)
Dept: RADIOLOGY | Facility: HOSPITAL | Age: 79
Discharge: HOME | End: 2024-12-17
Attending: STUDENT IN AN ORGANIZED HEALTH CARE EDUCATION/TRAINING PROGRAM
Payer: MEDICARE

## 2024-12-17 VITALS — HEIGHT: 62 IN | BODY MASS INDEX: 27.3 KG/M2 | WEIGHT: 148.38 LBS

## 2024-12-17 DIAGNOSIS — M25.512 LEFT SHOULDER PAIN, UNSPECIFIED CHRONICITY: Primary | ICD-10-CM

## 2024-12-17 DIAGNOSIS — S42.222A CLOSED 2-PART DISPLACED FRACTURE OF SURGICAL NECK OF LEFT HUMERUS, INITIAL ENCOUNTER: Primary | ICD-10-CM

## 2024-12-17 DIAGNOSIS — S42.202A CLOSED FRACTURE OF PROXIMAL END OF LEFT HUMERUS, UNSPECIFIED FRACTURE MORPHOLOGY, INITIAL ENCOUNTER: ICD-10-CM

## 2024-12-17 PROCEDURE — 99214 OFFICE O/P EST MOD 30 MIN: CPT | Mod: S$GLB,,, | Performed by: STUDENT IN AN ORGANIZED HEALTH CARE EDUCATION/TRAINING PROGRAM

## 2024-12-17 PROCEDURE — 1125F AMNT PAIN NOTED PAIN PRSNT: CPT | Mod: CPTII,S$GLB,, | Performed by: STUDENT IN AN ORGANIZED HEALTH CARE EDUCATION/TRAINING PROGRAM

## 2024-12-17 PROCEDURE — 1159F MED LIST DOCD IN RCRD: CPT | Mod: CPTII,S$GLB,, | Performed by: STUDENT IN AN ORGANIZED HEALTH CARE EDUCATION/TRAINING PROGRAM

## 2024-12-17 PROCEDURE — 3288F FALL RISK ASSESSMENT DOCD: CPT | Mod: CPTII,S$GLB,, | Performed by: STUDENT IN AN ORGANIZED HEALTH CARE EDUCATION/TRAINING PROGRAM

## 2024-12-17 PROCEDURE — 1100F PTFALLS ASSESS-DOCD GE2>/YR: CPT | Mod: CPTII,S$GLB,, | Performed by: STUDENT IN AN ORGANIZED HEALTH CARE EDUCATION/TRAINING PROGRAM

## 2024-12-17 PROCEDURE — 73030 X-RAY EXAM OF SHOULDER: CPT | Mod: 26,LT,, | Performed by: RADIOLOGY

## 2024-12-17 PROCEDURE — 1160F RVW MEDS BY RX/DR IN RCRD: CPT | Mod: CPTII,S$GLB,, | Performed by: STUDENT IN AN ORGANIZED HEALTH CARE EDUCATION/TRAINING PROGRAM

## 2024-12-17 PROCEDURE — 99999 PR PBB SHADOW E&M-EST. PATIENT-LVL III: CPT | Mod: PBBFAC,,, | Performed by: STUDENT IN AN ORGANIZED HEALTH CARE EDUCATION/TRAINING PROGRAM

## 2024-12-17 PROCEDURE — 73030 X-RAY EXAM OF SHOULDER: CPT | Mod: TC,LT

## 2024-12-17 NOTE — PROGRESS NOTES
"Orthopaedics Sports Medicine     Shoulder Initial Visit         12/17/2024    Referring MD: Nidhi Dickey MD    Chief Complaint   Patient presents with    Left Shoulder - Pain, Injury, Numbness       History of Present Illness    CHIEF COMPLAINT:  - Right-handed patient presents for follow-up evaluation of a left shoulder injury sustained from a fall approximately two weeks ago.    HPI:  Tea presents with a shoulder injury that occurred approximately two weeks ago due to a fall. Tea is right-handed, which is fortunate as the injury is to the left shoulder.     She expresses significant discomfort and limitation due to the injury, stating, "I can't do nothing, boss." She also reports experiencing spasms, noting, "And the spasms, and they just, my bones, they just pop."    The injury has significantly impacted daily activities and work. She expresses frustration with current limitations, saying, "I can't do nothing by myself."    Tea denies any injuries other than to the shoulder. She denies any pre-existing issues with the uninjured shoulder.      WORK STATUS:  - Previously worked as a tech at a hospital  - currently retired    Past Medical History:   Past Medical History:   Diagnosis Date    Alpha thalassemia     Asthma     resolved per patient    Cataract     Chronic anxiety     years tid xanax 1mg    Chronic knee pain     Chronic pain of left ankle     Clotting disorder     Cutaneous lupus erythematosus     dr henry derm    Depression     Depression     dr radha hughes previously    Ex-smoker     quit fall 1    Hypertension     Osteopenia 1/16 reck1/18       Past Surgical History:   Past Surgical History:   Procedure Laterality Date    ANKLE SURGERY Left     BACK SURGERY      CATARACT EXTRACTION Right     COLONOSCOPY N/A 8/2/2017    Procedure: COLONOSCOPY;  Surgeon: Virgil Oliva MD;  Location: [facility];  Service: Endoscopy;  Laterality: N/A;    HYSTERECTOMY      INJECTION OF ANESTHETIC AGENT " AROUND MEDIAL BRANCH NERVES INNERVATING LUMBAR FACET JOINT Bilateral 6/17/2020    Procedure: Bilateral L3-5 MBB;  Surgeon: Yury Moore MD;  Location: [facility];  Service: Pain Management;  Laterality: Bilateral;    INJECTION OF ANESTHETIC AGENT AROUND NERVE Bilateral 5/19/2020    Procedure: Bilateral Genicular nerve block with RN IV sedation Covid testing day of procedure PT does not drive;  Surgeon: Yury Moore MD;  Location: [facility];  Service: Pain Management;  Laterality: Bilateral;    KNEE ARTHROSCOPY      both knees twice    OOPHORECTOMY      RADIOFREQUENCY THERMOCOAGULATION Right 7/14/2020    Procedure: Right L3-5 Lumbar RFA;  Surgeon: Yury Moore MD;  Location: [facility];  Service: Pain Management;  Laterality: Right;    RADIOFREQUENCY THERMOCOAGULATION Left 8/6/2020    Procedure: Left L3-5 Lumbar RFA;  Surgeon: Yury Moore MD;  Location: [facility];  Service: Pain Management;  Laterality: Left;       Medications:  Patient's Medications   New Prescriptions    No medications on file   Previous Medications    ACETAZOLAMIDE (DIAMOX) 125 MG TAB    Take 1 tablet (125 mg total) by mouth 3 (three) times daily.    ATORVASTATIN (LIPITOR) 10 MG TABLET    Take 1 tablet (10 mg total) by mouth once daily.    ATROPINE 1% (ISOPTO ATROPINE) 1 % DROP    Place 1 drop into the right eye 2 (two) times a day.    BACLOFEN (LIORESAL) 10 MG TABLET    Take 10 mg by mouth 2 (two) times daily as needed.    BRIMONIDINE 0.2% (ALPHAGAN) 0.2 % DROP    PLACE 1 DROP IN EACH EYE TWICE A DAY    CITALOPRAM (CELEXA) 10 MG TABLET    Take 1 tablet (10 mg total) by mouth once daily.    DORZOLAMIDE-TIMOLOL 2-0.5% (COSOPT) 22.3-6.8 MG/ML OPHTHALMIC SOLUTION    PLACE 1 DROP IN EACH EYE TWICE A DAY    ESTRADIOL (ESTRACE) 0.01 % (0.1 MG/GRAM) VAGINAL CREAM    Place 1 g vaginally twice a week.    FEXOFENADINE (ALLEGRA) 180 MG TABLET    Take 1 tablet (180 mg total) by mouth once daily.    FLUTICASONE PROPIONATE  (FLONASE) 50 MCG/ACTUATION NASAL SPRAY    1 spray (50 mcg total) by Each Nostril route once daily.    KETOROLAC 0.5% (ACULAR) 0.5 % DROP    PLACE 1 DROP IN THE LEFT EYE FOUR TIMES DAILY    LATANOPROST 0.005 % OPHTHALMIC SOLUTION    Place 1 drop into both eyes once daily.    LOTEPREDNOL (LOTEMAX) 0.5 % OPHTHALMIC SUSPENSION    Place 1 drop into the left eye 4 (four) times daily.    LOTEPREDNOL ETABONATE (LOTEMAX SM) 0.38 % DRPG    Place 1 drop into the right eye 4 (four) times daily.    MAGNESIUM OXIDE (MAG-OX) 400 MG (241.3 MG MAGNESIUM) TABLET    Take 400 mg by mouth.    METHOCARBAMOL (ROBAXIN) 500 MG TAB    Take 1 tablet (500 mg total) by mouth 3 (three) times daily as needed (pain/muscle spasms).    MOXIFLOXACIN (VIGAMOX) 0.5 % OPHTHALMIC SOLUTION    Place 1 drop into the left eye 4 (four) times daily.    MULTIVITAMIN WITH FOLIC ACID 400 MCG TAB    Take 1 tablet by mouth every morning.    OXYCODONE-ACETAMINOPHEN (PERCOCET)  MG PER TABLET    Take 1 tablet by mouth 3 (three) times daily as needed.    PANTOPRAZOLE (PROTONIX) 40 MG TABLET    Take 1 tablet by mouth every morning.    POLYMYXIN B SULF-TRIMETHOPRIM (POLYTRIM) 10,000 UNIT- 1 MG/ML DROP    PLACE ONE DROP INTO THE RIGHT EYE FOUR TIMES DAILY    PREDNISOLONE ACETATE (PRED FORTE) 1 % DRPS    INSTILL 1 DROP IN RIGHT EYE 4 TIMES A DAY    PREDNISOLONE ACETATE (PRED FORTE) 1 % DRPS    Place 1 drop into the right eye 4 (four) times daily.    QUETIAPINE (SEROQUEL) 25 MG TAB    Take 2 tablets (50 mg total) by mouth once daily.    SENNA-DOCUSATE 8.6-50 MG (PERICOLACE) 8.6-50 MG PER TABLET    Take 2 tablets by mouth.    THIAMINE MONONITRATE, VIT B1, (VITAMIN B-1, MONONITRATE,) 100 MG TAB    Take 1 tablet by mouth every morning.    VALSARTAN-HYDROCHLOROTHIAZIDE (DIOVAN-HCT) 160-12.5 MG PER TABLET    Take 1 tablet by mouth once daily.   Modified Medications    No medications on file   Discontinued Medications    No medications on file       Allergies: Review of  "patient's allergies indicates:  No Known Allergies    Social History:   Home town: [place], [place]  Alcohol use: She reports current alcohol use of about 1.0 standard drink of alcohol per week.  Tobacco use: She reports that she quit smoking about 10 years ago. Her smoking use included cigarettes. She started smoking about 40 years ago. She has a 30 pack-year smoking history. She has never been exposed to tobacco smoke. She has never used smokeless tobacco.    Review of systems:  History of recent illness, fevers, shakes, or chills: no  History of cardiac problems or chest pain: no  History of pulmonary problems or asthma: no  History of diabetes: no  History of prior dvt or clotting problems: no  History of sleep apnea: no      Physical Examination:  Estimated body mass index is 27.14 kg/m² as calculated from the following:    Height as of this encounter: 5' 2" (1.575 m).    Weight as of this encounter: 67.3 kg (148 lb 5.9 oz).    General  Healthy appearing female in no acute distress  Alert and oriented, normal mood, appropriate affect    Shoulder Examination:  Patient is alert and oriented, no distress. Skin is intact. Neuro is normal with no focal motor or sensory findings.    Cervical exam is unremarkable. Intact cervical ROM. Negative Spurling's test    Physical Exam:  RIGHT    LEFT    Tenderness:          Greater Tuberosity             (-)    +  Bicipital Groove  (-)    +  AC joint   (-)    (-)  Other:     ROM: Deferred due to fracture    Strength: Deferred due to fracture    Special Tests: Deferred due to fracture    Neurovascular examination  - Motor grossly intact bilaterally to shoulder abduction, elbow flexion and extension, wrist flexion and extension, and intrinsic hand musculature  - Sensation intact to light touch bilaterally in axillary, median, radial, and ulnar distributions  - Symmetrical radial pulses      Imaging:  XR Results:  Results for orders placed during the hospital encounter of " 12/17/24    X-Ray Shoulder 2 or More Views Left    Narrative  EXAM: XR SHOULDER COMPLETE 2 OR MORE VIEWS LEFT    CLINICAL HISTORY: Follow-up fracture    FINDINGS:  Compared to 12/05/2024.    A subacute, comminuted fracture is again identified in the left humeral neck.  No significant angulation is visible at the fracture site.  No callus formation is visible.  The humeral head is in normal position in the glenohumeral joint.    Impression  Subacute, comminuted left humeral neck fracture.    Finalized on: 12/17/2024 10:02 AM By:  Hong George  R# [id number]      2024-12-17 10:04:51.824    [facility]      MRI Results:  No results found for this or any previous visit.      CT Results:  No results found for this or any previous visit.      Physician Read: I agree with the above impression.      Impression:  79 y.o. female with left proximal humerus fracture, 2 part, comminuted, displaced    Plan:  Discussed diagnosis and treatment options with patient today. She has sustained a left proximal humerus fracture, 2 part, comminuted, minimally displaced.  X-rays today show has maintained acceptable alignment.  Her daughter is also with her today.  We went over expected outcomes of 2 part proximal humerus fractures.  Reassured her that there is no difference in outcomes between non operatively and operatively treated fractures.  As she has maintained acceptable alignment I recommend continue with nonoperative treatment.  I recommend she continue to wear sling for another week and then may start weaning out of it. We will also refer her to physical therapy to start in about 1 week for shoulder rehabilitation and to help prevent stiffness.   Follow up with me in 3 weeks with XR.             Sedrick Machado MD    I, Jabari Arteaga, acted as a scribe for Sedrick Machado MD for the duration of this office visit.    This note was generated with the assistance of ambient listening technology. Verbal consent was  obtained by the patient and accompanying visitor(s) for the recording of patient appointment to facilitate this note. I attest to having reviewed and edited the generated note for accuracy, though some syntax or spelling errors may persist. Please contact the author of this note for any clarification.

## 2024-12-28 NOTE — PROGRESS NOTES
Orthopaedic Follow-Up Visit    Last Appointment: 12/17/24  Diagnosis: left proximal humerus fracture, 2 part, comminuted, displaced   Prior Procedure: Sling, Home Health PT      Tea Ring is a 79 y.o. female who is here for f/u evaluation of her left shoulder. The patient was last seen here by me on 12/17/24 at which point she had sustained a left proximal humerus fracture, 2 part, comminuted, minimally displaced.  X-rays showed it had maintained acceptable alignment. We went over expected outcomes of 2 part proximal humerus fractures.  Reassured her that there was no difference in outcomes between non operatively and operatively treated fractures.  As she had maintained acceptable alignment I recommended continuing with nonoperative treatment. I recommended she continue to wear sling for another week and then may start weaning out of it. We also referred her to physical therapy to start in about 1 week for shoulder rehabilitation and to help prevent stiffness.  The patient returns today reporting that she is still having aching pain to her arm. Reports she was initially contacted by home health but they never came to our to see her. Also requesting a different walker with wheels as previous walker does not slide around well. She requests refill of muscle relaxer and potentially something fro pain until she sees her pain management doctor on 1/13.      To review her history, Tea Ring is a 79 y.o. RHD female who presented on 12/17/24 with a shoulder injury that occurred approximately two weeks prior due to a fall. She expressed significant discomfort and limitation due to the injury. The injury had significantly impacted daily activities and work. She expressed frustration with current limitations. She denied any injuries other than to the shoulder. She denied any pre-existing issues with the uninjured shoulder.    Patient's medications, allergies, past medical, surgical, social and family histories were  reviewed and updated as appropriate.    Review of Systems   All systems reviewed were negative.  Specifically, the patient denies fever, chills, weight loss, chest pain, shortness of breath, or dyspnea on exertion.      Past Medical History:   Diagnosis Date    Alpha thalassemia     Asthma     resolved per patient    Cataract     Chronic anxiety     years tid xanax 1mg    Chronic knee pain     Chronic pain of left ankle     Clotting disorder     Cutaneous lupus erythematosus     dr henry derm    Depression     Depression     dr radha hughes previously    Ex-smoker     quit fall 1    Hypertension     Osteopenia 1/16 reck1/18       Past Surgical History:   Procedure Laterality Date    ANKLE SURGERY Left     BACK SURGERY      CATARACT EXTRACTION Right     COLONOSCOPY N/A 8/2/2017    Procedure: COLONOSCOPY;  Surgeon: Virgil Oliva MD;  Location: [facility];  Service: Endoscopy;  Laterality: N/A;    HYSTERECTOMY      INJECTION OF ANESTHETIC AGENT AROUND MEDIAL BRANCH NERVES INNERVATING LUMBAR FACET JOINT Bilateral 6/17/2020    Procedure: Bilateral L3-5 MBB;  Surgeon: Yury Moore MD;  Location: [facility];  Service: Pain Management;  Laterality: Bilateral;    INJECTION OF ANESTHETIC AGENT AROUND NERVE Bilateral 5/19/2020    Procedure: Bilateral Genicular nerve block with RN IV sedation Covid testing day of procedure PT does not drive;  Surgeon: Yury Moore MD;  Location: [facility];  Service: Pain Management;  Laterality: Bilateral;    KNEE ARTHROSCOPY      both knees twice    OOPHORECTOMY      RADIOFREQUENCY THERMOCOAGULATION Right 7/14/2020    Procedure: Right L3-5 Lumbar RFA;  Surgeon: Yury Moore MD;  Location: [facility];  Service: Pain Management;  Laterality: Right;    RADIOFREQUENCY THERMOCOAGULATION Left 8/6/2020    Procedure: Left L3-5 Lumbar RFA;  Surgeon: Yury Moore MD;  Location: [facility];  Service: Pain Management;  Laterality: Left;       Patient's Medications   New  Prescriptions    No medications on file   Previous Medications    ACETAZOLAMIDE (DIAMOX) 125 MG TAB    Take 1 tablet (125 mg total) by mouth 3 (three) times daily.    ATORVASTATIN (LIPITOR) 10 MG TABLET    Take 1 tablet (10 mg total) by mouth once daily.    ATROPINE 1% (ISOPTO ATROPINE) 1 % DROP    Place 1 drop into the right eye 2 (two) times a day.    BACLOFEN (LIORESAL) 10 MG TABLET    Take 10 mg by mouth 2 (two) times daily as needed.    BRIMONIDINE 0.2% (ALPHAGAN) 0.2 % DROP    PLACE 1 DROP IN EACH EYE TWICE A DAY    CITALOPRAM (CELEXA) 10 MG TABLET    Take 1 tablet (10 mg total) by mouth once daily.    DORZOLAMIDE-TIMOLOL 2-0.5% (COSOPT) 22.3-6.8 MG/ML OPHTHALMIC SOLUTION    PLACE 1 DROP IN EACH EYE TWICE A DAY    ESTRADIOL (ESTRACE) 0.01 % (0.1 MG/GRAM) VAGINAL CREAM    Place 1 g vaginally twice a week.    FEXOFENADINE (ALLEGRA) 180 MG TABLET    Take 1 tablet (180 mg total) by mouth once daily.    FLUTICASONE PROPIONATE (FLONASE) 50 MCG/ACTUATION NASAL SPRAY    1 spray (50 mcg total) by Each Nostril route once daily.    KETOROLAC 0.5% (ACULAR) 0.5 % DROP    PLACE 1 DROP IN THE LEFT EYE FOUR TIMES DAILY    LATANOPROST 0.005 % OPHTHALMIC SOLUTION    Place 1 drop into both eyes once daily.    LOTEPREDNOL (LOTEMAX) 0.5 % OPHTHALMIC SUSPENSION    Place 1 drop into the left eye 4 (four) times daily.    LOTEPREDNOL ETABONATE (LOTEMAX SM) 0.38 % DRPG    Place 1 drop into the right eye 4 (four) times daily.    MAGNESIUM OXIDE (MAG-OX) 400 MG (241.3 MG MAGNESIUM) TABLET    Take 400 mg by mouth.    MOXIFLOXACIN (VIGAMOX) 0.5 % OPHTHALMIC SOLUTION    Place 1 drop into the left eye 4 (four) times daily.    MULTIVITAMIN WITH FOLIC ACID 400 MCG TAB    Take 1 tablet by mouth every morning.    OXYCODONE-ACETAMINOPHEN (PERCOCET)  MG PER TABLET    Take 1 tablet by mouth 3 (three) times daily as needed.    PANTOPRAZOLE (PROTONIX) 40 MG TABLET    Take 1 tablet by mouth every morning.    POLYMYXIN B SULF-TRIMETHOPRIM  (POLYTRIM) 10,000 UNIT- 1 MG/ML DROP    PLACE ONE DROP INTO THE RIGHT EYE FOUR TIMES DAILY    PREDNISOLONE ACETATE (PRED FORTE) 1 % DRPS    INSTILL 1 DROP IN RIGHT EYE 4 TIMES A DAY    PREDNISOLONE ACETATE (PRED FORTE) 1 % DRPS    Place 1 drop into the right eye 4 (four) times daily.    QUETIAPINE (SEROQUEL) 25 MG TAB    Take 2 tablets (50 mg total) by mouth once daily.    THIAMINE MONONITRATE, VIT B1, (VITAMIN B-1, MONONITRATE,) 100 MG TAB    Take 1 tablet by mouth every morning.    VALSARTAN-HYDROCHLOROTHIAZIDE (DIOVAN-HCT) 160-12.5 MG PER TABLET    Take 1 tablet by mouth once daily.   Modified Medications    No medications on file   Discontinued Medications    No medications on file       Family History   Problem Relation Name Age of Onset    Diabetes Mother      Diabetes Father      Breast cancer Maternal Aunt         Review of patient's allergies indicates:  No Known Allergies      Objective:      Physical Exam  Patient is alert and oriented, no distress. Skin is intact. Neuro is normal with no focal motor or sensory findings.    Cervical exam is unremarkable. Intact cervical ROM. Negative Spurling's test    Physical Exam:                       RIGHT                                     LEFT     Tenderness:                                                                              Greater Tuberosity                  (-)                                            +  Bicipital Groove                       (-)                                             +  AC joint                                   (-)                                             (-)  Other:      ROM:   able to touch top of head, not able to reach back of head    ABD 70  ER 30     Strength: Deferred      Special Tests: Deferred     Neurovascular examination  - Motor grossly intact bilaterally to shoulder abduction, elbow flexion and extension, wrist flexion and extension, and intrinsic hand musculature  - Sensation intact to light touch  bilaterally in axillary, median, radial, and ulnar distributions  - Symmetrical radial pulses    Imaging:    XR Results:    X-Ray Shoulder 2 or More Views Left  Narrative: EXAMINATION:  XR SHOULDER COMPLETE 2 OR MORE VIEWS LEFT    CLINICAL HISTORY:  Pain in left shoulder    TECHNIQUE:  Two or three views of the left shoulder were preformed.    COMPARISON:  12/17/2024    FINDINGS:  There is a healing comminuted proximal humeral fracture.  Fracture fragment alignment is similar to the prior study.  Mild AC joint arthropathy is noted.  No significant degenerative change at the glenohumeral joint.  Impression: 1.  As above    Electronically signed by: Dariusz Dockery DO  Date:    01/08/2025  Time:    10:06       MRI Results:  No results found for this or any previous visit.      CT Results:  No results found for this or any previous visit.      Physician read: I agree with the above impression.    Assessment/Plan:   Tea Ring is a 79 y.o. female with left proximal humerus fracture, 2 part, comminuted, displaced- routine healing     Plan:    She has maintained appropriate alignment of proximal humerus fracture.  There was also good callus formation.  Range of motion is slowly improving.  Encouraged her to continue to work on range of motion.  We will make sure she gets scheduled for home health physical therapy.  Order placed for walker with wheels.   Prescription for Flexeril provided today.   Follow up with me in 1 month with XR.           Sedrick Machado MD    I, Jabari Arteaga, acted as a scribe for Sedrick Machado MD for the duration of this office visit.

## 2025-01-08 ENCOUNTER — OFFICE VISIT (OUTPATIENT)
Dept: SPORTS MEDICINE | Facility: CLINIC | Age: 80
End: 2025-01-08
Payer: MEDICARE

## 2025-01-08 ENCOUNTER — HOSPITAL ENCOUNTER (OUTPATIENT)
Dept: RADIOLOGY | Facility: HOSPITAL | Age: 80
Discharge: HOME | End: 2025-01-08
Attending: STUDENT IN AN ORGANIZED HEALTH CARE EDUCATION/TRAINING PROGRAM
Payer: MEDICARE

## 2025-01-08 VITALS — HEIGHT: 62 IN | WEIGHT: 148.38 LBS | BODY MASS INDEX: 27.3 KG/M2

## 2025-01-08 DIAGNOSIS — S42.222D CLOSED 2-PART DISPLACED FRACTURE OF SURGICAL NECK OF LEFT HUMERUS WITH ROUTINE HEALING, SUBSEQUENT ENCOUNTER: Primary | ICD-10-CM

## 2025-01-08 DIAGNOSIS — M62.81 ABNORMAL GAIT DUE TO MUSCLE WEAKNESS: ICD-10-CM

## 2025-01-08 DIAGNOSIS — R26.9 ABNORMAL GAIT DUE TO MUSCLE WEAKNESS: ICD-10-CM

## 2025-01-08 DIAGNOSIS — M25.512 LEFT SHOULDER PAIN, UNSPECIFIED CHRONICITY: ICD-10-CM

## 2025-01-08 PROCEDURE — 1101F PT FALLS ASSESS-DOCD LE1/YR: CPT | Mod: CPTII,S$GLB,, | Performed by: STUDENT IN AN ORGANIZED HEALTH CARE EDUCATION/TRAINING PROGRAM

## 2025-01-08 PROCEDURE — 1160F RVW MEDS BY RX/DR IN RCRD: CPT | Mod: CPTII,S$GLB,, | Performed by: STUDENT IN AN ORGANIZED HEALTH CARE EDUCATION/TRAINING PROGRAM

## 2025-01-08 PROCEDURE — 99214 OFFICE O/P EST MOD 30 MIN: CPT | Mod: S$GLB,,, | Performed by: STUDENT IN AN ORGANIZED HEALTH CARE EDUCATION/TRAINING PROGRAM

## 2025-01-08 PROCEDURE — 99999 PR PBB SHADOW E&M-EST. PATIENT-LVL V: CPT | Mod: PBBFAC,,, | Performed by: STUDENT IN AN ORGANIZED HEALTH CARE EDUCATION/TRAINING PROGRAM

## 2025-01-08 PROCEDURE — 1125F AMNT PAIN NOTED PAIN PRSNT: CPT | Mod: CPTII,S$GLB,, | Performed by: STUDENT IN AN ORGANIZED HEALTH CARE EDUCATION/TRAINING PROGRAM

## 2025-01-08 PROCEDURE — 73030 X-RAY EXAM OF SHOULDER: CPT | Mod: TC,LT

## 2025-01-08 PROCEDURE — 3288F FALL RISK ASSESSMENT DOCD: CPT | Mod: CPTII,S$GLB,, | Performed by: STUDENT IN AN ORGANIZED HEALTH CARE EDUCATION/TRAINING PROGRAM

## 2025-01-08 PROCEDURE — 73030 X-RAY EXAM OF SHOULDER: CPT | Mod: 26,LT,, | Performed by: RADIOLOGY

## 2025-01-08 PROCEDURE — 1159F MED LIST DOCD IN RCRD: CPT | Mod: CPTII,S$GLB,, | Performed by: STUDENT IN AN ORGANIZED HEALTH CARE EDUCATION/TRAINING PROGRAM

## 2025-01-08 RX ORDER — CYCLOBENZAPRINE HCL 5 MG
5 TABLET ORAL 3 TIMES DAILY PRN
Qty: 24 TABLET | Refills: 0 | Status: SHIPPED | OUTPATIENT
Start: 2025-01-08 | End: 2025-01-18

## 2025-01-16 ENCOUNTER — TELEPHONE (OUTPATIENT)
Dept: SPORTS MEDICINE | Facility: CLINIC | Age: 80
End: 2025-01-16
Payer: MEDICARE

## 2025-01-16 NOTE — TELEPHONE ENCOUNTER
Returned call. Advised that multiple attempts to schedule patient ahd been made and she continues to refuse for various reasons. HH will continue to try and schedule initial appointment. JOSÉ    ----- Message from Yissel sent at 1/16/2025  9:45 AM CST -----  Contact: alonso/ [facility]  Type:  Needs Medical Advice    Who Called: alonso  Symptoms (please be specific):    How long has patient had these symptoms:    Pharmacy name and phone #:    Would the patient rather a call back or a response via [facility]? Call back  Best Call Back Number: [phone]  Additional Information: states has been trying for two weeks to see patient who has fallen and fractured her shoulder,. Patient refused therapy and will not let them see her

## 2025-01-28 ENCOUNTER — OFFICE VISIT (OUTPATIENT)
Dept: UROLOGY | Facility: CLINIC | Age: 80
End: 2025-01-28
Payer: MEDICARE

## 2025-01-28 VITALS
WEIGHT: 148.38 LBS | RESPIRATION RATE: 16 BRPM | BODY MASS INDEX: 27.14 KG/M2 | DIASTOLIC BLOOD PRESSURE: 79 MMHG | SYSTOLIC BLOOD PRESSURE: 131 MMHG | HEART RATE: 79 BPM

## 2025-01-28 DIAGNOSIS — N39.0 FREQUENT UTI: Primary | ICD-10-CM

## 2025-01-28 LAB
BILIRUB UR QL STRIP: NEGATIVE
GLUCOSE UR QL STRIP: NEGATIVE
KETONES UR QL STRIP: NEGATIVE
LEUKOCYTE ESTERASE UR QL STRIP: POSITIVE
PH, POC UA: 5.5
POC BLOOD, URINE: NEGATIVE
POC NITRATES, URINE: NEGATIVE
PROT UR QL STRIP: NEGATIVE
SP GR UR STRIP: 1.03 (ref 1–1.03)
UROBILINOGEN UR STRIP-ACNC: 1 (ref 0.1–1.1)

## 2025-01-28 PROCEDURE — 99214 OFFICE O/P EST MOD 30 MIN: CPT | Mod: S$GLB,,, | Performed by: NURSE PRACTITIONER

## 2025-01-28 PROCEDURE — 3078F DIAST BP <80 MM HG: CPT | Mod: CPTII,S$GLB,, | Performed by: NURSE PRACTITIONER

## 2025-01-28 PROCEDURE — 1159F MED LIST DOCD IN RCRD: CPT | Mod: CPTII,S$GLB,, | Performed by: NURSE PRACTITIONER

## 2025-01-28 PROCEDURE — 81003 URINALYSIS AUTO W/O SCOPE: CPT | Mod: QW,S$GLB,, | Performed by: NURSE PRACTITIONER

## 2025-01-28 PROCEDURE — 1101F PT FALLS ASSESS-DOCD LE1/YR: CPT | Mod: CPTII,S$GLB,, | Performed by: NURSE PRACTITIONER

## 2025-01-28 PROCEDURE — 87086 URINE CULTURE/COLONY COUNT: CPT | Performed by: NURSE PRACTITIONER

## 2025-01-28 PROCEDURE — 3288F FALL RISK ASSESSMENT DOCD: CPT | Mod: CPTII,S$GLB,, | Performed by: NURSE PRACTITIONER

## 2025-01-28 PROCEDURE — 99999 PR PBB SHADOW E&M-EST. PATIENT-LVL IV: CPT | Mod: PBBFAC,,, | Performed by: NURSE PRACTITIONER

## 2025-01-28 PROCEDURE — 1126F AMNT PAIN NOTED NONE PRSNT: CPT | Mod: CPTII,S$GLB,, | Performed by: NURSE PRACTITIONER

## 2025-01-28 PROCEDURE — 3075F SYST BP GE 130 - 139MM HG: CPT | Mod: CPTII,S$GLB,, | Performed by: NURSE PRACTITIONER

## 2025-01-28 NOTE — PROGRESS NOTES
Chief Complaint:   Confusion    HPI:   Patient is presenting with her daughter.  Daughter states that patient has been slightly confused of the last 2 days and she is concerned that she has a urinary tract infection.  Patient denies pelvic or flank pain.  No fever or dysuria.  Urine in clinic indicates trace leukocytes, all other parameters are negative.  06/10/2024  Patient is a 78-year-old female that is presenting with her daughter.  Patient was prescribed prophylactic antibiotics secondary to recurrent urinary tract infections for 3 months.  States that she has been asymptomatic since our last visit.  Has implemented behavior modifications, discussed at initial visit.  Urine in clinic indicates trace leukocytes, all other parameters are negative.  PVR was 14 mL.  Patient states that she has right hip pain that radiates to her right groin to her right knee.  Is concerned that this might be related to a urological symptoms.  Reports that she broke her right hip and has had surgery.  03/07/2024  Patient is a 78-year-old female that is presenting with recurrent E coli cystitis. States that she broke her hip and is using a wheelchair to ambulate. Uses a pad or diaper when she goes out of the house. Urine in clinic indicates leukocytes, all other parameters are negative. Denies pelvic or flank pain. Denies gross hematuria. Denies history of renal stones.   Allergies:  Patient has no known allergies.    Medications:  has a current medication list which includes the following prescription(s): acetazolamide, atorvastatin, atropine 1%, baclofen, brimonidine 0.2%, citalopram, dorzolamide-timolol 2-0.5%, estradiol, fexofenadine, fluticasone propionate, ketorolac 0.5%, latanoprost, loteprednol, lotemax sm, magnesium oxide, moxifloxacin, multivitamin with folic acid, oxycodone-acetaminophen, pantoprazole, polymyxin b sulf-trimethoprim, prednisolone acetate, prednisolone acetate, quetiapine, thiamine mononitrate (vit b1), and  valsartan-hydrochlorothiazide.    Review of Systems:  General: No fever, chills, fatigability, or weight loss.  Skin: No rashes, itching, or changes in color or texture of skin.  Chest: Denies WHIPPLE, cyanosis, wheezing, cough, and sputum production.  Abdomen: Appetite fine. No weight loss. Denies diarrhea, abdominal pain, hematemesis, or blood in stool.  Musculoskeletal: No joint stiffness or swelling. Denies back pain.  : As above.  All other review of systems negative.    PMH:   has a past medical history of Alpha thalassemia, Asthma, Cataract, Chronic anxiety, Chronic knee pain, Chronic pain of left ankle, Clotting disorder, Cutaneous lupus erythematosus, Depression, Depression, Ex-smoker, Hypertension, and Osteopenia (1/16 reck1/18).    PSH:   has a past surgical history that includes Knee arthroscopy; Back surgery; Ankle surgery (Left); Colonoscopy (N/A, 8/2/2017); Hysterectomy; Oophorectomy; Injection of anesthetic agent around nerve (Bilateral, 5/19/2020); Injection of anesthetic agent around medial branch nerves innervating lumbar facet joint (Bilateral, 6/17/2020); Radiofrequency thermocoagulation (Right, 7/14/2020); Radiofrequency thermocoagulation (Left, 8/6/2020); and Cataract extraction (Right).    FamHx: family history includes Breast cancer in her maternal aunt; Diabetes in her father and mother.    SocHx:  reports that she quit smoking about 10 years ago. Her smoking use included cigarettes. She started smoking about 40 years ago. She has a 30 pack-year smoking history. She has never been exposed to tobacco smoke. She has never used smokeless tobacco. She reports current alcohol use of about 1.0 standard drink of alcohol per week. She reports that she does not use drugs.      Physical Exam:  General:  Slight confusion, no apparent distress, no deformities  Neck: No masses, normal thyroid  Lungs: normal inspiration, no use of accessory muscles  Heart: normal pulse, no arrhythmias  Abdomen: Soft, NT,  ND, no masses, no hernias, no hepatosplenomegaly  Lymphatic: Neck and groin nodes negative  Skin: The skin is warm and dry. No jaundice.  Ext: No c/c/e.    Labs/Studies:   See HPI  Impression/Plan:   Urine sent for culture, daughter will be contacted with results and needed follow-up.

## 2025-01-30 ENCOUNTER — TELEPHONE (OUTPATIENT)
Dept: UROLOGY | Facility: CLINIC | Age: 80
End: 2025-01-30
Payer: MEDICARE

## 2025-01-30 ENCOUNTER — PATIENT MESSAGE (OUTPATIENT)
Dept: UROLOGY | Facility: CLINIC | Age: 80
End: 2025-01-30
Payer: MEDICARE

## 2025-01-30 DIAGNOSIS — H40.1133 PRIMARY OPEN ANGLE GLAUCOMA (POAG) OF BOTH EYES, SEVERE STAGE: ICD-10-CM

## 2025-01-30 LAB — BACTERIA UR CULT: NORMAL

## 2025-01-30 RX ORDER — PREDNISOLONE ACETATE 10 MG/ML
1 SUSPENSION/ DROPS OPHTHALMIC 4 TIMES DAILY
Qty: 5 ML | Refills: 1 | Status: SHIPPED | OUTPATIENT
Start: 2025-01-30

## 2025-01-30 NOTE — TELEPHONE ENCOUNTER
Tried contacting patient reference to recent urine culture results that were negative for infection. There was no answer, left voicemail for patient to return call.

## 2025-01-30 NOTE — TELEPHONE ENCOUNTER
----- Message from Radha Bernal NP sent at 1/30/2025 11:16 AM CST -----  Please call patient inform her that urine culture is negative for infection

## 2025-02-14 ENCOUNTER — OFFICE VISIT (OUTPATIENT)
Dept: OPHTHALMOLOGY | Facility: CLINIC | Age: 80
End: 2025-02-14
Payer: MEDICARE

## 2025-02-14 DIAGNOSIS — Z96.1 PSEUDOPHAKIA OF BOTH EYES: ICD-10-CM

## 2025-02-14 DIAGNOSIS — H40.1133 PRIMARY OPEN ANGLE GLAUCOMA (POAG) OF BOTH EYES, SEVERE STAGE: Primary | ICD-10-CM

## 2025-02-14 DIAGNOSIS — L93.2 CUTANEOUS LUPUS ERYTHEMATOSUS: ICD-10-CM

## 2025-02-14 DIAGNOSIS — H35.351 CME (CYSTOID MACULAR EDEMA), RIGHT: ICD-10-CM

## 2025-02-14 PROCEDURE — 99999 PR PBB SHADOW E&M-EST. PATIENT-LVL III: CPT | Mod: PBBFAC,,, | Performed by: OPHTHALMOLOGY

## 2025-02-14 RX ORDER — DORZOLAMIDE HYDROCHLORIDE AND TIMOLOL MALEATE 20; 5 MG/ML; MG/ML
SOLUTION/ DROPS OPHTHALMIC
Qty: 10 ML | Refills: 6 | Status: SHIPPED | OUTPATIENT
Start: 2025-02-14

## 2025-02-14 RX ORDER — PREDNISOLONE ACETATE 10 MG/ML
1 SUSPENSION/ DROPS OPHTHALMIC 4 TIMES DAILY
Qty: 5 ML | Refills: 1 | Status: SHIPPED | OUTPATIENT
Start: 2025-02-14

## 2025-02-14 NOTE — PROGRESS NOTES
HPI     Glaucoma            Comments: 2 mo IOP     Pt co achey OD   Pt co watering oU   Pt states she is using all drops listed           Comments    1. Coag severe stage   [facility] 21/22 4/26/24 and 11/2024   [facility] OD 3/4/24 -   [facility] OD 11/4/2022 ( 0.5ml)  2. PCIOL OS w/ [facility] 7/23/24 +20.5 SY60WF/CDE:9.35  OPENING DATE AROUND 9/3/2024   PCIOL OD 1/25/22 W/ [facility]- complex  Yag OD 5-3-22 by [facility] since Dr Pitts was out that visit       Drop list:  Cosopt 2xs daily -- right eye  Ketorolac 4xs daily -- left eye   Prednisolone 4xs daily -- left eye   Prednisolone 1x daily -- right eye             Last edited by Saran Miles on 2/14/2025 10:26 AM.            Assessment /Plan     For exam results, see Encounter Report.      ICD-10-CM ICD-9-CM    1. Primary open angle glaucoma (POAG) of both eyes, severe stage  H40.1133 365.11 prednisoLONE acetate (PRED FORTE) 1 % DrpS     365.73 dorzolamide-timolol 2-0.5% (COSOPT) 22.3-6.8 mg/mL ophthalmic solution    Doing well - intraocular pressure is within acceptable range relative to target pressure with no evidence of progression.   Continue current treatment.  Reviewed importance of continued compliance with treatment and follow up.     Visit today included increased complexity associated with the care and management of glaucoma and macular disease . Patient aware that management of medical condition requires long term follow up.  Written instructions were placed in the portal for the patient upon closure of the encounter regarding specific use of the required medications.          2. Pseudophakia of both eyes  Z96.1 V43.1       3. Cutaneous lupus erythematosus  L93.2 695.4       4. CME (cystoid macular edema), right  H35.351 362.53 Posterior Segment OCT Retina-Both eyes    Increased on oct today, increase pred to right eye           Return to clinic 4-6 weeks IOP with [facility]          Drop list:  Cosopt 2xs daily -- right eye  Ketorolac 4xs daily -- both eyes    Prednisolone 4xs daily -- left eye   Prednisolone 4x daily -- right eye

## 2025-02-26 NOTE — PROGRESS NOTES
Orthopaedic Follow-Up Visit    Last Appointment: 1/8/25  Diagnosis: left proximal humerus fracture, 2 part, comminuted, displaced   Prior Procedure: Home Health PT, Flexeril      Tea Ring is a 79 y.o. female who is here for f/u evaluation of her left shoulder. The patient was last seen here by me on 1/8/25 at which point she had maintained appropriate alignment of proximal humerus fracture and there was also good callus formation.  Encouraged her to continue to work on range of motion and referral was placed for home health physical therapy. The patient returns today reporting left shoulder symptoms have improved. Her range of motion was slowly improving and is able to perform some ADLs now. She does report some right shoulder pain as well. She notes some difficulty with trying to open jars with her right arm due to pain.       To review her history, Tea Ring is a 79 y.o. RHD female who presented on 12/17/24 with a shoulder injury that occurred approximately two weeks prior due to a fall. She expressed significant discomfort and limitation due to the injury. The injury had significantly impacted daily activities and work. She expressed frustration with current limitations. She denied any injuries other than to the shoulder. She denied any pre-existing issues with the uninjured shoulder. She had sustained a left proximal humerus fracture, 2 part, comminuted, minimally displaced.  X-rays showed it had maintained acceptable alignment. We went over expected outcomes of 2 part proximal humerus fractures.  Reassured her that there was no difference in outcomes between non operatively and operatively treated fractures.  As she had maintained acceptable alignment I recommended continuing with nonoperative treatment. I recommended she continue to wear sling for another week and then may start weaning out of it. We also referred her to physical therapy to start in about 1 week for shoulder rehabilitation and to help  prevent stiffness.     Patient's medications, allergies, past medical, surgical, social and family histories were reviewed and updated as appropriate.    Review of Systems   All systems reviewed were negative.  Specifically, the patient denies fever, chills, weight loss, chest pain, shortness of breath, or dyspnea on exertion.      Past Medical History:   Diagnosis Date    Alpha thalassemia     Asthma     resolved per patient    Cataract     Chronic anxiety     years tid xanax 1mg    Chronic knee pain     Chronic pain of left ankle     Clotting disorder     Cutaneous lupus erythematosus     dr henry derm    Depression     Depression     dr radha hughes previously    Ex-smoker     quit fall 1    Hypertension     Osteopenia 1/16 reck1/18       Past Surgical History:   Procedure Laterality Date    ANKLE SURGERY Left     BACK SURGERY      CATARACT EXTRACTION Right     COLONOSCOPY N/A 8/2/2017    Procedure: COLONOSCOPY;  Surgeon: Virgil Oliva MD;  Location: [facility];  Service: Endoscopy;  Laterality: N/A;    HYSTERECTOMY      INJECTION OF ANESTHETIC AGENT AROUND MEDIAL BRANCH NERVES INNERVATING LUMBAR FACET JOINT Bilateral 6/17/2020    Procedure: Bilateral L3-5 MBB;  Surgeon: Yury Moore MD;  Location: [facility];  Service: Pain Management;  Laterality: Bilateral;    INJECTION OF ANESTHETIC AGENT AROUND NERVE Bilateral 5/19/2020    Procedure: Bilateral Genicular nerve block with RN IV sedation Covid testing day of procedure PT does not drive;  Surgeon: Yury Moore MD;  Location: [facility];  Service: Pain Management;  Laterality: Bilateral;    KNEE ARTHROSCOPY      both knees twice    OOPHORECTOMY      RADIOFREQUENCY THERMOCOAGULATION Right 7/14/2020    Procedure: Right L3-5 Lumbar RFA;  Surgeon: Yury Moore MD;  Location: [facility];  Service: Pain Management;  Laterality: Right;    RADIOFREQUENCY THERMOCOAGULATION Left 8/6/2020    Procedure: Left L3-5 Lumbar RFA;  Surgeon: Yury MONDRAGON  MD Oscar;  Location: [facility];  Service: Pain Management;  Laterality: Left;       Patient's Medications   New Prescriptions    No medications on file   Previous Medications    ACETAZOLAMIDE (DIAMOX) 125 MG TAB    Take 1 tablet (125 mg total) by mouth 3 (three) times daily.    ATORVASTATIN (LIPITOR) 10 MG TABLET    Take 1 tablet (10 mg total) by mouth once daily.    ATROPINE 1% (ISOPTO ATROPINE) 1 % DROP    Place 1 drop into the right eye 2 (two) times a day.    BACLOFEN (LIORESAL) 10 MG TABLET    Take 10 mg by mouth 2 (two) times daily as needed.    BRIMONIDINE 0.2% (ALPHAGAN) 0.2 % DROP    PLACE 1 DROP IN EACH EYE TWICE A DAY    CITALOPRAM (CELEXA) 10 MG TABLET    Take 1 tablet (10 mg total) by mouth once daily.    DORZOLAMIDE-TIMOLOL 2-0.5% (COSOPT) 22.3-6.8 MG/ML OPHTHALMIC SOLUTION    PLACE 1 DROP IN EACH EYE TWICE A DAY    ESTRADIOL (ESTRACE) 0.01 % (0.1 MG/GRAM) VAGINAL CREAM    Place 1 g vaginally twice a week.    FEXOFENADINE (ALLEGRA) 180 MG TABLET    Take 1 tablet (180 mg total) by mouth once daily.    FLUTICASONE PROPIONATE (FLONASE) 50 MCG/ACTUATION NASAL SPRAY    1 spray (50 mcg total) by Each Nostril route once daily.    KETOROLAC 0.5% (ACULAR) 0.5 % DROP    PLACE 1 DROP IN THE LEFT EYE FOUR TIMES DAILY    LATANOPROST 0.005 % OPHTHALMIC SOLUTION    Place 1 drop into both eyes once daily.    LOTEPREDNOL (LOTEMAX) 0.5 % OPHTHALMIC SUSPENSION    Place 1 drop into the left eye 4 (four) times daily.    LOTEPREDNOL ETABONATE (LOTEMAX SM) 0.38 % DRPG    Place 1 drop into the right eye 4 (four) times daily.    MAGNESIUM OXIDE (MAG-OX) 400 MG (241.3 MG MAGNESIUM) TABLET    Take 400 mg by mouth.    MOXIFLOXACIN (VIGAMOX) 0.5 % OPHTHALMIC SOLUTION    Place 1 drop into the left eye 4 (four) times daily.    MULTIVITAMIN WITH FOLIC ACID 400 MCG TAB    Take 1 tablet by mouth every morning.    OXYCODONE-ACETAMINOPHEN (PERCOCET)  MG PER TABLET    Take 1 tablet by mouth 3 (three) times daily as needed.     PANTOPRAZOLE (PROTONIX) 40 MG TABLET    Take 1 tablet by mouth every morning.    POLYMYXIN B SULF-TRIMETHOPRIM (POLYTRIM) 10,000 UNIT- 1 MG/ML DROP    PLACE ONE DROP INTO THE RIGHT EYE FOUR TIMES DAILY    PREDNISOLONE ACETATE (PRED FORTE) 1 % DRPS    INSTILL 1 DROP IN RIGHT EYE 4 TIMES A DAY    PREDNISOLONE ACETATE (PRED FORTE) 1 % DRPS    Place 1 drop into both eyes 4 (four) times daily.    QUETIAPINE (SEROQUEL) 25 MG TAB    Take 2 tablets (50 mg total) by mouth once daily.    THIAMINE MONONITRATE, VIT B1, (VITAMIN B-1, MONONITRATE,) 100 MG TAB    Take 1 tablet by mouth every morning.    VALSARTAN-HYDROCHLOROTHIAZIDE (DIOVAN-HCT) 160-12.5 MG PER TABLET    Take 1 tablet by mouth once daily.   Modified Medications    No medications on file   Discontinued Medications    No medications on file       Family History   Problem Relation Name Age of Onset    Diabetes Mother      Diabetes Father      Breast cancer Maternal Aunt         Review of patient's allergies indicates:  No Known Allergies      Objective:      Physical Exam  Patient is alert and oriented, no distress. Skin is intact. Neuro is normal with no focal motor or sensory findings.    Cervical exam is unremarkable. Intact cervical ROM. Negative Spurling's test    Physical Exam:                       RIGHT                                     LEFT     Tenderness:                                                                              Greater Tuberosity                  (-)                                             (-)  Bicipital Groove                       (-)                                             (-)  AC joint                                   (-)                                             (-)  Other:      ROM:  Forward Elevation 90/150    90  Abduction  70/90    70  ER (at side)  30    30     Strength: Deferred      Special Tests: Deferred       Neurovascular examination  - Motor grossly intact bilaterally to shoulder abduction, elbow flexion and  extension, wrist flexion and extension, and intrinsic hand musculature  - Sensation intact to light touch bilaterally in axillary, median, radial, and ulnar distributions  - Symmetrical radial pulses    Imaging:    XR Results:  X-Ray Shoulder 2 or More Views Left  Narrative: EXAMINATION:  XR SHOULDER COMPLETE 2 OR MORE VIEWS LEFT    CLINICAL HISTORY:  2-part displaced fracture of surgical neck of left humerus, subsequent encounter for fracture with routine healing    TECHNIQUE:  Two or three views of the left shoulder were performed.    COMPARISON:  Prior radiographs    FINDINGS:  Continued interval healing/callus maturation of the proximal humeral fracture site with alignment not significantly changed.  Impression: As above    Electronically signed by: Huan Jason MD  Date:    03/11/2025  Time:    10:09        XR SHOULDER TRAUMA 3 VIEW RIGHT     CLINICAL HISTORY:  Pain in right shoulder     TECHNIQUE:  Three or four views of the right shoulder were performed.     COMPARISON:  None     FINDINGS:  No acute fracture or dislocation.  Degenerative joint disease.  Decreased bone mineral density.  AC joint hypertrophy.  Senescent changes.     Impression:     As above        Electronically signed by:Justin Borrero  Date:                                            08/17/2022  Time:                                           17:33    MRI Results:  No results found for this or any previous visit.      CT Results:  No results found for this or any previous visit.      Physician read: I agree with the above impression.    Assessment/Plan:   Tea Ring is a 79 y.o. female with left proximal humerus fracture, 2 part, comminuted, displaced- routine healing and right shoulder rotator cuff tear arthropathy     Plan:    She has maintained appropriate alignment of proximal humerus fracture.  There is also good callus formation. Her function is progressing as well but still notes some pain with certain activities. She has  underlying rotator cuff tear arthropathy so ultimately her maximum level of function is likely limited.  For her right shoulder, she has prior imaging showing evidence of rotator cuff tear arthropathy. Discussed definitive management of this would consist of shoulder replacement which she is not interested in at this time. Discussed that we can perform intermittent injections to help manage these symptoms as well.   I recommend proceeding with left and right shoulder corticosteroid injections into the subacromial space. The patient is in agreement with this plan.   Procedure performed today and patient tolerated the procedure well with no immediate complications.   Follow up with me as needed          Sedrick Machado MD    I, Jabari Arteaga, acted as a scribe for Sedrick Machado MD for the duration of this office visit.

## 2025-03-03 DIAGNOSIS — F41.9 CHRONIC ANXIETY: ICD-10-CM

## 2025-03-03 RX ORDER — CITALOPRAM 10 MG/1
10 TABLET ORAL DAILY
Qty: 30 TABLET | Refills: 11 | Status: SHIPPED | OUTPATIENT
Start: 2025-03-03 | End: 2026-03-03

## 2025-03-03 NOTE — TELEPHONE ENCOUNTER
No care due was identified.  [facility] Embedded Care Due Messages. Reference number: [id number].   3/03/2025 5:31:24 AM CST

## 2025-03-06 ENCOUNTER — LAB VISIT (OUTPATIENT)
Dept: LAB | Facility: HOSPITAL | Age: 80
End: 2025-03-06
Attending: STUDENT IN AN ORGANIZED HEALTH CARE EDUCATION/TRAINING PROGRAM
Payer: MEDICARE

## 2025-03-06 ENCOUNTER — OFFICE VISIT (OUTPATIENT)
Dept: INTERNAL MEDICINE | Facility: CLINIC | Age: 80
End: 2025-03-06
Payer: MEDICARE

## 2025-03-06 ENCOUNTER — RESULTS FOLLOW-UP (OUTPATIENT)
Dept: INTERNAL MEDICINE | Facility: CLINIC | Age: 80
End: 2025-03-06

## 2025-03-06 VITALS
BODY MASS INDEX: 27.34 KG/M2 | WEIGHT: 148.56 LBS | DIASTOLIC BLOOD PRESSURE: 64 MMHG | SYSTOLIC BLOOD PRESSURE: 100 MMHG | RESPIRATION RATE: 14 BRPM | HEART RATE: 83 BPM | HEIGHT: 62 IN | OXYGEN SATURATION: 95 % | TEMPERATURE: 98 F

## 2025-03-06 DIAGNOSIS — G89.29 CHRONIC RIGHT SHOULDER PAIN: ICD-10-CM

## 2025-03-06 DIAGNOSIS — J06.9 URTI (ACUTE UPPER RESPIRATORY INFECTION): Primary | ICD-10-CM

## 2025-03-06 DIAGNOSIS — N39.0 FREQUENT UTI: ICD-10-CM

## 2025-03-06 DIAGNOSIS — M25.511 CHRONIC RIGHT SHOULDER PAIN: ICD-10-CM

## 2025-03-06 DIAGNOSIS — F01.A2 MILD VASCULAR DEMENTIA WITH PSYCHOTIC DISTURBANCE: ICD-10-CM

## 2025-03-06 DIAGNOSIS — M13.0 ARTHRITIS OF MULTIPLE SITES: ICD-10-CM

## 2025-03-06 DIAGNOSIS — I10 ESSENTIAL HYPERTENSION: Chronic | ICD-10-CM

## 2025-03-06 LAB
BACTERIA #/AREA URNS AUTO: ABNORMAL /HPF
BILIRUB UR QL STRIP: NEGATIVE
CLARITY UR REFRACT.AUTO: CLEAR
COLOR UR AUTO: YELLOW
CTP QC/QA: YES
GLUCOSE UR QL STRIP: NEGATIVE
HGB UR QL STRIP: NEGATIVE
HYALINE CASTS UR QL AUTO: 6 /LPF
KETONES UR QL STRIP: NEGATIVE
LEUKOCYTE ESTERASE UR QL STRIP: ABNORMAL
MICROSCOPIC COMMENT: ABNORMAL
NITRITE UR QL STRIP: NEGATIVE
PH UR STRIP: 6 [PH] (ref 5–8)
POC MOLECULAR INFLUENZA A AGN: NEGATIVE
POC MOLECULAR INFLUENZA B AGN: NEGATIVE
PROT UR QL STRIP: NEGATIVE
SP GR UR STRIP: >=1.03 (ref 1–1.03)
URN SPEC COLLECT METH UR: ABNORMAL
UROBILINOGEN UR STRIP-ACNC: NEGATIVE EU/DL
WBC #/AREA URNS AUTO: 7 /HPF (ref 0–5)

## 2025-03-06 PROCEDURE — 81000 URINALYSIS NONAUTO W/SCOPE: CPT | Mod: PO | Performed by: STUDENT IN AN ORGANIZED HEALTH CARE EDUCATION/TRAINING PROGRAM

## 2025-03-06 PROCEDURE — 99999 PR PBB SHADOW E&M-EST. PATIENT-LVL V: CPT | Mod: PBBFAC,,, | Performed by: STUDENT IN AN ORGANIZED HEALTH CARE EDUCATION/TRAINING PROGRAM

## 2025-03-06 RX ORDER — KETOROLAC TROMETHAMINE 30 MG/ML
30 INJECTION, SOLUTION INTRAMUSCULAR; INTRAVENOUS
Status: SHIPPED | OUTPATIENT
Start: 2025-03-06

## 2025-03-06 RX ORDER — KETOROLAC TROMETHAMINE 30 MG/ML
30 INJECTION, SOLUTION INTRAMUSCULAR; INTRAVENOUS
Status: SHIPPED | OUTPATIENT
Start: 2025-03-06 | End: 2025-03-09

## 2025-03-06 NOTE — PROGRESS NOTES
Chief Complaint   Patient presents with    Generalized Body Aches     HPI: Tea Ring is a 79 y.o. female  with Pmhx listed below who presents to clinic for    History of Present Illness    CHIEF COMPLAINT:  - Ms. Ring presents with pain in her left shoulder that has spread to other parts of her body, accompanied by shortness of breath and vomiting.    HPI:  Ms. Ring reports pain in her left shoulder present for a week, originating from a previous fracture. The pain has spread from the shoulder down her body and she cannot lay on her left side due to the pain. The pain is not constant but occurs frequently enough to interfere with daily activities, such as using a walker. She rates the pain as severe enough to be intolerable.    She reports shortness of breath occurring often, though not every day. When it occurs, she sits up until it passes, which does not last long. She also mentions vomiting twice recently, without blood in the vomit.    She was evaluated by Dr. Machado on January 8th for the left humerus fracture. An x-ray at that time showed the fracture was healing, but also revealed arthritis setting in the shoulders. A physical therapist was supposed to visit her at home, but this did not occur due to her having diarrhea on the scheduled day. She has an upcoming appointment with Dr. Machado on Tuesday.    She mentions being evaluated by Dr. Navarro, who has prescribed oxycodone for pain management. Her weight has remained stable at around 180 lbs for a long time, though she used to weigh over 200 lbs.    She denies fever, runny nose, congestion, rashes, recent travel outside the [facility], burning sensation when urinating, and coughing.           Problem List:  Problem List[1]    ROS: Negative except as noted above.       Current Meds:  Current Medications[2]   PE:  BP: 100/64  Pulse: 83 Resp: 14   Temp: 97.7 °F (36.5 °C)  Weight: 67.4 kg (148 lb 9.4 oz) Body mass index is 27.18 kg/m².    Wt Readings  from Last 5 Encounters:   03/06/25 67.4 kg (148 lb 9.4 oz)   01/28/25 67.3 kg (148 lb 5.9 oz)   01/08/25 67.3 kg (148 lb 5.9 oz)   12/17/24 67.3 kg (148 lb 5.9 oz)   12/06/24 67.3 kg (148 lb 5.9 oz)     General appearance: alert and cooperative, not in acute distress  Head: normocephalic, without obvious abnormality, atraumatic  Eyes: conjunctivae/corneas clear. PERRL, EOM's intact.  Ears: clear tympanic membranes   Neck: no adenopathy, supple, symmetrical, trachea midline and thyroid not enlarged, symmetric, no tenderness/mass/nodules, no JVD  Throat: lips, mucosa, and tongue normal; teeth and gums normal; no thrush  Chest: no reproducible chest pain   Heart: regular rate and rhythm, S1, S2 normal, no murmur, click, rub or gallop  Lungs: unlabored respiration, bilateral equal air entry, normal vesicular breath sound heard, no wheezing, rhonchi   Abdomen: soft, non-tender, non-distended; bowel sounds +; no masses,  no organomegaly, no ascites   Extremities: normal, atraumatic, no cyanosis or edema noted B/L upper and lower extremities.  Skin: skin color, texture, turgor normal. No rashes or lesions noted.  Neurologic: grossly intact      Lab:  Lab Results   Component Value Date    WBC 12.90 (H) 12/05/2024    HGB 10.7 (L) 12/05/2024    HCT 34.4 (L) 12/05/2024    MCV 72 (L) 12/05/2024     12/05/2024     12/05/2024    K 3.5 12/05/2024     12/05/2024    CO2 23 12/05/2024    BUN 31 (H) 12/05/2024     (H) 12/05/2024    CALCIUM 9.9 12/05/2024    MG 1.5 (L) 11/03/2023    PHOS 2.1 (L) 11/03/2023    AST 17 12/05/2024    ALT 9 (L) 12/05/2024    CHOL 112 (L) 10/24/2024    HDL 49 10/24/2024    LDLCALC 53.2 (L) 10/24/2024    TRIG 49 10/24/2024    TSH 0.765 11/21/2023    INR 1.2 12/21/2023       Impression:    ICD-10-CM ICD-9-CM    1. Frequent UTI  N39.0 599.0 Urinalysis, Reflex to Urine Culture Urine, Clean Catch      2. URTI (acute upper respiratory infection)  J06.9 465.9 POCT COVID-19 Rapid Screening       POCT Influenza A/B Molecular      3. Chronic right shoulder pain  M25.511 719.41 ketorolac injection 30 mg    G89.29 338.29       4. Essential hypertension  I10 401.9           Assessment & Plan    - Evaluated the patient's reported pain throughout her body, particularly in the shoulder and arm.  - Assessed the patient's shortness of breath and wheezing.  - Noted the patient's use of a walker for mobility and inability to use her left shoulder for support.  - Examined the patient's range of motion in the shoulders, observing limited mobility and pain during arm elevation.  - Reviewed x-ray of left shoulder, which revealed developing arthritis and a healing fracture of the left humerus.  - Ordered new x-rays before the upcoming appointment with Dr. Machado.  - Recommend physical therapy as the first line of treatment, with shoulder injections as a potential follow-up if necessary.  - Administered an intramuscular injection for pain relief.  - Scheduled an appointment with Dr. Machado on Tuesday for further evaluation and possible shoulder injection.    LEFT HUMERUS FRACTURE:  - Evaluated ongoing pain at the fracture site, affecting the patient's ability to lie on it.  - Acknowledged the fracture healing process and planned for further evaluation.  - Recommend physical therapy once the fracture is healed.    LEFT SHOULDER ARTHRITIS:  - Explained that arthritis in the left shoulder is likely to persist and may worsen over time.    LEFT SHOULDER PAIN WITH POSSIBLE ROTATOR CUFF INVOLVEMENT:  - Evaluated ongoing left shoulder pain affecting the patient's ability to use a walker.  - Considered conservative management with physical therapy before escalating to injections.  - Deferred to Dr. Machado for further shoulder management.  - Instructed the patient to bring x-ray results to the appointment and avoid sleeping on the left side due to shoulder pain.    SHORTNESS OF BREATH AND WHEEZING:  - Evaluated shortness of  breath and wheezing, potentially related to history of heavy smoking.  - Ms. Ring reports daily shortness of breath episodes that don't last long and are relieved by sitting up.  - Noted that the patient quit smoking 10 years ago after being a heavy smoker for a long time.    GENERALIZED PAIN AND VOMITING:  - Addressed generalized pain and vomiting, ruling out systemic illness.  - Noted patient reports of vomiting twice without blood.    OTHER INSTRUCTIONS:  - Noted that the patient has stopped using a wheelchair.        1. Frequent UTI  - Urinalysis, Reflex to Urine Culture Urine, Clean Catch; Future    2. URTI (acute upper respiratory infection) (Primary)  - POCT COVID-19 Rapid Screening  - POCT Influenza A/B Molecular    3. Chronic right shoulder pain  Offered toradol shot : reported that it doesn't work on her and wants to throw it away  Asking for demerol instead  Already on Percocet prescribed by pain management    - ketorolac injection 30 mg    4. Essential hypertension  Low salt diet.  Enouraged to increase in physical activity at least 30 minutes of brisk walking/day , 5 days a week  Advised weight loss    Advised for DASH diet - The Dietary Approaches to Stop Hypertension (DASH) is high in vegetables, fruits, low-fat dairy products, whole grains, poultry, fish, and nuts and low in sweets, sugar-sweetened beverages, and red meats   Stop smoking.  Limit caffeine intake  Limit alcohol intake <3/day for men and <2/day for women.  Advised to be compliant with medication.  Please monitor your blood pressure regularly at home   Return precaution provided      5. 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  Dr. Okeefe wanted her to do EEG and further evaluation, she declined getting it done.    Future Appointments   Date Time Provider Department Center   3/11/2025  9:00 AM [facility]   3/11/2025  9:30 AM Sedrick Machado MD [facility]   4/1/2025  9:45 AM [place] [facility] [place]   5/20/2025 10:45 [place] [facility] [place]       I spent a total of 32 minutes on the day of the visit.This includes face to face time and non-face to face time preparing to see the patient (eg, review of tests), obtaining and/or reviewing separately obtained history, documenting clinical information in the electronic or other health record, independently interpreting results and communicating results to the patient/family/caregiver, or care coordinator.  Visit today included increased complexity associated with the care of the episodic problem   addressed and managing the longitudinal care of the patient due to the serious and/or complex managed problem(s) .     Marcello Zambrano MD    This note was generated with the assistance of ambient listening technology. Verbal consent was obtained by the patient and accompanying visitor(s) for the recording of patient appointment to facilitate this note. I attest to having reviewed and edited the generated note for accuracy, though some syntax or spelling errors may persist. Please contact the author of this note for any clarification.          [1]   Patient Active Problem List  Diagnosis    Essential hypertension    Chronic anxiety    Cutaneous lupus erythematosus    Alpha thalassemia    Osteopenia    Ex-smoker    Chronic asthma without complication    Lumbar spondylosis    Atherosclerosis of aorta    History of total right knee replacement    Gait instability    Medication noncompliance due to cognitive impairment    Moderate major depression    Primary open angle glaucoma (POAG) of both eyes, severe stage    Arthritis of multiple sites    Adhesive capsulitis of right shoulder    Personal history of nicotine dependence     Non-traumatic rhabdomyolysis    Leukocytosis    Complete tear of right rotator  cuff    Chronic right shoulder pain    Frequent falls    Polypharmacy    Mild vascular dementia with psychotic disturbance    Uncomplicated opioid dependence   [2]   Current Outpatient Medications   Medication Sig Dispense Refill    atorvastatin (LIPITOR) 10 MG tablet Take 1 tablet (10 mg total) by mouth once daily. 90 tablet 3    citalopram (CELEXA) 10 MG tablet Take 1 tablet (10 mg total) by mouth once daily. 30 tablet 11    dorzolamide-timolol 2-0.5% (COSOPT) 22.3-6.8 mg/mL ophthalmic solution PLACE 1 DROP IN EACH EYE TWICE A DAY 10 mL 6    fluticasone propionate (FLONASE) 50 mcg/actuation nasal spray 1 spray (50 mcg total) by Each Nostril route once daily. 11.1 mL 0    ketorolac 0.5% (ACULAR) 0.5 % Drop PLACE 1 DROP IN THE LEFT EYE FOUR TIMES DAILY 10 mL 5    latanoprost 0.005 % ophthalmic solution Place 1 drop into both eyes once daily. 2.5 mL 6    multivitamin with folic acid 400 mcg Tab Take 1 tablet by mouth every morning.      oxyCODONE-acetaminophen (PERCOCET)  mg per tablet Take 1 tablet by mouth 3 (three) times daily as needed.      prednisoLONE acetate (PRED FORTE) 1 % DrpS Place 1 drop into both eyes 4 (four) times daily. 5 mL 1    QUEtiapine (SEROQUEL) 25 MG Tab Take 2 tablets (50 mg total) by mouth once daily. 60 tablet 2    thiamine mononitrate, vit B1, (VITAMIN B-1, MONONITRATE,) 100 mg Tab Take 1 tablet by mouth every morning.      valsartan-hydrochlorothiazide (DIOVAN-HCT) 160-12.5 mg per tablet Take 1 tablet by mouth once daily. 90 tablet 3    acetaZOLAMIDE (DIAMOX) 125 MG Tab Take 1 tablet (125 mg total) by mouth 3 (three) times daily. 90 tablet 0    atropine 1% (ISOPTO ATROPINE) 1 % Drop Place 1 drop into the right eye 2 (two) times a day. (Patient not taking: Reported on 3/6/2025) 5 mL 1    baclofen (LIORESAL) 10 MG tablet Take 10 mg by mouth 2 (two) times daily as needed. (Patient not taking: Reported on 12/17/2024)      brimonidine 0.2% (ALPHAGAN) 0.2 % Drop PLACE 1 DROP IN EACH EYE  TWICE A DAY (Patient not taking: Reported on 3/6/2025) 10 mL 6    estradioL (ESTRACE) 0.01 % (0.1 mg/gram) vaginal cream Place 1 g vaginally twice a week. (Patient not taking: Reported on 12/17/2024) 24 g 3    fexofenadine (ALLEGRA) 180 MG tablet Take 1 tablet (180 mg total) by mouth once daily. (Patient not taking: Reported on 12/17/2024) 30 tablet 0    loteprednol (LOTEMAX) 0.5 % ophthalmic suspension Place 1 drop into the left eye 4 (four) times daily. (Patient not taking: Reported on 3/6/2025) 5 mL 1    loteprednol etabonate (LOTEMAX SM) 0.38 % DrpG Place 1 drop into the right eye 4 (four) times daily. (Patient not taking: Reported on 3/6/2025) 5 g 1    magnesium oxide (MAG-OX) 400 mg (241.3 mg magnesium) tablet Take 400 mg by mouth. (Patient not taking: Reported on 3/6/2025)      moxifloxacin (VIGAMOX) 0.5 % ophthalmic solution Place 1 drop into the left eye 4 (four) times daily. (Patient not taking: Reported on 3/6/2025) 3 mL 1    pantoprazole (PROTONIX) 40 MG tablet Take 1 tablet by mouth every morning. (Patient not taking: Reported on 12/17/2024)      polymyxin B sulf-trimethoprim (POLYTRIM) 10,000 unit- 1 mg/mL Drop PLACE ONE DROP INTO THE RIGHT EYE FOUR TIMES DAILY (Patient not taking: Reported on 3/6/2025) 10 mL 2    prednisoLONE acetate (PRED FORTE) 1 % DrpS INSTILL 1 DROP IN RIGHT EYE 4 TIMES A DAY (Patient not taking: Reported on 3/6/2025) 5 mL 1     Current Facility-Administered Medications   Medication Dose Route Frequency Provider Last Rate Last Admin    ketorolac injection 30 mg  30 mg Intramuscular 1 time in Clinic/HOD         ketorolac injection 30 mg  30 mg Intramuscular 1 time in Clinic/HOD

## 2025-03-11 ENCOUNTER — OFFICE VISIT (OUTPATIENT)
Dept: SPORTS MEDICINE | Facility: CLINIC | Age: 80
End: 2025-03-11
Payer: MEDICARE

## 2025-03-11 ENCOUNTER — HOSPITAL ENCOUNTER (OUTPATIENT)
Dept: RADIOLOGY | Facility: HOSPITAL | Age: 80
Discharge: HOME | End: 2025-03-11
Attending: STUDENT IN AN ORGANIZED HEALTH CARE EDUCATION/TRAINING PROGRAM
Payer: MEDICARE

## 2025-03-11 VITALS — HEIGHT: 62 IN | WEIGHT: 148.56 LBS | BODY MASS INDEX: 27.34 KG/M2

## 2025-03-11 DIAGNOSIS — M75.102 ROTATOR CUFF TEAR ARTHROPATHY OF BOTH SHOULDERS: ICD-10-CM

## 2025-03-11 DIAGNOSIS — S42.222D CLOSED 2-PART DISPLACED FRACTURE OF SURGICAL NECK OF LEFT HUMERUS WITH ROUTINE HEALING, SUBSEQUENT ENCOUNTER: ICD-10-CM

## 2025-03-11 DIAGNOSIS — M12.812 ROTATOR CUFF TEAR ARTHROPATHY OF BOTH SHOULDERS: ICD-10-CM

## 2025-03-11 DIAGNOSIS — M75.101 ROTATOR CUFF TEAR ARTHROPATHY OF BOTH SHOULDERS: ICD-10-CM

## 2025-03-11 DIAGNOSIS — M12.811 ROTATOR CUFF TEAR ARTHROPATHY OF BOTH SHOULDERS: ICD-10-CM

## 2025-03-11 DIAGNOSIS — S42.222D CLOSED 2-PART DISPLACED FRACTURE OF SURGICAL NECK OF LEFT HUMERUS WITH ROUTINE HEALING, SUBSEQUENT ENCOUNTER: Primary | ICD-10-CM

## 2025-03-11 PROCEDURE — 99999 PR PBB SHADOW E&M-EST. PATIENT-LVL IV: CPT | Mod: PBBFAC,,, | Performed by: STUDENT IN AN ORGANIZED HEALTH CARE EDUCATION/TRAINING PROGRAM

## 2025-03-11 PROCEDURE — 73030 X-RAY EXAM OF SHOULDER: CPT | Mod: TC,LT

## 2025-03-11 PROCEDURE — 73030 X-RAY EXAM OF SHOULDER: CPT | Mod: 26,LT,, | Performed by: RADIOLOGY

## 2025-03-11 RX ADMIN — TRIAMCINOLONE ACETONIDE 40 MG: 40 INJECTION, SUSPENSION INTRA-ARTICULAR; INTRAMUSCULAR at 09:03

## 2025-03-13 RX ORDER — TRIAMCINOLONE ACETONIDE 40 MG/ML
40 INJECTION, SUSPENSION INTRA-ARTICULAR; INTRAMUSCULAR
Status: DISCONTINUED | OUTPATIENT
Start: 2025-03-11 | End: 2025-03-13 | Stop reason: HOSPADM

## 2025-03-13 NOTE — PROCEDURES
Large Joint Aspiration/Injection: R subacromial bursa    Date/Time: 3/11/2025 9:30 AM    Performed by: Sedrick Machado MD  Authorized by: Sedrick Machado MD    Consent Done?:  Yes (Verbal)  Indications:  Pain  Site marked: the procedure site was marked    Timeout: prior to procedure the correct patient, procedure, and site was verified    Prep: patient was prepped and draped in usual sterile fashion      Local anesthesia used?: Yes    Anesthesia:  Local infiltration  Local anesthetic:  Lidocaine 1% without epinephrine    Details:  Needle Size:  22 G  Ultrasonic Guidance for needle placement?: No    Approach:  Posterior  Location:  Shoulder  Site:  R subacromial bursa  Medications:  40 mg triamcinolone acetonide 40 mg/mL  Patient tolerance:  Patient tolerated the procedure well with no immediate complications  Large Joint Aspiration/Injection: L subacromial bursa    Date/Time: 3/11/2025 9:30 AM    Performed by: Sedrick Machado MD  Authorized by: Sedrick Machado MD    Consent Done?:  Yes (Verbal)  Indications:  Pain  Site marked: the procedure site was marked    Timeout: prior to procedure the correct patient, procedure, and site was verified    Prep: patient was prepped and draped in usual sterile fashion      Local anesthesia used?: Yes    Anesthesia:  Local infiltration  Local anesthetic:  Lidocaine 1% without epinephrine    Details:  Needle Size:  22 G  Ultrasonic Guidance for needle placement?: No    Approach:  Posterior  Location:  Shoulder  Site:  L subacromial bursa  Medications:  40 mg triamcinolone acetonide 40 mg/mL  Patient tolerance:  Patient tolerated the procedure well with no immediate complications

## 2025-03-15 ENCOUNTER — HOSPITAL ENCOUNTER (EMERGENCY)
Facility: HOSPITAL | Age: 80
Discharge: HOME | End: 2025-03-15
Attending: EMERGENCY MEDICINE
Payer: MEDICARE

## 2025-03-15 VITALS
HEIGHT: 64 IN | SYSTOLIC BLOOD PRESSURE: 126 MMHG | DIASTOLIC BLOOD PRESSURE: 66 MMHG | OXYGEN SATURATION: 96 % | WEIGHT: 148.56 LBS | TEMPERATURE: 99 F | RESPIRATION RATE: 18 BRPM | HEART RATE: 79 BPM | BODY MASS INDEX: 25.36 KG/M2

## 2025-03-15 DIAGNOSIS — E86.0 DEHYDRATION: Primary | ICD-10-CM

## 2025-03-15 DIAGNOSIS — R53.83 FATIGUE: ICD-10-CM

## 2025-03-15 LAB
ALBUMIN SERPL BCP-MCNC: 3.8 G/DL (ref 3.5–5.2)
ALP SERPL-CCNC: 80 U/L (ref 40–150)
ALT SERPL W/O P-5'-P-CCNC: 7 U/L (ref 10–44)
ANION GAP SERPL CALC-SCNC: 10 MMOL/L (ref 8–16)
AST SERPL-CCNC: 11 U/L (ref 10–40)
BASOPHILS # BLD AUTO: 0.03 K/UL (ref 0–0.2)
BASOPHILS NFR BLD: 0.3 % (ref 0–1.9)
BILIRUB SERPL-MCNC: 0.6 MG/DL (ref 0.1–1)
BILIRUB UR QL STRIP: NEGATIVE
BUN SERPL-MCNC: 40 MG/DL (ref 8–23)
CALCIUM SERPL-MCNC: 9.3 MG/DL (ref 8.7–10.5)
CHLORIDE SERPL-SCNC: 107 MMOL/L (ref 95–110)
CLARITY UR REFRACT.AUTO: CLEAR
CO2 SERPL-SCNC: 23 MMOL/L (ref 23–29)
COLOR UR AUTO: YELLOW
CREAT SERPL-MCNC: 1.4 MG/DL (ref 0.5–1.4)
CTP QC/QA: YES
DIFFERENTIAL METHOD BLD: ABNORMAL
EOSINOPHIL # BLD AUTO: 0 K/UL (ref 0–0.5)
EOSINOPHIL NFR BLD: 0.1 % (ref 0–8)
ERYTHROCYTE [DISTWIDTH] IN BLOOD BY AUTOMATED COUNT: 16.3 % (ref 11.5–14.5)
EST. GFR  (NO RACE VARIABLE): 38.5 ML/MIN/1.73 M^2
GLUCOSE SERPL-MCNC: 93 MG/DL (ref 70–110)
GLUCOSE UR QL STRIP: NEGATIVE
HCT VFR BLD AUTO: 39.7 % (ref 37–48.5)
HGB BLD-MCNC: 12 G/DL (ref 12–16)
HGB UR QL STRIP: NEGATIVE
IMM GRANULOCYTES # BLD AUTO: 0.04 K/UL (ref 0–0.04)
IMM GRANULOCYTES NFR BLD AUTO: 0.4 % (ref 0–0.5)
KETONES UR QL STRIP: NEGATIVE
LEUKOCYTE ESTERASE UR QL STRIP: ABNORMAL
LYMPHOCYTES # BLD AUTO: 2.6 K/UL (ref 1–4.8)
LYMPHOCYTES NFR BLD: 28.6 % (ref 18–48)
MAGNESIUM SERPL-MCNC: 1.7 MG/DL (ref 1.6–2.6)
MCH RBC QN AUTO: 21.5 PG (ref 27–31)
MCHC RBC AUTO-ENTMCNC: 30.2 G/DL (ref 32–36)
MCV RBC AUTO: 71 FL (ref 82–98)
MICROSCOPIC COMMENT: NORMAL
MONOCYTES # BLD AUTO: 0.9 K/UL (ref 0.3–1)
MONOCYTES NFR BLD: 9.8 % (ref 4–15)
NEUTROPHILS # BLD AUTO: 5.4 K/UL (ref 1.8–7.7)
NEUTROPHILS NFR BLD: 60.8 % (ref 38–73)
NITRITE UR QL STRIP: NEGATIVE
NRBC BLD-RTO: 0 /100 WBC
OHS QRS DURATION: 74 MS
OHS QTC CALCULATION: 427 MS
PH UR STRIP: 6 [PH] (ref 5–8)
PLATELET # BLD AUTO: 298 K/UL (ref 150–450)
PMV BLD AUTO: 9.9 FL (ref 9.2–12.9)
POCT GLUCOSE: 76 MG/DL (ref 70–110)
POTASSIUM SERPL-SCNC: 4 MMOL/L (ref 3.5–5.1)
PROT SERPL-MCNC: 8.1 G/DL (ref 6–8.4)
PROT UR QL STRIP: NEGATIVE
RBC # BLD AUTO: 5.59 M/UL (ref 4–5.4)
SARS-COV-2 RDRP RESP QL NAA+PROBE: NEGATIVE
SODIUM SERPL-SCNC: 140 MMOL/L (ref 136–145)
SP GR UR STRIP: 1.01 (ref 1–1.03)
TROPONIN I SERPL DL<=0.01 NG/ML-MCNC: 0.01 NG/ML (ref 0–0.03)
TSH SERPL DL<=0.005 MIU/L-ACNC: 0.83 UIU/ML (ref 0.4–4)
URN SPEC COLLECT METH UR: ABNORMAL
UROBILINOGEN UR STRIP-ACNC: NEGATIVE EU/DL
WBC # BLD AUTO: 8.91 K/UL (ref 3.9–12.7)
WBC #/AREA URNS AUTO: 3 /HPF (ref 0–5)

## 2025-03-15 PROCEDURE — 80053 COMPREHEN METABOLIC PANEL: CPT | Mod: ER | Performed by: EMERGENCY MEDICINE

## 2025-03-15 PROCEDURE — 84484 ASSAY OF TROPONIN QUANT: CPT | Mod: ER | Performed by: EMERGENCY MEDICINE

## 2025-03-15 PROCEDURE — 99285 EMERGENCY DEPT VISIT HI MDM: CPT | Mod: 25,ER

## 2025-03-15 PROCEDURE — 81000 URINALYSIS NONAUTO W/SCOPE: CPT | Mod: ER | Performed by: EMERGENCY MEDICINE

## 2025-03-15 PROCEDURE — 83735 ASSAY OF MAGNESIUM: CPT | Mod: ER | Performed by: EMERGENCY MEDICINE

## 2025-03-15 PROCEDURE — 93005 ELECTROCARDIOGRAM TRACING: CPT | Mod: ER

## 2025-03-15 PROCEDURE — 96360 HYDRATION IV INFUSION INIT: CPT | Mod: ER

## 2025-03-15 PROCEDURE — 82962 GLUCOSE BLOOD TEST: CPT | Mod: ER

## 2025-03-15 PROCEDURE — 84443 ASSAY THYROID STIM HORMONE: CPT | Mod: ER | Performed by: EMERGENCY MEDICINE

## 2025-03-15 PROCEDURE — 87635 SARS-COV-2 COVID-19 AMP PRB: CPT | Mod: ER | Performed by: EMERGENCY MEDICINE

## 2025-03-15 PROCEDURE — 93010 ELECTROCARDIOGRAM REPORT: CPT | Mod: ,,, | Performed by: INTERNAL MEDICINE

## 2025-03-15 PROCEDURE — 85025 COMPLETE CBC W/AUTO DIFF WBC: CPT | Mod: ER | Performed by: EMERGENCY MEDICINE

## 2025-03-15 PROCEDURE — 25000003 PHARM REV CODE 250: Mod: ER | Performed by: EMERGENCY MEDICINE

## 2025-03-15 RX ADMIN — SODIUM CHLORIDE 1000 ML: 9 INJECTION, SOLUTION INTRAVENOUS at 09:03

## 2025-03-15 NOTE — ED PROVIDER NOTES
Encounter Date: 3/15/2025       History     Chief Complaint   Patient presents with    Fatigue     Pt arrived by [facility] w/ complaints of being fatigue when waken up and a little confused. Pt GCS 15.     79-year-old female presents with fatigue that started this morning.  Family reports that she had decreased responsiveness this morning.  Patient reports that she has a headache yesterday but this has resolved.  She also reports an episode of urinary incontinence yesterday that was isolated.  She also reports some mild dizziness and shortness of breath.         Review of patient's allergies indicates:  No Known Allergies  Past Medical History:   Diagnosis Date    Alpha thalassemia     Asthma     resolved per patient    Cataract     Chronic anxiety     years tid xanax 1mg    Chronic knee pain     Chronic pain of left ankle     Clotting disorder     Cutaneous lupus erythematosus     dr henry derm    Depression     Depression     dr radha hughes previously    Ex-smoker     quit fall 1    Hypertension     Osteopenia 1/16 reck1/18     Past Surgical History:   Procedure Laterality Date    ANKLE SURGERY Left     BACK SURGERY      CATARACT EXTRACTION Right     COLONOSCOPY N/A 8/2/2017    Procedure: COLONOSCOPY;  Surgeon: Virgil Oliva MD;  Location: [facility];  Service: Endoscopy;  Laterality: N/A;    HYSTERECTOMY      INJECTION OF ANESTHETIC AGENT AROUND MEDIAL BRANCH NERVES INNERVATING LUMBAR FACET JOINT Bilateral 6/17/2020    Procedure: Bilateral L3-5 MBB;  Surgeon: Yury Moore MD;  Location: [facility];  Service: Pain Management;  Laterality: Bilateral;    INJECTION OF ANESTHETIC AGENT AROUND NERVE Bilateral 5/19/2020    Procedure: Bilateral Genicular nerve block with RN IV sedation Covid testing day of procedure PT does not drive;  Surgeon: Yury Moore MD;  Location: [facility];  Service: Pain Management;  Laterality: Bilateral;    KNEE ARTHROSCOPY      both knees twice    OOPHORECTOMY       RADIOFREQUENCY THERMOCOAGULATION Right 7/14/2020    Procedure: Right L3-5 Lumbar RFA;  Surgeon: Yury Moore MD;  Location: [facility];  Service: Pain Management;  Laterality: Right;    RADIOFREQUENCY THERMOCOAGULATION Left 8/6/2020    Procedure: Left L3-5 Lumbar RFA;  Surgeon: Yury Moore MD;  Location: [facility];  Service: Pain Management;  Laterality: Left;     Family History   Problem Relation Name Age of Onset    Diabetes Mother      Diabetes Father      Breast cancer Maternal Aunt       Social History[1]  Review of Systems   Constitutional:  Negative for fever.   Respiratory:  Positive for shortness of breath.    Cardiovascular:  Negative for chest pain.   Gastrointestinal:  Negative for diarrhea, nausea and vomiting.   Genitourinary:  Negative for dysuria.   Neurological:  Positive for dizziness.       Physical Exam     Initial Vitals [03/15/25 0736]   BP Pulse Resp Temp SpO2   (!) 154/83 79 17 98.7 °F (37.1 °C) 97 %      MAP       --         Physical Exam    Vitals reviewed.  Constitutional: She appears well-developed and well-nourished. She is not diaphoretic. No distress.   HENT:   Head: Normocephalic and atraumatic.   Eyes: EOM are normal. Pupils are equal, round, and reactive to light.   Cardiovascular:  Normal rate, regular rhythm and normal heart sounds.     Exam reveals no gallop and no friction rub.       No murmur heard.  Pulmonary/Chest: Breath sounds normal. No respiratory distress. She has no wheezes. She has no rhonchi. She has no rales.   Abdominal: Abdomen is soft. She exhibits no distension. There is no abdominal tenderness. There is no rebound and no guarding.   Musculoskeletal:         General: No tenderness or edema. Normal range of motion.     Neurological: She is alert and oriented to person, place, and time. She has normal strength. GCS score is 15. GCS eye subscore is 4. GCS verbal subscore is 5. GCS motor subscore is 6.   Skin: Skin is warm and dry.   Psychiatric: She  has a normal mood and affect.         ED Course   Procedures  Labs Reviewed   CBC W/ AUTO DIFFERENTIAL - Abnormal       Result Value    WBC 8.91      RBC 5.59 (*)     Hemoglobin 12.0      Hematocrit 39.7      MCV 71 (*)     MCH 21.5 (*)     MCHC 30.2 (*)     RDW 16.3 (*)     Platelets 298      MPV 9.9      Immature Granulocytes 0.4      Gran # (ANC) 5.4      Immature Grans (Abs) 0.04      Lymph # 2.6      Mono # 0.9      Eos # 0.0      Baso # 0.03      nRBC 0      Gran % 60.8      Lymph % 28.6      Mono % 9.8      Eosinophil % 0.1      Basophil % 0.3      Differential Method Automated     COMPREHENSIVE METABOLIC PANEL - Abnormal    Sodium 140      Potassium 4.0      Chloride 107      CO2 23      Glucose 93      BUN 40 (*)     Creatinine 1.4      Calcium 9.3      Total Protein 8.1      Albumin 3.8      Total Bilirubin 0.6      Alkaline Phosphatase 80      AST 11      ALT 7 (*)     eGFR 38.5 (*)     Anion Gap 10     URINALYSIS, REFLEX TO URINE CULTURE - Abnormal    Specimen UA Urine, Clean Catch      Color, UA Yellow      Appearance, UA Clear      pH, UA 6.0      Specific Gravity, UA 1.015      Protein, UA Negative      Glucose, UA Negative      Ketones, UA Negative      Bilirubin (UA) Negative      Occult Blood UA Negative      Nitrite, UA Negative      Urobilinogen, UA Negative      Leukocytes, UA 1+ (*)     Narrative:     Specimen Source->Urine   TROPONIN I    Troponin I 0.006     TSH    TSH 0.830     MAGNESIUM    Magnesium 1.7     URINALYSIS MICROSCOPIC    WBC, UA 3      Microscopic Comment SEE COMMENT      Narrative:     Specimen Source->Urine   SARS-COV-2 RDRP GENE    POC Rapid COVID Negative       Acceptable Yes     POCT GLUCOSE    POCT Glucose 76     POCT GLUCOSE MONITORING CONTINUOUS     EKG Readings: (Independently Interpreted)   Normal sinus rhythm rate 64.  T-wave inversions in the anterior leads which has been present on previous EKGs.  No ST changes.  NC interval 170.  QRS 74.  .      ECG Results              EKG 12-lead (In process)        Collection Time Result Time QRS Duration OHS QTC Calculation    03/15/25 08:08:45 03/15/25 08:10:07 74 427                     In process by Interface, Lab In [facility] (03/15/25 08:10:15)                   Narrative:    Test Reason : R53.83,    Vent. Rate :  64 BPM     Atrial Rate :  64 BPM     P-R Int : 170 ms          QRS Dur :  74 ms      QT Int : 414 ms       P-R-T Axes :  32 -13  60 degrees    QTcB Int : 427 ms    Normal sinus rhythm  T wave abnormality, consider anterior ischemia  Abnormal ECG  When compared with ECG of 18-Mar-2024 10:05,  Nonspecific T wave abnormality now evident in Lateral leads    Referred By: AAAREFERRAL SELF           Confirmed By:                                   Imaging Results              X-Ray Chest AP Portable (Final result)  Result time 03/15/25 09:14:00      Final result by Kamari Gupta III, MD (03/15/25 09:14:00)                   Impression:     No acute findings.  No change since prior examination.    Finalized on: 3/15/2025 9:14 AM By:  Kamari Gupta MD  [facility]# [id number]      2025-03-15 09:16:01.635     [facility]               Narrative:    EXAM: XR CHEST AP PORTABLE    CLINICAL HISTORY: Shortness of breath.     Comparison exam 03/18/2024.    Chest x-ray, one view.    FINDINGS:    Lungs are clear.  Heart size within normal limits.  Mild prominence of the aortopulmonary window likely due to pulmonary vasculature and mild patient rotation.  No acute bony findings.  Visualized upper abdomen normal.                                             CT Head Without Contrast (Final result)  Result time 03/15/25 08:28:30      Final result by Joanna Ridley MD (03/15/25 08:28:30)                   Impression:      No evidence of an acute intracranial abnormality.  Age-related global parenchymal volume loss and white matter changes that likely reflect chronic microvascular disease.      Finalized on: 3/15/2025 8:28 AM By:   Joanna Ridley MD  [facility]# [id number]      2025-03-15 08:30:39.014     [facility]               Narrative:    PROCEDURE:  CT HEAD WITHOUT CONTRAST    INDICATION:  Mental status change    TECHNIQUE:  Multidetector-row CT examination of the brain was performed from base to vertex [without] intravenous contrast material.This CT utilized automated exposure control and/or adjustment of the mA according to patient size and/or iterative reconstruction technique(s).    COMPARISON:No prior studies available for direct comparison.    FINDINGS:  The ventricles and sulci are normal in size and contour for age.  There is no evidence of intracranial hemorrhage or extra-axial collection.  No shift of the midline structures or mass effect is seen.  Gray-white matter differentiation is maintained.  Small vessel ischemic changes in the periventricular white matter.    No acute fracture of the skull base or calvarium is identified.  The visualized paranasal sinuses are clear.  The mastoid air cells are clear.  Bilateral lens replacements.                                         Medications   sodium chloride 0.9% bolus 1,000 mL 1,000 mL (0 mLs Intravenous Stopped 3/15/25 1049)     Medical Decision Making  Amount and/or Complexity of Data Reviewed  External Data Reviewed: radiology.     Details: 11/2023 TTE  ·  Left Ventricle: The left ventricle is normal in size. Normal wall thickness. There is concentric remodeling. Normal wall motion. There is normal systolic function with a visually estimated ejection fraction of 60 - 65%. There is normal diastolic function.  ·  Right Ventricle: Normal right ventricular cavity size. Wall thickness is normal. Right ventricle wall motion  is normal. Systolic function is normal.  ·  Pulmonary Artery: The estimated pulmonary artery systolic pressure is 18 mmHg.  ·  IVC/SVC: Normal venous pressure at 3 mmHg.    Labs: ordered.  Radiology: ordered.      Additional MDM:   Differential Diagnosis:   UTI, anemia, CORA,  electrolyte derangement, MI, thyroid abnormality, CVA, intracranial hemorrhage, pneumonia, COVID             ED Course as of 03/15/25 1314   Sat Mar 15, 2025   1208 Previous urine cultures show E coli resistant to some penicillins and cephalosporins and coagulase negative staph species sensitive to Augmentin. [NF]   1311 79-year-old female presents with fatigue likely secondary to dehydration.  BUN is elevated with a BUN creatinine ratio of 28.  Patient had a normal echo in 2023 and received one L of IV crystalloid fluids.  She has no fever or leukocytosis.  H&H is normal.  No significant electrolyte derangement.  Cardiac workup is unremarkable.  Thyroid level negative.  COVID negative.  No signs of UTI.  No signs of pneumonia.  Head CT shows no acute findings.  Symptoms likely secondary to dehydration.   [NF]      ED Course User Index  [NF] Virginia Brown DO                           Clinical Impression:  Final diagnoses:  [R53.83] Fatigue  [E86.0] Dehydration (Primary)          ED Disposition Condition    Discharge Stable          ED Prescriptions    None       Follow-up Information       Follow up With Specialties Details Why Contact Info    Marcello Zambrano MD Internal Medicine, Family Medicine On 3/17/2025  [address] [place] [id number]  [phone]      [facility] - Emergency Dept Emergency Medicine  As needed, If symptoms worsen [address] [place] [address]  Emergency Department  [facility] [id number] [phone]                 [1]   Social History  Tobacco Use    Smoking status: Former     Current packs/day: 0.00     Average packs/day: 1 pack/day for 30.0 years (30.0 ttl pk-yrs)     Types: Cigarettes     Start date: 9/2/1984     Quit date: 9/2/2014     Years since quitting: 10.5     Passive exposure: Never    Smokeless tobacco: Never    Tobacco comments:     smoke last cigarette 9/15   Substance Use Topics    Alcohol use: Yes     Alcohol/week: 1.0 standard drink of alcohol     Types: 1 Glasses of wine  per week     Comment: 3 times a week    Drug use: Virginia Michael, DO  03/15/25 [id number]

## 2025-03-19 ENCOUNTER — TELEPHONE (OUTPATIENT)
Dept: INTERNAL MEDICINE | Facility: CLINIC | Age: 80
End: 2025-03-19
Payer: MEDICARE

## 2025-03-19 NOTE — TELEPHONE ENCOUNTER
----- Message from Kavya sent at 3/19/2025  9:30 AM CDT -----  Contact: Tea  Type:  Sooner Apoointment RequestCaller is requesting a sooner appointment.  Caller declined first available appointment listed below.  Caller will not accept being placed on the waitlist and is requesting a message be sent to doctor.Name of Caller:FaheemTyrel is the first available appointment?July/2025Symptoms:follow up from ERWould the patient rather a call back or a response via [facility]? Call [facility] Call Back Number:[phone]!

## 2025-04-01 ENCOUNTER — OFFICE VISIT (OUTPATIENT)
Dept: OPHTHALMOLOGY | Facility: CLINIC | Age: 80
End: 2025-04-01
Payer: MEDICARE

## 2025-04-01 DIAGNOSIS — H35.351 CME (CYSTOID MACULAR EDEMA), RIGHT: ICD-10-CM

## 2025-04-01 DIAGNOSIS — H40.1133 PRIMARY OPEN ANGLE GLAUCOMA (POAG) OF BOTH EYES, SEVERE STAGE: Primary | ICD-10-CM

## 2025-04-01 DIAGNOSIS — Z96.1 PSEUDOPHAKIA OF BOTH EYES: ICD-10-CM

## 2025-04-01 PROCEDURE — 1159F MED LIST DOCD IN RCRD: CPT | Mod: CPTII,S$GLB,, | Performed by: OPHTHALMOLOGY

## 2025-04-01 PROCEDURE — G2211 COMPLEX E/M VISIT ADD ON: HCPCS | Mod: S$GLB,,, | Performed by: OPHTHALMOLOGY

## 2025-04-01 PROCEDURE — 99999 PR PBB SHADOW E&M-EST. PATIENT-LVL III: CPT | Mod: PBBFAC,,, | Performed by: OPHTHALMOLOGY

## 2025-04-01 PROCEDURE — 99214 OFFICE O/P EST MOD 30 MIN: CPT | Mod: S$GLB,,, | Performed by: OPHTHALMOLOGY

## 2025-04-01 NOTE — PROGRESS NOTES
HPI     Glaucoma            Comments: 4-6 weeks IOP check with mOCT    Patient states eyes been painful and burning but mostly right eye. No   visual changes.           Comments    1. Coag severe stage   GCL 21/22 4/26/24 and 11/2024   [facility] OD 3/4/24 -   [facility] OD 11/4/2022 ( 0.5ml)  2. PCIOL OS w/ [facility] 7/23/24 +20.5 SY60WF/CDE:9.35  OPENING DATE AROUND 9/3/2024   PCIOL OD 1/25/22 W/ Gonio- complex  Yag OD 5-3-22 by [facility] [place] Pitts was out that visit     Drop list:  Cosopt 2xs daily -- right eye  Ketorolac 4xs daily -- left eye   Prednisolone 2xs daily -- left eye   Prednisolone 1x daily -- right eye           Last edited by Loly Vasques MA on 4/1/2025 10:04 AM.            Assessment /Plan     For exam results, see Encounter Report.      ICD-10-CM ICD-9-CM    1. Primary open angle glaucoma (POAG) of both eyes, severe stage  H40.1133 365.11 Visit today included increased complexity associated with the care and management of glaucoma and macular disease . Patient aware that management of medical condition requires long term follow up.  Written instructions were placed in the portal for the patient upon closure of the encounter regarding specific use of the required medications.    Low IOP today OD, stop Cosopt      365.73       2. Pseudophakia of both eyes  Z96.1 V43.1       3. CME (cystoid macular edema), right  H35.351 362.53 Oct today           Drop list:   stop Cosopt   Ketorolac 4xs daily -- left eye   Prednisolone 2xs daily -- left eye   Prednisolone 1x daily -- right eye       Return to clinic 2-4 weeks IOP

## 2025-04-21 ENCOUNTER — OFFICE VISIT (OUTPATIENT)
Dept: INTERNAL MEDICINE | Facility: CLINIC | Age: 80
End: 2025-04-21
Payer: MEDICARE

## 2025-04-21 VITALS
TEMPERATURE: 98 F | HEART RATE: 80 BPM | WEIGHT: 145.31 LBS | OXYGEN SATURATION: 96 % | HEIGHT: 64 IN | SYSTOLIC BLOOD PRESSURE: 126 MMHG | DIASTOLIC BLOOD PRESSURE: 80 MMHG | BODY MASS INDEX: 24.81 KG/M2

## 2025-04-21 DIAGNOSIS — F41.9 CHRONIC ANXIETY: ICD-10-CM

## 2025-04-21 DIAGNOSIS — N18.32 STAGE 3B CHRONIC KIDNEY DISEASE: ICD-10-CM

## 2025-04-21 DIAGNOSIS — I10 ESSENTIAL HYPERTENSION: ICD-10-CM

## 2025-04-21 DIAGNOSIS — F11.20 UNCOMPLICATED OPIOID DEPENDENCE: ICD-10-CM

## 2025-04-21 DIAGNOSIS — F01.A2 MILD VASCULAR DEMENTIA WITH PSYCHOTIC DISTURBANCE: ICD-10-CM

## 2025-04-21 DIAGNOSIS — R30.0 DYSURIA: Primary | ICD-10-CM

## 2025-04-21 PROCEDURE — 99214 OFFICE O/P EST MOD 30 MIN: CPT | Mod: S$GLB,,, | Performed by: STUDENT IN AN ORGANIZED HEALTH CARE EDUCATION/TRAINING PROGRAM

## 2025-04-21 PROCEDURE — 3288F FALL RISK ASSESSMENT DOCD: CPT | Mod: CPTII,S$GLB,, | Performed by: STUDENT IN AN ORGANIZED HEALTH CARE EDUCATION/TRAINING PROGRAM

## 2025-04-21 PROCEDURE — 1101F PT FALLS ASSESS-DOCD LE1/YR: CPT | Mod: CPTII,S$GLB,, | Performed by: STUDENT IN AN ORGANIZED HEALTH CARE EDUCATION/TRAINING PROGRAM

## 2025-04-21 PROCEDURE — 99999 PR PBB SHADOW E&M-EST. PATIENT-LVL III: CPT | Mod: PBBFAC,,, | Performed by: STUDENT IN AN ORGANIZED HEALTH CARE EDUCATION/TRAINING PROGRAM

## 2025-04-21 PROCEDURE — G2211 COMPLEX E/M VISIT ADD ON: HCPCS | Mod: S$GLB,,, | Performed by: STUDENT IN AN ORGANIZED HEALTH CARE EDUCATION/TRAINING PROGRAM

## 2025-04-21 PROCEDURE — 3079F DIAST BP 80-89 MM HG: CPT | Mod: CPTII,S$GLB,, | Performed by: STUDENT IN AN ORGANIZED HEALTH CARE EDUCATION/TRAINING PROGRAM

## 2025-04-21 PROCEDURE — 3074F SYST BP LT 130 MM HG: CPT | Mod: CPTII,S$GLB,, | Performed by: STUDENT IN AN ORGANIZED HEALTH CARE EDUCATION/TRAINING PROGRAM

## 2025-04-21 PROCEDURE — 1125F AMNT PAIN NOTED PAIN PRSNT: CPT | Mod: CPTII,S$GLB,, | Performed by: STUDENT IN AN ORGANIZED HEALTH CARE EDUCATION/TRAINING PROGRAM

## 2025-04-21 RX ORDER — NITROFURANTOIN 25; 75 MG/1; MG/1
100 CAPSULE ORAL 2 TIMES DAILY
Qty: 14 CAPSULE | Refills: 0 | Status: SHIPPED | OUTPATIENT
Start: 2025-04-21 | End: 2025-04-28

## 2025-04-21 RX ORDER — NITROFURANTOIN 25; 75 MG/1; MG/1
100 CAPSULE ORAL 2 TIMES DAILY
Qty: 14 CAPSULE | Refills: 0 | Status: SHIPPED | OUTPATIENT
Start: 2025-04-21 | End: 2025-04-21

## 2025-04-21 NOTE — PROGRESS NOTES
Chief Complaint   Patient presents with    Back Pain    Flank Pain     HPI: Tea Ring is a 79 y.o. female  with Pmhx listed below who presents to clinic for    History of Present Illness    CHIEF COMPLAINT:  - Ms. Ring presents with complaints of pain in her side and back, as well as increased urinary frequency for the past two weeks.    HPI:  Ms. Ring reports pain in her left side and back, along with increased urinary frequency for two weeks. The urinary symptoms are intermittent with occasional incontinence, necessitating the use of adult incontinence products.    Ms. Ring reports vomiting yesterday and constipation requiring suppositories for bowel movements. She indicates difficulty with normal bowel function.    Ms. Ring recalls being evaluated at the emergency room 3-4 weeks ago, diagnosed with dehydration and treated with IV fluids. During this visit, a CT scan of her head was performed, and a urine test revealed leukocytes.    Ms. Ring reports difficulty opening bottles due to shoulder pain from a previous fracture. She was evaluated by Dr. Machado and received two shots in her shoulder.    Ms. Ring denies hematuria and dysuria.           Problem List:  Problem List[1]    ROS: Negative except as noted above.       Current Meds:  Current Medications[2]   PE:  BP: 126/80  Pulse: 80     Temp: 97.5 °F (36.4 °C)  Weight: 65.9 kg (145 lb 4.5 oz) Body mass index is 24.94 kg/m².    Wt Readings from Last 5 Encounters:   04/21/25 65.9 kg (145 lb 4.5 oz)   03/15/25 67.4 kg (148 lb 9.4 oz)   03/11/25 67.4 kg (148 lb 9.4 oz)   03/06/25 67.4 kg (148 lb 9.4 oz)   01/28/25 67.3 kg (148 lb 5.9 oz)     General appearance: alert and cooperative, not in acute distress  Head: normocephalic, without obvious abnormality, atraumatic  Eyes: conjunctivae/corneas clear. PERRL, EOM's intact.  Ears: clear tympanic membranes   Neck: no adenopathy, supple, symmetrical, trachea midline and thyroid not enlarged, symmetric, no  tenderness/mass/nodules, no JVD  Throat: lips, mucosa, and tongue normal; teeth and gums normal; no thrush  Chest: no reproducible chest pain   Heart: regular rate and rhythm, S1, S2 normal, no murmur, click, rub or gallop  Lungs: unlabored respiration, bilateral equal air entry, normal vesicular breath sound heard, no wheezing, rhonchi   Abdomen: soft, non-tender, non-distended; bowel sounds +; no masses,  no organomegaly, no ascites   Extremities: normal, atraumatic, no cyanosis or edema noted B/L upper and lower extremities.  Skin: skin color, texture, turgor normal. No rashes or lesions noted.  Neurologic: grossly intact      Lab:  Lab Results   Component Value Date    WBC 8.91 03/15/2025    HGB 12.0 03/15/2025    HCT 39.7 03/15/2025    MCV 71 (L) 03/15/2025     03/15/2025     03/15/2025    K 4.0 03/15/2025     03/15/2025    CO2 23 03/15/2025    BUN 40 (H) 03/15/2025    GLU 93 03/15/2025    CALCIUM 9.3 03/15/2025    MG 1.7 03/15/2025    PHOS 2.1 (L) 11/03/2023    AST 11 03/15/2025    ALT 7 (L) 03/15/2025    CHOL 112 (L) 10/24/2024    HDL 49 10/24/2024    LDLCALC 53.2 (L) 10/24/2024    TRIG 49 10/24/2024    TSH 0.830 03/15/2025    INR 1.2 12/21/2023       Impression:    ICD-10-CM ICD-9-CM    1. Dysuria  R30.0 788.1 Urinalysis, Reflex to Urine Culture      nitrofurantoin, macrocrystal-monohydrate, (MACROBID) 100 MG capsule      DISCONTINUED: nitrofurantoin, macrocrystal-monohydrate, (MACROBID) 100 MG capsule      2. Stage 3b chronic kidney disease  N18.32 585.3           Assessment & Plan    STAGE 3B CHRONIC KIDNEY DISEASE:  - Ms. Ring reports increased frequency and occasional urgency of urination for the past 2 weeks, with clear urine and no dysuria.  - Straining to urinate was also noted.  - Urinary germs are trending down but not yet at the desired level.  - Given the potential risk of UTI, especially considering the connection between constipation and increased UTI risk, Macrobid  (nitrofurantoin) has been prescribed for 7 days as a prophylactic antibiotic treatment.  - Ms. Ring is advised to increase fluid intake, particularly while taking the medication.    URINARY TRACT INFECTION:  - Previous urine test showed leukocytes.  - Ms. Ring to contact office after 7 days to assess symptom improvement.  - Discussed importance of adequate hydration; patient to increase water intake and reduce juice intake.    URINARY INCONTINENCE:  - Ms. Ring reports occasional urgency incontinence and uses incontinence products.    CONSTIPATION:  - Ms. Rnig has a history of constipation, which can increase risk of UTIs.  - Ms. Ring uses suppositories for bowel movements.  - Noted constipation as a potential side effect of opiates.    DEHYDRATION:  - Recent emergency department visit for dehydration, which may have contributed to current urinary symptoms.  - Ms. Ring received intravenous fluid therapy during the visit.    LEFT SHOULDER PAIN:  - Ms. Ring reports left shoulder pain and difficulty opening bottles.  - Recent follow-up with Dr. Machado, who released the shoulder.  - Ms. Ring received two intra-articular injections.  - Noted history of shoulder fracture.    PERSONAL HISTORY:  - Ms. Ring has a history of marital separation and substance use, including drug use and alcohol consumption.        1. Dysuria (Primary)  - Urinalysis, Reflex to Urine Culture  - nitrofurantoin, macrocrystal-monohydrate, (MACROBID) 100 MG capsule; Take 1 capsule (100 mg total) by mouth 2 (two) times daily. for 7 days  Dispense: 14 capsule; Refill: 0    2. Stage 3b chronic kidney disease  Reviewed renal function test  Stable  - counseled on increase water intake at least 3 litre of H20 to remain hydrated  - stay away from nephrotoxic drugs like Ibuprofen and NSAID  - Counseled on the need to control HTN and Blood glucose to prevent the disease progression  - low salt diet  - dietary modification: renal diet encouraged      3.  Essential hypertension  Low salt diet.  Enouraged to increase in physical activity at least 30 minutes of brisk walking/day , 5 days a week  Advised weight loss    Advised for DASH diet - The Dietary Approaches to Stop Hypertension (DASH) is high in vegetables, fruits, low-fat dairy products, whole grains, poultry, fish, and nuts and low in sweets, sugar-sweetened beverages, and red meats   Stop smoking.  Limit caffeine intake  Limit alcohol intake <3/day for men and <2/day for women.  Advised to be compliant with medication.  Please monitor your blood pressure regularly at home   Return precaution provided  Continue Diovan    4. Chronic anxiety  Mood stable on Celexa to be continued.    5. Uncomplicated opioid dependence  On percocet to be continued  Following pain management.    6. Mild vascular dementia with psychotic disturbance  [facility]  Dr. Okeefe wanted her to do EEG and further evaluation, she declined getting it done.    Future Appointments   Date Time Provider Department Center   5/20/2025 10:45 AM Alexandru Mariee MD [facility] [place]   7/24/2025  9:30 AM Shorty Burnett MD [facility] [place]       I spent a total of 31 minutes on the day of the visit.This includes face to face time and non-face to face time preparing to see the patient (eg, review of tests), obtaining and/or reviewing separately obtained history, documenting clinical information in the electronic or other health record, independently interpreting results and communicating results to the patient/family/caregiver, or care coordinator.  Visit today included increased complexity associated with the care of the episodic problem   addressed and managing the longitudinal care of the patient due to the serious and/or complex managed problem(s) .     Marcello Zambrano MD    This note was generated with the assistance of ambient listening technology. Verbal consent was obtained by the  patient and accompanying visitor(s) for the recording of patient appointment to facilitate this note. I attest to having reviewed and edited the generated note for accuracy, though some syntax or spelling errors may persist. Please contact the author of this note for any clarification.            [1]   Patient Active Problem List  Diagnosis    Essential hypertension    Chronic anxiety    Cutaneous lupus erythematosus    Alpha thalassemia    Osteopenia    Ex-smoker    Chronic asthma without complication    Lumbar spondylosis    Atherosclerosis of aorta    History of total right knee replacement    Gait instability    Medication noncompliance due to cognitive impairment    Moderate major depression    Primary open angle glaucoma (POAG) of both eyes, severe stage    Arthritis of multiple sites    Adhesive capsulitis of right shoulder    Personal history of nicotine dependence     Non-traumatic rhabdomyolysis    Leukocytosis    Complete tear of right rotator cuff    Chronic right shoulder pain    Frequent falls    Polypharmacy    Mild vascular dementia with psychotic disturbance    Uncomplicated opioid dependence    Stage 3b chronic kidney disease   [2]   Current Outpatient Medications   Medication Sig Dispense Refill    atorvastatin (LIPITOR) 10 MG tablet Take 1 tablet (10 mg total) by mouth once daily. 90 tablet 3    atropine 1% (ISOPTO ATROPINE) 1 % Drop Place 1 drop into the right eye 2 (two) times a day. 5 mL 1    baclofen (LIORESAL) 10 MG tablet Take 10 mg by mouth 2 (two) times daily as needed.      brimonidine 0.2% (ALPHAGAN) 0.2 % Drop PLACE 1 DROP IN EACH EYE TWICE A DAY 10 mL 6    citalopram (CELEXA) 10 MG tablet Take 1 tablet (10 mg total) by mouth once daily. 30 tablet 11    dorzolamide-timolol 2-0.5% (COSOPT) 22.3-6.8 mg/mL ophthalmic solution PLACE 1 DROP IN EACH EYE TWICE A DAY 10 mL 6    estradioL (ESTRACE) 0.01 % (0.1 mg/gram) vaginal cream Place 1 g vaginally twice a week. 24 g 3    fexofenadine  (ALLEGRA) 180 MG tablet Take 1 tablet (180 mg total) by mouth once daily. 30 tablet 0    fluticasone propionate (FLONASE) 50 mcg/actuation nasal spray 1 spray (50 mcg total) by Each Nostril route once daily. 11.1 mL 0    ketorolac 0.5% (ACULAR) 0.5 % Drop PLACE 1 DROP IN THE LEFT EYE FOUR TIMES DAILY 10 mL 5    latanoprost 0.005 % ophthalmic solution Place 1 drop into both eyes once daily. 2.5 mL 6    loteprednol (LOTEMAX) 0.5 % ophthalmic suspension Place 1 drop into the left eye 4 (four) times daily. 5 mL 1    loteprednol etabonate (LOTEMAX SM) 0.38 % DrpG Place 1 drop into the right eye 4 (four) times daily. 5 g 1    magnesium oxide (MAG-OX) 400 mg (241.3 mg magnesium) tablet Take 400 mg by mouth.      moxifloxacin (VIGAMOX) 0.5 % ophthalmic solution Place 1 drop into the left eye 4 (four) times daily. 3 mL 1    multivitamin with folic acid 400 mcg Tab Take 1 tablet by mouth every morning.      oxyCODONE-acetaminophen (PERCOCET)  mg per tablet Take 1 tablet by mouth 3 (three) times daily as needed.      pantoprazole (PROTONIX) 40 MG tablet Take 1 tablet by mouth every morning.      polymyxin B sulf-trimethoprim (POLYTRIM) 10,000 unit- 1 mg/mL Drop PLACE ONE DROP INTO THE RIGHT EYE FOUR TIMES DAILY 10 mL 2    prednisoLONE acetate (PRED FORTE) 1 % DrpS INSTILL 1 DROP IN RIGHT EYE 4 TIMES A DAY 5 mL 1    prednisoLONE acetate (PRED FORTE) 1 % DrpS Place 1 drop into both eyes 4 (four) times daily. 5 mL 1    thiamine mononitrate, vit B1, (VITAMIN B-1, MONONITRATE,) 100 mg Tab Take 1 tablet by mouth every morning.      valsartan-hydrochlorothiazide (DIOVAN-HCT) 160-12.5 mg per tablet Take 1 tablet by mouth once daily. 90 tablet 3    acetaZOLAMIDE (DIAMOX) 125 MG Tab Take 1 tablet (125 mg total) by mouth 3 (three) times daily. 90 tablet 0    nitrofurantoin, macrocrystal-monohydrate, (MACROBID) 100 MG capsule Take 1 capsule (100 mg total) by mouth 2 (two) times daily. for 7 days 14 capsule 0    QUEtiapine (SEROQUEL)  25 MG Tab Take 2 tablets (50 mg total) by mouth once daily. 60 tablet 2     Current Facility-Administered Medications   Medication Dose Route Frequency Provider Last Rate Last Admin    ketorolac injection 30 mg  30 mg Intramuscular 1 time in Clinic/HOD

## 2025-04-29 DIAGNOSIS — I70.0 ATHEROSCLEROSIS OF AORTA: Primary | Chronic | ICD-10-CM

## 2025-04-29 RX ORDER — ATORVASTATIN CALCIUM 10 MG/1
10 TABLET, FILM COATED ORAL DAILY
Qty: 90 TABLET | Refills: 3 | Status: SHIPPED | OUTPATIENT
Start: 2025-04-29

## 2025-05-02 DIAGNOSIS — H40.1133 PRIMARY OPEN ANGLE GLAUCOMA (POAG) OF BOTH EYES, SEVERE STAGE: ICD-10-CM

## 2025-05-05 DIAGNOSIS — H40.1133 PRIMARY OPEN ANGLE GLAUCOMA (POAG) OF BOTH EYES, SEVERE STAGE: ICD-10-CM

## 2025-05-05 RX ORDER — PREDNISOLONE ACETATE 10 MG/ML
1 SUSPENSION/ DROPS OPHTHALMIC 4 TIMES DAILY
Qty: 5 ML | Refills: 0 | Status: SHIPPED | OUTPATIENT
Start: 2025-05-05 | End: 2025-05-05 | Stop reason: SDUPTHER

## 2025-05-05 RX ORDER — PREDNISOLONE ACETATE 10 MG/ML
1 SUSPENSION/ DROPS OPHTHALMIC 4 TIMES DAILY
Qty: 5 ML | Refills: 0 | Status: SHIPPED | OUTPATIENT
Start: 2025-05-05

## 2025-05-20 ENCOUNTER — RESULTS FOLLOW-UP (OUTPATIENT)
Dept: RHEUMATOLOGY | Facility: CLINIC | Age: 80
End: 2025-05-20

## 2025-05-20 ENCOUNTER — OFFICE VISIT (OUTPATIENT)
Dept: RHEUMATOLOGY | Facility: CLINIC | Age: 80
End: 2025-05-20
Payer: MEDICARE

## 2025-05-20 ENCOUNTER — LAB VISIT (OUTPATIENT)
Dept: LAB | Facility: HOSPITAL | Age: 80
End: 2025-05-20
Attending: INTERNAL MEDICINE
Payer: MEDICARE

## 2025-05-20 VITALS
HEIGHT: 61 IN | WEIGHT: 145.06 LBS | DIASTOLIC BLOOD PRESSURE: 85 MMHG | HEART RATE: 61 BPM | SYSTOLIC BLOOD PRESSURE: 136 MMHG | BODY MASS INDEX: 27.39 KG/M2

## 2025-05-20 DIAGNOSIS — E55.9 VITAMIN D DEFICIENCY: Primary | ICD-10-CM

## 2025-05-20 DIAGNOSIS — M81.0 AGE-RELATED OSTEOPOROSIS WITHOUT CURRENT PATHOLOGICAL FRACTURE: Primary | ICD-10-CM

## 2025-05-20 DIAGNOSIS — M81.0 AGE-RELATED OSTEOPOROSIS WITHOUT CURRENT PATHOLOGICAL FRACTURE: ICD-10-CM

## 2025-05-20 DIAGNOSIS — E21.3 PARATHYROID HORMONE EXCESS: ICD-10-CM

## 2025-05-20 DIAGNOSIS — M54.32 BILATERAL SCIATICA: ICD-10-CM

## 2025-05-20 DIAGNOSIS — M25.50 POLYARTHRALGIA: ICD-10-CM

## 2025-05-20 DIAGNOSIS — M54.31 BILATERAL SCIATICA: ICD-10-CM

## 2025-05-20 DIAGNOSIS — E83.42 HYPOMAGNESEMIA: ICD-10-CM

## 2025-05-20 LAB
25(OH)D3+25(OH)D2 SERPL-MCNC: 16 NG/ML (ref 30–96)
ALBUMIN SERPL BCP-MCNC: 3.8 G/DL (ref 3.5–5.2)
ALP SERPL-CCNC: 75 UNIT/L (ref 40–150)
ALT SERPL W/O P-5'-P-CCNC: 10 UNIT/L (ref 10–44)
ANION GAP (OHS): 11 MMOL/L (ref 8–16)
AST SERPL-CCNC: 15 UNIT/L (ref 11–45)
BILIRUB SERPL-MCNC: 0.3 MG/DL (ref 0.1–1)
BUN SERPL-MCNC: 20 MG/DL (ref 8–23)
CALCIUM SERPL-MCNC: 9.7 MG/DL (ref 8.7–10.5)
CHLORIDE SERPL-SCNC: 105 MMOL/L (ref 95–110)
CO2 SERPL-SCNC: 24 MMOL/L (ref 23–29)
CREAT SERPL-MCNC: 1 MG/DL (ref 0.5–1.4)
GFR SERPLBLD CREATININE-BSD FMLA CKD-EPI: 58 ML/MIN/1.73/M2
GLUCOSE SERPL-MCNC: 106 MG/DL (ref 70–110)
MAGNESIUM SERPL-MCNC: 1.5 MG/DL (ref 1.6–2.6)
PHOSPHATE SERPL-MCNC: 2.5 MG/DL (ref 2.7–4.5)
POTASSIUM SERPL-SCNC: 4.3 MMOL/L (ref 3.5–5.1)
PROT SERPL-MCNC: 7.9 GM/DL (ref 6–8.4)
SODIUM SERPL-SCNC: 140 MMOL/L (ref 136–145)

## 2025-05-20 PROCEDURE — 84100 ASSAY OF PHOSPHORUS: CPT

## 2025-05-20 PROCEDURE — 84075 ASSAY ALKALINE PHOSPHATASE: CPT

## 2025-05-20 PROCEDURE — 86334 IMMUNOFIX E-PHORESIS SERUM: CPT

## 2025-05-20 PROCEDURE — 99999 PR PBB SHADOW E&M-EST. PATIENT-LVL V: CPT | Mod: PBBFAC,,, | Performed by: INTERNAL MEDICINE

## 2025-05-20 PROCEDURE — 36415 COLL VENOUS BLD VENIPUNCTURE: CPT

## 2025-05-20 PROCEDURE — 83970 ASSAY OF PARATHORMONE: CPT

## 2025-05-20 PROCEDURE — G2211 COMPLEX E/M VISIT ADD ON: HCPCS | Mod: S$GLB,,, | Performed by: INTERNAL MEDICINE

## 2025-05-20 PROCEDURE — 1125F AMNT PAIN NOTED PAIN PRSNT: CPT | Mod: CPTII,S$GLB,, | Performed by: INTERNAL MEDICINE

## 2025-05-20 PROCEDURE — 3079F DIAST BP 80-89 MM HG: CPT | Mod: CPTII,S$GLB,, | Performed by: INTERNAL MEDICINE

## 2025-05-20 PROCEDURE — 1101F PT FALLS ASSESS-DOCD LE1/YR: CPT | Mod: CPTII,S$GLB,, | Performed by: INTERNAL MEDICINE

## 2025-05-20 PROCEDURE — 99205 OFFICE O/P NEW HI 60 MIN: CPT | Mod: S$GLB,,, | Performed by: INTERNAL MEDICINE

## 2025-05-20 PROCEDURE — 84165 PROTEIN E-PHORESIS SERUM: CPT

## 2025-05-20 PROCEDURE — 82306 VITAMIN D 25 HYDROXY: CPT

## 2025-05-20 PROCEDURE — 82310 ASSAY OF CALCIUM: CPT

## 2025-05-20 PROCEDURE — 3288F FALL RISK ASSESSMENT DOCD: CPT | Mod: CPTII,S$GLB,, | Performed by: INTERNAL MEDICINE

## 2025-05-20 PROCEDURE — 1159F MED LIST DOCD IN RCRD: CPT | Mod: CPTII,S$GLB,, | Performed by: INTERNAL MEDICINE

## 2025-05-20 PROCEDURE — 3075F SYST BP GE 130 - 139MM HG: CPT | Mod: CPTII,S$GLB,, | Performed by: INTERNAL MEDICINE

## 2025-05-20 PROCEDURE — 83735 ASSAY OF MAGNESIUM: CPT

## 2025-05-20 PROCEDURE — 1160F RVW MEDS BY RX/DR IN RCRD: CPT | Mod: CPTII,S$GLB,, | Performed by: INTERNAL MEDICINE

## 2025-05-20 NOTE — PROGRESS NOTES
Normal calcium.  Low magnesium.  Please start over-the-counter magnesium supplement-SlowMag - 2 tablets daily as soon as possible.  Dr. MONDRAGON

## 2025-05-20 NOTE — PROGRESS NOTES
RHEUMATOLOGY CLINIC INITIAL VISIT    Reason for consult:-  Osteoporosis  Chief complaints, HPI, ROS, EXAM, Assessment & Plans:-  Tea Ruff a 79 y.o. pleasant female comes in with severe osteoporosis.  History of multiple fragility fractures.  Was on oral bisphosphonate therapy under recently.  Discontinued and referred here for further management.  She has been seen by my associate several years ago for polyarthralgia.  Was found to have osteoarthritis and chronic pain syndrome.  Complains of persistent worsening diffuse pain involving small and large joints along with upper, mid and back.  Under care of pain specialist  treated with oxycodone.  Rheumatological review of system negative otherwise.  Examination shows no synovitis.  Severe diffuse hyperesthesia.  1. Age-related osteoporosis without current pathological fracture    2. Polyarthralgia    3. Bilateral sciatica    4. Hypomagnesemia      Problem List Items Addressed This Visit       Age-related osteoporosis without current pathological fracture - Primary    Relevant Orders    Magnesium (Completed)    PTH, Intact    Vitamin D    Alkaline phosphatase, bone specific    NM Bone Scan Whole Body    Phosphorus    Immunofixation, Serum    Protein Electrophoresis, Serum    DXA Bone Density Axial Skeleton 1 or more sites    Comprehensive Metabolic Panel    Bilateral sciatica    Relevant Orders    Ambulatory referral/consult to Pain Clinic    Hypomagnesemia    Polyarthralgia       Severe osteoporosis with history of multiple fragility fractures.  Last DEXA scan done in 2016.  Repeat DEXA as soon as possible.  Ordered workup for secondary osteoporosis.  Check magnesium level because of long-term use of PPI which can cause hypomagnesemia.  Nuclear scan for any evidence of osteolytic lesions.    Based on her history she would definitely need anabolic therapy either with daily Tymlos/Forteo injections or monthly Evenity injection.  We will determine once the lab  results are available including PTH, calcium and DEXA scan.  Chronic pain syndrome on opioid therapy with pain specialists with bilateral sciatica.  Continue follow-up with pain specialist.  I have explained all of the above in detail and the patient understands all of the current recommendation(s). I have answered all questions to the best of my ability and to their complete satisfaction.         # Follow up in about 6 months (around 11/20/2025).      Past Medical History:   Diagnosis Date    Alpha thalassemia     Asthma     resolved per patient    Cataract     Chronic anxiety     years tid xanax 1mg    Chronic knee pain     Chronic pain of left ankle     Clotting disorder     Cutaneous lupus erythematosus     dr henry derm    Depression     Depression     dr radha hughes previously    Ex-smoker     quit fall 1    Hypertension     Osteopenia 1/16 reck1/18       Past Surgical History:   Procedure Laterality Date    ANKLE SURGERY Left     BACK SURGERY      CATARACT EXTRACTION Right     COLONOSCOPY N/A 8/2/2017    Procedure: COLONOSCOPY;  Surgeon: Virgil Oliva MD;  Location: [facility];  Service: Endoscopy;  Laterality: N/A;    HYSTERECTOMY      INJECTION OF ANESTHETIC AGENT AROUND MEDIAL BRANCH NERVES INNERVATING LUMBAR FACET JOINT Bilateral 6/17/2020    Procedure: Bilateral L3-5 MBB;  Surgeon: Yury Moore MD;  Location: [facility];  Service: Pain Management;  Laterality: Bilateral;    INJECTION OF ANESTHETIC AGENT AROUND NERVE Bilateral 5/19/2020    Procedure: Bilateral Genicular nerve block with RN IV sedation Covid testing day of procedure PT does not drive;  Surgeon: Yury Moore MD;  Location: [facility];  Service: Pain Management;  Laterality: Bilateral;    KNEE ARTHROSCOPY      both knees twice    OOPHORECTOMY      RADIOFREQUENCY THERMOCOAGULATION Right 7/14/2020    Procedure: Right L3-5 Lumbar RFA;  Surgeon: Yury Moore MD;  Location: [facility];  Service: Pain Management;   Laterality: Right;    RADIOFREQUENCY THERMOCOAGULATION Left 8/6/2020    Procedure: Left L3-5 Lumbar RFA;  Surgeon: Yury Moore MD;  Location: [facility];  Service: Pain Management;  Laterality: Left;        Social History[1]    Family History   Problem Relation Name Age of Onset    Diabetes Mother      Diabetes Father      Breast cancer Maternal Aunt         Review of patient's allergies indicates:  No Known Allergies    Medication List with Changes/Refills   Current Medications    ACETAZOLAMIDE (DIAMOX) 125 MG TAB    Take 1 tablet (125 mg total) by mouth 3 (three) times daily.    ATORVASTATIN (LIPITOR) 10 MG TABLET    Take 1 tablet (10 mg total) by mouth once daily.    ATROPINE 1% (ISOPTO ATROPINE) 1 % DROP    Place 1 drop into the right eye 2 (two) times a day.    BACLOFEN (LIORESAL) 10 MG TABLET    Take 10 mg by mouth 2 (two) times daily as needed.    BRIMONIDINE 0.2% (ALPHAGAN) 0.2 % DROP    PLACE 1 DROP IN EACH EYE TWICE A DAY    CITALOPRAM (CELEXA) 10 MG TABLET    Take 1 tablet (10 mg total) by mouth once daily.    DORZOLAMIDE-TIMOLOL 2-0.5% (COSOPT) 22.3-6.8 MG/ML OPHTHALMIC SOLUTION    PLACE 1 DROP IN EACH EYE TWICE A DAY    ESTRADIOL (ESTRACE) 0.01 % (0.1 MG/GRAM) VAGINAL CREAM    Place 1 g vaginally twice a week.    FEXOFENADINE (ALLEGRA) 180 MG TABLET    Take 1 tablet (180 mg total) by mouth once daily.    FLUTICASONE PROPIONATE (FLONASE) 50 MCG/ACTUATION NASAL SPRAY    1 spray (50 mcg total) by Each Nostril route once daily.    KETOROLAC 0.5% (ACULAR) 0.5 % DROP    PLACE 1 DROP IN THE LEFT EYE FOUR TIMES DAILY    LATANOPROST 0.005 % OPHTHALMIC SOLUTION    Place 1 drop into both eyes once daily.    LOTEPREDNOL (LOTEMAX) 0.5 % OPHTHALMIC SUSPENSION    Place 1 drop into the left eye 4 (four) times daily.    LOTEPREDNOL ETABONATE (LOTEMAX SM) 0.38 % DRPG    Place 1 drop into the right eye 4 (four) times daily.    MAGNESIUM OXIDE (MAG-OX) 400 MG (241.3 MG MAGNESIUM) TABLET    Take 400 mg by mouth.     MOXIFLOXACIN (VIGAMOX) 0.5 % OPHTHALMIC SOLUTION    Place 1 drop into the left eye 4 (four) times daily.    MULTIVITAMIN WITH FOLIC ACID 400 MCG TAB    Take 1 tablet by mouth every morning.    OXYCODONE-ACETAMINOPHEN (PERCOCET)  MG PER TABLET    Take 1 tablet by mouth 3 (three) times daily as needed.    PANTOPRAZOLE (PROTONIX) 40 MG TABLET    Take 1 tablet by mouth every morning.    POLYMYXIN B SULF-TRIMETHOPRIM (POLYTRIM) 10,000 UNIT- 1 MG/ML DROP    PLACE ONE DROP INTO THE RIGHT EYE FOUR TIMES DAILY    PREDNISOLONE ACETATE (PRED FORTE) 1 % DRPS    INSTILL 1 DROP IN RIGHT EYE 4 TIMES A DAY    PREDNISOLONE ACETATE (PRED FORTE) 1 % DRPS    Place 1 drop into both eyes 4 (four) times daily.    PREDNISOLONE ACETATE (PRED FORTE) 1 % DRPS    Place 1 drop into the right eye 4 (four) times daily.    QUETIAPINE (SEROQUEL) 25 MG TAB    Take 2 tablets (50 mg total) by mouth once daily.    THIAMINE MONONITRATE, VIT B1, (VITAMIN B-1, MONONITRATE,) 100 MG TAB    Take 1 tablet by mouth every morning.    VALSARTAN-HYDROCHLOROTHIAZIDE (DIOVAN-HCT) 160-12.5 MG PER TABLET    Take 1 tablet by mouth once daily.         Thank you for allowing me to participate in the care ofTea KELTON Ring.    Disclaimer: This note was prepared using voice recognition system and is likely to have sound alike errors and is not proof read.  Please call me with any questions.         [1]   Social History  Tobacco Use    Smoking status: Former     Current packs/day: 0.00     Average packs/day: 1 pack/day for 30.0 years (30.0 ttl pk-yrs)     Types: Cigarettes     Start date: 9/2/1984     Quit date: 9/2/2014     Years since quitting: 10.7     Passive exposure: Never    Smokeless tobacco: Never    Tobacco comments:     smoke last cigarette 9/15   Substance Use Topics    Alcohol use: Yes     Alcohol/week: 1.0 standard drink of alcohol     Types: 1 Glasses of wine per week     Comment: 3 times a week    Drug use: No

## 2025-05-21 LAB
ALBUMIN, SPE (OHS): 3.7 G/DL (ref 3.35–5.55)
ALPHA 1 GLOB (OHS): 0.34 GM/DL (ref 0.17–0.41)
ALPHA 2 GLOB (OHS): 0.72 GM/DL (ref 0.43–0.99)
BETA GLOB (OHS): 0.88 GM/DL (ref 0.5–1.1)
GAMMA GLOBULIN (OHS): 1.36 GM/DL (ref 0.67–1.58)
PROT SERPL-MCNC: 7 GM/DL (ref 6–8.4)
PTH-INTACT SERPL-MCNC: 156.9 PG/ML (ref 9–77)

## 2025-05-21 RX ORDER — ERGOCALCIFEROL 1.25 MG/1
50000 CAPSULE ORAL
Qty: 12 CAPSULE | Refills: 3 | Status: ON HOLD | OUTPATIENT
Start: 2025-05-21 | End: 2025-06-23 | Stop reason: HOSPADM

## 2025-05-21 NOTE — PROGRESS NOTES
Severe vitamin-D deficiency.  Start vitamin-D supplementation sent to your pharmacy as soon as possible.  Dr. MONDRAGON

## 2025-05-22 ENCOUNTER — TELEPHONE (OUTPATIENT)
Dept: RHEUMATOLOGY | Facility: CLINIC | Age: 80
End: 2025-05-22
Payer: MEDICARE

## 2025-05-22 NOTE — TELEPHONE ENCOUNTER
----- Message from Alexandru Mariee MD sent at 5/21/2025  8:20 PM CDT -----  High PTH. Please schedule USG and nuclear scan for evaluation of hyperparathyroidism.     ----- Message -----  From: Lab, Background User  Sent: 5/20/2025   3:40 PM CDT  To: Alexandru Mariee MD

## 2025-05-23 LAB
PATHOLOGIST INTERPRETATION - IFE SERUM (OHS): NORMAL
PATHOLOGIST REVIEW - SPE (OHS): NORMAL
W ALKALINE PHOSPHATASE, BONE: 14.5 UG/L

## 2025-05-26 ENCOUNTER — TELEPHONE (OUTPATIENT)
Dept: RHEUMATOLOGY | Facility: CLINIC | Age: 80
End: 2025-05-26
Payer: MEDICARE

## 2025-05-26 NOTE — TELEPHONE ENCOUNTER
Copied from CRM #[id number]. Topic: General Inquiry - Patient Advice  >> May 26, 2025 12:50 PM Fatimah wrote:  Type:  Patient Returning Call    Who Called:Tea   Who Left Message for Patient:nurse   Does the patient know what this is regarding?:returning missed call  Would the patient rather a call back or a response via [facility]?  call  Best Call Back Number:[phone]     Additional Information:

## 2025-05-27 ENCOUNTER — HOSPITAL ENCOUNTER (OUTPATIENT)
Dept: RADIOLOGY | Facility: HOSPITAL | Age: 80
Discharge: HOME | End: 2025-05-27
Attending: INTERNAL MEDICINE
Payer: MEDICARE

## 2025-05-27 ENCOUNTER — RESULTS FOLLOW-UP (OUTPATIENT)
Dept: RHEUMATOLOGY | Facility: CLINIC | Age: 80
End: 2025-05-27

## 2025-05-27 DIAGNOSIS — E21.3 PARATHYROID HORMONE EXCESS: ICD-10-CM

## 2025-05-27 DIAGNOSIS — M81.0 AGE-RELATED OSTEOPOROSIS WITHOUT CURRENT PATHOLOGICAL FRACTURE: ICD-10-CM

## 2025-05-27 PROCEDURE — 76536 US EXAM OF HEAD AND NECK: CPT | Mod: TC,PO

## 2025-05-27 PROCEDURE — 76536 US EXAM OF HEAD AND NECK: CPT | Mod: 26,,, | Performed by: RADIOLOGY

## 2025-05-27 PROCEDURE — 77080 DXA BONE DENSITY AXIAL: CPT | Mod: TC,PO

## 2025-05-27 PROCEDURE — 77080 DXA BONE DENSITY AXIAL: CPT | Mod: 26,,, | Performed by: RADIOLOGY

## 2025-05-27 NOTE — PROGRESS NOTES
Ultrasound shows thyroid nodule.  Repeat ultrasound in 1 year.  Awaiting nuclear parathyroid scan results to rule out any parathyroid disease.

## 2025-05-28 ENCOUNTER — TELEPHONE (OUTPATIENT)
Dept: RHEUMATOLOGY | Facility: CLINIC | Age: 80
End: 2025-05-28
Payer: MEDICARE

## 2025-05-28 NOTE — TELEPHONE ENCOUNTER
----- Message from Alexandru Mariee MD sent at 5/27/2025  4:38 PM CDT -----  Ultrasound shows thyroid nodule.  Repeat ultrasound in 1 year.  Awaiting nuclear parathyroid scan results to rule out any parathyroid disease.  ----- Message -----  From: [facility] Rad Results In  Sent: 5/27/2025  11:04 AM CDT  To: Alexandru Mariee MD

## 2025-05-28 NOTE — TELEPHONE ENCOUNTER
Daughter aware, advised her to call radiology to try to get Parathyroid scan on 6/17 when she is scheduled for the whole body bone scan

## 2025-06-06 DIAGNOSIS — H40.1133 PRIMARY OPEN ANGLE GLAUCOMA (POAG) OF BOTH EYES, SEVERE STAGE: ICD-10-CM

## 2025-06-08 ENCOUNTER — NURSE TRIAGE (OUTPATIENT)
Dept: ADMINISTRATIVE | Facility: CLINIC | Age: 80
End: 2025-06-08
Payer: MEDICARE

## 2025-06-08 NOTE — TELEPHONE ENCOUNTER
Patient calling in to report she had eye surgery a few months ago and she is out of her drops. She missed her appointment with Dr Burnett due to transportation. She reports pain to both of her eyes with visual disturbance and they are draining clear fluid. Pain is 10/10. Disposition provided - go to ER now. Instructed to call back with additional questions or worsening of symptoms. Patient verbalized understanding.     Reason for Disposition   SEVERE eye pain (e.g., excruciating)    Protocols used: Eye Pain and Other Symptoms-A-AH

## 2025-06-09 RX ORDER — PREDNISOLONE ACETATE 10 MG/ML
SUSPENSION/ DROPS OPHTHALMIC
Qty: 10 ML | Refills: 0 | Status: ON HOLD | OUTPATIENT
Start: 2025-06-09

## 2025-06-10 ENCOUNTER — RESULTS FOLLOW-UP (OUTPATIENT)
Dept: INTERNAL MEDICINE | Facility: CLINIC | Age: 80
End: 2025-06-10

## 2025-06-10 ENCOUNTER — OFFICE VISIT (OUTPATIENT)
Dept: INTERNAL MEDICINE | Facility: CLINIC | Age: 80
End: 2025-06-10
Payer: MEDICARE

## 2025-06-10 VITALS
WEIGHT: 143.94 LBS | TEMPERATURE: 97 F | BODY MASS INDEX: 27.18 KG/M2 | DIASTOLIC BLOOD PRESSURE: 50 MMHG | SYSTOLIC BLOOD PRESSURE: 100 MMHG | HEART RATE: 68 BPM | OXYGEN SATURATION: 97 % | HEIGHT: 61 IN

## 2025-06-10 DIAGNOSIS — M47.816 LUMBAR SPONDYLOSIS: ICD-10-CM

## 2025-06-10 DIAGNOSIS — N18.32 STAGE 3B CHRONIC KIDNEY DISEASE: ICD-10-CM

## 2025-06-10 DIAGNOSIS — N39.0 FREQUENT UTI: Primary | ICD-10-CM

## 2025-06-10 DIAGNOSIS — I10 ESSENTIAL HYPERTENSION: ICD-10-CM

## 2025-06-10 DIAGNOSIS — R51.9 CHRONIC DAILY HEADACHE: ICD-10-CM

## 2025-06-10 DIAGNOSIS — R35.0 URINARY FREQUENCY: ICD-10-CM

## 2025-06-10 LAB
BACTERIA #/AREA URNS HPF: ABNORMAL /HPF
BILIRUB UR QL STRIP.AUTO: NEGATIVE
CLARITY UR: CLEAR
COLOR UR AUTO: YELLOW
GLUCOSE UR QL STRIP: NEGATIVE
HGB UR QL STRIP: NEGATIVE
HOLD SPECIMEN: NORMAL
KETONES UR QL STRIP: NEGATIVE
LEUKOCYTE ESTERASE UR QL STRIP: ABNORMAL
MICROSCOPIC COMMENT: ABNORMAL
NITRITE UR QL STRIP: NEGATIVE
PH UR STRIP: 6 [PH]
PROT UR QL STRIP: NEGATIVE
RBC #/AREA URNS HPF: 2 /HPF (ref 0–4)
SP GR UR STRIP: 1.02
SQUAMOUS #/AREA URNS HPF: 10 /HPF
UROBILINOGEN UR STRIP-ACNC: NEGATIVE EU/DL
WBC #/AREA URNS HPF: 8 /HPF (ref 0–5)

## 2025-06-10 PROCEDURE — 3078F DIAST BP <80 MM HG: CPT | Mod: CPTII,S$GLB,, | Performed by: STUDENT IN AN ORGANIZED HEALTH CARE EDUCATION/TRAINING PROGRAM

## 2025-06-10 PROCEDURE — 99999 PR PBB SHADOW E&M-EST. PATIENT-LVL V: CPT | Mod: PBBFAC,,, | Performed by: STUDENT IN AN ORGANIZED HEALTH CARE EDUCATION/TRAINING PROGRAM

## 2025-06-10 PROCEDURE — 99214 OFFICE O/P EST MOD 30 MIN: CPT | Mod: 25,S$GLB,, | Performed by: STUDENT IN AN ORGANIZED HEALTH CARE EDUCATION/TRAINING PROGRAM

## 2025-06-10 PROCEDURE — 81003 URINALYSIS AUTO W/O SCOPE: CPT | Mod: PO | Performed by: STUDENT IN AN ORGANIZED HEALTH CARE EDUCATION/TRAINING PROGRAM

## 2025-06-10 PROCEDURE — 96372 THER/PROPH/DIAG INJ SC/IM: CPT | Mod: S$GLB,,, | Performed by: STUDENT IN AN ORGANIZED HEALTH CARE EDUCATION/TRAINING PROGRAM

## 2025-06-10 PROCEDURE — 1160F RVW MEDS BY RX/DR IN RCRD: CPT | Mod: CPTII,S$GLB,, | Performed by: STUDENT IN AN ORGANIZED HEALTH CARE EDUCATION/TRAINING PROGRAM

## 2025-06-10 PROCEDURE — 1125F AMNT PAIN NOTED PAIN PRSNT: CPT | Mod: CPTII,S$GLB,, | Performed by: STUDENT IN AN ORGANIZED HEALTH CARE EDUCATION/TRAINING PROGRAM

## 2025-06-10 PROCEDURE — 1101F PT FALLS ASSESS-DOCD LE1/YR: CPT | Mod: CPTII,S$GLB,, | Performed by: STUDENT IN AN ORGANIZED HEALTH CARE EDUCATION/TRAINING PROGRAM

## 2025-06-10 PROCEDURE — 3074F SYST BP LT 130 MM HG: CPT | Mod: CPTII,S$GLB,, | Performed by: STUDENT IN AN ORGANIZED HEALTH CARE EDUCATION/TRAINING PROGRAM

## 2025-06-10 PROCEDURE — 1159F MED LIST DOCD IN RCRD: CPT | Mod: CPTII,S$GLB,, | Performed by: STUDENT IN AN ORGANIZED HEALTH CARE EDUCATION/TRAINING PROGRAM

## 2025-06-10 PROCEDURE — 3288F FALL RISK ASSESSMENT DOCD: CPT | Mod: CPTII,S$GLB,, | Performed by: STUDENT IN AN ORGANIZED HEALTH CARE EDUCATION/TRAINING PROGRAM

## 2025-06-10 RX ORDER — KETOROLAC TROMETHAMINE 30 MG/ML
30 INJECTION, SOLUTION INTRAMUSCULAR; INTRAVENOUS
Status: COMPLETED | OUTPATIENT
Start: 2025-06-10 | End: 2025-06-10

## 2025-06-10 RX ORDER — SULFAMETHOXAZOLE AND TRIMETHOPRIM 800; 160 MG/1; MG/1
1 TABLET ORAL 2 TIMES DAILY
Qty: 20 TABLET | Refills: 0 | Status: SHIPPED | OUTPATIENT
Start: 2025-06-10 | End: 2025-06-10

## 2025-06-10 RX ORDER — NITROFURANTOIN 25; 75 MG/1; MG/1
100 CAPSULE ORAL 2 TIMES DAILY
Qty: 14 CAPSULE | Refills: 0 | Status: ON HOLD | OUTPATIENT
Start: 2025-06-10 | End: 2025-06-17

## 2025-06-10 RX ORDER — AMITRIPTYLINE HYDROCHLORIDE 50 MG/1
50 TABLET, FILM COATED ORAL NIGHTLY
Qty: 30 TABLET | Refills: 11 | Status: ON HOLD | OUTPATIENT
Start: 2025-06-10 | End: 2026-06-10

## 2025-06-10 RX ADMIN — KETOROLAC TROMETHAMINE 30 MG: 30 INJECTION, SOLUTION INTRAMUSCULAR; INTRAVENOUS at 11:06

## 2025-06-10 NOTE — PROGRESS NOTES
Chief Complaint   Patient presents with    Urinary Frequency    Headache     HPI: Tea Ring is a 79 y.o. female  with Pmhx listed below who presents to clinic for    History of Present Illness    CHIEF COMPLAINT:  - Ms. Ring presents with complaints of frequent urination and persistent headaches.    HPI:  Ms. Ring reports frequent urination for approximately 2 weeks, needing to use the bathroom at least 6 times daily or more. This issue has been recurring, with periods of improvement followed by recurrence. She denies burning sensation during urination but reports occasional leakage and varying urine output volume.    Ms. Ring has headaches since childhood, occurring almost every other day and lasting all day, with current pain level of 8 out of 10. The headaches are located all over her head and are associated with nausea and vomiting. They wake her up from sleep and have been worsening. She has dizziness with the headaches and finds it difficult to move quickly, especially to use the bathroom. Lying down in a quiet environment provides some relief, sometimes allowing her to briefly fall asleep. She has tried Tylenol and Aleve for pain relief.    Ms. Ring reports sleep issues, stating she cannot sleep well at night anymore and wakes up unable to return to sleep. She mentions having blurry vision, which she attributes to glaucoma and eye surgery.    Ms. Ring has a history of UTIs, having been treated with Macrobid about 3 weeks ago. She drinks water and cranberry juice to help with her urinary symptoms. Ms. Ring was previously evaluated by a urologist, Dr. Radha Bernal, on 01/28/2025 for frequent urinary tract infections but states she was discharged. She also used to be evaluated by a neurologist, Dr. Nixon Okeefe, for her headaches but has not seen him in almost a year.           Problem List:  Problem List[1]    ROS: Negative except as noted above.       Current Meds:  Current Medications[2]   PE:  BP: (!)  100/50  Pulse: 68     Temp: 97 °F (36.1 °C)  Weight: 65.3 kg (143 lb 15.4 oz) Body mass index is 27.2 kg/m².    Wt Readings from Last 5 Encounters:   06/10/25 65.3 kg (143 lb 15.4 oz)   05/20/25 65.8 kg (145 lb 1 oz)   04/21/25 65.9 kg (145 lb 4.5 oz)   03/15/25 67.4 kg (148 lb 9.4 oz)   03/11/25 67.4 kg (148 lb 9.4 oz)     General appearance: alert and cooperative, not in acute distress  Head: normocephalic, without obvious abnormality, atraumatic  Eyes: conjunctivae/corneas clear. PERRL, EOM's intact.  Ears: clear tympanic membranes   Neck: no adenopathy, supple, symmetrical, trachea midline and thyroid not enlarged, symmetric, no tenderness/mass/nodules, no JVD  Throat: lips, mucosa, and tongue normal; teeth and gums normal; no thrush  Chest: no reproducible chest pain   Heart: regular rate and rhythm, S1, S2 normal, no murmur, click, rub or gallop  Lungs: unlabored respiration, bilateral equal air entry, normal vesicular breath sound heard, no wheezing, rhonchi   Abdomen: soft, non-tender, non-distended; bowel sounds +; no masses,  no organomegaly, no ascites   Extremities: normal, atraumatic, no cyanosis or edema noted B/L upper and lower extremities.  Skin: skin color, texture, turgor normal. No rashes or lesions noted.  Neurologic: grossly intact      Lab:  Lab Results   Component Value Date    WBC 8.91 03/15/2025    HGB 12.0 03/15/2025    HCT 39.7 03/15/2025    MCV 71 (L) 03/15/2025     03/15/2025     05/20/2025    K 4.3 05/20/2025     05/20/2025    CO2 24 05/20/2025    BUN 20 05/20/2025     05/20/2025    CALCIUM 9.7 05/20/2025    MG 1.5 (L) 05/20/2025    PHOS 2.5 (L) 05/20/2025    AST 15 05/20/2025    ALT 10 05/20/2025    CHOL 112 (L) 10/24/2024    HDL 49 10/24/2024    LDLCALC 53.2 (L) 10/24/2024    TRIG 49 10/24/2024    TSH 0.830 03/15/2025    INR 1.2 12/21/2023       Impression:    ICD-10-CM ICD-9-CM    1. Frequent UTI  N39.0 599.0 Urinalysis, Reflex to Urine Culture Urine, Clean  Catch      GREY TOP URINE HOLD      Urinalysis Microscopic      2. Urinary frequency  R35.0 788.41 Ambulatory referral/consult to Urology      3. Essential hypertension  I10 401.9       4. Stage 3b chronic kidney disease  N18.32 585.3       5. Chronic daily headache  R51.9 784.0 Ambulatory referral/consult to Neurology      CANCELED: Ambulatory referral/consult to Neurology      6. Lumbar spondylosis  M47.816 721.3 ketorolac injection 30 mg          Assessment & Plan    MIGRAINE:  - Ms. Ring reports daily headaches, sometimes present upon waking and when going to bed.  - Headaches can occur on the entire left side, right side, or all over the head with a pain level of about 8.  - Associated symptoms include nausea, vomiting, and blurry vision.  - Headaches are severe enough to wake the patient from sleep.  - Referred to neurology for chronic headache management and further evaluation.    GLAUCOMA AND VISUAL DISTURBANCES:  - Ms. Ring reports blurry vision, which may be attributed to glaucoma or potentially related to headaches.  - Discussed the distinction between these potential causes of visual disturbances.    OVERACTIVE BLADDER:  - Ms. Ring reports frequent urination (at least 6 times daily) for approximately 2 weeks, though the problem has occurred intermittently for a longer period.  - Symptoms include frequent bladder contractions, sometimes producing large amounts of urine and sometimes less.  - Explained that overactive bladder involves bladder contractions occurring too frequently before adequate filling.  - Noted that Azo helps with burning urination but not with frequency.  - Referred to urology for evaluation of potential overactive bladder.    URINARY TRACT INFECTION:  - Ordered urine sample and testing to rule out UTI.  - Ms. Ring has a history of frequent UTIs requiring multiple courses of antibiotics and has previously consulted with a urologist for this issue.  - Referred to urology for evaluation  of recurrent UTIs.    FRACTURE OF HAND:  - Ms. Ring sustained a fall resulting in a hand fracture on 1/30.  - Reviewed history of this injury.    SLEEP DISORDER:  - Ms. Ring reports inability to sleep, with difficulty returning to sleep after waking.  - Sleep disturbances may be related to headaches that wake the patient during the night.    HISTORY OF ALCOHOL USE:  - Ms. Ring reports past consumption of wine but no longer drinks alcohol.          1. Frequent UTI (Primary)  - Urinalysis, Reflex to Urine Culture Urine, Clean Catch  - GREY TOP URINE HOLD  - Urinalysis Microscopic    2. Urinary frequency  Going on for over 6 months.  Reports to have urge incontinence as well with urinary leaks on coughing and straining  Was evaluated recently by urology for the same reason  Continues to have urinary frequency during this time  Reports to be drinking plenty of water, cranberry juice and OTC Azo's during the period  Her symptoms seems to be due to overactive bladder vs UTI  - Ambulatory referral/consult to Urology; Future    3. Essential hypertension  Low salt diet.  Enouraged to increase in physical activity at least 30 minutes of brisk walking/day , 5 days a week  Advised weight loss    Advised for DASH diet - The Dietary Approaches to Stop Hypertension (DASH) is high in vegetables, fruits, low-fat dairy products, whole grains, poultry, fish, and nuts and low in sweets, sugar-sweetened beverages, and red meats   Stop smoking.  Limit caffeine intake  Limit alcohol intake <3/day for men and <2/day for women.  Advised to be compliant with medication.  Please monitor your blood pressure regularly at home   Return precaution provided  Continue Diovan    4. Stage 3b chronic kidney disease  - counseled on increase water intake at least 3 litre of H20 to remain hydrated  - stay away from nephrotoxic drugs like Ibuprofen and NSAID  - Counseled on the need to control HTN and Blood glucose to prevent the disease progression  -  low salt diet  - dietary modification: renal diet encouraged      5. Chronic daily headache  Was following dr. Okeefe last visit was on 05/2024  - Ambulatory referral/consult to Neurology; Future  - amitriptyline (ELAVIL) 50 MG tablet; Take 1 tablet (50 mg total) by mouth every evening.  Dispense: 30 tablet; Refill: 11    6. Lumbar spondylosis    - ketorolac injection 30 mg      Future Appointments   Date Time Provider Department Center   6/11/2025  9:40 AM Radha Bernal NP [facility]   6/17/2025 10:00 AM [facility] [place]   6/17/2025  1:00 PM [facility] [place]   6/24/2025 10:00 AM [facility] [place]   6/24/2025  1:00 PM [facility] [place]   7/24/2025  9:30 AM Shorty Burnett MD [facility] [place]   9/17/2025  9:00 AM Ry Ellis NP [facility]URO [facility] [place]   12/4/2025  1:15 PM Alexandru Mariee MD [facility] RHEUM [facility] [place]       I spent a total of 30 minutes on the day of the visit.This includes face to face time and non-face to face time preparing to see the patient (eg, review of tests), obtaining and/or reviewing separately obtained history, documenting clinical information in the electronic or other health record, independently interpreting results and communicating results to the patient/family/caregiver, or care coordinator.  Visit today included increased complexity associated with the care of the episodic problem   addressed and managing the longitudinal care of the patient due to the serious and/or complex managed problem(s) .     Marcello Zambrano MD    This note was generated with the assistance of ambient listening technology. Verbal consent was obtained by the patient and accompanying visitor(s) for the recording of patient appointment to facilitate this note. I attest to having reviewed and edited the generated note for accuracy, though some syntax or spelling errors may persist. Please contact the author of this note for  any clarification.            [1]   Patient Active Problem List  Diagnosis    Essential hypertension    Chronic anxiety    Cutaneous lupus erythematosus    Alpha thalassemia    Osteopenia    Ex-smoker    Chronic asthma without complication    Lumbar spondylosis    Atherosclerosis of aorta    History of total right knee replacement    Gait instability    Medication noncompliance due to cognitive impairment    Moderate major depression    Primary open angle glaucoma (POAG) of both eyes, severe stage    Arthritis of multiple sites    Adhesive capsulitis of right shoulder    Personal history of nicotine dependence     Non-traumatic rhabdomyolysis    Leukocytosis    Complete tear of right rotator cuff    Chronic right shoulder pain    Frequent falls    Polypharmacy    Mild vascular dementia with psychotic disturbance    Uncomplicated opioid dependence    Stage 3b chronic kidney disease    Age-related osteoporosis without current pathological fracture    Polyarthralgia    Bilateral sciatica    Hypomagnesemia   [2]   Current Outpatient Medications   Medication Sig Dispense Refill    atorvastatin (LIPITOR) 10 MG tablet Take 1 tablet (10 mg total) by mouth once daily. 90 tablet 3    atropine 1% (ISOPTO ATROPINE) 1 % Drop Place 1 drop into the right eye 2 (two) times a day. 5 mL 1    baclofen (LIORESAL) 10 MG tablet Take 10 mg by mouth 2 (two) times daily as needed.      brimonidine 0.2% (ALPHAGAN) 0.2 % Drop PLACE 1 DROP IN EACH EYE TWICE A DAY 10 mL 6    citalopram (CELEXA) 10 MG tablet Take 1 tablet (10 mg total) by mouth once daily. 30 tablet 11    dorzolamide-timolol 2-0.5% (COSOPT) 22.3-6.8 mg/mL ophthalmic solution PLACE 1 DROP IN EACH EYE TWICE A DAY 10 mL 6    ergocalciferol (ERGOCALCIFEROL) 50,000 unit Cap Take 1 capsule (50,000 Units total) by mouth every 7 days. After dinner 12 capsule 3    estradioL (ESTRACE) 0.01 % (0.1 mg/gram) vaginal cream Place 1 g vaginally twice a week. 24 g 3    fexofenadine (ALLEGRA) 180  MG tablet Take 1 tablet (180 mg total) by mouth once daily. 30 tablet 0    fluticasone propionate (FLONASE) 50 mcg/actuation nasal spray 1 spray (50 mcg total) by Each Nostril route once daily. 11.1 mL 0    ketorolac 0.5% (ACULAR) 0.5 % Drop PLACE 1 DROP IN THE LEFT EYE FOUR TIMES DAILY 10 mL 5    latanoprost 0.005 % ophthalmic solution Place 1 drop into both eyes once daily. 2.5 mL 6    loteprednol (LOTEMAX) 0.5 % ophthalmic suspension Place 1 drop into the left eye 4 (four) times daily. 5 mL 1    loteprednol etabonate (LOTEMAX SM) 0.38 % DrpG Place 1 drop into the right eye 4 (four) times daily. 5 g 1    magnesium oxide (MAG-OX) 400 mg (241.3 mg magnesium) tablet Take 400 mg by mouth.      moxifloxacin (VIGAMOX) 0.5 % ophthalmic solution Place 1 drop into the left eye 4 (four) times daily. 3 mL 1    multivitamin with folic acid 400 mcg Tab Take 1 tablet by mouth every morning.      oxyCODONE-acetaminophen (PERCOCET)  mg per tablet Take 1 tablet by mouth 3 (three) times daily as needed.      pantoprazole (PROTONIX) 40 MG tablet Take 1 tablet by mouth every morning.      polymyxin B sulf-trimethoprim (POLYTRIM) 10,000 unit- 1 mg/mL Drop PLACE ONE DROP INTO THE RIGHT EYE FOUR TIMES DAILY 10 mL 2    prednisoLONE acetate (PRED FORTE) 1 % DrpS INSTILL 1 DROP IN RIGHT EYE 4 TIMES A DAY 5 mL 1    prednisoLONE acetate (PRED FORTE) 1 % DrpS Place 1 drop into both eyes 4 (four) times daily. 5 mL 1    prednisoLONE acetate (PRED FORTE) 1 % DrpS place 1 drop into the right eye 4 times daily. 10 mL 0    thiamine mononitrate, vit B1, (VITAMIN B-1, MONONITRATE,) 100 mg Tab Take 1 tablet by mouth every morning.      valsartan-hydrochlorothiazide (DIOVAN-HCT) 160-12.5 mg per tablet Take 1 tablet by mouth once daily. 90 tablet 3    acetaZOLAMIDE (DIAMOX) 125 MG Tab Take 1 tablet (125 mg total) by mouth 3 (three) times daily. 90 tablet 0    QUEtiapine (SEROQUEL) 25 MG Tab Take 2 tablets (50 mg total) by mouth once daily. 60  tablet 2     Current Facility-Administered Medications   Medication Dose Route Frequency Provider Last Rate Last Admin    ketorolac injection 30 mg  30 mg Intramuscular 1 time in Clinic/HOD         ketorolac injection 30 mg  30 mg Intramuscular 1 time in Clinic/HOD

## 2025-06-11 ENCOUNTER — TELEPHONE (OUTPATIENT)
Dept: INTERNAL MEDICINE | Facility: CLINIC | Age: 80
End: 2025-06-11
Payer: MEDICARE

## 2025-06-11 NOTE — TELEPHONE ENCOUNTER
Daughter called stating when she arrived at her mom's house she was unresponsive. She dialed 911 and patient started to come around. She counted the amitriptyline and the count was off. She thought you were not re-ordering.

## 2025-06-13 ENCOUNTER — HOSPITAL ENCOUNTER (INPATIENT)
Facility: HOSPITAL | Age: 80
LOS: 9 days | Discharge: HOSPICE | DRG: 689 | End: 2025-06-23
Attending: EMERGENCY MEDICINE | Admitting: STUDENT IN AN ORGANIZED HEALTH CARE EDUCATION/TRAINING PROGRAM
Payer: MEDICARE

## 2025-06-13 DIAGNOSIS — N39.0 UTI (URINARY TRACT INFECTION): ICD-10-CM

## 2025-06-13 DIAGNOSIS — S32.591A CLOSED FRACTURE OF RAMUS OF RIGHT PUBIS, INITIAL ENCOUNTER: Primary | ICD-10-CM

## 2025-06-13 DIAGNOSIS — Z86.59 HISTORY OF DEMENTIA: ICD-10-CM

## 2025-06-13 DIAGNOSIS — E43 SEVERE MALNUTRITION: ICD-10-CM

## 2025-06-13 DIAGNOSIS — I50.9 CHF (CONGESTIVE HEART FAILURE): ICD-10-CM

## 2025-06-13 DIAGNOSIS — R10.31 RIGHT LOWER QUADRANT ABDOMINAL PAIN: ICD-10-CM

## 2025-06-13 DIAGNOSIS — Z13.6 SCREENING FOR CARDIOVASCULAR CONDITION: ICD-10-CM

## 2025-06-13 DIAGNOSIS — D72.829 LEUKOCYTOSIS: ICD-10-CM

## 2025-06-13 DIAGNOSIS — R79.89 ELEVATED LACTIC ACID LEVEL: ICD-10-CM

## 2025-06-13 DIAGNOSIS — R07.9 CHEST PAIN: ICD-10-CM
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
## 2025-06-13 LAB
ABSOLUTE EOSINOPHIL (OHS): 0.01 K/UL
ABSOLUTE MONOCYTE (OHS): 0.97 K/UL (ref 0.3–1)
ABSOLUTE NEUTROPHIL COUNT (OHS): 15.66 K/UL (ref 1.8–7.7)
ALBUMIN SERPL BCP-MCNC: 3.9 G/DL (ref 3.5–5.2)
ALP SERPL-CCNC: 75 UNIT/L (ref 40–150)
ALT SERPL W/O P-5'-P-CCNC: 17 UNIT/L (ref 10–44)
ANION GAP (OHS): 14 MMOL/L (ref 8–16)
AST SERPL-CCNC: 28 UNIT/L (ref 11–45)
BASOPHILS # BLD AUTO: 0.03 K/UL
BASOPHILS NFR BLD AUTO: 0.2 %
BILIRUB SERPL-MCNC: 1.1 MG/DL (ref 0.1–1)
BILIRUB UR QL STRIP.AUTO: NEGATIVE
BUN SERPL-MCNC: 23 MG/DL (ref 8–23)
CALCIUM SERPL-MCNC: 9.8 MG/DL (ref 8.7–10.5)
CHLORIDE SERPL-SCNC: 105 MMOL/L (ref 95–110)
CLARITY UR: CLEAR
CO2 SERPL-SCNC: 20 MMOL/L (ref 23–29)
COLOR UR AUTO: YELLOW
CREAT SERPL-MCNC: 1.4 MG/DL (ref 0.5–1.4)
ERYTHROCYTE [DISTWIDTH] IN BLOOD BY AUTOMATED COUNT: 16.1 % (ref 11.5–14.5)
GFR SERPLBLD CREATININE-BSD FMLA CKD-EPI: 39 ML/MIN/1.73/M2
GLUCOSE SERPL-MCNC: 126 MG/DL (ref 70–110)
GLUCOSE UR QL STRIP: NEGATIVE
HCT VFR BLD AUTO: 35.5 % (ref 37–48.5)
HGB BLD-MCNC: 11.2 GM/DL (ref 12–16)
HGB UR QL STRIP: NEGATIVE
HOLD SPECIMEN: NORMAL
HOLD SPECIMEN: NORMAL
IMM GRANULOCYTES # BLD AUTO: 0.11 K/UL (ref 0–0.04)
IMM GRANULOCYTES NFR BLD AUTO: 0.6 % (ref 0–0.5)
KETONES UR QL STRIP: ABNORMAL
LACTATE SERPL-SCNC: 2.5 MMOL/L (ref 0.5–2.2)
LACTATE SERPL-SCNC: 3.4 MMOL/L (ref 0.5–2.2)
LEUKOCYTE ESTERASE UR QL STRIP: NEGATIVE
LYMPHOCYTES # BLD AUTO: 0.59 K/UL (ref 1–4.8)
MCH RBC QN AUTO: 22.5 PG (ref 27–31)
MCHC RBC AUTO-ENTMCNC: 31.5 G/DL (ref 32–36)
MCV RBC AUTO: 71 FL (ref 82–98)
NITRITE UR QL STRIP: NEGATIVE
NUCLEATED RBC (/100WBC) (OHS): 0 /100 WBC
PH UR STRIP: 6 [PH]
PLATELET # BLD AUTO: 346 K/UL (ref 150–450)
PMV BLD AUTO: 10.1 FL (ref 9.2–12.9)
POTASSIUM SERPL-SCNC: 3.6 MMOL/L (ref 3.5–5.1)
PROCALCITONIN SERPL-MCNC: 0.15 NG/ML
PROT SERPL-MCNC: 8 GM/DL (ref 6–8.4)
PROT UR QL STRIP: NEGATIVE
RBC # BLD AUTO: 4.98 M/UL (ref 4–5.4)
RELATIVE EOSINOPHIL (OHS): 0.1 %
RELATIVE LYMPHOCYTE (OHS): 3.4 % (ref 18–48)
RELATIVE MONOCYTE (OHS): 5.6 % (ref 4–15)
RELATIVE NEUTROPHIL (OHS): 90.1 % (ref 38–73)
SODIUM SERPL-SCNC: 139 MMOL/L (ref 136–145)
SP GR UR STRIP: 1.02
TROPONIN I SERPL DL<=0.01 NG/ML-MCNC: 0.01 NG/ML
UROBILINOGEN UR STRIP-ACNC: NEGATIVE EU/DL
WBC # BLD AUTO: 17.37 K/UL (ref 3.9–12.7)

## 2025-06-13 PROCEDURE — 63600175 PHARM REV CODE 636 W HCPCS: Mod: ER | Performed by: EMERGENCY MEDICINE

## 2025-06-13 PROCEDURE — 96361 HYDRATE IV INFUSION ADD-ON: CPT

## 2025-06-13 PROCEDURE — 87040 BLOOD CULTURE FOR BACTERIA: CPT | Mod: 91 | Performed by: EMERGENCY MEDICINE

## 2025-06-13 PROCEDURE — 81003 URINALYSIS AUTO W/O SCOPE: CPT | Mod: ER | Performed by: EMERGENCY MEDICINE

## 2025-06-13 PROCEDURE — G0378 HOSPITAL OBSERVATION PER HR: HCPCS | Mod: ER

## 2025-06-13 PROCEDURE — 96375 TX/PRO/DX INJ NEW DRUG ADDON: CPT

## 2025-06-13 PROCEDURE — 93010 ELECTROCARDIOGRAM REPORT: CPT | Mod: ICN,,, | Performed by: INTERNAL MEDICINE

## 2025-06-13 PROCEDURE — 86803 HEPATITIS C AB TEST: CPT | Performed by: EMERGENCY MEDICINE

## 2025-06-13 PROCEDURE — 85025 COMPLETE CBC W/AUTO DIFF WBC: CPT | Mod: ER | Performed by: EMERGENCY MEDICINE

## 2025-06-13 PROCEDURE — 93005 ELECTROCARDIOGRAM TRACING: CPT | Mod: ER

## 2025-06-13 PROCEDURE — 84145 PROCALCITONIN (PCT): CPT | Mod: ER | Performed by: EMERGENCY MEDICINE

## 2025-06-13 PROCEDURE — 87522 HEPATITIS C REVRS TRNSCRPJ: CPT | Performed by: EMERGENCY MEDICINE

## 2025-06-13 PROCEDURE — G0378 HOSPITAL OBSERVATION PER HR: HCPCS

## 2025-06-13 PROCEDURE — 83605 ASSAY OF LACTIC ACID: CPT | Mod: ER | Performed by: EMERGENCY MEDICINE

## 2025-06-13 PROCEDURE — 63600175 PHARM REV CODE 636 W HCPCS: Performed by: EMERGENCY MEDICINE

## 2025-06-13 PROCEDURE — 99285 EMERGENCY DEPT VISIT HI MDM: CPT | Mod: 25

## 2025-06-13 PROCEDURE — 80053 COMPREHEN METABOLIC PANEL: CPT | Mod: ER | Performed by: EMERGENCY MEDICINE

## 2025-06-13 PROCEDURE — 84484 ASSAY OF TROPONIN QUANT: CPT | Mod: ER | Performed by: EMERGENCY MEDICINE

## 2025-06-13 PROCEDURE — 87389 HIV-1 AG W/HIV-1&-2 AB AG IA: CPT | Performed by: EMERGENCY MEDICINE

## 2025-06-13 PROCEDURE — 96374 THER/PROPH/DIAG INJ IV PUSH: CPT

## 2025-06-13 RX ORDER — HYDROCODONE BITARTRATE AND ACETAMINOPHEN 5; 325 MG/1; MG/1
1 TABLET ORAL EVERY 6 HOURS PRN
Refills: 0 | Status: DISCONTINUED | OUTPATIENT
Start: 2025-06-13 | End: 2025-06-17

## 2025-06-13 RX ORDER — SODIUM CHLORIDE, SODIUM LACTATE, POTASSIUM CHLORIDE, CALCIUM CHLORIDE 600; 310; 30; 20 MG/100ML; MG/100ML; MG/100ML; MG/100ML
INJECTION, SOLUTION INTRAVENOUS CONTINUOUS
Status: DISCONTINUED | OUTPATIENT
Start: 2025-06-14 | End: 2025-06-15

## 2025-06-13 RX ORDER — ACETAMINOPHEN 650 MG/1
650 SUPPOSITORY RECTAL EVERY 6 HOURS PRN
Status: DISCONTINUED | OUTPATIENT
Start: 2025-06-13 | End: 2025-06-23 | Stop reason: HOSPADM

## 2025-06-13 RX ORDER — IBUPROFEN 200 MG
16 TABLET ORAL
Status: DISCONTINUED | OUTPATIENT
Start: 2025-06-13 | End: 2025-06-23 | Stop reason: HOSPADM

## 2025-06-13 RX ORDER — ONDANSETRON HYDROCHLORIDE 2 MG/ML
4 INJECTION, SOLUTION INTRAVENOUS EVERY 8 HOURS PRN
Status: DISCONTINUED | OUTPATIENT
Start: 2025-06-13 | End: 2025-06-23 | Stop reason: HOSPADM

## 2025-06-13 RX ORDER — MORPHINE SULFATE 4 MG/ML
2 INJECTION, SOLUTION INTRAMUSCULAR; INTRAVENOUS EVERY 4 HOURS PRN
Refills: 0 | Status: DISCONTINUED | OUTPATIENT
Start: 2025-06-13 | End: 2025-06-14

## 2025-06-13 RX ORDER — MORPHINE SULFATE 4 MG/ML
2 INJECTION, SOLUTION INTRAMUSCULAR; INTRAVENOUS
Refills: 0 | Status: COMPLETED | OUTPATIENT
Start: 2025-06-13 | End: 2025-06-13

## 2025-06-13 RX ORDER — NALOXONE HCL 0.4 MG/ML
0.02 VIAL (ML) INJECTION
Status: DISCONTINUED | OUTPATIENT
Start: 2025-06-13 | End: 2025-06-23 | Stop reason: HOSPADM

## 2025-06-13 RX ORDER — GLUCAGON 1 MG
1 KIT INJECTION
Status: DISCONTINUED | OUTPATIENT
Start: 2025-06-13 | End: 2025-06-23 | Stop reason: HOSPADM

## 2025-06-13 RX ORDER — ALUMINUM HYDROXIDE, MAGNESIUM HYDROXIDE, AND SIMETHICONE 1200; 120; 1200 MG/30ML; MG/30ML; MG/30ML
30 SUSPENSION ORAL 4 TIMES DAILY PRN
Status: DISCONTINUED | OUTPATIENT
Start: 2025-06-13 | End: 2025-06-23 | Stop reason: HOSPADM

## 2025-06-13 RX ORDER — IBUPROFEN 200 MG
24 TABLET ORAL
Status: DISCONTINUED | OUTPATIENT
Start: 2025-06-13 | End: 2025-06-23 | Stop reason: HOSPADM

## 2025-06-13 RX ORDER — ACETAMINOPHEN 325 MG/1
650 TABLET ORAL EVERY 8 HOURS PRN
Status: DISCONTINUED | OUTPATIENT
Start: 2025-06-13 | End: 2025-06-23 | Stop reason: HOSPADM

## 2025-06-13 RX ORDER — TALC
6 POWDER (GRAM) TOPICAL NIGHTLY PRN
Status: DISCONTINUED | OUTPATIENT
Start: 2025-06-13 | End: 2025-06-23 | Stop reason: HOSPADM

## 2025-06-13 RX ORDER — SODIUM CHLORIDE 0.9 % (FLUSH) 0.9 %
10 SYRINGE (ML) INJECTION EVERY 12 HOURS PRN
Status: DISCONTINUED | OUTPATIENT
Start: 2025-06-13 | End: 2025-06-23 | Stop reason: HOSPADM

## 2025-06-13 RX ORDER — MORPHINE SULFATE 4 MG/ML
2 INJECTION, SOLUTION INTRAMUSCULAR; INTRAVENOUS
Status: DISCONTINUED | OUTPATIENT
Start: 2025-06-13 | End: 2025-06-14

## 2025-06-13 RX ORDER — SODIUM CHLORIDE, SODIUM LACTATE, POTASSIUM CHLORIDE, CALCIUM CHLORIDE 600; 310; 30; 20 MG/100ML; MG/100ML; MG/100ML; MG/100ML
1000 INJECTION, SOLUTION INTRAVENOUS CONTINUOUS
Status: DISCONTINUED | OUTPATIENT
Start: 2025-06-13 | End: 2025-06-14

## 2025-06-13 RX ORDER — IPRATROPIUM BROMIDE AND ALBUTEROL SULFATE 2.5; .5 MG/3ML; MG/3ML
3 SOLUTION RESPIRATORY (INHALATION) EVERY 6 HOURS PRN
Status: DISCONTINUED | OUTPATIENT
Start: 2025-06-13 | End: 2025-06-23 | Stop reason: HOSPADM

## 2025-06-13 RX ORDER — CEFTRIAXONE 1 G/1
1 INJECTION, POWDER, FOR SOLUTION INTRAMUSCULAR; INTRAVENOUS
Status: COMPLETED | OUTPATIENT
Start: 2025-06-13 | End: 2025-06-13

## 2025-06-13 RX ORDER — PROMETHAZINE HYDROCHLORIDE 25 MG/1
25 TABLET ORAL EVERY 6 HOURS PRN
Status: DISCONTINUED | OUTPATIENT
Start: 2025-06-13 | End: 2025-06-23 | Stop reason: HOSPADM

## 2025-06-13 RX ORDER — ENOXAPARIN SODIUM 100 MG/ML
30 INJECTION SUBCUTANEOUS EVERY 24 HOURS
Status: DISCONTINUED | OUTPATIENT
Start: 2025-06-14 | End: 2025-06-14

## 2025-06-13 RX ORDER — AMOXICILLIN 250 MG
1 CAPSULE ORAL 2 TIMES DAILY PRN
Status: DISCONTINUED | OUTPATIENT
Start: 2025-06-13 | End: 2025-06-23 | Stop reason: HOSPADM

## 2025-06-13 RX ORDER — HYDRALAZINE HYDROCHLORIDE 20 MG/ML
10 INJECTION INTRAMUSCULAR; INTRAVENOUS ONCE AS NEEDED
Status: DISCONTINUED | OUTPATIENT
Start: 2025-06-13 | End: 2025-06-15

## 2025-06-13 RX ADMIN — MORPHINE SULFATE 2 MG: 4 INJECTION INTRAVENOUS at 03:06

## 2025-06-13 RX ADMIN — SODIUM CHLORIDE, POTASSIUM CHLORIDE, SODIUM LACTATE AND CALCIUM CHLORIDE 1000 ML: 600; 310; 30; 20 INJECTION, SOLUTION INTRAVENOUS at 09:06

## 2025-06-13 RX ADMIN — SODIUM CHLORIDE, POTASSIUM CHLORIDE, SODIUM LACTATE AND CALCIUM CHLORIDE 1000 ML: 600; 310; 30; 20 INJECTION, SOLUTION INTRAVENOUS at 03:06

## 2025-06-13 RX ADMIN — SODIUM CHLORIDE, POTASSIUM CHLORIDE, SODIUM LACTATE AND CALCIUM CHLORIDE 1000 ML: 600; 310; 30; 20 INJECTION, SOLUTION INTRAVENOUS at 06:06

## 2025-06-13 RX ADMIN — CEFTRIAXONE 1 G: 1 INJECTION, POWDER, FOR SOLUTION INTRAMUSCULAR; INTRAVENOUS at 03:06

## 2025-06-13 RX ADMIN — MORPHINE SULFATE 2 MG: 4 INJECTION INTRAVENOUS at 06:06

## 2025-06-13 NOTE — ED PROVIDER NOTES
Emergency Medicine Provider Note - 6/13/2025       History     Chief Complaint   Patient presents with    Fall     Weakness and 2 falls today. Being treated for uti.        Allergies:  Review of patient's allergies indicates:  No Known Allergies    History of Present Illness   HPI    6/13/2025, 2:27 PM  The history is provided by the old chart and patient    The patient is a 79 y.o. female complaining of generalized weakness.    Past Medical History of:  Alpha thalassemia, lupus, hypertension, osteopenia and clotting disorder.  Patient reports that she has been having difficulty urinating for over 2 weeks.  She has had to use the bathroom at least 6 times daily.  Patient reports that she will get the urge to urinate and then will itch down there.  She was started on Macrobid on Damaris 10, 2025 by her primary care physician.  Patient reports that she has been feeling lightheaded.  She denies any specific chest pain, chest pressure, nausea, vomiting, diarrhea, dysuria.  Patient reports that she had walked into the gas bedroom might have passed out.  Patient is complaining right hip pain, neck pain, and generalized weakness.  Nothing makes it better, nothing makes it worse      Arrival mode: Police/     PCP: Marcello Zambrano MD     Past Medical History:  Past Medical History:   Diagnosis Date    Alpha thalassemia     Asthma     resolved per patient    Cataract     Chronic anxiety     years tid xanax 1mg    Chronic knee pain     Chronic pain of left ankle     Clotting disorder     Cutaneous lupus erythematosus     dr henry derm    Dementia without behavioral disturbance     Depression     Depression     dr radha hughes previously    Ex-smoker     quit fall 1    Hypertension     Osteopenia 1/16 reck1/18       Past Surgical History:  Past Surgical History:   Procedure Laterality Date    ANKLE SURGERY Left     BACK SURGERY      CATARACT EXTRACTION Right     COLONOSCOPY N/A 8/2/2017    Procedure:  COLONOSCOPY;  Surgeon: Virgil Oliva MD;  Location: [facility] ENDO;  Service: Endoscopy;  Laterality: N/A;    HYSTERECTOMY      INJECTION OF ANESTHETIC AGENT AROUND MEDIAL BRANCH NERVES INNERVATING LUMBAR FACET JOINT Bilateral 6/17/2020    Procedure: Bilateral L3-5 MBB;  Surgeon: Yury Moore MD;  Location: [facility] PAIN MGT;  Service: Pain Management;  Laterality: Bilateral;    INJECTION OF ANESTHETIC AGENT AROUND NERVE Bilateral 5/19/2020    Procedure: Bilateral Genicular nerve block with RN IV sedation Covid testing day of procedure PT does not drive;  Surgeon: Yury Moore MD;  Location: [facility] PAIN MGT;  Service: Pain Management;  Laterality: Bilateral;    KNEE ARTHROSCOPY      both knees twice    OOPHORECTOMY      RADIOFREQUENCY THERMOCOAGULATION Right 7/14/2020    Procedure: Right L3-5 Lumbar RFA;  Surgeon: Yury Moore MD;  Location: [facility] PAIN MGT;  Service: Pain Management;  Laterality: Right;    RADIOFREQUENCY THERMOCOAGULATION Left 8/6/2020    Procedure: Left L3-5 Lumbar RFA;  Surgeon: Yury Moore MD;  Location: [facility] PAIN MGT;  Service: Pain Management;  Laterality: Left;         Family History:  Family History   Problem Relation Name Age of Onset    Diabetes Mother      Diabetes Father      Breast cancer Maternal Aunt         Social History:  Social History     Tobacco Use    Smoking status: Former     Current packs/day: 0.00     Average packs/day: 1 pack/day for 30.0 years (30.0 ttl pk-yrs)     Types: Cigarettes     Start date: 9/2/1984     Quit date: 9/2/2014     Years since quitting: 10.7     Passive exposure: Never    Smokeless tobacco: Never    Tobacco comments:     smoke last cigarette 9/15   Substance and Sexual Activity    Alcohol use: Yes     Alcohol/week: 1.0 standard drink of alcohol     Types: 1 Glasses of wine per week     Comment: 3 times a week    Drug use: No    Sexual activity: Not Currently     Partners: Male       I have reviewed the Past Medical History,  Past Surgical History, Family History and Social History as documented above.      Review of Systems   Review of Systems   Constitutional:  Negative for fever.   HENT:  Negative for sore throat.    Respiratory:  Negative for shortness of breath.    Cardiovascular:  Negative for chest pain.   Gastrointestinal:  Positive for abdominal pain. Negative for nausea and vomiting.   Genitourinary:  Positive for frequency and urgency. Negative for dysuria.   Musculoskeletal:  Positive for arthralgias (Right hip pain). Negative for back pain.   Skin:  Negative for rash.   Neurological:  Negative for weakness.   Hematological:  Does not bruise/bleed easily.        Physical Exam     Initial Vitals [06/13/25 1335]   BP Pulse Resp Temp SpO2   (!) 173/93 109 20 99.7 °F (37.6 °C) (!) 94 %      MAP       --          Physical Exam    Nursing Notes and Vital Signs Reviewed.  Constitutional: Patient is in no apparent distress. Well-developed and well-nourished.  Head: Atraumatic. Normocephalic.  Eyes: PERRL. EOM intact. Conjunctivae are not pale. No scleral icterus.  ENT: Mucous membranes are moist. Oropharynx is clear and symmetric.    Neck: Supple. Full ROM. No lymphadenopathy.  Cardiovascular: Regular rate. Regular rhythm. No murmurs, rubs, or gallops. Distal pulses are 2+ and symmetric.  Pulmonary/Chest: No respiratory distress. Clear to auscultation bilaterally. No wheezing or rales.  Abdominal: Soft and non-distended.  There is no tenderness.  No rebound, guarding, or rigidity. Good bowel sounds.  Genitourinary:    Musculoskeletal: Moves all extremities. No obvious deformities. No edema. No calf tenderness.  No leg discrepancy noted  Skin: Warm and dry.  Neurological:  Alert, awake, and appropriate. CNII-XII intact. GCS 15.  Normal speech.  No acute focal neurological deficits are appreciated.  Psychiatric: Normal affect. Good eye contact. Appropriate in content.     ED Course   ED Procedures:  Critical Care    Date/Time:  6/13/2025 2:27 PM    Performed by: Radha Linda DO  Authorized by: Douglas Stout  Direct patient critical care time: 20 minutes  Ordering / reviewing critical care time: 5 minutes  Documentation critical care time: 8 minutes  Total critical care time (exclusive of procedural time) : 33 minutes  Critical care time was exclusive of separately billable procedures and treating other patients.  Critical care was necessary to treat or prevent imminent or life-threatening deterioration of the following conditions: sepsis.  Critical care was time spent personally by me on the following activities: blood draw for specimens, discussions with primary provider, interpretation of cardiac output measurements, evaluation of patient's response to treatment, examination of patient, obtaining history from patient or surrogate, ordering and performing treatments and interventions, ordering and review of laboratory studies, ordering and review of radiographic studies, pulse oximetry, re-evaluation of patient's condition, review of old charts and vascular access procedures.        ED Vital Signs:  Vitals:    06/13/25 1335 06/13/25 1402 06/13/25 1446 06/13/25 1527   BP: (!) 173/93 (!) 145/90  (!) 179/81   Pulse: 109 105 107 110   Resp: 20  16 17   Temp: 99.7 °F (37.6 °C)   99.9 °F (37.7 °C)   TempSrc: Oral   Oral   SpO2: (!) 94% 96% 97% 99%   Weight: 65.3 kg (143 lb 15.4 oz)       06/13/25 1647 06/13/25 1707 06/13/25 1710 06/13/25 1741   BP: (!) 163/83  (!) 117/90 (!) 192/95   Pulse: 95  96 97   Resp:  (!) 23 20    Temp:   99.7 °F (37.6 °C)    TempSrc:   Oral    SpO2: 97%  96% 95%   Weight:        06/13/25 1800 06/13/25 1831 06/13/25 1832 06/13/25 1844   BP:   (!) 164/74    Pulse:  94  93   Resp: 20   20   Temp:       TempSrc:       SpO2:  99%  98%   Weight:           Labs/Imaging/EKG     Lab Results:  Results for orders placed or performed during the hospital encounter of 06/13/25   HCV Virus Hold Specimen    Collection Time:  06/13/25  2:39 PM   Result Value Ref Range    Extra Tube Hold for add-ons.    Comprehensive metabolic panel    Collection Time: 06/13/25  2:39 PM   Result Value Ref Range    Sodium 139 136 - 145 mmol/L    Potassium 3.6 3.5 - 5.1 mmol/L    Chloride 105 95 - 110 mmol/L    CO2 20 (L) 23 - 29 mmol/L    Glucose 126 (H) 70 - 110 mg/dL    BUN 23 8 - 23 mg/dL    Creatinine 1.4 0.5 - 1.4 mg/dL    Calcium 9.8 8.7 - 10.5 mg/dL    Protein Total 8.0 6.0 - 8.4 gm/dL    Albumin 3.9 3.5 - 5.2 g/dL    Bilirubin Total 1.1 (H) 0.1 - 1.0 mg/dL    ALP 75 40 - 150 unit/L    AST 28 11 - 45 unit/L    ALT 17 10 - 44 unit/L    Anion Gap 14 8 - 16 mmol/L    eGFR 39 (L) >60 mL/min/1.73/m2   Lactic acid, plasma #1    Collection Time: 06/13/25  2:39 PM   Result Value Ref Range    Lactic Acid Level 2.5 (H) 0.5 - 2.2 mmol/L   Procalcitonin    Collection Time: 06/13/25  2:39 PM   Result Value Ref Range    Procalcitonin 0.15 <0.25 ng/mL   Troponin I    Collection Time: 06/13/25  2:39 PM   Result Value Ref Range    Troponin-I 0.014 <=0.026 ng/mL   CBC with Differential    Collection Time: 06/13/25  2:39 PM   Result Value Ref Range    WBC 17.37 (H) 3.90 - 12.70 K/uL    RBC 4.98 4.00 - 5.40 M/uL    HGB 11.2 (L) 12.0 - 16.0 gm/dL    HCT 35.5 (L) 37.0 - 48.5 %    MCV 71 (L) 82 - 98 fL    MCH 22.5 (L) 27.0 - 31.0 pg    MCHC 31.5 (L) 32.0 - 36.0 g/dL    RDW 16.1 (H) 11.5 - 14.5 %    Platelet Count 346 150 - 450 K/uL    MPV 10.1 9.2 - 12.9 fL    Nucleated RBC 0 <=0 /100 WBC    Neut % 90.1 (H) 38 - 73 %    Lymph % 3.4 (L) 18 - 48 %    Mono % 5.6 4 - 15 %    Eos % 0.1 <=8 %    Basophil % 0.2 <=1.9 %    Imm Grans % 0.6 (H) 0.0 - 0.5 %    Neut # 15.66 (H) 1.8 - 7.7 K/uL    Lymph # 0.59 (L) 1 - 4.8 K/uL    Mono # 0.97 0.3 - 1 K/uL    Eos # 0.01 <=0.5 K/uL    Baso # 0.03 <=0.2 K/uL    Imm Grans # 0.11 (H) 0.00 - 0.04 K/uL   Urinalysis, Reflex to Urine Culture Urine, Clean Catch    Collection Time: 06/13/25  3:55 PM    Specimen: Urine   Result Value Ref Range     Color, UA Yellow Straw, Staci, Yellow, Light-Orange    Appearance, UA Clear Clear    pH, UA 6.0 5.0 - 8.0    Spec Grav UA 1.020 1.005 - 1.030    Protein, UA Negative Negative    Glucose, UA Negative Negative    Ketones, UA Trace (A) Negative    Bilirubin, UA Negative Negative    Blood, UA Negative Negative    Nitrites, UA Negative Negative    Urobilinogen, UA Negative <2.0 EU/dL    Leukocyte Esterase, UA Negative Negative   GREY TOP URINE HOLD    Collection Time: 06/13/25  3:55 PM   Result Value Ref Range    Extra Tube Hold for add-ons.    Lactic acid, plasma #2    Collection Time: 06/13/25  6:15 PM   Result Value Ref Range    Lactic Acid Level 3.4 (H) 0.5 - 2.2 mmol/L     *Note: Due to a large number of results and/or encounters for the requested time period, some results have not been displayed. A complete set of results can be found in Results Review.       The EKG was ordered, reviewed, and independently interpreted by the ED provider:  ECG Results              EKG 12-lead (Preliminary result)  Result time 06/13/25 16:07:18      Wet Read by Radha Linda DO (06/13/25 16:07:18, [facility] - Emergency Dept, Emergency Medicine)    Rate of 106.  Sinus tachycardia.  Left axis deviation.  Nonspecific changes.  No STEMI                                        Imaging Results:  Imaging Results              X-Ray Hip 2 or 3 views Right with Pelvis when performed (Final result)  Result time 06/13/25 15:31:11      Final result by Celso Castillo MD (06/13/25 15:31:11)                   Impression:      As above      Electronically signed by: Celso Castillo MD  Date:    06/13/2025  Time:    15:31               Narrative:    EXAMINATION:  XR HIP WITH PELVIS WHEN PERFORMED 2 OR 3 VIEWS RIGHT    CLINICAL HISTORY:  XR HIP WITH PELVIS WHEN PERFORMED 2 OR 3 VIEWS RIGHT    COMPARISON:  None    FINDINGS:  Four views of the right hip were obtained.    Fracture side of the superior and inferior pubic ramus.  Postoperative  changes within the right femoral neck.  Diffuse osteopenia.  Soft tissues are unremarkable.                                       CT Renal Stone Study ABD Pelvis WO (Final result)  Result time 06/13/25 15:27:13      Final result by Celso Castillo MD (06/13/25 15:27:13)                   Impression:      Age-indeterminate acute or subacute appearing fracture involving the right superior and inferior pubic ramus images 139 through 156.  Nonemergent pelvic MRI can be obtained as clinically warranted    No evidence of obstructive uropathy.    All CT scans at this facility use dose modulation, iterative reconstruction, and/or weight based dosing when appropriate to reduce radiation dose to as low as reasonable achievable.      Electronically signed by: Celso Castillo MD  Date:    06/13/2025  Time:    15:27               Narrative:    EXAMINATION:  CT RENAL STONE STUDY ABD PELVIS WO    CLINICAL HISTORY:  Flank pain, kidney stone suspected; Right lower quadrant pain    TECHNIQUE:  Low dose axial images, sagittal and coronal reformations were obtained from the lung bases to the pubic symphysis.  Oral contrast was not administered.    COMPARISON:  05/06/2019    FINDINGS:  Heart: Normal size. No effusion.    Lung Bases: Clear.    Liver: Normal size and attenuation. No focal lesions.    Gallbladder: Post cholecystectomy.    Bile Ducts: No dilatation.    Pancreas: No obvious mass. No peripancreatic fat stranding.    Spleen: Normal.    Adrenals: Normal.    Kidneys/Ureters: Mild renal cortical thinning.  No mass, hydroureteronephrosis, or nephroureterolithiasis.    Bladder: No wall thickening.    Reproductive organs: Post hysterectomy.    GI Tract/Mesentery: No evidence of bowel obstruction or inflammation.    Peritoneal Space: No ascites or free air.    Retroperitoneum: Mild stranding and small hematoma anterior to the bladder in the region of the right pubic ramus fracture.  Evaluation for active hemorrhage limited without  contrast.  No significant adenopathy.    Abdominal wall: Normal.    Vasculature: No aneurysm. Aorta demonstrates atherosclerotic disease.    Bones: Age-indeterminate acute or subacute appearing fracture involving the right superior and inferior pubic ramus images 139 through 156.  Pelvic MRI can be obtained as clinically warranted.  No suspicious lytic or sclerotic lesions.  Advanced degenerative changes within the lower lumbar spine.  Diffuse osteopenia.  Remote right-sided femoral neck fracture.  Postoperative changes within the right hip.                                       CT Cervical Spine Without Contrast (Final result)  Result time 06/13/25 15:24:10      Final result by NERIS Garvin Sr., MD (06/13/25 15:24:10)                   Impression:      1. There is an age-indeterminate fracture in the medial aspect of the left clavicle.  2. There is grade 1 anterolisthesis of C6 on C7. There is grade 1 anterolisthesis of C7 on T1.  3. There are mild degenerative changes between C2 and T1.  All CT scans at this facility use dose modulation, iterative reconstruction, and/or weight base dosing when appropriate to reduce radiation dose when appropriate to reduce radiation dose to as low as reasonably achievable.      Electronically signed by: Kamari Garvin MD  Date:    06/13/2025  Time:    15:24               Narrative:    EXAMINATION:  CT CERVICAL SPINE WITHOUT CONTRAST    CLINICAL HISTORY:  Neck trauma (Age >= 65y);    TECHNIQUE:  Standard cervical spine CT protocol was performed without IV contrast.    COMPARISON:  03/18/2024    FINDINGS:  The following pertains to the cervical spine.  There is grade 1 anterolisthesis of C6 on C7.  There is grade 1 anterolisthesis of C7 on T1.  There is no fracture or scoliosis. There is normal cervical lordosis.  There are mild degenerative changes between C2 and T1.  There is a mild amount of atherosclerosis.    There is an age-indeterminate fracture in the medial aspect of  the left clavicle.                                       CT Head Without Contrast (Final result)  Result time 06/13/25 15:10:07      Final result by Celso Castillo MD (06/13/25 15:10:07)                   Impression:      Chronic microvascular ischemic changes.    All CT scans at this facility use dose modulation, iterative reconstruction, and/or weight based dosing when appropriate to reduce radiation dose to as low as reasonable achievable.      Electronically signed by: Celso Castillo MD  Date:    06/13/2025  Time:    15:10               Narrative:    EXAMINATION:  CT HEAD WITHOUT CONTRAST    CLINICAL HISTORY:  Fall; Unspecified fall, initial encounter    TECHNIQUE:  Low dose axial CT images obtained throughout the head without intravenous contrast. Sagittal and coronal reconstructions were performed.    COMPARISON:  03/15/2025    FINDINGS:  Intracranial compartment:    The brain parenchyma demonstrates areas of decreased attenuation with mild to moderate periventricular white matter consistent with chronic microvascular ischemic changes..  No parenchymal mass, hemorrhage, edema or major vascular distribution infarct.  Vascular calcifications are noted.    Mild prominence of the sulci and ventricles are consistent with age-related involutional changes.    No extra-axial blood or fluid collections.    Skull/extracranial contents (limited evaluation): No fracture. Mastoid air cells and paranasal sinuses are essentially clear.                                       X-Ray Chest AP Portable (Final result)  Result time 06/13/25 14:26:36      Final result by Celso Castillo MD (06/13/25 14:26:36)                   Impression:      No acute process seen.      Electronically signed by: Celso Castillo MD  Date:    06/13/2025  Time:    14:26               Narrative:    EXAMINATION:  XR CHEST AP PORTABLE    CLINICAL HISTORY:  Sepsis;    FINDINGS:  Single view of the chest.  Comparison 03/15/2025    Cardiac silhouette is  normal.  The lungs demonstrate no evidence of active disease.  No evidence of pleural effusion or pneumothorax.  Bones appear intact.  Moderate degenerative changes and moderate atherosclerotic disease. Left humeral neck fracture.                                       The Emergency Provider reviewed the vital signs and test results, which are outlined above.       ED Plan/Discussion   ED Medication(s):  Medications   hydrALAZINE injection 10 mg (has no administration in time range)   morphine injection 2 mg (has no administration in time range)   lactated ringers infusion (has no administration in time range)   lactated ringers bolus 1,000 mL (0 mLs Intravenous Stopped 6/13/25 1816)   cefTRIAXone injection 1 g (1 g Intravenous Given 6/13/25 1557)   morphine injection 2 mg (2 mg Intravenous Given 6/13/25 1528)   lactated ringers bolus 1,000 mL (0 mLs Intravenous Stopped 6/13/25 1747)   morphine injection 2 mg (2 mg Intravenous Given 6/13/25 1800)       ED Course as of 06/13/25 1847   Fri Jun 13, 2025   1428 11/1/2023 [LB]   1428 ·  Left Ventricle: The left ventricle is normal in size. Normal wall thickness. There is concentric remodeling. Normal wall motion. There is normal systolic function with a visually estimated ejection fraction of 60 - 65%. There is normal diastolic function.  ·  Right Ventricle: Normal right ventricular cavity size. Wall thickness is normal. Right ventricle wall motion  is normal. Systolic function is normal.  ·  Pulmonary Artery: The estimated pulmonary artery systolic pressure is 18 mmHg.  ·  IVC/SVC: Normal venous pressure at 3 mmHg.      Reviewed Echo [LB]   1518 Lactic Acid Level(!): 2.5 [LB]   1518 WBC(!): 17.37 [LB]   1605 X-Ray Hip 2 or 3 views Right with Pelvis when performed  Fracture side of the superior and inferior pubic ramus.   [LB]   1605 CT Renal Stone Study ABD Pelvis WO  Impression:     Age-indeterminate acute or subacute appearing fracture involving the right superior and  inferior pubic ramus images 139 through 156.  Nonemergent pelvic MRI can be obtained as clinically warranted     No evidence of obstructive uropathy   [LB]   1606 CT Cervical Spine Without Contrast  Impression:     1. There is an age-indeterminate fracture in the medial aspect of the left clavicle.  2. There is grade 1 anterolisthesis of C6 on C7. There is grade 1 anterolisthesis of C7 on T1.  3. There are mild degenerative changes between C2 and T1.  All CT scans at this facility use dose modulation, iterative reconstruction, and/or weight base dosing when appropriate to reduce radiation dose when appropriate to reduce radiation dose to as low as reasonably achievable.   [LB]   1606 CT Head Without Contrast  Impression:     Chronic microvascular ischemic changes   [LB]   1606 X-Ray Chest AP Portable [LB]   1617 Patient is requesting transfer to [facility] [LB]   1651 Secure chat:    LEXI Guzmán NP:   Observation:  Tele.  . [LB]   1750 Secure chat with :    per  service, orthopedics recommends CT scan right knee to exclude possible fracture [LB]   1817 6:17 PM  Fluid challenge completed.  Vital signs have been reviewed.  A focused perfusion assessment (septic focused exam) was completed.      [LB]   1834 Lactic Acid Level(!): 3.4 [LB]   1847 Hospital Medicine Service aware of elevated lactic acid.  Also oncoming physician Dr. Celso Crowley aware. [LB]      ED Course User Index  [LB] Radha Linda, DO           16:51 PM: Secure chat with  Diana Wright NP. JULIA Stout MD agrees with current care and management of pt and accepts admission.   Admitting/Observing Service: Hospital Medicine Service   Admitting Physician: Dr. JULIA Stout  Floor:Telemetry      and 617 PM   All historical, clinical, radiographic, and laboratory findings were reviewed with the patient/family in detail.  I discussed the indications and treatment need: (Internal Medicine) .    Patient/Family  requests transfer to: [facility]    Patient/Family understands that [facility] [place] does provide (Internal Medicine) services.     Patient/family verbalized understanding.   All remaining questions and concerns were addressed at that time and the patient/family agrees to proceed accordingly.  Similarly all pertinent details of the encounter were discussed with Diana Wright NP at [facility] . JULIA Stout MD agrees to accept the patient in transfer based on the needs/patient preferences outlined above.  Patient will be transferred by [facility],Routine , secondary to a need for ongoing Antibiotics, Cardiac Monitoring, and Fluids en route.  Risks: of transfer:    inclement weather, loss of vitals signs, permanent neurologic damage, MVC, loss of current lifestyle,  and/or death.  Benefits of transfer: Internal Medicine and PT.  Patient and/or family agree and verbalize understanding.     Radha Linda, DO,  FACEP        MIPS Measures     Smoker? No     Hypertension: Blood pressure was > 120/80.  Patient was informed that they may be developing hypertension.  They were advised to keep a log of their blood pressure and follow up with their primary care physician.    MIPS: MIPS Measure #415:  Emergency Department Utilization of CT for Minor Blunt Head Trauma for Patients age 18 Years  and  Older.    Measure exclusions:  The patient has a ventricular shunt? No  The patient has a brain tumor? No  The patient has multi-system trauma? No  The patient is pregnant? No  The patient is taking anti platelet medication excluding aspirin?  No  Age 65 years and older?  Patient is 79 y.o.    A head CT was ordered? Yes:   The CT was ordered by the emergency provider?  Yes         Medical Decision Making           Additional MDM:   Sepsis:   This patient does have evidence of infective focus  My overall impression is sepsis without organ  "dysfunction.  Source: Unknown  Antibiotics given- Antibiotics     Patient Encounter Information Not Found      Latest lactate reviewed- 2.5  Organ dysfunction indicated by no evidence of organ dysfunction    Fluid challenge Actual Body Weight- The patient's actual body weight will be used to calculate the 30 ml/kg fluid bolus.     Post- resuscitation assessment Yes - I attest a sepsis perfusion exam was performed within 6 hours of sepsis, severe sepsis, or septic shock presentation, following fluid resuscitation.      Will Not start Pressors- Levophed for MAP of 66  Source control achieved by:  Levaquin.           Medical Decision Making  Differential Diagnosis:  Pneumonia, urinary tract infection, symptomatic anemia, diverticulitis, fracture    Cardiac Monitor Interpretation:  Independent ED physician interpretation of cardiac monitoring.   Rate: 110  Rhythm: Sinus tachycardia  Indication: fatigue    ED course: Sepsis suspected.  Blood cultures obtained.  WBC 17.37.  Mild anemia 11.2/35.5.  Previous hemoglobin March 15, 2025: 12/39.7.  Positive left shift.  No bandemia.  EKG: Sinus tachycardia.  No STEMI.  Troponin 0.014.  Procalcitonin 0.15.  Lactic elevated at 2.1.  Patient given 30 mL/kilos fluid bolus.  Of note, patient's IV "blew" after returning from CT scan.  Therefore delays and receiving fluids secondary to IV access.  Patient did not have any episodes of hypotension in the emergency room.  Total bili 1.1.  Other hepatic enzymes normal.  K +3.6.  Glucose 126.  Urinalysis trace ketones.  Right hip: Fracture of the superior and inferior pubic rami.  Diffuse osteopenia.  CT renal stand:  Acute or subacute fracture involving the right superior and inferior pubic rami.  No renal colic.  Mild stranding and small hematoma anterior to the bladder in the region of the right pubic ramus.  Atherosclerosis.  No aneurysm.  CT cervical spine: H indeterminate fracture of the medial aspect of the left clavicle.  Note no " bruising or pain on exam.  No fracture of the neck.  CT head: Chronic microvascular ischemic changes.  Chest x-ray: Cardiac silhouette normal.  Lungs no active disease.  Given patient's leukocytosis and mild elevation of lactic acid, recommend patient be hospitalized.  Appreciate hospital medicine service.  Patient given 30 mL/kilos fluid bolus.  Patient's mean arterial pressure was greater than 65.  Patient did not require pressor support.  Unfortunately, patient has repeat lactic acid was 3.5.  Patient started on continuous infusion of lactated Ringer's.  Hospital Medicine aware.      Amount and/or Complexity of Data Reviewed  Labs: ordered. Decision-making details documented in ED Course.  Radiology: ordered and independent interpretation performed. Decision-making details documented in ED Course.     Details: Right hip: There is fracture of the inferior and superior pubic rami.  Chest x-ray no acute abnormality  ECG/medicine tests: ordered and independent interpretation performed. Decision-making details documented in ED Course.    Risk  Prescription drug management.  Parenteral controlled substances.  Decision regarding hospitalization.    Critical Care  Total time providing critical care: 33 minutes      Prescription Management: I performed a review of the patient's current Rx medication list as input by nursing staff.    Patient's Medications   New Prescriptions    No medications on file   Previous Medications    ACETAZOLAMIDE (DIAMOX) 125 MG TAB    Take 1 tablet (125 mg total) by mouth 3 (three) times daily.    AMITRIPTYLINE (ELAVIL) 50 MG TABLET    Take 1 tablet (50 mg total) by mouth every evening.    ATORVASTATIN (LIPITOR) 10 MG TABLET    Take 1 tablet (10 mg total) by mouth once daily.    ATROPINE 1% (ISOPTO ATROPINE) 1 % DROP    Place 1 drop into the right eye 2 (two) times a day.    BACLOFEN (LIORESAL) 10 MG TABLET    Take 10 mg by mouth 2 (two) times daily as needed.    BRIMONIDINE 0.2% (ALPHAGAN) 0.2  % DROP    PLACE 1 DROP IN EACH EYE TWICE A DAY    CITALOPRAM (CELEXA) 10 MG TABLET    Take 1 tablet (10 mg total) by mouth once daily.    DORZOLAMIDE-TIMOLOL 2-0.5% (COSOPT) 22.3-6.8 MG/ML OPHTHALMIC SOLUTION    PLACE 1 DROP IN EACH EYE TWICE A DAY    ERGOCALCIFEROL (ERGOCALCIFEROL) 50,000 UNIT CAP    Take 1 capsule (50,000 Units total) by mouth every 7 days. After dinner    ESTRADIOL (ESTRACE) 0.01 % (0.1 MG/GRAM) VAGINAL CREAM    Place 1 g vaginally twice a week.    FEXOFENADINE (ALLEGRA) 180 MG TABLET    Take 1 tablet (180 mg total) by mouth once daily.    FLUTICASONE PROPIONATE (FLONASE) 50 MCG/ACTUATION NASAL SPRAY    1 spray (50 mcg total) by Each Nostril route once daily.    KETOROLAC 0.5% (ACULAR) 0.5 % DROP    PLACE 1 DROP IN THE LEFT EYE FOUR TIMES DAILY    LATANOPROST 0.005 % OPHTHALMIC SOLUTION    Place 1 drop into both eyes once daily.    LOTEPREDNOL (LOTEMAX) 0.5 % OPHTHALMIC SUSPENSION    Place 1 drop into the left eye 4 (four) times daily.    LOTEPREDNOL ETABONATE (LOTEMAX SM) 0.38 % DRPG    Place 1 drop into the right eye 4 (four) times daily.    MAGNESIUM OXIDE (MAG-OX) 400 MG (241.3 MG MAGNESIUM) TABLET    Take 400 mg by mouth.    MOXIFLOXACIN (VIGAMOX) 0.5 % OPHTHALMIC SOLUTION    Place 1 drop into the left eye 4 (four) times daily.    MULTIVITAMIN WITH FOLIC ACID 400 MCG TAB    Take 1 tablet by mouth every morning.    NITROFURANTOIN, MACROCRYSTAL-MONOHYDRATE, (MACROBID) 100 MG CAPSULE    Take 1 capsule (100 mg total) by mouth 2 (two) times daily. for 7 days    OXYCODONE-ACETAMINOPHEN (PERCOCET)  MG PER TABLET    Take 1 tablet by mouth 3 (three) times daily as needed.    PANTOPRAZOLE (PROTONIX) 40 MG TABLET    Take 1 tablet by mouth every morning.    POLYMYXIN B SULF-TRIMETHOPRIM (POLYTRIM) 10,000 UNIT- 1 MG/ML DROP    PLACE ONE DROP INTO THE RIGHT EYE FOUR TIMES DAILY    PREDNISOLONE ACETATE (PRED FORTE) 1 % DRPS    INSTILL 1 DROP IN RIGHT EYE 4 TIMES A DAY    PREDNISOLONE ACETATE (PRED  "FORTE) 1 % DRPS    Place 1 drop into both eyes 4 (four) times daily.    PREDNISOLONE ACETATE (PRED FORTE) 1 % DRPS    place 1 drop into the right eye 4 times daily.    QUETIAPINE (SEROQUEL) 25 MG TAB    Take 2 tablets (50 mg total) by mouth once daily.    THIAMINE MONONITRATE, VIT B1, (VITAMIN B-1, MONONITRATE,) 100 MG TAB    Take 1 tablet by mouth every morning.    VALSARTAN-HYDROCHLOROTHIAZIDE (DIOVAN-HCT) 160-12.5 MG PER TABLET    Take 1 tablet by mouth once daily.   Modified Medications    No medications on file   Discontinued Medications    No medications on file        Discussed case verbally with:      Referrals:  No orders of the defined types were placed in this encounter.        Portions of this note may have been created with voice recognition software. Occasional "wrong-word" or "sound-a-like" substitutions may have occurred due to the inherent limitations of voice recognition software. Please, read the note carefully and recognize, using context, where substitutions have occurred.          Clinical Impression       ICD-10-CM ICD-9-CM   1. Elevated lactic acid level  R79.89 276.2   2. Screening for cardiovascular condition  Z13.6 V81.2   3. Fall  W19.XXXA E888.9   4. Right lower quadrant abdominal pain  R10.31 789.03   5. Leukocytosis  D72.829 288.60   6. Closed fracture of ramus of right pubis, initial encounter (Superior and Inferior)  S32.591A 808.2         ED Disposition     Disposition: Admit to telemetry  Patient condition: Fair    ED Follow-up                      Radha Linda,   06/13/25 2056    "

## 2025-06-14 PROBLEM — M54.2 NECK PAIN: Status: ACTIVE | Noted: 2025-06-14

## 2025-06-14 PROBLEM — Z86.59 HISTORY OF DEMENTIA: Status: ACTIVE | Noted: 2025-06-14

## 2025-06-14 PROBLEM — R44.3 HALLUCINATIONS: Status: ACTIVE | Noted: 2025-06-14

## 2025-06-14 PROBLEM — N39.0 UTI (URINARY TRACT INFECTION): Status: ACTIVE | Noted: 2025-06-14

## 2025-06-14 PROBLEM — S32.591A CLOSED FRACTURE OF MULTIPLE PUBIC RAMI, RIGHT, INITIAL ENCOUNTER: Status: ACTIVE | Noted: 2025-06-14

## 2025-06-14 LAB
ABSOLUTE EOSINOPHIL (OHS): 0.15 K/UL
ABSOLUTE EOSINOPHIL (OHS): 0.25 K/UL
ABSOLUTE MONOCYTE (OHS): 0.99 K/UL (ref 0.3–1)
ABSOLUTE MONOCYTE (OHS): 1.1 K/UL (ref 0.3–1)
ABSOLUTE NEUTROPHIL COUNT (OHS): 8.12 K/UL (ref 1.8–7.7)
ABSOLUTE NEUTROPHIL COUNT (OHS): 8.56 K/UL (ref 1.8–7.7)
ALBUMIN SERPL BCP-MCNC: 3 G/DL (ref 3.5–5.2)
ALP SERPL-CCNC: 54 UNIT/L (ref 40–150)
ALT SERPL W/O P-5'-P-CCNC: 10 UNIT/L (ref 10–44)
ANION GAP (OHS): 10 MMOL/L (ref 8–16)
AST SERPL-CCNC: 22 UNIT/L (ref 11–45)
BASOPHILS # BLD AUTO: 0.05 K/UL
BASOPHILS # BLD AUTO: 0.05 K/UL
BASOPHILS NFR BLD AUTO: 0.4 %
BASOPHILS NFR BLD AUTO: 0.4 %
BILIRUB SERPL-MCNC: 1 MG/DL (ref 0.1–1)
BUN SERPL-MCNC: 20 MG/DL (ref 8–23)
CALCIUM SERPL-MCNC: 9 MG/DL (ref 8.7–10.5)
CHLORIDE SERPL-SCNC: 108 MMOL/L (ref 95–110)
CO2 SERPL-SCNC: 17 MMOL/L (ref 23–29)
CREAT SERPL-MCNC: 1.1 MG/DL (ref 0.5–1.4)
ERYTHROCYTE [DISTWIDTH] IN BLOOD BY AUTOMATED COUNT: 15.9 % (ref 11.5–14.5)
ERYTHROCYTE [DISTWIDTH] IN BLOOD BY AUTOMATED COUNT: 16 % (ref 11.5–14.5)
GFR SERPLBLD CREATININE-BSD FMLA CKD-EPI: 52 ML/MIN/1.73/M2
GLUCOSE SERPL-MCNC: 98 MG/DL (ref 70–110)
HCT VFR BLD AUTO: 26.9 % (ref 37–48.5)
HCT VFR BLD AUTO: 28.6 % (ref 37–48.5)
HCV AB SERPL QL IA: NEGATIVE
HGB BLD-MCNC: 8.4 GM/DL (ref 12–16)
HGB BLD-MCNC: 8.8 GM/DL (ref 12–16)
HIV 1+2 AB+HIV1 P24 AG SERPL QL IA: NEGATIVE
IMM GRANULOCYTES # BLD AUTO: 0.05 K/UL (ref 0–0.04)
IMM GRANULOCYTES # BLD AUTO: 0.06 K/UL (ref 0–0.04)
IMM GRANULOCYTES NFR BLD AUTO: 0.4 % (ref 0–0.5)
IMM GRANULOCYTES NFR BLD AUTO: 0.5 % (ref 0–0.5)
LACTATE SERPL-SCNC: 1 MMOL/L (ref 0.5–2.2)
LYMPHOCYTES # BLD AUTO: 1.43 K/UL (ref 1–4.8)
LYMPHOCYTES # BLD AUTO: 1.55 K/UL (ref 1–4.8)
MCH RBC QN AUTO: 22.5 PG (ref 27–31)
MCH RBC QN AUTO: 22.5 PG (ref 27–31)
MCHC RBC AUTO-ENTMCNC: 30.8 G/DL (ref 32–36)
MCHC RBC AUTO-ENTMCNC: 31.2 G/DL (ref 32–36)
MCV RBC AUTO: 72 FL (ref 82–98)
MCV RBC AUTO: 73 FL (ref 82–98)
NUCLEATED RBC (/100WBC) (OHS): 0 /100 WBC
NUCLEATED RBC (/100WBC) (OHS): 0 /100 WBC
OHS QRS DURATION: 70 MS
OHS QTC CALCULATION: 448 MS
PLATELET # BLD AUTO: 209 K/UL (ref 150–450)
PLATELET # BLD AUTO: 235 K/UL (ref 150–450)
PLATELET BLD QL SMEAR: NORMAL
PMV BLD AUTO: 10.7 FL (ref 9.2–12.9)
PMV BLD AUTO: 9.8 FL (ref 9.2–12.9)
POTASSIUM SERPL-SCNC: 4.2 MMOL/L (ref 3.5–5.1)
PROT SERPL-MCNC: 6.3 GM/DL (ref 6–8.4)
RBC # BLD AUTO: 3.74 M/UL (ref 4–5.4)
RBC # BLD AUTO: 3.91 M/UL (ref 4–5.4)
RELATIVE EOSINOPHIL (OHS): 1.3 %
RELATIVE EOSINOPHIL (OHS): 2.2 %
RELATIVE LYMPHOCYTE (OHS): 12.7 % (ref 18–48)
RELATIVE LYMPHOCYTE (OHS): 13.9 % (ref 18–48)
RELATIVE MONOCYTE (OHS): 8.8 % (ref 4–15)
RELATIVE MONOCYTE (OHS): 9.9 % (ref 4–15)
RELATIVE NEUTROPHIL (OHS): 73.2 % (ref 38–73)
RELATIVE NEUTROPHIL (OHS): 76.3 % (ref 38–73)
SODIUM SERPL-SCNC: 135 MMOL/L (ref 136–145)
WBC # BLD AUTO: 11.12 K/UL (ref 3.9–12.7)
WBC # BLD AUTO: 11.24 K/UL (ref 3.9–12.7)

## 2025-06-14 PROCEDURE — 36415 COLL VENOUS BLD VENIPUNCTURE: CPT | Performed by: NURSE PRACTITIONER

## 2025-06-14 PROCEDURE — 85025 COMPLETE CBC W/AUTO DIFF WBC: CPT | Mod: 91 | Performed by: NURSE PRACTITIONER

## 2025-06-14 PROCEDURE — 25000003 PHARM REV CODE 250: Performed by: HOSPITALIST

## 2025-06-14 PROCEDURE — 36415 COLL VENOUS BLD VENIPUNCTURE: CPT | Performed by: EMERGENCY MEDICINE

## 2025-06-14 PROCEDURE — 21400001 HC TELEMETRY ROOM

## 2025-06-14 PROCEDURE — 83605 ASSAY OF LACTIC ACID: CPT | Performed by: EMERGENCY MEDICINE

## 2025-06-14 PROCEDURE — 63600175 PHARM REV CODE 636 W HCPCS: Performed by: HOSPITALIST

## 2025-06-14 PROCEDURE — 80053 COMPREHEN METABOLIC PANEL: CPT | Performed by: HOSPITALIST

## 2025-06-14 PROCEDURE — 36415 COLL VENOUS BLD VENIPUNCTURE: CPT | Performed by: HOSPITALIST

## 2025-06-14 PROCEDURE — 96372 THER/PROPH/DIAG INJ SC/IM: CPT | Performed by: HOSPITALIST

## 2025-06-14 PROCEDURE — 85025 COMPLETE CBC W/AUTO DIFF WBC: CPT | Performed by: HOSPITALIST

## 2025-06-14 PROCEDURE — 25000003 PHARM REV CODE 250: Performed by: NURSE PRACTITIONER

## 2025-06-14 RX ORDER — CITALOPRAM 10 MG/1
10 TABLET ORAL DAILY
Status: DISCONTINUED | OUTPATIENT
Start: 2025-06-14 | End: 2025-06-23 | Stop reason: HOSPADM

## 2025-06-14 RX ORDER — VALSARTAN AND HYDROCHLOROTHIAZIDE 160; 12.5 MG/1; MG/1
1 TABLET, FILM COATED ORAL DAILY
Status: DISCONTINUED | OUTPATIENT
Start: 2025-06-14 | End: 2025-06-14 | Stop reason: CLARIF

## 2025-06-14 RX ORDER — CEFTRIAXONE 1 G/1
1 INJECTION, POWDER, FOR SOLUTION INTRAMUSCULAR; INTRAVENOUS
Status: DISCONTINUED | OUTPATIENT
Start: 2025-06-14 | End: 2025-06-19

## 2025-06-14 RX ORDER — VALSARTAN 160 MG/1
160 TABLET ORAL DAILY
Status: DISCONTINUED | OUTPATIENT
Start: 2025-06-14 | End: 2025-06-19

## 2025-06-14 RX ORDER — HYDROCHLOROTHIAZIDE 12.5 MG/1
12.5 TABLET ORAL DAILY
Status: DISCONTINUED | OUTPATIENT
Start: 2025-06-14 | End: 2025-06-19

## 2025-06-14 RX ORDER — ATORVASTATIN CALCIUM 10 MG/1
10 TABLET, FILM COATED ORAL DAILY
Status: DISCONTINUED | OUTPATIENT
Start: 2025-06-14 | End: 2025-06-23 | Stop reason: HOSPADM

## 2025-06-14 RX ORDER — ENOXAPARIN SODIUM 100 MG/ML
40 INJECTION SUBCUTANEOUS EVERY 24 HOURS
Status: DISCONTINUED | OUTPATIENT
Start: 2025-06-14 | End: 2025-06-17

## 2025-06-14 RX ORDER — PANTOPRAZOLE SODIUM 40 MG/1
40 TABLET, DELAYED RELEASE ORAL EVERY MORNING
Status: DISCONTINUED | OUTPATIENT
Start: 2025-06-14 | End: 2025-06-23 | Stop reason: HOSPADM

## 2025-06-14 RX ORDER — QUETIAPINE FUMARATE 25 MG/1
50 TABLET, FILM COATED ORAL NIGHTLY
Status: DISCONTINUED | OUTPATIENT
Start: 2025-06-14 | End: 2025-06-19

## 2025-06-14 RX ADMIN — SENNOSIDES AND DOCUSATE SODIUM 1 TABLET: 50; 8.6 TABLET ORAL at 03:06

## 2025-06-14 RX ADMIN — CITALOPRAM HYDROBROMIDE 10 MG: 10 TABLET ORAL at 12:06

## 2025-06-14 RX ADMIN — Medication 6 MG: at 02:06

## 2025-06-14 RX ADMIN — MORPHINE SULFATE 2 MG: 4 INJECTION INTRAVENOUS at 09:06

## 2025-06-14 RX ADMIN — CEFTRIAXONE 1 G: 1 INJECTION, POWDER, FOR SOLUTION INTRAMUSCULAR; INTRAVENOUS at 04:06

## 2025-06-14 RX ADMIN — PANTOPRAZOLE SODIUM 40 MG: 40 TABLET, DELAYED RELEASE ORAL at 12:06

## 2025-06-14 RX ADMIN — ATORVASTATIN CALCIUM 10 MG: 10 TABLET, FILM COATED ORAL at 12:06

## 2025-06-14 RX ADMIN — ENOXAPARIN SODIUM 40 MG: 40 INJECTION SUBCUTANEOUS at 04:06

## 2025-06-14 RX ADMIN — QUETIAPINE FUMARATE 50 MG: 25 TABLET ORAL at 08:06

## 2025-06-14 RX ADMIN — HYDROCHLOROTHIAZIDE 12.5 MG: 12.5 TABLET ORAL at 12:06

## 2025-06-14 RX ADMIN — HYDROCODONE BITARTRATE AND ACETAMINOPHEN 1 TABLET: 5; 325 TABLET ORAL at 03:06

## 2025-06-14 RX ADMIN — VALSARTAN 160 MG: 160 TABLET, FILM COATED ORAL at 12:06

## 2025-06-14 NOTE — PROGRESS NOTES
[facility] - Telemetry ([facility])  Adult Nutrition  Progress Note    SUMMARY       Recommendations    Recommendation/Intervention:   1. Recommend a Low sodium diet with texture per SLP.   2. Recommend Boost Plus with all meals to provide additional calories.   3. Recommend starting a bowel regimen.   4. Encourage PO intake with feeding assistance as warranted.   5. Weigh x2 weekly.    Goals:   1. Pt will have diet texture modified/cleared by SLP by RD follow up.   2. Pt will tolerate and consume >50% EEN and EPN by RD follow up.   3. Pt will have improved GI status by RD follow up.    Nutrition Goal Status: new  Communication of RD Recs: other (comment) (RD progress note, POC, sticky note)    Nutrition Discharge Planning    Nutrition Discharge Planning: Oral supplement regimen (comments), Oral diet with texture modifications per SLP (comments)  Oral supplement regimen (comments): Boost Plus TID  Oral diet with texture modifications per SLP (comments): Low sodium, texture per SLP    Assessment and Plan    Interventions:  1. Low sodium, texture modified diet.  2. Commercial beverage medical food supplement therapy.  3. Management of nutrition related prescription medication.  4. Feeding assistance.   5. Collaboration by nutrition professional with other providers.    Malnutrition Assessment 6/14/25:     Skin (Micronutrient): bruised (Jim score 15)       Subcutaneous Fat (Malnutrition): mild depletion  Muscle Mass (Malnutrition): mild depletion   Orbital Region (Subcutaneous Fat Loss): mild depletion  Upper Arm Region (Subcutaneous Fat Loss): mild depletion  Thoracic and Lumbar Region: mild depletion   [place] Region (Muscle Loss): mild depletion  Clavicle Bone Region (Muscle Loss): mild depletion  Patellar Region (Muscle Loss): mild depletion  Anterior Thigh Region (Muscle Loss): mild depletion  Posterior Calf Region (Muscle Loss): mild depletion                 Reason for Assessment    Reason For Assessment: identified  "at risk by screening criteria  Diagnosis: renal disease    General Information Comments:   25: 79 y.o. female admitted for a UTI. PMH: HTN, dementia, anxiety, lupus. Pt is currently getting a Low sodium diet without any ONS. Pt seen for malnutrition screening. Per EMR pt reported being unsure of any weight loss PTA with a decrease in appetite/intake PTA. RD attempted to speak w/ pt at bedside but pt was very confused and was unable to respond to RD when asked questions, no family members present. Per EMR pt has a BMI WNL, no PIs recorded, has gained weight since last admit but does not have any meal intakes recorded since admit. RD performed a visual NFPE which shown mild muscle and fat loss. RD to send Boost Plus with all meals to provide additional calories. Nutrition education material not appropriate for pt. Labs and meds reviewed.    Nutrition/Diet History    Spiritual, Cultural Beliefs, Gnosticism Practices, Values that Affect Care: no  Food Allergies: NKFA  Factors Affecting Nutritional Intake: impaired cognitive status/motor control, chewing difficulties/inability to chew food, difficulty/impaired swallowing  Nutrition Related Social Determinants of Health: SDOH: Unable to assess at this time.      Anthropometrics    Height: 5' 1" (154.9 cm)  Height (inches): 61 in  Weight: 69.1 kg (152 lb 5.4 oz)  Weight (lb): 152.34 lb  Weight Method: Bed Scale  Ideal Body Weight (IBW), Female: 105 lb  % Ideal Body Weight, Female (lb): 145.09 %  BMI (Calculated): 28.8  BMI Grade: 18.5-24.9 - normal  Usual Body Weight (UBW), k.4 kg  % Usual Body Weight: 102.74  % Weight Change From Usual Weight: 2.52 %     Wt Readings from Last 15 Encounters:   25 69.1 kg (152 lb 5.4 oz)   06/10/25 65.3 kg (143 lb 15.4 oz)   25 65.8 kg (145 lb 1 oz)   25 65.9 kg (145 lb 4.5 oz)   03/15/25 67.4 kg (148 lb 9.4 oz)   25 67.4 kg (148 lb 9.4 oz)   25 67.4 kg (148 lb 9.4 oz)   25 67.3 kg (148 lb 5.9 " "oz)   01/08/25 67.3 kg (148 lb 5.9 oz)   12/17/24 67.3 kg (148 lb 5.9 oz)   12/06/24 67.3 kg (148 lb 5.9 oz)   12/05/24 67.3 kg (148 lb 5.9 oz)   10/24/24 67.9 kg (149 lb 11.1 oz)   06/10/24 66.1 kg (145 lb 11.6 oz)   03/20/24 67.3 kg (148 lb 5.9 oz)       Lab/Procedures/Meds    Pertinent Labs Reviewed: reviewed  Pertinent Medications Reviewed: reviewed    BMP  Lab Results   Component Value Date     (L) 06/15/2025    K 4.0 06/15/2025     06/15/2025    CO2 15 (L) 06/15/2025    BUN 14 06/15/2025    CREATININE 1.0 06/15/2025    CALCIUM 9.0 06/15/2025    ANIONGAP 12 06/15/2025    EGFRNORACEVR 58 (L) 06/15/2025     Lab Results   Component Value Date    CALCIUM 9.0 06/15/2025    PHOS 2.5 (L) 05/20/2025     Lab Results   Component Value Date    ALBUMIN 3.0 (L) 06/15/2025     Lab Results   Component Value Date    ALT 11 06/15/2025    AST 23 06/15/2025    ALKPHOS 56 06/15/2025    BILITOT 0.9 06/15/2025     No results for input(s): "POCTGLUCOSE" in the last 24 hours.    Lab Results   Component Value Date    HGBA1C 5.5 10/24/2024     Lab Results   Component Value Date    WBC 14.13 (H) 06/15/2025    HGB 8.3 (L) 06/15/2025    HCT 27.7 (L) 06/15/2025    MCV 75 (L) 06/15/2025     06/15/2025       Scheduled Meds:   atorvastatin  10 mg Oral Daily    azithromycin  500 mg Intravenous Q24H    cefTRIAXone (Rocephin) IV (PEDS and ADULTS)  1 g Intravenous Q24H    citalopram  10 mg Oral Daily    enoxparin  40 mg Subcutaneous Daily    furosemide (LASIX) injection  40 mg Intravenous Daily    hydroCHLOROthiazide  12.5 mg Oral Daily    ketorolac 0.5%  1 drop Both Eyes QID    pantoprazole  40 mg Oral QAM    prednisoLONE acetate  1 drop Both Eyes BID    QUEtiapine  50 mg Oral QHS    valsartan  160 mg Oral Daily     Continuous Infusions:  PRN Meds:.  Current Facility-Administered Medications:     acetaminophen, 650 mg, Rectal, Q6H PRN    acetaminophen, 650 mg, Oral, Q8H PRN    albuterol-ipratropium, 3 mL, Nebulization, Q6H " PRN    aluminum-magnesium hydroxide-simethicone, 30 mL, Oral, QID PRN    dextrose 50%, 12.5 g, Intravenous, PRN    dextrose 50%, 25 g, Intravenous, PRN    glucagon (human recombinant), 1 mg, Intramuscular, PRN    glucose, 16 g, Oral, PRN    glucose, 24 g, Oral, PRN    hydrALAZINE, 10 mg, Intravenous, Q8H PRN    HYDROcodone-acetaminophen, 1 tablet, Oral, Q6H PRN    melatonin, 6 mg, Oral, Nightly PRN    naloxone, 0.02 mg, Intravenous, PRN    ondansetron, 4 mg, Intravenous, Q8H PRN    promethazine, 25 mg, Oral, Q6H PRN    senna-docusate, 1 tablet, Oral, BID PRN    sodium chloride 0.9%, 10 mL, Intravenous, Q12H PRN    sodium chloride 0.9%, 10 mL, Intravenous, PRN      Estimated/Assessed Needs    Weight Used For Calorie Calculations: 69.1 kg (152 lb 5.4 oz)  Energy Calorie Requirements (kcal): 7281-6413 (MSJ x 1.2-1.4 (normal))  Energy Need Method: [facility]  Protein Requirements: 69-83 g pro (1-1.2 g/kg (older adult))  Weight Used For Protein Calculations: 69.1 kg (152 lb 5.4 oz)  Fluid Requirements (mL): 8752-5908 ml  Estimated Fluid Requirement Method: RDA Method  RDA Method (mL): 1330  CHO Requirement: 166-194 g cho (4922-1432 kcals/8)      Nutrition Prescription Ordered    Current Diet Order: Low sodium    Evaluation of Received Nutrient/Fluid Intake  I/O: (Net since admit):   6/14/25: +2823.3 ml    Energy Calories Required: other (see comments) (none recorded)  Protein Required: other (see comments) (none recorded)  Fluid Required: exceeds needs  Total Fluid Intake (mL): 2923.3  Comments: LBM: PTA, MST 3  Tolerance: other (see comments) (none recorded)  % Intake of Estimated Energy Needs: Other: none recorded  % Meal Intake: Other: none recorded    PES Statement  Inadequate protein energy intake related to Chronic illness, Chewing difficulty, Swallowing difficulty, Self-feeding barriers, Cognitive Impairment as evidenced by Intake <75% estimated needs, Depletion of muscle stores, Depletion of fat  stores  Status: New    Nutrition Risk    Level of Risk/Frequency of Follow-up: high (Follow up x2 weekly)     Monitor and Evaluation    Monitor and Evaluation: Energy intake, Food and beverage intake, Protein intake, Carbohydrate intake, Diet order, Weight, Electrolyte and renal panel, Gastrointestinal profile, Glucose/endocrine profile, Nutrition focused physical findings, Skin     Nutrition Follow-Up    RD Follow-up?: Yes (Follow up x2 weekly)    Kerry Stein RDN, LDN

## 2025-06-14 NOTE — ASSESSMENT & PLAN NOTE
Chronic, controlled.  Latest blood pressure and vitals reviewed-   Temp:  [99 °F (37.2 °C)-100.7 °F (38.2 °C)]   Pulse:  []   Resp:  [16-23]   BP: (117-192)/(68-95)   SpO2:  [94 %-99 %] .   Home meds for hypertension were reviewed and noted below.   Hypertension Medications              valsartan-hydrochlorothiazide (DIOVAN-HCT) 160-12.5 mg per tablet Take 1 tablet by mouth once daily.     While in the hospital, will manage blood pressure as follows; Continue home antihypertensive regimen once verified with family in a.m.; we will treat with p.r.n. hydralazine    Will utilize p.r.n. blood pressure medication only if patient's blood pressure greater than  160/90 and she develops symptoms such as worsening chest pain or shortness of breath.

## 2025-06-14 NOTE — PROGRESS NOTES
Pharmacist Renal Dose Adjustment Note    Tea Ring is a 79 y.o. female being treated with the medication enoxaparin.    Patient Data:    Vital Signs (Most Recent):  Temp: 99 °F (37.2 °C) (06/13/25 2025)  Pulse: 96 (06/13/25 2057)  Resp: 20 (06/13/25 1844)  BP: (!) 157/86 (06/13/25 2025)  SpO2: 95 % (06/13/25 2026) Vital Signs (72h Range):  Temp:  [99 °F (37.2 °C)-99.9 °F (37.7 °C)]   Pulse:  []   Resp:  [16-23]   BP: (117-192)/(74-95)   SpO2:  [94 %-99 %]      Recent Labs   Lab 06/13/25  1439   CREATININE 1.4     Serum creatinine: 1.4 mg/dL 06/13/25 1439  Estimated creatinine clearance: 28.7 mL/min    Enoxaparin 40 mg subcutaneous every 24 hours will be changed to enoxaparin 30 mg subcutaneous every 24 hours for DVT prevention with CrCl < 30 ml/min.    Pharmacist's Name: Shahram Howard  Pharmacist's Extension: [phone]

## 2025-06-14 NOTE — HOSPITAL COURSE
80 y/o female admitted for UTI and acute encephalopathy. Patient started IV rocephin. Ortho has been consulted for pubic rami fracture.   As of 6/15 patient with worsening confusion. WBCs trending up. CXR showed worsening vascular congestion vs infectious process. IVFs stopped and IV lasix started.  Will add azithromycin to current IV abx therapy. Case discussed with Dr. Lo.   As of 6/16/2025, T 99.9 highest overnight, VSS otherwise. Ortho pending: Pubic rami fx = pain management. Diff following commands/swallowing pills--spits them out. Confusion slightly improved today--no obvious hallucinations. Bl Cx no growth after 36 hours. Overnight, taking PO meds. C/o pain not improved w/ current meds.  As of 6/17/2025, VSS. Cr bumped 1.1>1.6. Renal US: no acute abn. Hold Lasix, Valsartan, and HCTZ. Cover BP w/ IV Metoprolol and prn Hydralazine. K repletion 10 IV. BMP at 1500. Mentation not at baseline per daughter at bedside. Bouts of anger and uncooperative intermittently. Pain med adjusted. MRI Brain pending. In afternoon, mentation worsened and unable to obtain MRI or 1500 BMP redraw.  As of 6/18/2025, intermittent cooperation w/ continuous confusion, not at baseline, and bouts of anger. Hold Lasix, Valsartan, and HCTZ 2/2 kidney fx. K repletion. Re-attempt MRI today. 1500 BMP.  As of 6/19/2025, confused, speaking to someone who is not present, more cooperative today. Daughter at bedside. Cr and K WNL. Seroquel 25 in AM added. Neuro and TelePsych: Pt seems to be in a state of delirium. Sleep deprivation may be worsening s/s. Incr Seroquel to 50 BID. Zyprexa IM prn. Will take quite some time given Dementia for mentation to improve, if it improves.   As of 6/20/2025, transitioned to PO and de-escalated meds. CM following: awaiting SNF.  As of 6/21/2025, SNF requires P2P scheduled Monday at 1100 w/ Dr. Payne.   As of 6/22/2025, Cr bumped 2.5. Nurse reports last charted BM: 6/16 = Mag Citrate. BP soft. Attempt IV for  gentle IV hydration.     6/23- remains same, c/o pain in the back. Mental status waxing and waning. We had a long discussion with her daughter about her poor condition and poor prognosis. She is a DNR. Daughter agreed with comfort care and hospice. She was accepted by [facility] at the [facility]. She was seen and examined and deemed appropriate for discharge to hospice today by ambulance.

## 2025-06-14 NOTE — SUBJECTIVE & OBJECTIVE
Interval History: WBCs have normalized. Continue IV abx therapy. Patient continues to have hallucinations.     Review of Systems   Constitutional:  Negative for chills, diaphoresis, fatigue and fever.   Respiratory:  Negative for cough and shortness of breath.    Cardiovascular:  Negative for chest pain, palpitations and leg swelling.   Gastrointestinal:  Negative for abdominal pain, diarrhea, nausea and vomiting.   Genitourinary:  Positive for dysuria. Negative for flank pain and hematuria.   Musculoskeletal:  Positive for arthralgias, myalgias and neck pain. Negative for neck stiffness.   Neurological:  Negative for dizziness, light-headedness and headaches.   Psychiatric/Behavioral:  Positive for confusion and hallucinations.    All other systems reviewed and are negative.    Objective:     Vital Signs (Most Recent):  Temp: 99.6 °F (37.6 °C) (06/14/25 0747)  Pulse: 93 (06/14/25 0951)  Resp: 17 (06/14/25 0906)  BP: (!) 152/70 (06/14/25 0747)  SpO2: 95 % (06/14/25 0747) Vital Signs (24h Range):  Temp:  [99 °F (37.2 °C)-100.7 °F (38.2 °C)] 99.6 °F (37.6 °C)  Pulse:  [] 93  Resp:  [14-23] 17  SpO2:  [94 %-99 %] 95 %  BP: (117-192)/(68-95) 152/70     Weight: 69.1 kg (152 lb 5.4 oz)  Body mass index is 28.78 kg/m².    Intake/Output Summary (Last 24 hours) at 6/14/2025 1011  Last data filed at 6/14/2025 0626  Gross per 24 hour   Intake 2923.34 ml   Output 100 ml   Net 2823.34 ml         Physical Exam  Vitals reviewed.   Constitutional:       General: She is awake. She is not in acute distress.     Appearance: Normal appearance. She is normal weight. She is not ill-appearing, toxic-appearing or diaphoretic.   HENT:      Head: Normocephalic and atraumatic.      Right Ear: External ear normal.      Left Ear: External ear normal.      Nose: Nose normal. No congestion or rhinorrhea.      Mouth/Throat:      Mouth: Mucous membranes are moist.      Pharynx: No oropharyngeal exudate or posterior oropharyngeal erythema.    Eyes:      General: No scleral icterus.     Extraocular Movements: Extraocular movements intact.      Conjunctiva/sclera: Conjunctivae normal.   Neck:      Vascular: No carotid bruit.   Cardiovascular:      Rate and Rhythm: Normal rate and regular rhythm.      Pulses: Normal pulses.      Heart sounds: Normal heart sounds. No murmur heard.     No friction rub. No gallop.   Pulmonary:      Effort: Pulmonary effort is normal. No respiratory distress.      Breath sounds: Normal breath sounds. No stridor. No wheezing, rhonchi or rales.   Chest:      Chest wall: No tenderness.   Abdominal:      General: Abdomen is flat. Bowel sounds are normal. There is no distension.      Palpations: Abdomen is soft. There is no mass.      Tenderness: There is no abdominal tenderness. There is no right CVA tenderness, left CVA tenderness, guarding or rebound.      Hernia: No hernia is present.   Musculoskeletal:         General: No swelling, tenderness, deformity or signs of injury. Normal range of motion.      Cervical back: Normal range of motion and neck supple. No rigidity or tenderness. No spinous process tenderness or muscular tenderness.      Right lower leg: No edema.      Left lower leg: No edema.      Comments: Moves all extremities well   Lymphadenopathy:      Cervical: No cervical adenopathy.   Skin:     General: Skin is warm and dry.      Capillary Refill: Capillary refill takes less than 2 seconds.      Findings: No rash.   Neurological:      General: No focal deficit present.      Mental Status: She is alert. She is confused.      GCS: GCS eye subscore is 4. GCS verbal subscore is 4. GCS motor subscore is 6.      Cranial Nerves: No cranial nerve deficit.      Sensory: No sensory deficit.      Motor: No weakness.      Coordination: Coordination abnormal.      Comments: Responds appropriately, but has some confusion during conversation   Psychiatric:         Attention and Perception: She perceives auditory and visual  "hallucinations.         Mood and Affect: Mood normal.         Speech: Speech normal.         Behavior: Behavior normal. Behavior is cooperative.         Thought Content: Thought content normal.         Judgment: Judgment normal.               Significant Labs: All pertinent labs within the past 24 hours have been reviewed.  Blood Culture: No results for input(s): "LABBLOO" in the last 48 hours.  CBC:   Recent Labs   Lab 06/13/25  1439 06/14/25  0558 06/14/25  0855   WBC 17.37* 11.24 11.12   HGB 11.2* 8.8* 8.4*   HCT 35.5* 28.6* 26.9*    209 235     CMP:   Recent Labs   Lab 06/13/25  1439 06/14/25  0558    135*   K 3.6 4.2    108   CO2 20* 17*   * 98   BUN 23 20   CREATININE 1.4 1.1   CALCIUM 9.8 9.0   PROT 8.0 6.3   ALBUMIN 3.9 3.0*   BILITOT 1.1* 1.0   ALKPHOS 75 54   AST 28 22   ALT 17 10   ANIONGAP 14 10     Urine Culture: No results for input(s): "LABURIN" in the last 48 hours.    Significant Imaging: I have reviewed all pertinent imaging results/findings within the past 24 hours.  "

## 2025-06-14 NOTE — ASSESSMENT & PLAN NOTE
Witnessed patient having auditory and visual hallucinations during examination.  Told her daughter to turn the lights off when no one was present in the room while examining the patient.  Unsure if this is related to UTI versus dementia.  We will continue IV antibiotics and have on dementia precautions.  Psych consult defer to day provider.

## 2025-06-14 NOTE — ASSESSMENT & PLAN NOTE
CT imaging cervical spine revealed:  1. There is grade 1 anterolisthesis of C6 on C7. There is grade 1 anterolisthesis of C7 on T1.  2. There are mild degenerative changes between C2 and T1.    Denies any complaints at this time.  Asymptomatic.  Plan:   -analgesics p.r.n.

## 2025-06-14 NOTE — ASSESSMENT & PLAN NOTE
Chronic. Stable. Not in acute exacerbation and currently denies endorsing any suicidal/homicidal ideations.  Active auditory and visual hallucinations.  Unsure if this is baseline due to patient's underlying dementia versus UTI symptoms.  Plan:  -Continue home medications once verified with family in a.m.   -continue IV antibiotics for treatment of UTI

## 2025-06-14 NOTE — ASSESSMENT & PLAN NOTE
CT imaging revealed:  -Age-indeterminate acute or subacute appearing fracture involving the right superior and inferior pubic ramus images 139 through 156. Nonemergent pelvic MRI can be obtained as clinically warranted.    On-call orthopedic consulted.  We will see us consult in a.m..    Plan:   -analgesics p.r.n.   -orthopedic consult

## 2025-06-14 NOTE — ASSESSMENT & PLAN NOTE
Chronic, controlled.  Latest blood pressure and vitals reviewed-   Temp:  [99 °F (37.2 °C)-100.7 °F (38.2 °C)]   Pulse:  []   Resp:  [14-23]   BP: (117-192)/(68-95)   SpO2:  [94 %-99 %] .   Home meds for hypertension were reviewed and noted below.   Hypertension Medications              valsartan-hydrochlorothiazide (DIOVAN-HCT) 160-12.5 mg per tablet Take 1 tablet by mouth once daily.     While in the hospital, will manage blood pressure as follows; Continue home antihypertensive regimen once verified with family in a.m.; we will treat with p.r.n. hydralazine    Will utilize p.r.n. blood pressure medication only if patient's blood pressure greater than  160/90 and she develops symptoms such as worsening chest pain or shortness of breath.

## 2025-06-14 NOTE — PROGRESS NOTES
[facility] Medicine  Progress Note    Patient Name: Tea Ring  MRN: [id number]  Patient Class: OP- Observation   Admission Date: 6/13/2025  Length of Stay: 0 days  Attending Physician: Greg Lo MD  Primary Care Provider: Marcello Zambrano MD        Subjective     Principal Problem:UTI (urinary tract infection)        HPI:  Tea Ring is a 79 y.o. female with a PMH  has a past medical history of Alpha thalassemia, Asthma, Cataract, Chronic anxiety, Chronic knee pain, Chronic pain of left ankle, Clotting disorder, Cutaneous lupus erythematosus, Dementia without behavioral disturbance, Depression, Depression, Ex-smoker, Hypertension, and Osteopenia (1/16 reck1/18).  Presents as a transfer from our [facility] facility for further treatment of generalized weakness fungal recent UTI and ortho consult for pubic rami fracture. Patient reported that she has been having difficulty urinating for over 2 weeks. She has had to use the bathroom at least 6 times daily. Patient reports that she will get the urge to urinate and then will itch down there. She was started on Macrobid on Damaris 10, 2025 by her primary care physician. Patient reports that she has been feeling lightheaded. She denies any specific chest pain, chest pressure, nausea, vomiting, diarrhea, dysuria. Patient reports that she had walked into the gas bedroom might have passed out. Patient is complaining right hip pain, neck pain, and generalized weakness.  Denies any injury or trauma which may have caused her symptoms.  Denies any alleviating or aggravating factors.  Denies any other symptoms at this time.    ER workup revealed CBC with leukocytosis of 17.37.  CMP with a BUN/creatinine of 23/1.4 with EGFR of 39 (previously 20/1.0 with EGFR 58 on 05/20/2025).CBG of 126 mg/dL.  Troponin negative.  Procalcitonin level negative.  Initial lactic acid 2.5 with repeat of 3.4 followed by 1.0.  Previous UA revealed acute cystitis, with  repeat UA showing improvement.  Blood cultures obtained x2.  CT cervical spine revealed age indeterminate fracture of the medial aspect of left clavicle and degenerative changes of cervical spine.  CT head negative for acute intracranial abnormality/findings.  Chest x-ray negative for acute cardiopulmonary findings.  Plain film imaging of the hip/pelvis revealed fracture of the superior and inferior pubic rami.  CT renal stone study of abdomen and pelvis revealed age indeterminate acute or subacute appearing fracture involving right superior and inferior pubic rami.  CT of the right knee pending results.  On-call orthopedic surgeon consulted.  Recommended hospital Medicine to admit and will see us consult.  Vital signs stable.  Patient received 1 g Rocephin, 2 L lactated Ringer's, and a total of 4 mg of morphine at outside facility.  Patient admitted under observation status.    RECONCILE HOME MEDICATIONS WITH FAMILY DURING DAY.  PATIENT UNABLE TO REPORT WHICH MEDICATION SHE TAKES.    PCP: Marcello Zambrano      Overview/Hospital Course:  78 y/o female admitted for sepsis secondary to UTI and acute encephalopathy. Patient started IV rocephin. WBCs have normalized. Lactic acid back to normal. Ortho has been consulted for pubic rami fracture.     Interval History: WBCs have normalized. Continue IV abx therapy. Patient continues to have hallucinations.     Review of Systems   Constitutional:  Negative for chills, diaphoresis, fatigue and fever.   Respiratory:  Negative for cough and shortness of breath.    Cardiovascular:  Negative for chest pain, palpitations and leg swelling.   Gastrointestinal:  Negative for abdominal pain, diarrhea, nausea and vomiting.   Genitourinary:  Positive for dysuria. Negative for flank pain and hematuria.   Musculoskeletal:  Positive for arthralgias, myalgias and neck pain. Negative for neck stiffness.   Neurological:  Negative for dizziness, light-headedness and headaches.    Psychiatric/Behavioral:  Positive for confusion and hallucinations.    All other systems reviewed and are negative.    Objective:     Vital Signs (Most Recent):  Temp: 99.6 °F (37.6 °C) (06/14/25 0747)  Pulse: 93 (06/14/25 0951)  Resp: 17 (06/14/25 0906)  BP: (!) 152/70 (06/14/25 0747)  SpO2: 95 % (06/14/25 0747) Vital Signs (24h Range):  Temp:  [99 °F (37.2 °C)-100.7 °F (38.2 °C)] 99.6 °F (37.6 °C)  Pulse:  [] 93  Resp:  [14-23] 17  SpO2:  [94 %-99 %] 95 %  BP: (117-192)/(68-95) 152/70     Weight: 69.1 kg (152 lb 5.4 oz)  Body mass index is 28.78 kg/m².    Intake/Output Summary (Last 24 hours) at 6/14/2025 1011  Last data filed at 6/14/2025 0626  Gross per 24 hour   Intake 2923.34 ml   Output 100 ml   Net 2823.34 ml         Physical Exam  Vitals reviewed.   Constitutional:       General: She is awake. She is not in acute distress.     Appearance: Normal appearance. She is normal weight. She is not ill-appearing, toxic-appearing or diaphoretic.   HENT:      Head: Normocephalic and atraumatic.      Right Ear: External ear normal.      Left Ear: External ear normal.      Nose: Nose normal. No congestion or rhinorrhea.      Mouth/Throat:      Mouth: Mucous membranes are moist.      Pharynx: No oropharyngeal exudate or posterior oropharyngeal erythema.   Eyes:      General: No scleral icterus.     Extraocular Movements: Extraocular movements intact.      Conjunctiva/sclera: Conjunctivae normal.   Neck:      Vascular: No carotid bruit.   Cardiovascular:      Rate and Rhythm: Normal rate and regular rhythm.      Pulses: Normal pulses.      Heart sounds: Normal heart sounds. No murmur heard.     No friction rub. No gallop.   Pulmonary:      Effort: Pulmonary effort is normal. No respiratory distress.      Breath sounds: Normal breath sounds. No stridor. No wheezing, rhonchi or rales.   Chest:      Chest wall: No tenderness.   Abdominal:      General: Abdomen is flat. Bowel sounds are normal. There is no distension.  "     Palpations: Abdomen is soft. There is no mass.      Tenderness: There is no abdominal tenderness. There is no right CVA tenderness, left CVA tenderness, guarding or rebound.      Hernia: No hernia is present.   Musculoskeletal:         General: No swelling, tenderness, deformity or signs of injury. Normal range of motion.      Cervical back: Normal range of motion and neck supple. No rigidity or tenderness. No spinous process tenderness or muscular tenderness.      Right lower leg: No edema.      Left lower leg: No edema.      Comments: Moves all extremities well   Lymphadenopathy:      Cervical: No cervical adenopathy.   Skin:     General: Skin is warm and dry.      Capillary Refill: Capillary refill takes less than 2 seconds.      Findings: No rash.   Neurological:      General: No focal deficit present.      Mental Status: She is alert. She is confused.      GCS: GCS eye subscore is 4. GCS verbal subscore is 4. GCS motor subscore is 6.      Cranial Nerves: No cranial nerve deficit.      Sensory: No sensory deficit.      Motor: No weakness.      Coordination: Coordination abnormal.      Comments: Responds appropriately, but has some confusion during conversation   Psychiatric:         Attention and Perception: She perceives auditory and visual hallucinations.         Mood and Affect: Mood normal.         Speech: Speech normal.         Behavior: Behavior normal. Behavior is cooperative.         Thought Content: Thought content normal.         Judgment: Judgment normal.               Significant Labs: All pertinent labs within the past 24 hours have been reviewed.  Blood Culture: No results for input(s): "LABBLOO" in the last 48 hours.  CBC:   Recent Labs   Lab 06/13/25  1439 06/14/25  0558 06/14/25  0855   WBC 17.37* 11.24 11.12   HGB 11.2* 8.8* 8.4*   HCT 35.5* 28.6* 26.9*    209 235     CMP:   Recent Labs   Lab 06/13/25  1439 06/14/25  0558    135*   K 3.6 4.2    108   CO2 20* 17*   GLU " "126* 98   BUN 23 20   CREATININE 1.4 1.1   CALCIUM 9.8 9.0   PROT 8.0 6.3   ALBUMIN 3.9 3.0*   BILITOT 1.1* 1.0   ALKPHOS 75 54   AST 28 22   ALT 17 10   ANIONGAP 14 10     Urine Culture: No results for input(s): "LABURIN" in the last 48 hours.    Significant Imaging: I have reviewed all pertinent imaging results/findings within the past 24 hours.      Assessment & Plan  UTI (urinary tract infection)  Presents with complaints of dysuria and confusion with AH/VH. Urinalysis revealed:   Lab Results   Component Value Date    COLORU Yellow 06/13/2025    APPEARANCEUA Clear 06/13/2025    SPECGRAV 1.020 06/13/2025    PHUR 6.0 06/13/2025    PROTEINUA Negative 06/13/2025    GLUCUA Negative 06/13/2025    KETONESU Negative 03/15/2025    NITRITE Negative 06/13/2025    UROBILINOGEN Negative 06/13/2025    BILIRUBINUA Negative 06/13/2025    LEUKOCYTESUR Negative 06/13/2025    RBCUA 2 06/10/2025    WBCUA 8 (H) 06/10/2025    BACTERIA Moderate (A) 06/10/2025    HYALINECASTS 6 (A) 03/06/2025   Received 1 g Rocephin at outside facility  Plan:  -UA culture pending  -Continue Abx    Hallucinations  Witnessed patient having auditory and visual hallucinations during examination.    Unsure if this is related to UTI versus dementia.    We will continue IV antibiotics and have on dementia precautions.    Essential hypertension  Chronic, controlled.  Latest blood pressure and vitals reviewed-   Temp:  [99 °F (37.2 °C)-100.7 °F (38.2 °C)]   Pulse:  []   Resp:  [14-23]   BP: (117-192)/(68-95)   SpO2:  [94 %-99 %] .   Home meds for hypertension were reviewed and noted below.   Hypertension Medications              valsartan-hydrochlorothiazide (DIOVAN-HCT) 160-12.5 mg per tablet Take 1 tablet by mouth once daily.     While in the hospital, will manage blood pressure as follows; Continue home antihypertensive regimen once verified with family in a.m.; we will treat with p.r.n. hydralazine    Will utilize p.r.n. blood pressure medication only " if patient's blood pressure greater than  160/90 and she develops symptoms such as worsening chest pain or shortness of breath.    Moderate major depression  Chronic. Stable. Not in acute exacerbation and currently denies endorsing any suicidal/homicidal ideations.  Active auditory and visual hallucinations.  Unsure if this is baseline due to patient's underlying dementia versus UTI symptoms.  Plan:  -Continue home medications   -continue IV antibiotics for treatment of UTI    Primary open angle glaucoma (POAG) of both eyes, severe stage  -continue eye drops once verified with family    Neck pain  CT imaging cervical spine revealed:  1. There is grade 1 anterolisthesis of C6 on C7. There is grade 1 anterolisthesis of C7 on T1.  2. There are mild degenerative changes between C2 and T1.    Denies any complaints at this time.  Asymptomatic.  Plan:   -analgesics p.r.n.    Closed fracture of multiple pubic rami, right, initial encounter  CT imaging revealed:  -Age-indeterminate acute or subacute appearing fracture involving the right superior and inferior pubic ramus images 139 through 156. Nonemergent pelvic MRI can be obtained as clinically warranted.    On-call orthopedic consulted.  We will see us consult in a.m..    Plan:   -analgesics p.r.n.   -orthopedic consult    History of dementia  Dementia is controlled currently. Continue home dementia meds and non-pharmacologic interventions to prevent delirium (No VS between 11PM-5AM, activity during day, opening blinds, providing glasses/hearing aids, and up in chair during daytime). Use PRN anti-psychotics to prevent behavior of self harm during sundowning, and avoid narcotics and benzos unless absolutely necessary. PRN anti-psychotics prescribed to avoid self harm behaviors.    VTE Risk Mitigation (From admission, onward)           Ordered     enoxaparin injection 40 mg  Daily         06/14/25 1005     IP VTE HIGH RISK PATIENT  Once         06/13/25 1115     Place  sequential compression device  Until discontinued         06/13/25 6775                    Discharge Planning   YIMI:      Code Status: Full Code   Medical Readiness for Discharge Date:                            Christina Hunter NP  Department of Hospital Medicine   [facility])

## 2025-06-14 NOTE — ASSESSMENT & PLAN NOTE
Presents with complaints of dysuria and confusion with AH/VH. Urinalysis revealed:   Lab Results   Component Value Date    COLORU Yellow 06/13/2025    APPEARANCEUA Clear 06/13/2025    SPECGRAV 1.020 06/13/2025    PHUR 6.0 06/13/2025    PROTEINUA Negative 06/13/2025    GLUCUA Negative 06/13/2025    KETONESU Negative 03/15/2025    NITRITE Negative 06/13/2025    UROBILINOGEN Negative 06/13/2025    BILIRUBINUA Negative 06/13/2025    LEUKOCYTESUR Negative 06/13/2025    RBCUA 2 06/10/2025    WBCUA 8 (H) 06/10/2025    BACTERIA Moderate (A) 06/10/2025    HYALINECASTS 6 (A) 03/06/2025   Received 1 g Rocephin at outside facility  Plan:  -UA culture pending  -Continue Abx

## 2025-06-14 NOTE — ASSESSMENT & PLAN NOTE
Witnessed patient having auditory and visual hallucinations during examination.    Unsure if this is related to UTI versus dementia.    We will continue IV antibiotics and have on dementia precautions.

## 2025-06-14 NOTE — ASSESSMENT & PLAN NOTE
Chronic. Stable. Not in acute exacerbation and currently denies endorsing any suicidal/homicidal ideations.  Active auditory and visual hallucinations.  Unsure if this is baseline due to patient's underlying dementia versus UTI symptoms.  Plan:  -Continue home medications   -continue IV antibiotics for treatment of UTI

## 2025-06-14 NOTE — H&P
[facility] Medicine  History & Physical    Patient Name: Tea Ring  MRN: [id number]  Patient Class: OP- Observation  Admission Date: 6/13/2025  Attending Physician: Ron Bragg MD  Primary Care Provider: Marcello Zambrano MD         Patient information was obtained from patient, past medical records, and ER records.     Subjective:     Principal Problem:UTI (urinary tract infection)    Chief Complaint:   Chief Complaint   Patient presents with    Fall     Weakness and 2 falls today. Being treated for uti.         HPI: Tea Ring is a 79 y.o. female with a PMH  has a past medical history of Alpha thalassemia, Asthma, Cataract, Chronic anxiety, Chronic knee pain, Chronic pain of left ankle, Clotting disorder, Cutaneous lupus erythematosus, Dementia without behavioral disturbance, Depression, Depression, Ex-smoker, Hypertension, and Osteopenia (1/16 reck1/18).  Presents as a transfer from our [facility] facility for further treatment of generalized weakness fungal recent UTI and ortho consult for pubic rami fracture. Patient reported that she has been having difficulty urinating for over 2 weeks. She has had to use the bathroom at least 6 times daily. Patient reports that she will get the urge to urinate and then will itch down there. She was started on Macrobid on Damaris 10, 2025 by her primary care physician. Patient reports that she has been feeling lightheaded. She denies any specific chest pain, chest pressure, nausea, vomiting, diarrhea, dysuria. Patient reports that she had walked into the gas bedroom might have passed out. Patient is complaining right hip pain, neck pain, and generalized weakness.  Denies any injury or trauma which may have caused her symptoms.  Denies any alleviating or aggravating factors.  Denies any other symptoms at this time.    ER workup revealed CBC with leukocytosis of 17.37.  CMP with a BUN/creatinine of 23/1.4 with EGFR of 39 (previously 20/1.0 with  EGFR 58 on 05/20/2025).CBG of 126 mg/dL.  Troponin negative.  Procalcitonin level negative.  Initial lactic acid 2.5 with repeat of 3.4 followed by 1.0.  Previous UA revealed acute cystitis, with repeat UA showing improvement.  Blood cultures obtained x2.  CT cervical spine revealed age indeterminate fracture of the medial aspect of left clavicle and degenerative changes of cervical spine.  CT head negative for acute intracranial abnormality/findings.  Chest x-ray negative for acute cardiopulmonary findings.  Plain film imaging of the hip/pelvis revealed fracture of the superior and inferior pubic rami.  CT renal stone study of abdomen and pelvis revealed age indeterminate acute or subacute appearing fracture involving right superior and inferior pubic rami.  CT of the right knee pending results.  On-call orthopedic surgeon consulted.  Recommended hospital Medicine to admit and will see us consult.  Vital signs stable.  Patient received 1 g Rocephin, 2 L lactated Ringer's, and a total of 4 mg of morphine at outside facility.  Patient admitted under observation status.    RECONCILE HOME MEDICATIONS WITH FAMILY DURING DAY.  PATIENT UNABLE TO REPORT WHICH MEDICATION SHE TAKES.    PCP: Marcello Zambrano      Past Medical History:   Diagnosis Date    Alpha thalassemia     Asthma     resolved per patient    Cataract     Chronic anxiety     years tid xanax 1mg    Chronic knee pain     Chronic pain of left ankle     Clotting disorder     Cutaneous lupus erythematosus     dr henry derm    Dementia without behavioral disturbance     Depression     Depression     dr radha hughes previously    Ex-smoker     quit fall 1    Hypertension     Osteopenia 1/16 reck1/18       Past Surgical History:   Procedure Laterality Date    ANKLE SURGERY Left     BACK SURGERY      CATARACT EXTRACTION Right     COLONOSCOPY N/A 8/2/2017    Procedure: COLONOSCOPY;  Surgeon: Virgil Oliva MD;  Location: [facility];  Service: Endoscopy;   Laterality: N/A;    HYSTERECTOMY      INJECTION OF ANESTHETIC AGENT AROUND MEDIAL BRANCH NERVES INNERVATING LUMBAR FACET JOINT Bilateral 6/17/2020    Procedure: Bilateral L3-5 MBB;  Surgeon: Yury Moore MD;  Location: [facility];  Service: Pain Management;  Laterality: Bilateral;    INJECTION OF ANESTHETIC AGENT AROUND NERVE Bilateral 5/19/2020    Procedure: Bilateral Genicular nerve block with RN IV sedation Covid testing day of procedure PT does not drive;  Surgeon: Yury Moore MD;  Location: [facility];  Service: Pain Management;  Laterality: Bilateral;    KNEE ARTHROSCOPY      both knees twice    OOPHORECTOMY      RADIOFREQUENCY THERMOCOAGULATION Right 7/14/2020    Procedure: Right L3-5 Lumbar RFA;  Surgeon: Yury Moore MD;  Location: [facility];  Service: Pain Management;  Laterality: Right;    RADIOFREQUENCY THERMOCOAGULATION Left 8/6/2020    Procedure: Left L3-5 Lumbar RFA;  Surgeon: Yury Moore MD;  [facility]: [facility];  Service: Pain Management;  Laterality: Left;       Review of patient's allergies indicates:  No Known Allergies    No current facility-administered medications on file prior to encounter.     Current Outpatient Medications on File Prior to Encounter   Medication Sig    acetaZOLAMIDE (DIAMOX) 125 MG Tab Take 1 tablet (125 mg total) by mouth 3 (three) times daily.    amitriptyline (ELAVIL) 50 MG tablet Take 1 tablet (50 mg total) by mouth every evening.    atorvastatin (LIPITOR) 10 MG tablet Take 1 tablet (10 mg total) by mouth once daily.    atropine 1% (ISOPTO ATROPINE) 1 % Drop Place 1 drop into the right eye 2 (two) times a day.    baclofen (LIORESAL) 10 MG tablet Take 10 mg by mouth 2 (two) times daily as needed.    brimonidine 0.2% (ALPHAGAN) 0.2 % Drop PLACE 1 DROP IN EACH EYE TWICE A DAY    citalopram (CELEXA) 10 MG tablet Take 1 tablet (10 mg total) by mouth once daily.    dorzolamide-timolol 2-0.5% (COSOPT) 22.3-6.8 mg/mL ophthalmic solution PLACE 1  DROP IN EACH EYE TWICE A DAY    ergocalciferol (ERGOCALCIFEROL) 50,000 unit Cap Take 1 capsule (50,000 Units total) by mouth every 7 days. After dinner    estradioL (ESTRACE) 0.01 % (0.1 mg/gram) vaginal cream Place 1 g vaginally twice a week.    fexofenadine (ALLEGRA) 180 MG tablet Take 1 tablet (180 mg total) by mouth once daily.    fluticasone propionate (FLONASE) 50 mcg/actuation nasal spray 1 spray (50 mcg total) by Each Nostril route once daily.    ketorolac 0.5% (ACULAR) 0.5 % Drop PLACE 1 DROP IN THE LEFT EYE FOUR TIMES DAILY    latanoprost 0.005 % ophthalmic solution Place 1 drop into both eyes once daily.    loteprednol (LOTEMAX) 0.5 % ophthalmic suspension Place 1 drop into the left eye 4 (four) times daily.    loteprednol etabonate (LOTEMAX SM) 0.38 % DrpG Place 1 drop into the right eye 4 (four) times daily.    magnesium oxide (MAG-OX) 400 mg (241.3 mg magnesium) tablet Take 400 mg by mouth.    moxifloxacin (VIGAMOX) 0.5 % ophthalmic solution Place 1 drop into the left eye 4 (four) times daily.    multivitamin with folic acid 400 mcg Tab Take 1 tablet by mouth every morning.    nitrofurantoin, macrocrystal-monohydrate, (MACROBID) 100 MG capsule Take 1 capsule (100 mg total) by mouth 2 (two) times daily. for 7 days    oxyCODONE-acetaminophen (PERCOCET)  mg per tablet Take 1 tablet by mouth 3 (three) times daily as needed.    pantoprazole (PROTONIX) 40 MG tablet Take 1 tablet by mouth every morning.    polymyxin B sulf-trimethoprim (POLYTRIM) 10,000 unit- 1 mg/mL Drop PLACE ONE DROP INTO THE RIGHT EYE FOUR TIMES DAILY    prednisoLONE acetate (PRED FORTE) 1 % DrpS INSTILL 1 DROP IN RIGHT EYE 4 TIMES A DAY    prednisoLONE acetate (PRED FORTE) 1 % DrpS Place 1 drop into both eyes 4 (four) times daily.    prednisoLONE acetate (PRED FORTE) 1 % DrpS place 1 drop into the right eye 4 times daily.    QUEtiapine (SEROQUEL) 25 MG Tab Take 2 tablets (50 mg total) by mouth once daily.    thiamine mononitrate,  vit B1, (VITAMIN B-1, MONONITRATE,) 100 mg Tab Take 1 tablet by mouth every morning.    valsartan-hydrochlorothiazide (DIOVAN-HCT) 160-12.5 mg per tablet Take 1 tablet by mouth once daily.     Family History       Problem Relation (Age of Onset)    Breast cancer Maternal Aunt    Diabetes Mother, Father          Tobacco Use    Smoking status: Former     Current packs/day: 0.00     Average packs/day: 1 pack/day for 30.0 years (30.0 ttl pk-yrs)     Types: Cigarettes     Start date: 9/2/1984     Quit date: 9/2/2014     Years since quitting: 10.7     Passive exposure: Never    Smokeless tobacco: Never    Tobacco comments:     smoke last cigarette 9/15   Substance and Sexual Activity    Alcohol use: Yes     Alcohol/week: 1.0 standard drink of alcohol     Types: 1 Glasses of wine per week     Comment: 3 times a week    Drug use: No    Sexual activity: Not Currently     Partners: Male     Review of Systems   Constitutional:  Negative for chills, diaphoresis, fatigue and fever.   Respiratory:  Negative for cough and shortness of breath.    Cardiovascular:  Negative for chest pain, palpitations and leg swelling.   Gastrointestinal:  Negative for abdominal pain, diarrhea, nausea and vomiting.   Genitourinary:  Positive for dysuria. Negative for flank pain and hematuria.   Musculoskeletal:  Positive for arthralgias, myalgias and neck pain. Negative for neck stiffness.   Neurological:  Positive for dizziness, light-headedness and headaches.   All other systems reviewed and are negative.    Objective:     Vital Signs (Most Recent):  Temp: 99.5 °F (37.5 °C) (06/14/25 0023)  Pulse: 97 (06/14/25 0023)  Resp: 18 (06/14/25 0023)  BP: (!) 144/76 (06/14/25 0023)  SpO2: 96 % (06/14/25 0023) Vital Signs (24h Range):  Temp:  [99 °F (37.2 °C)-99.9 °F (37.7 °C)] 99.5 °F (37.5 °C)  Pulse:  [] 97  Resp:  [16-23] 18  SpO2:  [94 %-99 %] 96 %  BP: (117-192)/(74-95) 144/76     Weight: 69.1 kg (152 lb 5.4 oz)  Body mass index is 28.78  kg/m².     Physical Exam  Vitals reviewed.   Constitutional:       General: She is awake. She is not in acute distress.     Appearance: Normal appearance. She is normal weight. She is not ill-appearing, toxic-appearing or diaphoretic.      Comments: Elderly female resting comfortably in stretcher in no acute distress.   HENT:      Head: Normocephalic and atraumatic.      Right Ear: External ear normal.      Left Ear: External ear normal.      Nose: Nose normal. No congestion or rhinorrhea.      Mouth/Throat:      Mouth: Mucous membranes are moist.      Pharynx: Oropharynx is clear. No oropharyngeal exudate or posterior oropharyngeal erythema.   Eyes:      General: No scleral icterus.     Extraocular Movements: Extraocular movements intact.      Conjunctiva/sclera: Conjunctivae normal.   Neck:      Vascular: No carotid bruit.   Cardiovascular:      Rate and Rhythm: Normal rate and regular rhythm.      Pulses: Normal pulses.      Heart sounds: Normal heart sounds. No murmur heard.     No friction rub. No gallop.   Pulmonary:      Effort: Pulmonary effort is normal. No respiratory distress.      Breath sounds: Normal breath sounds. No stridor. No wheezing, rhonchi or rales.   Chest:      Chest wall: No tenderness.   Abdominal:      General: Abdomen is flat. Bowel sounds are normal. There is no distension.      Palpations: Abdomen is soft. There is no mass.      Tenderness: There is no abdominal tenderness. There is no right CVA tenderness, left CVA tenderness, guarding or rebound.      Hernia: No hernia is present.   Musculoskeletal:         General: No swelling, tenderness, deformity or signs of injury. Normal range of motion.      Cervical back: Normal range of motion and neck supple. No rigidity or tenderness. No spinous process tenderness or muscular tenderness.      Right lower leg: No edema.      Left lower leg: No edema.      Comments: Moves all extremities well   Lymphadenopathy:      Cervical: No cervical  adenopathy.   Skin:     General: Skin is warm and dry.      Capillary Refill: Capillary refill takes less than 2 seconds.      Coloration: Skin is not jaundiced or pale.      Findings: No bruising, erythema, lesion or rash.   Neurological:      General: No focal deficit present.      Mental Status: She is alert. She is confused.      GCS: GCS eye subscore is 4. GCS verbal subscore is 4. GCS motor subscore is 6.      Cranial Nerves: No cranial nerve deficit.      Sensory: No sensory deficit.      Motor: No weakness.      Coordination: Coordination abnormal.      Comments: Responds appropriately, but has some confusion during conversation   Psychiatric:         Attention and Perception: She perceives auditory and visual hallucinations.         Mood and Affect: Mood normal.         Speech: Speech normal.         Behavior: Behavior normal. Behavior is cooperative.         Thought Content: Thought content normal.         Judgment: Judgment normal.                Significant Labs: All pertinent labs within the past 24 hours have been reviewed.    Significant Imaging: I have reviewed all pertinent imaging results/findings within the past 24 hours.    LABS:  Recent Results (from the past 24 hours)   HCV Virus Hold Specimen    Collection Time: 06/13/25  2:39 PM   Result Value Ref Range    Extra Tube Hold for add-ons.    Comprehensive metabolic panel    Collection Time: 06/13/25  2:39 PM   Result Value Ref Range    Sodium 139 136 - 145 mmol/L    Potassium 3.6 3.5 - 5.1 mmol/L    Chloride 105 95 - 110 mmol/L    CO2 20 (L) 23 - 29 mmol/L    Glucose 126 (H) 70 - 110 mg/dL    BUN 23 8 - 23 mg/dL    Creatinine 1.4 0.5 - 1.4 mg/dL    Calcium 9.8 8.7 - 10.5 mg/dL    Protein Total 8.0 6.0 - 8.4 gm/dL    Albumin 3.9 3.5 - 5.2 g/dL    Bilirubin Total 1.1 (H) 0.1 - 1.0 mg/dL    ALP 75 40 - 150 unit/L    AST 28 11 - 45 unit/L    ALT 17 10 - 44 unit/L    Anion Gap 14 8 - 16 mmol/L    eGFR 39 (L) >60 mL/min/1.73/m2   Lactic acid, plasma #1     Collection Time: 06/13/25  2:39 PM   Result Value Ref Range    Lactic Acid Level 2.5 (H) 0.5 - 2.2 mmol/L   Procalcitonin    Collection Time: 06/13/25  2:39 PM   Result Value Ref Range    Procalcitonin 0.15 <0.25 ng/mL   Troponin I    Collection Time: 06/13/25  2:39 PM   Result Value Ref Range    Troponin-I 0.014 <=0.026 ng/mL   CBC with Differential    Collection Time: 06/13/25  2:39 PM   Result Value Ref Range    WBC 17.37 (H) 3.90 - 12.70 K/uL    RBC 4.98 4.00 - 5.40 M/uL    HGB 11.2 (L) 12.0 - 16.0 gm/dL    HCT 35.5 (L) 37.0 - 48.5 %    MCV 71 (L) 82 - 98 fL    MCH 22.5 (L) 27.0 - 31.0 pg    MCHC 31.5 (L) 32.0 - 36.0 g/dL    RDW 16.1 (H) 11.5 - 14.5 %    Platelet Count 346 150 - 450 K/uL    MPV 10.1 9.2 - 12.9 fL    Nucleated RBC 0 <=0 /100 WBC    Neut % 90.1 (H) 38 - 73 %    Lymph % 3.4 (L) 18 - 48 %    Mono % 5.6 4 - 15 %    Eos % 0.1 <=8 %    Basophil % 0.2 <=1.9 %    Imm Grans % 0.6 (H) 0.0 - 0.5 %    Neut # 15.66 (H) 1.8 - 7.7 K/uL    Lymph # 0.59 (L) 1 - 4.8 K/uL    Mono # 0.97 0.3 - 1 K/uL    Eos # 0.01 <=0.5 K/uL    Baso # 0.03 <=0.2 K/uL    Imm Grans # 0.11 (H) 0.00 - 0.04 K/uL   Urinalysis, Reflex to Urine Culture Urine, Clean Catch    Collection Time: 06/13/25  3:55 PM    Specimen: Urine   Result Value Ref Range    Color, UA Yellow Straw, Staci, Yellow, Light-Orange    Appearance, UA Clear Clear    pH, UA 6.0 5.0 - 8.0    Spec Grav UA 1.020 1.005 - 1.030    Protein, UA Negative Negative    Glucose, UA Negative Negative    Ketones, UA Trace (A) Negative    Bilirubin, UA Negative Negative    Blood, UA Negative Negative    Nitrites, UA Negative Negative    Urobilinogen, UA Negative <2.0 EU/dL    Leukocyte Esterase, UA Negative Negative   GREY TOP URINE HOLD    Collection Time: 06/13/25  3:55 PM   Result Value Ref Range    Extra Tube Hold for add-ons.    Lactic acid, plasma #2    Collection Time: 06/13/25  6:15 PM   Result Value Ref Range    Lactic Acid Level 3.4 (H) 0.5 - 2.2 mmol/L        RADIOLOGY  X-Ray Hip 2 or 3 views Right with Pelvis when performed  Result Date: 6/13/2025  EXAMINATION: XR HIP WITH PELVIS WHEN PERFORMED 2 OR 3 VIEWS RIGHT CLINICAL HISTORY: XR HIP WITH PELVIS WHEN PERFORMED 2 OR 3 VIEWS RIGHT COMPARISON: None FINDINGS: Four views of the right hip were obtained. Fracture side of the superior and inferior pubic ramus.  Postoperative changes within the right femoral neck.  Diffuse osteopenia.  Soft tissues are unremarkable.     As above Electronically signed by: Celso Castillo MD Date:    06/13/2025 Time:    15:31    CT Renal Stone Study ABD Pelvis WO  Result Date: 6/13/2025  EXAMINATION: CT RENAL STONE STUDY ABD PELVIS WO CLINICAL HISTORY: Flank pain, kidney stone suspected; Right lower quadrant pain TECHNIQUE: Low dose axial images, sagittal and coronal reformations were obtained from the lung bases to the pubic symphysis.  Oral contrast was not administered. COMPARISON: 05/06/2019 FINDINGS: Heart: Normal size. No effusion. Lung Bases: Clear. Liver: Normal size and attenuation. No focal lesions. Gallbladder: Post cholecystectomy. Bile Ducts: No dilatation. Pancreas: No obvious mass. No peripancreatic fat stranding. Spleen: Normal. Adrenals: Normal. Kidneys/Ureters: Mild renal cortical thinning.  No mass, hydroureteronephrosis, or nephroureterolithiasis. Bladder: No wall thickening. Reproductive organs: Post hysterectomy. GI Tract/Mesentery: No evidence of bowel obstruction or inflammation. Peritoneal Space: No ascites or free air. Retroperitoneum: Mild stranding and small hematoma anterior to the bladder in the region of the right pubic ramus fracture.  Evaluation for active hemorrhage limited without contrast.  No significant adenopathy. Abdominal wall: Normal. Vasculature: No aneurysm. Aorta demonstrates atherosclerotic disease. Bones: Age-indeterminate acute or subacute appearing fracture involving the right superior and inferior pubic ramus images 139 through 156.  Pelvic  MRI can be obtained as clinically warranted.  No suspicious lytic or sclerotic lesions.  Advanced degenerative changes within the lower lumbar spine.  Diffuse osteopenia.  Remote right-sided femoral neck fracture.  Postoperative changes within the right hip.     Age-indeterminate acute or subacute appearing fracture involving the right superior and inferior pubic ramus images 139 through 156.  Nonemergent pelvic MRI can be obtained as clinically warranted No evidence of obstructive uropathy. All CT scans at this facility use dose modulation, iterative reconstruction, and/or weight based dosing when appropriate to reduce radiation dose to as low as reasonable achievable. Electronically signed by: Celso Castillo MD Date:    06/13/2025 Time:    15:27    CT Cervical Spine Without Contrast  Result Date: 6/13/2025  EXAMINATION: CT CERVICAL SPINE WITHOUT CONTRAST CLINICAL HISTORY: Neck trauma (Age >= 65y); TECHNIQUE: Standard cervical spine CT protocol was performed without IV contrast. COMPARISON: 03/18/2024 FINDINGS: The following pertains to the cervical spine.  There is grade 1 anterolisthesis of C6 on C7.  There is grade 1 anterolisthesis of C7 on T1.  There is no fracture or scoliosis. There is normal cervical lordosis.  There are mild degenerative changes between C2 and T1.  There is a mild amount of atherosclerosis. There is an age-indeterminate fracture in the medial aspect of the left clavicle.     1. There is an age-indeterminate fracture in the medial aspect of the left clavicle. 2. There is grade 1 anterolisthesis of C6 on C7. There is grade 1 anterolisthesis of C7 on T1. 3. There are mild degenerative changes between C2 and T1. All CT scans at this facility use dose modulation, iterative reconstruction, and/or weight base dosing when appropriate to reduce radiation dose when appropriate to reduce radiation dose to as low as reasonably achievable. Electronically signed by: Kamari Garvin MD Date:    06/13/2025  Time:    15:24    CT Head Without Contrast  Result Date: 6/13/2025  EXAMINATION: CT HEAD WITHOUT CONTRAST CLINICAL HISTORY: Fall; Unspecified fall, initial encounter TECHNIQUE: Low dose axial CT images obtained throughout the head without intravenous contrast. Sagittal and coronal reconstructions were performed. COMPARISON: 03/15/2025 FINDINGS: Intracranial compartment: The brain parenchyma demonstrates areas of decreased attenuation with mild to moderate periventricular white matter consistent with chronic microvascular ischemic changes..  No parenchymal mass, hemorrhage, edema or major vascular distribution infarct.  Vascular calcifications are noted. Mild prominence of the sulci and ventricles are consistent with age-related involutional changes. No extra-axial blood or fluid collections. Skull/extracranial contents (limited evaluation): No fracture. Mastoid air cells and paranasal sinuses are essentially clear.     Chronic microvascular ischemic changes. All CT scans at this facility use dose modulation, iterative reconstruction, and/or weight based dosing when appropriate to reduce radiation dose to as low as reasonable achievable. Electronically signed by: Celso Castillo MD Date:    06/13/2025 Time:    15:10    X-Ray Chest AP Portable  Result Date: 6/13/2025  EXAMINATION: XR CHEST AP PORTABLE CLINICAL HISTORY: Sepsis; FINDINGS: Single view of the chest.  Comparison 03/15/2025 Cardiac silhouette is normal.  The lungs demonstrate no evidence of active disease.  No evidence of pleural effusion or pneumothorax.  Bones appear intact.  Moderate degenerative changes and moderate atherosclerotic disease. Left humeral neck fracture.     No acute process seen. Electronically signed by: Celso Castillo MD Date:    06/13/2025 Time:    14:26    DXA Bone Density Axial Skeleton 1 or more sites  Result Date: 5/27/2025  EXAMINATION: DXA BONE DENSITY AXIAL SKELETON 1 OR MORE SITES CLINICAL HISTORY: OP; Age-related osteoporosis  without current pathological fracture TECHNIQUE: DXA scanning was performed over the left hip and lumbar spine.  Review of the images confirms satisfactory positioning and technique. COMPARISON: Comparison cannot be made to prior study from 01/07/2016 as it was performed on a different machine. FINDINGS: The L1 to L4 vertebral bone mineral density is equal to 1.199 g/cm squared with a T score of 1.4. The left femoral neck bone mineral density is equal to 0.553 g/cm squared with a T score of -2.7.     Osteoporosis Consider FDA approved medical therapies in postmenopausal women and men aged 50 years and older, based on the following: *A hip or vertebral (clinical or morphometric) fracture *T score less than or equal to -2.5 at the femoral neck or spine after appropriate evaluation to exclude secondary causes. *Low bone mass -- also known as osteopenia (T score between -1.0 and -2.5 at the femoral neck or spine) and a 10 year probability of hip fracture greater than or equal to 3% or a 10 year probability of major osteoporosis-related fracture greater than or equal to 20% based on the [place]-adapted WHO algorithm. *Clinicians judgment and/or patient preference may indicate treatment for people with 10 year fracture probabilities is above or below these levels. Electronically signed by: Dariusz Dockery DO Date:    05/27/2025 Time:    14:24    US Thyroid  Result Date: 5/27/2025  EXAMINATION: US THYROID CLINICAL HISTORY: Hyperparathyroidism, unspecified TECHNIQUE: Ultrasound of the thyroid and cervical lymph nodes was performed. COMPARISON: None. FINDINGS: Right lobe: 5.0 x 1.1 x 1.2 cm Left lobe: 4.0 x 1.9 x 2.0 cm Isthmus: 0.2 cm Total thyroid volume: 13 cc Echotexture: Homogeneous increased vascularity. Nodules: Subcentimeter nodules present.  2 cm left lobe lower pole TI-RADS 3 nodule.     Increased thyroid vascularity, correlate for intrinsic thyroid disease.  Multinodular thyroid including left lobe TI-RADS 3 nodule.   One year follow-up recommended. Electronically signed by: Huan Jason MD Date:    05/27/2025 Time:    11:02      EKG    MICROBIOLOGY    MDM     Amount and/or Complexity of Data Reviewed  Clinical lab tests: reviewed  Tests in the radiology section of CPT®: reviewed  Tests in the medicine section of CPT®: reviewed  Discussion of test results with the performing providers: yes  Decide to obtain previous medical records or to obtain history from someone other than the patient: yes  Obtain history from someone other than the patient: yes  Review and summarize past medical records: yes  Discuss the patient with other providers: yes  Independent visualization of images, tracings, or specimens: yes      Assessment/Plan:     Assessment & Plan  UTI (urinary tract infection)  Presents with complaints of dysuria and confusion with AH/VH. Urinalysis revealed:   Lab Results   Component Value Date    COLORU Yellow 06/13/2025    APPEARANCEUA Clear 06/13/2025    SPECGRAV 1.020 06/13/2025    PHUR 6.0 06/13/2025    PROTEINUA Negative 06/13/2025    GLUCUA Negative 06/13/2025    KETONESU Negative 03/15/2025    NITRITE Negative 06/13/2025    UROBILINOGEN Negative 06/13/2025    BILIRUBINUA Negative 06/13/2025    LEUKOCYTESUR Negative 06/13/2025    RBCUA 2 06/10/2025    WBCUA 8 (H) 06/10/2025    BACTERIA Moderate (A) 06/10/2025    HYALINECASTS 6 (A) 03/06/2025   Received 1 g Rocephin at outside facility  Plan:  -UA culture pending  -Continue Abx    Hallucinations  Witnessed patient having auditory and visual hallucinations during examination.  Told her daughter to turn the lights off when no one was present in the room while examining the patient.  Unsure if this is related to UTI versus dementia.  We will continue IV antibiotics and have on dementia precautions.  Psych consult defer to day provider.    Essential hypertension  Chronic, controlled.  Latest blood pressure and vitals reviewed-   Temp:  [99 °F (37.2 °C)-100.7 °F (38.2  °C)]   Pulse:  []   Resp:  [16-23]   BP: (117-192)/(68-95)   SpO2:  [94 %-99 %] .   Home meds for hypertension were reviewed and noted below.   Hypertension Medications              valsartan-hydrochlorothiazide (DIOVAN-HCT) 160-12.5 mg per tablet Take 1 tablet by mouth once daily.     While in the hospital, will manage blood pressure as follows; Continue home antihypertensive regimen once verified with family in a.m.; we will treat with p.r.n. hydralazine    Will utilize p.r.n. blood pressure medication only if patient's blood pressure greater than  160/90 and she develops symptoms such as worsening chest pain or shortness of breath.    Moderate major depression  Chronic. Stable. Not in acute exacerbation and currently denies endorsing any suicidal/homicidal ideations.  Active auditory and visual hallucinations.  Unsure if this is baseline due to patient's underlying dementia versus UTI symptoms.  Plan:  -Continue home medications once verified with family in a.m.   -continue IV antibiotics for treatment of UTI    Primary open angle glaucoma (POAG) of both eyes, severe stage  -continue eye drops once verified with family    Neck pain  CT imaging cervical spine revealed:  1. There is grade 1 anterolisthesis of C6 on C7. There is grade 1 anterolisthesis of C7 on T1.  2. There are mild degenerative changes between C2 and T1.    Denies any complaints at this time.  Asymptomatic.  Plan:   -analgesics p.r.n.    Closed fracture of multiple pubic rami, right, initial encounter  CT imaging revealed:  -Age-indeterminate acute or subacute appearing fracture involving the right superior and inferior pubic ramus images 139 through 156. Nonemergent pelvic MRI can be obtained as clinically warranted.    On-call orthopedic consulted.  We will see us consult in a.m..    Plan:   -analgesics p.r.n.   -orthopedic consult    History of dementia  Dementia is controlled currently. Continue home dementia meds and non-pharmacologic  interventions to prevent delirium (No VS between 11PM-5AM, activity during day, opening blinds, providing glasses/hearing aids, and up in chair during daytime). Use PRN anti-psychotics to prevent behavior of self harm during sundowning, and avoid narcotics and benzos unless absolutely necessary. PRN anti-psychotics prescribed to avoid self harm behaviors.      VTE Risk Mitigation (From admission, onward)           Ordered     enoxaparin injection 30 mg  Daily         06/13/25 2255     IP VTE HIGH RISK PATIENT  Once         06/13/25 2255     Place sequential compression device  Until discontinued         06/13/25 2255                    Pharmacist Renal Dose Adjustment Note    Tea Ring is a 79 y.o. female being treated with the medication enoxaparin.    Patient Data:    Vital Signs (Most Recent):  Temp: 99 °F (37.2 °C) (06/13/25 2025)  Pulse: 96 (06/13/25 2057)  Resp: 20 (06/13/25 1844)  BP: (!) 157/86 (06/13/25 2025)  SpO2: 95 % (06/13/25 2026) Vital Signs (72h Range):  Temp:  [99 °F (37.2 °C)-99.9 °F (37.7 °C)]   Pulse:  []   Resp:  [16-23]   BP: (117-192)/(74-95)   SpO2:  [94 %-99 %]      Recent Labs   Lab 06/13/25  1439   CREATININE 1.4     Serum creatinine: 1.4 mg/dL 06/13/25 1439  Estimated creatinine clearance: 28.7 mL/min    Enoxaparin 40 mg subcutaneous every 24 hours will be changed to enoxaparin 30 mg subcutaneous every 24 hours for DVT prevention with CrCl < 30 ml/min.    Pharmacist's Name: Shahram Howard  Pharmacist's Extension: [phone]    //Core Measures   -DVT proph: SCDs, lovenox  -Code status Full    -Surrogate:none present    Components of this note were documented using a voice recognition system and are subject to errors not corrected at the time the document was proof read. Please contact the author for any clarifications.     Ricci Bragg NP  Department of Hospital Medicine  [facility] - Telemetry ([facility])

## 2025-06-14 NOTE — SUBJECTIVE & OBJECTIVE
Past Medical History:   Diagnosis Date    Alpha thalassemia     Asthma     resolved per patient    Cataract     Chronic anxiety     years tid xanax 1mg    Chronic knee pain     Chronic pain of left ankle     Clotting disorder     Cutaneous lupus erythematosus     dr henry derm    Dementia without behavioral disturbance     Depression     Depression     dr radha hughes previously    Ex-smoker     quit fall 1    Hypertension     Osteopenia 1/16 reck1/18       Past Surgical History:   Procedure Laterality Date    ANKLE SURGERY Left     BACK SURGERY      CATARACT EXTRACTION Right     COLONOSCOPY N/A 8/2/2017    Procedure: COLONOSCOPY;  Surgeon: Virgil Oliva MD;  Location: [facility] ENDO;  Service: Endoscopy;  Laterality: N/A;    HYSTERECTOMY      INJECTION OF ANESTHETIC AGENT AROUND MEDIAL BRANCH NERVES INNERVATING LUMBAR FACET JOINT Bilateral 6/17/2020    Procedure: Bilateral L3-5 MBB;  Surgeon: Yury Moore MD;  Location: [facility] PAIN MGT;  Service: Pain Management;  Laterality: Bilateral;    INJECTION OF ANESTHETIC AGENT AROUND NERVE Bilateral 5/19/2020    Procedure: Bilateral Genicular nerve block with RN IV sedation Covid testing day of procedure PT does not drive;  Surgeon: Yury Moore MD;  Location: [facility] PAIN MGT;  Service: Pain Management;  Laterality: Bilateral;    KNEE ARTHROSCOPY      both knees twice    OOPHORECTOMY      RADIOFREQUENCY THERMOCOAGULATION Right 7/14/2020    Procedure: Right L3-5 Lumbar RFA;  Surgeon: Yuyr Moore MD;  Location: [facility] PAIN MGT;  Service: Pain Management;  Laterality: Right;    RADIOFREQUENCY THERMOCOAGULATION Left 8/6/2020    Procedure: Left L3-5 Lumbar RFA;  Surgeon: Yury Moore MD;  [facility];  Service: Pain Management;  Laterality: Left;       Review of patient's allergies indicates:  No Known Allergies    No current facility-administered medications on file prior to encounter.     Current Outpatient Medications on File Prior to Encounter    Medication Sig    acetaZOLAMIDE (DIAMOX) 125 MG Tab Take 1 tablet (125 mg total) by mouth 3 (three) times daily.    amitriptyline (ELAVIL) 50 MG tablet Take 1 tablet (50 mg total) by mouth every evening.    atorvastatin (LIPITOR) 10 MG tablet Take 1 tablet (10 mg total) by mouth once daily.    atropine 1% (ISOPTO ATROPINE) 1 % Drop Place 1 drop into the right eye 2 (two) times a day.    baclofen (LIORESAL) 10 MG tablet Take 10 mg by mouth 2 (two) times daily as needed.    brimonidine 0.2% (ALPHAGAN) 0.2 % Drop PLACE 1 DROP IN EACH EYE TWICE A DAY    citalopram (CELEXA) 10 MG tablet Take 1 tablet (10 mg total) by mouth once daily.    dorzolamide-timolol 2-0.5% (COSOPT) 22.3-6.8 mg/mL ophthalmic solution PLACE 1 DROP IN EACH EYE TWICE A DAY    ergocalciferol (ERGOCALCIFEROL) 50,000 unit Cap Take 1 capsule (50,000 Units total) by mouth every 7 days. After dinner    estradioL (ESTRACE) 0.01 % (0.1 mg/gram) vaginal cream Place 1 g vaginally twice a week.    fexofenadine (ALLEGRA) 180 MG tablet Take 1 tablet (180 mg total) by mouth once daily.    fluticasone propionate (FLONASE) 50 mcg/actuation nasal spray 1 spray (50 mcg total) by Each Nostril route once daily.    ketorolac 0.5% (ACULAR) 0.5 % Drop PLACE 1 DROP IN THE LEFT EYE FOUR TIMES DAILY    latanoprost 0.005 % ophthalmic solution Place 1 drop into both eyes once daily.    loteprednol (LOTEMAX) 0.5 % ophthalmic suspension Place 1 drop into the left eye 4 (four) times daily.    loteprednol etabonate (LOTEMAX SM) 0.38 % DrpG Place 1 drop into the right eye 4 (four) times daily.    magnesium oxide (MAG-OX) 400 mg (241.3 mg magnesium) tablet Take 400 mg by mouth.    moxifloxacin (VIGAMOX) 0.5 % ophthalmic solution Place 1 drop into the left eye 4 (four) times daily.    multivitamin with folic acid 400 mcg Tab Take 1 tablet by mouth every morning.    nitrofurantoin, macrocrystal-monohydrate, (MACROBID) 100 MG capsule Take 1 capsule (100 mg total) by mouth 2 (two)  times daily. for 7 days    oxyCODONE-acetaminophen (PERCOCET)  mg per tablet Take 1 tablet by mouth 3 (three) times daily as needed.    pantoprazole (PROTONIX) 40 MG tablet Take 1 tablet by mouth every morning.    polymyxin B sulf-trimethoprim (POLYTRIM) 10,000 unit- 1 mg/mL Drop PLACE ONE DROP INTO THE RIGHT EYE FOUR TIMES DAILY    prednisoLONE acetate (PRED FORTE) 1 % DrpS INSTILL 1 DROP IN RIGHT EYE 4 TIMES A DAY    prednisoLONE acetate (PRED FORTE) 1 % DrpS Place 1 drop into both eyes 4 (four) times daily.    prednisoLONE acetate (PRED FORTE) 1 % DrpS place 1 drop into the right eye 4 times daily.    QUEtiapine (SEROQUEL) 25 MG Tab Take 2 tablets (50 mg total) by mouth once daily.    thiamine mononitrate, vit B1, (VITAMIN B-1, MONONITRATE,) 100 mg Tab Take 1 tablet by mouth every morning.    valsartan-hydrochlorothiazide (DIOVAN-HCT) 160-12.5 mg per tablet Take 1 tablet by mouth once daily.     Family History       Problem Relation (Age of Onset)    Breast cancer Maternal Aunt    Diabetes Mother, Father          Tobacco Use    Smoking status: Former     Current packs/day: 0.00     Average packs/day: 1 pack/day for 30.0 years (30.0 ttl pk-yrs)     Types: Cigarettes     Start date: 9/2/1984     Quit date: 9/2/2014     Years since quitting: 10.7     Passive exposure: Never    Smokeless tobacco: Never    Tobacco comments:     smoke last cigarette 9/15   Substance and Sexual Activity    Alcohol use: Yes     Alcohol/week: 1.0 standard drink of alcohol     Types: 1 Glasses of wine per week     Comment: 3 times a week    Drug use: No    Sexual activity: Not Currently     Partners: Male     Review of Systems   Constitutional:  Negative for chills, diaphoresis, fatigue and fever.   Respiratory:  Negative for cough and shortness of breath.    Cardiovascular:  Negative for chest pain, palpitations and leg swelling.   Gastrointestinal:  Negative for abdominal pain, diarrhea, nausea and vomiting.   Genitourinary:   Positive for dysuria. Negative for flank pain and hematuria.   Musculoskeletal:  Positive for arthralgias, myalgias and neck pain. Negative for neck stiffness.   Neurological:  Positive for dizziness, light-headedness and headaches.   All other systems reviewed and are negative.    Objective:     Vital Signs (Most Recent):  Temp: 99.5 °F (37.5 °C) (06/14/25 0023)  Pulse: 97 (06/14/25 0023)  Resp: 18 (06/14/25 0023)  BP: (!) 144/76 (06/14/25 0023)  SpO2: 96 % (06/14/25 0023) Vital Signs (24h Range):  Temp:  [99 °F (37.2 °C)-99.9 °F (37.7 °C)] 99.5 °F (37.5 °C)  Pulse:  [] 97  Resp:  [16-23] 18  SpO2:  [94 %-99 %] 96 %  BP: (117-192)/(74-95) 144/76     Weight: 69.1 kg (152 lb 5.4 oz)  Body mass index is 28.78 kg/m².     Physical Exam  Vitals reviewed.   Constitutional:       General: She is awake. She is not in acute distress.     Appearance: Normal appearance. She is normal weight. She is not ill-appearing, toxic-appearing or diaphoretic.      Comments: Elderly female resting comfortably in stretcher in no acute distress.   HENT:      Head: Normocephalic and atraumatic.      Right Ear: External ear normal.      Left Ear: External ear normal.      Nose: Nose normal. No congestion or rhinorrhea.      Mouth/Throat:      Mouth: Mucous membranes are moist.      Pharynx: Oropharynx is clear. No oropharyngeal exudate or posterior oropharyngeal erythema.   Eyes:      General: No scleral icterus.     Extraocular Movements: Extraocular movements intact.      Conjunctiva/sclera: Conjunctivae normal.   Neck:      Vascular: No carotid bruit.   Cardiovascular:      Rate and Rhythm: Normal rate and regular rhythm.      Pulses: Normal pulses.      Heart sounds: Normal heart sounds. No murmur heard.     No friction rub. No gallop.   Pulmonary:      Effort: Pulmonary effort is normal. No respiratory distress.      Breath sounds: Normal breath sounds. No stridor. No wheezing, rhonchi or rales.   Chest:      Chest wall: No  tenderness.   Abdominal:      General: Abdomen is flat. Bowel sounds are normal. There is no distension.      Palpations: Abdomen is soft. There is no mass.      Tenderness: There is no abdominal tenderness. There is no right CVA tenderness, left CVA tenderness, guarding or rebound.      Hernia: No hernia is present.   Musculoskeletal:         General: No swelling, tenderness, deformity or signs of injury. Normal range of motion.      Cervical back: Normal range of motion and neck supple. No rigidity or tenderness. No spinous process tenderness or muscular tenderness.      Right lower leg: No edema.      Left lower leg: No edema.      Comments: Moves all extremities well   Lymphadenopathy:      Cervical: No cervical adenopathy.   Skin:     General: Skin is warm and dry.      Capillary Refill: Capillary refill takes less than 2 seconds.      Coloration: Skin is not jaundiced or pale.      Findings: No bruising, erythema, lesion or rash.   Neurological:      General: No focal deficit present.      Mental Status: She is alert. She is confused.      GCS: GCS eye subscore is 4. GCS verbal subscore is 4. GCS motor subscore is 6.      Cranial Nerves: No cranial nerve deficit.      Sensory: No sensory deficit.      Motor: No weakness.      Coordination: Coordination abnormal.      Comments: Responds appropriately, but has some confusion during conversation   Psychiatric:         Attention and Perception: She perceives auditory and visual hallucinations.         Mood and Affect: Mood normal.         Speech: Speech normal.         Behavior: Behavior normal. Behavior is cooperative.         Thought Content: Thought content normal.         Judgment: Judgment normal.                Significant Labs: All pertinent labs within the past 24 hours have been reviewed.    Significant Imaging: I have reviewed all pertinent imaging results/findings within the past 24 hours.    LABS:  Recent Results (from the past 24 hours)   HCV Virus  Hold Specimen    Collection Time: 06/13/25  2:39 PM   Result Value Ref Range    Extra Tube Hold for add-ons.    Comprehensive metabolic panel    Collection Time: 06/13/25  2:39 PM   Result Value Ref Range    Sodium 139 136 - 145 mmol/L    Potassium 3.6 3.5 - 5.1 mmol/L    Chloride 105 95 - 110 mmol/L    CO2 20 (L) 23 - 29 mmol/L    Glucose 126 (H) 70 - 110 mg/dL    BUN 23 8 - 23 mg/dL    Creatinine 1.4 0.5 - 1.4 mg/dL    Calcium 9.8 8.7 - 10.5 mg/dL    Protein Total 8.0 6.0 - 8.4 gm/dL    Albumin 3.9 3.5 - 5.2 g/dL    Bilirubin Total 1.1 (H) 0.1 - 1.0 mg/dL    ALP 75 40 - 150 unit/L    AST 28 11 - 45 unit/L    ALT 17 10 - 44 unit/L    Anion Gap 14 8 - 16 mmol/L    eGFR 39 (L) >60 mL/min/1.73/m2   Lactic acid, plasma #1    Collection Time: 06/13/25  2:39 PM   Result Value Ref Range    Lactic Acid Level 2.5 (H) 0.5 - 2.2 mmol/L   Procalcitonin    Collection Time: 06/13/25  2:39 PM   Result Value Ref Range    Procalcitonin 0.15 <0.25 ng/mL   Troponin I    Collection Time: 06/13/25  2:39 PM   Result Value Ref Range    Troponin-I 0.014 <=0.026 ng/mL   CBC with Differential    Collection Time: 06/13/25  2:39 PM   Result Value Ref Range    WBC 17.37 (H) 3.90 - 12.70 K/uL    RBC 4.98 4.00 - 5.40 M/uL    HGB 11.2 (L) 12.0 - 16.0 gm/dL    HCT 35.5 (L) 37.0 - 48.5 %    MCV 71 (L) 82 - 98 fL    MCH 22.5 (L) 27.0 - 31.0 pg    MCHC 31.5 (L) 32.0 - 36.0 g/dL    RDW 16.1 (H) 11.5 - 14.5 %    Platelet Count 346 150 - 450 K/uL    MPV 10.1 9.2 - 12.9 fL    Nucleated RBC 0 <=0 /100 WBC    Neut % 90.1 (H) 38 - 73 %    Lymph % 3.4 (L) 18 - 48 %    Mono % 5.6 4 - 15 %    Eos % 0.1 <=8 %    Basophil % 0.2 <=1.9 %    Imm Grans % 0.6 (H) 0.0 - 0.5 %    Neut # 15.66 (H) 1.8 - 7.7 K/uL    Lymph # 0.59 (L) 1 - 4.8 K/uL    Mono # 0.97 0.3 - 1 K/uL    Eos # 0.01 <=0.5 K/uL    Baso # 0.03 <=0.2 K/uL    Imm Grans # 0.11 (H) 0.00 - 0.04 K/uL   Urinalysis, Reflex to Urine Culture Urine, Clean Catch    Collection Time: 06/13/25  3:55 PM    Specimen:  Urine   Result Value Ref Range    Color, UA Yellow Straw, Staci, Yellow, Light-Orange    Appearance, UA Clear Clear    pH, UA 6.0 5.0 - 8.0    Spec Grav UA 1.020 1.005 - 1.030    Protein, UA Negative Negative    Glucose, UA Negative Negative    Ketones, UA Trace (A) Negative    Bilirubin, UA Negative Negative    Blood, UA Negative Negative    Nitrites, UA Negative Negative    Urobilinogen, UA Negative <2.0 EU/dL    Leukocyte Esterase, UA Negative Negative   GRE[place] URINE HOLD    Collection Time: 06/13/25  3:55 PM   Result Value Ref Range    Extra Tube Hold for add-ons.    Lactic acid, plasma #2    Collection Time: 06/13/25  6:15 PM   Result Value Ref Range    Lactic Acid Level 3.4 (H) 0.5 - 2.2 mmol/L       RADIOLOGY  X-Ray Hip 2 or 3 views Right with Pelvis when performed  Result Date: 6/13/2025  EXAMINATION: XR HIP WITH PELVIS WHEN PERFORMED 2 OR 3 VIEWS RIGHT CLINICAL HISTORY: XR HIP WITH PELVIS WHEN PERFORMED 2 OR 3 VIEWS RIGHT COMPARISON: None FINDINGS: Four views of the right hip were obtained. Fracture side of the superior and inferior pubic ramus.  Postoperative changes within the right femoral neck.  Diffuse osteopenia.  Soft tissues are unremarkable.     As above Electronically signed by: Celso Castillo MD Date:    06/13/2025 Time:    15:31    CT Renal Stone Study ABD Pelvis WO  Result Date: 6/13/2025  EXAMINATION: CT RENAL STONE STUDY ABD PELVIS WO CLINICAL HISTORY: Flank pain, kidney stone suspected; Right lower quadrant pain TECHNIQUE: Low dose axial images, sagittal and coronal reformations were obtained from the lung bases to the pubic symphysis.  Oral contrast was not administered. COMPARISON: 05/06/2019 FINDINGS: Heart: Normal size. No effusion. Lung Bases: Clear. Liver: Normal size and attenuation. No focal lesions. Gallbladder: Post cholecystectomy. Bile Ducts: No dilatation. Pancreas: No obvious mass. No peripancreatic fat stranding. Spleen: Normal. Adrenals: Normal. Kidneys/Ureters: Mild renal  cortical thinning.  No mass, hydroureteronephrosis, or nephroureterolithiasis. Bladder: No wall thickening. Reproductive organs: Post hysterectomy. GI Tract/Mesentery: No evidence of bowel obstruction or inflammation. Peritoneal Space: No ascites or free air. Retroperitoneum: Mild stranding and small hematoma anterior to the bladder in the region of the right pubic ramus fracture.  Evaluation for active hemorrhage limited without contrast.  No significant adenopathy. Abdominal wall: Normal. Vasculature: No aneurysm. Aorta demonstrates atherosclerotic disease. Bones: Age-indeterminate acute or subacute appearing fracture involving the right superior and inferior pubic ramus images 139 through 156.  Pelvic MRI can be obtained as clinically warranted.  No suspicious lytic or sclerotic lesions.  Advanced degenerative changes within the lower lumbar spine.  Diffuse osteopenia.  Remote right-sided femoral neck fracture.  Postoperative changes within the right hip.     Age-indeterminate acute or subacute appearing fracture involving the right superior and inferior pubic ramus images 139 through 156.  Nonemergent pelvic MRI can be obtained as clinically warranted No evidence of obstructive uropathy. All CT scans at this facility use dose modulation, iterative reconstruction, and/or weight based dosing when appropriate to reduce radiation dose to as low as reasonable achievable. Electronically signed by: Celso Castillo MD Date:    06/13/2025 Time:    15:27    CT Cervical Spine Without Contrast  Result Date: 6/13/2025  EXAMINATION: CT CERVICAL SPINE WITHOUT CONTRAST CLINICAL HISTORY: Neck trauma (Age >= 65y); TECHNIQUE: Standard cervical spine CT protocol was performed without IV contrast. COMPARISON: 03/18/2024 FINDINGS: The following pertains to the cervical spine.  There is grade 1 anterolisthesis of C6 on C7.  There is grade 1 anterolisthesis of C7 on T1.  There is no fracture or scoliosis. There is normal cervical  lordosis.  There are mild degenerative changes between C2 and T1.  There is a mild amount of atherosclerosis. There is an age-indeterminate fracture in the medial aspect of the left clavicle.     1. There is an age-indeterminate fracture in the medial aspect of the left clavicle. 2. There is grade 1 anterolisthesis of C6 on C7. There is grade 1 anterolisthesis of C7 on T1. 3. There are mild degenerative changes between C2 and T1. All CT scans at this facility use dose modulation, iterative reconstruction, and/or weight base dosing when appropriate to reduce radiation dose when appropriate to reduce radiation dose to as low as reasonably achievable. Electronically signed by: Kamari Garvin MD Date:    06/13/2025 Time:    15:24    CT Head Without Contrast  Result Date: 6/13/2025  EXAMINATION: CT HEAD WITHOUT CONTRAST CLINICAL HISTORY: Fall; Unspecified fall, initial encounter TECHNIQUE: Low dose axial CT images obtained throughout the head without intravenous contrast. Sagittal and coronal reconstructions were performed. COMPARISON: 03/15/2025 FINDINGS: Intracranial compartment: The brain parenchyma demonstrates areas of decreased attenuation with mild to moderate periventricular white matter consistent with chronic microvascular ischemic changes..  No parenchymal mass, hemorrhage, edema or major vascular distribution infarct.  Vascular calcifications are noted. Mild prominence of the sulci and ventricles are consistent with age-related involutional changes. No extra-axial blood or fluid collections. Skull/extracranial contents (limited evaluation): No fracture. Mastoid air cells and paranasal sinuses are essentially clear.     Chronic microvascular ischemic changes. All CT scans at this facility use dose modulation, iterative reconstruction, and/or weight based dosing when appropriate to reduce radiation dose to as low as reasonable achievable. Electronically signed by: Celso Castillo MD Date:    06/13/2025  Time:    15:10    X-Ray Chest AP Portable  Result Date: 6/13/2025  EXAMINATION: XR CHEST AP PORTABLE CLINICAL HISTORY: Sepsis; FINDINGS: Single view of the chest.  Comparison 03/15/2025 Cardiac silhouette is normal.  The lungs demonstrate no evidence of active disease.  No evidence of pleural effusion or pneumothorax.  Bones appear intact.  Moderate degenerative changes and moderate atherosclerotic disease. Left humeral neck fracture.     No acute process seen. Electronically signed by: Celso Castillo MD Date:    06/13/2025 Time:    14:26    DXA Bone Density Axial Skeleton 1 or more sites  Result Date: 5/27/2025  EXAMINATION: DXA BONE DENSITY AXIAL SKELETON 1 OR MORE SITES CLINICAL HISTORY: OP; Age-related osteoporosis without current pathological fracture TECHNIQUE: DXA scanning was performed over the left hip and lumbar spine.  Review of the images confirms satisfactory positioning and technique. COMPARISON: Comparison cannot be made to prior study from 01/07/2016 as it was performed on a different machine. FINDINGS: The L1 to L4 vertebral bone mineral density is equal to 1.199 g/cm squared with a T score of 1.4. The left femoral neck bone mineral density is equal to 0.553 g/cm squared with a T score of -2.7.     Osteoporosis Consider FDA approved medical therapies in postmenopausal women and men aged 50 years and older, based on the following: *A hip or vertebral (clinical or morphometric) fracture *T score less than or equal to -2.5 at the femoral neck or spine after appropriate evaluation to exclude secondary causes. *Low bone mass -- also known as osteopenia (T score between -1.0 and -2.5 at the femoral neck or spine) and a 10 year probability of hip fracture greater than or equal to 3% or a 10 year probability of major osteoporosis-related fracture greater than or equal to 20% based on the [place]-adapted WHO algorithm. *Clinicians judgment and/or patient preference may indicate treatment for people with 10 year  fracture probabilities is above or below these levels. Electronically signed by: Dariusz Dockery DO Date:    05/27/2025 Time:    14:24    US Thyroid  Result Date: 5/27/2025  EXAMINATION: US THYROID CLINICAL HISTORY: Hyperparathyroidism, unspecified TECHNIQUE: Ultrasound of the thyroid and cervical lymph nodes was performed. COMPARISON: None. FINDINGS: Right lobe: 5.0 x 1.1 x 1.2 cm Left lobe: 4.0 x 1.9 x 2.0 cm Isthmus: 0.2 cm Total thyroid volume: 13 cc Echotexture: Homogeneous increased vascularity. Nodules: Subcentimeter nodules present.  2 cm left lobe lower pole TI-RADS 3 nodule.     Increased thyroid vascularity, correlate for intrinsic thyroid disease.  Multinodular thyroid including left lobe TI-RADS 3 nodule.  One year follow-up recommended. Electronically signed by: Huan Jason MD Date:    05/27/2025 Time:    11:02      EKG    MICROBIOLOGY    MDM     Amount and/or Complexity of Data Reviewed  Clinical lab tests: reviewed  Tests in the radiology section of CPT®: reviewed  Tests in the medicine section of CPT®: reviewed  Discussion of test results with the performing providers: yes  Decide to obtain previous medical records or to obtain history from someone other than the patient: yes  Obtain history from someone other than the patient: yes  Review and summarize past medical records: yes  Discuss the patient with other providers: yes  Independent visualization of images, tracings, or specimens: yes

## 2025-06-14 NOTE — HPI
Tea Ring is a 79 y.o. female with a PMH  has a past medical history of Alpha thalassemia, Asthma, Cataract, Chronic anxiety, Chronic knee pain, Chronic pain of left ankle, Clotting disorder, Cutaneous lupus erythematosus, Dementia without behavioral disturbance, Depression, Depression, Ex-smoker, Hypertension, and Osteopenia (1/16 reck1/18).  Presents as a transfer from our [facility] facility for further treatment of generalized weakness fungal recent UTI and ortho consult for pubic rami fracture. Patient reported that she has been having difficulty urinating for over 2 weeks. She has had to use the bathroom at least 6 times daily. Patient reports that she will get the urge to urinate and then will itch down there. She was started on Macrobid on Damaris 10, 2025 by her primary care physician. Patient reports that she has been feeling lightheaded. She denies any specific chest pain, chest pressure, nausea, vomiting, diarrhea, dysuria. Patient reports that she had walked into the gas bedroom might have passed out. Patient is complaining right hip pain, neck pain, and generalized weakness.  Denies any injury or trauma which may have caused her symptoms.  Denies any alleviating or aggravating factors.  Denies any other symptoms at this time.    ER workup revealed CBC with leukocytosis of 17.37.  CMP with a BUN/creatinine of 23/1.4 with EGFR of 39 (previously 20/1.0 with EGFR 58 on 05/20/2025).CBG of 126 mg/dL.  Troponin negative.  Procalcitonin level negative.  Initial lactic acid 2.5 with repeat of 3.4 followed by 1.0.  Previous UA revealed acute cystitis, with repeat UA showing improvement.  Blood cultures obtained x2.  CT cervical spine revealed age indeterminate fracture of the medial aspect of left clavicle and degenerative changes of cervical spine.  CT head negative for acute intracranial abnormality/findings.  Chest x-ray negative for acute cardiopulmonary findings.  Plain film imaging of the hip/pelvis revealed  fracture of the superior and inferior pubic rami.  CT renal stone study of abdomen and pelvis revealed age indeterminate acute or subacute appearing fracture involving right superior and inferior pubic rami.  CT of the right knee pending results.  On-call orthopedic surgeon consulted.  Recommended hospital Medicine to admit and will see us consult.  Vital signs stable.  Patient received 1 g Rocephin, 2 L lactated Ringer's, and a total of 4 mg of morphine at outside facility.  Patient admitted under observation status.    RECONCILE HOME MEDICATIONS WITH FAMILY DURING DAY.  PATIENT UNABLE TO REPORT WHICH MEDICATION SHE TAKES.    PCP: Marcello Zambrano

## 2025-06-15 PROBLEM — G93.40 ACUTE ENCEPHALOPATHY: Status: ACTIVE | Noted: 2025-06-15

## 2025-06-15 PROBLEM — I50.31 ACUTE DIASTOLIC HEART FAILURE: Status: ACTIVE | Noted: 2025-06-15

## 2025-06-15 PROBLEM — J18.9 PNA (PNEUMONIA): Status: ACTIVE | Noted: 2025-06-15

## 2025-06-15 LAB
ABSOLUTE EOSINOPHIL (OHS): 0.14 K/UL
ABSOLUTE MONOCYTE (OHS): 1.6 K/UL (ref 0.3–1)
ABSOLUTE NEUTROPHIL COUNT (OHS): 10.49 K/UL (ref 1.8–7.7)
ALBUMIN SERPL BCP-MCNC: 3 G/DL (ref 3.5–5.2)
ALP SERPL-CCNC: 56 UNIT/L (ref 40–150)
ALT SERPL W/O P-5'-P-CCNC: 11 UNIT/L (ref 10–44)
ANION GAP (OHS): 12 MMOL/L (ref 8–16)
AST SERPL-CCNC: 23 UNIT/L (ref 11–45)
BASOPHILS # BLD AUTO: 0.06 K/UL
BASOPHILS NFR BLD AUTO: 0.4 %
BILIRUB SERPL-MCNC: 0.9 MG/DL (ref 0.1–1)
BILIRUB UR QL STRIP.AUTO: NEGATIVE
BNP SERPL-MCNC: 91 PG/ML (ref 0–99)
BUN SERPL-MCNC: 14 MG/DL (ref 8–23)
CALCIUM SERPL-MCNC: 9 MG/DL (ref 8.7–10.5)
CHLORIDE SERPL-SCNC: 108 MMOL/L (ref 95–110)
CLARITY UR: CLEAR
CO2 SERPL-SCNC: 15 MMOL/L (ref 23–29)
COLOR UR AUTO: YELLOW
CREAT SERPL-MCNC: 1 MG/DL (ref 0.5–1.4)
ERYTHROCYTE [DISTWIDTH] IN BLOOD BY AUTOMATED COUNT: 16 % (ref 11.5–14.5)
GFR SERPLBLD CREATININE-BSD FMLA CKD-EPI: 58 ML/MIN/1.73/M2
GLUCOSE SERPL-MCNC: 114 MG/DL (ref 70–110)
GLUCOSE UR QL STRIP: NEGATIVE
HCT VFR BLD AUTO: 27.7 % (ref 37–48.5)
HGB BLD-MCNC: 8.3 GM/DL (ref 12–16)
HGB UR QL STRIP: ABNORMAL
HOLD SPECIMEN: NORMAL
IMM GRANULOCYTES # BLD AUTO: 0.06 K/UL (ref 0–0.04)
IMM GRANULOCYTES NFR BLD AUTO: 0.4 % (ref 0–0.5)
KETONES UR QL STRIP: ABNORMAL
LACTATE SERPL-SCNC: 1 MMOL/L (ref 0.5–2.2)
LEUKOCYTE ESTERASE UR QL STRIP: NEGATIVE
LYMPHOCYTES # BLD AUTO: 1.78 K/UL (ref 1–4.8)
MCH RBC QN AUTO: 22.5 PG (ref 27–31)
MCHC RBC AUTO-ENTMCNC: 30 G/DL (ref 32–36)
MCV RBC AUTO: 75 FL (ref 82–98)
NITRITE UR QL STRIP: NEGATIVE
NUCLEATED RBC (/100WBC) (OHS): 0 /100 WBC
PH UR STRIP: 7 [PH]
PLATELET # BLD AUTO: 227 K/UL (ref 150–450)
PMV BLD AUTO: 10.8 FL (ref 9.2–12.9)
POTASSIUM SERPL-SCNC: 4 MMOL/L (ref 3.5–5.1)
PROT SERPL-MCNC: 6.4 GM/DL (ref 6–8.4)
PROT UR QL STRIP: NEGATIVE
RBC # BLD AUTO: 3.69 M/UL (ref 4–5.4)
RELATIVE EOSINOPHIL (OHS): 1 %
RELATIVE LYMPHOCYTE (OHS): 12.6 % (ref 18–48)
RELATIVE MONOCYTE (OHS): 11.3 % (ref 4–15)
RELATIVE NEUTROPHIL (OHS): 74.3 % (ref 38–73)
SODIUM SERPL-SCNC: 135 MMOL/L (ref 136–145)
SP GR UR STRIP: 1.01
UROBILINOGEN UR STRIP-ACNC: NEGATIVE EU/DL
WBC # BLD AUTO: 14.13 K/UL (ref 3.9–12.7)

## 2025-06-15 PROCEDURE — 25000003 PHARM REV CODE 250: Performed by: HOSPITALIST

## 2025-06-15 PROCEDURE — 25000003 PHARM REV CODE 250: Performed by: NURSE PRACTITIONER

## 2025-06-15 PROCEDURE — 63600175 PHARM REV CODE 636 W HCPCS: Performed by: HOSPITALIST

## 2025-06-15 PROCEDURE — 36415 COLL VENOUS BLD VENIPUNCTURE: CPT | Performed by: HOSPITALIST

## 2025-06-15 PROCEDURE — 21400001 HC TELEMETRY ROOM

## 2025-06-15 PROCEDURE — 83880 ASSAY OF NATRIURETIC PEPTIDE: CPT | Performed by: NURSE PRACTITIONER

## 2025-06-15 PROCEDURE — 63600175 PHARM REV CODE 636 W HCPCS: Performed by: NURSE PRACTITIONER

## 2025-06-15 PROCEDURE — 81003 URINALYSIS AUTO W/O SCOPE: CPT | Performed by: HOSPITALIST

## 2025-06-15 PROCEDURE — 82040 ASSAY OF SERUM ALBUMIN: CPT | Performed by: HOSPITALIST

## 2025-06-15 PROCEDURE — 85025 COMPLETE CBC W/AUTO DIFF WBC: CPT | Performed by: HOSPITALIST

## 2025-06-15 PROCEDURE — 51798 US URINE CAPACITY MEASURE: CPT

## 2025-06-15 PROCEDURE — 83605 ASSAY OF LACTIC ACID: CPT | Performed by: HOSPITALIST

## 2025-06-15 PROCEDURE — 36410 VNPNXR 3YR/> PHY/QHP DX/THER: CPT

## 2025-06-15 PROCEDURE — C1751 CATH, INF, PER/CENT/MIDLINE: HCPCS

## 2025-06-15 RX ORDER — HYDRALAZINE HYDROCHLORIDE 20 MG/ML
10 INJECTION INTRAMUSCULAR; INTRAVENOUS ONCE AS NEEDED
Status: DISCONTINUED | OUTPATIENT
Start: 2025-06-13 | End: 2025-06-15

## 2025-06-15 RX ORDER — BRIMONIDINE TARTRATE 2 MG/ML
1 SOLUTION/ DROPS OPHTHALMIC 2 TIMES DAILY
Status: DISCONTINUED | OUTPATIENT
Start: 2025-06-15 | End: 2025-06-15

## 2025-06-15 RX ORDER — LORAZEPAM 2 MG/ML
0.5 INJECTION INTRAMUSCULAR ONCE
Status: COMPLETED | OUTPATIENT
Start: 2025-06-15 | End: 2025-06-15

## 2025-06-15 RX ORDER — LATANOPROST 50 UG/ML
1 SOLUTION/ DROPS OPHTHALMIC NIGHTLY
Status: DISCONTINUED | OUTPATIENT
Start: 2025-06-15 | End: 2025-06-15

## 2025-06-15 RX ORDER — KETOROLAC TROMETHAMINE 5 MG/ML
1 SOLUTION OPHTHALMIC 4 TIMES DAILY
Status: DISCONTINUED | OUTPATIENT
Start: 2025-06-15 | End: 2025-06-20

## 2025-06-15 RX ORDER — FUROSEMIDE 10 MG/ML
40 INJECTION INTRAMUSCULAR; INTRAVENOUS DAILY
Status: DISCONTINUED | OUTPATIENT
Start: 2025-06-15 | End: 2025-06-19

## 2025-06-15 RX ORDER — SODIUM CHLORIDE 0.9 % (FLUSH) 0.9 %
10 SYRINGE (ML) INJECTION
Status: DISCONTINUED | OUTPATIENT
Start: 2025-06-15 | End: 2025-06-23 | Stop reason: HOSPADM

## 2025-06-15 RX ORDER — PREDNISOLONE ACETATE 10 MG/ML
1 SUSPENSION/ DROPS OPHTHALMIC 2 TIMES DAILY
Status: DISCONTINUED | OUTPATIENT
Start: 2025-06-15 | End: 2025-06-23 | Stop reason: HOSPADM

## 2025-06-15 RX ORDER — HYDRALAZINE HYDROCHLORIDE 20 MG/ML
10 INJECTION INTRAMUSCULAR; INTRAVENOUS EVERY 8 HOURS PRN
Status: DISCONTINUED | OUTPATIENT
Start: 2025-06-15 | End: 2025-06-23 | Stop reason: HOSPADM

## 2025-06-15 RX ORDER — METOPROLOL TARTRATE 1 MG/ML
5 INJECTION, SOLUTION INTRAVENOUS
Status: DISCONTINUED | OUTPATIENT
Start: 2025-06-15 | End: 2025-06-19

## 2025-06-15 RX ORDER — DORZOLAMIDE HYDROCHLORIDE AND TIMOLOL MALEATE 20; 5 MG/ML; MG/ML
1 SOLUTION/ DROPS OPHTHALMIC 2 TIMES DAILY
Status: DISCONTINUED | OUTPATIENT
Start: 2025-06-15 | End: 2025-06-15

## 2025-06-15 RX ORDER — KETOROLAC TROMETHAMINE 5 MG/ML
1 SOLUTION OPHTHALMIC 4 TIMES DAILY
Status: DISCONTINUED | OUTPATIENT
Start: 2025-06-15 | End: 2025-06-15

## 2025-06-15 RX ADMIN — KETOROLAC TROMETHAMINE 1 DROP: 5 SOLUTION OPHTHALMIC at 08:06

## 2025-06-15 RX ADMIN — HYDRALAZINE HYDROCHLORIDE 10 MG: 20 INJECTION INTRAMUSCULAR; INTRAVENOUS at 05:06

## 2025-06-15 RX ADMIN — VALSARTAN 160 MG: 160 TABLET, FILM COATED ORAL at 08:06

## 2025-06-15 RX ADMIN — ENOXAPARIN SODIUM 40 MG: 40 INJECTION SUBCUTANEOUS at 04:06

## 2025-06-15 RX ADMIN — ACETAMINOPHEN 650 MG: 325 TABLET ORAL at 02:06

## 2025-06-15 RX ADMIN — CEFTRIAXONE 1 G: 1 INJECTION, POWDER, FOR SOLUTION INTRAMUSCULAR; INTRAVENOUS at 04:06

## 2025-06-15 RX ADMIN — HYDROCHLOROTHIAZIDE 12.5 MG: 12.5 TABLET ORAL at 08:06

## 2025-06-15 RX ADMIN — KETOROLAC TROMETHAMINE 1 DROP: 5 SOLUTION OPHTHALMIC at 04:06

## 2025-06-15 RX ADMIN — CITALOPRAM HYDROBROMIDE 10 MG: 10 TABLET ORAL at 08:06

## 2025-06-15 RX ADMIN — METOROPROLOL TARTRATE 5 MG: 5 INJECTION, SOLUTION INTRAVENOUS at 08:06

## 2025-06-15 RX ADMIN — Medication 6 MG: at 02:06

## 2025-06-15 RX ADMIN — QUETIAPINE FUMARATE 50 MG: 25 TABLET ORAL at 08:06

## 2025-06-15 RX ADMIN — HYDRALAZINE HYDROCHLORIDE 10 MG: 20 INJECTION INTRAMUSCULAR; INTRAVENOUS at 08:06

## 2025-06-15 RX ADMIN — LORAZEPAM 0.5 MG: 2 INJECTION INTRAMUSCULAR; INTRAVENOUS at 01:06

## 2025-06-15 RX ADMIN — METOROPROLOL TARTRATE 5 MG: 5 INJECTION, SOLUTION INTRAVENOUS at 03:06

## 2025-06-15 RX ADMIN — AZITHROMYCIN MONOHYDRATE 500 MG: 500 INJECTION, POWDER, LYOPHILIZED, FOR SOLUTION INTRAVENOUS at 01:06

## 2025-06-15 RX ADMIN — ATORVASTATIN CALCIUM 10 MG: 10 TABLET, FILM COATED ORAL at 08:06

## 2025-06-15 RX ADMIN — PREDNISOLONE ACETATE 1 DROP: 10 SUSPENSION/ DROPS OPHTHALMIC at 08:06

## 2025-06-15 RX ADMIN — FUROSEMIDE 40 MG: 10 INJECTION, SOLUTION INTRAVENOUS at 12:06

## 2025-06-15 NOTE — ASSESSMENT & PLAN NOTE
Patient has a diagnosis of pneumonia. The cause of the pneumonia is unknown at this time. The patient has the following signs/symptoms of pneumonia: Elevated WBCs, low grade fever . The patient does not have a current oxygen requirement and the patient does not have a home oxygen requirement. I have reviewed the pertinent imaging. The following cultures have been collected: Blood cultures The culture results are listed below.     Current antimicrobial regimen consists of the antibiotics listed below. Will monitor patient closely and continue current treatment plan unchanged.    Antibiotics (From admission, onward)      Start     Stop Route Frequency Ordered    06/15/25 1300  azithromycin (ZITHROMAX) 500 mg in 0.9% NaCl 250 mL IVPB (admixture device)         -- IV Every 24 hours (non-standard times) 06/15/25 1152    06/14/25 1600  cefTRIAXone injection 1 g         -- IV Every 24 hours (non-standard times) 06/14/25 0950            Microbiology Results (last 7 days)       Procedure Component Value Units Date/Time    Blood culture x two cultures. Draw prior to antibiotics. [[id number]]  (Normal) Collected: 06/13/25 1430    Order Status: Completed Specimen: Blood from Peripheral, Forearm, Left Updated: 06/14/25 2300     Blood Culture No Growth After 6 Hours    Blood culture x two cultures. Draw prior to antibiotics. [[id number]]  (Normal) Collected: 06/13/25 1436    Order Status: Completed Specimen: Blood from Peripheral, Antecubital, Right Updated: 06/14/25 2300     Blood Culture No Growth After 6 Hours

## 2025-06-15 NOTE — PLAN OF CARE
[facility] - Telemetry (Hospital)  Initial Discharge Assessment       Primary Care Provider: Marcello Zambrano MD    Admission Diagnosis: Leukocytosis [D72.829]  Screening for cardiovascular condition [Z13.6]  Fall [W19.XXXA]  Right lower quadrant abdominal pain [R10.31]  Elevated lactic acid level [R79.89]  Closed fracture of ramus of right pubis, initial encounter [S32.591A]  UTI (urinary tract infection) [N39.0]    Admission Date: 6/13/2025  Expected Discharge Date:     Transition of Care Barriers: None    Payor: HU[employer] MANAGED MEDICARE / Plan: HU[employer] MEDICARE PPO / Product Type: Medicare Advantage /     Extended Emergency Contact Information  Primary Emergency Contact: María ElenaZoie   [place]  Mobile Phone: [phone]  Relation: Daughter  Secondary Emergency Contact: Bhavani Ring  Mobile Phone: [phone]  Relation: Daughter  Preferred language: English   needed? No    Discharge Plan A: [facility] - [place] [address]  [address] [address]  Phone: [phone] Fax: [phone]    [facility] [place] [address]   [address]  [address]  [place] [address]  Phone: [phone] Fax: [phone]      Initial Assessment (most recent)       Adult Discharge Assessment - 06/15/25 1150          Discharge Assessment    Assessment Type Discharge Planning Assessment     Confirmed/corrected address, phone number and insurance Yes     Confirmed Demographics Correct on Facesheet     Source of Information patient     Communicated YIMI with patient/caregiver Date not available/Unable to determine     People in Home alone     Do you expect to return to your current living situation? Yes     Do you have help at home or someone to help you manage your care at home? Yes     Who are your caregiver(s) and their phone number(s)? Zoie Ring, daughter, [phone]     Prior to hospitilization  cognitive status: Alert/Oriented     Current cognitive status: Alert/Oriented     Walking or Climbing Stairs Difficulty no     Equipment Currently Used at Home bedside commode;walker, rolling;wheelchair     Readmission within 30 days? No     Patient currently being followed by outpatient case management? No     Do you currently have service(s) that help you manage your care at home? No     Do you take prescription medications? Yes     Do you have prescription coverage? Yes     Do you have any problems affording any of your prescribed medications? No     Is the patient taking medications as prescribed? yes     Who is going to help you get home at discharge? family     How do you get to doctors appointments? family or friend will provide     Are you on dialysis? No     Do you take coumadin? No     Discharge Plan A Home     DME Needed Upon Discharge  none     Discharge Plan discussed with: Adult children;Patient     Transition of Care Barriers None

## 2025-06-15 NOTE — ASSESSMENT & PLAN NOTE
Witnessed patient having auditory and visual hallucinations during examination.    Unsure if this is related to UTI/PNA versus dementia.    We will continue IV antibiotics and have on dementia precautions.

## 2025-06-15 NOTE — PROGRESS NOTES
[facility] Medicine  Progress Note    Patient Name: Tea Ring  MRN: [id number]  Patient Class: IP- Inpatient   Admission Date: 6/13/2025  Length of Stay: 1 days  Attending Physician: Greg Lo MD  Primary Care Provider: Marcello Zambrano MD        Subjective     Principal Problem:UTI (urinary tract infection)        HPI:  Tea Ring is a 79 y.o. female with a PMH  has a past medical history of Alpha thalassemia, Asthma, Cataract, Chronic anxiety, Chronic knee pain, Chronic pain of left ankle, Clotting disorder, Cutaneous lupus erythematosus, Dementia without behavioral disturbance, Depression, Depression, Ex-smoker, Hypertension, and Osteopenia (1/16 reck1/18).  Presents as a transfer from our [facility] facility for further treatment of generalized weakness fungal recent UTI and ortho consult for pubic rami fracture. Patient reported that she has been having difficulty urinating for over 2 weeks. She has had to use the bathroom at least 6 times daily. Patient reports that she will get the urge to urinate and then will itch down there. She was started on Macrobid on Damaris 10, 2025 by her primary care physician. Patient reports that she has been feeling lightheaded. She denies any specific chest pain, chest pressure, nausea, vomiting, diarrhea, dysuria. Patient reports that she had walked into the gas bedroom might have passed out. Patient is complaining right hip pain, neck pain, and generalized weakness.  Denies any injury or trauma which may have caused her symptoms.  Denies any alleviating or aggravating factors.  Denies any other symptoms at this time.    ER workup revealed CBC with leukocytosis of 17.37.  CMP with a BUN/creatinine of 23/1.4 with EGFR of 39 (previously 20/1.0 with EGFR 58 on 05/20/2025).CBG of 126 mg/dL.  Troponin negative.  Procalcitonin level negative.  Initial lactic acid 2.5 with repeat of 3.4 followed by 1.0.  Previous UA revealed acute cystitis, with repeat  UA showing improvement.  Blood cultures obtained x2.  CT cervical spine revealed age indeterminate fracture of the medial aspect of left clavicle and degenerative changes of cervical spine.  CT head negative for acute intracranial abnormality/findings.  Chest x-ray negative for acute cardiopulmonary findings.  Plain film imaging of the hip/pelvis revealed fracture of the superior and inferior pubic rami.  CT renal stone study of abdomen and pelvis revealed age indeterminate acute or subacute appearing fracture involving right superior and inferior pubic rami.  CT of the right knee pending results.  On-call orthopedic surgeon consulted.  Recommended hospital Medicine to admit and will see us consult.  Vital signs stable.  Patient received 1 g Rocephin, 2 L lactated Ringer's, and a total of 4 mg of morphine at outside facility.  Patient admitted under observation status.    RECONCILE HOME MEDICATIONS WITH FAMILY DURING DAY.  PATIENT UNABLE TO REPORT WHICH MEDICATION SHE TAKES.    PCP: Marcello Zambrano      Overview/Hospital Course:  80 y/o female admitted for UTI and acute encephalopathy. Patient started IV rocephin. Ortho has been consulted for pubic rami fracture. As of 6/15 patient with worsening confusion. WBCs trending up. CXR showed worsening vascular congestion vs infectious process. IVFs stopped and IV lasix started.  Will add azithromycin to current IV abx therapy. Case discussed with Dr. Lo.             Review of Systems   Unable to perform ROS: Mental status change     Objective:     Vital Signs (Most Recent):  Temp: 97.8 °F (36.6 °C) (06/15/25 1113)  Pulse: (!) 111 (06/15/25 1113)  Resp: 18 (06/15/25 1113)  BP: (!) 162/78 (06/15/25 1113)  SpO2: 98 % (06/15/25 1113) Vital Signs (24h Range):  Temp:  [97.1 °F (36.2 °C)-99.5 °F (37.5 °C)] 97.8 °F (36.6 °C)  Pulse:  [] 111  Resp:  [16-18] 18  SpO2:  [93 %-98 %] 98 %  BP: (140-183)/(72-96) 162/78     Weight: 69.1 kg (152 lb 5.4 oz)  Body mass index is  28.78 kg/m².    Intake/Output Summary (Last 24 hours) at 6/15/2025 1420  Last data filed at 6/15/2025 0251  Gross per 24 hour   Intake --   Output 0 ml   Net 0 ml         Physical Exam  Vitals reviewed.   Constitutional:       General: She is awake. She is not in acute distress.     Appearance: Normal appearance. She is normal weight. She is not toxic-appearing or diaphoretic.   HENT:      Head: Normocephalic and atraumatic.      Right Ear: External ear normal.      Left Ear: External ear normal.      Nose: Nose normal. No congestion or rhinorrhea.      Mouth/Throat:      Mouth: Mucous membranes are moist.      Pharynx: No oropharyngeal exudate or posterior oropharyngeal erythema.   Eyes:      General: No scleral icterus.     Extraocular Movements: Extraocular movements intact.      Conjunctiva/sclera: Conjunctivae normal.   Neck:      Vascular: No carotid bruit.   Cardiovascular:      Rate and Rhythm: Regular rhythm. Tachycardia present.      Pulses: Normal pulses.      Heart sounds: Normal heart sounds. No murmur heard.     No friction rub. No gallop.   Pulmonary:      Effort: Pulmonary effort is normal. No respiratory distress.      Breath sounds: Normal breath sounds. No stridor. No wheezing, rhonchi or rales.   Chest:      Chest wall: No tenderness.   Abdominal:      General: Abdomen is flat. Bowel sounds are normal. There is no distension.      Palpations: Abdomen is soft. There is no mass.      Tenderness: There is no abdominal tenderness. There is no right CVA tenderness, left CVA tenderness, guarding or rebound.      Hernia: No hernia is present.   Musculoskeletal:         General: No swelling, tenderness, deformity or signs of injury. Normal range of motion.      Cervical back: Normal range of motion and neck supple. No rigidity or tenderness. No spinous process tenderness or muscular tenderness.      Right lower leg: No edema.      Left lower leg: No edema.      Comments: Moves all extremities well  "  Lymphadenopathy:      Cervical: No cervical adenopathy.   Skin:     General: Skin is warm and dry.      Capillary Refill: Capillary refill takes less than 2 seconds.      Findings: No rash.   Neurological:      General: No focal deficit present.      Mental Status: She is alert. She is confused.      GCS: GCS eye subscore is 4. GCS verbal subscore is 4. GCS motor subscore is 6.      Comments: Confused, unable to assess    Psychiatric:         Attention and Perception: She perceives auditory and visual hallucinations.         Behavior: Behavior is combative.               Significant Labs: All pertinent labs within the past 24 hours have been reviewed.  Blood Culture: No results for input(s): "LABBLOO" in the last 48 hours.  BMP:   Recent Labs   Lab 06/15/25  0537   *   *   K 4.0      CO2 15*   BUN 14   CREATININE 1.0   CALCIUM 9.0     CBC:   Recent Labs   Lab 06/14/25  0558 06/14/25  0855 06/15/25  0537   WBC 11.24 11.12 14.13*   HGB 8.8* 8.4* 8.3*   HCT 28.6* 26.9* 27.7*    235 227     CMP:   Recent Labs   Lab 06/13/25  1439 06/14/25  0558 06/15/25  0537    135* 135*   K 3.6 4.2 4.0    108 108   CO2 20* 17* 15*   * 98 114*   BUN 23 20 14   CREATININE 1.4 1.1 1.0   CALCIUM 9.8 9.0 9.0   PROT 8.0 6.3 6.4   ALBUMIN 3.9 3.0* 3.0*   BILITOT 1.1* 1.0 0.9   ALKPHOS 75 54 56   AST 28 22 23   ALT 17 10 11   ANIONGAP 14 10 12     Urine Culture: No results for input(s): "LABURIN" in the last 48 hours.  Urine Studies:   Recent Labs   Lab 06/15/25  1328   COLORU Yellow   APPEARANCEUA Clear   PHUR 7.0   SPECGRAV 1.015   PROTEINUA Negative   GLUCUA Negative   BILIRUBINUA Negative   OCCULTUA Trace*   NITRITE Negative   UROBILINOGEN Negative   LEUKOCYTESUR Negative       Significant Imaging: I have reviewed all pertinent imaging results/findings within the past 24 hours.      Assessment & Plan  UTI (urinary tract infection)  Presents with complaints of dysuria and confusion with AH/VH. " Urinalysis revealed:   Lab Results   Component Value Date    COLORU Yellow 06/15/2025    APPEARANCEUA Clear 06/15/2025    SPECGRAV 1.015 06/15/2025    PHUR 7.0 06/15/2025    PROTEINUA Negative 06/15/2025    GLUCUA Negative 06/15/2025    KETONESU Negative 03/15/2025    NITRITE Negative 06/15/2025    UROBILINOGEN Negative 06/15/2025    BILIRUBINUA Negative 06/15/2025    LEUKOCYTESUR Negative 06/15/2025    RBCUA 2 06/10/2025    WBCUA 8 (H) 06/10/2025    BACTERIA Moderate (A) 06/10/2025    HYALINECASTS 6 (A) 03/06/2025   Received 1 g Rocephin at outside facility  Plan:  -UA culture pending  -Continue Abx    Hallucinations  Witnessed patient having auditory and visual hallucinations during examination.    Unsure if this is related to UTI/PNA versus dementia.    We will continue IV antibiotics and have on dementia precautions.    Essential hypertension  Chronic, uncontrolled.  Latest blood pressure and vitals reviewed-   Temp:  [97.1 °F (36.2 °C)-99.5 °F (37.5 °C)]   Pulse:  []   Resp:  [16-18]   BP: (140-183)/(72-96)   SpO2:  [93 %-98 %] .   Home meds for hypertension were reviewed and noted below.   Hypertension Medications              valsartan-hydrochlorothiazide (DIOVAN-HCT) 160-12.5 mg per tablet Take 1 tablet by mouth once daily.     While in the hospital, will manage blood pressure as follows; Continue home antihypertensive regimen once verified with family in a.m.; we will treat with p.r.n. hydralazine    Will utilize p.r.n. blood pressure medication only if patient's blood pressure greater than  170/90 and she develops symptoms such as worsening chest pain or shortness of breath.    6/15/25  -Patient spiting out medication  -IV metoprolol 5 mg Q6 hours until able to take oral.         Moderate major depression  Chronic. Stable. Not in acute exacerbation and currently denies endorsing any suicidal/homicidal ideations.  Active auditory and visual hallucinations.  Unsure if this is baseline due to patient's  underlying dementia versus UTI symptoms.  Plan:  -Continue home medications     Primary open angle glaucoma (POAG) of both eyes, severe stage  -continue eye drops   Neck pain  CT imaging cervical spine revealed:  1. There is grade 1 anterolisthesis of C6 on C7. There is grade 1 anterolisthesis of C7 on T1.  2. There are mild degenerative changes between C2 and T1.    Plan:   -analgesics p.r.n.    Closed fracture of multiple pubic rami, right, initial encounter  CT imaging revealed:  -Age-indeterminate acute or subacute appearing fracture involving the right superior and inferior pubic ramus images 139 through 156. Nonemergent pelvic MRI can be obtained as clinically warranted.    On-call orthopedic consulted.  We will see us consult in a.m..    Plan:   -analgesics p.r.n.   -orthopedic consult    History of dementia  Dementia is controlled currently. Continue home dementia meds and non-pharmacologic interventions to prevent delirium (No VS between 11PM-5AM, activity during day, opening blinds, providing glasses/hearing aids, and up in chair during daytime). Use PRN anti-psychotics to prevent behavior of self harm during sundowning, and avoid narcotics and benzos unless absolutely necessary. PRN anti-psychotics prescribed to avoid self harm behaviors.    Acute diastolic heart failure  - Echo pending  -BNP pending  -Start IV lasix 40 mg daily   -IV metoprolol   - Monitor strict I&Os and daily weights.    - Low sodium diet  - Place on fluid restriction of 1.5 L.     Results for orders placed during the hospital encounter of 10/31/23    Echo    Interpretation Summary    Left Ventricle: The left ventricle is normal in size. Normal wall thickness. There is concentric remodeling. Normal wall motion. There is normal systolic function with a visually estimated ejection fraction of 60 - 65%. There is normal diastolic function.    Right Ventricle: Normal right ventricular cavity size. Wall thickness is normal. Right ventricle  wall motion  is normal. Systolic function is normal.    Pulmonary Artery: The estimated pulmonary artery systolic pressure is 18 mmHg.    IVC/SVC: Normal venous pressure at 3 mmHg.       PNA (pneumonia)  Patient has a diagnosis of pneumonia. The cause of the pneumonia is unknown at this time. The patient has the following signs/symptoms of pneumonia: Elevated WBCs, low grade fever . The patient does not have a current oxygen requirement and the patient does not have a home oxygen requirement. I have reviewed the pertinent imaging. The following cultures have been collected: Blood cultures The culture results are listed below.     Current antimicrobial regimen consists of the antibiotics listed below. Will monitor patient closely and continue current treatment plan unchanged.    Antibiotics (From admission, onward)      Start     Stop Route Frequency Ordered    06/15/25 1300  azithromycin (ZITHROMAX) 500 mg in 0.9% NaCl 250 mL IVPB (admixture device)         -- IV Every 24 hours (non-standard times) 06/15/25 1152    06/14/25 1600  cefTRIAXone injection 1 g         -- IV Every 24 hours (non-standard times) 06/14/25 0950            Microbiology Results (last 7 days)       Procedure Component Value Units Date/Time    Blood culture x two cultures. Draw prior to antibiotics. [[id number]]  (Normal) Collected: 06/13/25 1430    Order Status: Completed Specimen: Blood from Peripheral, Forearm, Left Updated: 06/14/25 2300     Blood Culture No Growth After 6 Hours    Blood culture x two cultures. Draw prior to antibiotics. [[id number]]  (Normal) Collected: 06/13/25 1436    Order Status: Completed Specimen: Blood from Peripheral, Antecubital, Right Updated: 06/14/25 2300     Blood Culture No Growth After 6 Hours          Acute encephalopathy  Infection vs hospital induced psychosis   Treat underlying cause   IV abx     VTE Risk Mitigation (From admission, onward)           Ordered     enoxaparin injection 40 mg  Daily          06/14/25 1005     IP VTE HIGH RISK PATIENT  Once         06/13/25 2255     Place sequential compression device  Until discontinued         06/13/25 2255                    Discharge Planning   YIMI:      Code Status: Full Code   Medical Readiness for Discharge Date:   Discharge Plan A: Home                        Christina Hunter NP  Department of Hospital Medicine   [facility] - Telemetry ([facility])

## 2025-06-15 NOTE — SUBJECTIVE & OBJECTIVE
Review of Systems   Unable to perform ROS: Mental status change     Objective:     Vital Signs (Most Recent):  Temp: 97.8 °F (36.6 °C) (06/15/25 1113)  Pulse: (!) 111 (06/15/25 1113)  Resp: 18 (06/15/25 1113)  BP: (!) 162/78 (06/15/25 1113)  SpO2: 98 % (06/15/25 1113) Vital Signs (24h Range):  Temp:  [97.1 °F (36.2 °C)-99.5 °F (37.5 °C)] 97.8 °F (36.6 °C)  Pulse:  [] 111  Resp:  [16-18] 18  SpO2:  [93 %-98 %] 98 %  BP: (140-183)/(72-96) 162/78     Weight: 69.1 kg (152 lb 5.4 oz)  Body mass index is 28.78 kg/m².    Intake/Output Summary (Last 24 hours) at 6/15/2025 1420  Last data filed at 6/15/2025 0251  Gross per 24 hour   Intake --   Output 0 ml   Net 0 ml         Physical Exam  Vitals reviewed.   Constitutional:       General: She is awake. She is not in acute distress.     Appearance: Normal appearance. She is normal weight. She is not toxic-appearing or diaphoretic.   HENT:      Head: Normocephalic and atraumatic.      Right Ear: External ear normal.      Left Ear: External ear normal.      Nose: Nose normal. No congestion or rhinorrhea.      Mouth/Throat:      Mouth: Mucous membranes are moist.      Pharynx: No oropharyngeal exudate or posterior oropharyngeal erythema.   Eyes:      General: No scleral icterus.     Extraocular Movements: Extraocular movements intact.      Conjunctiva/sclera: Conjunctivae normal.   Neck:      Vascular: No carotid bruit.   Cardiovascular:      Rate and Rhythm: Regular rhythm. Tachycardia present.      Pulses: Normal pulses.      Heart sounds: Normal heart sounds. No murmur heard.     No friction rub. No gallop.   Pulmonary:      Effort: Pulmonary effort is normal. No respiratory distress.      Breath sounds: Normal breath sounds. No stridor. No wheezing, rhonchi or rales.   Chest:      Chest wall: No tenderness.   Abdominal:      General: Abdomen is flat. Bowel sounds are normal. There is no distension.      Palpations: Abdomen is soft. There is no mass.       Tenderness: There is no abdominal tenderness. There is no right CVA tenderness, left CVA tenderness, guarding or rebound.      Hernia: No hernia is present.   Musculoskeletal:         General: No swelling, tenderness, deformity or signs of injury. Normal range of motion.      Cervical back: Normal range of motion and neck supple. No rigidity or tenderness. No spinous process tenderness or muscular tenderness.      Right lower leg: No edema.      Left lower leg: No edema.      Comments: Moves all extremities well   Lymphadenopathy:      Cervical: No cervical adenopathy.   Skin:     General: Skin is warm and dry.      Capillary Refill: Capillary refill takes less than 2 seconds.      Findings: No rash.   Neurological:      General: No focal deficit present.      Mental Status: She is alert. She is confused.      GCS: GCS eye subscore is 4. GCS verbal subscore is 4. GCS motor subscore is 6.      Comments: Confused, unable to assess    Psychiatric:         Attention and Perception: She perceives auditory and visual hallucinations.         Behavior: Behavior is combative.               Significant Labs: All pertinent labs within the past 24 hours have been reviewed.  Blood Culture: No results for input(s): "LABBLOO" in the last 48 hours.  BMP:   Recent Labs   Lab 06/15/25  0537   *   *   K 4.0      CO2 15*   BUN 14   CREATININE 1.0   CALCIUM 9.0     CBC:   Recent Labs   Lab 06/14/25  0558 06/14/25  0855 06/15/25  0537   WBC 11.24 11.12 14.13*   HGB 8.8* 8.4* 8.3*   HCT 28.6* 26.9* 27.7*    235 227     CMP:   Recent Labs   Lab 06/13/25  1439 06/14/25  0558 06/15/25  0537    135* 135*   K 3.6 4.2 4.0    108 108   CO2 20* 17* 15*   * 98 114*   BUN 23 20 14   CREATININE 1.4 1.1 1.0   CALCIUM 9.8 9.0 9.0   PROT 8.0 6.3 6.4   ALBUMIN 3.9 3.0* 3.0*   BILITOT 1.1* 1.0 0.9   ALKPHOS 75 54 56   AST 28 22 23   ALT 17 10 11   ANIONGAP 14 10 12     Urine Culture: No results for input(s): " ""LABURIN" in the last 48 hours.  Urine Studies:   Recent Labs   Lab 06/15/25  1328   COLORU Yellow   APPEARANCEUA Clear   PHUR 7.0   SPECGRAV 1.015   PROTEINUA Negative   GLUCUA Negative   BILIRUBINUA Negative   OCCULTUA Trace*   NITRITE Negative   UROBILINOGEN Negative   LEUKOCYTESUR Negative       Significant Imaging: I have reviewed all pertinent imaging results/findings within the past 24 hours.  "

## 2025-06-15 NOTE — PLAN OF CARE
Nutrition Recommendation/Intervention 6/14/25:   1. Recommend a Low sodium diet with texture per SLP.   2. Recommend Boost Plus with all meals to provide additional calories.   3. Recommend starting a bowel regimen.   4. Encourage PO intake with feeding assistance as warranted.   5. Weigh x2 weekly.  6. Collaboration by nutrition professional with other providers.    Goals:   1. Pt will have diet texture modified/cleared by SLP by RD follow up.   2. Pt will tolerate and consume >50% EEN and EPN by RD follow up.   3. Pt will have improved GI status by RD follow up.    Kerry Stein RDN, LDN

## 2025-06-15 NOTE — ASSESSMENT & PLAN NOTE
CT imaging cervical spine revealed:  1. There is grade 1 anterolisthesis of C6 on C7. There is grade 1 anterolisthesis of C7 on T1.  2. There are mild degenerative changes between C2 and T1.    Plan:   -analgesics p.r.n.

## 2025-06-15 NOTE — ASSESSMENT & PLAN NOTE
Chronic, uncontrolled.  Latest blood pressure and vitals reviewed-   Temp:  [97.1 °F (36.2 °C)-99.5 °F (37.5 °C)]   Pulse:  []   Resp:  [16-18]   BP: (140-183)/(72-96)   SpO2:  [93 %-98 %] .   Home meds for hypertension were reviewed and noted below.   Hypertension Medications              valsartan-hydrochlorothiazide (DIOVAN-HCT) 160-12.5 mg per tablet Take 1 tablet by mouth once daily.     While in the hospital, will manage blood pressure as follows; Continue home antihypertensive regimen once verified with family in a.m.; we will treat with p.r.n. hydralazine    Will utilize p.r.n. blood pressure medication only if patient's blood pressure greater than  170/90 and she develops symptoms such as worsening chest pain or shortness of breath.    6/15/25  -Patient spiting out medication  -IV metoprolol 5 mg Q6 hours until able to take oral.

## 2025-06-15 NOTE — ASSESSMENT & PLAN NOTE
Presents with complaints of dysuria and confusion with AH/VH. Urinalysis revealed:   Lab Results   Component Value Date    COLORU Yellow 06/15/2025    APPEARANCEUA Clear 06/15/2025    SPECGRAV 1.015 06/15/2025    PHUR 7.0 06/15/2025    PROTEINUA Negative 06/15/2025    GLUCUA Negative 06/15/2025    KETONESU Negative 03/15/2025    NITRITE Negative 06/15/2025    UROBILINOGEN Negative 06/15/2025    BILIRUBINUA Negative 06/15/2025    LEUKOCYTESUR Negative 06/15/2025    RBCUA 2 06/10/2025    WBCUA 8 (H) 06/10/2025    BACTERIA Moderate (A) 06/10/2025    HYALINECASTS 6 (A) 03/06/2025   Received 1 g Rocephin at outside facility  Plan:  -UA culture pending  -Continue Abx

## 2025-06-15 NOTE — ASSESSMENT & PLAN NOTE
Chronic. Stable. Not in acute exacerbation and currently denies endorsing any suicidal/homicidal ideations.  Active auditory and visual hallucinations.  Unsure if this is baseline due to patient's underlying dementia versus UTI symptoms.  Plan:  -Continue home medications

## 2025-06-15 NOTE — ASSESSMENT & PLAN NOTE
- Echo pending  -BNP pending  -Start IV lasix 40 mg daily   -IV metoprolol   - Monitor strict I&Os and daily weights.    - Low sodium diet  - Place on fluid restriction of 1.5 L.     Results for orders placed during the hospital encounter of 10/31/23    Echo    Interpretation Summary    Left Ventricle: The left ventricle is normal in size. Normal wall thickness. There is concentric remodeling. Normal wall motion. There is normal systolic function with a visually estimated ejection fraction of 60 - 65%. There is normal diastolic function.    Right Ventricle: Normal right ventricular cavity size. Wall thickness is normal. Right ventricle wall motion  is normal. Systolic function is normal.    Pulmonary Artery: The estimated pulmonary artery systolic pressure is 18 mmHg.    IVC/SVC: Normal venous pressure at 3 mmHg.

## 2025-06-15 NOTE — PLAN OF CARE
A222/A222 NATE Ring is a 79 y.o.female admitted on 6/13/2025 for UTI (urinary tract infection)   Code Status: Full Code MRN: [id number]   Review of patient's allergies indicates:  No Known Allergies  Past Medical History:   Diagnosis Date    Alpha thalassemia     Asthma     resolved per patient    Cataract     Chronic anxiety     years tid xanax 1mg    Chronic knee pain     Chronic pain of left ankle     Clotting disorder     Cutaneous lupus erythematosus     dr henry derm    Dementia without behavioral disturbance     Depression     Depression     dr radha hughes previously    Ex-smoker     quit fall 1    Hypertension     Osteopenia 1/16 reck1/18      PRN meds    acetaminophen, 650 mg, Q6H PRN  acetaminophen, 650 mg, Q8H PRN  albuterol-ipratropium, 3 mL, Q6H PRN  aluminum-magnesium hydroxide-simethicone, 30 mL, QID PRN  dextrose 50%, 12.5 g, PRN  dextrose 50%, 25 g, PRN  glucagon (human recombinant), 1 mg, PRN  glucose, 16 g, PRN  glucose, 24 g, PRN  hydrALAZINE, 10 mg, Q8H PRN  HYDROcodone-acetaminophen, 1 tablet, Q6H PRN  melatonin, 6 mg, Nightly PRN  naloxone, 0.02 mg, PRN  ondansetron, 4 mg, Q8H PRN  promethazine, 25 mg, Q6H PRN  senna-docusate, 1 tablet, BID PRN  sodium chloride 0.9%, 10 mL, Q12H PRN  sodium chloride 0.9%, 10 mL, PRN         Pt disoriented x3.    Pt remained afebrile throughout this shift.   All meds administered per order.   I/o completed d/t bladder scan of 450.  Pt remained free of falls this shift.   Plan of care reviewed. Patient verbalizes understanding.   Pt moving/turned q 2  Bed low, side rails up x 2, wheels locked, call light in reach.   Patient instructed to call for assistance.  Patient education provided.             Orientation: disoriented to, place, time, situation  Krystyna Coma Scale Score: 13     Lead Monitored: Lead II Rhythm: sinus tachycardia Frequency/Ectopy: PVCs  Cardiac/Telemetry Box Number: [id number]  VTE Core Measure: Pharmacological prophylaxis initiated/maintained  Last Bowel Movement: 06/15/25  Diet Low Sodium (2 gm)  Voiding Characteristics: incontinence  Jim Score: 15  Fall Risk Score: 23  Accucheck []   Freq?      Lines/Drains/Airways       Peripheral Intravenous Line  Duration                  Peripheral IV - Single Lumen 06/13/25 6976 22 G Anterior;Distal;Left Upper Arm 1 day

## 2025-06-15 NOTE — NURSING
"Patient spit out 3 morning pills x3 this morning. Saying, "hell no!" When asked if she could try to take them again. Unable to identify d/t meds dissolving in applesauce. Christina CANALES notified.   "

## 2025-06-16 ENCOUNTER — DOCUMENTATION ONLY (OUTPATIENT)
Dept: CARDIOLOGY | Facility: CLINIC | Age: 80
End: 2025-06-16
Payer: MEDICARE

## 2025-06-16 PROBLEM — D64.9 ANEMIA: Status: ACTIVE | Noted: 2025-06-16

## 2025-06-16 PROBLEM — E87.1 HYPONATREMIA: Status: ACTIVE | Noted: 2025-06-16

## 2025-06-16 LAB
ABSOLUTE EOSINOPHIL (OHS): 0.13 K/UL
ABSOLUTE MONOCYTE (OHS): 1.48 K/UL (ref 0.3–1)
ABSOLUTE NEUTROPHIL COUNT (OHS): 9.73 K/UL (ref 1.8–7.7)
ALBUMIN SERPL BCP-MCNC: 3.1 G/DL (ref 3.5–5.2)
ALP SERPL-CCNC: 61 UNIT/L (ref 40–150)
ALT SERPL W/O P-5'-P-CCNC: 13 UNIT/L (ref 10–44)
ANION GAP (OHS): 12 MMOL/L (ref 8–16)
AORTIC ROOT ANNULUS: 2.9 CM
AORTIC SIZE INDEX: 1.4 CM/M2
ASCENDING AORTA: 2.4 CM
AST SERPL-CCNC: 23 UNIT/L (ref 11–45)
AV INDEX (PROSTH): 0.69
AV MEAN GRADIENT: 5 MMHG
AV PEAK GRADIENT: 8 MMHG
AV VALVE AREA BY VELOCITY RATIO: 2.2 CM²
AV VALVE AREA: 2.4 CM²
AV VELOCITY RATIO: 0.64
BASOPHILS # BLD AUTO: 0.06 K/UL
BASOPHILS NFR BLD AUTO: 0.5 %
BILIRUB SERPL-MCNC: 0.8 MG/DL (ref 0.1–1)
BSA FOR ECHO PROCEDURE: 1.72 M2
BUN SERPL-MCNC: 19 MG/DL (ref 8–23)
CALCIUM SERPL-MCNC: 9.2 MG/DL (ref 8.7–10.5)
CHLORIDE SERPL-SCNC: 106 MMOL/L (ref 95–110)
CO2 SERPL-SCNC: 18 MMOL/L (ref 23–29)
CREAT SERPL-MCNC: 1.1 MG/DL (ref 0.5–1.4)
CV ECHO LV RWT: 0.56 CM
DOP CALC AO PEAK VEL: 1.4 M/S
DOP CALC AO VTI: 25.6 CM
DOP CALC LVOT AREA: 3.5 CM2
DOP CALC LVOT DIAMETER: 2.1 CM
DOP CALC LVOT PEAK VEL: 0.9 M/S
DOP CALC LVOT STROKE VOLUME: 60.9 CM3
DOP CALC RVOT PEAK VEL: 0.79 M/S
DOP CALC RVOT VTI: 15.9 CM
DOP CALCLVOT PEAK VEL VTI: 17.6 CM
E WAVE DECELERATION TIME: 231 MSEC
E/A RATIO: 0.72
E/E' RATIO: 9 M/S
ECHO LV POSTERIOR WALL: 1.1 CM (ref 0.6–1.1)
ERYTHROCYTE [DISTWIDTH] IN BLOOD BY AUTOMATED COUNT: 16 % (ref 11.5–14.5)
FRACTIONAL SHORTENING: 30.8 % (ref 28–44)
GFR SERPLBLD CREATININE-BSD FMLA CKD-EPI: 52 ML/MIN/1.73/M2
GLUCOSE SERPL-MCNC: 78 MG/DL (ref 70–110)
HCT VFR BLD AUTO: 32 % (ref 37–48.5)
HCV RNA SERPL NAA+PROBE-LOG IU: NOT DETECTED {LOG_IU}/ML
HGB BLD-MCNC: 9.8 GM/DL (ref 12–16)
IMM GRANULOCYTES # BLD AUTO: 0.06 K/UL (ref 0–0.04)
IMM GRANULOCYTES NFR BLD AUTO: 0.5 % (ref 0–0.5)
INTERVENTRICULAR SEPTUM: 1 CM (ref 0.6–1.1)
IVC DIAMETER: 1.56 CM
IVRT: 65 MSEC
LA MAJOR: 4.7 CM
LA MINOR: 4.8 CM
LA WIDTH: 3 CM
LEFT ATRIUM SIZE: 2.9 CM
LEFT ATRIUM VOLUME INDEX: 21 ML/M2
LEFT ATRIUM VOLUME: 35 CM3
LEFT INTERNAL DIMENSION IN SYSTOLE: 2.7 CM (ref 2.1–4)
LEFT VENTRICLE DIASTOLIC VOLUME INDEX: 38.69 ML/M2
LEFT VENTRICLE DIASTOLIC VOLUME: 65 ML
LEFT VENTRICLE MASS INDEX: 78 G/M2
LEFT VENTRICLE SYSTOLIC VOLUME INDEX: 15.5 ML/M2
LEFT VENTRICLE SYSTOLIC VOLUME: 26 ML
LEFT VENTRICULAR INTERNAL DIMENSION IN DIASTOLE: 3.9 CM (ref 3.5–6)
LEFT VENTRICULAR MASS: 131 G
LV LATERAL E/E' RATIO: 7.9 M/S
LV SEPTAL E/E' RATIO: 10.5 M/S
LVED V (TEICH): 64.79 ML
LVES V (TEICH): 25.68 ML
LVOT MG: 2.09 MMHG
LVOT MV: 0.71 CM/S
LYMPHOCYTES # BLD AUTO: 1.4 K/UL (ref 1–4.8)
MCH RBC QN AUTO: 22.5 PG (ref 27–31)
MCHC RBC AUTO-ENTMCNC: 30.6 G/DL (ref 32–36)
MCV RBC AUTO: 73 FL (ref 82–98)
MV PEAK A VEL: 0.88 M/S
MV PEAK E VEL: 0.63 M/S
MV STENOSIS PRESSURE HALF TIME: 67.06 MS
MV VALVE AREA P 1/2 METHOD: 3.28 CM2
NUCLEATED RBC (/100WBC) (OHS): 0 /100 WBC
PISA TR MAX VEL: 2.3 M/S
PLATELET # BLD AUTO: 232 K/UL (ref 150–450)
PMV BLD AUTO: 10.6 FL (ref 9.2–12.9)
POTASSIUM SERPL-SCNC: 3.8 MMOL/L (ref 3.5–5.1)
PROT SERPL-MCNC: 7.2 GM/DL (ref 6–8.4)
PV MEAN GRADIENT: 2 MMHG
RA MAJOR: 3.78 CM
RA PRESSURE ESTIMATED: 3 MMHG
RA WIDTH: 2.7 CM
RBC # BLD AUTO: 4.36 M/UL (ref 4–5.4)
RELATIVE EOSINOPHIL (OHS): 1 %
RELATIVE LYMPHOCYTE (OHS): 10.9 % (ref 18–48)
RELATIVE MONOCYTE (OHS): 11.5 % (ref 4–15)
RELATIVE NEUTROPHIL (OHS): 75.6 % (ref 38–73)
RV TB RVSP: 5 MMHG
SODIUM SERPL-SCNC: 136 MMOL/L (ref 136–145)
STJ: 2.6 CM
TDI LATERAL: 0.08 M/S
TDI SEPTAL: 0.06 M/S
TDI: 0.07 M/S
TR MAX PG: 22 MMHG
TRICUSPID ANNULAR PLANE SYSTOLIC EXCURSION: 1.8 CM
TV REST PULMONARY ARTERY PRESSURE: 24 MMHG
WBC # BLD AUTO: 12.86 K/UL (ref 3.9–12.7)
Z-SCORE OF LEFT VENTRICULAR DIMENSION IN END DIASTOLE: -1.84
Z-SCORE OF LEFT VENTRICULAR DIMENSION IN END SYSTOLE: -0.58

## 2025-06-16 PROCEDURE — 36415 COLL VENOUS BLD VENIPUNCTURE: CPT | Performed by: STUDENT IN AN ORGANIZED HEALTH CARE EDUCATION/TRAINING PROGRAM

## 2025-06-16 PROCEDURE — 25000003 PHARM REV CODE 250: Performed by: HOSPITALIST

## 2025-06-16 PROCEDURE — 63600175 PHARM REV CODE 636 W HCPCS: Performed by: HOSPITALIST

## 2025-06-16 PROCEDURE — 82040 ASSAY OF SERUM ALBUMIN: CPT | Performed by: STUDENT IN AN ORGANIZED HEALTH CARE EDUCATION/TRAINING PROGRAM

## 2025-06-16 PROCEDURE — 25000003 PHARM REV CODE 250: Performed by: NURSE PRACTITIONER

## 2025-06-16 PROCEDURE — 63600175 PHARM REV CODE 636 W HCPCS: Performed by: NURSE PRACTITIONER

## 2025-06-16 PROCEDURE — 21400001 HC TELEMETRY ROOM

## 2025-06-16 PROCEDURE — 85025 COMPLETE CBC W/AUTO DIFF WBC: CPT | Performed by: STUDENT IN AN ORGANIZED HEALTH CARE EDUCATION/TRAINING PROGRAM

## 2025-06-16 RX ORDER — LORAZEPAM 2 MG/ML
0.5 INJECTION INTRAMUSCULAR EVERY 6 HOURS PRN
Status: DISCONTINUED | OUTPATIENT
Start: 2025-06-16 | End: 2025-06-19

## 2025-06-16 RX ADMIN — HYDROCODONE BITARTRATE AND ACETAMINOPHEN 1 TABLET: 5; 325 TABLET ORAL at 04:06

## 2025-06-16 RX ADMIN — METOROPROLOL TARTRATE 5 MG: 5 INJECTION, SOLUTION INTRAVENOUS at 04:06

## 2025-06-16 RX ADMIN — ACETAMINOPHEN 650 MG: 325 TABLET ORAL at 09:06

## 2025-06-16 RX ADMIN — FUROSEMIDE 40 MG: 10 INJECTION, SOLUTION INTRAVENOUS at 08:06

## 2025-06-16 RX ADMIN — AZITHROMYCIN MONOHYDRATE 500 MG: 500 INJECTION, POWDER, LYOPHILIZED, FOR SOLUTION INTRAVENOUS at 12:06

## 2025-06-16 RX ADMIN — PREDNISOLONE ACETATE 1 DROP: 10 SUSPENSION/ DROPS OPHTHALMIC at 08:06

## 2025-06-16 RX ADMIN — ONDANSETRON 4 MG: 2 INJECTION INTRAMUSCULAR; INTRAVENOUS at 09:06

## 2025-06-16 RX ADMIN — METOROPROLOL TARTRATE 5 MG: 5 INJECTION, SOLUTION INTRAVENOUS at 03:06

## 2025-06-16 RX ADMIN — METOROPROLOL TARTRATE 5 MG: 5 INJECTION, SOLUTION INTRAVENOUS at 08:06

## 2025-06-16 RX ADMIN — CEFTRIAXONE 1 G: 1 INJECTION, POWDER, FOR SOLUTION INTRAMUSCULAR; INTRAVENOUS at 04:06

## 2025-06-16 RX ADMIN — PREDNISOLONE ACETATE 1 DROP: 10 SUSPENSION/ DROPS OPHTHALMIC at 12:06

## 2025-06-16 RX ADMIN — PANTOPRAZOLE SODIUM 40 MG: 40 TABLET, DELAYED RELEASE ORAL at 06:06

## 2025-06-16 RX ADMIN — ATORVASTATIN CALCIUM 10 MG: 10 TABLET, FILM COATED ORAL at 09:06

## 2025-06-16 RX ADMIN — KETOROLAC TROMETHAMINE 1 DROP: 5 SOLUTION OPHTHALMIC at 04:06

## 2025-06-16 RX ADMIN — KETOROLAC TROMETHAMINE 1 DROP: 5 SOLUTION OPHTHALMIC at 08:06

## 2025-06-16 RX ADMIN — HYDRALAZINE HYDROCHLORIDE 10 MG: 20 INJECTION INTRAMUSCULAR; INTRAVENOUS at 09:06

## 2025-06-16 RX ADMIN — HYDROCHLOROTHIAZIDE 12.5 MG: 12.5 TABLET ORAL at 09:06

## 2025-06-16 RX ADMIN — QUETIAPINE FUMARATE 50 MG: 25 TABLET ORAL at 08:06

## 2025-06-16 RX ADMIN — CITALOPRAM HYDROBROMIDE 10 MG: 10 TABLET ORAL at 09:06

## 2025-06-16 RX ADMIN — ENOXAPARIN SODIUM 40 MG: 40 INJECTION SUBCUTANEOUS at 04:06

## 2025-06-16 RX ADMIN — VALSARTAN 160 MG: 160 TABLET, FILM COATED ORAL at 09:06

## 2025-06-16 RX ADMIN — KETOROLAC TROMETHAMINE 1 DROP: 5 SOLUTION OPHTHALMIC at 12:06

## 2025-06-16 NOTE — ASSESSMENT & PLAN NOTE
- Echo pending  - BNP 91  - Cont IV lasix 40 mg daily   - IV metoprolol   - Monitor strict I&Os and daily weights.    - Low sodium diet  - Place on fluid restriction of 1.5 L.     Results for orders placed during the hospital encounter of 10/31/23    Echo    Interpretation Summary    Left Ventricle: The left ventricle is normal in size. Normal wall thickness. There is concentric remodeling. Normal wall motion. There is normal systolic function with a visually estimated ejection fraction of 60 - 65%. There is normal diastolic function.    Right Ventricle: Normal right ventricular cavity size. Wall thickness is normal. Right ventricle wall motion  is normal. Systolic function is normal.    Pulmonary Artery: The estimated pulmonary artery systolic pressure is 18 mmHg.    IVC/SVC: Normal venous pressure at 3 mmHg.

## 2025-06-16 NOTE — ASSESSMENT & PLAN NOTE
Chronic, uncontrolled.  Latest blood pressure and vitals reviewed-   Temp:  [97.8 °F (36.6 °C)-99.9 °F (37.7 °C)]   Pulse:  []   Resp:  [17-18]   BP: (135-187)/(40-90)   SpO2:  [90 %-99 %] .   Home meds for hypertension were reviewed and noted below.   Hypertension Medications              valsartan-hydrochlorothiazide (DIOVAN-HCT) 160-12.5 mg per tablet Take 1 tablet by mouth once daily.     While in the hospital, will manage blood pressure as follows; Continue home antihypertensive regimen once verified with family in a.m.; we will treat with p.r.n. hydralazine    Will utilize p.r.n. blood pressure medication only if patient's blood pressure greater than  170/90 and she develops symptoms such as worsening chest pain or shortness of breath.    - 6/15/25: Patient spitting out medication  - 6/16/2025: attempted to take pills--spit some out after administration  - IV metoprolol 5 mg Q6 hours until able to take oral.   - prn Hydralazine

## 2025-06-16 NOTE — ASSESSMENT & PLAN NOTE
Chronic. Stable. Not in acute exacerbation and currently denies endorsing any suicidal/homicidal ideations. Active auditory and visual hallucinations. Unsure if this is baseline due to patient's underlying dementia versus UTI symptoms.  Plan:  - Continue home medications

## 2025-06-16 NOTE — ASSESSMENT & PLAN NOTE
Hyponatremia is likely due to Dehydration/hypovolemia. The patient's most recent sodium results are listed below.  Recent Labs     06/14/25  0558 06/15/25  0537 06/16/25  0747   * 135* 136     Plan  - Correct the sodium by 4-6mEq in 24 hours.   - Monitor sodium Daily.   - Patient hyponatremia is improving  - Trend

## 2025-06-16 NOTE — PROGRESS NOTES
[facility] Medicine  Progress Note    Patient Name: Tea Ring  MRN: [id number]  Patient Class: IP- Inpatient   Admission Date: 6/13/2025  Length of Stay: 2 days  Attending Physician: Ted Rodriguez MD  Primary Care Provider: Marcello Zambrano MD    Subjective     Principal Problem: UTI (urinary tract infection)    HPI:  Tea Ring is a 79 y.o. female with a PMH  has a past medical history of Alpha thalassemia, Asthma, Cataract, Chronic anxiety, Chronic knee pain, Chronic pain of left ankle, Clotting disorder, Cutaneous lupus erythematosus, Dementia without behavioral disturbance, Depression, Depression, Ex-smoker, Hypertension, and Osteopenia (1/16 reck1/18).  Presents as a transfer from our [facility] facility for further treatment of generalized weakness fungal recent UTI and ortho consult for pubic rami fracture. Patient reported that she has been having difficulty urinating for over 2 weeks. She has had to use the bathroom at least 6 times daily. Patient reports that she will get the urge to urinate and then will itch down there. She was started on Macrobid on Damaris 10, 2025 by her primary care physician. Patient reports that she has been feeling lightheaded. She denies any specific chest pain, chest pressure, nausea, vomiting, diarrhea, dysuria. Patient reports that she had walked into the gas bedroom might have passed out. Patient is complaining right hip pain, neck pain, and generalized weakness.  Denies any injury or trauma which may have caused her symptoms.  Denies any alleviating or aggravating factors.  Denies any other symptoms at this time.    ER workup revealed CBC with leukocytosis of 17.37.  CMP with a BUN/creatinine of 23/1.4 with EGFR of 39 (previously 20/1.0 with EGFR 58 on 05/20/2025).CBG of 126 mg/dL.  Troponin negative.  Procalcitonin level negative.  Initial lactic acid 2.5 with repeat of 3.4 followed by 1.0.  Previous UA revealed acute cystitis, with repeat UA  showing improvement.  Blood cultures obtained x2.  CT cervical spine revealed age indeterminate fracture of the medial aspect of left clavicle and degenerative changes of cervical spine.  CT head negative for acute intracranial abnormality/findings.  Chest x-ray negative for acute cardiopulmonary findings.  Plain film imaging of the hip/pelvis revealed fracture of the superior and inferior pubic rami.  CT renal stone study of abdomen and pelvis revealed age indeterminate acute or subacute appearing fracture involving right superior and inferior pubic rami.  CT of the right knee pending results.  On-call orthopedic surgeon consulted.  Recommended hospital Medicine to admit and will see us consult.  Vital signs stable.  Patient received 1 g Rocephin, 2 L lactated Ringer's, and a total of 4 mg of morphine at outside facility.  Patient admitted under observation status.    RECONCILE HOME MEDICATIONS WITH FAMILY DURING DAY.  PATIENT UNABLE TO REPORT WHICH MEDICATION SHE TAKES.    PCP: Marcello Zambrano      Overview/Hospital Course:  78 y/o female admitted for UTI and acute encephalopathy. Patient started IV rocephin. Ortho has been consulted for pubic rami fracture.   As of 6/15 patient with worsening confusion. WBCs trending up. CXR showed worsening vascular congestion vs infectious process. IVFs stopped and IV lasix started.  Will add azithromycin to current IV abx therapy. Case discussed with Dr. Lo.   As of 6/16/2025, T 99.9 highest overnight, VSS otherwise. Ortho pending: Pubic rami fx = pain management. Diff following commands/swallowing pills--spits them out. Confusion slightly improved today--no obvious hallucinations. Bl Cx no growth after 36 hours.    Interval History: Pt lying in bed in no acute distress. Nurse at bedside--reports slightly improved today. Oriented to self, place, and time/year. Disoriented to situation. Somewhat cooperative. Takes meds and swallows then spits some pills out. Patient  requests to be left alone and denies pain.      Review of Systems   Unable to perform ROS: Mental status change     Objective:     Vital Signs (Most Recent):  Temp: 98.4 °F (36.9 °C) (06/16/25 0801)  Pulse: 86 (06/16/25 0910)  Resp: 18 (06/16/25 0801)  BP: (!) 177/84 (06/16/25 0916)  SpO2: 97 % (06/16/25 0801) Vital Signs (24h Range):  Temp:  [97.8 °F (36.6 °C)-99.9 °F (37.7 °C)] 98.4 °F (36.9 °C)  Pulse:  [] 86  Resp:  [17-18] 18  SpO2:  [90 %-99 %] 97 %  BP: (135-187)/(40-90) 177/84     Weight: 68.9 kg (152 lb)  Body mass index is 28.72 kg/m².    Intake/Output Summary (Last 24 hours) at 6/16/2025 1102  Last data filed at 6/16/2025 0701  Gross per 24 hour   Intake 0 ml   Output 650 ml   Net -650 ml         Physical Exam  Vitals reviewed.   Constitutional:       General: She is not in acute distress.     Appearance: She is normal weight. She is not ill-appearing or diaphoretic.   HENT:      Head: Normocephalic and atraumatic.      Nose: Nose normal.      Mouth/Throat:      Mouth: Mucous membranes are dry.      Pharynx: Oropharynx is clear.   Eyes:      Extraocular Movements: Extraocular movements intact.      Pupils: Pupils are equal, round, and reactive to light.   Cardiovascular:      Rate and Rhythm: Regular rhythm. Tachycardia present.      Pulses: Normal pulses.      Heart sounds: Normal heart sounds.   Pulmonary:      Effort: Pulmonary effort is normal. No respiratory distress.      Breath sounds: Normal breath sounds. No wheezing, rhonchi or rales.   Chest:      Chest wall: No tenderness.   Abdominal:      General: Bowel sounds are normal. There is no distension.      Palpations: Abdomen is soft.      Tenderness: There is no abdominal tenderness. There is no guarding.   Skin:     General: Skin is warm and dry.   Neurological:      Comments: Oriented to self, place=hospital, and time=2025. Otherwise confused. Intermittently pleasant and cooperative. Spits some pills out.   Psychiatric:         Mood and  Affect: Affect is flat.               Significant Labs: All pertinent labs within the past 24 hours have been reviewed.  Recent Lab Results  (Last 5 results in the past 24 hours)        06/16/25  1021   06/16/25  0747   06/15/25  1831   06/15/25  1808   06/15/25  1328        Albumin   3.1             ALP   61             ALT   13             Anion Gap   12             Ao root annulus 2.9               Ascending aorta 2.4               Ao peak russell 1.4               ASI 1.4               Ao VTI 25.6               Appearance, UA         Clear       AST   23             AV valve area 2.4               SHANIQUA by Velocity Ratio 2.2               AV mean gradient 5               AV index (prosthetic) 0.69               AV peak gradient 8               AV Velocity Ratio 0.64               Baso #   0.06             Basophil %   0.5             Bilirubin (UA)         Negative       BILIRUBIN TOTAL   0.8  Comment: For infants and newborns, interpretation of results should be based   on gestational age, weight and in agreement with clinical   observations.    Premature Infant recommended reference ranges:   0-24 hours:  <8.0 mg/dL   24-48 hours: <12.0 mg/dL   3-5 days:    <15.0 mg/dL   6-29 days:   <15.0 mg/dL             BNP       91  Comment: Values of less than 100 pg/ml are consistent with non-CHF populations.          BSA 1.72               BUN   19             Calcium   9.2             Chloride   106             CO2   18             Color, UA         Yellow       Creatinine   1.1             Left Ventricle Relative Wall Thickness 0.56               E/A ratio 0.72               E/E' ratio 9               eGFR   52  Comment: Estimated GFR calculated using the CKD-EPI creatinine (2021) equation.             Eos #   0.13             Eos %   1.0             E wave deceleration time 231               FS 30.8               Glucose   78             Glucose, UA         Negative       Gran # (ANC)   9.73             Hematocrit    32.0             Hemoglobin   9.8             Extra Tube         Hold for add-ons.  Comment: Auto resulted.          Immature Grans (Abs)   0.06  Comment: Mild elevation in immature granulocytes is non specific and can be seen in a variety of conditions including stress response, acute inflammation, trauma and pregnancy. Correlation with other laboratory and clinical findings is essential.             Immature Granulocytes   0.5             IVC diameter 1.56               IVRT 65               IVSd 1.0               Ketones, UA         1+       LA WIDTH 3.0               Lactic Acid Level     1.0           Left Atrium Major Axis 4.7               Left Atrium Minor Axis 4.8               LA size 2.9               LA Vol 35               LA vol index 21               LVOT area 3.5               Leukocyte Esterase, UA         Negative       LV LATERAL E/E' RATIO 7.9               LV SEPTAL E/E' RATIO 10.5               LV EDV BP 65               LV Diastolic Volume Index 38.69               Left Ventricular End Diastolic Volume by Teichholz Method 64.79               Left Ventricular End Systolic Volume by Teichholz Method 25.68               LVIDd 3.9               LVIDs 2.7               LV mass 131.0               LV Mass Index 78.0               Left Ventricular Outflow Tract Mean Gradient 2.09               Left Ventricular Outflow Tract Mean Velocity 0.71               LVOT diameter 2.1               LVOT peak harley 0.9               LVOT stroke volume 60.9               LVOT peak VTI 17.6               LV ESV BP 26               LV Systolic Volume Index 15.5               Lymph #   1.40             Lymph %   10.9             MCH   22.5             MCHC   30.6             MCV   73             Mean e' 0.07               Mono #   1.48             Mono %   11.5             MPV   10.6             MV valve area p 1/2 method 3.28               MV Peak A Harley 0.88               MV Peak E Harley 0.63               MV stenosis  pressure 1/2 time 67.06               Neut %   75.6             NITRITE UA         Negative       nRBC   0             Blood, UA         Trace       pH, UA         7.0       Platelet Count   232             Potassium   3.8             PROTEIN TOTAL   7.2             Protein, UA         Negative  Comment: Recommend a 24 hour urine protein or a urine protein/creatinine ratio if globulin induced proteinuria is clinically suspected.       PV mean gradient 2               PW 1.1               RA Major Axis 3.78               Est. RA pres 3               RA Width 2.7               RBC   4.36             RDW   16.0             RV TB RVSP 5               RVOT peak harley 0.79               RVOT peak VTI 15.9               Sodium   136             Spec Grav UA         1.015       STJ 2.6               TAPSE 1.8               TDI SEPTAL 0.06               TDI LATERAL 0.08               Triscuspid Valve Regurgitation Peak Gradient 22               TR Max Harley 2.3               TV resting pulmonary artery pressure 24               Urobilinogen, UA         Negative       WBC   12.86             ZLVIDD -1.84               ZLVIDS -0.58                                      Significant Imaging: I have reviewed all pertinent imaging results/findings within the past 24 hours.      Assessment & Plan  UTI (urinary tract infection)  Presents with complaints of dysuria and confusion with AH/VH. Urinalysis revealed:   Lab Results   Component Value Date    COLORU Yellow 06/15/2025    APPEARANCEUA Clear 06/15/2025    SPECGRAV 1.015 06/15/2025    PHUR 7.0 06/15/2025    PROTEINUA Negative 06/15/2025    GLUCUA Negative 06/15/2025    KETONESU Negative 03/15/2025    NITRITE Negative 06/15/2025    UROBILINOGEN Negative 06/15/2025    BILIRUBINUA Negative 06/15/2025    LEUKOCYTESUR Negative 06/15/2025    RBCUA 2 06/10/2025    WBCUA 8 (H) 06/10/2025    BACTERIA Moderate (A) 06/10/2025    HYALINECASTS 6 (A) 03/06/2025   Received 1 g Rocephin at outside  facility  Plan:  - UA as above  - Did NOT reflex to culture -- confirmed with Micro Lab 6/16/2025  - Azithromycin and Rocephin IV    Essential hypertension  Chronic, uncontrolled.  Latest blood pressure and vitals reviewed-   Temp:  [97.8 °F (36.6 °C)-99.9 °F (37.7 °C)]   Pulse:  []   Resp:  [17-18]   BP: (135-187)/(40-90)   SpO2:  [90 %-99 %] .   Home meds for hypertension were reviewed and noted below.   Hypertension Medications              valsartan-hydrochlorothiazide (DIOVAN-HCT) 160-12.5 mg per tablet Take 1 tablet by mouth once daily.     While in the hospital, will manage blood pressure as follows; Continue home antihypertensive regimen once verified with family in a.m.; we will treat with p.r.n. hydralazine    Will utilize p.r.n. blood pressure medication only if patient's blood pressure greater than  170/90 and she develops symptoms such as worsening chest pain or shortness of breath.    - 6/15/25: Patient spitting out medication  - 6/16/2025: attempted to take pills--spit some out after administration  - IV metoprolol 5 mg Q6 hours until able to take oral.   - prn Hydralazine    Moderate major depression  Chronic. Stable. Not in acute exacerbation and currently denies endorsing any suicidal/homicidal ideations. Active auditory and visual hallucinations. Unsure if this is baseline due to patient's underlying dementia versus UTI symptoms.  Plan:  - Continue home medications     Primary open angle glaucoma (POAG) of both eyes, severe stage  - continue eye drops   Neck pain  CT imaging cervical spine revealed:  1. There is grade 1 anterolisthesis of C6 on C7. There is grade 1 anterolisthesis of C7 on T1.  2. There are mild degenerative changes between C2 and T1.    Plan:   - analgesics p.r.n.    Closed fracture of multiple pubic rami, right, initial encounter  CT imaging revealed:  - Age-indeterminate acute or subacute appearing fracture involving the right superior and inferior pubic ramus images 139  through 156. Nonemergent pelvic MRI can be obtained as clinically warranted.  - analgesics p.r.n.   - orthopedic re-consulted: will round, pain management    Acute diastolic heart failure  - Echo pending  - BNP 91  - Cont IV lasix 40 mg daily   - IV metoprolol   - Monitor strict I&Os and daily weights.    - Low sodium diet  - Place on fluid restriction of 1.5 L.     Results for orders placed during the hospital encounter of 10/31/23    Echo    Interpretation Summary    Left Ventricle: The left ventricle is normal in size. Normal wall thickness. There is concentric remodeling. Normal wall motion. There is normal systolic function with a visually estimated ejection fraction of 60 - 65%. There is normal diastolic function.    Right Ventricle: Normal right ventricular cavity size. Wall thickness is normal. Right ventricle wall motion  is normal. Systolic function is normal.    Pulmonary Artery: The estimated pulmonary artery systolic pressure is 18 mmHg.    IVC/SVC: Normal venous pressure at 3 mmHg.     PNA (pneumonia)  Patient has a diagnosis of pneumonia. The cause of the pneumonia is unknown at this time. The patient has the following signs/symptoms of pneumonia: Elevated WBCs, low grade fever . The patient does not have a current oxygen requirement and the patient does not have a home oxygen requirement. I have reviewed the pertinent imaging. The following cultures have been collected: Blood cultures The culture results are listed below.     Current antimicrobial regimen consists of the antibiotics listed below. Will monitor patient closely and continue current treatment plan unchanged.    Antibiotics (From admission, onward)      Start     Stop Route Frequency Ordered    06/15/25 1300  azithromycin (ZITHROMAX) 500 mg in 0.9% NaCl 250 mL IVPB (admixture device)         -- IV Every 24 hours (non-standard times) 06/15/25 1152    06/14/25 1600  cefTRIAXone injection 1 g         -- IV Every 24 hours (non-standard times)  06/14/25 0950            Microbiology Results (last 7 days)       Procedure Component Value Units Date/Time    Blood culture x two cultures. Draw prior to antibiotics. [[id number]]  (Normal) Collected: 06/13/25 1430    Order Status: Completed Specimen: Blood from Peripheral, Forearm, Left Updated: 06/16/25 0500     Blood Culture No Growth After 36 Hours    Blood culture x two cultures. Draw prior to antibiotics. [[id number]]  (Normal) Collected: 06/13/25 1436    Order Status: Completed Specimen: Blood from Peripheral, Antecubital, Right Updated: 06/16/25 0500     Blood Culture No Growth After 36 Hours          Acute encephalopathy  History of dementia  Hallucinations  Dementia is stable currently. Continue home dementia meds and non-pharmacologic interventions to prevent delirium (No VS between 11PM-5AM, activity during day, opening blinds, providing glasses/hearing aids, and up in chair during daytime). Use PRN anti-psychotics to prevent behavior of self harm during sundowning, and avoid narcotics and benzos unless absolutely necessary. PRN anti-psychotics prescribed to avoid self harm behaviors.    6/15/2025: Witnessed patient having auditory and visual hallucinations during examination.    - Consider UTI/PNA vs Dementia vs Hosp induced psychosis  - Treat underlying cause  - Daughter reports not pt baseline  - Dementia Precautions  - Cont IV Abx  Anemia  Anemia: Most recent hemoglobin and hematocrit are listed below.  Recent Labs     06/14/25  0855 06/15/25  0537 06/16/25  0747   HGB 8.4* 8.3* 9.8*   HCT 26.9* 27.7* 32.0*     Plan  - Monitor serial CBC: Daily  - Transfuse PRBC if patient becomes hemodynamically unstable, symptomatic or H/H drops below 7/21.  - Patient has not received any PRBC transfusions to date  - Patient's anemia is currently improving  - Trend  Hyponatremia  Hyponatremia is likely due to Dehydration/hypovolemia. The patient's most recent sodium results are listed below.  Recent Labs      06/14/25  0558 06/15/25  0537 06/16/25  0747   * 135* 136     Plan  - Correct the sodium by 4-6mEq in 24 hours.   - Monitor sodium Daily.   - Patient hyponatremia is improving  - Trend    VTE Risk Mitigation (From admission, onward)           Ordered     enoxaparin injection 40 mg  Daily         06/14/25 1005     IP VTE HIGH RISK PATIENT  Once         06/13/25 2255     Place sequential compression device  Until discontinued         06/13/25 2255                    Discharge Planning   YIMI:      Code Status: Full Code   Medical Readiness for Discharge Date:   Discharge Plan A: Home          Kristin Philip NP-C  Department of Hospital Medicine   [place] - Telemetry ([facility])

## 2025-06-16 NOTE — PROGRESS NOTES
[facility] ([facility]) Team notified of pt referral via [facility] (automated inbasket notification) .    Patient screened today by provider and HF nurse for enrollment to program.      Pt was deemed not a candidate for enrollment at this time related to patient current admission diagnosis/ problem will not benefit from the Heart Failure Transitional Care Program.UTI    Pt will require additional follow up planning per primary team.

## 2025-06-16 NOTE — CONSULTS
Pelvis fracture   Patient was complaining of her right arm hurting it has been 3 weeks also there was no x-ray obtained at that point.  She also is having pain in the right hip and right groin area as well as states that she had broken foot.  Also has previous bilateral knee replacements numerous times specially on the right side  She complains that she had left shoulder fracture also.  She would like to see if anything is wrong with the right upper extremity she did have some sort of surgery done for it.  She has previous history of right hip fracture with compression screw fixation by x-ray.    Worried about her daughter who is helping/she said she adopted her and always worried about where she is.    Past Medical History:   Diagnosis Date    Alpha thalassemia     Asthma     resolved per patient    Cataract     Chronic anxiety     years tid xanax 1mg    Chronic knee pain     Chronic pain of left ankle     Clotting disorder     Cutaneous lupus erythematosus     dr henry derm    Dementia without behavioral disturbance     Depression     Depression     dr radha hughes previously    Ex-smoker     quit fall 1    Hypertension     Osteopenia 1/16 reck1/18     Past Surgical History:   Procedure Laterality Date    ANKLE SURGERY Left     BACK SURGERY      CATARACT EXTRACTION Right     COLONOSCOPY N/A 8/2/2017    Procedure: COLONOSCOPY;  Surgeon: Virgil Oliva MD;  Location: [facility] ENDO;  Service: Endoscopy;  Laterality: N/A;    HYSTERECTOMY      INJECTION OF ANESTHETIC AGENT AROUND MEDIAL BRANCH NERVES INNERVATING LUMBAR FACET JOINT Bilateral 6/17/2020    Procedure: Bilateral L3-5 MBB;  Surgeon: Yury Moore MD;  Location: [facility] PAIN T;  Service: Pain Management;  Laterality: Bilateral;    INJECTION OF ANESTHETIC AGENT AROUND NERVE Bilateral 5/19/2020    Procedure: Bilateral Genicular nerve block with RN IV sedation Covid testing day of procedure PT does not drive;  Surgeon: Yury Moore MD;  Location: [facility] PAIN  MGT;  Service: Pain Management;  Laterality: Bilateral;    KNEE ARTHROSCOPY      both knees twice    OOPHORECTOMY      RADIOFREQUENCY THERMOCOAGULATION Right 7/14/2020    Procedure: Right L3-5 Lumbar RFA;  Surgeon: Yury Moore MD;  Location: [facility] PAIN MGT;  Service: Pain Management;  Laterality: Right;    RADIOFREQUENCY THERMOCOAGULATION Left 8/6/2020    Procedure: Left L3-5 Lumbar RFA;  Surgeon: Yury Moore MD;  Location: [facility] PAIN MGT;  Service: Pain Management;  Laterality: Left;     Allergies no known drug allergies   Social history reviewed   Denies any fever or chills or shortness of breath or difficulty with chewing or swallowing loss of bowel bladder control  Complains of pain throughout her body the right shoulder the back the knees the hips  Physical exam   Alert oriented answering appropriately  I inquired also about osteoporosis and she receiving treatment she said once a month  Right upper extremity there is a scar over the proximal humerus around 4 cm.  Attempt of raising the arms very painful in guards.  The elbow motion is intact.  Radial pulses intact.  Right hip passive internal external rotation with some pain.  Pelvis with pain to AP compression anteriorly and lateral compression over the pubis  Bilateral knees with midline surgical scars from previous arthroplasties   Right knee with tenderness and very mild swelling nonspecific  Any attempt on range of motion seems sterile be painful  No ecchymosis no swelling seen      X-ray of the pelvis showing superior and inferior pubic rami fractures nondisplaced on the right.  He has a compression hip screw on the right hip.  No obvious fractures.  Osteopenic bone.  CT scan 06/13/2025 of the right knee questionable fibula head fracture.  Suspicion of periprosthetic fracture of the still femoral diaphysis and proximal tibial diaphysis    Impression   Right superior inferior pubic rami fracture  Osteopenic bone   Right upper extremity  pain  History of left humeral head and neck fracture  Questionable periprosthetic right femur fracture  Plan   We will x-ray the right humerus  X-ray of right knee  No need for treatment for the pelvis fracture  May need treatment for osteoporosis  Pain management  We will order also x-ray of both of her knees to rule out fractures.  Right total knee has revision component in the left knee is primary  I will hold off on allowing weight-bearing as tolerated to the right lower extremity until we investigate findings on CT scan of the right knee.  That could be old

## 2025-06-16 NOTE — ASSESSMENT & PLAN NOTE
Dementia is stable currently. Continue home dementia meds and non-pharmacologic interventions to prevent delirium (No VS between 11PM-5AM, activity during day, opening blinds, providing glasses/hearing aids, and up in chair during daytime). Use PRN anti-psychotics to prevent behavior of self harm during sundowning, and avoid narcotics and benzos unless absolutely necessary. PRN anti-psychotics prescribed to avoid self harm behaviors.    6/15/2025: Witnessed patient having auditory and visual hallucinations during examination.    - Consider UTI/PNA vs Dementia vs Hosp induced psychosis  - Treat underlying cause  - Daughter reports not pt baseline  - Dementia Precautions  - Cont IV Abx

## 2025-06-16 NOTE — ASSESSMENT & PLAN NOTE
Anemia: Most recent hemoglobin and hematocrit are listed below.  Recent Labs     06/14/25  0855 06/15/25  0537 06/16/25  0747   HGB 8.4* 8.3* 9.8*   HCT 26.9* 27.7* 32.0*     Plan  - Monitor serial CBC: Daily  - Transfuse PRBC if patient becomes hemodynamically unstable, symptomatic or H/H drops below 7/21.  - Patient has not received any PRBC transfusions to date  - Patient's anemia is currently improving  - Trend

## 2025-06-16 NOTE — ASSESSMENT & PLAN NOTE
CT imaging revealed:  - Age-indeterminate acute or subacute appearing fracture involving the right superior and inferior pubic ramus images 139 through 156. Nonemergent pelvic MRI can be obtained as clinically warranted.  - analgesics p.r.n.   - orthopedic re-consulted: will round, pain management

## 2025-06-16 NOTE — ASSESSMENT & PLAN NOTE
Patient has a diagnosis of pneumonia. The cause of the pneumonia is unknown at this time. The patient has the following signs/symptoms of pneumonia: Elevated WBCs, low grade fever . The patient does not have a current oxygen requirement and the patient does not have a home oxygen requirement. I have reviewed the pertinent imaging. The following cultures have been collected: Blood cultures The culture results are listed below.     Current antimicrobial regimen consists of the antibiotics listed below. Will monitor patient closely and continue current treatment plan unchanged.    Antibiotics (From admission, onward)      Start     Stop Route Frequency Ordered    06/15/25 1300  azithromycin (ZITHROMAX) 500 mg in 0.9% NaCl 250 mL IVPB (admixture device)         -- IV Every 24 hours (non-standard times) 06/15/25 1152    06/14/25 1600  cefTRIAXone injection 1 g         -- IV Every 24 hours (non-standard times) 06/14/25 0950            Microbiology Results (last 7 days)       Procedure Component Value Units Date/Time    Blood culture x two cultures. Draw prior to antibiotics. [[id number]]  (Normal) Collected: 06/13/25 1430    Order Status: Completed Specimen: Blood from Peripheral, Forearm, Left Updated: 06/16/25 0500     Blood Culture No Growth After 36 Hours    Blood culture x two cultures. Draw prior to antibiotics. [[id number]]  (Normal) Collected: 06/13/25 1436    Order Status: Completed Specimen: Blood from Peripheral, Antecubital, Right Updated: 06/16/25 0500     Blood Culture No Growth After 36 Hours

## 2025-06-16 NOTE — CONSULTS
Food & Nutrition Education    Diet Education: Cardiac, Fluid restrcition diet    Learners: Patient daughter    Nutrition Education provided with handouts:  Heart Healthy Nutrition Therapy  Fluid Restristed Nutrition Therapy  ([email or web address].org)    Comments:  PMH: HTN, MDD, POAG, Hallucinations, Neck pain, Closed fracture of multiple public rami, Acute diastolic HF, Pneumonia, Acute encephalopathy    79 y.o. Female admitted for UTI (urinary tract infection). Pt is currently on a Low sodium 2 gm diet + Boost plus TID. Note RD currently following pt. RD visited pt at bedside, pt resting in bed, AMS noted, pt family present. Pt daughter reported pt had a decreased appetite of 3 meals, small portions + Boost/Ensure ONS PTA, visually confirmed 50% lunch consumed. Pt daughter stated pt needs feeding assistance and only drinks vanilla flavored ONS, note pt missing teeth. RD provided pt daughter with a menu to help encourage pt preferred food choices, changed ONS flavor on pt's trays. Pt daughter reported pt UBW is 140-145 lbs, current weight charted 6/16/25 152 lbs (BMI 28.72, Normal for age).     RD educated patient daughter on low sodium, general healthful diet r/t recent hospital diagnosis. Recommended a well-balanced diet with a variety of fresh foods, fruits and vegetables (5 cups/day), whole grains (3 oz/day), and fat-free or low-fat dairy. Discussed reading food packages, food labels, and nutrition facts labels to identify nutrient content of foods.    Discussed the importance of limiting sodium to less than 2,000 mg per day. Recommended salt free seasonings and other herbs and spices in meals to enhance flavor without additional sodium.    Discussed dietary sources of cholesterol, the importance of incorporating healthy fats into the diet, and avoiding saturated and trans fats for heart health. For a generally healthy diet, aim for total fat less than 25-35% of calories.    Discussed the importance of  fiber (especially soluble fiber), dietary sources, and a goal intake of >20-30g/day.    Discussed fluid restriction per MD and dietary sources of fluid. RD recommended using a cup with measurements for fluids and to try to consume small sips spread throughout the day rather than a lot at one time.    Pt daughter reported pt loves to eat fish and does like to put salt on her food; she expressed understanding and appreciation for nutrition education provided. Encouraged her to rad handouts and use RD contact information with any further questions/concerns that she may have; also encouraged her to talk to an outpatient RD to assist with recipes and meal planning.     Nutrition Related Social Determinants of Health: SDOH: Adequate food in home environment and None Identified    MST score = 3, performed 6/13/25, day of admit    Visual NFPE performed, mild malnutrition noted. NFPE performed at previous encounter.   All questions and concerns answered.  Provided handout with dietitian's contact information.  *Please re-consult as needed.  Thank you,  Silvana Zavala, BS, RDN, LDN

## 2025-06-16 NOTE — PLAN OF CARE
A222/A222 NATE Ring is a 79 y.o.female admitted on 6/13/2025 for UTI (urinary tract infection)   Code Status: Full Code MRN: [id number]   Review of patient's allergies indicates:  No Known Allergies  Past Medical History:   Diagnosis Date    Alpha thalassemia     Asthma     resolved per patient    Cataract     Chronic anxiety     years tid xanax 1mg    Chronic knee pain     Chronic pain of left ankle     Clotting disorder     Cutaneous lupus erythematosus     dr henry derm    Dementia without behavioral disturbance     Depression     Depression     dr radha hughes previously    Ex-smoker     quit fall 1    Hypertension     Osteopenia 1/16 reck1/18      PRN meds    acetaminophen, 650 mg, Q6H PRN  acetaminophen, 650 mg, Q8H PRN  albuterol-ipratropium, 3 mL, Q6H PRN  aluminum-magnesium hydroxide-simethicone, 30 mL, QID PRN  dextrose 50%, 12.5 g, PRN  dextrose 50%, 25 g, PRN  glucagon (human recombinant), 1 mg, PRN  glucose, 16 g, PRN  glucose, 24 g, PRN  hydrALAZINE, 10 mg, Q8H PRN  HYDROcodone-acetaminophen, 1 tablet, Q6H PRN  melatonin, 6 mg, Nightly PRN  naloxone, 0.02 mg, PRN  ondansetron, 4 mg, Q8H PRN  promethazine, 25 mg, Q6H PRN  senna-docusate, 1 tablet, BID PRN  sodium chloride 0.9%, 10 mL, Q12H PRN  sodium chloride 0.9%, 10 mL, PRN         Pt is intermittently oriented, but mostly oriented x4.    Pt remained afebrile throughout this shift.   All meds administered per order.   Pt remained free of falls this shift.   Plan of care reviewed. Patient verbalizes understanding.   Pt moving/turned q 2.   Bed low, side rails up x 2, wheels locked, call light in reach.   Patient instructed to call for assistance.  Patient education provided               Orientation: oriented x 4 (intermittently oriented)  [place] Coma Scale Score: 13     Lead Monitored: Lead II Rhythm: sinus tachycardia Frequency/Ectopy: PVCs  Cardiac/Telemetry Box Number: [id number]  VTE Core Measure: Pharmacological prophylaxis initiated/maintained Last  Bowel Movement: 06/15/25  Diet Low Sodium (2 gm)  Voiding Characteristics: incontinence  Jim Score: 15  Fall Risk Score: 23  Accucheck []   Freq?      Lines/Drains/Airways       Peripheral Intravenous Line  Duration                  Midline Catheter - Single Lumen 06/15/25 1745 Left basilic vein (medial side of arm) other (see comments) <1 day

## 2025-06-16 NOTE — ASSESSMENT & PLAN NOTE
CT imaging cervical spine revealed:  1. There is grade 1 anterolisthesis of C6 on C7. There is grade 1 anterolisthesis of C7 on T1.  2. There are mild degenerative changes between C2 and T1.    Plan:   - analgesics p.r.n.

## 2025-06-16 NOTE — ASSESSMENT & PLAN NOTE
Presents with complaints of dysuria and confusion with AH/VH. Urinalysis revealed:   Lab Results   Component Value Date    COLORU Yellow 06/15/2025    APPEARANCEUA Clear 06/15/2025    SPECGRAV 1.015 06/15/2025    PHUR 7.0 06/15/2025    PROTEINUA Negative 06/15/2025    GLUCUA Negative 06/15/2025    KETONESU Negative 03/15/2025    NITRITE Negative 06/15/2025    UROBILINOGEN Negative 06/15/2025    BILIRUBINUA Negative 06/15/2025    LEUKOCYTESUR Negative 06/15/2025    RBCUA 2 06/10/2025    WBCUA 8 (H) 06/10/2025    BACTERIA Moderate (A) 06/10/2025    HYALINECASTS 6 (A) 03/06/2025   Received 1 g Rocephin at outside facility  Plan:  - UA as above  - Did NOT reflex to culture -- confirmed with Micro Lab 6/16/2025  - Azithromycin and Rocephin IV

## 2025-06-16 NOTE — SUBJECTIVE & OBJECTIVE
Interval History: Pt lying in bed in no acute distress. Nurse at bedside--reports slightly improved today. Oriented to self, place, and time/year. Disoriented to situation. Somewhat cooperative. Takes meds and swallows then spits some pills out. Patient requests to be left alone and denies pain.      Review of Systems   Unable to perform ROS: Mental status change     Objective:     Vital Signs (Most Recent):  Temp: 98.4 °F (36.9 °C) (06/16/25 0801)  Pulse: 86 (06/16/25 0910)  Resp: 18 (06/16/25 0801)  BP: (!) 177/84 (06/16/25 0916)  SpO2: 97 % (06/16/25 0801) Vital Signs (24h Range):  Temp:  [97.8 °F (36.6 °C)-99.9 °F (37.7 °C)] 98.4 °F (36.9 °C)  Pulse:  [] 86  Resp:  [17-18] 18  SpO2:  [90 %-99 %] 97 %  BP: (135-187)/(40-90) 177/84     Weight: 68.9 kg (152 lb)  Body mass index is 28.72 kg/m².    Intake/Output Summary (Last 24 hours) at 6/16/2025 1102  Last data filed at 6/16/2025 0701  Gross per 24 hour   Intake 0 ml   Output 650 ml   Net -650 ml         Physical Exam  Vitals reviewed.   Constitutional:       General: She is not in acute distress.     Appearance: She is normal weight. She is not ill-appearing or diaphoretic.   HENT:      Head: Normocephalic and atraumatic.      Nose: Nose normal.      Mouth/Throat:      Mouth: Mucous membranes are dry.      Pharynx: Oropharynx is clear.   Eyes:      Extraocular Movements: Extraocular movements intact.      Pupils: Pupils are equal, round, and reactive to light.   Cardiovascular:      Rate and Rhythm: Regular rhythm. Tachycardia present.      Pulses: Normal pulses.      Heart sounds: Normal heart sounds.   Pulmonary:      Effort: Pulmonary effort is normal. No respiratory distress.      Breath sounds: Normal breath sounds. No wheezing, rhonchi or rales.   Chest:      Chest wall: No tenderness.   Abdominal:      General: Bowel sounds are normal. There is no distension.      Palpations: Abdomen is soft.      Tenderness: There is no abdominal tenderness. There  is no guarding.   Skin:     General: Skin is warm and dry.   Neurological:      Comments: Oriented to self, place=hospital, and time=2025. Otherwise confused. Intermittently pleasant and cooperative. Spits some pills out.   Psychiatric:         Mood and Affect: Affect is flat.               Significant Labs: All pertinent labs within the past 24 hours have been reviewed.  Recent Lab Results  (Last 5 results in the past 24 hours)        06/16/25  1021   06/16/25  0747   06/15/25  1831   06/15/25  1808   06/15/25  1328        Albumin   3.1             ALP   61             ALT   13             Anion Gap   12             Ao root annulus 2.9               Ascending aorta 2.4               Ao peak russell 1.4               ASI 1.4               Ao VTI 25.6               Appearance, UA         Clear       AST   23             AV valve area 2.4               SHANIQUA by Velocity Ratio 2.2               AV mean gradient 5               AV index (prosthetic) 0.69               AV peak gradient 8               AV Velocity Ratio 0.64               Baso #   0.06             Basophil %   0.5             Bilirubin (UA)         Negative       BILIRUBIN TOTAL   0.8  Comment: For infants and newborns, interpretation of results should be based   on gestational age, weight and in agreement with clinical   observations.    Premature Infant recommended reference ranges:   0-24 hours:  <8.0 mg/dL   24-48 hours: <12.0 mg/dL   3-5 days:    <15.0 mg/dL   6-29 days:   <15.0 mg/dL             BNP       91  Comment: Values of less than 100 pg/ml are consistent with non-CHF populations.          BSA 1.72               BUN   19             Calcium   9.2             Chloride   106             CO2   18             Color, UA         Yellow       Creatinine   1.1             Left Ventricle Relative Wall Thickness 0.56               E/A ratio 0.72               E/E' ratio 9               eGFR   52  Comment: Estimated GFR calculated using the CKD-EPI creatinine  (2021) equation.             Eos #   0.13             Eos %   1.0             E wave deceleration time 231               FS 30.8               Glucose   78             Glucose, UA         Negative       Gran # (ANC)   9.73             Hematocrit   32.0             Hemoglobin   9.8             Extra Tube         Hold for add-ons.  Comment: Auto resulted.          Immature Grans (Abs)   0.06  Comment: Mild elevation in immature granulocytes is non specific and can be seen in a variety of conditions including stress response, acute inflammation, trauma and pregnancy. Correlation with other laboratory and clinical findings is essential.             Immature Granulocytes   0.5             IVC diameter 1.56               IVRT 65               IVSd 1.0               Ketones, UA         1+       LA WIDTH 3.0               Lactic Acid Level     1.0           Left Atrium Major Axis 4.7               Left Atrium Minor Axis 4.8               LA size 2.9               LA Vol 35               LA vol index 21               LVOT area 3.5               Leukocyte Esterase, UA         Negative       LV LATERAL E/E' RATIO 7.9               LV SEPTAL E/E' RATIO 10.5               LV EDV BP 65               LV Diastolic Volume Index 38.69               Left Ventricular End Diastolic Volume by Teichholz Method 64.79               Left Ventricular End Systolic Volume by Teichholz Method 25.68               LVIDd 3.9               LVIDs 2.7               LV mass 131.0               LV Mass Index 78.0               Left Ventricular Outflow Tract Mean Gradient 2.09               Left Ventricular Outflow Tract Mean Velocity 0.71               LVOT diameter 2.1               LVOT peak russell 0.9               LVOT stroke volume 60.9               LVOT peak VTI 17.6               LV ESV BP 26               LV Systolic Volume Index 15.5               Lymph #   1.40             Lymph %   10.9             MCH   22.5             MCHC   30.6              MCV   73             Mean e' 0.07               Mono #   1.48             Mono %   11.5             MPV   10.6             MV valve area p 1/2 method 3.28               MV Peak A Harley 0.88               MV Peak E Harley 0.63               MV stenosis pressure 1/2 time 67.06               Neut %   75.6             NITRITE UA         Negative       nRBC   0             Blood, UA         Trace       pH, UA         7.0       Platelet Count   232             Potassium   3.8             PROTEIN TOTAL   7.2             Protein, UA         Negative  Comment: Recommend a 24 hour urine protein or a urine protein/creatinine ratio if globulin induced proteinuria is clinically suspected.       PV mean gradient 2               PW 1.1               RA Major Axis 3.78               Est. RA pres 3               RA Width 2.7               RBC   4.36             RDW   16.0             RV TB RVSP 5               RVOT peak harley 0.79               RVOT peak VTI 15.9               Sodium   136             Spec Grav UA         1.015       STJ 2.6               TAPSE 1.8               TDI SEPTAL 0.06               TDI LATERAL 0.08               Triscuspid Valve Regurgitation Peak Gradient 22               TR Max Harley 2.3               TV resting pulmonary artery pressure 24               Urobilinogen, UA         Negative       WBC   12.86             ZLVIDD -1.84               ZLVIDS -0.58                                      Significant Imaging: I have reviewed all pertinent imaging results/findings within the past 24 hours.

## 2025-06-17 LAB
ABSOLUTE EOSINOPHIL (OHS): 0.37 K/UL
ABSOLUTE MONOCYTE (OHS): 1.44 K/UL (ref 0.3–1)
ABSOLUTE NEUTROPHIL COUNT (OHS): 7.22 K/UL (ref 1.8–7.7)
ALBUMIN SERPL BCP-MCNC: 2.7 G/DL (ref 3.5–5.2)
ALP SERPL-CCNC: 49 UNIT/L (ref 40–150)
ALT SERPL W/O P-5'-P-CCNC: 9 UNIT/L (ref 10–44)
ANION GAP (OHS): 13 MMOL/L (ref 8–16)
AST SERPL-CCNC: 15 UNIT/L (ref 11–45)
BASOPHILS # BLD AUTO: 0.06 K/UL
BASOPHILS NFR BLD AUTO: 0.5 %
BILIRUB SERPL-MCNC: 0.5 MG/DL (ref 0.1–1)
BUN SERPL-MCNC: 33 MG/DL (ref 8–23)
CALCIUM SERPL-MCNC: 8.6 MG/DL (ref 8.7–10.5)
CHLORIDE SERPL-SCNC: 106 MMOL/L (ref 95–110)
CO2 SERPL-SCNC: 20 MMOL/L (ref 23–29)
CREAT SERPL-MCNC: 1.6 MG/DL (ref 0.5–1.4)
ERYTHROCYTE [DISTWIDTH] IN BLOOD BY AUTOMATED COUNT: 15.8 % (ref 11.5–14.5)
GFR SERPLBLD CREATININE-BSD FMLA CKD-EPI: 33 ML/MIN/1.73/M2
GLUCOSE SERPL-MCNC: 85 MG/DL (ref 70–110)
HCT VFR BLD AUTO: 27.1 % (ref 37–48.5)
HGB BLD-MCNC: 8.5 GM/DL (ref 12–16)
IMM GRANULOCYTES # BLD AUTO: 0.07 K/UL (ref 0–0.04)
IMM GRANULOCYTES NFR BLD AUTO: 0.6 % (ref 0–0.5)
LYMPHOCYTES # BLD AUTO: 2.04 K/UL (ref 1–4.8)
MCH RBC QN AUTO: 22.3 PG (ref 27–31)
MCHC RBC AUTO-ENTMCNC: 31.4 G/DL (ref 32–36)
MCV RBC AUTO: 71 FL (ref 82–98)
NUCLEATED RBC (/100WBC) (OHS): 0 /100 WBC
PLATELET # BLD AUTO: 268 K/UL (ref 150–450)
PMV BLD AUTO: 10.4 FL (ref 9.2–12.9)
POTASSIUM SERPL-SCNC: 3.2 MMOL/L (ref 3.5–5.1)
PROT SERPL-MCNC: 6.3 GM/DL (ref 6–8.4)
RBC # BLD AUTO: 3.82 M/UL (ref 4–5.4)
RELATIVE EOSINOPHIL (OHS): 3.3 %
RELATIVE LYMPHOCYTE (OHS): 18.2 % (ref 18–48)
RELATIVE MONOCYTE (OHS): 12.9 % (ref 4–15)
RELATIVE NEUTROPHIL (OHS): 64.5 % (ref 38–73)
SODIUM SERPL-SCNC: 139 MMOL/L (ref 136–145)
WBC # BLD AUTO: 11.2 K/UL (ref 3.9–12.7)

## 2025-06-17 PROCEDURE — 63700000 PHARM REV CODE 250 ALT 637 W/O HCPCS: Performed by: STUDENT IN AN ORGANIZED HEALTH CARE EDUCATION/TRAINING PROGRAM

## 2025-06-17 PROCEDURE — 63600175 PHARM REV CODE 636 W HCPCS: Performed by: STUDENT IN AN ORGANIZED HEALTH CARE EDUCATION/TRAINING PROGRAM

## 2025-06-17 PROCEDURE — 63600175 PHARM REV CODE 636 W HCPCS

## 2025-06-17 PROCEDURE — 36415 COLL VENOUS BLD VENIPUNCTURE: CPT

## 2025-06-17 PROCEDURE — 25000003 PHARM REV CODE 250

## 2025-06-17 PROCEDURE — 21400001 HC TELEMETRY ROOM

## 2025-06-17 PROCEDURE — 25000003 PHARM REV CODE 250: Performed by: NURSE PRACTITIONER

## 2025-06-17 PROCEDURE — 63600175 PHARM REV CODE 636 W HCPCS: Performed by: HOSPITALIST

## 2025-06-17 PROCEDURE — 80053 COMPREHEN METABOLIC PANEL: CPT

## 2025-06-17 PROCEDURE — 85025 COMPLETE CBC W/AUTO DIFF WBC: CPT

## 2025-06-17 PROCEDURE — 25000003 PHARM REV CODE 250: Performed by: HOSPITALIST

## 2025-06-17 RX ORDER — OXYCODONE AND ACETAMINOPHEN 5; 325 MG/1; MG/1
1 TABLET ORAL EVERY 6 HOURS PRN
Refills: 0 | Status: DISCONTINUED | OUTPATIENT
Start: 2025-06-17 | End: 2025-06-19

## 2025-06-17 RX ORDER — MORPHINE SULFATE 4 MG/ML
2 INJECTION, SOLUTION INTRAMUSCULAR; INTRAVENOUS EVERY 6 HOURS PRN
Status: DISCONTINUED | OUTPATIENT
Start: 2025-06-17 | End: 2025-06-19

## 2025-06-17 RX ORDER — POTASSIUM CHLORIDE 7.45 MG/ML
10 INJECTION INTRAVENOUS
Status: DISCONTINUED | OUTPATIENT
Start: 2025-06-17 | End: 2025-06-17

## 2025-06-17 RX ORDER — AZITHROMYCIN 250 MG/1
500 TABLET, FILM COATED ORAL DAILY
Status: DISCONTINUED | OUTPATIENT
Start: 2025-06-17 | End: 2025-06-19

## 2025-06-17 RX ORDER — ENOXAPARIN SODIUM 100 MG/ML
30 INJECTION SUBCUTANEOUS EVERY 24 HOURS
Status: DISCONTINUED | OUTPATIENT
Start: 2025-06-17 | End: 2025-06-19

## 2025-06-17 RX ORDER — POTASSIUM CHLORIDE 7.45 MG/ML
10 INJECTION INTRAVENOUS
Status: COMPLETED | OUTPATIENT
Start: 2025-06-17 | End: 2025-06-17

## 2025-06-17 RX ORDER — MORPHINE SULFATE 4 MG/ML
2 INJECTION, SOLUTION INTRAMUSCULAR; INTRAVENOUS ONCE
Status: COMPLETED | OUTPATIENT
Start: 2025-06-17 | End: 2025-06-17

## 2025-06-17 RX ADMIN — PREDNISOLONE ACETATE 1 DROP: 10 SUSPENSION/ DROPS OPHTHALMIC at 08:06

## 2025-06-17 RX ADMIN — KETOROLAC TROMETHAMINE 1 DROP: 5 SOLUTION OPHTHALMIC at 08:06

## 2025-06-17 RX ADMIN — CEFTRIAXONE 1 G: 1 INJECTION, POWDER, FOR SOLUTION INTRAMUSCULAR; INTRAVENOUS at 03:06

## 2025-06-17 RX ADMIN — POTASSIUM CHLORIDE 10 MEQ: 7.46 INJECTION, SOLUTION INTRAVENOUS at 08:06

## 2025-06-17 RX ADMIN — KETOROLAC TROMETHAMINE 1 DROP: 5 SOLUTION OPHTHALMIC at 01:06

## 2025-06-17 RX ADMIN — LORAZEPAM 0.5 MG: 2 INJECTION INTRAMUSCULAR; INTRAVENOUS at 04:06

## 2025-06-17 RX ADMIN — METOROPROLOL TARTRATE 5 MG: 5 INJECTION, SOLUTION INTRAVENOUS at 08:06

## 2025-06-17 RX ADMIN — CITALOPRAM HYDROBROMIDE 10 MG: 10 TABLET ORAL at 08:06

## 2025-06-17 RX ADMIN — METOROPROLOL TARTRATE 5 MG: 5 INJECTION, SOLUTION INTRAVENOUS at 03:06

## 2025-06-17 RX ADMIN — QUETIAPINE FUMARATE 50 MG: 25 TABLET ORAL at 08:06

## 2025-06-17 RX ADMIN — ATORVASTATIN CALCIUM 10 MG: 10 TABLET, FILM COATED ORAL at 08:06

## 2025-06-17 RX ADMIN — ENOXAPARIN SODIUM 30 MG: 30 INJECTION SUBCUTANEOUS at 04:06

## 2025-06-17 RX ADMIN — HYDROCODONE BITARTRATE AND ACETAMINOPHEN 1 TABLET: 5; 325 TABLET ORAL at 12:06

## 2025-06-17 RX ADMIN — AZITHROMYCIN DIHYDRATE 500 MG: 250 TABLET ORAL at 10:06

## 2025-06-17 RX ADMIN — Medication 6 MG: at 12:06

## 2025-06-17 RX ADMIN — OXYCODONE HYDROCHLORIDE AND ACETAMINOPHEN 1 TABLET: 5; 325 TABLET ORAL at 01:06

## 2025-06-17 RX ADMIN — PANTOPRAZOLE SODIUM 40 MG: 40 TABLET, DELAYED RELEASE ORAL at 06:06

## 2025-06-17 RX ADMIN — MORPHINE SULFATE 2 MG: 4 INJECTION INTRAVENOUS at 08:06

## 2025-06-17 NOTE — ASSESSMENT & PLAN NOTE
Patient has a diagnosis of pneumonia. The cause of the pneumonia is unknown at this time. The patient has the following signs/symptoms of pneumonia: Elevated WBCs, low grade fever . The patient does not have a current oxygen requirement and the patient does not have a home oxygen requirement. I have reviewed the pertinent imaging. The following cultures have been collected: Blood cultures The culture results are listed below.     Current antimicrobial regimen consists of the antibiotics listed below. Will monitor patient closely and continue current treatment plan unchanged.    Antibiotics (From admission, onward)      Start     Stop Route Frequency Ordered    06/17/25 0930  azithromycin tablet 500 mg         06/22/25 0859 Oral Daily 06/17/25 0919    06/14/25 1600  cefTRIAXone injection 1 g         -- IV Every 24 hours (non-standard times) 06/14/25 0950            Microbiology Results (last 7 days)       Procedure Component Value Units Date/Time    Blood culture x two cultures. Draw prior to antibiotics. [[id number]]  (Normal) Collected: 06/13/25 1430    Order Status: Completed Specimen: Blood from Peripheral, Forearm, Left Updated: 06/16/25 1701     Blood Culture No Growth After 48 Hours    Blood culture x two cultures. Draw prior to antibiotics. [[id number]]  (Normal) Collected: 06/13/25 1436    Order Status: Completed Specimen: Blood from Peripheral, Antecubital, Right Updated: 06/16/25 1701     Blood Culture No Growth After 48 Hours

## 2025-06-17 NOTE — PROGRESS NOTES
[facility] Medicine  Progress Note    Patient Name: Tea Ring  MRN: [id number]  Patient Class: IP- Inpatient   Admission Date: 6/13/2025  Length of Stay: 3 days  Attending Physician: Ted Rodriguez MD  Primary Care Provider: Marcello Zambrano MD    Subjective     Principal Problem: UTI (urinary tract infection)    HPI:  Tea Ring is a 79 y.o. female with a PMH  has a past medical history of Alpha thalassemia, Asthma, Cataract, Chronic anxiety, Chronic knee pain, Chronic pain of left ankle, Clotting disorder, Cutaneous lupus erythematosus, Dementia without behavioral disturbance, Depression, Depression, Ex-smoker, Hypertension, and Osteopenia (1/16 reck1/18).  Presents as a transfer from our [facility] facility for further treatment of generalized weakness fungal recent UTI and ortho consult for pubic rami fracture. Patient reported that she has been having difficulty urinating for over 2 weeks. She has had to use the bathroom at least 6 times daily. Patient reports that she will get the urge to urinate and then will itch down there. She was started on Macrobid on Damaris 10, 2025 by her primary care physician. Patient reports that she has been feeling lightheaded. She denies any specific chest pain, chest pressure, nausea, vomiting, diarrhea, dysuria. Patient reports that she had walked into the gas bedroom might have passed out. Patient is complaining right hip pain, neck pain, and generalized weakness.  Denies any injury or trauma which may have caused her symptoms.  Denies any alleviating or aggravating factors.  Denies any other symptoms at this time.    ER workup revealed CBC with leukocytosis of 17.37.  CMP with a BUN/creatinine of 23/1.4 with EGFR of 39 (previously 20/1.0 with EGFR 58 on 05/20/2025).CBG of 126 mg/dL.  Troponin negative.  Procalcitonin level negative.  Initial lactic acid 2.5 with repeat of 3.4 followed by 1.0.  Previous UA revealed acute cystitis, with repeat UA  showing improvement.  Blood cultures obtained x2.  CT cervical spine revealed age indeterminate fracture of the medial aspect of left clavicle and degenerative changes of cervical spine.  CT head negative for acute intracranial abnormality/findings.  Chest x-ray negative for acute cardiopulmonary findings.  Plain film imaging of the hip/pelvis revealed fracture of the superior and inferior pubic rami.  CT renal stone study of abdomen and pelvis revealed age indeterminate acute or subacute appearing fracture involving right superior and inferior pubic rami.  CT of the right knee pending results.  On-call orthopedic surgeon consulted.  Recommended hospital Medicine to admit and will see us consult.  Vital signs stable.  Patient received 1 g Rocephin, 2 L lactated Ringer's, and a total of 4 mg of morphine at outside facility.  Patient admitted under observation status.    RECONCILE HOME MEDICATIONS WITH FAMILY DURING DAY.  PATIENT UNABLE TO REPORT WHICH MEDICATION SHE TAKES.    PCP: Marcello Zambrano      Overview/Hospital Course:  78 y/o female admitted for UTI and acute encephalopathy. Patient started IV rocephin. Ortho has been consulted for pubic rami fracture.   As of 6/15 patient with worsening confusion. WBCs trending up. CXR showed worsening vascular congestion vs infectious process. IVFs stopped and IV lasix started.  Will add azithromycin to current IV abx therapy. Case discussed with Dr. Lo.   As of 6/16/2025, T 99.9 highest overnight, VSS otherwise. Ortho pending: Pubic rami fx = pain management. Diff following commands/swallowing pills--spits them out. Confusion slightly improved today--no obvious hallucinations. Bl Cx no growth after 36 hours. Overnight, taking PO meds. C/o pain not improved w/ current meds.  As of 6/17/2025, VSS. Cr bumped 1.1>1.6. Renal US: no acute abn. Hold Lasix, Valsartan, and HCTZ. Cover BP w/ IV Metoprolol and prn Hydralazine. K repletion 10 IV. BMP at 1500. Mentation not at  "baseline per daughter at bedside. Bouts of anger and uncooperative intermittently. Pain med adjusted. MRI Brain pending.     Interval History: Pt lying in bed in no acute distress. Mentation not at baseline per daughter at bedside. Bouts of anger and uncooperative intermittently. Pt raising voice at daughter and expressing dissatisfaction w/ bed/room and c/o pain. Changed from Norco 5 to Percocet 5, previously on 10 at home. Discussed w/ daughter need to titrate slowly as AMS concern. Daughter expresses concern re: pt's current status and feels "something is wrong." MRI Brain ordered/pending. Discussed lab results w/ Renal US WNL. Discussed held meds and BP monitoring. Daughter denies questions/needs at this time. Avasys in use.     Cr bumped 1.1>1.6. Renal US: no acute abn. Hold Lasix, Valsartan, and HCTZ. Cover BP w/ IV Metoprolol and prn Hydralazine. K repletion 10 IV. BMP at 1500. Mentation not at baseline per daughter at bedside. Bouts of anger and uncooperative intermittently. Pain med adjusted. MRI Brain pending.     Review of Systems   Unable to perform ROS: Mental status change     Objective:     Vital Signs (Most Recent):  Temp: 98.4 °F (36.9 °C) (06/17/25 0812)  Pulse: 82 (06/17/25 1207)  Resp: 18 (06/17/25 1207)  BP: (!) 109/51 (06/17/25 1207)  SpO2: 97 % (06/17/25 1207) Vital Signs (24h Range):  Temp:  [97.2 °F (36.2 °C)-98.4 °F (36.9 °C)] 98.4 °F (36.9 °C)  Pulse:  [] 82  Resp:  [16-20] 18  SpO2:  [93 %-100 %] 97 %  BP: (106-142)/(51-76) 109/51     Weight: 66.5 kg (146 lb 9.7 oz)  Body mass index is 27.7 kg/m².    Intake/Output Summary (Last 24 hours) at 6/17/2025 1243  Last data filed at 6/17/2025 0701  Gross per 24 hour   Intake 0 ml   Output --   Net 0 ml         Physical Exam  Vitals reviewed.   Constitutional:       General: She is not in acute distress.     Appearance: She is normal weight. She is not ill-appearing or diaphoretic.   HENT:      Head: Normocephalic and atraumatic.      Nose: " Nose normal.      Mouth/Throat:      Mouth: Mucous membranes are moist.      Pharynx: Oropharynx is clear.   Eyes:      Extraocular Movements: Extraocular movements intact.      Pupils: Pupils are equal, round, and reactive to light.   Cardiovascular:      Rate and Rhythm: Normal rate and regular rhythm.      Pulses: Normal pulses.      Heart sounds: Normal heart sounds.   Pulmonary:      Effort: Pulmonary effort is normal. No respiratory distress.      Breath sounds: Normal breath sounds. No wheezing, rhonchi or rales.   Chest:      Chest wall: No tenderness.   Abdominal:      General: Bowel sounds are normal. There is no distension.      Palpations: Abdomen is soft.      Tenderness: There is no abdominal tenderness. There is no guarding.   Skin:     General: Skin is warm and dry.   Neurological:      Comments: Taking oral meds at this time per bedside nurse. Bouts of anger and confusion. Uncooperative at times.   Psychiatric:         Mood and Affect: Affect is angry (intermittently).               Significant Labs: All pertinent labs within the past 24 hours have been reviewed.  Recent Lab Results         06/17/25  0537        Albumin 2.7       ALP 49       ALT 9       Anion Gap 13       AST 15       Baso # 0.06       Basophil % 0.5       BILIRUBIN TOTAL 0.5  Comment: For infants and newborns, interpretation of results should be based   on gestational age, weight and in agreement with clinical   observations.    Premature Infant recommended reference ranges:   0-24 hours:  <8.0 mg/dL   24-48 hours: <12.0 mg/dL   3-5 days:    <15.0 mg/dL   6-29 days:   <15.0 mg/dL       BUN 33       Calcium 8.6       Chloride 106       CO2 20       Creatinine 1.6       eGFR 33  Comment: Estimated GFR calculated using the CKD-EPI creatinine (2021) equation.       Eos # 0.37       Eos % 3.3       Glucose 85       Gran # (ANC) 7.22       Hematocrit 27.1       Hemoglobin 8.5       Immature Grans (Abs) 0.07  Comment: Mild elevation in  immature granulocytes is non specific and can be seen in a variety of conditions including stress response, acute inflammation, trauma and pregnancy. Correlation with other laboratory and clinical findings is essential.       Immature Granulocytes 0.6       Lymph # 2.04       Lymph % 18.2       MCH 22.3       MCHC 31.4       MCV 71       Mono # 1.44       Mono % 12.9       MPV 10.4       Neut % 64.5       nRBC 0       Platelet Count 268       Potassium 3.2       PROTEIN TOTAL 6.3       RBC 3.82       RDW 15.8       Sodium 139       WBC 11.20               Significant Imaging: I have reviewed all pertinent imaging results/findings within the past 24 hours.      Assessment & Plan  UTI (urinary tract infection)  Presents with complaints of dysuria and confusion with AH/VH. Urinalysis revealed:   Lab Results   Component Value Date    COLORU Yellow 06/15/2025    APPEARANCEUA Clear 06/15/2025    SPECGRAV 1.015 06/15/2025    PHUR 7.0 06/15/2025    PROTEINUA Negative 06/15/2025    GLUCUA Negative 06/15/2025    KETONESU Negative 03/15/2025    NITRITE Negative 06/15/2025    UROBILINOGEN Negative 06/15/2025    BILIRUBINUA Negative 06/15/2025    LEUKOCYTESUR Negative 06/15/2025    RBCUA 2 06/10/2025    WBCUA 8 (H) 06/10/2025    BACTERIA Moderate (A) 06/10/2025    HYALINECASTS 6 (A) 03/06/2025   Received 1 g Rocephin at outside facility  Plan:  - UA as above  - Did NOT reflex to culture -- confirmed with Micro Lab 6/16/2025  - Azithromycin PO x 5D  - Rocephin IV  - Mentation improvement stalled    Essential hypertension  Chronic, uncontrolled.  Latest blood pressure and vitals reviewed-   Temp:  [97.2 °F (36.2 °C)-98.4 °F (36.9 °C)]   Pulse:  []   Resp:  [16-20]   BP: (106-142)/(51-76)   SpO2:  [93 %-100 %] .   Home meds for hypertension were reviewed and noted below.   Hypertension Medications              valsartan-hydrochlorothiazide (DIOVAN-HCT) 160-12.5 mg per tablet Take 1 tablet by mouth once daily.     While in the  hospital, will manage blood pressure as follows; Continue home antihypertensive regimen once verified with family in a.m.; we will treat with p.r.n. hydralazine    Will utilize p.r.n. blood pressure medication only if patient's blood pressure greater than  170/90 and she develops symptoms such as worsening chest pain or shortness of breath.    - IV metoprolol 5 mg Q6 hours until able to take oral.   - prn Hydralazine  - 6/15: Patient spitting out medication  - 6/16: attempted to take pills--spit some out after administration  - 6/16 HS: taking oral meds, held IV Metoprolol 2/2 BP soft  - 6/17: Hold IV Lasix, Valsartan, and HCTZ 2/2 Cr bump; control BP w/ IV Metoprolol and prn Hydralazine    Moderate major depression  Chronic. Stable. Not in acute exacerbation and currently denies endorsing any suicidal/homicidal ideations. Active auditory and visual hallucinations. Unsure if this is baseline due to patient's underlying dementia versus UTI symptoms.  Plan:  - Continue home medications     Primary open angle glaucoma (POAG) of both eyes, severe stage  - continue eye drops   Neck pain  CT imaging cervical spine revealed:  1. There is grade 1 anterolisthesis of C6 on C7. There is grade 1 anterolisthesis of C7 on T1.  2. There are mild degenerative changes between C2 and T1.    Plan:   - analgesics p.r.n.  - Change Norco to Percocet    Closed fracture of multiple pubic rami, right, initial encounter  CT imaging revealed:  - Age-indeterminate acute or subacute appearing fracture involving the right superior and inferior pubic ramus images 139 through 156. Nonemergent pelvic MRI can be obtained as clinically warranted.  - analgesics p.r.n.   - Orthopedic following: pain management  - Change Norco to Percocet  - XR R Humerus: no acute fracture or dislocation  - XR R Knee: no acute findings    Acute diastolic heart failure  - Echo: EF 60-65%, diastolic dysfunction  - BNP 91  - Hold IV lasix 40 mg daily today 6/17 2/2 Cr   -  IV metoprolol   - Monitor strict I&Os and daily weights.    - Low sodium diet  - Place on fluid restriction of 1.5 L.     Results for orders placed during the hospital encounter of 10/31/23    Echo    Interpretation Summary    Left Ventricle: The left ventricle is normal in size. Normal wall thickness. There is concentric remodeling. Normal wall motion. There is normal systolic function with a visually estimated ejection fraction of 60 - 65%. There is normal diastolic function.    Right Ventricle: Normal right ventricular cavity size. Wall thickness is normal. Right ventricle wall motion  is normal. Systolic function is normal.    Pulmonary Artery: The estimated pulmonary artery systolic pressure is 18 mmHg.    IVC/SVC: Normal venous pressure at 3 mmHg.     PNA (pneumonia)  Patient has a diagnosis of pneumonia. The cause of the pneumonia is unknown at this time. The patient has the following signs/symptoms of pneumonia: Elevated WBCs, low grade fever . The patient does not have a current oxygen requirement and the patient does not have a home oxygen requirement. I have reviewed the pertinent imaging. The following cultures have been collected: Blood cultures The culture results are listed below.     Current antimicrobial regimen consists of the antibiotics listed below. Will monitor patient closely and continue current treatment plan unchanged.    Antibiotics (From admission, onward)      Start     Stop Route Frequency Ordered    06/17/25 0930  azithromycin tablet 500 mg         06/22/25 0859 Oral Daily 06/17/25 0919    06/14/25 1600  cefTRIAXone injection 1 g         -- IV Every 24 hours (non-standard times) 06/14/25 0950            Microbiology Results (last 7 days)       Procedure Component Value Units Date/Time    Blood culture x two cultures. Draw prior to antibiotics. [[id number]]  (Normal) Collected: 06/13/25 1430    Order Status: Completed Specimen: Blood from Peripheral, Forearm, Left Updated: 06/16/25 1701      Blood Culture No Growth After 48 Hours    Blood culture x two cultures. Draw prior to antibiotics. [[id number]]  (Normal) Collected: 06/13/25 1436    Order Status: Completed Specimen: Blood from Peripheral, Antecubital, Right Updated: 06/16/25 1701     Blood Culture No Growth After 48 Hours          Acute encephalopathy  History of dementia  Hallucinations  Dementia is stable currently. Continue home dementia meds and non-pharmacologic interventions to prevent delirium (No VS between 11PM-5AM, activity during day, opening blinds, providing glasses/hearing aids, and up in chair during daytime). Use PRN anti-psychotics to prevent behavior of self harm during sundowning, and avoid narcotics and benzos unless absolutely necessary. PRN anti-psychotics prescribed to avoid self harm behaviors.    6/15/2025: Witnessed patient having auditory and visual hallucinations during examination.    - Consider UTI/PNA vs Dementia vs Hosp induced psychosis  - Treat underlying cause  - Daughter reports not pt baseline  - Dementia Precautions  - Cont IV Abx  - prn Ativan  - Seroquel home med nightly  - MRI Brain  - Consider TelePsych pending results  Anemia  Anemia: Most recent hemoglobin and hematocrit are listed below.  Recent Labs     06/15/25  0537 06/16/25  0747 06/17/25  0537   HGB 8.3* 9.8* 8.5*   HCT 27.7* 32.0* 27.1*     Plan  - Monitor serial CBC: Daily  - Transfuse PRBC if patient becomes hemodynamically unstable, symptomatic or H/H drops below 7/21.  - Patient has not received any PRBC transfusions to date  - Patient's anemia is currently stable  - Trend  Hyponatremia  Hyponatremia is likely due to Dehydration/hypovolemia. The patient's most recent sodium results are listed below.  Recent Labs     06/15/25  0537 06/16/25  0747 06/17/25  0537   * 136 139     Plan  - Correct the sodium by 4-6mEq in 24 hours.   - Monitor sodium Daily.   - Patient hyponatremia is stable  - Trend    VTE Risk Mitigation (From admission,  onward)           Ordered     enoxaparin injection 30 mg  Daily         06/17/25 0920     IP VTE HIGH RISK PATIENT  Once         06/13/25 2255     Place sequential compression device  Until discontinued         06/13/25 2255                    Discharge Planning   YIMI: 6/18/2025     Code Status: Full Code   Medical Readiness for Discharge Date:   Discharge Plan A: Home        EVERARDO RodriguezC  Department of Hospital Medicine   [facility] [facility] ([facility])

## 2025-06-17 NOTE — PROGRESS NOTES
Pharmacist Renal Dose Adjustment Note    Tea Ring is a 79 y.o. female being treated with the medication Lovenox    Patient Data:    Vital Signs (Most Recent):  Temp: 98.4 °F (36.9 °C) (06/17/25 0812)  Pulse: 81 (06/17/25 0904)  Resp: 20 (06/17/25 0856)  BP: (!) 111/57 (06/17/25 0904)  SpO2: 97 % (06/17/25 0812) Vital Signs (72h Range):  Temp:  [97.1 °F (36.2 °C)-99.9 °F (37.7 °C)]   Pulse:  []   Resp:  [16-20]   BP: (106-187)/(40-96)   SpO2:  [90 %-100 %]      Recent Labs   Lab 06/15/25  0537 06/16/25  0747 06/17/25  0537   CREATININE 1.0 1.1 1.6*     Serum creatinine: 1.6 mg/dL (H) 06/17/25 0537  Estimated creatinine clearance: 25.3 mL/min (A)    Medication:lovenox dose: 40mg frequency q24 will be changed to medication:Lovenox dose:30mg frequency:q24    Pharmacist's Name: Irena Rincon  Pharmacist's Extension: [phone]

## 2025-06-17 NOTE — ASSESSMENT & PLAN NOTE
Chronic, uncontrolled.  Latest blood pressure and vitals reviewed-   Temp:  [97.2 °F (36.2 °C)-98.4 °F (36.9 °C)]   Pulse:  []   Resp:  [16-20]   BP: (106-142)/(51-76)   SpO2:  [93 %-100 %] .   Home meds for hypertension were reviewed and noted below.   Hypertension Medications              valsartan-hydrochlorothiazide (DIOVAN-HCT) 160-12.5 mg per tablet Take 1 tablet by mouth once daily.     While in the hospital, will manage blood pressure as follows; Continue home antihypertensive regimen once verified with family in a.m.; we will treat with p.r.n. hydralazine    Will utilize p.r.n. blood pressure medication only if patient's blood pressure greater than  170/90 and she develops symptoms such as worsening chest pain or shortness of breath.    - IV metoprolol 5 mg Q6 hours until able to take oral.   - prn Hydralazine  - 6/15: Patient spitting out medication  - 6/16: attempted to take pills--spit some out after administration  - 6/16 HS: taking oral meds, held IV Metoprolol 2/2 BP soft  - 6/17: Hold IV Lasix, Valsartan, and HCTZ 2/2 Cr bump; control BP w/ IV Metoprolol and prn Hydralazine

## 2025-06-17 NOTE — ASSESSMENT & PLAN NOTE
- Echo: EF 60-65%, diastolic dysfunction  - BNP 91  - Hold IV lasix 40 mg daily today 6/17 2/2 Cr   - IV metoprolol   - Monitor strict I&Os and daily weights.    - Low sodium diet  - Place on fluid restriction of 1.5 L.     Results for orders placed during the hospital encounter of 10/31/23    Echo    Interpretation Summary    Left Ventricle: The left ventricle is normal in size. Normal wall thickness. There is concentric remodeling. Normal wall motion. There is normal systolic function with a visually estimated ejection fraction of 60 - 65%. There is normal diastolic function.    Right Ventricle: Normal right ventricular cavity size. Wall thickness is normal. Right ventricle wall motion  is normal. Systolic function is normal.    Pulmonary Artery: The estimated pulmonary artery systolic pressure is 18 mmHg.    IVC/SVC: Normal venous pressure at 3 mmHg.

## 2025-06-17 NOTE — SUBJECTIVE & OBJECTIVE
"Interval History: Pt lying in bed in no acute distress. Mentation not at baseline per daughter at bedside. Bouts of anger and uncooperative intermittently. Pt raising voice at daughter and expressing dissatisfaction w/ bed/room and c/o pain. Changed from Norco 5 to Percocet 5, previously on 10 at home. Discussed w/ daughter need to titrate slowly as AMS concern. Daughter expresses concern re: pt's current status and feels "something is wrong." MRI Brain ordered/pending. Discussed lab results w/ Renal US WNL. Discussed held meds and BP monitoring. Daughter denies questions/needs at this time. Avasys in use.     Cr bumped 1.1>1.6. Renal US: no acute abn. Hold Lasix, Valsartan, and HCTZ. Cover BP w/ IV Metoprolol and prn Hydralazine. K repletion 10 IV. BMP at 1500. Mentation not at baseline per daughter at bedside. Bouts of anger and uncooperative intermittently. Pain med adjusted. MRI Brain pending.     Review of Systems   Unable to perform ROS: Mental status change     Objective:     Vital Signs (Most Recent):  Temp: 98.4 °F (36.9 °C) (06/17/25 0812)  Pulse: 82 (06/17/25 1207)  Resp: 18 (06/17/25 1207)  BP: (!) 109/51 (06/17/25 1207)  SpO2: 97 % (06/17/25 1207) Vital Signs (24h Range):  Temp:  [97.2 °F (36.2 °C)-98.4 °F (36.9 °C)] 98.4 °F (36.9 °C)  Pulse:  [] 82  Resp:  [16-20] 18  SpO2:  [93 %-100 %] 97 %  BP: (106-142)/(51-76) 109/51     Weight: 66.5 kg (146 lb 9.7 oz)  Body mass index is 27.7 kg/m².    Intake/Output Summary (Last 24 hours) at 6/17/2025 1241  Last data filed at 6/17/2025 0701  Gross per 24 hour   Intake 0 ml   Output --   Net 0 ml         Physical Exam  Vitals reviewed.   Constitutional:       General: She is not in acute distress.     Appearance: She is normal weight. She is not ill-appearing or diaphoretic.   HENT:      Head: Normocephalic and atraumatic.      Nose: Nose normal.      Mouth/Throat:      Mouth: Mucous membranes are moist.      Pharynx: Oropharynx is clear.   Eyes:      " Extraocular Movements: Extraocular movements intact.      Pupils: Pupils are equal, round, and reactive to light.   Cardiovascular:      Rate and Rhythm: Normal rate and regular rhythm.      Pulses: Normal pulses.      Heart sounds: Normal heart sounds.   Pulmonary:      Effort: Pulmonary effort is normal. No respiratory distress.      Breath sounds: Normal breath sounds. No wheezing, rhonchi or rales.   Chest:      Chest wall: No tenderness.   Abdominal:      General: Bowel sounds are normal. There is no distension.      Palpations: Abdomen is soft.      Tenderness: There is no abdominal tenderness. There is no guarding.   Skin:     General: Skin is warm and dry.   Neurological:      Comments: Taking oral meds at this time per bedside nurse. Bouts of anger and confusion. Uncooperative at times.   Psychiatric:         Mood and Affect: Affect is angry (intermittently).               Significant Labs: All pertinent labs within the past 24 hours have been reviewed.  Recent Lab Results         06/17/25  0537        Albumin 2.7       ALP 49       ALT 9       Anion Gap 13       AST 15       Baso # 0.06       Basophil % 0.5       BILIRUBIN TOTAL 0.5  Comment: For infants and newborns, interpretation of results should be based   on gestational age, weight and in agreement with clinical   observations.    Premature Infant recommended reference ranges:   0-24 hours:  <8.0 mg/dL   24-48 hours: <12.0 mg/dL   3-5 days:    <15.0 mg/dL   6-29 days:   <15.0 mg/dL       BUN 33       Calcium 8.6       Chloride 106       CO2 20       Creatinine 1.6       eGFR 33  Comment: Estimated GFR calculated using the CKD-EPI creatinine (2021) equation.       Eos # 0.37       Eos % 3.3       Glucose 85       Gran # (ANC) 7.22       Hematocrit 27.1       Hemoglobin 8.5       Immature Grans (Abs) 0.07  Comment: Mild elevation in immature granulocytes is non specific and can be seen in a variety of conditions including stress response, acute  inflammation, trauma and pregnancy. Correlation with other laboratory and clinical findings is essential.       Immature Granulocytes 0.6       Lymph # 2.04       Lymph % 18.2       MCH 22.3       MCHC 31.4       MCV 71       Mono # 1.44       Mono % 12.9       MPV 10.4       Neut % 64.5       nRBC 0       Platelet Count 268       Potassium 3.2       PROTEIN TOTAL 6.3       RBC 3.82       RDW 15.8       Sodium 139       WBC 11.20               Significant Imaging: I have reviewed all pertinent imaging results/findings within the past 24 hours.

## 2025-06-17 NOTE — ASSESSMENT & PLAN NOTE
Presents with complaints of dysuria and confusion with AH/VH. Urinalysis revealed:   Lab Results   Component Value Date    COLORU Yellow 06/15/2025    APPEARANCEUA Clear 06/15/2025    SPECGRAV 1.015 06/15/2025    PHUR 7.0 06/15/2025    PROTEINUA Negative 06/15/2025    GLUCUA Negative 06/15/2025    KETONESU Negative 03/15/2025    NITRITE Negative 06/15/2025    UROBILINOGEN Negative 06/15/2025    BILIRUBINUA Negative 06/15/2025    LEUKOCYTESUR Negative 06/15/2025    RBCUA 2 06/10/2025    WBCUA 8 (H) 06/10/2025    BACTERIA Moderate (A) 06/10/2025    HYALINECASTS 6 (A) 03/06/2025   Received 1 g Rocephin at outside facility  Plan:  - UA as above  - Did NOT reflex to culture -- confirmed with Micro Lab 6/16/2025  - Azithromycin PO x 5D  - Rocephin IV  - Mentation improvement stalled

## 2025-06-17 NOTE — PROGRESS NOTES
Patient refused lab collect. I attempted to use [facility] again without any drawback. Patient switched back to lab collect.

## 2025-06-17 NOTE — CONSULTS
[facility] - Telemetry ([facility])  Wound Care    Patient Name:  Tea Ring   MRN:  [id number]  Date: 6/17/2025  Diagnosis: UTI (urinary tract infection)    History:     Past Medical History:   Diagnosis Date    Alpha thalassemia     Asthma     resolved per patient    Cataract     Chronic anxiety     years tid xanax 1mg    Chronic knee pain     Chronic pain of left ankle     Clotting disorder     Cutaneous lupus erythematosus     dr henry derm    Dementia without behavioral disturbance     Depression     Depression     dr radha hughes previously    Ex-smoker     quit fall 1    Hypertension     Osteopenia 1/16 reck1/18       Social History[1]    Precautions:     Allergies as of 06/13/2025    (No Known Allergies)       [facility] Assessment Details/Treatment     High Risk Wound Care Screen completed due to documented low Jim Score.    Chart reviewed.   Patient is a 78 y/o female admitted 6/13/25 for UTI, PMH significant for Alpha thalassemia, Asthma, Cataract, Chronic anxiety, Chronic knee pain, Chronic pain of left ankle, Clotting disorder, Cutaneous lupus erythematosus, Dementia without behavioral disturbance, Depression, Depression, Ex-smoker, Hypertension, and Osteopenia (1/16 reck1/18).     Patient resting in bed, awake and alert daughter present at bedside. Wound care nurse gave introduction and reason for visit.     Skin Assessed  --Patient right side-lying on black foam wedge with heel offloading boots in place. Bilateral elbows intact no discoloration/wounds present to BUE.   --Breasts folds and abdominal folds slightly moist but intact no wounds or irritation noted. Dried areas really well.   --Gently removed foam wedge and positioned more onto right side to assess backside. Gently lifted bordered sacral foam, skin intact no redness noted. Reinforced bordered foam. Did note maroon/purple intact discoloration to scattered areas on buttock. However daughter states those areas have been present for years and are related to  skin condition patient was diagnosed with years ago. Will continue to monitor these areas throughout her stay.   --Heel boots removed. Gently lifted bordered heel foams. Bilateral heels intact no redness noted. Reinforced heel foams. Reapplied heel offloading boots.   Patient wanted to lay supine for a little while. Recommend continued turning every 2 hours.     Patient on isotour bed - recommend Apply Low Air Loss pump to hospital bed  Pressure Injury Prevention Orderset initiated.         25 1052   WOCN Assessment   WOCN Total Time (mins) 45   Visit Date 25   Visit Time 1052   Consult Type New   WOCN Speciality Wound   Wound At risk for pressure Injury   Intervention assessed;changed;applied;chart review;orders   Teaching on-going   Skin Interventions   Device Skin Pressure Protection positioning supports utilized   Pressure Reduction Devices heel offloading device utilized;positioning supports utilized   Pressure Reduction Techniques frequent weight shift encouraged;weight shift assistance provided;heels elevated off bed   Skin Protection incontinence pads utilized   Positioning   Body Position turned;supine   Head of Bed (HOB) Positioning HOB at 30 degrees   Positioning/Transfer Devices pillows;in use   Pressure Injury Prevention    Check Moisture Management Pad Done   Sacral Foam Dressing Peel back sacral foam dressing, assess skin and reapply   Heel protection technique Heel boot   Heel preventative measures Peel back dressing/boot, assess skin and reapply   Check Medical Devices Done       2025         [1]   Social History  Socioeconomic History    Marital status:      Spouse name:     Number of children: 3   Tobacco Use    Smoking status: Former     Current packs/day: 0.00     Average packs/day: 1 pack/day for 30.0 years (30.0 ttl pk-yrs)     Types: Cigarettes     Start date: 1984     Quit date: 2014     Years since quitting: 10.7     Passive exposure: Never     Smokeless tobacco: Never    Tobacco comments:     smoke last cigarette 9/15   Substance and Sexual Activity    Alcohol use: Yes     Alcohol/week: 1.0 standard drink of alcohol     Types: 1 Glasses of wine per week     Comment: 3 times a week    Drug use: No    Sexual activity: Not Currently     Partners: Male     Social Drivers of Health     Financial Resource Strain: Patient Unable To Answer (6/14/2025)    Overall Financial Resource Strain (CARDIA)     Difficulty of Paying Living Expenses: Patient unable to answer   Food Insecurity: Patient Unable To Answer (6/14/2025)    Hunger Vital Sign     Worried About Running Out of Food in the Last Year: Patient unable to answer     Ran Out of Food in the Last Year: Patient unable to answer   Transportation Needs: Patient Unable To Answer (6/14/2025)    PRAPARE - Transportation     Lack of Transportation (Medical): Patient unable to answer     Lack of Transportation (Non-Medical): Patient unable to answer   Stress: Patient Unable To Answer (6/14/2025)    [employer] - Occupational Stress Questionnaire     Feeling of Stress : Patient unable to answer   Housing Stability: Patient Unable To Answer (6/14/2025)    Housing Stability Vital Sign     Unable to Pay for Housing in the Last Year: Patient unable to answer     Homeless in the Last Year: Patient unable to answer

## 2025-06-17 NOTE — ASSESSMENT & PLAN NOTE
Anemia: Most recent hemoglobin and hematocrit are listed below.  Recent Labs     06/15/25  0537 06/16/25  0747 06/17/25  0537   HGB 8.3* 9.8* 8.5*   HCT 27.7* 32.0* 27.1*     Plan  - Monitor serial CBC: Daily  - Transfuse PRBC if patient becomes hemodynamically unstable, symptomatic or H/H drops below 7/21.  - Patient has not received any PRBC transfusions to date  - Patient's anemia is currently stable  - Trend

## 2025-06-17 NOTE — ASSESSMENT & PLAN NOTE
CT imaging revealed:  - Age-indeterminate acute or subacute appearing fracture involving the right superior and inferior pubic ramus images 139 through 156. Nonemergent pelvic MRI can be obtained as clinically warranted.  - analgesics p.r.n.   - Orthopedic following: pain management  - Change Norco to Percocet  - XR R Humerus: no acute fracture or dislocation  - XR R Knee: no acute findings

## 2025-06-17 NOTE — ASSESSMENT & PLAN NOTE
CT imaging cervical spine revealed:  1. There is grade 1 anterolisthesis of C6 on C7. There is grade 1 anterolisthesis of C7 on T1.  2. There are mild degenerative changes between C2 and T1.    Plan:   - analgesics p.r.n.  - Change Norco to Percocet

## 2025-06-17 NOTE — ASSESSMENT & PLAN NOTE
Hyponatremia is likely due to Dehydration/hypovolemia. The patient's most recent sodium results are listed below.  Recent Labs     06/15/25  0537 06/16/25  0747 06/17/25  0537   * 136 139     Plan  - Correct the sodium by 4-6mEq in 24 hours.   - Monitor sodium Daily.   - Patient hyponatremia is stable  - Trend

## 2025-06-17 NOTE — ASSESSMENT & PLAN NOTE
Dementia is stable currently. Continue home dementia meds and non-pharmacologic interventions to prevent delirium (No VS between 11PM-5AM, activity during day, opening blinds, providing glasses/hearing aids, and up in chair during daytime). Use PRN anti-psychotics to prevent behavior of self harm during sundowning, and avoid narcotics and benzos unless absolutely necessary. PRN anti-psychotics prescribed to avoid self harm behaviors.    6/15/2025: Witnessed patient having auditory and visual hallucinations during examination.    - Consider UTI/PNA vs Dementia vs Hosp induced psychosis  - Treat underlying cause  - Daughter reports not pt baseline  - Dementia Precautions  - Cont IV Abx  - prn Ativan  - Seroquel home med nightly  - MRI Brain  - Consider TelePsych pending results

## 2025-06-17 NOTE — PLAN OF CARE
"A222/A222 ALVARADO.  Tea Ring is a 79 y.o.female admitted on 6/13/2025 for UTI (urinary tract infection)   Code Status: Full Code MRN: [id number]   Review of patient's allergies indicates:  No Known Allergies  Past Medical History:   Diagnosis Date    Alpha thalassemia     Asthma     resolved per patient    Cataract     Chronic anxiety     years tid xanax 1mg    Chronic knee pain     Chronic pain of left ankle     Clotting disorder     Cutaneous lupus erythematosus     dr henry derm    Dementia without behavioral disturbance     Depression     Depression     dr radha hughes previously    Ex-smoker     quit fall 1    Hypertension     Osteopenia 1/16 reck1/18      PRN meds    acetaminophen, 650 mg, Q6H PRN  acetaminophen, 650 mg, Q8H PRN  albuterol-ipratropium, 3 mL, Q6H PRN  aluminum-magnesium hydroxide-simethicone, 30 mL, QID PRN  dextrose 50%, 12.5 g, PRN  dextrose 50%, 25 g, PRN  glucagon (human recombinant), 1 mg, PRN  glucose, 16 g, PRN  glucose, 24 g, PRN  hydrALAZINE, 10 mg, Q8H PRN  lorazepam, 0.5 mg, Q6H PRN  melatonin, 6 mg, Nightly PRN  morphine, 2 mg, Q6H PRN  naloxone, 0.02 mg, PRN  ondansetron, 4 mg, Q8H PRN  oxyCODONE-acetaminophen, 1 tablet, Q6H PRN  promethazine, 25 mg, Q6H PRN  senna-docusate, 1 tablet, BID PRN  sodium chloride 0.9%, 10 mL, Q12H PRN  sodium chloride 0.9%, 10 mL, PRN         Pt oriented x3, but agitated and refusing most test.   Pt remained afebrile throughout this shift.   All meds administered per order.   Pt remained free of falls this shift.   Plan of care reviewed. Patient verbalizes understanding.   Pt moving/turning independently.   Bed low, side rails up x 2, wheels locked, call light in reach.   Patient instructed to call for assistance.  Patient education provided        Patient refused afternoon labs and MRI. She said, "I might do it tomorrow." Dr. Rodriguez and [place] notified.        Orientation: oriented x 4 (intermittently oriented)  Krystyna Coma Scale Score: 13     Lead " Monitored: Lead II Rhythm: sinus tachycardia Frequency/Ectopy: PVCs  Cardiac/Telemetry Box Number: [id number]  VTE Core Measure: Pharmacological prophylaxis initiated/maintained Last Bowel Movement: 06/16/25  Diet Low Sodium (2 gm)  Voiding Characteristics: incontinence  Jim Score: 17  Fall Risk Score: 23  Accucheck []   Freq?      Lines/Drains/Airways       Peripheral Intravenous Line  Duration                  Midline Catheter - Single Lumen 06/15/25 1745 Left basilic vein (medial side of arm) other (see comments) 1 day

## 2025-06-18 LAB
ABSOLUTE EOSINOPHIL (OHS): 0.39 K/UL
ABSOLUTE MONOCYTE (OHS): 1.12 K/UL (ref 0.3–1)
ABSOLUTE NEUTROPHIL COUNT (OHS): 7.64 K/UL (ref 1.8–7.7)
ALBUMIN SERPL BCP-MCNC: 2.7 G/DL (ref 3.5–5.2)
ALP SERPL-CCNC: 48 UNIT/L (ref 40–150)
ALT SERPL W/O P-5'-P-CCNC: 9 UNIT/L (ref 10–44)
ANION GAP (OHS): 10 MMOL/L (ref 8–16)
ANION GAP (OHS): 12 MMOL/L (ref 8–16)
AST SERPL-CCNC: 12 UNIT/L (ref 11–45)
BASOPHILS # BLD AUTO: 0.06 K/UL
BASOPHILS NFR BLD AUTO: 0.5 %
BILIRUB SERPL-MCNC: 0.5 MG/DL (ref 0.1–1)
BUN SERPL-MCNC: 38 MG/DL (ref 8–23)
BUN SERPL-MCNC: 40 MG/DL (ref 8–23)
CALCIUM SERPL-MCNC: 8.6 MG/DL (ref 8.7–10.5)
CALCIUM SERPL-MCNC: 8.7 MG/DL (ref 8.7–10.5)
CHLORIDE SERPL-SCNC: 105 MMOL/L (ref 95–110)
CHLORIDE SERPL-SCNC: 106 MMOL/L (ref 95–110)
CO2 SERPL-SCNC: 20 MMOL/L (ref 23–29)
CO2 SERPL-SCNC: 20 MMOL/L (ref 23–29)
CREAT SERPL-MCNC: 1.5 MG/DL (ref 0.5–1.4)
CREAT SERPL-MCNC: 1.5 MG/DL (ref 0.5–1.4)
ERYTHROCYTE [DISTWIDTH] IN BLOOD BY AUTOMATED COUNT: 15.8 % (ref 11.5–14.5)
GFR SERPLBLD CREATININE-BSD FMLA CKD-EPI: 36 ML/MIN/1.73/M2
GFR SERPLBLD CREATININE-BSD FMLA CKD-EPI: 36 ML/MIN/1.73/M2
GLUCOSE SERPL-MCNC: 111 MG/DL (ref 70–110)
GLUCOSE SERPL-MCNC: 93 MG/DL (ref 70–110)
HCT VFR BLD AUTO: 25.5 % (ref 37–48.5)
HGB BLD-MCNC: 8.2 GM/DL (ref 12–16)
IMM GRANULOCYTES # BLD AUTO: 0.08 K/UL (ref 0–0.04)
IMM GRANULOCYTES NFR BLD AUTO: 0.7 % (ref 0–0.5)
LYMPHOCYTES # BLD AUTO: 1.95 K/UL (ref 1–4.8)
MAGNESIUM SERPL-MCNC: 1.6 MG/DL (ref 1.6–2.6)
MCH RBC QN AUTO: 22.7 PG (ref 27–31)
MCHC RBC AUTO-ENTMCNC: 32.2 G/DL (ref 32–36)
MCV RBC AUTO: 70 FL (ref 82–98)
NUCLEATED RBC (/100WBC) (OHS): 0 /100 WBC
PLATELET # BLD AUTO: 284 K/UL (ref 150–450)
PMV BLD AUTO: 9.7 FL (ref 9.2–12.9)
POTASSIUM SERPL-SCNC: 3.4 MMOL/L (ref 3.5–5.1)
POTASSIUM SERPL-SCNC: 3.8 MMOL/L (ref 3.5–5.1)
PROT SERPL-MCNC: 6.4 GM/DL (ref 6–8.4)
RBC # BLD AUTO: 3.62 M/UL (ref 4–5.4)
RELATIVE EOSINOPHIL (OHS): 3.5 %
RELATIVE LYMPHOCYTE (OHS): 17.3 % (ref 18–48)
RELATIVE MONOCYTE (OHS): 10 % (ref 4–15)
RELATIVE NEUTROPHIL (OHS): 68 % (ref 38–73)
SODIUM SERPL-SCNC: 136 MMOL/L (ref 136–145)
SODIUM SERPL-SCNC: 137 MMOL/L (ref 136–145)
WBC # BLD AUTO: 11.24 K/UL (ref 3.9–12.7)

## 2025-06-18 PROCEDURE — 63600175 PHARM REV CODE 636 W HCPCS: Performed by: STUDENT IN AN ORGANIZED HEALTH CARE EDUCATION/TRAINING PROGRAM

## 2025-06-18 PROCEDURE — 63700000 PHARM REV CODE 250 ALT 637 W/O HCPCS: Performed by: STUDENT IN AN ORGANIZED HEALTH CARE EDUCATION/TRAINING PROGRAM

## 2025-06-18 PROCEDURE — 63600175 PHARM REV CODE 636 W HCPCS: Performed by: HOSPITALIST

## 2025-06-18 PROCEDURE — 85025 COMPLETE CBC W/AUTO DIFF WBC: CPT

## 2025-06-18 PROCEDURE — 84132 ASSAY OF SERUM POTASSIUM: CPT

## 2025-06-18 PROCEDURE — 97530 THERAPEUTIC ACTIVITIES: CPT

## 2025-06-18 PROCEDURE — 83735 ASSAY OF MAGNESIUM: CPT

## 2025-06-18 PROCEDURE — 25000003 PHARM REV CODE 250

## 2025-06-18 PROCEDURE — 63600175 PHARM REV CODE 636 W HCPCS

## 2025-06-18 PROCEDURE — 97163 PT EVAL HIGH COMPLEX 45 MIN: CPT

## 2025-06-18 PROCEDURE — 80053 COMPREHEN METABOLIC PANEL: CPT

## 2025-06-18 PROCEDURE — 21400001 HC TELEMETRY ROOM

## 2025-06-18 PROCEDURE — 36415 COLL VENOUS BLD VENIPUNCTURE: CPT

## 2025-06-18 PROCEDURE — 25000003 PHARM REV CODE 250: Performed by: NURSE PRACTITIONER

## 2025-06-18 RX ORDER — POTASSIUM CHLORIDE 7.45 MG/ML
10 INJECTION INTRAVENOUS ONCE
Status: COMPLETED | OUTPATIENT
Start: 2025-06-18 | End: 2025-06-18

## 2025-06-18 RX ADMIN — OXYCODONE HYDROCHLORIDE AND ACETAMINOPHEN 1 TABLET: 5; 325 TABLET ORAL at 09:06

## 2025-06-18 RX ADMIN — LORAZEPAM 0.5 MG: 2 INJECTION INTRAMUSCULAR; INTRAVENOUS at 03:06

## 2025-06-18 RX ADMIN — ATORVASTATIN CALCIUM 10 MG: 10 TABLET, FILM COATED ORAL at 09:06

## 2025-06-18 RX ADMIN — CEFTRIAXONE 1 G: 1 INJECTION, POWDER, FOR SOLUTION INTRAMUSCULAR; INTRAVENOUS at 04:06

## 2025-06-18 RX ADMIN — CITALOPRAM HYDROBROMIDE 10 MG: 10 TABLET ORAL at 09:06

## 2025-06-18 RX ADMIN — OXYCODONE HYDROCHLORIDE AND ACETAMINOPHEN 1 TABLET: 5; 325 TABLET ORAL at 01:06

## 2025-06-18 RX ADMIN — METOROPROLOL TARTRATE 5 MG: 5 INJECTION, SOLUTION INTRAVENOUS at 03:06

## 2025-06-18 RX ADMIN — METOROPROLOL TARTRATE 5 MG: 5 INJECTION, SOLUTION INTRAVENOUS at 09:06

## 2025-06-18 RX ADMIN — POTASSIUM CHLORIDE 10 MEQ: 7.46 INJECTION, SOLUTION INTRAVENOUS at 09:06

## 2025-06-18 RX ADMIN — METOROPROLOL TARTRATE 5 MG: 5 INJECTION, SOLUTION INTRAVENOUS at 04:06

## 2025-06-18 RX ADMIN — MORPHINE SULFATE 2 MG: 4 INJECTION INTRAVENOUS at 12:06

## 2025-06-18 RX ADMIN — PREDNISOLONE ACETATE 1 DROP: 10 SUSPENSION/ DROPS OPHTHALMIC at 09:06

## 2025-06-18 RX ADMIN — KETOROLAC TROMETHAMINE 1 DROP: 5 SOLUTION OPHTHALMIC at 01:06

## 2025-06-18 RX ADMIN — QUETIAPINE FUMARATE 50 MG: 25 TABLET ORAL at 09:06

## 2025-06-18 RX ADMIN — PANTOPRAZOLE SODIUM 40 MG: 40 TABLET, DELAYED RELEASE ORAL at 09:06

## 2025-06-18 RX ADMIN — KETOROLAC TROMETHAMINE 1 DROP: 5 SOLUTION OPHTHALMIC at 09:06

## 2025-06-18 RX ADMIN — ENOXAPARIN SODIUM 30 MG: 30 INJECTION SUBCUTANEOUS at 04:06

## 2025-06-18 RX ADMIN — KETOROLAC TROMETHAMINE 1 DROP: 5 SOLUTION OPHTHALMIC at 04:06

## 2025-06-18 RX ADMIN — AZITHROMYCIN DIHYDRATE 500 MG: 250 TABLET ORAL at 09:06

## 2025-06-18 RX ADMIN — OXYCODONE HYDROCHLORIDE AND ACETAMINOPHEN 1 TABLET: 5; 325 TABLET ORAL at 10:06

## 2025-06-18 NOTE — PROGRESS NOTES
Orthopedic   X-ray right humerus negative for fracture   Old x-ray of the left humerus in the electronic record show humeral head fracture in the process of healing with callus formation   New x-ray of the right knee showing revision total knee no evidence of fracture  Pelvis right superior inferior pubic rami fracture    Patient wants to be discharged by Friday back home with home physical therapy  I did discuss that she does not need any surgery and she needs to mobilize   She does have pain management as outpatient  She may start physical therapy and occupational therapy weight-bearing as tolerated to both lower extremities  Follow-up in 4 weeks in orthopedically

## 2025-06-18 NOTE — ASSESSMENT & PLAN NOTE
Chronic, uncontrolled.  Latest blood pressure and vitals reviewed-   Temp:  [97.5 °F (36.4 °C)-98.8 °F (37.1 °C)]   Pulse:  []   Resp:  [16-18]   BP: ()/(50-73)   SpO2:  [87 %-97 %] .   Home meds for hypertension were reviewed and noted below.   Hypertension Medications              valsartan-hydrochlorothiazide (DIOVAN-HCT) 160-12.5 mg per tablet Take 1 tablet by mouth once daily.     While in the hospital, will manage blood pressure as follows; Continue home antihypertensive regimen once verified with family in a.m.; we will treat with p.r.n. hydralazine    Will utilize p.r.n. blood pressure medication only if patient's blood pressure greater than  170/90 and she develops symptoms such as worsening chest pain or shortness of breath.    - IV metoprolol 5 mg Q6 hours until able to take oral.   - prn Hydralazine  - 6/15: Patient spitting out medication  - 6/16: attempted to take pills--spit some out after administration  - 6/16 HS: taking oral meds, held IV Metoprolol 2/2 BP soft  - 6/17: Hold IV Lasix, Valsartan, and HCTZ 2/2 Cr bump; control BP w/ IV Metoprolol and prn Hydralazine  - 6/18: Hold IV Lasix, Valsartan, and HCTZ; control BP w/ IV Metoprolol and prn Hydralazine

## 2025-06-18 NOTE — ASSESSMENT & PLAN NOTE
Presents with complaints of dysuria and confusion with AH/VH. Urinalysis revealed:   Lab Results   Component Value Date    COLORU Yellow 06/15/2025    APPEARANCEUA Clear 06/15/2025    SPECGRAV 1.015 06/15/2025    PHUR 7.0 06/15/2025    PROTEINUA Negative 06/15/2025    GLUCUA Negative 06/15/2025    KETONESU Negative 03/15/2025    NITRITE Negative 06/15/2025    UROBILINOGEN Negative 06/15/2025    BILIRUBINUA Negative 06/15/2025    LEUKOCYTESUR Negative 06/15/2025    RBCUA 2 06/10/2025    WBCUA 8 (H) 06/10/2025    BACTERIA Moderate (A) 06/10/2025    HYALINECASTS 6 (A) 03/06/2025   Received 1 g Rocephin at outside facility  Plan:  - UA as above  - Did NOT reflex to culture -- confirmed with Micro Lab 6/16/2025  - Azithromycin PO x 5D  - Rocephin IV  - Mentation worsened  - Re-attempt MRI Brain today

## 2025-06-18 NOTE — ASSESSMENT & PLAN NOTE
CT imaging cervical spine revealed:  1. There is grade 1 anterolisthesis of C6 on C7. There is grade 1 anterolisthesis of C7 on T1.  2. There are mild degenerative changes between C2 and T1.    Plan:   - analgesics p.r.n.  - Change Norco to Percocet  - prn IV Morphine

## 2025-06-18 NOTE — ASSESSMENT & PLAN NOTE
Dementia is stable currently. Continue home dementia meds and non-pharmacologic interventions to prevent delirium (No VS between 11PM-5AM, activity during day, opening blinds, providing glasses/hearing aids, and up in chair during daytime). Use PRN anti-psychotics to prevent behavior of self harm during sundowning, and avoid narcotics and benzos unless absolutely necessary. PRN anti-psychotics prescribed to avoid self harm behaviors.    6/15/2025: Witnessed patient having auditory and visual hallucinations during examination.    - Consider UTI/PNA vs Dementia vs Hosp induced psychosis  - Treat underlying cause  - Daughter reports not pt baseline  - Dementia Precautions  - Cont IV Abx  - prn Ativan  - Seroquel home med nightly  - re-attempt MRI Brain today  - Consider TelePsych pending results

## 2025-06-18 NOTE — ASSESSMENT & PLAN NOTE
Patient has a diagnosis of pneumonia. The cause of the pneumonia is unknown at this time. The patient has the following signs/symptoms of pneumonia: Elevated WBCs, low grade fever . The patient does not have a current oxygen requirement and the patient does not have a home oxygen requirement. I have reviewed the pertinent imaging. The following cultures have been collected: Blood cultures The culture results are listed below.     Current antimicrobial regimen consists of the antibiotics listed below. Will monitor patient closely and continue current treatment plan unchanged.    Antibiotics (From admission, onward)      Start     Stop Route Frequency Ordered    06/17/25 0930  azithromycin tablet 500 mg         06/22/25 0859 Oral Daily 06/17/25 0919    06/14/25 1600  cefTRIAXone injection 1 g         -- IV Every 24 hours (non-standard times) 06/14/25 0950            Microbiology Results (last 7 days)       Procedure Component Value Units Date/Time    Blood culture x two cultures. Draw prior to antibiotics. [[id number]]  (Normal) Collected: 06/13/25 1436    Order Status: Completed Specimen: Blood from Peripheral, Antecubital, Right Updated: 06/17/25 1701     Blood Culture No Growth After 72 Hours    Blood culture x two cultures. Draw prior to antibiotics. [[id number]]  (Normal) Collected: 06/13/25 1430    Order Status: Completed Specimen: Blood from Peripheral, Forearm, Left Updated: 06/17/25 1701     Blood Culture No Growth After 72 Hours

## 2025-06-18 NOTE — PLAN OF CARE
CM attempted to meet with patient at bedside. Patient off floor for MRI. CM called and spoke with daughter, Zoie, regarding recommendation for SNF upon discharge. Agency list emailed per request and blanket referrals sent. CM to f/u.

## 2025-06-18 NOTE — SUBJECTIVE & OBJECTIVE
Interval History: Pt lying in bed mumbling to self and requesting to be left alone when spoken to. Daughter at bedside, recognized by patient and request for her to stay. Patient oriented to self, otherwise disoriented. Discussed POC w/ daughter and re-attempt for MRI today. K repletion IV as intake and med compliance inconsistent. PT/OT eval pending Dr. Minor approval for weight-bearing. Daughter reports understanding and denies any questions/concerns at this time.    Review of Systems  Objective:     Vital Signs (Most Recent):  Temp: 97.5 °F (36.4 °C) (06/18/25 0737)  Pulse: 84 (06/18/25 1003)  Resp: 18 (06/18/25 0737)  BP: (!) 153/73 (06/18/25 0946)  SpO2: 97 % (06/18/25 0737) Vital Signs (24h Range):  Temp:  [97.5 °F (36.4 °C)-98.8 °F (37.1 °C)] 97.5 °F (36.4 °C)  Pulse:  [] 84  Resp:  [16-18] 18  SpO2:  [87 %-97 %] 97 %  BP: ()/(50-73) 153/73     Weight: 66.5 kg (146 lb 9.7 oz)  Body mass index is 27.7 kg/m².    Intake/Output Summary (Last 24 hours) at 6/18/2025 1051  Last data filed at 6/17/2025 1806  Gross per 24 hour   Intake 88.13 ml   Output --   Net 88.13 ml         Physical Exam          Significant Labs: All pertinent labs within the past 24 hours have been reviewed.  Recent Lab Results         06/18/25  0513   06/18/25  0512        Albumin 2.7         ALP 48         ALT 9         Anion Gap 12         AST 12         Baso #   0.06       Basophil %   0.5       BILIRUBIN TOTAL 0.5  Comment: For infants and newborns, interpretation of results should be based   on gestational age, weight and in agreement with clinical   observations.    Premature Infant recommended reference ranges:   0-24 hours:  <8.0 mg/dL   24-48 hours: <12.0 mg/dL   3-5 days:    <15.0 mg/dL   6-29 days:   <15.0 mg/dL         BUN 38         Calcium 8.6         Chloride 105         CO2 20         Creatinine 1.5         eGFR 36  Comment: Estimated GFR calculated using the CKD-EPI creatinine (2021) equation.         Eos #    0.39       Eos %   3.5       Glucose 93         Gran # (ANC)   7.64       Hematocrit   25.5       Hemoglobin   8.2       Immature Grans (Abs)   0.08  Comment: Mild elevation in immature granulocytes is non specific and can be seen in a variety of conditions including stress response, acute inflammation, trauma and pregnancy. Correlation with other laboratory and clinical findings is essential.       Immature Granulocytes   0.7       Lymph #   1.95       Lymph %   17.3       Magnesium  1.6         MCH   22.7       MCHC   32.2       MCV   70       Mono #   1.12       Mono %   10.0       MPV   9.7       Neut %   68.0       nRBC   0       Platelet Count   284       Potassium 3.4         PROTEIN TOTAL 6.4         RBC   3.62       RDW   15.8       Sodium 137         WBC   11.24               Significant Imaging: I have reviewed all pertinent imaging results/findings within the past 24 hours.

## 2025-06-18 NOTE — ASSESSMENT & PLAN NOTE
Anemia: Most recent hemoglobin and hematocrit are listed below.  Recent Labs     06/16/25  0747 06/17/25  0537 06/18/25  0512   HGB 9.8* 8.5* 8.2*   HCT 32.0* 27.1* 25.5*     Plan  - Monitor serial CBC: Daily  - Transfuse PRBC if patient becomes hemodynamically unstable, symptomatic or H/H drops below 7/21.  - Patient has not received any PRBC transfusions to date  - Patient's anemia is currently stable  - Trend

## 2025-06-18 NOTE — PROGRESS NOTES
[facility]  Progress Note    Patient Name: Tea Ring  MRN: [id number]  Patient Class: IP- Inpatient   Admission Date: 6/13/2025  Length of Stay: 4 days  Attending Physician: Kylie Payne MD  Primary Care Provider: Marcello Zambrano MD    Subjective     Principal Problem: UTI (urinary tract infection)    HPI:  Tea Ring is a 79 y.o. female with a PMH  has a past medical history of Alpha thalassemia, Asthma, Cataract, Chronic anxiety, Chronic knee pain, Chronic pain of left ankle, Clotting disorder, Cutaneous lupus erythematosus, Dementia without behavioral disturbance, Depression, Depression, Ex-smoker, Hypertension, and Osteopenia (1/16 reck1/18).  Presents as a transfer from our [facility] facility for further treatment of generalized weakness fungal recent UTI and ortho consult for pubic rami fracture. Patient reported that she has been having difficulty urinating for over 2 weeks. She has had to use the bathroom at least 6 times daily. Patient reports that she will get the urge to urinate and then will itch down there. She was started on Macrobid on Damaris 10, 2025 by her primary care physician. Patient reports that she has been feeling lightheaded. She denies any specific chest pain, chest pressure, nausea, vomiting, diarrhea, dysuria. Patient reports that she had walked into the gas bedroom might have passed out. Patient is complaining right hip pain, neck pain, and generalized weakness.  Denies any injury or trauma which may have caused her symptoms.  Denies any alleviating or aggravating factors.  Denies any other symptoms at this time.    ER workup revealed CBC with leukocytosis of 17.37.  CMP with a BUN/creatinine of 23/1.4 with EGFR of 39 (previously 20/1.0 with EGFR 58 on 05/20/2025).CBG of 126 mg/dL.  Troponin negative.  Procalcitonin level negative.  Initial lactic acid 2.5 with repeat of 3.4 followed by 1.0.  Previous UA revealed acute cystitis, with repeat UA  showing improvement.  Blood cultures obtained x2.  CT cervical spine revealed age indeterminate fracture of the medial aspect of left clavicle and degenerative changes of cervical spine.  CT head negative for acute intracranial abnormality/findings.  Chest x-ray negative for acute cardiopulmonary findings.  Plain film imaging of the hip/pelvis revealed fracture of the superior and inferior pubic rami.  CT renal stone study of abdomen and pelvis revealed age indeterminate acute or subacute appearing fracture involving right superior and inferior pubic rami.  CT of the right knee pending results.  On-call orthopedic surgeon consulted.  Recommended hospital Medicine to admit and will see us consult.  Vital signs stable.  Patient received 1 g Rocephin, 2 L lactated Ringer's, and a total of 4 mg of morphine at outside facility.  Patient admitted under observation status.    RECONCILE HOME MEDICATIONS WITH FAMILY DURING DAY.  PATIENT UNABLE TO REPORT WHICH MEDICATION SHE TAKES.    PCP: Marcello Zambrano    Overview/Hospital Course:  78 y/o female admitted for UTI and acute encephalopathy. Patient started IV rocephin. Ortho has been consulted for pubic rami fracture.   As of 6/15 patient with worsening confusion. WBCs trending up. CXR showed worsening vascular congestion vs infectious process. IVFs stopped and IV lasix started.  Will add azithromycin to current IV abx therapy. Case discussed with Dr. Lo.   As of 6/16/2025, T 99.9 highest overnight, VSS otherwise. Ortho pending: Pubic rami fx = pain management. Diff following commands/swallowing pills--spits them out. Confusion slightly improved today--no obvious hallucinations. Bl Cx no growth after 36 hours. Overnight, taking PO meds. C/o pain not improved w/ current meds.  As of 6/17/2025, VSS. Cr bumped 1.1>1.6. Renal US: no acute abn. Hold Lasix, Valsartan, and HCTZ. Cover BP w/ IV Metoprolol and prn Hydralazine. K repletion 10 IV. BMP at 1500. Mentation not at  baseline per daughter at bedside. Bouts of anger and uncooperative intermittently. Pain med adjusted. MRI Brain pending. In afternoon, mentation worsened and unable to obtain MRI or 1500 BMP redraw.  As of 6/18/2025, intermittent cooperation w/ continuous confusion, not at baseline, and bouts of anger. Hold Lasix, Valsartan, and HCTZ 2/2 kidney fx. K repletion. Re-attempt MRI today. 1500 BMP.    Interval History: Pt lying in bed mumbling to self and requesting to be left alone when spoken to. Daughter at bedside, recognized by patient and request for her to stay. Patient oriented to self, otherwise disoriented. Discussed POC w/ daughter and re-attempt for MRI today. K repletion IV as intake and med compliance inconsistent. PT/OT eval pending Dr. Minor approval for weight-bearing. Daughter reports understanding and denies any questions/concerns at this time.    Review of Systems  Objective:     Vital Signs (Most Recent):  Temp: 97.5 °F (36.4 °C) (06/18/25 0737)  Pulse: 84 (06/18/25 1003)  Resp: 18 (06/18/25 0737)  BP: (!) 153/73 (06/18/25 0946)  SpO2: 97 % (06/18/25 0737) Vital Signs (24h Range):  Temp:  [97.5 °F (36.4 °C)-98.8 °F (37.1 °C)] 97.5 °F (36.4 °C)  Pulse:  [] 84  Resp:  [16-18] 18  SpO2:  [87 %-97 %] 97 %  BP: ()/(50-73) 153/73     Weight: 66.5 kg (146 lb 9.7 oz)  Body mass index is 27.7 kg/m².    Intake/Output Summary (Last 24 hours) at 6/18/2025 1051  Last data filed at 6/17/2025 1806  Gross per 24 hour   Intake 88.13 ml   Output --   Net 88.13 ml         Physical Exam          Significant Labs: All pertinent labs within the past 24 hours have been reviewed.  Recent Lab Results         06/18/25  0513   06/18/25  0512        Albumin 2.7         ALP 48         ALT 9         Anion Gap 12         AST 12         Baso #   0.06       Basophil %   0.5       BILIRUBIN TOTAL 0.5  Comment: For infants and newborns, interpretation of results should be based   on gestational age, weight and in  agreement with clinical   observations.    Premature Infant recommended reference ranges:   0-24 hours:  <8.0 mg/dL   24-48 hours: <12.0 mg/dL   3-5 days:    <15.0 mg/dL   6-29 days:   <15.0 mg/dL         BUN 38         Calcium 8.6         Chloride 105         CO2 20         Creatinine 1.5         eGFR 36  Comment: Estimated GFR calculated using the CKD-EPI creatinine (2021) equation.         Eos #   0.39       Eos %   3.5       Glucose 93         Gran # (ANC)   7.64       Hematocrit   25.5       Hemoglobin   8.2       Immature Grans (Abs)   0.08  Comment: Mild elevation in immature granulocytes is non specific and can be seen in a variety of conditions including stress response, acute inflammation, trauma and pregnancy. Correlation with other laboratory and clinical findings is essential.       Immature Granulocytes   0.7       Lymph #   1.95       Lymph %   17.3       Magnesium  1.6         MCH   22.7       MCHC   32.2       MCV   70       Mono #   1.12       Mono %   10.0       MPV   9.7       Neut %   68.0       nRBC   0       Platelet Count   284       Potassium 3.4         PROTEIN TOTAL 6.4         RBC   3.62       RDW   15.8       Sodium 137         WBC   11.24               Significant Imaging: I have reviewed all pertinent imaging results/findings within the past 24 hours.      Assessment & Plan  UTI (urinary tract infection)  Presents with complaints of dysuria and confusion with AH/VH. Urinalysis revealed:   Lab Results   Component Value Date    COLORU Yellow 06/15/2025    APPEARANCEUA Clear 06/15/2025    SPECGRAV 1.015 06/15/2025    PHUR 7.0 06/15/2025    PROTEINUA Negative 06/15/2025    GLUCUA Negative 06/15/2025    KETONESU Negative 03/15/2025    NITRITE Negative 06/15/2025    UROBILINOGEN Negative 06/15/2025    BILIRUBINUA Negative 06/15/2025    LEUKOCYTESUR Negative 06/15/2025    RBCUA 2 06/10/2025    WBCUA 8 (H) 06/10/2025    BACTERIA Moderate (A) 06/10/2025    HYALINECASTS 6 (A) 03/06/2025    Received 1 g Rocephin at outside facility  Plan:  - UA as above  - Did NOT reflex to culture -- confirmed with Micro Lab 6/16/2025  - Azithromycin PO x 5D  - Rocephin IV  - Mentation worsened  - Re-attempt MRI Brain today    Essential hypertension  Chronic, uncontrolled.  Latest blood pressure and vitals reviewed-   Temp:  [97.5 °F (36.4 °C)-98.8 °F (37.1 °C)]   Pulse:  []   Resp:  [16-18]   BP: ()/(50-73)   SpO2:  [87 %-97 %] .   Home meds for hypertension were reviewed and noted below.   Hypertension Medications              valsartan-hydrochlorothiazide (DIOVAN-HCT) 160-12.5 mg per tablet Take 1 tablet by mouth once daily.     While in the hospital, will manage blood pressure as follows; Continue home antihypertensive regimen once verified with family in a.m.; we will treat with p.r.n. hydralazine    Will utilize p.r.n. blood pressure medication only if patient's blood pressure greater than  170/90 and she develops symptoms such as worsening chest pain or shortness of breath.    - IV metoprolol 5 mg Q6 hours until able to take oral.   - prn Hydralazine  - 6/15: Patient spitting out medication  - 6/16: attempted to take pills--spit some out after administration  - 6/16 HS: taking oral meds, held IV Metoprolol 2/2 BP soft  - 6/17: Hold IV Lasix, Valsartan, and HCTZ 2/2 Cr bump; control BP w/ IV Metoprolol and prn Hydralazine  - 6/18: Hold IV Lasix, Valsartan, and HCTZ; control BP w/ IV Metoprolol and prn Hydralazine  Moderate major depression  Chronic. Stable. Not in acute exacerbation and currently denies endorsing any suicidal/homicidal ideations. Active auditory and visual hallucinations. Unsure if this is baseline due to patient's underlying dementia versus UTI symptoms.  Plan:  - Continue home medications   - MRI Brain pending    Primary open angle glaucoma (POAG) of both eyes, severe stage  - continue eye drops   Neck pain  CT imaging cervical spine revealed:  1. There is grade 1 anterolisthesis  of C6 on C7. There is grade 1 anterolisthesis of C7 on T1.  2. There are mild degenerative changes between C2 and T1.    Plan:   - analgesics p.r.n.  - Change Norco to Percocet  - prn IV Morphine    Closed fracture of multiple pubic rami, right, initial encounter  CT imaging revealed:  - Age-indeterminate acute or subacute appearing fracture involving the right superior and inferior pubic ramus images 139 through 156. Nonemergent pelvic MRI can be obtained as clinically warranted.  - analgesics p.r.n.   - Orthopedic following: pain management  - Change Norco to Percocet  - XR R Humerus: no acute fracture or dislocation  - XR R Knee: no acute findings  - Dr. Minor messaged to see if weight-bearing OK  - PT/OT eval pending Dr. Minor approval    Acute diastolic heart failure  - Echo: EF 60-65%, diastolic dysfunction  - BNP 91  - Hold IV lasix 40 mg daily today 6/17 2/2 Cr   - IV metoprolol   - Monitor strict I&Os and daily weights.    - Low sodium diet  - Place on fluid restriction of 1.5 L.     Results for orders placed during the hospital encounter of 10/31/23    Echo    Interpretation Summary    Left Ventricle: The left ventricle is normal in size. Normal wall thickness. There is concentric remodeling. Normal wall motion. There is normal systolic function with a visually estimated ejection fraction of 60 - 65%. There is normal diastolic function.    Right Ventricle: Normal right ventricular cavity size. Wall thickness is normal. Right ventricle wall motion  is normal. Systolic function is normal.    Pulmonary Artery: The estimated pulmonary artery systolic pressure is 18 mmHg.    IVC/SVC: Normal venous pressure at 3 mmHg.     PNA (pneumonia)  Patient has a diagnosis of pneumonia. The cause of the pneumonia is unknown at this time. The patient has the following signs/symptoms of pneumonia: Elevated WBCs, low grade fever . The patient does not have a current oxygen requirement and the patient does not have a  home oxygen requirement. I have reviewed the pertinent imaging. The following cultures have been collected: Blood cultures The culture results are listed below.     Current antimicrobial regimen consists of the antibiotics listed below. Will monitor patient closely and continue current treatment plan unchanged.    Antibiotics (From admission, onward)      Start     Stop Route Frequency Ordered    06/17/25 0930  azithromycin tablet 500 mg         06/22/25 0859 Oral Daily 06/17/25 0919    06/14/25 1600  cefTRIAXone injection 1 g         -- IV Every 24 hours (non-standard times) 06/14/25 0950            Microbiology Results (last 7 days)       Procedure Component Value Units Date/Time    Blood culture x two cultures. Draw prior to antibiotics. [[id number]]  (Normal) Collected: 06/13/25 1436    Order Status: Completed Specimen: Blood from Peripheral, Antecubital, Right Updated: 06/17/25 1701     Blood Culture No Growth After 72 Hours    Blood culture x two cultures. Draw prior to antibiotics. [[id number]]  (Normal) Collected: 06/13/25 1430    Order Status: Completed Specimen: Blood from Peripheral, Forearm, Left Updated: 06/17/25 1701     Blood Culture No Growth After 72 Hours          Acute encephalopathy  History of dementia  Hallucinations  Dementia is stable currently. Continue home dementia meds and non-pharmacologic interventions to prevent delirium (No VS between 11PM-5AM, activity during day, opening blinds, providing glasses/hearing aids, and up in chair during daytime). Use PRN anti-psychotics to prevent behavior of self harm during sundowning, and avoid narcotics and benzos unless absolutely necessary. PRN anti-psychotics prescribed to avoid self harm behaviors.    6/15/2025: Witnessed patient having auditory and visual hallucinations during examination.    - Consider UTI/PNA vs Dementia vs Hosp induced psychosis  - Treat underlying cause  - Daughter reports not pt baseline  - Dementia Precautions  - Cont IV  Abx  - prn Ativan  - Seroquel home med nightly  - re-attempt MRI Brain today  - Consider TelePsych pending results  Anemia  Anemia: Most recent hemoglobin and hematocrit are listed below.  Recent Labs     06/16/25  0747 06/17/25  0537 06/18/25  0512   HGB 9.8* 8.5* 8.2*   HCT 32.0* 27.1* 25.5*     Plan  - Monitor serial CBC: Daily  - Transfuse PRBC if patient becomes hemodynamically unstable, symptomatic or H/H drops below 7/21.  - Patient has not received any PRBC transfusions to date  - Patient's anemia is currently stable  - Trend  Hyponatremia  Hyponatremia is likely due to Dehydration/hypovolemia. The patient's most recent sodium results are listed below.  Recent Labs     06/16/25  0747 06/17/25  0537 06/18/25  0513    139 137     Plan  - Correct the sodium by 4-6mEq in 24 hours.   - Monitor sodium Daily.   - Patient hyponatremia is stable  - Trend    VTE Risk Mitigation (From admission, onward)           Ordered     enoxaparin injection 30 mg  Daily         06/17/25 0920     IP VTE HIGH RISK PATIENT  Once         06/13/25 2255     Place sequential compression device  Until discontinued         06/13/25 2255                    Discharge Planning   YIMI: 6/18/2025     Code Status: Full Code   Medical Readiness for Discharge Date:   Discharge Plan A: Home        Kristin Philip NP-C  Department of Hospital Medicine   [facility] - Telemetry ([facility])

## 2025-06-18 NOTE — ASSESSMENT & PLAN NOTE
Chronic. Stable. Not in acute exacerbation and currently denies endorsing any suicidal/homicidal ideations. Active auditory and visual hallucinations. Unsure if this is baseline due to patient's underlying dementia versus UTI symptoms.  Plan:  - Continue home medications   - MRI Brain pending

## 2025-06-18 NOTE — PT/OT/SLP EVAL
Physical Therapy Evaluation    Patient Name:  Tea Ring   MRN:  [id number]    Recommendations:     Discharge Recommendations: Moderate Intensity Therapy   Discharge Equipment Recommendations: none   Barriers to discharge: Decreased caregiver support    Assessment:     Tea Ring is a 79 y.o. female admitted with a medical diagnosis of UTI (urinary tract infection).  She presents with the following impairments/functional limitations: weakness, pain, impaired endurance, visual deficits, impaired balance, impaired self care skills, impaired functional mobility, gait instability, decreased lower extremity function, decreased upper extremity function, impaired coordination, decreased ROM, orthopedic precautions, decreased safety awareness, impaired muscle length .    Rehab Prognosis: Good; patient would benefit from acute skilled PT services to address these deficits and reach maximum level of function.    Recent Surgery: * No surgery found *      Plan:     During this hospitalization, patient to be seen 3 x/week to address the identified rehab impairments via gait training, therapeutic activities, therapeutic exercises and progress toward the following goals:    Plan of Care Expires:  07/02/25    Subjective     Chief Complaint: PAIN   Patient/Family Comments/goals: GO HOME   Pain/Comfort:  Pain Rating 1: 5/10  Location - Side 1: Left  Location 1: back  Pain Addressed 1: Reposition  Pain Rating Post-Intervention 1: 5/10    Patients cultural, spiritual, Sikh conflicts given the current situation:      Living Environment:   PT LIVES AT HOME ALONE IN A ONE STORY HOME WITH THRESHOLD TO ENTER   Prior to admission, patients level of function was MOD I WITH RW AND DOESN'T DRIVE .  Equipment used at home: bedside commode, walker, rolling, wheelchair, shower chair.  DME owned (not currently used): ROLLATOR.  Upon discharge, patient will have assistance from NONE .    Objective:     Communicated with NURSE AND EPIC CHART  REVIEW  prior to session.  Patient found supine with telemetry, Other (comments) (MIDLINE)  upon PT entry to room.    General Precautions: Standard, fall  Orthopedic Precautions:LLE weight bearing as tolerated, RLE weight bearing as tolerated   Braces: N/A  Respiratory Status: Room air    Exams:  RLE ROM: IMPAIRED  RLE Strength: NA D/T PAIN   LLE ROM: IMPAIRED  LLE Strength: NA D/T PAIN     Functional Mobility:  Bed Mobility:     Rolling Right: moderate assistance  Scooting: moderate assistance  Supine to Sit: moderate assistance  Transfers:     Sit to Stand:  moderate assistance with rolling walker      AM-PAC 6 CLICK MOBILITY  Total Score:10       Treatment & Education:  GT. BELT AND  SOCKS DONNED PRIOR TO OOB MOBILITY.   PT STOOD WITH MOD A AND SEVERE POST LEAN. PT ABLE TO TAKE ON STEP BACKWARDS AND SIT EOB.   PT UNABLE TO GT TRAIN AT THIS TIME D/T INC PAIN. PT LEFT SEATED EOB WITH LUNCH FOR SITTING TOLERANCE.    PT EDUCATED ON ROLE OF P.T. AND REC FOR CONT P.T. AT D/C     Patient left sitting edge of bed with all lines intact, call button in reach, and NURSE notified.    GOALS:   Multidisciplinary Problems       Physical Therapy Goals          Problem: Physical Therapy    Goal Priority Disciplines Outcome Interventions   Physical Therapy Goal     PT, PT/OT     Description: Lt25  1. PT WILL COMPLETE BED MOBILITY WITH CGA  2. PT WILL STAND T/F TO CHAIR WITH RW AND MOD A FOR OOB TOLERANCE  3. PT WILL GT TRAIN X 50' WITH RW AND MOD A TO PROGRESS GT  4. PT WILL INC AMPAC SCORE BY 2 POINTS TO PROGRESS GROSS FUNC MOBILITY.                          DME Justifications:  No DME recommended requiring DME justifications    History:     Past Medical History:   Diagnosis Date    Alpha thalassemia     Asthma     resolved per patient    Cataract     Chronic anxiety     years tid xanax 1mg    Chronic knee pain     Chronic pain of left ankle     Clotting disorder     Cutaneous lupus erythematosus     dr henry derm     Dementia without behavioral disturbance     Depression     Depression     dr radha hughes previously    Ex-smoker     quit fall 1    Hypertension     Osteopenia 1/16 reck1/18       Past Surgical History:   Procedure Laterality Date    ANKLE SURGERY Left     BACK SURGERY      CATARACT EXTRACTION Right     COLONOSCOPY N/A 8/2/2017    Procedure: COLONOSCOPY;  Surgeon: Virgil Oliva MD;  Location: [facility] ENDO;  Service: Endoscopy;  Laterality: N/A;    HYSTERECTOMY      INJECTION OF ANESTHETIC AGENT AROUND MEDIAL BRANCH NERVES INNERVATING LUMBAR FACET JOINT Bilateral 6/17/2020    Procedure: Bilateral L3-5 MBB;  Surgeon: Yury Moore MD;  Location: [facility] PAIN MGT;  Service: Pain Management;  Laterality: Bilateral;    INJECTION OF ANESTHETIC AGENT AROUND NERVE Bilateral 5/19/2020    Procedure: Bilateral Genicular nerve block with RN IV sedation Covid testing day of procedure PT does not drive;  Surgeon: Yury Moore MD;  Location: [facility] PAIN MGT;  Service: Pain Management;  Laterality: Bilateral;    KNEE ARTHROSCOPY      both knees twice    OOPHORECTOMY      RADIOFREQUENCY THERMOCOAGULATION Right 7/14/2020    Procedure: Right L3-5 Lumbar RFA;  Surgeon: Yury Moore MD;  Location: [facility] PAIN MGT;  Service: Pain Management;  Laterality: Right;    RADIOFREQUENCY THERMOCOAGULATION Left 8/6/2020    Procedure: Left L3-5 Lumbar RFA;  Surgeon: Yury Moore MD;  Location: [facility] PAIN MGT;  Service: Pain Management;  Laterality: Left;       Time Tracking:     PT Received On: 06/18/25  PT Start Time: 1315     PT Stop Time: 1340  PT Total Time (min): 25 min     Billable Minutes: Evaluation 15 and Therapeutic Activity 10      06/18/2025

## 2025-06-18 NOTE — ASSESSMENT & PLAN NOTE
CT imaging revealed:  - Age-indeterminate acute or subacute appearing fracture involving the right superior and inferior pubic ramus images 139 through 156. Nonemergent pelvic MRI can be obtained as clinically warranted.  - analgesics p.r.n.   - Orthopedic following: pain management  - Change Norco to Percocet  - XR R Humerus: no acute fracture or dislocation  - XR R Knee: no acute findings  - Dr. Minor messaged to see if weight-bearing OK  - PT/OT eval pending Dr. Minor approval

## 2025-06-18 NOTE — PLAN OF CARE
Problem: Infection  Goal: Absence of Infection Signs and Symptoms  Outcome: Progressing     Problem: Adult Inpatient Plan of Care  Goal: Plan of Care Review  Outcome: Progressing  Goal: Patient-Specific Goal (Individualized)  Outcome: Progressing  Goal: Absence of Hospital-Acquired Illness or Injury  Outcome: Progressing  Goal: Optimal Comfort and Wellbeing  Outcome: Progressing  Goal: Readiness for Transition of Care  Outcome: Progressing     Problem: Fall Injury Risk  Goal: Absence of Fall and Fall-Related Injury  Outcome: Progressing     Problem: Skin Injury Risk Increased  Goal: Skin Health and Integrity  Outcome: Progressing     Problem: Pneumonia  Goal: Fluid Balance  Outcome: Progressing  Goal: Resolution of Infection Signs and Symptoms  Outcome: Progressing  Goal: Effective Oxygenation and Ventilation  Outcome: Progressing     Problem: Diarrhea  Goal: Effective Diarrhea Management  Outcome: Progressing     Problem: Delirium  Goal: Optimal Coping  Outcome: Progressing  Goal: Improved Behavioral Control  Outcome: Progressing  Goal: Improved Attention and Thought Clarity  Outcome: Progressing  Goal: Improved Sleep  Outcome: Progressing     Problem: Confusion Acute  Goal: Optimal Cognitive Function  Outcome: Progressing

## 2025-06-18 NOTE — ASSESSMENT & PLAN NOTE
Hyponatremia is likely due to Dehydration/hypovolemia. The patient's most recent sodium results are listed below.  Recent Labs     06/16/25  0747 06/17/25  0537 06/18/25  0513    139 137     Plan  - Correct the sodium by 4-6mEq in 24 hours.   - Monitor sodium Daily.   - Patient hyponatremia is stable  - Trend

## 2025-06-19 PROBLEM — G93.41 ACUTE METABOLIC ENCEPHALOPATHY: Status: ACTIVE | Noted: 2025-06-15

## 2025-06-19 LAB
ABSOLUTE EOSINOPHIL (OHS): 0.24 K/UL
ABSOLUTE MONOCYTE (OHS): 1.16 K/UL (ref 0.3–1)
ABSOLUTE NEUTROPHIL COUNT (OHS): 8.28 K/UL (ref 1.8–7.7)
ALBUMIN SERPL BCP-MCNC: 2.8 G/DL (ref 3.5–5.2)
ALP SERPL-CCNC: 52 UNIT/L (ref 40–150)
ALT SERPL W/O P-5'-P-CCNC: 7 UNIT/L (ref 10–44)
ANION GAP (OHS): 11 MMOL/L (ref 8–16)
AST SERPL-CCNC: 12 UNIT/L (ref 11–45)
BACTERIA BLD CULT: NORMAL
BACTERIA BLD CULT: NORMAL
BASOPHILS # BLD AUTO: 0.06 K/UL
BASOPHILS NFR BLD AUTO: 0.5 %
BILIRUB SERPL-MCNC: 0.5 MG/DL (ref 0.1–1)
BUN SERPL-MCNC: 38 MG/DL (ref 8–23)
CALCIUM SERPL-MCNC: 9.2 MG/DL (ref 8.7–10.5)
CHLORIDE SERPL-SCNC: 107 MMOL/L (ref 95–110)
CO2 SERPL-SCNC: 19 MMOL/L (ref 23–29)
CREAT SERPL-MCNC: 1.2 MG/DL (ref 0.5–1.4)
ERYTHROCYTE [DISTWIDTH] IN BLOOD BY AUTOMATED COUNT: 16 % (ref 11.5–14.5)
GFR SERPLBLD CREATININE-BSD FMLA CKD-EPI: 46 ML/MIN/1.73/M2
GLUCOSE SERPL-MCNC: 102 MG/DL (ref 70–110)
HCT VFR BLD AUTO: 28.9 % (ref 37–48.5)
HGB BLD-MCNC: 9.1 GM/DL (ref 12–16)
IMM GRANULOCYTES # BLD AUTO: 0.07 K/UL (ref 0–0.04)
IMM GRANULOCYTES NFR BLD AUTO: 0.6 % (ref 0–0.5)
LYMPHOCYTES # BLD AUTO: 2.3 K/UL (ref 1–4.8)
MCH RBC QN AUTO: 22.7 PG (ref 27–31)
MCHC RBC AUTO-ENTMCNC: 31.5 G/DL (ref 32–36)
MCV RBC AUTO: 72 FL (ref 82–98)
NUCLEATED RBC (/100WBC) (OHS): 0 /100 WBC
PLATELET # BLD AUTO: 302 K/UL (ref 150–450)
PMV BLD AUTO: 10.4 FL (ref 9.2–12.9)
POTASSIUM SERPL-SCNC: 3.9 MMOL/L (ref 3.5–5.1)
PROT SERPL-MCNC: 6.9 GM/DL (ref 6–8.4)
RBC # BLD AUTO: 4.01 M/UL (ref 4–5.4)
RELATIVE EOSINOPHIL (OHS): 2 %
RELATIVE LYMPHOCYTE (OHS): 19 % (ref 18–48)
RELATIVE MONOCYTE (OHS): 9.6 % (ref 4–15)
RELATIVE NEUTROPHIL (OHS): 68.3 % (ref 38–73)
SODIUM SERPL-SCNC: 137 MMOL/L (ref 136–145)
WBC # BLD AUTO: 12.11 K/UL (ref 3.9–12.7)

## 2025-06-19 PROCEDURE — 63700000 PHARM REV CODE 250 ALT 637 W/O HCPCS: Performed by: STUDENT IN AN ORGANIZED HEALTH CARE EDUCATION/TRAINING PROGRAM

## 2025-06-19 PROCEDURE — 25000003 PHARM REV CODE 250: Performed by: NURSE PRACTITIONER

## 2025-06-19 PROCEDURE — 80053 COMPREHEN METABOLIC PANEL: CPT

## 2025-06-19 PROCEDURE — 25000003 PHARM REV CODE 250: Performed by: HOSPITALIST

## 2025-06-19 PROCEDURE — 25000003 PHARM REV CODE 250

## 2025-06-19 PROCEDURE — 85025 COMPLETE CBC W/AUTO DIFF WBC: CPT

## 2025-06-19 PROCEDURE — 21400001 HC TELEMETRY ROOM

## 2025-06-19 PROCEDURE — 51701 INSERT BLADDER CATHETER: CPT

## 2025-06-19 PROCEDURE — 97166 OT EVAL MOD COMPLEX 45 MIN: CPT

## 2025-06-19 PROCEDURE — 97530 THERAPEUTIC ACTIVITIES: CPT | Mod: CQ

## 2025-06-19 PROCEDURE — G0426 INPT/ED TELECONSULT50: HCPCS | Mod: 95,ICN,, | Performed by: PSYCHIATRY & NEUROLOGY

## 2025-06-19 PROCEDURE — 97535 SELF CARE MNGMENT TRAINING: CPT

## 2025-06-19 PROCEDURE — 63600175 PHARM REV CODE 636 W HCPCS

## 2025-06-19 PROCEDURE — 36415 COLL VENOUS BLD VENIPUNCTURE: CPT

## 2025-06-19 PROCEDURE — 63600175 PHARM REV CODE 636 W HCPCS: Performed by: NURSE PRACTITIONER

## 2025-06-19 PROCEDURE — 51798 US URINE CAPACITY MEASURE: CPT

## 2025-06-19 PROCEDURE — G0426 INPT/ED TELECONSULT50: HCPCS | Mod: 95,,, | Performed by: STUDENT IN AN ORGANIZED HEALTH CARE EDUCATION/TRAINING PROGRAM

## 2025-06-19 PROCEDURE — 63600175 PHARM REV CODE 636 W HCPCS: Performed by: EMERGENCY MEDICINE

## 2025-06-19 RX ORDER — QUETIAPINE FUMARATE 25 MG/1
50 TABLET, FILM COATED ORAL 2 TIMES DAILY
Status: DISCONTINUED | OUTPATIENT
Start: 2025-06-19 | End: 2025-06-23 | Stop reason: HOSPADM

## 2025-06-19 RX ORDER — ENOXAPARIN SODIUM 100 MG/ML
40 INJECTION SUBCUTANEOUS EVERY 24 HOURS
Status: DISCONTINUED | OUTPATIENT
Start: 2025-06-19 | End: 2025-06-22

## 2025-06-19 RX ORDER — OLANZAPINE 10 MG/2ML
5 INJECTION, POWDER, FOR SOLUTION INTRAMUSCULAR EVERY 8 HOURS PRN
Status: DISCONTINUED | OUTPATIENT
Start: 2025-06-19 | End: 2025-06-23 | Stop reason: HOSPADM

## 2025-06-19 RX ORDER — OXYCODONE AND ACETAMINOPHEN 7.5; 325 MG/1; MG/1
1 TABLET ORAL EVERY 4 HOURS PRN
Refills: 0 | Status: DISCONTINUED | OUTPATIENT
Start: 2025-06-19 | End: 2025-06-21

## 2025-06-19 RX ORDER — QUETIAPINE FUMARATE 25 MG/1
25 TABLET, FILM COATED ORAL DAILY
Status: DISCONTINUED | OUTPATIENT
Start: 2025-06-19 | End: 2025-06-19

## 2025-06-19 RX ADMIN — CITALOPRAM HYDROBROMIDE 10 MG: 10 TABLET ORAL at 09:06

## 2025-06-19 RX ADMIN — HYDROCHLOROTHIAZIDE 12.5 MG: 12.5 TABLET ORAL at 09:06

## 2025-06-19 RX ADMIN — ATORVASTATIN CALCIUM 10 MG: 10 TABLET, FILM COATED ORAL at 09:06

## 2025-06-19 RX ADMIN — VALSARTAN 160 MG: 160 TABLET, FILM COATED ORAL at 09:06

## 2025-06-19 RX ADMIN — KETOROLAC TROMETHAMINE 1 DROP: 5 SOLUTION OPHTHALMIC at 01:06

## 2025-06-19 RX ADMIN — KETOROLAC TROMETHAMINE 1 DROP: 5 SOLUTION OPHTHALMIC at 09:06

## 2025-06-19 RX ADMIN — FUROSEMIDE 40 MG: 10 INJECTION, SOLUTION INTRAVENOUS at 09:06

## 2025-06-19 RX ADMIN — QUETIAPINE FUMARATE 25 MG: 25 TABLET ORAL at 11:06

## 2025-06-19 RX ADMIN — METOROPROLOL TARTRATE 5 MG: 5 INJECTION, SOLUTION INTRAVENOUS at 09:06

## 2025-06-19 RX ADMIN — PREDNISOLONE ACETATE 1 DROP: 10 SUSPENSION/ DROPS OPHTHALMIC at 09:06

## 2025-06-19 RX ADMIN — ENOXAPARIN SODIUM 40 MG: 40 INJECTION SUBCUTANEOUS at 04:06

## 2025-06-19 RX ADMIN — AZITHROMYCIN DIHYDRATE 500 MG: 250 TABLET ORAL at 09:06

## 2025-06-19 RX ADMIN — PANTOPRAZOLE SODIUM 40 MG: 40 TABLET, DELAYED RELEASE ORAL at 09:06

## 2025-06-19 RX ADMIN — MORPHINE SULFATE 2 MG: 4 INJECTION INTRAVENOUS at 09:06

## 2025-06-19 RX ADMIN — KETOROLAC TROMETHAMINE 1 DROP: 5 SOLUTION OPHTHALMIC at 04:06

## 2025-06-19 NOTE — ASSESSMENT & PLAN NOTE
Patient has a diagnosis of pneumonia. The cause of the pneumonia is unknown at this time. The patient has the following signs/symptoms of pneumonia: Elevated WBCs, low grade fever . The patient does not have a current oxygen requirement and the patient does not have a home oxygen requirement. I have reviewed the pertinent imaging. The following cultures have been collected: Blood cultures The culture results are listed below.     Current antimicrobial regimen consists of the antibiotics listed below. Will monitor patient closely and continue current treatment plan unchanged.    Antibiotics (From admission, onward)      Start     Stop Route Frequency Ordered    06/17/25 0930  azithromycin tablet 500 mg         06/22/25 0859 Oral Daily 06/17/25 0919    06/14/25 1600  cefTRIAXone injection 1 g         -- IV Every 24 hours (non-standard times) 06/14/25 0950            Microbiology Results (last 7 days)       Procedure Component Value Units Date/Time    Blood culture x two cultures. Draw prior to antibiotics. [[id number]]  (Normal) Collected: 06/13/25 1430    Order Status: Completed Specimen: Blood from Peripheral, Forearm, Left Updated: 06/18/25 1702     Blood Culture No Growth After 96 hours    Blood culture x two cultures. Draw prior to antibiotics. [[id number]]  (Normal) Collected: 06/13/25 1436    Order Status: Completed Specimen: Blood from Peripheral, Antecubital, Right Updated: 06/18/25 1702     Blood Culture No Growth After 96 hours

## 2025-06-19 NOTE — ASSESSMENT & PLAN NOTE
- Echo: EF 60-65%, diastolic dysfunction  - BNP 91  - Monitor strict I&Os and daily weights.    - Low sodium diet  - Place on fluid restriction of 1.5 L.     Results for orders placed during the hospital encounter of 10/31/23    Echo    Interpretation Summary    Left Ventricle: The left ventricle is normal in size. Normal wall thickness. There is concentric remodeling. Normal wall motion. There is normal systolic function with a visually estimated ejection fraction of 60 - 65%. There is normal diastolic function.    Right Ventricle: Normal right ventricular cavity size. Wall thickness is normal. Right ventricle wall motion  is normal. Systolic function is normal.    Pulmonary Artery: The estimated pulmonary artery systolic pressure is 18 mmHg.    IVC/SVC: Normal venous pressure at 3 mmHg.

## 2025-06-19 NOTE — PLAN OF CARE
[facility] SNF admissions out for today, they will not be reviewing referral at this time.    [facility] clinically accepting and reaching out to family. CM to f/u with daughter.

## 2025-06-19 NOTE — PLAN OF CARE
CM met with patient/daughter at bedside per request to f/u regarding SNF. Daughter states she prefers [facility], [facility] or [facility].    Informed [facility] accepting, messages left with [facility] and [facility] to f/u regarding facility decision.    Patient remains on IV Lasix, Rocephin and metoprolol at this time. CM following.

## 2025-06-19 NOTE — SUBJECTIVE & OBJECTIVE
"HPI:  79 y.o. female with medical Hx of HTN, dementia was admitted due to pubic bone fracture as well as UTI. Pt has been noted to be restless, to have hallucinations. Pt is confused and at times with limited cooperation, difficult to redirect. No reported unilateral weakness, facial droop.     Images personally reviewed and interpreted:  Study: MRI Brain  Study Interpretation: TECHNIQUE: Multiplanar noncontrast MRI sequences of the brain.  Mildly limited by patient motion.     FINDINGS:  The corpus callosum is intact.     The ventricles are enlarged as are the extra-axial CSF spaces consistent with generalized cerebral volume loss or atrophy.     There is scattered white matter hyperintensity consistent with age-related microvascular ischemic degeneration.     Pituitary gland region is grossly normal.     No extra-axial fluid collection.     Diffusion-weighted sequence is negative.        Impression:   No acute or focal process.  Age-related atrophy with associated white matter degeneration.     Finalized on: 6/18/2025 3:49 PM By:  Mateo Bae MD  [facility]# [id number]      2025-06-18 15:[id number]     [facility]    Additional studies reviewed:   Study: Cardiac_Neuro: 2D echo  Study Interpretation: Vitals    Height Weight BMI (Calculated) BSA (Calculated - sq m) BP Pulse   5' 1" (1.549 m) 68.9 kg (152 lb) 28.7 1.72 sq meters 177/84 86     Study Details A complete echo was performed using complete 2D, color flow Doppler and spectral Doppler. During the study, the apical, parasternal, subcostal and suprasternal views were captured. There was No saline (bubble) used.  Overall the study quality was technically difficult. The study was difficult due to patient's poor endocardial visualization and poor acoustic windows. There was a prior echocardiogram study performed performed.This was a portable study performed at the patient's bedside.     Echocardiography Findings    Left Ventricle The left ventricle is normal in size. " Mildly increased wall thickness. There is mild concentric hypertrophy. Normal wall motion. There is normal systolic function with a visually estimated ejection fraction of 60 - 65%. Grade I diastolic dysfunction.   Right Ventricle The right ventricle is normal in size Wall thickness is normal. Systolic function is normal.   Left Atrium The left atrium is normal in size   Right Atrium The right atrium is normal in size.   Aortic Valve The aortic valve is structurally normal. There is normal leaflet mobility. Aortic valve peak velocity is 1.4 m/s. Mean gradient is 5 mmHg.   Mitral Valve The mitral valve is structurally normal. There is normal leaflet mobility. There is trace regurgitation.   Tricuspid Valve The tricuspid valve is structurally normal. There is normal leaflet mobility. There is mild regurgitation.   Pulmonic Valve Not well visualized due to poor acoustic window. The pulmonic valve is structurally normal. There is normal leaflet mobility.   IVC/SVC Normal venous pressure at 3 mmHg.   Ascending Aorta The aortic root is normal in size. The ascending aorta is normal in size measuring 2.4 cm.   Pericardium and Other Findings There is no pericardial effusion.   Pulmonary Artery The estimated pulmonary artery systolic pressure is 24 mmHg.       Laboratory studies reviewed:  BMP:   Lab Results   Component Value Date     06/19/2025     03/15/2025    K 3.9 06/19/2025    K 4.0 03/15/2025     06/19/2025     03/15/2025    CO2 19 (L) 06/19/2025    CO2 23 03/15/2025    BUN 38 (H) 06/19/2025    CREATININE 1.2 06/19/2025    CALCIUM 9.2 06/19/2025    CALCIUM 9.3 03/15/2025     CBC:   Lab Results   Component Value Date    WBC 12.11 06/19/2025    RBC 4.01 06/19/2025    RBC 5.59 (H) 03/15/2025    HGB 9.1 (L) 06/19/2025    HGB 12.0 03/15/2025    HCT 28.9 (L) 06/19/2025    HCT 39.7 03/15/2025     06/19/2025     03/15/2025    MCV 72 (L) 06/19/2025    MCV 71 (L) 03/15/2025    MCH 22.7 (L)  06/19/2025    MCHC 31.5 (L) 06/19/2025    MCHC 30.2 (L) 03/15/2025     Lipid Panel:   Lab Results   Component Value Date    CHOL 112 (L) 10/24/2024    LDLCALC 53.2 (L) 10/24/2024    HDL 49 10/24/2024    TRIG 49 10/24/2024     Coagulation:   Lab Results   Component Value Date    INR 1.2 12/21/2023    INR 1.1 10/31/2023    APTT 26.3 10/31/2023     Hgb A1C:   Lab Results   Component Value Date    HGBA1C 5.5 10/24/2024     TSH:   Lab Results   Component Value Date    TSH 0.830 03/15/2025       Documentation personally reviewed:  Notes: Notes: ED notes and H&P     Assessment and plan:    80 y/o female consulted for AMS. Pt seems to be in a state of delirium. Likely from UTI and exacerbated by hospital stay (unfamiliar environment). Sleep deprivation may worsen her symptoms    MRI brain showed no acute stroke or ICH.    -Quetiapine 25 mg nightly. Nightly dose can be increased to 50 mg.  -Watch for QT prolongation.   -Would avoid benzodiazepines, opiates as may worsen delirium.  -See psych recs.    Post charge discharge plan:  Clinic follow up: Other: TBD    Visit Type: N/A  Timeframe: N/A       patient is moderate risk for suicide or harm to self, patient is also elopement risk given that she really does not want to be in the hospital at this time patient is moderate risk for suicide or harm to self, patient is also elopement risk given that she really does not want to be in the hospital at this time. Please ensure no sharp objects or cords are around her given her recent impulsive behavior.

## 2025-06-19 NOTE — PLAN OF CARE
Problem: Infection  Goal: Absence of Infection Signs and Symptoms  Outcome: Progressing     Problem: Adult Inpatient Plan of Care  Goal: Plan of Care Review  Outcome: Progressing  Goal: Patient-Specific Goal (Individualized)  Outcome: Progressing  Goal: Absence of Hospital-Acquired Illness or Injury  Outcome: Progressing  Goal: Optimal Comfort and Wellbeing  Outcome: Progressing  Goal: Readiness for Transition of Care  Outcome: Progressing     Problem: Fall Injury Risk  Goal: Absence of Fall and Fall-Related Injury  Outcome: Progressing     Problem: Skin Injury Risk Increased  Goal: Skin Health and Integrity  Outcome: Progressing     Problem: Pneumonia  Goal: Fluid Balance  Outcome: Progressing  Goal: Resolution of Infection Signs and Symptoms  Outcome: Progressing  Goal: Effective Oxygenation and Ventilation  Outcome: Progressing     Problem: Diarrhea  Goal: Effective Diarrhea Management  Outcome: Progressing     Problem: Delirium  Goal: Optimal Coping  Outcome: Progressing  Goal: Improved Behavioral Control  Outcome: Progressing  Goal: Improved Attention and Thought Clarity  Outcome: Progressing  Goal: Improved Sleep  Outcome: Progressing     Problem: Confusion Acute  Goal: Optimal Cognitive Function  Outcome: Progressing     Problem: Infection  Goal: Absence of Infection Signs and Symptoms  Outcome: Progressing     Problem: Adult Inpatient Plan of Care  Goal: Plan of Care Review  Outcome: Progressing  Goal: Patient-Specific Goal (Individualized)  Outcome: Progressing  Goal: Absence of Hospital-Acquired Illness or Injury  Outcome: Progressing  Goal: Optimal Comfort and Wellbeing  Outcome: Progressing  Goal: Readiness for Transition of Care  Outcome: Progressing     Problem: Fall Injury Risk  Goal: Absence of Fall and Fall-Related Injury  Outcome: Progressing     Problem: Skin Injury Risk Increased  Goal: Skin Health and Integrity  Outcome: Progressing     Problem: Pneumonia  Goal: Fluid Balance  Outcome:  Progressing  Goal: Resolution of Infection Signs and Symptoms  Outcome: Progressing  Goal: Effective Oxygenation and Ventilation  Outcome: Progressing     Problem: Diarrhea  Goal: Effective Diarrhea Management  Outcome: Progressing     Problem: Delirium  Goal: Optimal Coping  Outcome: Progressing  Goal: Improved Behavioral Control  Outcome: Progressing  Goal: Improved Attention and Thought Clarity  Outcome: Progressing  Goal: Improved Sleep  Outcome: Progressing     Problem: Confusion Acute  Goal: Optimal Cognitive Function  Outcome: Progressing     Problem: Infection  Goal: Absence of Infection Signs and Symptoms  Outcome: Progressing     Problem: Adult Inpatient Plan of Care  Goal: Plan of Care Review  Outcome: Progressing  Goal: Patient-Specific Goal (Individualized)  Outcome: Progressing  Goal: Absence of Hospital-Acquired Illness or Injury  Outcome: Progressing  Goal: Optimal Comfort and Wellbeing  Outcome: Progressing  Goal: Readiness for Transition of Care  Outcome: Progressing     Problem: Fall Injury Risk  Goal: Absence of Fall and Fall-Related Injury  Outcome: Progressing     Problem: Skin Injury Risk Increased  Goal: Skin Health and Integrity  Outcome: Progressing     Problem: Pneumonia  Goal: Fluid Balance  Outcome: Progressing  Goal: Resolution of Infection Signs and Symptoms  Outcome: Progressing  Goal: Effective Oxygenation and Ventilation  Outcome: Progressing     Problem: Diarrhea  Goal: Effective Diarrhea Management  Outcome: Progressing     Problem: Delirium  Goal: Optimal Coping  Outcome: Progressing  Goal: Improved Behavioral Control  Outcome: Progressing  Goal: Improved Attention and Thought Clarity  Outcome: Progressing  Goal: Improved Sleep  Outcome: Progressing     Problem: Confusion Acute  Goal: Optimal Cognitive Function  Outcome: Progressing

## 2025-06-19 NOTE — PLAN OF CARE
Pt tolerated therapy poorly. Resistive and refusing all movement. Required Total A x2 for bed mobility. Recommending moderate intensity therapy intervention at d/c.

## 2025-06-19 NOTE — PROGRESS NOTES
[facility] Medicine  Progress Note    Patient Name: Tea Ring  MRN: [id number]  Patient Class: IP- Inpatient   Admission Date: 6/13/2025  Length of Stay: 5 days  Attending Physician: Kylie Payne MD  Primary Care Provider: Marcello Zambrano MD    Subjective     Principal Problem: UTI (urinary tract infection)    HPI:  Tea Ring is a 79 y.o. female with a PMH  has a past medical history of Alpha thalassemia, Asthma, Cataract, Chronic anxiety, Chronic knee pain, Chronic pain of left ankle, Clotting disorder, Cutaneous lupus erythematosus, Dementia without behavioral disturbance, Depression, Depression, Ex-smoker, Hypertension, and Osteopenia (1/16 reck1/18).  Presents as a transfer from our [facility] facility for further treatment of generalized weakness fungal recent UTI and ortho consult for pubic rami fracture. Patient reported that she has been having difficulty urinating for over 2 weeks. She has had to use the bathroom at least 6 times daily. Patient reports that she will get the urge to urinate and then will itch down there. She was started on Macrobid on Damaris 10, 2025 by her primary care physician. Patient reports that she has been feeling lightheaded. She denies any specific chest pain, chest pressure, nausea, vomiting, diarrhea, dysuria. Patient reports that she had walked into the gas bedroom might have passed out. Patient is complaining right hip pain, neck pain, and generalized weakness.  Denies any injury or trauma which may have caused her symptoms.  Denies any alleviating or aggravating factors.  Denies any other symptoms at this time.    ER workup revealed CBC with leukocytosis of 17.37.  CMP with a BUN/creatinine of 23/1.4 with EGFR of 39 (previously 20/1.0 with EGFR 58 on 05/20/2025).CBG of 126 mg/dL.  Troponin negative.  Procalcitonin level negative.  Initial lactic acid 2.5 with repeat of 3.4 followed by 1.0.  Previous UA revealed acute cystitis, with repeat UA  showing improvement.  Blood cultures obtained x2.  CT cervical spine revealed age indeterminate fracture of the medial aspect of left clavicle and degenerative changes of cervical spine.  CT head negative for acute intracranial abnormality/findings.  Chest x-ray negative for acute cardiopulmonary findings.  Plain film imaging of the hip/pelvis revealed fracture of the superior and inferior pubic rami.  CT renal stone study of abdomen and pelvis revealed age indeterminate acute or subacute appearing fracture involving right superior and inferior pubic rami.  CT of the right knee pending results.  On-call orthopedic surgeon consulted.  Recommended hospital Medicine to admit and will see us consult.  Vital signs stable.  Patient received 1 g Rocephin, 2 L lactated Ringer's, and a total of 4 mg of morphine at outside facility.  Patient admitted under observation status.    RECONCILE HOME MEDICATIONS WITH FAMILY DURING DAY.  PATIENT UNABLE TO REPORT WHICH MEDICATION SHE TAKES.    PCP: Marcello Zambrano      Overview/Hospital Course:  78 y/o female admitted for UTI and acute encephalopathy. Patient started IV rocephin. Ortho has been consulted for pubic rami fracture.   As of 6/15 patient with worsening confusion. WBCs trending up. CXR showed worsening vascular congestion vs infectious process. IVFs stopped and IV lasix started.  Will add azithromycin to current IV abx therapy. Case discussed with Dr. Lo.   As of 6/16/2025, T 99.9 highest overnight, VSS otherwise. Ortho pending: Pubic rami fx = pain management. Diff following commands/swallowing pills--spits them out. Confusion slightly improved today--no obvious hallucinations. Bl Cx no growth after 36 hours. Overnight, taking PO meds. C/o pain not improved w/ current meds.  As of 6/17/2025, VSS. Cr bumped 1.1>1.6. Renal US: no acute abn. Hold Lasix, Valsartan, and HCTZ. Cover BP w/ IV Metoprolol and prn Hydralazine. K repletion 10 IV. BMP at 1500. Mentation not at  baseline per daughter at bedside. Bouts of anger and uncooperative intermittently. Pain med adjusted. MRI Brain pending. In afternoon, mentation worsened and unable to obtain MRI or 1500 BMP redraw.  As of 6/18/2025, intermittent cooperation w/ continuous confusion, not at baseline, and bouts of anger. Hold Lasix, Valsartan, and HCTZ 2/2 kidney fx. K repletion. Re-attempt MRI today. 1500 BMP.  As of 6/19/2025, confused, speaking to someone who is not present, more cooperative today. Daughter at bedside. Cr and K WNL. Seroquel 25 in AM added. Neuro and TelePsych pending.    Interval History: Pt sitting up in bed in no acute distress w/ daughter at bedside. Previous rounding found patient speaking to  and a baby who were not present in the room. More cooperative, taking pills by mouth. DC IV metoprolol. Add Seroquel 25 mg in AM. MRI Brain w/o acute abn. Awaiting Neuro and TelePsych consults. Cont IV abx and trend mentation response. Cont to work w/ PT/OT. Discussed lab results w/ Cr WNL and K 3.9. Daughter reports understanding and denies questions/needs at this time.    Review of Systems  Objective:     Vital Signs (Most Recent):  Temp: 98.1 °F (36.7 °C) (06/19/25 0905)  Pulse: 95 (06/19/25 0929)  Resp: 18 (06/19/25 0940)  BP: (!) 152/72 (06/19/25 0929)  SpO2: 95 % (06/19/25 0905) Vital Signs (24h Range):  Temp:  [97.3 °F (36.3 °C)-99.3 °F (37.4 °C)] 98.1 °F (36.7 °C)  Pulse:  [74-95] 95  Resp:  [18] 18  SpO2:  [91 %-96 %] 95 %  BP: (116-152)/(54-78) 152/72     Weight: 66.5 kg (146 lb 9.7 oz)  Body mass index is 27.7 kg/m².  No intake or output data in the 24 hours ending 06/19/25 1150      Physical Exam  Vitals reviewed.   Constitutional:       General: She is not in acute distress.     Appearance: She is normal weight. She is not ill-appearing or diaphoretic.   HENT:      Head: Normocephalic and atraumatic.      Nose: Nose normal.      Mouth/Throat:      Mouth: Mucous membranes are moist.      Pharynx:  Oropharynx is clear.   Eyes:      Extraocular Movements: Extraocular movements intact.      Pupils: Pupils are equal, round, and reactive to light.   Cardiovascular:      Rate and Rhythm: Normal rate and regular rhythm.      Pulses: Normal pulses.      Heart sounds: Normal heart sounds.   Pulmonary:      Effort: Pulmonary effort is normal. No respiratory distress.      Breath sounds: Normal breath sounds. No wheezing, rhonchi or rales.   Chest:      Chest wall: No tenderness.   Abdominal:      General: Bowel sounds are normal. There is no distension.      Palpations: Abdomen is soft.      Tenderness: There is no abdominal tenderness. There is no guarding.   Musculoskeletal:         General: Tenderness present.      Right lower leg: No edema.      Left lower leg: No edema.   Skin:     General: Skin is warm and dry.   Neurological:      Comments: Taking oral meds at this time per bedside nurse. Bouts of anger and confusion. Uncooperative at times.   Psychiatric:         Attention and Perception: She perceives visual hallucinations.         Mood and Affect: Angry: intermittently.         Behavior: Behavior is cooperative.      Comments: Speaking to someone who is not present               Significant Labs: All pertinent labs within the past 24 hours have been reviewed.  Recent Lab Results         06/19/25  0515   06/18/25  1456        Albumin 2.8         ALP 52         ALT 7         Anion Gap 11   10       AST 12         Baso # 0.06         Basophil % 0.5         BILIRUBIN TOTAL 0.5  Comment: For infants and newborns, interpretation of results should be based   on gestational age, weight and in agreement with clinical   observations.    Premature Infant recommended reference ranges:   0-24 hours:  <8.0 mg/dL   24-48 hours: <12.0 mg/dL   3-5 days:    <15.0 mg/dL   6-29 days:   <15.0 mg/dL         BUN 38   40       Calcium 9.2   8.7       Chloride 107   106       CO2 19   20       Creatinine 1.2   1.5       eGFR  46  Comment: Estimated GFR calculated using the CKD-EPI creatinine (2021) equation.   36  Comment: Estimated GFR calculated using the CKD-EPI creatinine (2021) equation.       Eos # 0.24         Eos % 2.0         Glucose 102   111       Gran # (ANC) 8.28         Hematocrit 28.9         Hemoglobin 9.1         Immature Grans (Abs) 0.07  Comment: Mild elevation in immature granulocytes is non specific and can be seen in a variety of conditions including stress response, acute inflammation, trauma and pregnancy. Correlation with other laboratory and clinical findings is essential.         Immature Granulocytes 0.6         Lymph # 2.30         Lymph % 19.0         MCH 22.7         MCHC 31.5         MCV 72         Mono # 1.16         Mono % 9.6         MPV 10.4         Neut % 68.3         nRBC 0         Platelet Count 302         Potassium 3.9   3.8       PROTEIN TOTAL 6.9         RBC 4.01         RDW 16.0         Sodium 137   136       WBC 12.11                 Significant Imaging: I have reviewed all pertinent imaging results/findings within the past 24 hours.      Assessment & Plan  UTI (urinary tract infection)  Presents with complaints of dysuria and confusion with AH/VH. Urinalysis revealed:   Lab Results   Component Value Date    COLORU Yellow 06/15/2025    APPEARANCEUA Clear 06/15/2025    SPECGRAV 1.015 06/15/2025    PHUR 7.0 06/15/2025    PROTEINUA Negative 06/15/2025    GLUCUA Negative 06/15/2025    KETONESU Negative 03/15/2025    NITRITE Negative 06/15/2025    UROBILINOGEN Negative 06/15/2025    BILIRUBINUA Negative 06/15/2025    LEUKOCYTESUR Negative 06/15/2025    RBCUA 2 06/10/2025    WBCUA 8 (H) 06/10/2025    BACTERIA Moderate (A) 06/10/2025    HYALINECASTS 6 (A) 03/06/2025   Received 1 g Rocephin at outside facility  Plan:  - UA as above  - Did NOT reflex to culture -- confirmed with Micro Lab 6/16/2025  - Azithromycin PO x 5D  - Rocephin IV  - Mentation: visual hallucinations today, more cooperative  - MRI  Brain w/o acute abn  - Add Seroquel 25 nightly  - Consult Neuro  - Consult TelePsych    Essential hypertension  Chronic, uncontrolled.  Latest blood pressure and vitals reviewed-   Temp:  [97.3 °F (36.3 °C)-99.3 °F (37.4 °C)]   Pulse:  [74-95]   Resp:  [18]   BP: (116-152)/(54-78)   SpO2:  [91 %-96 %] .   Home meds for hypertension were reviewed and noted below.   Hypertension Medications              valsartan-hydrochlorothiazide (DIOVAN-HCT) 160-12.5 mg per tablet Take 1 tablet by mouth once daily.     While in the hospital, will manage blood pressure as follows; Continue home antihypertensive regimen once verified with family in a.m.; we will treat with p.r.n. hydralazine    Will utilize p.r.n. blood pressure medication only if patient's blood pressure greater than  170/90 and she develops symptoms such as worsening chest pain or shortness of breath.    - Admit: IV metoprolol 5 mg Q6 hours until able to take oral.   - prn Hydralazine  - 6/15: Patient spitting out medication  - 6/16: attempted to take pills--spit some out after administration  - 6/16 HS: taking oral meds, held IV Metoprolol 2/2 BP soft  - 6/17: Hold IV Lasix, Valsartan, and HCTZ 2/2 Cr bump; control BP w/ IV Metoprolol and prn Hydralazine  - 6/18: Hold IV Lasix, Valsartan, and HCTZ; control BP w/ IV Metoprolol and prn Hydralazine  - 6/19: taking all orals, DC IV Metoprolol  Moderate major depression  Chronic. Stable. Not in acute exacerbation and currently denies endorsing any suicidal/homicidal ideations. Active auditory and visual hallucinations. Unsure if this is baseline due to patient's underlying dementia versus UTI symptoms.  Plan:  - Continue home medications   - MRI Brain w/o acute abn  - Consult Neuro  - Consult TelePsych    Primary open angle glaucoma (POAG) of both eyes, severe stage  - continue eye drops   Neck pain  CT imaging cervical spine revealed:  1. There is grade 1 anterolisthesis of C6 on C7. There is grade 1 anterolisthesis  of C7 on T1.  2. There are mild degenerative changes between C2 and T1.    Plan:   - analgesics p.r.n.  - Change Norco to Percocet  - prn IV Morphine    Closed fracture of multiple pubic rami, right, initial encounter  CT imaging revealed:  - Age-indeterminate acute or subacute appearing fracture involving the right superior and inferior pubic ramus images 139 through 156. Nonemergent pelvic MRI can be obtained as clinically warranted.  - analgesics p.r.n.   - Orthopedic following: pain management  - Change Norco to Percocet  - XR R Humerus: no acute fracture or dislocation  - XR R Knee: no acute findings  - Dr. Minor: weight-bearing OK  - PT/OT eval pending     Acute diastolic heart failure  - Echo: EF 60-65%, diastolic dysfunction  - BNP 91  - Monitor strict I&Os and daily weights.    - Low sodium diet  - Place on fluid restriction of 1.5 L.     Results for orders placed during the hospital encounter of 10/31/23    Echo    Interpretation Summary    Left Ventricle: The left ventricle is normal in size. Normal wall thickness. There is concentric remodeling. Normal wall motion. There is normal systolic function with a visually estimated ejection fraction of 60 - 65%. There is normal diastolic function.    Right Ventricle: Normal right ventricular cavity size. Wall thickness is normal. Right ventricle wall motion  is normal. Systolic function is normal.    Pulmonary Artery: The estimated pulmonary artery systolic pressure is 18 mmHg.    IVC/SVC: Normal venous pressure at 3 mmHg.     PNA (pneumonia)  Patient has a diagnosis of pneumonia. The cause of the pneumonia is unknown at this time. The patient has the following signs/symptoms of pneumonia: Elevated WBCs, low grade fever . The patient does not have a current oxygen requirement and the patient does not have a home oxygen requirement. I have reviewed the pertinent imaging. The following cultures have been collected: Blood cultures The culture results are listed  below.     Current antimicrobial regimen consists of the antibiotics listed below. Will monitor patient closely and continue current treatment plan unchanged.    Antibiotics (From admission, onward)      Start     Stop Route Frequency Ordered    06/17/25 0930  azithromycin tablet 500 mg         06/22/25 0859 Oral Daily 06/17/25 0919    06/14/25 1600  cefTRIAXone injection 1 g         -- IV Every 24 hours (non-standard times) 06/14/25 0950            Microbiology Results (last 7 days)       Procedure Component Value Units Date/Time    Blood culture x two cultures. Draw prior to antibiotics. [[id number]]  (Normal) Collected: 06/13/25 1430    Order Status: Completed Specimen: Blood from Peripheral, Forearm, Left Updated: 06/18/25 1702     Blood Culture No Growth After 96 hours    Blood culture x two cultures. Draw prior to antibiotics. [[id number]]  (Normal) Collected: 06/13/25 1436    Order Status: Completed Specimen: Blood from Peripheral, Antecubital, Right Updated: 06/18/25 1702     Blood Culture No Growth After 96 hours          Acute metabolic encephalopathy  History of dementia  Hallucinations  Dementia is stable currently. Continue home dementia meds and non-pharmacologic interventions to prevent delirium (No VS between 11PM-5AM, activity during day, opening blinds, providing glasses/hearing aids, and up in chair during daytime). Use PRN anti-psychotics to prevent behavior of self harm during sundowning, and avoid narcotics and benzos unless absolutely necessary. PRN anti-psychotics prescribed to avoid self harm behaviors.    6/15/2025: Witnessed patient having auditory and visual hallucinations during examination.    - Consider UTI/PNA vs Dementia vs Hosp induced psychosis  - Treat underlying cause  - Daughter reports not pt baseline  - Dementia Precautions  - Cont IV Abx  - prn Ativan  - Seroquel home med nightly  - Mentation: visual hallucinations today, more cooperative  - MRI Brain w/o acute abn  - Add  Seroquel 25 nightly  - Consult Neuro  - Consult TelePsych    Anemia  Anemia: Most recent hemoglobin and hematocrit are listed below.  Recent Labs     06/17/25  0537 06/18/25  0512 06/19/25  0515   HGB 8.5* 8.2* 9.1*   HCT 27.1* 25.5* 28.9*     Plan  - Monitor serial CBC: Daily  - Transfuse PRBC if patient becomes hemodynamically unstable, symptomatic or H/H drops below 7/21.  - Patient has not received any PRBC transfusions to date  - Patient's anemia is currently improving  - Trend    Hyponatremia  Hyponatremia is likely due to Dehydration/hypovolemia. The patient's most recent sodium results are listed below.  Recent Labs     06/18/25  0513 06/18/25  1456 06/19/25  0515    136 137     Plan  - Correct the sodium by 4-6mEq in 24 hours.   - Monitor sodium Daily.   - Patient hyponatremia is stable  - Trend    VTE Risk Mitigation (From admission, onward)           Ordered     enoxaparin injection 40 mg  Daily         06/19/25 0854     IP VTE HIGH RISK PATIENT  Once         06/13/25 2255     Place sequential compression device  Until discontinued         06/13/25 2255                    Discharge Planning   YIMI: 6/22/2025     Code Status: Full Code   Medical Readiness for Discharge Date:   Discharge Plan A: Home        EVERARDO RodriguezC  Department of Hospital Medicine   [facility] - Telemetry ([facility])

## 2025-06-19 NOTE — ASSESSMENT & PLAN NOTE
Chronic. Stable. Not in acute exacerbation and currently denies endorsing any suicidal/homicidal ideations. Active auditory and visual hallucinations. Unsure if this is baseline due to patient's underlying dementia versus UTI symptoms.  Plan:  - Continue home medications   - MRI Brain w/o acute abn  - Consult Neuro  - Consult TelePsych

## 2025-06-19 NOTE — ASSESSMENT & PLAN NOTE
Chronic, uncontrolled.  Latest blood pressure and vitals reviewed-   Temp:  [97.3 °F (36.3 °C)-99.3 °F (37.4 °C)]   Pulse:  [74-95]   Resp:  [18]   BP: (116-152)/(54-78)   SpO2:  [91 %-96 %] .   Home meds for hypertension were reviewed and noted below.   Hypertension Medications              valsartan-hydrochlorothiazide (DIOVAN-HCT) 160-12.5 mg per tablet Take 1 tablet by mouth once daily.     While in the hospital, will manage blood pressure as follows; Continue home antihypertensive regimen once verified with family in a.m.; we will treat with p.r.n. hydralazine    Will utilize p.r.n. blood pressure medication only if patient's blood pressure greater than  170/90 and she develops symptoms such as worsening chest pain or shortness of breath.    - Admit: IV metoprolol 5 mg Q6 hours until able to take oral.   - prn Hydralazine  - 6/15: Patient spitting out medication  - 6/16: attempted to take pills--spit some out after administration  - 6/16 HS: taking oral meds, held IV Metoprolol 2/2 BP soft  - 6/17: Hold IV Lasix, Valsartan, and HCTZ 2/2 Cr bump; control BP w/ IV Metoprolol and prn Hydralazine  - 6/18: Hold IV Lasix, Valsartan, and HCTZ; control BP w/ IV Metoprolol and prn Hydralazine  - 6/19: taking all orals, DC IV Metoprolol

## 2025-06-19 NOTE — PLAN OF CARE
List of accepting SNFs emailed to patient's daughter. Pending choice ([facility] vs [facility]). CM spoke with daughter who will have decision for Friday am.

## 2025-06-19 NOTE — SUBJECTIVE & OBJECTIVE
Interval History: Pt sitting up in bed in no acute distress w/ daughter at bedside. Previous rounding found patient speaking to  and a baby who were not present in the room. More cooperative, taking pills by mouth. DC IV metoprolol. Add Seroquel 25 mg in AM. MRI Brain w/o acute abn. Awaiting Neuro and TelePsych consults. Cont IV abx and trend mentation response. Cont to work w/ PT/OT. Discussed lab results w/ Cr WNL and K 3.9. Daughter reports understanding and denies questions/needs at this time.    Review of Systems  Objective:     Vital Signs (Most Recent):  Temp: 98.1 °F (36.7 °C) (06/19/25 0905)  Pulse: 95 (06/19/25 0929)  Resp: 18 (06/19/25 0940)  BP: (!) 152/72 (06/19/25 0929)  SpO2: 95 % (06/19/25 0905) Vital Signs (24h Range):  Temp:  [97.3 °F (36.3 °C)-99.3 °F (37.4 °C)] 98.1 °F (36.7 °C)  Pulse:  [74-95] 95  Resp:  [18] 18  SpO2:  [91 %-96 %] 95 %  BP: (116-152)/(54-78) 152/72     Weight: 66.5 kg (146 lb 9.7 oz)  Body mass index is 27.7 kg/m².  No intake or output data in the 24 hours ending 06/19/25 1150      Physical Exam  Vitals reviewed.   Constitutional:       General: She is not in acute distress.     Appearance: She is normal weight. She is not ill-appearing or diaphoretic.   HENT:      Head: Normocephalic and atraumatic.      Nose: Nose normal.      Mouth/Throat:      Mouth: Mucous membranes are moist.      Pharynx: Oropharynx is clear.   Eyes:      Extraocular Movements: Extraocular movements intact.      Pupils: Pupils are equal, round, and reactive to light.   Cardiovascular:      Rate and Rhythm: Normal rate and regular rhythm.      Pulses: Normal pulses.      Heart sounds: Normal heart sounds.   Pulmonary:      Effort: Pulmonary effort is normal. No respiratory distress.      Breath sounds: Normal breath sounds. No wheezing, rhonchi or rales.   Chest:      Chest wall: No tenderness.   Abdominal:      General: Bowel sounds are normal. There is no distension.      Palpations: Abdomen is  soft.      Tenderness: There is no abdominal tenderness. There is no guarding.   Musculoskeletal:         General: Tenderness present.      Right lower leg: No edema.      Left lower leg: No edema.   Skin:     General: Skin is warm and dry.   Neurological:      Comments: Taking oral meds at this time per bedside nurse. Bouts of anger and confusion. Uncooperative at times.   Psychiatric:         Attention and Perception: She perceives visual hallucinations.         Mood and Affect: Angry: intermittently.         Behavior: Behavior is cooperative.      Comments: Speaking to someone who is not present               Significant Labs: All pertinent labs within the past 24 hours have been reviewed.  Recent Lab Results         06/19/25  0515   06/18/25  1456        Albumin 2.8         ALP 52         ALT 7         Anion Gap 11   10       AST 12         Baso # 0.06         Basophil % 0.5         BILIRUBIN TOTAL 0.5  Comment: For infants and newborns, interpretation of results should be based   on gestational age, weight and in agreement with clinical   observations.    Premature Infant recommended reference ranges:   0-24 hours:  <8.0 mg/dL   24-48 hours: <12.0 mg/dL   3-5 days:    <15.0 mg/dL   6-29 days:   <15.0 mg/dL         BUN 38   40       Calcium 9.2   8.7       Chloride 107   106       CO2 19   20       Creatinine 1.2   1.5       eGFR 46  Comment: Estimated GFR calculated using the CKD-EPI creatinine (2021) equation.   36  Comment: Estimated GFR calculated using the CKD-EPI creatinine (2021) equation.       Eos # 0.24         Eos % 2.0         Glucose 102   111       Gran # (ANC) 8.28         Hematocrit 28.9         Hemoglobin 9.1         Immature Grans (Abs) 0.07  Comment: Mild elevation in immature granulocytes is non specific and can be seen in a variety of conditions including stress response, acute inflammation, trauma and pregnancy. Correlation with other laboratory and clinical findings is essential.          Immature Granulocytes 0.6         Lymph # 2.30         Lymph % 19.0         MCH 22.7         MCHC 31.5         MCV 72         Mono # 1.16         Mono % 9.6         MPV 10.4         Neut % 68.3         nRBC 0         Platelet Count 302         Potassium 3.9   3.8       PROTEIN TOTAL 6.9         RBC 4.01         RDW 16.0         Sodium 137   136       WBC 12.11                 Significant Imaging: I have reviewed all pertinent imaging results/findings within the past 24 hours.

## 2025-06-19 NOTE — ASSESSMENT & PLAN NOTE
Dementia is stable currently. Continue home dementia meds and non-pharmacologic interventions to prevent delirium (No VS between 11PM-5AM, activity during day, opening blinds, providing glasses/hearing aids, and up in chair during daytime). Use PRN anti-psychotics to prevent behavior of self harm during sundowning, and avoid narcotics and benzos unless absolutely necessary. PRN anti-psychotics prescribed to avoid self harm behaviors.    6/15/2025: Witnessed patient having auditory and visual hallucinations during examination.    - Consider UTI/PNA vs Dementia vs Hosp induced psychosis  - Treat underlying cause  - Daughter reports not pt baseline  - Dementia Precautions  - Cont IV Abx  - prn Ativan  - Seroquel home med nightly  - Mentation: visual hallucinations today, more cooperative  - MRI Brain w/o acute abn  - Add Seroquel 25 nightly  - Consult Neuro  - Consult TelePsych

## 2025-06-19 NOTE — PROGRESS NOTES
Pharmacist Renal Dose Adjustment Note    Tea Ring is a 79 y.o. female being treated with the medication Lovenox    Patient Data:    Vital Signs (Most Recent):  Temp: 98.6 °F (37 °C) (06/19/25 0345)  Pulse: 90 (06/19/25 0755)  Resp: 18 (06/19/25 0345)  BP: 133/78 (06/19/25 0345)  SpO2: (!) 93 % (06/19/25 0345) Vital Signs (72h Range):  Temp:  [97.2 °F (36.2 °C)-99.3 °F (37.4 °C)]   Pulse:  []   Resp:  [16-20]   BP: ()/(50-84)   SpO2:  [87 %-100 %]      Recent Labs   Lab 06/18/25  0513 06/18/25  1456 06/19/25  0515   CREATININE 1.5* 1.5* 1.2     Serum creatinine: 1.2 mg/dL 06/19/25 0515  Estimated creatinine clearance: 33.7 mL/min    Medication:Lovenox dose: 30mg frequency Q24 will be changed to medication:Lovenox dose:40mg frequency:Q24    Pharmacist's Name: Irena Rincon  Pharmacist's Extension: [phone]

## 2025-06-19 NOTE — ASSESSMENT & PLAN NOTE
Hyponatremia is likely due to Dehydration/hypovolemia. The patient's most recent sodium results are listed below.  Recent Labs     06/18/25  0513 06/18/25  1456 06/19/25  0515    136 137     Plan  - Correct the sodium by 4-6mEq in 24 hours.   - Monitor sodium Daily.   - Patient hyponatremia is stable  - Trend

## 2025-06-19 NOTE — TELEMEDICINE CONSULT
"[facility]   General Neurology  Consult Note      Consult Information  Inpatient Consult to Neurology Services (General Neurology)  Consult performed by: Anselmo Durand MD  Consult ordered by: Arnoldo Philip, NP-C  Reason for consult: AMS        Consulting Provider: ARNOLDO PHILIP   Current Providers  No providers found    Patient Location:  [facility] MED/TELE 2 IP Unit    Spoke hospital nurse at bedside with patient assisting consultant.  Patient information was obtained from primary team.       Neurology Documentation       Blood pressure (!) 140/63, pulse 97, temperature 97.9 °F (36.6 °C), temperature source Oral, resp. rate 18, height 5' 1" (1.549 m), weight 66.5 kg (146 lb 9.7 oz), SpO2 96%.    Medical Decision Making  HPI:  79 y.o. female with medical Hx of HTN, dementia was admitted due to pubic bone fracture as well as UTI. Pt has been noted to be restless, to have hallucinations. Pt is confused and at times with limited cooperation, difficult to redirect. No reported unilateral weakness, facial droop.     Images personally reviewed and interpreted:  Study: MRI Brain  Study Interpretation: TECHNIQUE: Multiplanar noncontrast MRI sequences of the brain.  Mildly limited by patient motion.     FINDINGS:  The corpus callosum is intact.     The ventricles are enlarged as are the extra-axial CSF spaces consistent with generalized cerebral volume loss or atrophy.     There is scattered white matter hyperintensity consistent with age-related microvascular ischemic degeneration.     Pituitary gland region is grossly normal.     No extra-axial fluid collection.     Diffusion-weighted sequence is negative.        Impression:   No acute or focal process.  Age-related atrophy with associated white matter degeneration.     Finalized on: 6/18/2025 3:49 PM By:  Mateo Bae MD  [facility]# [id number]      2025-06-18 15:51:[id number]     [facility]    Additional studies reviewed:   Study: Cardiac_Neuro: 2D echo  Study " "Interpretation: Vitals    Height Weight BMI (Calculated) BSA (Calculated - sq m) BP Pulse   5' 1" (1.549 m) 68.9 kg (152 lb) 28.7 1.72 sq meters 177/84 86     Study Details A complete echo was performed using complete 2D, color flow Doppler and spectral Doppler. During the study, the apical, parasternal, subcostal and suprasternal views were captured. There was No saline (bubble) used.  Overall the study quality was technically difficult. The study was difficult due to patient's poor endocardial visualization and poor acoustic windows. There was a prior echocardiogram study performed performed.This was a portable study performed at the patient's bedside.     Echocardiography Findings    Left Ventricle The left ventricle is normal in size. Mildly increased wall thickness. There is mild concentric hypertrophy. Normal wall motion. There is normal systolic function with a visually estimated ejection fraction of 60 - 65%. Grade I diastolic dysfunction.   Right Ventricle The right ventricle is normal in size Wall thickness is normal. Systolic function is normal.   Left Atrium The left atrium is normal in size   Right Atrium The right atrium is normal in size.   Aortic Valve The aortic valve is structurally normal. There is normal leaflet mobility. Aortic valve peak velocity is 1.4 m/s. Mean gradient is 5 mmHg.   Mitral Valve The mitral valve is structurally normal. There is normal leaflet mobility. There is trace regurgitation.   Tricuspid Valve The tricuspid valve is structurally normal. There is normal leaflet mobility. There is mild regurgitation.   Pulmonic Valve Not well visualized due to poor acoustic window. The pulmonic valve is structurally normal. There is normal leaflet mobility.   IVC/SVC Normal venous pressure at 3 mmHg.   Ascending Aorta The aortic root is normal in size. The ascending aorta is normal in size measuring 2.4 cm.   Pericardium and Other Findings There is no pericardial effusion.   Pulmonary " Artery The estimated pulmonary artery systolic pressure is 24 mmHg.       Laboratory studies reviewed:  BMP:   Lab Results   Component Value Date     06/19/2025     03/15/2025    K 3.9 06/19/2025    K 4.0 03/15/2025     06/19/2025     03/15/2025    CO2 19 (L) 06/19/2025    CO2 23 03/15/2025    BUN 38 (H) 06/19/2025    CREATININE 1.2 06/19/2025    CALCIUM 9.2 06/19/2025    CALCIUM 9.3 03/15/2025     CBC:   Lab Results   Component Value Date    WBC 12.11 06/19/2025    RBC 4.01 06/19/2025    RBC 5.59 (H) 03/15/2025    HGB 9.1 (L) 06/19/2025    HGB 12.0 03/15/2025    HCT 28.9 (L) 06/19/2025    HCT 39.7 03/15/2025     06/19/2025     03/15/2025    MCV 72 (L) 06/19/2025    MCV 71 (L) 03/15/2025    MCH 22.7 (L) 06/19/2025    MCHC 31.5 (L) 06/19/2025    MCHC 30.2 (L) 03/15/2025     Lipid Panel:   Lab Results   Component Value Date    CHOL 112 (L) 10/24/2024    LDLCALC 53.2 (L) 10/24/2024    HDL 49 10/24/2024    TRIG 49 10/24/2024     Coagulation:   Lab Results   Component Value Date    INR 1.2 12/21/2023    INR 1.1 10/31/2023    APTT 26.3 10/31/2023     Hgb A1C:   Lab Results   Component Value Date    HGBA1C 5.5 10/24/2024     TSH:   Lab Results   Component Value Date    TSH 0.830 03/15/2025       Documentation personally reviewed:  Notes: Notes: ED notes and H&P     Assessment and plan:    80 y/o female consulted for AMS. Pt seems to be in a state of delirium. Likely from UTI and exacerbated by hospital stay (unfamiliar environment). Sleep deprivation may worsen her symptoms    MRI brain showed no acute stroke or ICH.    -Quetiapine 25 mg nightly. Nightly dose can be increased to 50 mg.  -Watch for QT prolongation.   -Would avoid benzodiazepines, opiates as may worsen delirium.  -See psych recs.    Post charge discharge plan:  Clinic follow up: Other: TBD    Visit Type: N/A  Timeframe: N/A        Additional Physical Exam, History, & ROS  Review of Systems   Unable to perform ROS: Mental  status change     Physical Exam  Constitutional:       General: She is not in acute distress.  Pulmonary:      Effort: No respiratory distress.   Neurological:      Mental Status: She is alert.      Cranial Nerves: No facial asymmetry.      Motor: No tremor.      Comments: Restless.   Nonsensical    AROM of UE's and LE's against gravity       Diagnosis Date    Alpha thalassemia     Asthma     resolved per patient    Cataract     Chronic anxiety     years tid xanax 1mg    Chronic knee pain     Chronic pain of left ankle     Clotting disorder     Cutaneous lupus erythematosus     dr henry derm    Dementia without behavioral disturbance     Depression     Depression     dr radha hughes previously    Ex-smoker     quit fall 1    Hypertension     Osteopenia 1/16 reck1/18     Past Surgical History:   Procedure Laterality Date    ANKLE SURGERY Left     BACK SURGERY      CATARACT EXTRACTION Right     COLONOSCOPY N/A 8/2/2017    Procedure: COLONOSCOPY;  Surgeon: Virgil Oliva MD;  Location: [facility] ENDO;  Service: Endoscopy;  Laterality: N/A;    HYSTERECTOMY      INJECTION OF ANESTHETIC AGENT AROUND MEDIAL BRANCH NERVES INNERVATING LUMBAR FACET JOINT Bilateral 6/17/2020    Procedure: Bilateral L3-5 MBB;  Surgeon: Yury Moore MD;  Location: [facility] PAIN MGT;  Service: Pain Management;  Laterality: Bilateral;    INJECTION OF ANESTHETIC AGENT AROUND NERVE Bilateral 5/19/2020    Procedure: Bilateral Genicular nerve block with RN IV sedation Covid testing day of procedure PT does not drive;  Surgeon: Yury Moore MD;  Location: [facility] PAIN MGT;  Service: Pain Management;  Laterality: Bilateral;    KNEE ARTHROSCOPY      both knees twice    OOPHORECTOMY      RADIOFREQUENCY THERMOCOAGULATION Right 7/14/2020    Procedure: Right L3-5 Lumbar RFA;  Surgeon: Yury Moore MD;  Location: [facility] PAIN MGT;  Service: Pain Management;  Laterality: Right;    RADIOFREQUENCY THERMOCOAGULATION Left 8/6/2020    Procedure: Left L3-5 Lumbar  RFA;  Surgeon: Yury Moore MD;  Location: [facility];  Service: Pain Management;  Laterality: Left;     Family History   Problem Relation Name Age of Onset    Diabetes Mother      Diabetes Father      Breast cancer Maternal Aunt         Diagnoses  No problems updated.    Anselmo Durand MD    Neurology consultation requested by spoke provider. Audiovisual encounter with the patient performed using a secure connection.  Results and impressions from the visit are documented on this note and were communicated to the consulting provider/team via direct communication. The note has been shared for addition to the patients electronic medical record.

## 2025-06-19 NOTE — ASSESSMENT & PLAN NOTE
CT imaging revealed:  - Age-indeterminate acute or subacute appearing fracture involving the right superior and inferior pubic ramus images 139 through 156. Nonemergent pelvic MRI can be obtained as clinically warranted.  - analgesics p.r.n.   - Orthopedic following: pain management  - Change Norco to Percocet  - XR R Humerus: no acute fracture or dislocation  - XR R Knee: no acute findings  - Dr. Minor: weight-bearing OK  - PT/OT eval pending

## 2025-06-19 NOTE — CONSULTS
[facility]   DEPARTMENT OF PSYCHIATRY    SERVICE: Telepsychiatry  ENCOUNTER: initial    CHIEF COMPLAINT: AMS    TELEPSYCHIATRY (AUDIOVISUAL): Each patient who is provided psychiatric services via telehealth is: (1) informed of the relationship between the psychiatric provider and patient, as well as the respective role of any other health care staff/providers with respect to management of the patient; and (2) notified that he or she may decline to receive psychiatric services by telehealth and may withdraw from such care at any time.  Risks of telehealth include the potential for security breaches (HIPPA compliant platforms notwithstanding) and technological failure, as well as the limitations to physical examination inherent to the modality. The patient was agreeable to the use of telehealth services.    START TIME: 6/19/2025 2:21 PM  STOP TIME: 6/19/2025 3:18 PM  TOTAL ENCOUNTER TIME: see above start/stop times    -- PATIENT IDENTIFIERS: Tea Ring  [id number]  1945  79 y.o.  female  -- REQUESTING PROVIDER: Kylie Payne MD *  -- LOCATION OF PATIENT: unit (med/surg)    -- MODE OF ARRIVAL: ambulance  -- PRESENT WITH PATIENT DURING SESSION: staff  -- SOURCES OF INFORMATION: PATIENT, provider(s), EHR/chart  -- LOCATION OF ENCOUNTER PROVIDER: RAMIRO Guzman  -- ENCOUNTER PROVIDER: Jabari Ludwig MD      Subjective:     History of Present Illness:  Per hospital medicine:  Tea Ring is a 79 y.o. female with a PMH  has a past medical history of Alpha thalassemia, Asthma, Cataract, Chronic anxiety, Chronic knee pain, Chronic pain of left ankle, Clotting disorder, Cutaneous lupus erythematosus, Dementia without behavioral disturbance, Depression, Depression, Ex-smoker, Hypertension, and Osteopenia (1/16 reck1/18).  Presents as a transfer from our [facility] facility for further treatment of generalized weakness fungal recent UTI and ortho consult for pubic rami fracture. Patient reported that she has  been having difficulty urinating for over 2 weeks. She has had to use the bathroom at least 6 times daily. Patient reports that she will get the urge to urinate and then will itch down there. She was started on Macrobid on Damaris 10, 2025 by her primary care physician. Patient reports that she has been feeling lightheaded. She denies any specific chest pain, chest pressure, nausea, vomiting, diarrhea, dysuria. Patient reports that she had walked into the gas bedroom might have passed out. Patient is complaining right hip pain, neck pain, and generalized weakness.  Denies any injury or trauma which may have caused her symptoms.  Denies any alleviating or aggravating factors.  Denies any other symptoms at this time.     ER workup revealed CBC with leukocytosis of 17.37.  CMP with a BUN/creatinine of 23/1.4 with EGFR of 39 (previously 20/1.0 with EGFR 58 on 05/20/2025).CBG of 126 mg/dL.  Troponin negative.  Procalcitonin level negative.  Initial lactic acid 2.5 with repeat of 3.4 followed by 1.0.  Previous UA revealed acute cystitis, with repeat UA showing improvement.  Blood cultures obtained x2.  CT cervical spine revealed age indeterminate fracture of the medial aspect of left clavicle and degenerative changes of cervical spine.  CT head negative for acute intracranial abnormality/findings.  Chest x-ray negative for acute cardiopulmonary findings.  Plain film imaging of the hip/pelvis revealed fracture of the superior and inferior pubic rami.  CT renal stone study of abdomen and pelvis revealed age indeterminate acute or subacute appearing fracture involving right superior and inferior pubic rami.  CT of the right knee pending results.  On-call orthopedic surgeon consulted.  Recommended hospital Medicine to admit and will see us consult.  Vital signs stable.  Patient received 1 g Rocephin, 2 L lactated Ringer's, and a total of 4 mg of morphine at outside facility.  Patient admitted under observation status.    Chart  reviewed.  Has been having perceptual disturbances, visual hallucinations, in the setting of urinary tract infection, likely neurocognitive disorder, pubic rami fracture.  Documented by hospital medicine that today the patient was found talking to  and a baby who were not actually present in the room.  Quetiapine 25 mg was added to start in the morning today.  Has been on 50 mg nightly for 5 days.  MRI brain without evidence of acute hemorrhage, other acute process; generalized cerebral volume loss/atrophy and scattered white matter hyperintensities consistent with microvascular ischemic changes noted.  Being treated with IV antibiotics for pneumonia and urinary tract infection.      Patient is seen at bedside with nurse. Complaining that she has to urinate, RN educated patient that she could go. Difficult to redirect to me, did not want to speak to psychiatry. Oriented to place, year, month, self. Irritable otherwise, further interview deferred. Informed patient of reason for consult and how I could help and I would be speaking with the hospital medicine physician over her care.     Psychiatric History:   Previous Psychiatric Hospitalizations: No   Previous Medication Trials: Yes  Previous Suicide Attempts: denied, questionable attempt 11/2023 and was ultimately not PECd  History of Violence: no  History of Depression: yes  History of Yu: no  History of Auditory/Visual Hallucination: yes in delirium  History of Delusions: no  Outpatient psychiatrist (current & past): No    Substance Abuse History:  Tobacco:No  Alcohol: No  Illicit Substances:No  Detox/Rehab: No    Legal History: Past charges/incarcerations: No     Family Psychiatric History: no      Social History:  Developmental/Childhood:Appears to have achieved all developmental milestones appropriately  *Education:High School Diploma  Employment Status/Finances:Retired   Relationship Status/Sexual Orientation:   Children: 2  Housing Status:  unknown    history:  NO  Access to gun: NO  [employer]beterian:not asked  Recreational activities:not asked    [facility] Toolkit ASQ Suicide Screening Tool:  In the past few weeks, have you wished you were dead? No  In the past few weeks, have you felt that you or your family would be better off if you were dead? No  In the past week, have you been having thoughts about killing yourself? No  Have you ever tried to kill yourself? No  Are you having thoughts of killing yourself right now? No    Psychiatric Mental Status Exam:  Arousal: alert  Sensorium/Orientation: oriented to person, place, situation, month of year, year  Behavior/Cooperation: reluctant to participate   Speech: normal tone, normal rate, normal pitch, normal volume  Language: grossly intact  Mood: Irritable   Affect: appropriate and irritable  Thought Process: poverty of thought  Thought Content:   Auditory hallucinations: YES     Visual hallucinations: YES     Paranoia: NO  Delusions:  NO  Suicidal ideation: NO  Homicidal ideation: NO  Attention/Concentration:  Impaired due to delirium  Memory: not able to formally assess  Fund of Knowledge: Vocabulary appropriate    Abstract reasoning: not formally assessed  Insight: limited awareness of illness  Judgment: impaired due to delirium      Past Medical History:   Past Medical History:   Diagnosis Date    Alpha thalassemia     Asthma     resolved per patient    Cataract     Chronic anxiety     years tid xanax 1mg    Chronic knee pain     Chronic pain of left ankle     Clotting disorder     Cutaneous lupus erythematosus     dr henry derm    Dementia without behavioral disturbance     Depression     Depression     dr radha hughes previously    Ex-smoker     quit fall 1    Hypertension     Osteopenia 1/16 reck1/18      Laboratory Data:   Labs Reviewed   COMPREHENSIVE METABOLIC PANEL - Abnormal       Result Value    Sodium 139      Potassium 3.6      Chloride 105      CO2 20 (*)     Glucose 126 (*)     BUN 23       Creatinine 1.4      Calcium 9.8      Protein Total 8.0      Albumin 3.9      Bilirubin Total 1.1 (*)     ALP 75      AST 28      ALT 17      Anion Gap 14      eGFR 39 (*)    LACTIC ACID, PLASMA - Abnormal    Lactic Acid Level 2.5 (*)     Narrative:     Falsely low lactic acid results can be found in samples containing >=13.0 mg/dL total bilirubin and/or >=3.5 mg/dL direct bilirubin.    URINALYSIS, REFLEX TO URINE CULTURE - Abnormal    Color, UA Yellow      Appearance, UA Clear      pH, UA 6.0      Spec Grav UA 1.020      Protein, UA Negative      Glucose, UA Negative      Ketones, UA Trace (*)     Bilirubin, UA Negative      Blood, UA Negative      Nitrites, UA Negative      Urobilinogen, UA Negative      Leukocyte Esterase, UA Negative     CBC WITH DIFFERENTIAL - Abnormal    WBC 17.37 (*)     RBC 4.98      HGB 11.2 (*)     HCT 35.5 (*)     MCV 71 (*)     MCH 22.5 (*)     MCHC 31.5 (*)     RDW 16.1 (*)     Platelet Count 346      MPV 10.1      Nucleated RBC 0      Neut % 90.1 (*)     Lymph % 3.4 (*)     Mono % 5.6      Eos % 0.1      Basophil % 0.2      Imm Grans % 0.6 (*)     Neut # 15.66 (*)     Lymph # 0.59 (*)     Mono # 0.97      Eos # 0.01      Baso # 0.03      Imm Grans # 0.11 (*)    LACTIC ACID, PLASMA - Abnormal    Lactic Acid Level 3.4 (*)     Narrative:     Falsely low lactic acid results can be found in samples containing >=13.0 mg/dL total bilirubin and/or >=3.5 mg/dL direct bilirubin.    PROCALCITONIN - Normal    Procalcitonin 0.15     TROPONIN I - Normal    Troponin-I 0.014     HEP C VIRUS HOLD SPECIMEN    Extra Tube Hold for add-ons.     CBC W/ AUTO DIFFERENTIAL    Narrative:     The following orders were created for panel order CBC auto differential.  Procedure                               Abnormality         Status                     ---------                               -----------         ------                     CBC with Differential[[id number]]       Abnormal            Final result                  Please view results for these tests on the individual orders.   GREY TOP URINE HOLD    Extra Tube Hold for add-ons.         Neurological History:  Seizures: No  Head trauma: No    Allergies:   Review of patient's allergies indicates:  No Known Allergies    Medications in ER:   Medications   sodium chloride 0.9% flush 10 mL (has no administration in time range)   albuterol-ipratropium 2.5 mg-0.5 mg/3 mL nebulizer solution 3 mL (has no administration in time range)   melatonin tablet 6 mg (6 mg Oral Given 6/17/25 0024)   ondansetron injection 4 mg (4 mg Intravenous Given 6/16/25 0914)   promethazine tablet 25 mg (has no administration in time range)   senna-docusate 8.6-50 mg per tablet 1 tablet (1 tablet Oral Given 6/14/25 0336)   acetaminophen tablet 650 mg (650 mg Oral Given 6/16/25 2117)   aluminum-magnesium hydroxide-simethicone 200-200-20 mg/5 mL suspension 30 mL (has no administration in time range)   acetaminophen suppository 650 mg (has no administration in time range)   naloxone 0.4 mg/mL injection 0.02 mg (has no administration in time range)   glucose chewable tablet 16 g (has no administration in time range)   glucose chewable tablet 24 g (has no administration in time range)   dextrose 50% injection 12.5 g (has no administration in time range)   dextrose 50% injection 25 g (has no administration in time range)   glucagon (human recombinant) injection 1 mg (has no administration in time range)   cefTRIAXone injection 1 g (1 g Intravenous Given 6/18/25 1613)   atorvastatin tablet 10 mg (10 mg Oral Given 6/19/25 0929)   citalopram tablet 10 mg (10 mg Oral Given 6/19/25 0929)   pantoprazole EC tablet 40 mg (40 mg Oral Given 6/19/25 0929)   valsartan tablet 160 mg (160 mg Oral Given 6/19/25 0928)   hydroCHLOROthiazide tablet 12.5 mg (12.5 mg Oral Given 6/19/25 0928)   QUEtiapine tablet 50 mg (50 mg Oral Given 6/18/25 2104)   hydrALAZINE injection 10 mg (10 mg Intravenous Given 6/16/25 0919)    furosemide injection 40 mg (40 mg Intravenous Given 6/19/25 0929)   prednisoLONE acetate 1 % ophthalmic suspension 1 drop (1 drop Both Eyes Given 6/19/25 0929)   ketorolac 0.5% ophthalmic solution 1 drop (1 drop Both Eyes Given 6/19/25 1322)   sodium chloride 0.9% flush 10 mL (has no administration in time range)   LORazepam injection 0.5 mg (0.5 mg Intravenous Given 6/18/25 1507)   oxyCODONE-acetaminophen 5-325 mg per tablet 1 tablet (1 tablet Oral Given 6/18/25 2104)   azithromycin tablet 500 mg (500 mg Oral Given 6/19/25 0929)   morphine injection 2 mg (2 mg Intravenous Given 6/19/25 0940)   enoxaparin injection 40 mg (has no administration in time range)   QUEtiapine tablet 25 mg (25 mg Oral Given 6/19/25 1145)   lactated ringers bolus 1,000 mL (0 mLs Intravenous Stopped 6/13/25 1816)   cefTRIAXone injection 1 g (1 g Intravenous Given 6/13/25 1557)   morphine injection 2 mg (2 mg Intravenous Given 6/13/25 1528)   lactated ringers bolus 1,000 mL (0 mLs Intravenous Stopped 6/13/25 1747)   morphine injection 2 mg (2 mg Intravenous Given 6/13/25 1800)   LORazepam injection 0.5 mg (0.5 mg Intravenous Given 6/15/25 1353)   potassium chloride 10 mEq in 100 mL IVPB (0 mEq Intravenous Stopped 6/17/25 0946)   morphine injection 2 mg (2 mg Intravenous Given 6/17/25 0856)   potassium chloride 10 mEq in 100 mL IVPB (10 mEq Intravenous New Bag 6/18/25 0945)       Medications at home: reviewed      Assessment - Diagnosis - Goals:     Diagnosis/Impression:   Acute metabolic encephalopathy w/ delirium due to multiple etiologies (UTI, dysruption of sleep wake cycle, PNA, etc.)  MDD, recurrent, unspecified  Dementia    Rec:   - not recommend PEC  - Continue quetiapine 25mg QAM and 50mg QHS for today, if needed can increase to 50mg BID  - Ok to continue home Celexa  - stop prn benzos, known to worsen/cause delirium especially in elderly patients that are hospitalized  - Zyprexa 5mg PO/IM Q8H PRN noredirectable agitation  -  Delirium precautions and behavior management.   - Continue management of underlying medical conditions causing delirium per primary team.    Plan of Care communicated to: Dr. Elmer Ludwig MD   Psychiatry  [facility]    Inpatient consult to Telemedicine - Psych  Consult performed by: Jabari Ludwig MD  Consult ordered by: Kristin Philip NP-C  Reason for consult: [facility] MED/TELE 2

## 2025-06-19 NOTE — NURSING
Pt is refusing telemetry monitoring and confused at baseline. Provider notified. Will attempt to place monitor back on her at a later time.

## 2025-06-19 NOTE — ASSESSMENT & PLAN NOTE
Anemia: Most recent hemoglobin and hematocrit are listed below.  Recent Labs     06/17/25  0537 06/18/25  0512 06/19/25  0515   HGB 8.5* 8.2* 9.1*   HCT 27.1* 25.5* 28.9*     Plan  - Monitor serial CBC: Daily  - Transfuse PRBC if patient becomes hemodynamically unstable, symptomatic or H/H drops below 7/21.  - Patient has not received any PRBC transfusions to date  - Patient's anemia is currently improving  - Trend

## 2025-06-19 NOTE — PT/OT/SLP EVAL
"Occupational Therapy   Evaluation    Name: Tea Ring  MRN: [id number]  Admitting Diagnosis: UTI (urinary tract infection)  Recent Surgery: * No surgery found *      Recommendations:     Discharge Recommendations: Moderate Intensity Therapy  Discharge Equipment Recommendations:  to be determined by next level of care  Barriers to discharge:  Decreased caregiver support    Assessment:     Tea Ring is a 79 y.o. female with a medical diagnosis of UTI (urinary tract infection).  She presents with performance deficits affecting function: weakness, impaired endurance, impaired self care skills, impaired functional mobility, gait instability, impaired balance, decreased lower extremity function, decreased upper extremity function, pain, decreased ROM, impaired cardiopulmonary response to activity, decreased coordination.      Rehab Prognosis: Guarded; patient would benefit from acute skilled OT services to address these deficits and reach maximum level of function.      Pt resistive and refusing all movement. Pt states, "I will move tomorrow". After max encouragement, v/c, and tactile cues, pt still resistive to any movement.     Plan:     Patient to be seen 2 x/week to address the above listed problems via self-care/home management, therapeutic activities, therapeutic exercises  Plan of Care Expires: 07/03/25  Plan of Care Reviewed with: patient, daughter    Subjective     Chief Complaint: Pt complaining of global pain but unable to articulate where pain is located. Pt confused throughout session and hallucinating asking if different people are present.   Patient/Family Comments/goals: Pt's family member, Mercy, present in room during session and attempting to encourage pt to move.     Occupational Profile: Pt provided personal history below but noted to be poor historian due to cognitive status.   Living Environment: Pt lives at home alone in a H /c a threshold.   Previous level of function: Pt mod (I) /c ADLs. Uses " "RW at baseline. Pt does not engage in community mobility.   Roles and Routines: Pt does not drive.   Equipment Used at Home: bedside commode, walker, rolling, wheelchair, shower chair, other (see comments) (has rollator but does not use)  Assistance upon Discharge: Pt has questionable assistance upon d/c. Pt states she does not have family nearby.     Pain/Comfort:  Pain Rating 1:  (Pt reporting global pain but unable to rate)  Objective:   EPIC chart review completed prior to session.  Patient found HOB elevated with bed alarm (midline) upon OT entry to room.    General Precautions: Standard, fall  Orthopedic Precautions: RLE weight bearing as tolerated, LLE weight bearing as tolerated  Braces: N/A  Respiratory Status: Room air    Occupational Performance:  Bed Mobility:    Patient completed Rolling/Turning to Left with  total assistance and 2 persons  Patient completed Rolling/Turning to Right with total assistance and 2 persons  Pt required Total A x2 for HOB repositioning     Functional Mobility/Transfers:  Not completed due to pt's refusal and resistance. Multiple attempts at encouragement and explanation attempted but pt still refusing any movement. Pt repeatedly saying she will get out of the bed tomorrow.   Attempted to complete STS but pt resistive and loudly vocalizing she wanted to stop.     Activities of Daily Living:  Grooming: total assistance and of 2 persons for brief change supine  Pt resistive throughout   Pt hallucinating throughout brief change stating "Y'all better cover me up so the children don't see me."     Cognitive/Visual Perceptual:  Cognitive/Psychosocial Skills:     -       Oriented to: Person and Time   -       Follows Commands/attention:Easily distracted  -       Communication: clear/fluent  -       Memory: Pt presenting confused and hallucinating during session   -       Mood/Affect/Coping skills/emotional control: Pt waxing and waning with periods of pleasantness, being guarded, and " agitation that appeared to be rooted in fear    Physical Exam:  Upper Extremity Range of Motion:     -       Right Upper Extremity: WFL  -       Left Upper Extremity: WFL  Upper Extremity Strength:    -       Right Upper Extremity: Not tested due to confusion and cognitive deficits; will continue to attempt while admitted  -       Left Upper Extremity: Not tested due to confusion and cognitive deficits; will continue to attempt while admitted   Strength:    -       Right Upper Extremity: Fair  -       Left Upper Extremity: Fair    AMPAC 6 Click ADL:  AMPAC Total Score: 6    Treatment & Education:  Pt educated on OT roles and responsibilities. Pt physically resistive and refusing any active movement throughout session. Multiple attempts made to move pt and max v/c and encouragement provided throughout. Educated pt on UE ROM exercises to be completed throughout the day for continued strengthening and mobility. Educated daughter on positioning bed to be in a sitting position to mimic chair sitting as much as possible. Also educated daughter on benefits of having pt be in a sitting position for extended periods for CV endurance.     Patient left with bed in chair position with all lines intact, call button in reach, and bed alarm on and family present    GOALS:   Multidisciplinary Problems       Occupational Therapy Goals          Problem: Occupational Therapy    Goal Priority Disciplines Outcome Interventions   Occupational Therapy Goal     OT, PT/OT     Description: Goals to be met by: 07/03/25     Patient will increase functional independence with ADLs by performing:    Grooming while seated with Minimal Assistance.  Sitting at edge of bed x10 minutes with Stand-by Assistance.  Supine to sit with Moderate Assistance.                         DME Justifications:  TBD at next level of care.     History:     Past Medical History:   Diagnosis Date    Alpha thalassemia     Asthma     resolved per patient    Cataract      Chronic anxiety     years tid xanax 1mg    Chronic knee pain     Chronic pain of left ankle     Clotting disorder     Cutaneous lupus erythematosus     dr henry derm    Dementia without behavioral disturbance     Depression     Depression     dr radha hughes previously    Ex-smoker     quit fall 1    Hypertension     Osteopenia 1/16 reck1/18         Past Surgical History:   Procedure Laterality Date    ANKLE SURGERY Left     BACK SURGERY      CATARACT EXTRACTION Right     COLONOSCOPY N/A 8/2/2017    Procedure: COLONOSCOPY;  Surgeon: Virgil Oliva MD;  Location: [facility] ENDO;  Service: Endoscopy;  Laterality: N/A;    HYSTERECTOMY      INJECTION OF ANESTHETIC AGENT AROUND MEDIAL BRANCH NERVES INNERVATING LUMBAR FACET JOINT Bilateral 6/17/2020    Procedure: Bilateral L3-5 MBB;  Surgeon: Yury Moore MD;  Location: [facility] PAIN MGT;  Service: Pain Management;  Laterality: Bilateral;    INJECTION OF ANESTHETIC AGENT AROUND NERVE Bilateral 5/19/2020    Procedure: Bilateral Genicular nerve block with RN IV sedation Covid testing day of procedure PT does not drive;  Surgeon: Yury Moore MD;  Location: [facility] PAIN MGT;  Service: Pain Management;  Laterality: Bilateral;    KNEE ARTHROSCOPY      both knees twice    OOPHORECTOMY      RADIOFREQUENCY THERMOCOAGULATION Right 7/14/2020    Procedure: Right L3-5 Lumbar RFA;  Surgeon: Yury Moore MD;  Location: [facility] PAIN MGT;  Service: Pain Management;  Laterality: Right;    RADIOFREQUENCY THERMOCOAGULATION Left 8/6/2020    Procedure: Left L3-5 Lumbar RFA;  Surgeon: Yury Moore MD;  Location: [facility] PAIN MGT;  Service: Pain Management;  Laterality: Left;       Time Tracking:     OT Date of Treatment: 06/19/25  OT Start Time: 1040  OT Stop Time: 1110  OT Total Time (min): 30 min    Billable Minutes:Evaluation 7  Self Care/Home Management 23    DIMA Grider  6/19/2025

## 2025-06-19 NOTE — PT/OT/SLP PROGRESS
"Physical Therapy  Treatment    Tea Ring   MRN: [id number]   Admitting Diagnosis: UTI (urinary tract infection)    PT Received On: 06/19/25  PT Start Time: 1040     PT Stop Time: 1110    PT Total Time (min): 30 min       Billable Minutes:  Therapeutic Activity 30    Treatment Type: Treatment  PT/PTA: PTA     Number of PTA visits since last PT visit: 1       General Precautions: Standard, fall  Orthopedic Precautions: RLE weight bearing as tolerated, LLE weight bearing as tolerated  Braces: N/A  Respiratory Status: Room air         Subjective:  Completed Epic chart review prior to PT session.   Patient resistant to participation in PT session. Adamantly refusing to sit EOB or get OOB at this time.   "I'll do that tomorrow." "I can't do it!"  Appears to be hallucinating at times as she would frequently speak to or ask about other people in the room who were not present.     Pain/Comfort  Pain Rating 1:  (REPORTS PAIN WITH ALL MOVEMENT BUT UNABLE TO RATE)  Pain Addressed 1: Other (see comments), Cessation of Activity (ACTYIVITY PACING)    Objective:   Patient found with: Other (comments), bed alarm (MIDLINE)    Roll R/L x2 trials/side: Total A of 2 (significant physical resistance to movement; would not assist by pulling on bed rail despite consistent VC for sequencing of task)    Attempted supine > sit transition with Total A of 2 but unable to complete due to physical resistance and patient screaming    Patient reports being soiled. Therapist offered to assist with brief change and patient was initially agreeable but quickly retracted when movement was initiated.  Remained physically resistant and rigid. Required constant redirection and education on importance of changing brief. Waxing and waning levels of agreeableness.    Patient's daughter attempted to encourage patient to participate in PT session but to no avail.     Educated patient on importance of increased tolerance to upright position and direct impact on " CV endurance and strength. Patient encouraged to utilize elevated HOB to simulate chair position until able to safely complete chair T/F. Patient given a minimum goal of majority of the day to be spent in upright, especially with all meals. Encouraged patient to perform AROM TE to BLE throughout the day within all available planes of motion. Re enforced importance of utilizing call light to meet needs in room and not attempt to get up without staff assistance. Unsure of patient's level of retention due to current cognitive status.    AM-PAC 6 CLICK MOBILITY  How much help from another person does this patient currently need?   1 = Unable, Total/Dependent Assistance  2 = A lot, Maximum/Moderate Assistance  3 = A little, Minimum/Contact Guard/Supervision  4 = None, Modified [place]/Independent    Turning over in bed (including adjusting bedclothes, sheets and blankets)?: 1  Sitting down on and standing up from a chair with arms (e.g., wheelchair, bedside commode, etc.): 1  Moving from lying on back to sitting on the side of the bed?: 1  Moving to and from a bed to a chair (including a wheelchair)?: 1  Need to walk in hospital room?: 1  Climbing 3-5 steps with a railing?: 1  Basic Mobility Total Score: 6    AM-PAC Raw Score CMS G-Code Modifier Level of Impairment Assistance   6 % Total / Unable   7 - 9 CM 80 - 100% Maximal Assist   10 - 14 CL 60 - 80% Moderate Assist   15 - 19 CK 40 - 60% Moderate Assist   20 - 22 CJ 20 - 40% Minimal Assist   23 CI 1-20% SBA / CGA   24 CH 0% Independent/ Mod I     Patient left with bed in chair position with call button in reach, bed alarm on, and daughter present.    Assessment:  Tea Ring is a 79 y.o. female with a medical diagnosis of UTI (urinary tract infection) and presents with overall decline in functional mobility. Patient would continue to benefit from skilled PT to address functional limitations listed below in order to return to PLOF/decrease caregiver  burden.     Rehab identified problem list/impairments: weakness, impaired endurance, impaired self care skills, impaired functional mobility, gait instability, impaired balance, pain, decreased safety awareness, decreased lower extremity function, decreased upper extremity function, decreased coordination, impaired cognition, decreased ROM, impaired cardiopulmonary response to activity    Rehab potential is fair.    Activity tolerance: Poor    Discharge recommendations: Moderate Intensity Therapy      Barriers to discharge:      Equipment recommendations: to be determined by next level of care     GOALS:   Multidisciplinary Problems       Physical Therapy Goals          Problem: Physical Therapy    Goal Priority Disciplines Outcome Interventions   Physical Therapy Goal     PT, PT/OT     Description: Lt25  1. PT WILL COMPLETE BED MOBILITY WITH CGA  2. PT WILL STAND T/F TO CHAIR WITH RW AND MOD A FOR OOB TOLERANCE  3. PT WILL GT TRAIN X 50' WITH RW AND MOD A TO PROGRESS GT  4. PT WILL INC AMPAC SCORE BY 2 POINTS TO PROGRESS GROSS FUNC MOBILITY.                          DME Justifications:  No DME recommended requiring DME justifications    PLAN:    Patient to be seen 3 x/week to address the above listed problems via gait training, therapeutic activities, therapeutic exercises  Plan of Care expires: 25  Plan of Care reviewed with: patient, daughter         2025

## 2025-06-19 NOTE — ASSESSMENT & PLAN NOTE
Presents with complaints of dysuria and confusion with AH/VH. Urinalysis revealed:   Lab Results   Component Value Date    COLORU Yellow 06/15/2025    APPEARANCEUA Clear 06/15/2025    SPECGRAV 1.015 06/15/2025    PHUR 7.0 06/15/2025    PROTEINUA Negative 06/15/2025    GLUCUA Negative 06/15/2025    KETONESU Negative 03/15/2025    NITRITE Negative 06/15/2025    UROBILINOGEN Negative 06/15/2025    BILIRUBINUA Negative 06/15/2025    LEUKOCYTESUR Negative 06/15/2025    RBCUA 2 06/10/2025    WBCUA 8 (H) 06/10/2025    BACTERIA Moderate (A) 06/10/2025    HYALINECASTS 6 (A) 03/06/2025   Received 1 g Rocephin at outside facility  Plan:  - UA as above  - Did NOT reflex to culture -- confirmed with Micro Lab 6/16/2025  - Azithromycin PO x 5D  - Rocephin IV  - Mentation: visual hallucinations today, more cooperative  - MRI Brain w/o acute abn  - Add Seroquel 25 nightly  - Consult Neuro  - Consult TelePsych

## 2025-06-20 PROBLEM — E43 SEVERE MALNUTRITION: Status: ACTIVE | Noted: 2025-06-20

## 2025-06-20 LAB
ABSOLUTE EOSINOPHIL (OHS): 0.08 K/UL
ABSOLUTE MONOCYTE (OHS): 1.01 K/UL (ref 0.3–1)
ABSOLUTE NEUTROPHIL COUNT (OHS): 9.5 K/UL (ref 1.8–7.7)
ALBUMIN SERPL BCP-MCNC: 3 G/DL (ref 3.5–5.2)
ALP SERPL-CCNC: 57 UNIT/L (ref 40–150)
ALT SERPL W/O P-5'-P-CCNC: 8 UNIT/L (ref 10–44)
ANION GAP (OHS): 13 MMOL/L (ref 8–16)
AST SERPL-CCNC: 15 UNIT/L (ref 11–45)
BASOPHILS # BLD AUTO: 0.05 K/UL
BASOPHILS NFR BLD AUTO: 0.4 %
BILIRUB SERPL-MCNC: 0.7 MG/DL (ref 0.1–1)
BUN SERPL-MCNC: 27 MG/DL (ref 8–23)
CALCIUM SERPL-MCNC: 10.1 MG/DL (ref 8.7–10.5)
CHLORIDE SERPL-SCNC: 106 MMOL/L (ref 95–110)
CO2 SERPL-SCNC: 20 MMOL/L (ref 23–29)
CREAT SERPL-MCNC: 0.9 MG/DL (ref 0.5–1.4)
ERYTHROCYTE [DISTWIDTH] IN BLOOD BY AUTOMATED COUNT: 15.9 % (ref 11.5–14.5)
GFR SERPLBLD CREATININE-BSD FMLA CKD-EPI: >60 ML/MIN/1.73/M2
GLUCOSE SERPL-MCNC: 80 MG/DL (ref 70–110)
HCT VFR BLD AUTO: 31.1 % (ref 37–48.5)
HGB BLD-MCNC: 9.6 GM/DL (ref 12–16)
IMM GRANULOCYTES # BLD AUTO: 0.1 K/UL (ref 0–0.04)
IMM GRANULOCYTES NFR BLD AUTO: 0.8 % (ref 0–0.5)
LYMPHOCYTES # BLD AUTO: 2.14 K/UL (ref 1–4.8)
MCH RBC QN AUTO: 22 PG (ref 27–31)
MCHC RBC AUTO-ENTMCNC: 30.9 G/DL (ref 32–36)
MCV RBC AUTO: 71 FL (ref 82–98)
NUCLEATED RBC (/100WBC) (OHS): 0 /100 WBC
PLATELET # BLD AUTO: 411 K/UL (ref 150–450)
PLATELET BLD QL SMEAR: NORMAL
PMV BLD AUTO: 10.7 FL (ref 9.2–12.9)
POTASSIUM SERPL-SCNC: 3.9 MMOL/L (ref 3.5–5.1)
PROT SERPL-MCNC: 7.6 GM/DL (ref 6–8.4)
RBC # BLD AUTO: 4.37 M/UL (ref 4–5.4)
RELATIVE EOSINOPHIL (OHS): 0.6 %
RELATIVE LYMPHOCYTE (OHS): 16.6 % (ref 18–48)
RELATIVE MONOCYTE (OHS): 7.8 % (ref 4–15)
RELATIVE NEUTROPHIL (OHS): 73.8 % (ref 38–73)
SODIUM SERPL-SCNC: 139 MMOL/L (ref 136–145)
WBC # BLD AUTO: 12.88 K/UL (ref 3.9–12.7)

## 2025-06-20 PROCEDURE — 99231 SBSQ HOSP IP/OBS SF/LOW 25: CPT | Mod: ,,, | Performed by: ORTHOPAEDIC SURGERY

## 2025-06-20 PROCEDURE — 97530 THERAPEUTIC ACTIVITIES: CPT

## 2025-06-20 PROCEDURE — 25000003 PHARM REV CODE 250

## 2025-06-20 PROCEDURE — 85025 COMPLETE CBC W/AUTO DIFF WBC: CPT

## 2025-06-20 PROCEDURE — 63600175 PHARM REV CODE 636 W HCPCS: Performed by: EMERGENCY MEDICINE

## 2025-06-20 PROCEDURE — 84155 ASSAY OF PROTEIN SERUM: CPT

## 2025-06-20 PROCEDURE — 25000003 PHARM REV CODE 250: Performed by: HOSPITALIST

## 2025-06-20 PROCEDURE — 21400001 HC TELEMETRY ROOM

## 2025-06-20 PROCEDURE — 36415 COLL VENOUS BLD VENIPUNCTURE: CPT

## 2025-06-20 PROCEDURE — 97530 THERAPEUTIC ACTIVITIES: CPT | Mod: CQ

## 2025-06-20 PROCEDURE — 25000003 PHARM REV CODE 250: Performed by: EMERGENCY MEDICINE

## 2025-06-20 RX ORDER — AMLODIPINE BESYLATE 5 MG/1
5 TABLET ORAL DAILY
Status: DISCONTINUED | OUTPATIENT
Start: 2025-06-20 | End: 2025-06-23 | Stop reason: HOSPADM

## 2025-06-20 RX ADMIN — AMLODIPINE BESYLATE 5 MG: 5 TABLET ORAL at 10:06

## 2025-06-20 RX ADMIN — Medication 6 MG: at 08:06

## 2025-06-20 RX ADMIN — PREDNISOLONE ACETATE 1 DROP: 10 SUSPENSION/ DROPS OPHTHALMIC at 08:06

## 2025-06-20 RX ADMIN — ENOXAPARIN SODIUM 40 MG: 40 INJECTION SUBCUTANEOUS at 04:06

## 2025-06-20 RX ADMIN — QUETIAPINE FUMARATE 50 MG: 25 TABLET ORAL at 08:06

## 2025-06-20 RX ADMIN — OXYCODONE HYDROCHLORIDE AND ACETAMINOPHEN 1 TABLET: 7.5; 325 TABLET ORAL at 08:06

## 2025-06-20 NOTE — PT/OT/SLP PROGRESS
Physical Therapy  Treatment    Tea Ring   MRN: [id number]   Admitting Diagnosis: UTI (urinary tract infection)    PT Received On: 06/20/25  PT Start Time: 1300     PT Stop Time: 1313    PT Total Time (min): 13 min       Billable Minutes:  Therapeutic Activity 13    Treatment Type: Treatment  PT/PTA: PTA     Number of PTA visits since last PT visit: 2       General Precautions: Standard, fall  Orthopedic Precautions: RLE weight bearing as tolerated, LLE weight bearing as tolerated  Braces: N/A  Respiratory Status: Room air         Subjective:  Communicated with patient's nurse AC and conducted Epic chart review prior to session.  Patient agreeable to participate with therapy. States that she is having pain throughout her right leg into her right hip.    Pain/Comfort  Pain Rating 1: 10/10  Location - Side 1: Right  Location - Orientation 1: generalized  Location 1: leg  Pain Addressed 1: Reposition, Cessation of Activity, Nurse notified    Objective:   Patient found with: bed alarm (midline)    Functional Mobility:  Bed Mobility:    Total A to EOB, return to supine, supine scooting    Transfers:   NT    Gait:    NT    Balance:  Without UE support, Max A   With (heavy use of) UE support, SBA   Sitting EOB x 5 minutes with leaning to the left to compensate for the pain of the right leg    Treatment and Education:  Educated patient on importance of increased tolerance to upright position and direct impact on CV endurance and strength. Patient encouraged to utilize elevated HOB to simulate chair position until able to safely complete chair T/F. Patient given a minimum goal of majority of the day to be spent in upright, especially with all meals. Encouraged patient to perform AROM TE to BLE throughout the day within all available planes of motion. Re enforced importance of utilizing call light to meet needs in room and not attempt to get up without staff assistance. Patient verbalized understanding and agreed to comply.       AM-PAC 6 CLICK MOBILITY  How much help from another person does this patient currently need?   1 = Unable, Total/Dependent Assistance  2 = A lot, Maximum/Moderate Assistance  3 = A little, Minimum/Contact Guard/Supervision  4 = None, Modified [place]/Independent    Turning over in bed (including adjusting bedclothes, sheets and blankets)?: 1  Sitting down on and standing up from a chair with arms (e.g., wheelchair, bedside commode, etc.): 1  Moving from lying on back to sitting on the side of the bed?: 1  Moving to and from a bed to a chair (including a wheelchair)?: 1  Need to walk in hospital room?: 1  Climbing 3-5 steps with a railing?: 1  Basic Mobility Total Score: 6    AM-PAC Raw Score CMS G-Code Modifier Level of Impairment Assistance   6 % Total / Unable   7 - 9 CM 80 - 100% Maximal Assist   10 - 14 CL 60 - 80% Moderate Assist   15 - 19 CK 40 - 60% Moderate Assist   20 - 22 CJ 20 - 40% Minimal Assist   23 CI 1-20% SBA / CGA   24 CH 0% Independent/ Mod I     Patient left HOB elevated with all lines intact, call button in reach, bed alarm on, and nurse notified.    Assessment:  Tea Ring is a 79 y.o. female with a medical diagnosis of UTI (urinary tract infection) and presents with overall decline in functional mobility. Patient would continue to benefit from skilled PT to address functional limitations listed below in order to return to PLOF/decrease caregiver burden.    Rehab identified problem list/impairments: weakness, impaired endurance, impaired self care skills, impaired functional mobility, decreased coordination, impaired cognition, impaired balance, gait instability, decreased upper extremity function, decreased lower extremity function, decreased safety awareness, pain, decreased ROM    Rehab potential is fair.    Activity tolerance: Poor    Discharge recommendations: Moderate Intensity Therapy      Barriers to discharge:      Equipment recommendations: to be determined by next  level of care     GOALS:   Multidisciplinary Problems       Physical Therapy Goals          Problem: Physical Therapy    Goal Priority Disciplines Outcome Interventions   Physical Therapy Goal     PT, PT/OT     Description: Lt25  1. PT WILL COMPLETE BED MOBILITY WITH CGA  2. PT WILL STAND T/F TO CHAIR WITH RW AND MOD A FOR OOB TOLERANCE  3. PT WILL GT TRAIN X 50' WITH RW AND MOD A TO PROGRESS GT  4. PT WILL INC AMPAC SCORE BY 2 POINTS TO PROGRESS GROSS FUNC MOBILITY.                          DME Justifications:  No DME recommended requiring DME justifications    PLAN:    Patient to be seen 3 x/week to address the above listed problems via gait training, therapeutic activities, therapeutic exercises  Plan of Care expires: 25  Plan of Care reviewed with: patient         2025

## 2025-06-20 NOTE — ASSESSMENT & PLAN NOTE
Presents with complaints of dysuria and confusion with AH/VH. Urinalysis revealed:   Lab Results   Component Value Date    COLORU Yellow 06/15/2025    APPEARANCEUA Clear 06/15/2025    SPECGRAV 1.015 06/15/2025    PHUR 7.0 06/15/2025    PROTEINUA Negative 06/15/2025    GLUCUA Negative 06/15/2025    KETONESU Negative 03/15/2025    NITRITE Negative 06/15/2025    UROBILINOGEN Negative 06/15/2025    BILIRUBINUA Negative 06/15/2025    LEUKOCYTESUR Negative 06/15/2025    RBCUA 2 06/10/2025    WBCUA 8 (H) 06/10/2025    BACTERIA Moderate (A) 06/10/2025    HYALINECASTS 6 (A) 03/06/2025   Received 1 g Rocephin at outside facility  Plan:  - UA as above  - Did NOT reflex to culture -- confirmed with Micro Lab 6/16/2025  - Azithromycin PO x 5D completed  - Rocephin IV x 5 D completed  - Mentation: anger on morning rounds and uncooperative, midday pleasant and cooperative, no apparent hallucinations on assessment; bedside reports night nurse reported visual and auditory hallucinations.  - MRI Brain w/o acute abn  - Incr Seroquel to 50 mg BID  - Neuro and TelePsych: Pt seems to be in a state of delirium. Sleep deprivation may be worsening s/s. Incr Seroquel to 50 BID. Zyprexa IM prn. Will take quite some time given Dementia for mentation to improve, if it improves.

## 2025-06-20 NOTE — PROGRESS NOTES
Patient overall resting comfortably    Neurovascular exam lower extremities grossly intact    Was up to chair today we will continue increased mobilization with physical therapy    Patient will follow up in [facility] in about 1 month.  She has a good candidate for home health care to assist her with mobilization if she discharges to home

## 2025-06-20 NOTE — PLAN OF CARE
Pt extremely fearful of falling. Pt required max encouragement for participation. BM TOTAL A x2 persons. EOB Seating supported 5 min

## 2025-06-20 NOTE — ASSESSMENT & PLAN NOTE
Malnutrition Type:  Context: acute illness or injury  Level: severe    Related to (etiology):   Physiological causes increasing nutrient needs dt acute illness  Psychological causes    Signs and Symptoms (as evidenced by):   Unintentional weight loss of >2% in 1 week.  Inc muscle and fat loss.  Unable or unwilling to eat sufficient energy/protein to maintain a healthy weight.  Food avoidance and/or lack of interest in food.     Malnutrition Characteristic Summary:  Weight Loss (Malnutrition): greater than 2% in 1 week  Energy Intake (Malnutrition): less than or equal to 50% for greater than or equal to 5 days  Subcutaneous Fat (Malnutrition): mild depletion  Muscle Mass (Malnutrition): mild depletion      Interventions/Recommendations (treatment strategy):  1. Low sodium modified diet.  2. Commercial beverage medical food supplement therapy.  3. Feeding assistance management.  4. Management of nutrition related prescription medication.  5. Collaboration by nutrition professional with other providers.    Nutrition Diagnosis Status:   New

## 2025-06-20 NOTE — ASSESSMENT & PLAN NOTE
CT imaging revealed:  - Age-indeterminate acute or subacute appearing fracture involving the right superior and inferior pubic ramus images 139 through 156. Nonemergent pelvic MRI can be obtained as clinically warranted.  - analgesics p.r.n.   - Orthopedic following: pain management  - Change Norco to Percocet  - XR R Humerus: no acute fracture or dislocation  - XR R Knee: no acute findings  - Dr. Minor: weight-bearing OK  - PT/OT: SNF for mod therapy  - 6/20: Dr. Herrera, ortho, signed off

## 2025-06-20 NOTE — PLAN OF CARE
Nutrition Recommendation/Intervention for Malnutrition 06/20/2025:   1. Recommend a Low sodium diet with texture per SLP.   2. Recommend Boost Plus with all meals to provide additional calories.   3. Recommend starting a bowel regimen.   4. Encourage PO intake with feeding assistance as warranted.   5. Weigh x2 weekly.  6. Collaboration by nutrition professional with other providers.    Goals:   1. Pt will have diet texture modified/cleared by SLP by RD follow up.   2. Pt will tolerate and consume >50% EEN and EPN by RD follow up.   3. Pt will have improved GI status by RD follow up.    Kerry Stein RDN, LDN

## 2025-06-20 NOTE — ASSESSMENT & PLAN NOTE
CT imaging cervical spine revealed:  1. There is grade 1 anterolisthesis of C6 on C7. There is grade 1 anterolisthesis of C7 on T1.  2. There are mild degenerative changes between C2 and T1.    Plan:   - analgesics p.r.n.  - Percocet prn

## 2025-06-20 NOTE — ASSESSMENT & PLAN NOTE
Chronic, uncontrolled.  Latest blood pressure and vitals reviewed-   Temp:  [99 °F (37.2 °C)-99.6 °F (37.6 °C)]   Pulse:  []   Resp:  [18-19]   BP: (123-162)/(70-92)   SpO2:  [95 %-99 %] .   Home meds for hypertension were reviewed and noted below.   Hypertension Medications              valsartan-hydrochlorothiazide (DIOVAN-HCT) 160-12.5 mg per tablet Take 1 tablet by mouth once daily.     While in the hospital, will manage blood pressure as follows; Continue home antihypertensive regimen once verified with family in a.m.; we will treat with p.r.n. hydralazine    Will utilize p.r.n. blood pressure medication only if patient's blood pressure greater than  170/90 and she develops symptoms such as worsening chest pain or shortness of breath.    - Admit: IV metoprolol 5 mg Q6 hours until able to take oral.   - prn Hydralazine  - 6/15: Patient spitting out medication  - 6/16: attempted to take pills--spit some out after administration  - 6/16 HS: taking oral meds, held IV Metoprolol 2/2 BP soft  - 6/17: Hold IV Lasix, Valsartan, and HCTZ 2/2 Cr bump; control BP w/ IV Metoprolol and prn Hydralazine  - 6/18: Hold IV Lasix, Valsartan, and HCTZ; control BP w/ IV Metoprolol and prn Hydralazine  - 6/19: taking all orals, DC IV Metoprolol  - 6/20: DC Lasix, Valsartan, and HCTZ. Norvasc 5 mg daily

## 2025-06-20 NOTE — ASSESSMENT & PLAN NOTE
Patient has a diagnosis of pneumonia. The cause of the pneumonia is unknown at this time. The patient has the following signs/symptoms of pneumonia: none, s/s resolved. The patient does not have a current oxygen requirement and the patient does not have a home oxygen requirement. I have reviewed the pertinent imaging. The following cultures have been collected: Blood cultures The culture results are listed below.     Current antimicrobial regimen consists of the antibiotics listed below. Will monitor patient closely and continue current treatment plan unchanged.    Antibiotics (From admission, onward)      None            Microbiology Results (last 7 days)       Procedure Component Value Units Date/Time    Blood culture x two cultures. Draw prior to antibiotics. [[id number]]  (Normal) Collected: 06/13/25 1430    Order Status: Completed Specimen: Blood from Peripheral, Forearm, Left Updated: 06/19/25 1702     Blood Culture No Growth After 5 Days    Blood culture x two cultures. Draw prior to antibiotics. [[id number]]  (Normal) Collected: 06/13/25 1436    Order Status: Completed Specimen: Blood from Peripheral, Antecubital, Right Updated: 06/19/25 1702     Blood Culture No Growth After 5 Days

## 2025-06-20 NOTE — ASSESSMENT & PLAN NOTE
Hyponatremia is likely due to Dehydration/hypovolemia. The patient's most recent sodium results are listed below.  Recent Labs     06/18/25  1456 06/19/25  0515 06/20/25  0532    137 139     Plan  - Correct the sodium by 4-6mEq in 24 hours.   - Monitor sodium Daily.   - Patient hyponatremia is stable  - Trend

## 2025-06-20 NOTE — SUBJECTIVE & OBJECTIVE
Interval History: Pt lying in bed in no acute distress. When spoken to, patient answers in anger and requests to be left alone.  Midday, rounded and spoke w/ daughter at bedside. Patient pleasant and cooperative to assessment. Dr. Payne reports medically stable for DC to SNF.  CM following and requesting [place] SNF per daughter request.     Review of Systems   Unable to perform ROS: Dementia     Objective:     Vital Signs (Most Recent):  Temp: 99 °F (37.2 °C) (06/20/25 1538)  Pulse: 101 (06/20/25 1538)  Resp: 19 (06/20/25 1538)  BP: (!) 145/70 (06/20/25 1538)  SpO2: 97 % (06/20/25 1538) Vital Signs (24h Range):  Temp:  [99 °F (37.2 °C)-99.6 °F (37.6 °C)] 99 °F (37.2 °C)  Pulse:  [] 101  Resp:  [18-19] 19  SpO2:  [95 %-99 %] 97 %  BP: (123-162)/(70-92) 145/70     Weight: 66.5 kg (146 lb 9.7 oz)  Body mass index is 27.7 kg/m².    Intake/Output Summary (Last 24 hours) at 6/20/2025 1656  Last data filed at 6/19/2025 2311  Gross per 24 hour   Intake --   Output 150 ml   Net -150 ml         Physical Exam  Vitals reviewed.   Constitutional:       General: She is not in acute distress.     Appearance: She is normal weight. She is not ill-appearing or diaphoretic.   HENT:      Head: Normocephalic and atraumatic.      Nose: Nose normal.      Mouth/Throat:      Mouth: Mucous membranes are moist.      Pharynx: Oropharynx is clear.   Eyes:      Extraocular Movements: Extraocular movements intact.      Pupils: Pupils are equal, round, and reactive to light.   Cardiovascular:      Rate and Rhythm: Normal rate and regular rhythm.      Pulses: Normal pulses.      Heart sounds: Normal heart sounds.   Pulmonary:      Effort: Pulmonary effort is normal. No respiratory distress.      Breath sounds: Normal breath sounds. No wheezing, rhonchi or rales.   Chest:      Chest wall: No tenderness.   Abdominal:      General: Bowel sounds are normal. There is no distension.      Palpations: Abdomen is soft.      Tenderness: There is no  abdominal tenderness. There is no guarding.   Musculoskeletal:         General: Tenderness present.      Right lower leg: No edema.      Left lower leg: No edema.   Skin:     General: Skin is warm and dry.   Neurological:      Comments: Taking oral meds at this time per bedside nurse. Bouts of anger and confusion. Uncooperative at times.   Psychiatric:         Mood and Affect: Angry: intermittently.         Behavior: Behavior is cooperative.      Comments: Bedside reports overnight pt had auditory and visual hallucinations. None apparent at this time.               Significant Labs: All pertinent labs within the past 24 hours have been reviewed.  Recent Lab Results         06/20/25  0532        Albumin 3.0       ALP 57       ALT 8       Anion Gap 13       AST 15       Baso # 0.05       Basophil % 0.4       BILIRUBIN TOTAL 0.7  Comment: For infants and newborns, interpretation of results should be based   on gestational age, weight and in agreement with clinical   observations.    Premature Infant recommended reference ranges:   0-24 hours:  <8.0 mg/dL   24-48 hours: <12.0 mg/dL   3-5 days:    <15.0 mg/dL   6-29 days:   <15.0 mg/dL       BUN 27       Calcium 10.1       Chloride 106       CO2 20       Creatinine 0.9       eGFR >60  Comment: Estimated GFR calculated using the CKD-EPI creatinine (2021) equation.       Eos # 0.08       Eos % 0.6       Glucose 80       Gran # (ANC) 9.50       Hematocrit 31.1       Hemoglobin 9.6       Immature Grans (Abs) 0.10  Comment: Mild elevation in immature granulocytes is non specific and can be seen in a variety of conditions including stress response, acute inflammation, trauma and pregnancy. Correlation with other laboratory and clinical findings is essential.       Immature Granulocytes 0.8       Lymph # 2.14       Lymph % 16.6       MCH 22.0       MCHC 30.9       MCV 71       Mono # 1.01       Mono % 7.8       MPV 10.7       Neut % 73.8       nRBC 0       Platelet Estimate  Appears Normal       Platelet Count 411       Potassium 3.9       PROTEIN TOTAL 7.6       RBC 4.37       RDW 15.9       Sodium 139       WBC 12.88               Significant Imaging: I have reviewed all pertinent imaging results/findings within the past 24 hours.

## 2025-06-20 NOTE — PLAN OF CARE
[facility] left voicemail offering P2P stating medical director is denying for SNF. The P2P would need to be scheduled before 8am on Tuesday, 6/24/25.     Voicemail left with [facility] line ([phone]) with request to schedule P2P for today if possible. Awaiting return call. Contact info for Dr. Payne provided.     Julia from [facility] updated.

## 2025-06-20 NOTE — PROGRESS NOTES
[facility] - Telemetry ([facility])  Adult Nutrition  Progress Note    SUMMARY       Recommendations    Recommendation/Intervention:   1. Recommend a Low sodium diet with texture per SLP.   2. Recommend Boost Plus with all meals to provide additional calories.   3. Recommend starting a bowel regimen.   4. Encourage PO intake with feeding assistance as warranted.   5. Weigh x2 weekly.    Goals:   1. Pt will have diet texture modified/cleared by SLP by RD follow up.   2. Pt will tolerate and consume >50% EEN and EPN by RD follow up.   3. Pt will have improved GI status by RD follow up.    Nutrition Goal Status: goal not met  Communication of RD Recs: other (comment) (RD progress note, POC, sticky note)    Nutrition Discharge Planning    Nutrition Discharge Planning: Oral supplement regimen (comments), Oral diet with texture modifications per SLP (comments)  Oral supplement regimen (comments): Boost Plus TID  Oral diet with texture modifications per SLP (comments): Low sodium, texture per SLP    Assessment and Plan    Endocrine  Severe malnutrition  Malnutrition Type:  Context: acute illness or injury  Level: severe    Related to (etiology):   Physiological causes increasing nutrient needs dt acute illness  Psychological causes    Signs and Symptoms (as evidenced by):   Unintentional weight loss of >2% in 1 week.  Inc muscle and fat loss.  Unable or unwilling to eat sufficient energy/protein to maintain a healthy weight.  Food avoidance and/or lack of interest in food.     Malnutrition Characteristic Summary:  Weight Loss (Malnutrition): greater than 2% in 1 week  Energy Intake (Malnutrition): less than or equal to 50% for greater than or equal to 5 days  Subcutaneous Fat (Malnutrition): mild depletion  Muscle Mass (Malnutrition): mild depletion      Interventions/Recommendations (treatment strategy):  1. Low sodium modified diet.  2. Commercial beverage medical food supplement therapy.  3. Feeding assistance management.  4.  Management of nutrition related prescription medication.  5. Collaboration by nutrition professional with other providers.    Nutrition Diagnosis Status:   New         Malnutrition Assessment 06/20/2025:  Malnutrition Context: acute illness or injury  Malnutrition Level: severe  Skin (Micronutrient): red (Jim score 14)       Weight Loss (Malnutrition): greater than 2% in 1 week  Energy Intake (Malnutrition): less than or equal to 50% for greater than or equal to 5 days  Subcutaneous Fat (Malnutrition): mild depletion  Muscle Mass (Malnutrition): mild depletion   Orbital Region (Subcutaneous Fat Loss): mild depletion  Upper Arm Region (Subcutaneous Fat Loss): mild depletion  Thoracic and Lumbar Region: mild depletion   Orthodox Region (Muscle Loss): mild depletion  Clavicle Bone Region (Muscle Loss): mild depletion  Clavicle and Acromion Bone Region (Muscle Loss): mild depletion  Patellar Region (Muscle Loss): mild depletion  Anterior Thigh Region (Muscle Loss): mild depletion  Posterior Calf Region (Muscle Loss): mild depletion                 Reason for Assessment    Reason For Assessment: RD follow-up  Diagnosis: renal disease    General Information Comments:   6/14/25: 79 y.o. female admitted for a UTI. PMH: HTN, dementia, anxiety, lupus. Pt is currently getting a Low sodium diet without any ONS. Pt seen for malnutrition screening. Per EMR pt reported being unsure of any weight loss PTA with a decrease in appetite/intake PTA. RD attempted to speak w/ pt at bedside but pt was very confused and was unable to respond to RD when asked questions, no family members present. Per EMR pt has a BMI WNL, no PIs recorded, has gained weight since last admit but does not have any meal intakes recorded since admit. RD performed a visual NFPE which shown mild muscle and fat loss. RD to send Boost Plus with all meals to provide additional calories. Nutrition education material not appropriate for pt. Labs and meds  "reviewed.    Follow up:    25: Pt seen for follow up. Pt is currently getting a Low sodium diet with Boost Plus TID. RD attempted to speak w/ pt at bedside but pt was very confused and did not answer when spoken to without any family at bedside. RD spoke w/ RN. RN reported pt has been refusing to eat, is barely drinking any of the boost being sent even with feeding assistance and is severely confused. Pt has had CBW updated which shows an unintentional weight loss of 2.6 kg since admit which is a 4% weight loss and is deemed significant, CBW updated . RD to update PES statement since pt is now deemed severely malnourished in the context of acute illness. Noted other RD and intern provided pt's daughter diet education . RD to continue current POC at this time. Labs and meds reviewed.    Nutrition/Diet History    Spiritual, Cultural Beliefs, Adventism Practices, Values that Affect Care: no  Food Allergies: NKFA  Factors Affecting Nutritional Intake: impaired cognitive status/motor control, difficulty/impaired swallowing, chewing difficulties/inability to chew food  Nutrition-related SDOH: Adequate food in home environment, None Identified    Anthropometrics    Height: 5' 1" (154.9 cm)  Height (inches): 61 in  Weight: 66.5 kg (146 lb 9.7 oz)  Weight (lb): 146.61 lb  Weight Method: Bed Scale  Ideal Body Weight (IBW), Female: 105 lb  % Ideal Body Weight, Female (lb): 139.63 %  BMI (Calculated): 27.7  BMI Grade: 18.5-24.9 - normal  Usual Body Weight (UBW), k.4 kg  % Usual Body Weight: 98.87  % Weight Change From Usual Weight: -1.34 %  Weight Loss Since Admission: 5 lb 11.7 oz  % Weight Change Since Admission: 3.91       Wt Readings from Last 15 Encounters:   25 66.5 kg (146 lb 9.7 oz)   06/10/25 65.3 kg (143 lb 15.4 oz)   25 65.8 kg (145 lb 1 oz)   25 65.9 kg (145 lb 4.5 oz)   03/15/25 67.4 kg (148 lb 9.4 oz)   25 67.4 kg (148 lb 9.4 oz)   25 67.4 kg (148 lb 9.4 oz) " "  01/28/25 67.3 kg (148 lb 5.9 oz)   01/08/25 67.3 kg (148 lb 5.9 oz)   12/17/24 67.3 kg (148 lb 5.9 oz)   12/06/24 67.3 kg (148 lb 5.9 oz)   12/05/24 67.3 kg (148 lb 5.9 oz)   10/24/24 67.9 kg (149 lb 11.1 oz)   06/10/24 66.1 kg (145 lb 11.6 oz)   03/20/24 67.3 kg (148 lb 5.9 oz)       Lab/Procedures/Meds    Pertinent Labs Reviewed: reviewed  Pertinent Medications Reviewed: reviewed    BMP  Lab Results   Component Value Date     06/20/2025    K 3.9 06/20/2025     06/20/2025    CO2 20 (L) 06/20/2025    BUN 27 (H) 06/20/2025    CREATININE 0.9 06/20/2025    CALCIUM 10.1 06/20/2025    ANIONGAP 13 06/20/2025    EGFRNORACEVR >60 06/20/2025     Lab Results   Component Value Date    CALCIUM 10.1 06/20/2025    PHOS 2.5 (L) 05/20/2025     Lab Results   Component Value Date    ALBUMIN 3.0 (L) 06/20/2025     Lab Results   Component Value Date    ALT 8 (L) 06/20/2025    AST 15 06/20/2025    ALKPHOS 57 06/20/2025    BILITOT 0.7 06/20/2025     No results for input(s): "POCTGLUCOSE" in the last 24 hours.    Lab Results   Component Value Date    HGBA1C 5.5 10/24/2024     Lab Results   Component Value Date    WBC 12.88 (H) 06/20/2025    HGB 9.6 (L) 06/20/2025    HCT 31.1 (L) 06/20/2025    MCV 71 (L) 06/20/2025     06/20/2025       Scheduled Meds:   amLODIPine  5 mg Oral Daily    atorvastatin  10 mg Oral Daily    citalopram  10 mg Oral Daily    enoxparin  40 mg Subcutaneous Daily    pantoprazole  40 mg Oral QAM    prednisoLONE acetate  1 drop Both Eyes BID    QUEtiapine  50 mg Oral BID     Continuous Infusions:  PRN Meds:.  Current Facility-Administered Medications:     acetaminophen, 650 mg, Rectal, Q6H PRN    acetaminophen, 650 mg, Oral, Q8H PRN    albuterol-ipratropium, 3 mL, Nebulization, Q6H PRN    aluminum-magnesium hydroxide-simethicone, 30 mL, Oral, QID PRN    dextrose 50%, 12.5 g, Intravenous, PRN    dextrose 50%, 25 g, Intravenous, PRN    glucagon (human recombinant), 1 mg, Intramuscular, PRN    glucose, " 16 g, Oral, PRN    glucose, 24 g, Oral, PRN    hydrALAZINE, 10 mg, Intravenous, Q8H PRN    melatonin, 6 mg, Oral, Nightly PRN    naloxone, 0.02 mg, Intravenous, PRN    OLANZapine, 5 mg, Intramuscular, Q8H PRN    ondansetron, 4 mg, Intravenous, Q8H PRN    oxyCODONE-acetaminophen, 1 tablet, Oral, Q4H PRN    promethazine, 25 mg, Oral, Q6H PRN    senna-docusate, 1 tablet, Oral, BID PRN    sodium chloride 0.9%, 10 mL, Intravenous, Q12H PRN    sodium chloride 0.9%, 10 mL, Intravenous, PRN        Estimated/Assessed Needs    Weight Used For Calorie Calculations: 66.5 kg (146 lb 9.7 oz)  Energy Calorie Requirements (kcal): 2196-8196 kcals (MSJ x 1.2-1.4 (normal))  Energy Need Method: [place]-St Jeor  Protein Requirements: 67-80 g pro (1-1.2 g/kg (older adult))  Weight Used For Protein Calculations: 66.5 kg (146 lb 9.7 oz)  Fluid Requirements (mL): 6357-0307 ml  Estimated Fluid Requirement Method: RDA Method  RDA Method (mL): 1298  CHO Requirement: 162-189 g cho (9016-8174 kcals/8)      Nutrition Prescription Ordered    Current Diet Order: Low Sodium  Oral Nutrition Supplement: Boost Plus TID    Evaluation of Received Nutrient/Fluid Intake  I/O: (Net since admit):   6/14/25: +2823.3 ml  6/20/25: +2111.5 ml    Energy Calories Required: not meeting needs  Protein Required: not meeting needs  Fluid Required: not meeting needs  Total Fluid Intake (mL): 0 (none recorded)  Comments: LBM: 6/16, MST 3  Tolerance: other (see comments) (currently refusing to eat)  % Intake of Estimated Energy Needs: 0 - 25 %  % Meal Intake: 0 - 25 %    Nutrition Risk    Level of Risk/Frequency of Follow-up: high (Follow up x2 weekly)     Monitor and Evaluation    Monitor and Evaluation: Energy intake, Food and beverage intake, Protein intake, Carbohydrate intake, Diet order, Food and nutrition knowledge, Weight, Beliefs and attitudes, Electrolyte and renal panel, Gastrointestinal profile, Glucose/endocrine profile, Nutrition focused physical findings,  Skin     Nutrition Follow-Up    RD Follow-up?: Yes (Follow up x2 weekly)    Kerry Stein RDN, LDN

## 2025-06-20 NOTE — PT/OT/SLP PROGRESS
Occupational Therapy   Treatment    Name: Tea Ring  MRN: [id number]  Admitting Diagnosis:  UTI (urinary tract infection)       Recommendations:     Discharge Recommendations: Moderate Intensity Therapy  Discharge Equipment Recommendations:  to be determined by next level of care  Barriers to discharge:  Decreased caregiver support    Assessment:     Tea Ring is a 79 y.o. female with a medical diagnosis of UTI (urinary tract infection).  She presents with the following performance deficits affecting function are weakness, impaired endurance, impaired self care skills, impaired functional mobility, gait instability, impaired cognition, impaired balance, decreased coordination, decreased upper extremity function, decreased lower extremity function, impaired fine motor, impaired coordination, decreased ROM, abnormal tone, pain, decreased safety awareness.     Rehab Prognosis:  Fair and Poor; patient would benefit from acute skilled OT services to address these deficits and reach maximum level of function.       Plan:     Patient to be seen 2 x/week to address the above listed problems via self-care/home management, therapeutic activities, therapeutic exercises  Plan of Care Expires: 07/03/25  Plan of Care Reviewed with: patient    Subjective     Chief Complaint: Rt Hip Pain  Patient/Family Comments/goals: None stated  Pain/Comfort:  Pain Rating 1: 10/10  Location - Side 1: Right  Location - Orientation 1: generalized  Location 1: hip  Pain Addressed 1: Reposition, Distraction, Cessation of Activity  Pain Rating Post-Intervention 1: 10/10    Objective:     Communicated with: Nurse and EPIC chart review prior to session.  Patient found HOB elevated with bed alarm upon OT entry to room.    General Precautions: Standard, fall    Orthopedic Precautions:RLE weight bearing as tolerated, LLE weight bearing as tolerated  Braces: N/A  Respiratory Status: Room air     Occupational Performance:     Bed Mobility:    Patient  completed Rolling/Turning to Left with  total assistance x2 people  Patient completed Rolling/Turning to Right with total assistance x2 people  Patient completed Scooting/Bridging with total assistance x2 people  Patient completed Supine to Sit with total assistance x2 people  Patient completed Sit to Supine with total assistance x2 people  Max v/c for   Pt sat EOB for 5 min with Max assist to CGA  Pt had both guard rail up for safety and therapist forward and behind supporting  Posterior lean    Functional Mobility/Transfers:  Unsafe at this time    Activities of Daily Living:  Pt refused      [facility] 6 Click ADL: 6    Treatment & Education:  Pt unaware of their current location. Pt stated they were in a library. Pt shows confusion and fearful of all mobility. OT role, plan of care, progression of goals, importance of continued OOB activity, ADL/functional transfer and mobility retraining, call don't fall, safety precautions, fall prevention.  Educated patient on importance of increased tolerance to upright position and direct impact on CV endurance and strength. Patient encouraged to sit up in chair/ EOB, for a minimum of 2 consecutive hours, 3x per day. Encouraged patient to perform AROM TE to BLE and BUE throughout the day within all available planes of motion. Re enforced importance of utilizing call light to meet needs in room and not attempt to get up without staff assistance. Patient verbalized understanding and agreed to comply.        Patient left with bed in chair position with all lines intact, call button in reach, and bed alarm on    GOALS:   Multidisciplinary Problems       Occupational Therapy Goals          Problem: Occupational Therapy    Goal Priority Disciplines Outcome Interventions   Occupational Therapy Goal     OT, PT/OT Not Progressing    Description: Goals to be met by: 07/03/25     Patient will increase functional independence with ADLs by performing:    Grooming while seated  with Minimal Assistance.  Sitting at edge of bed x10 minutes with Stand-by Assistance.  Supine to sit with Moderate Assistance.                             Time Tracking:     OT Date of Treatment: 06/20/25  OT Start Time: 1300  OT Stop Time: 1315  OT Total Time (min): 15 min    Billable Minutes:Therapeutic Activity 15    OT/RADHA: RADHA     Number of RADHA visits since last OT visit: 1  OTR/L readily available for conference at the time of the provision of services-  BARB Blackmon    6/20/2025

## 2025-06-20 NOTE — ASSESSMENT & PLAN NOTE
Chronic. Stable. Not in acute exacerbation and currently denies endorsing any suicidal/homicidal ideations. Active auditory and visual hallucinations. Unsure if this is baseline due to patient's underlying dementia versus UTI symptoms.  Plan:  - Continue home medications   - MRI Brain w/o acute abn  - Neuro and TelePsych: Pt seems to be in a state of delirium. Sleep deprivation may be worsening s/s. Incr Seroquel to 50 BID. Zyprexa IM prn. Will take quite some time given Dementia for mentation to improve, if it improves.

## 2025-06-20 NOTE — PROGRESS NOTES
[facility] Medicine  Progress Note    Patient Name: Tea Ring  MRN: [id number]  Patient Class: IP- Inpatient   Admission Date: 6/13/2025  Length of Stay: 6 days  Attending Physician: Kylie Payne MD  Primary Care Provider: Marcello Zambrano MD    Subjective     Principal Problem: UTI (urinary tract infection)    HPI:  Tea Ring is a 79 y.o. female with a PMH  has a past medical history of Alpha thalassemia, Asthma, Cataract, Chronic anxiety, Chronic knee pain, Chronic pain of left ankle, Clotting disorder, Cutaneous lupus erythematosus, Dementia without behavioral disturbance, Depression, Depression, Ex-smoker, Hypertension, and Osteopenia (1/16 reck1/18).  Presents as a transfer from our [facility] facility for further treatment of generalized weakness fungal recent UTI and ortho consult for pubic rami fracture. Patient reported that she has been having difficulty urinating for over 2 weeks. She has had to use the bathroom at least 6 times daily. Patient reports that she will get the urge to urinate and then will itch down there. She was started on Macrobid on Damaris 10, 2025 by her primary care physician. Patient reports that she has been feeling lightheaded. She denies any specific chest pain, chest pressure, nausea, vomiting, diarrhea, dysuria. Patient reports that she had walked into the gas bedroom might have passed out. Patient is complaining right hip pain, neck pain, and generalized weakness.  Denies any injury or trauma which may have caused her symptoms.  Denies any alleviating or aggravating factors.  Denies any other symptoms at this time.    ER workup revealed CBC with leukocytosis of 17.37.  CMP with a BUN/creatinine of 23/1.4 with EGFR of 39 (previously 20/1.0 with EGFR 58 on 05/20/2025).CBG of 126 mg/dL.  Troponin negative.  Procalcitonin level negative.  Initial lactic acid 2.5 with repeat of 3.4 followed by 1.0.  Previous UA revealed acute cystitis, with repeat UA  showing improvement.  Blood cultures obtained x2.  CT cervical spine revealed age indeterminate fracture of the medial aspect of left clavicle and degenerative changes of cervical spine.  CT head negative for acute intracranial abnormality/findings.  Chest x-ray negative for acute cardiopulmonary findings.  Plain film imaging of the hip/pelvis revealed fracture of the superior and inferior pubic rami.  CT renal stone study of abdomen and pelvis revealed age indeterminate acute or subacute appearing fracture involving right superior and inferior pubic rami.  CT of the right knee pending results.  On-call orthopedic surgeon consulted.  Recommended hospital Medicine to admit and will see us consult.  Vital signs stable.  Patient received 1 g Rocephin, 2 L lactated Ringer's, and a total of 4 mg of morphine at outside facility.  Patient admitted under observation status.    RECONCILE HOME MEDICATIONS WITH FAMILY DURING DAY.  PATIENT UNABLE TO REPORT WHICH MEDICATION SHE TAKES.    PCP: Marcello Zambrano    Overview/Hospital Course:  78 y/o female admitted for UTI and acute encephalopathy. Patient started IV rocephin. Ortho has been consulted for pubic rami fracture.   As of 6/15 patient with worsening confusion. WBCs trending up. CXR showed worsening vascular congestion vs infectious process. IVFs stopped and IV lasix started.  Will add azithromycin to current IV abx therapy. Case discussed with Dr. Lo.   As of 6/16/2025, T 99.9 highest overnight, VSS otherwise. Ortho pending: Pubic rami fx = pain management. Diff following commands/swallowing pills--spits them out. Confusion slightly improved today--no obvious hallucinations. Bl Cx no growth after 36 hours. Overnight, taking PO meds. C/o pain not improved w/ current meds.  As of 6/17/2025, VSS. Cr bumped 1.1>1.6. Renal US: no acute abn. Hold Lasix, Valsartan, and HCTZ. Cover BP w/ IV Metoprolol and prn Hydralazine. K repletion 10 IV. BMP at 1500. Mentation not at  baseline per daughter at bedside. Bouts of anger and uncooperative intermittently. Pain med adjusted. MRI Brain pending. In afternoon, mentation worsened and unable to obtain MRI or 1500 BMP redraw.  As of 6/18/2025, intermittent cooperation w/ continuous confusion, not at baseline, and bouts of anger. Hold Lasix, Valsartan, and HCTZ 2/2 kidney fx. K repletion. Re-attempt MRI today. 1500 BMP.  As of 6/19/2025, confused, speaking to someone who is not present, more cooperative today. Daughter at bedside. Cr and K WNL. Seroquel 25 in AM added. Neuro and TelePsych: Pt seems to be in a state of delirium. Sleep deprivation may be worsening s/s. Incr Seroquel to 50 BID. Zyprexa IM prn. Will take quite some time given Dementia for mentation to improve, if it improves.   As of 6/20/2025, transitioned to PO and de-escalated meds. CM following: awaiting SNF    Interval History: Pt lying in bed in no acute distress. When spoken to, patient answers in anger and requests to be left alone.  Midday, rounded and spoke w/ daughter at bedside. Patient pleasant and cooperative to assessment. Dr. Payne reports medically stable for DC to SNF.  CM following and requesting [place] [facility] per daughter request.     Review of Systems   Unable to perform ROS: Dementia     Objective:     Vital Signs (Most Recent):  Temp: 99 °F (37.2 °C) (06/20/25 1538)  Pulse: 101 (06/20/25 1538)  Resp: 19 (06/20/25 1538)  BP: (!) 145/70 (06/20/25 1538)  SpO2: 97 % (06/20/25 1538) Vital Signs (24h Range):  Temp:  [99 °F (37.2 °C)-99.6 °F (37.6 °C)] 99 °F (37.2 °C)  Pulse:  [] 101  Resp:  [18-19] 19  SpO2:  [95 %-99 %] 97 %  BP: (123-162)/(70-92) 145/70     Weight: 66.5 kg (146 lb 9.7 oz)  Body mass index is 27.7 kg/m².    Intake/Output Summary (Last 24 hours) at 6/20/2025 1656  Last data filed at 6/19/2025 2311  Gross per 24 hour   Intake --   Output 150 ml   Net -150 ml         Physical Exam  Vitals reviewed.   Constitutional:       General: She is not in  acute distress.     Appearance: She is normal weight. She is not ill-appearing or diaphoretic.   HENT:      Head: Normocephalic and atraumatic.      Nose: Nose normal.      Mouth/Throat:      Mouth: Mucous membranes are moist.      Pharynx: Oropharynx is clear.   Eyes:      Extraocular Movements: Extraocular movements intact.      Pupils: Pupils are equal, round, and reactive to light.   Cardiovascular:      Rate and Rhythm: Normal rate and regular rhythm.      Pulses: Normal pulses.      Heart sounds: Normal heart sounds.   Pulmonary:      Effort: Pulmonary effort is normal. No respiratory distress.      Breath sounds: Normal breath sounds. No wheezing, rhonchi or rales.   Chest:      Chest wall: No tenderness.   Abdominal:      General: Bowel sounds are normal. There is no distension.      Palpations: Abdomen is soft.      Tenderness: There is no abdominal tenderness. There is no guarding.   Musculoskeletal:         General: Tenderness present.      Right lower leg: No edema.      Left lower leg: No edema.   Skin:     General: Skin is warm and dry.   Neurological:      Comments: Taking oral meds at this time per bedside nurse. Bouts of anger and confusion. Uncooperative at times.   Psychiatric:         Mood and Affect: Angry: intermittently.         Behavior: Behavior is cooperative.      Comments: Bedside reports overnight pt had auditory and visual hallucinations. None apparent at this time.               Significant Labs: All pertinent labs within the past 24 hours have been reviewed.  Recent Lab Results         06/20/25  0532        Albumin 3.0       ALP 57       ALT 8       Anion Gap 13       AST 15       Baso # 0.05       Basophil % 0.4       BILIRUBIN TOTAL 0.7  Comment: For infants and newborns, interpretation of results should be based   on gestational age, weight and in agreement with clinical   observations.    Premature Infant recommended reference ranges:   0-24 hours:  <8.0 mg/dL   24-48 hours: <12.0  mg/dL   3-5 days:    <15.0 mg/dL   6-29 days:   <15.0 mg/dL       BUN 27       Calcium 10.1       Chloride 106       CO2 20       Creatinine 0.9       eGFR >60  Comment: Estimated GFR calculated using the CKD-EPI creatinine (2021) equation.       Eos # 0.08       Eos % 0.6       Glucose 80       Gran # (ANC) 9.50       Hematocrit 31.1       Hemoglobin 9.6       Immature Grans (Abs) 0.10  Comment: Mild elevation in immature granulocytes is non specific and can be seen in a variety of conditions including stress response, acute inflammation, trauma and pregnancy. Correlation with other laboratory and clinical findings is essential.       Immature Granulocytes 0.8       Lymph # 2.14       Lymph % 16.6       MCH 22.0       MCHC 30.9       MCV 71       Mono # 1.01       Mono % 7.8       MPV 10.7       Neut % 73.8       nRBC 0       Platelet Estimate Appears Normal       Platelet Count 411       Potassium 3.9       PROTEIN TOTAL 7.6       RBC 4.37       RDW 15.9       Sodium 139       WBC 12.88               Significant Imaging: I have reviewed all pertinent imaging results/findings within the past 24 hours.      Assessment & Plan  Acute metabolic encephalopathy  History of dementia  Hallucinations  Dementia is stable currently. Continue home dementia meds and non-pharmacologic interventions to prevent delirium (No VS between 11PM-5AM, activity during day, opening blinds, providing glasses/hearing aids, and up in chair during daytime). Use PRN anti-psychotics to prevent behavior of self harm during sundowning, and avoid narcotics and benzos unless absolutely necessary. PRN anti-psychotics prescribed to avoid self harm behaviors.    6/15/2025: Witnessed patient having auditory and visual hallucinations during examination.    - Consider UTI/PNA vs Dementia vs Hosp induced psychosis  - Treat underlying cause  - Daughter reports not pt baseline  - Dementia Precautions  - Cont IV Abx  - prn Ativan  - Seroquel home med  nightly  - Mentation: anger on morning rounds and uncooperative, midday pleasant and cooperative, no apparent hallucinations on assessment; bedside reports night nurse reported visual and auditory hallucinations.  - MRI Brain w/o acute abn  - Incr Seroquel to 50 mg BID  - Neuro and TelePsych: Pt seems to be in a state of delirium. Sleep deprivation may be worsening s/s. Incr Seroquel to 50 BID. Zyprexa IM prn. Will take quite some time given Dementia for mentation to improve, if it improves.     UTI (urinary tract infection)  Presents with complaints of dysuria and confusion with /. Urinalysis revealed:   Lab Results   Component Value Date    COLORU Yellow 06/15/2025    APPEARANCEUA Clear 06/15/2025    SPECGRAV 1.015 06/15/2025    PHUR 7.0 06/15/2025    PROTEINUA Negative 06/15/2025    GLUCUA Negative 06/15/2025    KETONESU Negative 03/15/2025    NITRITE Negative 06/15/2025    UROBILINOGEN Negative 06/15/2025    BILIRUBINUA Negative 06/15/2025    LEUKOCYTESUR Negative 06/15/2025    RBCUA 2 06/10/2025    WBCUA 8 (H) 06/10/2025    BACTERIA Moderate (A) 06/10/2025    HYALINECASTS 6 (A) 03/06/2025   Received 1 g Rocephin at outside facility  Plan:  - UA as above  - Did NOT reflex to culture -- confirmed with Micro Lab 6/16/2025  - Azithromycin PO x 5D completed  - Rocephin IV x 5 D completed  - Mentation: anger on morning rounds and uncooperative, midday pleasant and cooperative, no apparent hallucinations on assessment; bedside reports night nurse reported visual and auditory hallucinations.  - MRI Brain w/o acute abn  - Incr Seroquel to 50 mg BID  - Neuro and TelePsych: Pt seems to be in a state of delirium. Sleep deprivation may be worsening s/s. Incr Seroquel to 50 BID. Zyprexa IM prn. Will take quite some time given Dementia for mentation to improve, if it improves.     Essential hypertension  Chronic, uncontrolled.  Latest blood pressure and vitals reviewed-   Temp:  [99 °F (37.2 °C)-99.6 °F (37.6 °C)]    Pulse:  []   Resp:  [18-19]   BP: (123-162)/(70-92)   SpO2:  [95 %-99 %] .   Home meds for hypertension were reviewed and noted below.   Hypertension Medications              valsartan-hydrochlorothiazide (DIOVAN-HCT) 160-12.5 mg per tablet Take 1 tablet by mouth once daily.     While in the hospital, will manage blood pressure as follows; Continue home antihypertensive regimen once verified with family in a.m.; we will treat with p.r.n. hydralazine    Will utilize p.r.n. blood pressure medication only if patient's blood pressure greater than  170/90 and she develops symptoms such as worsening chest pain or shortness of breath.    - Admit: IV metoprolol 5 mg Q6 hours until able to take oral.   - prn Hydralazine  - 6/15: Patient spitting out medication  - 6/16: attempted to take pills--spit some out after administration  - 6/16 HS: taking oral meds, held IV Metoprolol 2/2 BP soft  - 6/17: Hold IV Lasix, Valsartan, and HCTZ 2/2 Cr bump; control BP w/ IV Metoprolol and prn Hydralazine  - 6/18: Hold IV Lasix, Valsartan, and HCTZ; control BP w/ IV Metoprolol and prn Hydralazine  - 6/19: taking all orals, DC IV Metoprolol  - 6/20: DC Lasix, Valsartan, and HCTZ. Norvasc 5 mg daily  Moderate major depression  Chronic. Stable. Not in acute exacerbation and currently denies endorsing any suicidal/homicidal ideations. Active auditory and visual hallucinations. Unsure if this is baseline due to patient's underlying dementia versus UTI symptoms.  Plan:  - Continue home medications   - MRI Brain w/o acute abn  - Neuro and TelePsych: Pt seems to be in a state of delirium. Sleep deprivation may be worsening s/s. Incr Seroquel to 50 BID. Zyprexa IM prn. Will take quite some time given Dementia for mentation to improve, if it improves.     Primary open angle glaucoma (POAG) of both eyes, severe stage  - continue eye drops   Neck pain  CT imaging cervical spine revealed:  1. There is grade 1 anterolisthesis of C6 on C7. There  is grade 1 anterolisthesis of C7 on T1.  2. There are mild degenerative changes between C2 and T1.    Plan:   - analgesics p.r.n.  - Percocet prn    Closed fracture of multiple pubic rami, right, initial encounter  CT imaging revealed:  - Age-indeterminate acute or subacute appearing fracture involving the right superior and inferior pubic ramus images 139 through 156. Nonemergent pelvic MRI can be obtained as clinically warranted.  - analgesics p.r.n.   - Orthopedic following: pain management  - Change Norco to Percocet  - XR R Humerus: no acute fracture or dislocation  - XR R Knee: no acute findings  - Dr. Minor: weight-bearing OK  - PT/OT: SNF for mod therapy  - 6/20: Dr. Herrera, ortho, signed off    Acute diastolic heart failure  - Echo: EF 60-65%, diastolic dysfunction  - BNP 91  - Monitor strict I&Os and daily weights.    - Low sodium diet  - Place on fluid restriction of 1.5 L.     Results for orders placed during the hospital encounter of 10/31/23    Echo    Interpretation Summary    Left Ventricle: The left ventricle is normal in size. Normal wall thickness. There is concentric remodeling. Normal wall motion. There is normal systolic function with a visually estimated ejection fraction of 60 - 65%. There is normal diastolic function.    Right Ventricle: Normal right ventricular cavity size. Wall thickness is normal. Right ventricle wall motion  is normal. Systolic function is normal.    Pulmonary Artery: The estimated pulmonary artery systolic pressure is 18 mmHg.    IVC/SVC: Normal venous pressure at 3 mmHg.     PNA (pneumonia)  Patient has a diagnosis of pneumonia. The cause of the pneumonia is unknown at this time. The patient has the following signs/symptoms of pneumonia: none, s/s resolved. The patient does not have a current oxygen requirement and the patient does not have a home oxygen requirement. I have reviewed the pertinent imaging. The following cultures have been collected: Blood cultures  The culture results are listed below.     Current antimicrobial regimen consists of the antibiotics listed below. Will monitor patient closely and continue current treatment plan unchanged.    Antibiotics (From admission, onward)      None            Microbiology Results (last 7 days)       Procedure Component Value Units Date/Time    Blood culture x two cultures. Draw prior to antibiotics. [[id number]]  (Normal) Collected: 06/13/25 1430    Order Status: Completed Specimen: Blood from Peripheral, Forearm, Left Updated: 06/19/25 1702     Blood Culture No Growth After 5 Days    Blood culture x two cultures. Draw prior to antibiotics. [[id number]]  (Normal) Collected: 06/13/25 1436    Order Status: Completed Specimen: Blood from Peripheral, Antecubital, Right Updated: 06/19/25 1702     Blood Culture No Growth After 5 Days          Anemia  Anemia: Most recent hemoglobin and hematocrit are listed below.  Recent Labs     06/18/25  0512 06/19/25  0515 06/20/25  0532   HGB 8.2* 9.1* 9.6*   HCT 25.5* 28.9* 31.1*     Plan  - Monitor serial CBC: Daily  - Transfuse PRBC if patient becomes hemodynamically unstable, symptomatic or H/H drops below 7/21.  - Patient has not received any PRBC transfusions to date  - Patient's anemia is currently improving  - Trend    Hyponatremia  Hyponatremia is likely due to Dehydration/hypovolemia. The patient's most recent sodium results are listed below.  Recent Labs     06/18/25  1456 06/19/25  0515 06/20/25  0532    137 139     Plan  - Correct the sodium by 4-6mEq in 24 hours.   - Monitor sodium Daily.   - Patient hyponatremia is stable  - Trend    Severe malnutrition  Nutrition consulted. Most recent weight and BMI monitored-     Measurements:  Wt Readings from Last 1 Encounters:   06/20/25 66.5 kg (146 lb 9.7 oz)   Body mass index is 27.7 kg/m².    Patient has been screened and assessed by RD.    Malnutrition Type:  Context: acute illness or injury  Level: severe    Malnutrition  Characteristic Summary:  Weight Loss (Malnutrition): greater than 2% in 1 week  Energy Intake (Malnutrition): less than or equal to 50% for greater than or equal to 5 days  Subcutaneous Fat (Malnutrition): mild depletion  Muscle Mass (Malnutrition): mild depletion    Interventions/Recommendations (treatment strategy):  1. Recommend a Low sodium diet with texture per SLP. 2. Recommend Boost Plus with all meals to provide additional calories. 3. Recommend starting a bowel regimen. 4. Encourage PO intake with feeding assistance as warranted. 5. Weigh x2 weekly.    VTE Risk Mitigation (From admission, onward)           Ordered     enoxaparin injection 40 mg  Daily         06/19/25 0854     IP VTE HIGH RISK PATIENT  Once         06/13/25 2255     Place sequential compression device  Until discontinued         06/13/25 2255                    Discharge Planning   YIMI: 6/20/2025     Code Status: Full Code   Medical Readiness for Discharge Date: 6/20/2025  Discharge Plan A: SNF          SDOH Screening:  The patient was unable to be screened for utility difficulties, food insecurity, transport difficulties, housing insecurity, and interpersonal safety, so no concerns could be identified this admission.      Kristin Philip NP-C  Department of Hospital Medicine   [facility] ([facility])

## 2025-06-20 NOTE — ASSESSMENT & PLAN NOTE
Nutrition consulted. Most recent weight and BMI monitored-     Measurements:  Wt Readings from Last 1 Encounters:   06/20/25 66.5 kg (146 lb 9.7 oz)   Body mass index is 27.7 kg/m².    Patient has been screened and assessed by RD.    Malnutrition Type:  Context: acute illness or injury  Level: severe    Malnutrition Characteristic Summary:  Weight Loss (Malnutrition): greater than 2% in 1 week  Energy Intake (Malnutrition): less than or equal to 50% for greater than or equal to 5 days  Subcutaneous Fat (Malnutrition): mild depletion  Muscle Mass (Malnutrition): mild depletion    Interventions/Recommendations (treatment strategy):  1. Recommend a Low sodium diet with texture per SLP. 2. Recommend Boost Plus with all meals to provide additional calories. 3. Recommend starting a bowel regimen. 4. Encourage PO intake with feeding assistance as warranted. 5. Weigh x2 weekly.

## 2025-06-20 NOTE — PLAN OF CARE
CM called and spoke with daughter, Zoie. Patient/family have chosen [facility].    LOC team notified to submit auth via secure chat. CM left messages with Julia and Lucy from [facility] to notify of patient/family choice. Julia to call patient's daughter this morning.    PASRR submitted, awaiting 142.

## 2025-06-20 NOTE — ASSESSMENT & PLAN NOTE
Dementia is stable currently. Continue home dementia meds and non-pharmacologic interventions to prevent delirium (No VS between 11PM-5AM, activity during day, opening blinds, providing glasses/hearing aids, and up in chair during daytime). Use PRN anti-psychotics to prevent behavior of self harm during sundowning, and avoid narcotics and benzos unless absolutely necessary. PRN anti-psychotics prescribed to avoid self harm behaviors.    6/15/2025: Witnessed patient having auditory and visual hallucinations during examination.    - Consider UTI/PNA vs Dementia vs Hosp induced psychosis  - Treat underlying cause  - Daughter reports not pt baseline  - Dementia Precautions  - Cont IV Abx  - prn Ativan  - Seroquel home med nightly  - Mentation: anger on morning rounds and uncooperative, midday pleasant and cooperative, no apparent hallucinations on assessment; bedside reports night nurse reported visual and auditory hallucinations.  - MRI Brain w/o acute abn  - Incr Seroquel to 50 mg BID  - Neuro and TelePsych: Pt seems to be in a state of delirium. Sleep deprivation may be worsening s/s. Incr Seroquel to 50 BID. Zyprexa IM prn. Will take quite some time given Dementia for mentation to improve, if it improves.

## 2025-06-20 NOTE — PLAN OF CARE
P2P scheduled for Monday at 8:30am with Dr. Nadira Wallace.    MD and NP notified via secure chat.    Update: MD would prefer 11am time for P2P. CM called and spoke with [place] from [facility], time changed to 11am on Monday with same provider.    MACKENZIE also called to update daughter.

## 2025-06-20 NOTE — ASSESSMENT & PLAN NOTE
Anemia: Most recent hemoglobin and hematocrit are listed below.  Recent Labs     06/18/25  0512 06/19/25  0515 06/20/25  0532   HGB 8.2* 9.1* 9.6*   HCT 25.5* 28.9* 31.1*     Plan  - Monitor serial CBC: Daily  - Transfuse PRBC if patient becomes hemodynamically unstable, symptomatic or H/H drops below 7/21.  - Patient has not received any PRBC transfusions to date  - Patient's anemia is currently improving  - Trend

## 2025-06-21 LAB
ABSOLUTE EOSINOPHIL (OHS): 0.23 K/UL
ABSOLUTE MONOCYTE (OHS): 1.04 K/UL (ref 0.3–1)
ABSOLUTE NEUTROPHIL COUNT (OHS): 6.97 K/UL (ref 1.8–7.7)
ALBUMIN SERPL BCP-MCNC: 2.7 G/DL (ref 3.5–5.2)
ALP SERPL-CCNC: 55 UNIT/L (ref 40–150)
ALT SERPL W/O P-5'-P-CCNC: 10 UNIT/L (ref 10–44)
ANION GAP (OHS): 14 MMOL/L (ref 8–16)
AST SERPL-CCNC: 12 UNIT/L (ref 11–45)
BASOPHILS # BLD AUTO: 0.06 K/UL
BASOPHILS NFR BLD AUTO: 0.6 %
BILIRUB SERPL-MCNC: 0.6 MG/DL (ref 0.1–1)
BUN SERPL-MCNC: 37 MG/DL (ref 8–23)
CALCIUM SERPL-MCNC: 9.5 MG/DL (ref 8.7–10.5)
CHLORIDE SERPL-SCNC: 105 MMOL/L (ref 95–110)
CO2 SERPL-SCNC: 21 MMOL/L (ref 23–29)
CREAT SERPL-MCNC: 1.3 MG/DL (ref 0.5–1.4)
ERYTHROCYTE [DISTWIDTH] IN BLOOD BY AUTOMATED COUNT: 15.9 % (ref 11.5–14.5)
GFR SERPLBLD CREATININE-BSD FMLA CKD-EPI: 42 ML/MIN/1.73/M2
GLUCOSE SERPL-MCNC: 76 MG/DL (ref 70–110)
HCT VFR BLD AUTO: 28.9 % (ref 37–48.5)
HGB BLD-MCNC: 8.7 GM/DL (ref 12–16)
IMM GRANULOCYTES # BLD AUTO: 0.1 K/UL (ref 0–0.04)
IMM GRANULOCYTES NFR BLD AUTO: 0.9 % (ref 0–0.5)
LYMPHOCYTES # BLD AUTO: 2.44 K/UL (ref 1–4.8)
MCH RBC QN AUTO: 21.7 PG (ref 27–31)
MCHC RBC AUTO-ENTMCNC: 30.1 G/DL (ref 32–36)
MCV RBC AUTO: 72 FL (ref 82–98)
NUCLEATED RBC (/100WBC) (OHS): 0 /100 WBC
PLATELET # BLD AUTO: 428 K/UL (ref 150–450)
PMV BLD AUTO: 9.3 FL (ref 9.2–12.9)
POTASSIUM SERPL-SCNC: 3.9 MMOL/L (ref 3.5–5.1)
PROT SERPL-MCNC: 7 GM/DL (ref 6–8.4)
RBC # BLD AUTO: 4.01 M/UL (ref 4–5.4)
RELATIVE EOSINOPHIL (OHS): 2.1 %
RELATIVE LYMPHOCYTE (OHS): 22.5 % (ref 18–48)
RELATIVE MONOCYTE (OHS): 9.6 % (ref 4–15)
RELATIVE NEUTROPHIL (OHS): 64.3 % (ref 38–73)
SODIUM SERPL-SCNC: 140 MMOL/L (ref 136–145)
WBC # BLD AUTO: 10.84 K/UL (ref 3.9–12.7)

## 2025-06-21 PROCEDURE — 21400001 HC TELEMETRY ROOM

## 2025-06-21 PROCEDURE — 99232 SBSQ HOSP IP/OBS MODERATE 35: CPT | Mod: ,,, | Performed by: ORTHOPAEDIC SURGERY

## 2025-06-21 PROCEDURE — 85025 COMPLETE CBC W/AUTO DIFF WBC: CPT

## 2025-06-21 PROCEDURE — 36415 COLL VENOUS BLD VENIPUNCTURE: CPT

## 2025-06-21 PROCEDURE — 63600175 PHARM REV CODE 636 W HCPCS: Performed by: EMERGENCY MEDICINE

## 2025-06-21 PROCEDURE — 25000003 PHARM REV CODE 250: Performed by: NURSE PRACTITIONER

## 2025-06-21 PROCEDURE — 25000003 PHARM REV CODE 250

## 2025-06-21 PROCEDURE — 80053 COMPREHEN METABOLIC PANEL: CPT

## 2025-06-21 PROCEDURE — 25000003 PHARM REV CODE 250: Performed by: EMERGENCY MEDICINE

## 2025-06-21 PROCEDURE — 25000003 PHARM REV CODE 250: Performed by: HOSPITALIST

## 2025-06-21 RX ORDER — OXYCODONE AND ACETAMINOPHEN 7.5; 325 MG/1; MG/1
1 TABLET ORAL EVERY 4 HOURS PRN
Status: DISCONTINUED | OUTPATIENT
Start: 2025-06-21 | End: 2025-06-23

## 2025-06-21 RX ORDER — TRAMADOL HYDROCHLORIDE 50 MG/1
50 TABLET, FILM COATED ORAL EVERY 6 HOURS PRN
Status: DISCONTINUED | OUTPATIENT
Start: 2025-06-21 | End: 2025-06-23

## 2025-06-21 RX ADMIN — PREDNISOLONE ACETATE 1 DROP: 10 SUSPENSION/ DROPS OPHTHALMIC at 09:06

## 2025-06-21 RX ADMIN — ENOXAPARIN SODIUM 40 MG: 40 INJECTION SUBCUTANEOUS at 05:06

## 2025-06-21 RX ADMIN — PANTOPRAZOLE SODIUM 40 MG: 40 TABLET, DELAYED RELEASE ORAL at 09:06

## 2025-06-21 RX ADMIN — Medication 6 MG: at 08:06

## 2025-06-21 RX ADMIN — AMLODIPINE BESYLATE 5 MG: 5 TABLET ORAL at 09:06

## 2025-06-21 RX ADMIN — CITALOPRAM HYDROBROMIDE 10 MG: 10 TABLET ORAL at 09:06

## 2025-06-21 RX ADMIN — ATORVASTATIN CALCIUM 10 MG: 10 TABLET, FILM COATED ORAL at 09:06

## 2025-06-21 RX ADMIN — OXYCODONE HYDROCHLORIDE AND ACETAMINOPHEN 1 TABLET: 7.5; 325 TABLET ORAL at 09:06

## 2025-06-21 RX ADMIN — QUETIAPINE FUMARATE 50 MG: 25 TABLET ORAL at 08:06

## 2025-06-21 RX ADMIN — PREDNISOLONE ACETATE 1 DROP: 10 SUSPENSION/ DROPS OPHTHALMIC at 08:06

## 2025-06-21 RX ADMIN — TRAMADOL HYDROCHLORIDE 50 MG: 50 TABLET, COATED ORAL at 12:06

## 2025-06-21 RX ADMIN — TRAMADOL HYDROCHLORIDE 50 MG: 50 TABLET, COATED ORAL at 08:06

## 2025-06-21 RX ADMIN — QUETIAPINE FUMARATE 50 MG: 25 TABLET ORAL at 09:06

## 2025-06-21 NOTE — ASSESSMENT & PLAN NOTE
Presents with complaints of dysuria and confusion with AH/VH. Urinalysis revealed:   Lab Results   Component Value Date    COLORU Yellow 06/15/2025    APPEARANCEUA Clear 06/15/2025    SPECGRAV 1.015 06/15/2025    PHUR 7.0 06/15/2025    PROTEINUA Negative 06/15/2025    GLUCUA Negative 06/15/2025    KETONESU Negative 03/15/2025    NITRITE Negative 06/15/2025    UROBILINOGEN Negative 06/15/2025    BILIRUBINUA Negative 06/15/2025    LEUKOCYTESUR Negative 06/15/2025    RBCUA 2 06/10/2025    WBCUA 8 (H) 06/10/2025    BACTERIA Moderate (A) 06/10/2025    HYALINECASTS 6 (A) 03/06/2025   Received 1 g Rocephin at outside facility  Plan:  - UA as above  - Did NOT reflex to culture -- confirmed with Micro Lab 6/16/2025  - Azithromycin PO x 5D completed  - Rocephin IV x 5 D completed  - Resolved

## 2025-06-21 NOTE — ASSESSMENT & PLAN NOTE
Nutrition consulted. Most recent weight and BMI monitored-     Measurements:  Wt Readings from Last 1 Encounters:   06/21/25 65.5 kg (144 lb 6.4 oz)   Body mass index is 27.28 kg/m².    Patient has been screened and assessed by RD.    Malnutrition Type:  Context: acute illness or injury  Level: severe    Malnutrition Characteristic Summary:  Weight Loss (Malnutrition): greater than 2% in 1 week  Energy Intake (Malnutrition): less than or equal to 50% for greater than or equal to 5 days  Subcutaneous Fat (Malnutrition): mild depletion  Muscle Mass (Malnutrition): mild depletion    Interventions/Recommendations (treatment strategy):  1. Recommend a Low sodium diet with texture per SLP. 2. Recommend Boost Plus with all meals to provide additional calories. 3. Recommend starting a bowel regimen. 4. Encourage PO intake with feeding assistance as warranted. 5. Weigh x2 weekly.

## 2025-06-21 NOTE — SUBJECTIVE & OBJECTIVE
Interval History: Pt lying in bed in no acute distress. Mentation improved this morning and is cooperative w/ assessment, although still confused to time and situation. Does not appear to be having hallucinations at this time. Awaiting peer to peer scheduled Monday 6/23/2025 w/ Dr. Payne at 1100.      Review of Systems  Objective:     Vital Signs (Most Recent):  Temp: 97.8 °F (36.6 °C) (06/21/25 0738)  Pulse: 97 (06/21/25 0738)  Resp: 19 (06/21/25 0738)  BP: 112/68 (06/21/25 0738)  SpO2: (!) 93 % (06/21/25 0738) Vital Signs (24h Range):  Temp:  [97.8 °F (36.6 °C)-99 °F (37.2 °C)] 97.8 °F (36.6 °C)  Pulse:  [] 97  Resp:  [18-19] 19  SpO2:  [93 %-97 %] 93 %  BP: (112-145)/(68-92) 112/68     Weight: 65.5 kg (144 lb 6.4 oz)  Body mass index is 27.28 kg/m².  No intake or output data in the 24 hours ending 06/21/25 0822      Physical Exam          Significant Labs: All pertinent labs within the past 24 hours have been reviewed.  Recent Lab Results         06/21/25  0756        Baso # 0.06       Basophil % 0.6       Eos # 0.23       Eos % 2.1       Gran # (ANC) 6.97       Hematocrit 28.9       Hemoglobin 8.7       Immature Grans (Abs) 0.10  Comment: Mild elevation in immature granulocytes is non specific and can be seen in a variety of conditions including stress response, acute inflammation, trauma and pregnancy. Correlation with other laboratory and clinical findings is essential.       Immature Granulocytes 0.9       Lymph # 2.44       Lymph % 22.5       MCH 21.7       MCHC 30.1       MCV 72       Mono # 1.04       Mono % 9.6       MPV 9.3       Neut % 64.3       nRBC 0       Platelet Count 428       RBC 4.01       RDW 15.9       WBC 10.84               Significant Imaging: I have reviewed all pertinent imaging results/findings within the past 24 hours.

## 2025-06-21 NOTE — PLAN OF CARE
Problem: Infection  Goal: Absence of Infection Signs and Symptoms  6/21/2025 0610 by Sandra Cheng RN  Outcome: Progressing  6/21/2025 0610 by Sandra Cheng RN  Outcome: Progressing     Problem: Adult Inpatient Plan of Care  Goal: Plan of Care Review  6/21/2025 0610 by Sandra Cheng RN  Outcome: Progressing  6/21/2025 0610 by Sandra Cheng RN  Outcome: Progressing  Goal: Patient-Specific Goal (Individualized)  6/21/2025 0610 by Sandra Cheng RN  Outcome: Progressing  6/21/2025 0610 by Sandra Cheng RN  Outcome: Progressing  Goal: Absence of Hospital-Acquired Illness or Injury  6/21/2025 0610 by Sandra Cheng RN  Outcome: Progressing  6/21/2025 0610 by Sandra Cheng RN  Outcome: Progressing  Goal: Optimal Comfort and Wellbeing  6/21/2025 0610 by Sandra Cheng RN  Outcome: Progressing  6/21/2025 0610 by Sandra Cheng RN  Outcome: Progressing  Goal: Readiness for Transition of Care  6/21/2025 0610 by Sandra Cheng RN  Outcome: Progressing  6/21/2025 0610 by Sandra Cheng RN  Outcome: Progressing     Problem: Fall Injury Risk  Goal: Absence of Fall and Fall-Related Injury  6/21/2025 0610 by Sandra Cheng RN  Outcome: Progressing  6/21/2025 0610 by Sandra Cheng RN  Outcome: Progressing     Problem: Skin Injury Risk Increased  Goal: Skin Health and Integrity  6/21/2025 0610 by Sandra Cheng RN  Outcome: Progressing  6/21/2025 0610 by Sandra Cheng RN  Outcome: Progressing     Problem: Pneumonia  Goal: Fluid Balance  6/21/2025 0610 by Sandra Cheng RN  Outcome: Progressing  6/21/2025 0610 by Sandra Cheng RN  Outcome: Progressing  Goal: Resolution of Infection Signs and Symptoms  6/21/2025 0610 by Sandra Cheng RN  Outcome: Progressing  6/21/2025 0610 by Sandra Cheng RN  Outcome: Progressing  Goal: Effective Oxygenation and Ventilation  6/21/2025 0610 by Sandra Cheng RN  Outcome: Progressing  6/21/2025 0610 by Sandra Cheng, RN  Outcome: Progressing     Problem: Diarrhea  Goal: Effective Diarrhea Management  6/21/2025  0610 by Prosper, Sandra, RN  Outcome: Progressing  6/21/2025 0610 by Sandra Cheng RN  Outcome: Progressing     Problem: Delirium  Goal: Optimal Coping  6/21/2025 0610 by Sandra Cheng RN  Outcome: Progressing  6/21/2025 0610 by Sandra Cheng RN  Outcome: Progressing  Goal: Improved Behavioral Control  6/21/2025 0610 by Sandra Cheng RN  Outcome: Progressing  6/21/2025 0610 by Sandra Cheng RN  Outcome: Progressing  Goal: Improved Attention and Thought Clarity  6/21/2025 0610 by Sandra Cheng RN  Outcome: Progressing  6/21/2025 0610 by Sandra Cheng RN  Outcome: Progressing  Goal: Improved Sleep  6/21/2025 0610 by Sandra Cheng RN  Outcome: Progressing  6/21/2025 0610 by Sandra Cheng RN  Outcome: Progressing     Problem: Confusion Acute  Goal: Optimal Cognitive Function  6/21/2025 0610 by Sandra Cheng RN  Outcome: Progressing  6/21/2025 0610 by Sandra Cheng RN  Outcome: Progressing

## 2025-06-21 NOTE — PROGRESS NOTES
No real change today    Continues to be somewhat confused    She does have pain with logroll of the right lower extremity what has mild pain when I logroll of the left    She demonstrates intact motor function of her lower extremities    Continue to ambulate with physical therapy and increase activity.  We will sign off for now, feel free to contact ortho should the need arise

## 2025-06-21 NOTE — ASSESSMENT & PLAN NOTE
Chronic, uncontrolled.  Latest blood pressure and vitals reviewed-   Temp:  [97.8 °F (36.6 °C)-99 °F (37.2 °C)]   Pulse:  []   Resp:  [18-19]   BP: (112-145)/(68-92)   SpO2:  [93 %-97 %] .   Home meds for hypertension were reviewed and noted below.   Hypertension Medications              valsartan-hydrochlorothiazide (DIOVAN-HCT) 160-12.5 mg per tablet Take 1 tablet by mouth once daily.     While in the hospital, will manage blood pressure as follows; Continue home antihypertensive regimen once verified with family in a.m.; we will treat with p.r.n. hydralazine    Will utilize p.r.n. blood pressure medication only if patient's blood pressure greater than  170/90 and she develops symptoms such as worsening chest pain or shortness of breath.    - Admit: IV metoprolol 5 mg Q6 hours until able to take oral.   - prn Hydralazine  - 6/15: Patient spitting out medication  - 6/16: attempted to take pills--spit some out after administration  - 6/16 HS: taking oral meds, held IV Metoprolol 2/2 BP soft  - 6/17: Hold IV Lasix, Valsartan, and HCTZ 2/2 Cr bump; control BP w/ IV Metoprolol and prn Hydralazine  - 6/18: Hold IV Lasix, Valsartan, and HCTZ; control BP w/ IV Metoprolol and prn Hydralazine  - 6/19: taking all orals, DC IV Metoprolol  - 6/20: DC Lasix, Valsartan, and HCTZ. Norvasc 5 mg daily

## 2025-06-21 NOTE — ASSESSMENT & PLAN NOTE
Patient has a diagnosis of pneumonia. The cause of the pneumonia is unknown at this time. The patient has the following signs/symptoms of pneumonia: none, s/s resolved. The patient does not have a current oxygen requirement and the patient does not have a home oxygen requirement. I have reviewed the pertinent imaging. The following cultures have been collected: Blood cultures The culture results are listed below.     Current antimicrobial regimen consists of the antibiotics listed below. Will monitor patient closely and continue current treatment plan unchanged.    Antibiotics (From admission, onward)      None            Microbiology Results (last 7 days)       Procedure Component Value Units Date/Time    Blood culture x two cultures. Draw prior to antibiotics. [[id number]]  (Normal) Collected: 06/13/25 1430    Order Status: Completed Specimen: Blood from Peripheral, Forearm, Left Updated: 06/19/25 1702     Blood Culture No Growth After 5 Days    Blood culture x two cultures. Draw prior to antibiotics. [[id number]]  (Normal) Collected: 06/13/25 1436    Order Status: Completed Specimen: Blood from Peripheral, Antecubital, Right Updated: 06/19/25 1702     Blood Culture No Growth After 5 Days

## 2025-06-21 NOTE — PROGRESS NOTES
[facility] Medicine  Progress Note    Patient Name: Tea Ring  MRN: [id number]  Patient Class: IP- Inpatient   Admission Date: 6/13/2025  Length of Stay: 7 days  Attending Physician: Kylie Payne MD  Primary Care Provider: Marcello Zambrano MD    Subjective     Principal Problem: Acute metabolic encephalopathy    HPI:  Tea Ring is a 79 y.o. female with a PMH  has a past medical history of Alpha thalassemia, Asthma, Cataract, Chronic anxiety, Chronic knee pain, Chronic pain of left ankle, Clotting disorder, Cutaneous lupus erythematosus, Dementia without behavioral disturbance, Depression, Depression, Ex-smoker, Hypertension, and Osteopenia (1/16 reck1/18).  Presents as a transfer from our [facility] facility for further treatment of generalized weakness fungal recent UTI and ortho consult for pubic rami fracture. Patient reported that she has been having difficulty urinating for over 2 weeks. She has had to use the bathroom at least 6 times daily. Patient reports that she will get the urge to urinate and then will itch down there. She was started on Macrobid on Damaris 10, 2025 by her primary care physician. Patient reports that she has been feeling lightheaded. She denies any specific chest pain, chest pressure, nausea, vomiting, diarrhea, dysuria. Patient reports that she had walked into the gas bedroom might have passed out. Patient is complaining right hip pain, neck pain, and generalized weakness.  Denies any injury or trauma which may have caused her symptoms.  Denies any alleviating or aggravating factors.  Denies any other symptoms at this time.    ER workup revealed CBC with leukocytosis of 17.37.  CMP with a BUN/creatinine of 23/1.4 with EGFR of 39 (previously 20/1.0 with EGFR 58 on 05/20/2025).CBG of 126 mg/dL.  Troponin negative.  Procalcitonin level negative.  Initial lactic acid 2.5 with repeat of 3.4 followed by 1.0.  Previous UA revealed acute cystitis, with repeat UA  showing improvement.  Blood cultures obtained x2.  CT cervical spine revealed age indeterminate fracture of the medial aspect of left clavicle and degenerative changes of cervical spine.  CT head negative for acute intracranial abnormality/findings.  Chest x-ray negative for acute cardiopulmonary findings.  Plain film imaging of the hip/pelvis revealed fracture of the superior and inferior pubic rami.  CT renal stone study of abdomen and pelvis revealed age indeterminate acute or subacute appearing fracture involving right superior and inferior pubic rami.  CT of the right knee pending results.  On-call orthopedic surgeon consulted.  Recommended hospital Medicine to admit and will see us consult.  Vital signs stable.  Patient received 1 g Rocephin, 2 L lactated Ringer's, and a total of 4 mg of morphine at outside facility.  Patient admitted under observation status.    RECONCILE HOME MEDICATIONS WITH FAMILY DURING DAY.  PATIENT UNABLE TO REPORT WHICH MEDICATION SHE TAKES.    PCP: Marcello Zambrano    Overview/Hospital Course:  78 y/o female admitted for UTI and acute encephalopathy. Patient started IV rocephin. Ortho has been consulted for pubic rami fracture.   As of 6/15 patient with worsening confusion. WBCs trending up. CXR showed worsening vascular congestion vs infectious process. IVFs stopped and IV lasix started.  Will add azithromycin to current IV abx therapy. Case discussed with Dr. Lo.   As of 6/16/2025, T 99.9 highest overnight, VSS otherwise. Ortho pending: Pubic rami fx = pain management. Diff following commands/swallowing pills--spits them out. Confusion slightly improved today--no obvious hallucinations. Bl Cx no growth after 36 hours. Overnight, taking PO meds. C/o pain not improved w/ current meds.  As of 6/17/2025, VSS. Cr bumped 1.1>1.6. Renal US: no acute abn. Hold Lasix, Valsartan, and HCTZ. Cover BP w/ IV Metoprolol and prn Hydralazine. K repletion 10 IV. BMP at 1500. Mentation not at  baseline per daughter at bedside. Bouts of anger and uncooperative intermittently. Pain med adjusted. MRI Brain pending. In afternoon, mentation worsened and unable to obtain MRI or 1500 BMP redraw.  As of 6/18/2025, intermittent cooperation w/ continuous confusion, not at baseline, and bouts of anger. Hold Lasix, Valsartan, and HCTZ 2/2 kidney fx. K repletion. Re-attempt MRI today. 1500 BMP.  As of 6/19/2025, confused, speaking to someone who is not present, more cooperative today. Daughter at bedside. Cr and K WNL. Seroquel 25 in AM added. Neuro and TelePsych: Pt seems to be in a state of delirium. Sleep deprivation may be worsening s/s. Incr Seroquel to 50 BID. Zyprexa IM prn. Will take quite some time given Dementia for mentation to improve, if it improves.   As of 6/20/2025, transitioned to PO and de-escalated meds. CM following: awaiting SNF.  As of 6/21/2025, SNF requires P2P scheduled Monday at 1100 w/ Dr. Payne.     Interval History: Pt lying in bed in no acute distress. Mentation improved this morning and is cooperative w/ assessment, although still confused to time and situation. Does not appear to be having hallucinations at this time. Awaiting peer to peer scheduled Monday 6/23/2025 w/ Dr. Payne at 1100.      Review of Systems  Objective:     Vital Signs (Most Recent):  Temp: 97.8 °F (36.6 °C) (06/21/25 0738)  Pulse: 97 (06/21/25 0738)  Resp: 19 (06/21/25 0738)  BP: 112/68 (06/21/25 0738)  SpO2: (!) 93 % (06/21/25 0738) Vital Signs (24h Range):  Temp:  [97.8 °F (36.6 °C)-99 °F (37.2 °C)] 97.8 °F (36.6 °C)  Pulse:  [] 97  Resp:  [18-19] 19  SpO2:  [93 %-97 %] 93 %  BP: (112-145)/(68-92) 112/68     Weight: 65.5 kg (144 lb 6.4 oz)  Body mass index is 27.28 kg/m².  No intake or output data in the 24 hours ending 06/21/25 0822      Physical Exam          Significant Labs: All pertinent labs within the past 24 hours have been reviewed.  Recent Lab Results         06/21/25  0756        Baso # 0.06        Basophil % 0.6       Eos # 0.23       Eos % 2.1       Gran # (ANC) 6.97       Hematocrit 28.9       Hemoglobin 8.7       Immature Grans (Abs) 0.10  Comment: Mild elevation in immature granulocytes is non specific and can be seen in a variety of conditions including stress response, acute inflammation, trauma and pregnancy. Correlation with other laboratory and clinical findings is essential.       Immature Granulocytes 0.9       Lymph # 2.44       Lymph % 22.5       MCH 21.7       MCHC 30.1       MCV 72       Mono # 1.04       Mono % 9.6       MPV 9.3       Neut % 64.3       nRBC 0       Platelet Count 428       RBC 4.01       RDW 15.9       WBC 10.84               Significant Imaging: I have reviewed all pertinent imaging results/findings within the past 24 hours.  Assessment & Plan  Acute metabolic encephalopathy  History of dementia  Hallucinations  Dementia is stable currently. Continue home dementia meds and non-pharmacologic interventions to prevent delirium (No VS between 11PM-5AM, activity during day, opening blinds, providing glasses/hearing aids, and up in chair during daytime). Use PRN anti-psychotics to prevent behavior of self harm during sundowning, and avoid narcotics and benzos unless absolutely necessary. PRN anti-psychotics prescribed to avoid self harm behaviors.    6/15/2025: Witnessed patient having auditory and visual hallucinations during examination.    - Consider UTI/PNA vs Dementia vs Hosp induced psychosis  - Treat underlying cause  - Daughter maintains not pt baseline  - Dementia Precautions  - MRI Brain w/o acute abn  - 6/19: Neuro and TelePsych: Pt seems to be in a state of delirium. Sleep deprivation may be worsening s/s. Incr Seroquel to 50 BID. Zyprexa IM prn. Will take quite some time given Dementia for mentation to improve, if it improves.   - Cont IV Abx  - Mentation: much more pleasant and cooperative today. Oriented to self.   - Incr Seroquel to 50 mg BID  - Awaiting SNF  placement requiring P2P 6/23/2025 1100 w/ Dr. Payne    UTI (urinary tract infection)  Presents with complaints of dysuria and confusion with AH/VH. Urinalysis revealed:   Lab Results   Component Value Date    COLORU Yellow 06/15/2025    APPEARANCEUA Clear 06/15/2025    SPECGRAV 1.015 06/15/2025    PHUR 7.0 06/15/2025    PROTEINUA Negative 06/15/2025    GLUCUA Negative 06/15/2025    KETONESU Negative 03/15/2025    NITRITE Negative 06/15/2025    UROBILINOGEN Negative 06/15/2025    BILIRUBINUA Negative 06/15/2025    LEUKOCYTESUR Negative 06/15/2025    RBCUA 2 06/10/2025    WBCUA 8 (H) 06/10/2025    BACTERIA Moderate (A) 06/10/2025    HYALINECASTS 6 (A) 03/06/2025   Received 1 g Rocephin at outside facility  Plan:  - UA as above  - Did NOT reflex to culture -- confirmed with Micro Lab 6/16/2025  - Azithromycin PO x 5D completed  - Rocephin IV x 5 D completed  - Resolved    Essential hypertension  Chronic, uncontrolled.  Latest blood pressure and vitals reviewed-   Temp:  [97.8 °F (36.6 °C)-99 °F (37.2 °C)]   Pulse:  []   Resp:  [18-19]   BP: (112-145)/(68-92)   SpO2:  [93 %-97 %] .   Home meds for hypertension were reviewed and noted below.   Hypertension Medications              valsartan-hydrochlorothiazide (DIOVAN-HCT) 160-12.5 mg per tablet Take 1 tablet by mouth once daily.     While in the hospital, will manage blood pressure as follows; Continue home antihypertensive regimen once verified with family in a.m.; we will treat with p.r.n. hydralazine    Will utilize p.r.n. blood pressure medication only if patient's blood pressure greater than  170/90 and she develops symptoms such as worsening chest pain or shortness of breath.    - Admit: IV metoprolol 5 mg Q6 hours until able to take oral.   - prn Hydralazine  - 6/15: Patient spitting out medication  - 6/16: attempted to take pills--spit some out after administration  - 6/16 HS: taking oral meds, held IV Metoprolol 2/2 BP soft  - 6/17: Hold IV Lasix,  Valsartan, and HCTZ 2/2 Cr bump; control BP w/ IV Metoprolol and prn Hydralazine  - 6/18: Hold IV Lasix, Valsartan, and HCTZ; control BP w/ IV Metoprolol and prn Hydralazine  - 6/19: taking all orals, DC IV Metoprolol  - 6/20: DC Lasix, Valsartan, and HCTZ. Norvasc 5 mg daily  Moderate major depression  Chronic. Stable. Not in acute exacerbation and currently denies endorsing any suicidal/homicidal ideations. Active auditory and visual hallucinations. Unsure if this is baseline due to patient's underlying dementia versus UTI symptoms.  Plan:  - Continue home medications   - MRI Brain w/o acute abn  - 6/19: Neuro and TelePsych: Pt seems to be in a state of delirium. Sleep deprivation may be worsening s/s. Incr Seroquel to 50 BID. Zyprexa IM prn. Will take quite some time given Dementia for mentation to improve, if it improves.     Primary open angle glaucoma (POAG) of both eyes, severe stage  - continue eye drops   Neck pain  CT imaging cervical spine revealed:  1. There is grade 1 anterolisthesis of C6 on C7. There is grade 1 anterolisthesis of C7 on T1.  2. There are mild degenerative changes between C2 and T1.    Plan:   - analgesics p.r.n.  - Percocet prn    Closed fracture of multiple pubic rami, right, initial encounter  CT imaging revealed:  - Age-indeterminate acute or subacute appearing fracture involving the right superior and inferior pubic ramus images 139 through 156. Nonemergent pelvic MRI can be obtained as clinically warranted.  - analgesics p.r.n.   - Orthopedic following: pain management  - Change Norco to Percocet  - XR R Humerus: no acute fracture or dislocation  - XR R Knee: no acute findings  - Dr. Minor: weight-bearing OK  - PT/OT: SNF for mod therapy  - 6/20: Dr. Herrera, ortho, signed off    Acute diastolic heart failure  - Echo: EF 60-65%, diastolic dysfunction  - BNP 91  - Monitor strict I&Os and daily weights.    - Low sodium diet  - Place on fluid restriction of 1.5 L.     Results  for orders placed during the hospital encounter of 10/31/23    Echo    Interpretation Summary    Left Ventricle: The left ventricle is normal in size. Normal wall thickness. There is concentric remodeling. Normal wall motion. There is normal systolic function with a visually estimated ejection fraction of 60 - 65%. There is normal diastolic function.    Right Ventricle: Normal right ventricular cavity size. Wall thickness is normal. Right ventricle wall motion  is normal. Systolic function is normal.    Pulmonary Artery: The estimated pulmonary artery systolic pressure is 18 mmHg.    IVC/SVC: Normal venous pressure at 3 mmHg.     PNA (pneumonia)  Patient has a diagnosis of pneumonia. The cause of the pneumonia is unknown at this time. The patient has the following signs/symptoms of pneumonia: none, s/s resolved. The patient does not have a current oxygen requirement and the patient does not have a home oxygen requirement. I have reviewed the pertinent imaging. The following cultures have been collected: Blood cultures The culture results are listed below.     Current antimicrobial regimen consists of the antibiotics listed below. Will monitor patient closely and continue current treatment plan unchanged.    Antibiotics (From admission, onward)      None            Microbiology Results (last 7 days)       Procedure Component Value Units Date/Time    Blood culture x two cultures. Draw prior to antibiotics. [[id number]]  (Normal) Collected: 06/13/25 1430    Order Status: Completed Specimen: Blood from Peripheral, Forearm, Left Updated: 06/19/25 1702     Blood Culture No Growth After 5 Days    Blood culture x two cultures. Draw prior to antibiotics. [[id number]]  (Normal) Collected: 06/13/25 1436    Order Status: Completed Specimen: Blood from Peripheral, Antecubital, Right Updated: 06/19/25 1702     Blood Culture No Growth After 5 Days          Anemia  Anemia: Most recent hemoglobin and hematocrit are listed  below.  Recent Labs     06/19/25  0515 06/20/25  0532 06/21/25  0756   HGB 9.1* 9.6* 8.7*   HCT 28.9* 31.1* 28.9*     Plan  - Monitor serial CBC: Daily  - Transfuse PRBC if patient becomes hemodynamically unstable, symptomatic or H/H drops below 7/21.  - Patient has not received any PRBC transfusions to date  - Patient's anemia is currently stable  - Trend    Hyponatremia  Hyponatremia is likely due to Dehydration/hypovolemia. The patient's most recent sodium results are listed below.  Recent Labs     06/18/25  1456 06/19/25  0515 06/20/25  0532    137 139     Plan  - Correct the sodium by 4-6mEq in 24 hours.   - Monitor sodium Daily.   - Patient hyponatremia is stable  - Trend    Severe malnutrition  Nutrition consulted. Most recent weight and BMI monitored-     Measurements:  Wt Readings from Last 1 Encounters:   06/21/25 65.5 kg (144 lb 6.4 oz)   Body mass index is 27.28 kg/m².    Patient has been screened and assessed by RD.    Malnutrition Type:  Context: acute illness or injury  Level: severe    Malnutrition Characteristic Summary:  Weight Loss (Malnutrition): greater than 2% in 1 week  Energy Intake (Malnutrition): less than or equal to 50% for greater than or equal to 5 days  Subcutaneous Fat (Malnutrition): mild depletion  Muscle Mass (Malnutrition): mild depletion    Interventions/Recommendations (treatment strategy):  1. Recommend a Low sodium diet with texture per SLP. 2. Recommend Boost Plus with all meals to provide additional calories. 3. Recommend starting a bowel regimen. 4. Encourage PO intake with feeding assistance as warranted. 5. Weigh x2 weekly.    VTE Risk Mitigation (From admission, onward)           Ordered     enoxaparin injection 40 mg  Daily         06/19/25 0854     IP VTE HIGH RISK PATIENT  Once         06/13/25 2255     Place sequential compression device  Until discontinued         06/13/25 2255                    Discharge Planning   YIMI: 6/20/2025     Code Status: Full Code    Medical Readiness for Discharge Date: 6/20/2025  Discharge Plan A: Home        EVERARDO RodriguezC  Department of Hospital Medicine   '[place] - Telemetry ([facility])

## 2025-06-21 NOTE — ASSESSMENT & PLAN NOTE
Dementia is stable currently. Continue home dementia meds and non-pharmacologic interventions to prevent delirium (No VS between 11PM-5AM, activity during day, opening blinds, providing glasses/hearing aids, and up in chair during daytime). Use PRN anti-psychotics to prevent behavior of self harm during sundowning, and avoid narcotics and benzos unless absolutely necessary. PRN anti-psychotics prescribed to avoid self harm behaviors.    6/15/2025: Witnessed patient having auditory and visual hallucinations during examination.    - Consider UTI/PNA vs Dementia vs Hosp induced psychosis  - Treat underlying cause  - Daughter maintains not pt baseline  - Dementia Precautions  - MRI Brain w/o acute abn  - 6/19: Neuro and TelePsych: Pt seems to be in a state of delirium. Sleep deprivation may be worsening s/s. Incr Seroquel to 50 BID. Zyprexa IM prn. Will take quite some time given Dementia for mentation to improve, if it improves.   - Cont IV Abx  - Mentation: much more pleasant and cooperative today. Oriented to self.   - Incr Seroquel to 50 mg BID  - Awaiting SNF placement requiring P2P 6/23/2025 1100 w/ Dr. Payne

## 2025-06-21 NOTE — ASSESSMENT & PLAN NOTE
Chronic. Stable. Not in acute exacerbation and currently denies endorsing any suicidal/homicidal ideations. Active auditory and visual hallucinations. Unsure if this is baseline due to patient's underlying dementia versus UTI symptoms.  Plan:  - Continue home medications   - MRI Brain w/o acute abn  - 6/19: Neuro and TelePsych: Pt seems to be in a state of delirium. Sleep deprivation may be worsening s/s. Incr Seroquel to 50 BID. Zyprexa IM prn. Will take quite some time given Dementia for mentation to improve, if it improves.

## 2025-06-21 NOTE — PLAN OF CARE
A222/A222 NATE Ring is a 79 y.o.female admitted on 6/13/2025 for Acute metabolic encephalopathy   Code Status: Full Code MRN: [id number]   Review of patient's allergies indicates:  No Known Allergies  Past Medical History:   Diagnosis Date    Alpha thalassemia     Asthma     resolved per patient    Cataract     Chronic anxiety     years tid xanax 1mg    Chronic knee pain     Chronic pain of left ankle     Clotting disorder     Cutaneous lupus erythematosus     dr henry derm    Dementia without behavioral disturbance     Depression     Depression     dr radha hughes previously    Ex-smoker     quit fall 1    Hypertension     Osteopenia 1/16 reck1/18      PRN meds    acetaminophen, 650 mg, Q6H PRN  acetaminophen, 650 mg, Q8H PRN  albuterol-ipratropium, 3 mL, Q6H PRN  aluminum-magnesium hydroxide-simethicone, 30 mL, QID PRN  dextrose 50%, 12.5 g, PRN  dextrose 50%, 25 g, PRN  glucagon (human recombinant), 1 mg, PRN  glucose, 16 g, PRN  glucose, 24 g, PRN  hydrALAZINE, 10 mg, Q8H PRN  melatonin, 6 mg, Nightly PRN  naloxone, 0.02 mg, PRN  OLANZapine, 5 mg, Q8H PRN  ondansetron, 4 mg, Q8H PRN  oxyCODONE-acetaminophen, 1 tablet, Q4H PRN  promethazine, 25 mg, Q6H PRN  senna-docusate, 1 tablet, BID PRN  sodium chloride 0.9%, 10 mL, Q12H PRN  sodium chloride 0.9%, 10 mL, PRN  traMADoL, 50 mg, Q6H PRN      Chart check completed. Will continue plan of care.      Orientation: oriented x 4, other (see comments) (forgetful)  [place] Coma Scale Score: 14     Lead Monitored: Lead II Rhythm: normal sinus rhythm Frequency/Ectopy: PVCs  Cardiac/Telemetry Box Number: [id number]  VTE Core Measure: Pharmacological prophylaxis initiated/maintained Last Bowel Movement: 06/16/25  Diet Low Sodium (2 gm)  Voiding Characteristics: incontinence  Jim Score: 16  Fall Risk Score: 25  Accucheck []   Freq?      Lines/Drains/Airways       None

## 2025-06-21 NOTE — ASSESSMENT & PLAN NOTE
Anemia: Most recent hemoglobin and hematocrit are listed below.  Recent Labs     06/19/25  0515 06/20/25  0532 06/21/25  0756   HGB 9.1* 9.6* 8.7*   HCT 28.9* 31.1* 28.9*     Plan  - Monitor serial CBC: Daily  - Transfuse PRBC if patient becomes hemodynamically unstable, symptomatic or H/H drops below 7/21.  - Patient has not received any PRBC transfusions to date  - Patient's anemia is currently stable  - Trend

## 2025-06-22 LAB
ABSOLUTE EOSINOPHIL (OHS): 0.26 K/UL
ABSOLUTE MONOCYTE (OHS): 0.81 K/UL (ref 0.3–1)
ABSOLUTE NEUTROPHIL COUNT (OHS): 6.43 K/UL (ref 1.8–7.7)
ALBUMIN SERPL BCP-MCNC: 2.7 G/DL (ref 3.5–5.2)
ALP SERPL-CCNC: 56 UNIT/L (ref 40–150)
ALT SERPL W/O P-5'-P-CCNC: 9 UNIT/L (ref 10–44)
ANION GAP (OHS): 12 MMOL/L (ref 8–16)
AST SERPL-CCNC: 12 UNIT/L (ref 11–45)
BASOPHILS # BLD AUTO: 0.05 K/UL
BASOPHILS NFR BLD AUTO: 0.5 %
BILIRUB SERPL-MCNC: 0.6 MG/DL (ref 0.1–1)
BUN SERPL-MCNC: 54 MG/DL (ref 8–23)
CALCIUM SERPL-MCNC: 9.1 MG/DL (ref 8.7–10.5)
CHLORIDE SERPL-SCNC: 103 MMOL/L (ref 95–110)
CO2 SERPL-SCNC: 22 MMOL/L (ref 23–29)
CREAT SERPL-MCNC: 2.5 MG/DL (ref 0.5–1.4)
ERYTHROCYTE [DISTWIDTH] IN BLOOD BY AUTOMATED COUNT: 16.1 % (ref 11.5–14.5)
GFR SERPLBLD CREATININE-BSD FMLA CKD-EPI: 19 ML/MIN/1.73/M2
GLUCOSE SERPL-MCNC: 91 MG/DL (ref 70–110)
HCT VFR BLD AUTO: 27.8 % (ref 37–48.5)
HGB BLD-MCNC: 8.5 GM/DL (ref 12–16)
IMM GRANULOCYTES # BLD AUTO: 0.13 K/UL (ref 0–0.04)
IMM GRANULOCYTES NFR BLD AUTO: 1.2 % (ref 0–0.5)
LYMPHOCYTES # BLD AUTO: 2.85 K/UL (ref 1–4.8)
MCH RBC QN AUTO: 22.1 PG (ref 27–31)
MCHC RBC AUTO-ENTMCNC: 30.6 G/DL (ref 32–36)
MCV RBC AUTO: 72 FL (ref 82–98)
NUCLEATED RBC (/100WBC) (OHS): 0 /100 WBC
PLATELET # BLD AUTO: 457 K/UL (ref 150–450)
PMV BLD AUTO: 9.6 FL (ref 9.2–12.9)
POTASSIUM SERPL-SCNC: 4 MMOL/L (ref 3.5–5.1)
PROT SERPL-MCNC: 6.7 GM/DL (ref 6–8.4)
RBC # BLD AUTO: 3.85 M/UL (ref 4–5.4)
RELATIVE EOSINOPHIL (OHS): 2.5 %
RELATIVE LYMPHOCYTE (OHS): 27.1 % (ref 18–48)
RELATIVE MONOCYTE (OHS): 7.7 % (ref 4–15)
RELATIVE NEUTROPHIL (OHS): 61 % (ref 38–73)
SODIUM SERPL-SCNC: 137 MMOL/L (ref 136–145)
WBC # BLD AUTO: 10.53 K/UL (ref 3.9–12.7)

## 2025-06-22 PROCEDURE — 85025 COMPLETE CBC W/AUTO DIFF WBC: CPT

## 2025-06-22 PROCEDURE — 36415 COLL VENOUS BLD VENIPUNCTURE: CPT

## 2025-06-22 PROCEDURE — 25000003 PHARM REV CODE 250

## 2025-06-22 PROCEDURE — 21400001 HC TELEMETRY ROOM

## 2025-06-22 PROCEDURE — 25000003 PHARM REV CODE 250: Performed by: NURSE PRACTITIONER

## 2025-06-22 PROCEDURE — 80053 COMPREHEN METABOLIC PANEL: CPT

## 2025-06-22 PROCEDURE — 25000003 PHARM REV CODE 250: Performed by: HOSPITALIST

## 2025-06-22 PROCEDURE — 97530 THERAPEUTIC ACTIVITIES: CPT | Mod: CQ

## 2025-06-22 PROCEDURE — 97110 THERAPEUTIC EXERCISES: CPT | Mod: CQ

## 2025-06-22 PROCEDURE — 63600175 PHARM REV CODE 636 W HCPCS: Performed by: EMERGENCY MEDICINE

## 2025-06-22 RX ORDER — SYRING-NEEDL,DISP,INSUL,0.3 ML 29 G X1/2"
148 SYRINGE, EMPTY DISPOSABLE MISCELLANEOUS ONCE AS NEEDED
Status: DISCONTINUED | OUTPATIENT
Start: 2025-06-22 | End: 2025-06-23 | Stop reason: HOSPADM

## 2025-06-22 RX ORDER — SYRING-NEEDL,DISP,INSUL,0.3 ML 29 G X1/2"
148 SYRINGE, EMPTY DISPOSABLE MISCELLANEOUS ONCE
Status: COMPLETED | OUTPATIENT
Start: 2025-06-22 | End: 2025-06-22

## 2025-06-22 RX ORDER — ENOXAPARIN SODIUM 100 MG/ML
30 INJECTION SUBCUTANEOUS EVERY 24 HOURS
Status: DISCONTINUED | OUTPATIENT
Start: 2025-06-22 | End: 2025-06-23 | Stop reason: HOSPADM

## 2025-06-22 RX ADMIN — ACETAMINOPHEN 650 MG: 325 TABLET ORAL at 08:06

## 2025-06-22 RX ADMIN — ATORVASTATIN CALCIUM 10 MG: 10 TABLET, FILM COATED ORAL at 09:06

## 2025-06-22 RX ADMIN — Medication 6 MG: at 08:06

## 2025-06-22 RX ADMIN — QUETIAPINE FUMARATE 50 MG: 25 TABLET ORAL at 08:06

## 2025-06-22 RX ADMIN — TRAMADOL HYDROCHLORIDE 50 MG: 50 TABLET, COATED ORAL at 08:06

## 2025-06-22 RX ADMIN — PREDNISOLONE ACETATE 1 DROP: 10 SUSPENSION/ DROPS OPHTHALMIC at 08:06

## 2025-06-22 RX ADMIN — CITALOPRAM HYDROBROMIDE 10 MG: 10 TABLET ORAL at 09:06

## 2025-06-22 RX ADMIN — PREDNISOLONE ACETATE 1 DROP: 10 SUSPENSION/ DROPS OPHTHALMIC at 09:06

## 2025-06-22 RX ADMIN — PANTOPRAZOLE SODIUM 40 MG: 40 TABLET, DELAYED RELEASE ORAL at 09:06

## 2025-06-22 RX ADMIN — QUETIAPINE FUMARATE 50 MG: 25 TABLET ORAL at 09:06

## 2025-06-22 RX ADMIN — ENOXAPARIN SODIUM 30 MG: 30 INJECTION SUBCUTANEOUS at 05:06

## 2025-06-22 RX ADMIN — MAGNESIUM CITRATE 148 ML: 1.75 LIQUID ORAL at 09:06

## 2025-06-22 NOTE — SUBJECTIVE & OBJECTIVE
Interval History: Pt lying in bed in no acute distress. Mentation improved as yesterday. Cooperative to assessment. BM: 6/16 per bedside nurse review of charting. Mag Citrate 1/2 bottle now and repeat in 2-3 prn if no BM achieved. BP soft, awaiting IV restart attempt for gentle IV hydration. Cr 2.5. Dr. Payne to discuss w/ daughter DC disposition as awaiting peer to peer for SNF placement.    Review of Systems   Unable to perform ROS: Dementia     Objective:     Vital Signs (Most Recent):  Temp: 99 °F (37.2 °C) (06/22/25 0824)  Pulse: 91 (06/22/25 0824)  Resp: 17 (06/22/25 0824)  BP: (!) 96/54 (06/22/25 0824)  SpO2: (!) 91 % (06/22/25 0824) Vital Signs (24h Range):  Temp:  [97.2 °F (36.2 °C)-99 °F (37.2 °C)] 99 °F (37.2 °C)  Pulse:  [89-99] 91  Resp:  [17-20] 17  SpO2:  [91 %-97 %] 91 %  BP: ()/(52-67) 96/54     Weight: 64.5 kg (142 lb 3.2 oz)  Body mass index is 26.87 kg/m².    Intake/Output Summary (Last 24 hours) at 6/22/2025 1135  Last data filed at 6/22/2025 0924  Gross per 24 hour   Intake 660 ml   Output --   Net 660 ml         Physical Exam  Vitals reviewed.   Constitutional:       General: She is not in acute distress.     Appearance: She is normal weight. She is not ill-appearing or diaphoretic.   HENT:      Head: Normocephalic and atraumatic.      Nose: Nose normal.      Mouth/Throat:      Mouth: Mucous membranes are moist.      Pharynx: Oropharynx is clear.   Eyes:      Extraocular Movements: Extraocular movements intact.      Pupils: Pupils are equal, round, and reactive to light.   Cardiovascular:      Rate and Rhythm: Normal rate and regular rhythm.      Pulses: Normal pulses.      Heart sounds: Normal heart sounds.   Pulmonary:      Effort: Pulmonary effort is normal. No respiratory distress.      Breath sounds: Normal breath sounds. No wheezing, rhonchi or rales.   Chest:      Chest wall: No tenderness.   Abdominal:      General: Bowel sounds are normal. There is no distension.       Palpations: Abdomen is soft.      Tenderness: There is no abdominal tenderness. There is no guarding.   Musculoskeletal:         General: Tenderness present.      Right lower leg: No edema.      Left lower leg: No edema.   Skin:     General: Skin is warm and dry.   Neurological:      Comments: Taking oral meds at this time per bedside nurse. Bouts of anger and confusion. Uncooperative at times.   Psychiatric:         Mood and Affect: Angry: intermittently.         Behavior: Behavior is cooperative.      Comments: No auditory or visual hallucinations apparent at this time.               Significant Labs: All pertinent labs within the past 24 hours have been reviewed.  Recent Lab Results         06/22/25  0547        Albumin 2.7       ALP 56       ALT 9       Anion Gap 12       AST 12       Baso # 0.05       Basophil % 0.5       BILIRUBIN TOTAL 0.6  Comment: For infants and newborns, interpretation of results should be based   on gestational age, weight and in agreement with clinical   observations.    Premature Infant recommended reference ranges:   0-24 hours:  <8.0 mg/dL   24-48 hours: <12.0 mg/dL   3-5 days:    <15.0 mg/dL   6-29 days:   <15.0 mg/dL       BUN 54       Calcium 9.1       Chloride 103       CO2 22       Creatinine 2.5       eGFR 19  Comment: Estimated GFR calculated using the CKD-EPI creatinine (2021) equation.       Eos # 0.26       Eos % 2.5       Glucose 91       Gran # (ANC) 6.43       Hematocrit 27.8       Hemoglobin 8.5       Immature Grans (Abs) 0.13  Comment: Mild elevation in immature granulocytes is non specific and can be seen in a variety of conditions including stress response, acute inflammation, trauma and pregnancy. Correlation with other laboratory and clinical findings is essential.       Immature Granulocytes 1.2       Lymph # 2.85       Lymph % 27.1       MCH 22.1       MCHC 30.6       MCV 72       Mono # 0.81       Mono % 7.7       MPV 9.6       Neut % 61.0       nRBC 0        Platelet Count 457       Potassium 4.0       PROTEIN TOTAL 6.7       RBC 3.85       RDW 16.1       Sodium 137       WBC 10.53               Significant Imaging: I have reviewed all pertinent imaging results/findings within the past 24 hours.

## 2025-06-22 NOTE — ASSESSMENT & PLAN NOTE
CT imaging revealed:  - Age-indeterminate acute or subacute appearing fracture involving the right superior and inferior pubic ramus images 139 through 156. Nonemergent pelvic MRI can be obtained as clinically warranted.  - analgesics p.r.n.   - Orthopedic following: pain management  - Change Norco to Percocet  - XR R Humerus: no acute fracture or dislocation  - XR R Knee: no acute findings  - Dr. Minor: weight-bearing OK  - PT/OT: SNF for mod therapy  - 6/20: Dr. Herrera, ortho, signed off     BMI:   HbA1c:   Glucose: POCT Blood Glucose.: 83 mg/dL (12-11-21 @ 16:35)    BP: --  Lipid Panel:

## 2025-06-22 NOTE — ASSESSMENT & PLAN NOTE
Dementia is stable currently. Continue home dementia meds and non-pharmacologic interventions to prevent delirium (No VS between 11PM-5AM, activity during day, opening blinds, providing glasses/hearing aids, and up in chair during daytime). Use PRN anti-psychotics to prevent behavior of self harm during sundowning, and avoid narcotics and benzos unless absolutely necessary. PRN anti-psychotics prescribed to avoid self harm behaviors.    6/15/2025: Witnessed patient having auditory and visual hallucinations during examination.    - Consider UTI/PNA vs Dementia vs Hosp induced psychosis  - Treat underlying cause  - Daughter maintains not pt baseline  - Dementia Precautions  - MRI Brain w/o acute abn  - 6/19: Neuro and TelePsych: Pt seems to be in a state of delirium. Sleep deprivation may be worsening s/s. Incr Seroquel to 50 BID. Zyprexa IM prn. Will take quite some time given Dementia for mentation to improve, if it improves.   - Mentation: more cooperative today. Oriented to self. No apparent auditory or visual hallucinations at this time.  - Cont Seroquel 50 mg BID  - Awaiting SNF placement requiring P2P 6/23/2025 1100 w/ Dr. Payne

## 2025-06-22 NOTE — PLAN OF CARE
Problem: Infection  Goal: Absence of Infection Signs and Symptoms  Outcome: Progressing     Problem: Adult Inpatient Plan of Care  Goal: Plan of Care Review  Outcome: Progressing  Goal: Patient-Specific Goal (Individualized)  Outcome: Progressing  Goal: Absence of Hospital-Acquired Illness or Injury  Outcome: Progressing  Goal: Optimal Comfort and Wellbeing  Outcome: Progressing  Goal: Readiness for Transition of Care  Outcome: Progressing     Problem: Fall Injury Risk  Goal: Absence of Fall and Fall-Related Injury  Outcome: Progressing     Problem: Skin Injury Risk Increased  Goal: Skin Health and Integrity  Outcome: Progressing     Problem: Pneumonia  Goal: Fluid Balance  Outcome: Progressing  Goal: Resolution of Infection Signs and Symptoms  Outcome: Progressing  Goal: Effective Oxygenation and Ventilation  Outcome: Progressing     Problem: Diarrhea  Goal: Effective Diarrhea Management  Outcome: Progressing     Problem: Delirium  Goal: Optimal Coping  Outcome: Progressing  Goal: Improved Behavioral Control  Outcome: Progressing  Goal: Improved Attention and Thought Clarity  Outcome: Progressing  Goal: Improved Sleep  Outcome: Progressing

## 2025-06-22 NOTE — ASSESSMENT & PLAN NOTE
Hyponatremia is likely due to Dehydration/hypovolemia. The patient's most recent sodium results are listed below.  Recent Labs     06/20/25  0532 06/21/25  0756 06/22/25  0547    140 137     Plan  - Correct the sodium by 4-6mEq in 24 hours.   - Monitor sodium Daily.   - Patient hyponatremia is stable  - Trend

## 2025-06-22 NOTE — PLAN OF CARE
A222/A222 NATE Ring is a 79 y.o.female admitted on 6/13/2025 for Acute metabolic encephalopathy   Code Status: Full Code MRN: [id number]   Review of patient's allergies indicates:  No Known Allergies  Past Medical History:   Diagnosis Date    Alpha thalassemia     Asthma     resolved per patient    Cataract     Chronic anxiety     years tid xanax 1mg    Chronic knee pain     Chronic pain of left ankle     Clotting disorder     Cutaneous lupus erythematosus     dr henry derm    Dementia without behavioral disturbance     Depression     Depression     dr radha hughes previously    Ex-smoker     quit fall 1    Hypertension     Osteopenia 1/16 reck1/18      PRN meds    acetaminophen, 650 mg, Q6H PRN  acetaminophen, 650 mg, Q8H PRN  albuterol-ipratropium, 3 mL, Q6H PRN  aluminum-magnesium hydroxide-simethicone, 30 mL, QID PRN  dextrose 50%, 12.5 g, PRN  dextrose 50%, 25 g, PRN  glucagon (human recombinant), 1 mg, PRN  glucose, 16 g, PRN  glucose, 24 g, PRN  hydrALAZINE, 10 mg, Q8H PRN  melatonin, 6 mg, Nightly PRN  naloxone, 0.02 mg, PRN  OLANZapine, 5 mg, Q8H PRN  ondansetron, 4 mg, Q8H PRN  oxyCODONE-acetaminophen, 1 tablet, Q4H PRN  promethazine, 25 mg, Q6H PRN  senna-docusate, 1 tablet, BID PRN  sodium chloride 0.9%, 10 mL, Q12H PRN  sodium chloride 0.9%, 10 mL, PRN  traMADoL, 50 mg, Q6H PRN      Chart check completed. Will continue plan of care.      Orientation: oriented x 4 (very forgetful)  Krystyna Coma Scale Score: 15     Lead Monitored: Lead II Rhythm: normal sinus rhythm Frequency/Ectopy: PVCs  Cardiac/Telemetry Box Number: [id number]  VTE Core Measure: Pharmacological prophylaxis initiated/maintained Last Bowel Movement: 06/16/25  Diet Low Sodium (2 gm)  Voiding Characteristics: incontinence  Jim Score: 16  Fall Risk Score: 25  Accucheck []   Freq?      Lines/Drains/Airways       None

## 2025-06-22 NOTE — PT/OT/SLP PROGRESS
Physical Therapy  Treatment    Tea Ring   MRN: [id number]   Admitting Diagnosis: Acute metabolic encephalopathy    PT Received On: 06/22/25  PT Start Time: 0920     PT Stop Time: 0950    PT Total Time (min): 30 min       Billable Minutes:  Therapeutic Activity 15 and Therapeutic Exercise 15    Treatment Type: Treatment  PT/PTA: PTA     Number of PTA visits since last PT visit: 3       General Precautions: Standard, fall  Orthopedic Precautions: RLE weight bearing as tolerated, LLE weight bearing as tolerated  Braces: N/A  Respiratory Status: Room air         Subjective:  Communicated with nurse Sanchez and completed Epic chart review prior to session. Pt agreed to session.    Pain/Comfort  Pain Rating 1:  (unrated)  Location - Side 1: Right  Location - Orientation 1: generalized  Location 1: hip  Pain Addressed 1: Reposition, Distraction (Activity pacing)    Objective:   Patient found with: bed alarm    Supine > Sit; Max A heavy cueing for sequencing  Scoot towards EOB: Mod A  Pt able to maintain static sitting balance EOB with CGA. Pt tolerated sitting EOB for ~12 mins focusing on tolerance to upright and core and trunk stability.     Performed BLE AROM TE x15ea: AP, LAQ    STS x3 trials with Max A x2 with RW. Cues for proper hand placement and for pt to use BUE. Pt with decreased use of BUE each time. Pt only able to maintain standing for ~30 secs before returning to seated position. Pt took 3 lateral steps towards HOB with Max A x2 with RW.    Sit > Supine: Max A x2        Treatment and Education:  Educated pt on benefits of increasing time spent EOB. Encouraged pt to sit EOB for all meals. Encouraged pt to perform therex throughout the day to regain strength. Reviewed call don't fall policy and to call for assistance with all OOB needs.      AM-PAC 6 CLICK MOBILITY  How much help from another person does this patient currently need?   1 = Unable, Total/Dependent Assistance  2 = A lot, Maximum/Moderate  Assistance  3 = A little, Minimum/Contact Guard/Supervision  4 = None, Modified [place]/Independent    Turning over in bed (including adjusting bedclothes, sheets and blankets)?: 2  Sitting down on and standing up from a chair with arms (e.g., wheelchair, bedside commode, etc.): 2  Moving from lying on back to sitting on the side of the bed?: 2  Moving to and from a bed to a chair (including a wheelchair)?: 1  Need to walk in hospital room?: 2  Climbing 3-5 steps with a railing?: 1  Basic Mobility Total Score: 10    AM-PAC Raw Score CMS G-Code Modifier Level of Impairment Assistance   6 % Total / Unable   7 - 9 CM 80 - 100% Maximal Assist   10 - 14 CL 60 - 80% Moderate Assist   15 - 19 CK 40 - 60% Moderate Assist   20 - 22 CJ 20 - 40% Minimal Assist   23 CI 1-20% SBA / CGA   24 CH 0% Independent/ Mod I     Patient left supine with all lines intact, call button in reach, bed alarm on, and nurse notified.    Assessment:  Tea Ring is a 79 y.o. female with a medical diagnosis of Acute metabolic encephalopathy and presents with the following functional limitations. Pt will continue to benefit from skilled PT in order to address the below deficits to reach maximum level of function.    Rehab identified problem list/impairments: weakness, impaired endurance, impaired self care skills, impaired functional mobility, decreased coordination, impaired cognition, impaired balance, gait instability, decreased upper extremity function, decreased lower extremity function, decreased safety awareness, pain, decreased ROM    Rehab potential is fair.    Activity tolerance: Fair    Discharge recommendations: Moderate Intensity Therapy      Barriers to discharge:      Equipment recommendations: to be determined by next level of care     GOALS:   Multidisciplinary Problems       Physical Therapy Goals          Problem: Physical Therapy    Goal Priority Disciplines Outcome Interventions   Physical Therapy Goal     PT, PT/OT      Description: Lt25  1. PT WILL COMPLETE BED MOBILITY WITH CGA  2. PT WILL STAND T/F TO CHAIR WITH RW AND MOD A FOR OOB TOLERANCE  3. PT WILL GT TRAIN X 50' WITH RW AND MOD A TO PROGRESS GT  4. PT WILL INC AMPAC SCORE BY 2 POINTS TO PROGRESS GROSS FUNC MOBILITY.                          DME Justifications:  No DME recommended requiring DME justifications    PLAN:    Patient to be seen 3 x/week to address the above listed problems via gait training, therapeutic activities, therapeutic exercises  Plan of Care expires: 25  Plan of Care reviewed with: patient         2025

## 2025-06-22 NOTE — ASSESSMENT & PLAN NOTE
CORA is likely due to pre-renal azotemia due to intravascular volume depletion. Baseline creatinine is 1.4. Most recent creatinine and eGFR are listed below.  Recent Labs     06/20/25  0532 06/21/25  0756 06/22/25  0547   CREATININE 0.9 1.3 2.5*   EGFRNORACEVR >60 42* 19*      Plan  - CORA is worsening. Will give gentle IVF  - Avoid nephrotoxins and renally dose meds for GFR listed above  - Monitor urine output, serial BMP, and adjust therapy as needed  - Trend

## 2025-06-22 NOTE — PROGRESS NOTES
Pharmacist Renal Dose Adjustment Note    Tea Ring is a 79 y.o. female being treated with the medication enoxaparin.    Patient Data:    Vital Signs (Most Recent):  Temp: 99 °F (37.2 °C) (06/22/25 0824)  Pulse: 91 (06/22/25 0824)  Resp: 17 (06/22/25 0824)  BP: (!) 96/54 (06/22/25 0824)  SpO2: (!) 91 % (06/22/25 0824) Vital Signs (72h Range):  Temp:  [97.2 °F (36.2 °C)-99.6 °F (37.6 °C)]   Pulse:  []   Resp:  [17-20]   BP: ()/(52-92)   SpO2:  [91 %-99 %]      Recent Labs   Lab 06/20/25  0532 06/21/25  0756 06/22/25  0547   CREATININE 0.9 1.3 2.5*     Serum creatinine: 2.5 mg/dL (H) 06/22/25 0547  Estimated creatinine clearance: 16 mL/min (A)    Enoxaparin 40 mg daily was adjusted to 30 mg daily according to [facility]'s renal dose adjustment policy.    Pharmacist's Name: Lien Choudhary PharmD 6/22/2025 11:48 AM  Pharmacist's Extension: [phone]

## 2025-06-22 NOTE — ASSESSMENT & PLAN NOTE
Chronic, uncontrolled.  Latest blood pressure and vitals reviewed-   Temp:  [98 °F (36.7 °C)-99 °F (37.2 °C)]   Pulse:  [89-91]   Resp:  [17-20]   BP: ()/(52-67)   SpO2:  [91 %-97 %] .   Home meds for hypertension were reviewed and noted below.   Hypertension Medications              valsartan-hydrochlorothiazide (DIOVAN-HCT) 160-12.5 mg per tablet Take 1 tablet by mouth once daily.     While in the hospital, will manage blood pressure as follows; Continue home antihypertensive regimen once verified with family in a.m.; we will treat with p.r.n. hydralazine    Will utilize p.r.n. blood pressure medication only if patient's blood pressure greater than  170/90 and she develops symptoms such as worsening chest pain or shortness of breath.    - Admit: IV metoprolol 5 mg Q6 hours until able to take oral.   - prn Hydralazine  - 6/15: Patient spitting out medication  - 6/16: attempted to take pills--spit some out after administration  - 6/16 HS: taking oral meds, held IV Metoprolol 2/2 BP soft  - 6/17: Hold IV Lasix, Valsartan, and HCTZ 2/2 Cr bump; control BP w/ IV Metoprolol and prn Hydralazine  - 6/18: Hold IV Lasix, Valsartan, and HCTZ; control BP w/ IV Metoprolol and prn Hydralazine  - 6/19: taking all orals, DC IV Metoprolol  - 6/20: DC Lasix, Valsartan, and HCTZ. Norvasc 5 mg daily  - 6/21: Hold Norvasc 2/2 BP soft

## 2025-06-22 NOTE — ASSESSMENT & PLAN NOTE
Nutrition consulted. Most recent weight and BMI monitored-     Measurements:  Wt Readings from Last 1 Encounters:   06/21/25 64.5 kg (142 lb 3.2 oz)   Body mass index is 26.87 kg/m².    Patient has been screened and assessed by RD.    Malnutrition Type:  Context: acute illness or injury  Level: severe    Malnutrition Characteristic Summary:  Weight Loss (Malnutrition): greater than 2% in 1 week  Energy Intake (Malnutrition): less than or equal to 50% for greater than or equal to 5 days  Subcutaneous Fat (Malnutrition): mild depletion  Muscle Mass (Malnutrition): mild depletion    Interventions/Recommendations (treatment strategy):  1. Recommend a Low sodium diet with texture per SLP. 2. Recommend Boost Plus with all meals to provide additional calories. 3. Recommend starting a bowel regimen. 4. Encourage PO intake with feeding assistance as warranted. 5. Weigh x2 weekly.

## 2025-06-22 NOTE — ASSESSMENT & PLAN NOTE
Patient has a diagnosis of pneumonia. The cause of the pneumonia is unknown at this time. The patient has the following signs/symptoms of pneumonia: none, s/s resolved. The patient does not have a current oxygen requirement and the patient does not have a home oxygen requirement. I have reviewed the pertinent imaging. The following cultures have been collected: Blood cultures The culture results are listed below.     Current antimicrobial regimen consists of the antibiotics listed below. Will monitor patient closely and continue current treatment plan unchanged.    Antibiotics (From admission, onward)      None            Microbiology Results (last 7 days)       Procedure Component Value Units Date/Time    Blood culture x two cultures. Draw prior to antibiotics. [[id number]]  (Normal) Collected: 06/13/25 1430    Order Status: Completed Specimen: Blood from Peripheral, Forearm, Left Updated: 06/19/25 1702     Blood Culture No Growth After 5 Days    Blood culture x two cultures. Draw prior to antibiotics. [[id number]]  (Normal) Collected: 06/13/25 1436    Order Status: Completed Specimen: Blood from Peripheral, Antecubital, Right Updated: 06/19/25 1702     Blood Culture No Growth After 5 Days

## 2025-06-22 NOTE — PROGRESS NOTES
[facility]  Progress Note    Patient Name: Tea Ring  MRN: [id number]  Patient Class: IP- Inpatient   Admission Date: 6/13/2025  Length of Stay: 8 days  Attending Physician: Kylie Payne MD  Primary Care Provider: Marcello Zambrano MD    Subjective     Principal Problem: Acute metabolic encephalopathy    HPI:  Tea Ring is a 79 y.o. female with a PMH  has a past medical history of Alpha thalassemia, Asthma, Cataract, Chronic anxiety, Chronic knee pain, Chronic pain of left ankle, Clotting disorder, Cutaneous lupus erythematosus, Dementia without behavioral disturbance, Depression, Depression, Ex-smoker, Hypertension, and Osteopenia (1/16 reck1/18).  Presents as a transfer from our [facility] facility for further treatment of generalized weakness fungal recent UTI and ortho consult for pubic rami fracture. Patient reported that she has been having difficulty urinating for over 2 weeks. She has had to use the bathroom at least 6 times daily. Patient reports that she will get the urge to urinate and then will itch down there. She was started on Macrobid on Damaris 10, 2025 by her primary care physician. Patient reports that she has been feeling lightheaded. She denies any specific chest pain, chest pressure, nausea, vomiting, diarrhea, dysuria. Patient reports that she had walked into the gas bedroom might have passed out. Patient is complaining right hip pain, neck pain, and generalized weakness.  Denies any injury or trauma which may have caused her symptoms.  Denies any alleviating or aggravating factors.  Denies any other symptoms at this time.    ER workup revealed CBC with leukocytosis of 17.37.  CMP with a BUN/creatinine of 23/1.4 with EGFR of 39 (previously 20/1.0 with EGFR 58 on 05/20/2025).CBG of 126 mg/dL.  Troponin negative.  Procalcitonin level negative.  Initial lactic acid 2.5 with repeat of 3.4 followed by 1.0.  Previous UA revealed acute cystitis, with repeat UA  showing improvement.  Blood cultures obtained x2.  CT cervical spine revealed age indeterminate fracture of the medial aspect of left clavicle and degenerative changes of cervical spine.  CT head negative for acute intracranial abnormality/findings.  Chest x-ray negative for acute cardiopulmonary findings.  Plain film imaging of the hip/pelvis revealed fracture of the superior and inferior pubic rami.  CT renal stone study of abdomen and pelvis revealed age indeterminate acute or subacute appearing fracture involving right superior and inferior pubic rami.  CT of the right knee pending results.  On-call orthopedic surgeon consulted.  Recommended hospital Medicine to admit and will see us consult.  Vital signs stable.  Patient received 1 g Rocephin, 2 L lactated Ringer's, and a total of 4 mg of morphine at outside facility.  Patient admitted under observation status.    RECONCILE HOME MEDICATIONS WITH FAMILY DURING DAY.  PATIENT UNABLE TO REPORT WHICH MEDICATION SHE TAKES.    PCP: Marcello Zambrano    Overview/Hospital Course:  80 y/o female admitted for UTI and acute encephalopathy. Patient started IV rocephin. Ortho has been consulted for pubic rami fracture.   As of 6/15 patient with worsening confusion. WBCs trending up. CXR showed worsening vascular congestion vs infectious process. IVFs stopped and IV lasix started.  Will add azithromycin to current IV abx therapy. Case discussed with Dr. Lo.   As of 6/16/2025, T 99.9 highest overnight, VSS otherwise. Ortho pending: Pubic rami fx = pain management. Diff following commands/swallowing pills--spits them out. Confusion slightly improved today--no obvious hallucinations. Bl Cx no growth after 36 hours. Overnight, taking PO meds. C/o pain not improved w/ current meds.  As of 6/17/2025, VSS. Cr bumped 1.1>1.6. Renal US: no acute abn. Hold Lasix, Valsartan, and HCTZ. Cover BP w/ IV Metoprolol and prn Hydralazine. K repletion 10 IV. BMP at 1500. Mentation not at  baseline per daughter at bedside. Bouts of anger and uncooperative intermittently. Pain med adjusted. MRI Brain pending. In afternoon, mentation worsened and unable to obtain MRI or 1500 BMP redraw.  As of 6/18/2025, intermittent cooperation w/ continuous confusion, not at baseline, and bouts of anger. Hold Lasix, Valsartan, and HCTZ 2/2 kidney fx. K repletion. Re-attempt MRI today. 1500 BMP.  As of 6/19/2025, confused, speaking to someone who is not present, more cooperative today. Daughter at bedside. Cr and K WNL. Seroquel 25 in AM added. Neuro and TelePsych: Pt seems to be in a state of delirium. Sleep deprivation may be worsening s/s. Incr Seroquel to 50 BID. Zyprexa IM prn. Will take quite some time given Dementia for mentation to improve, if it improves.   As of 6/20/2025, transitioned to PO and de-escalated meds. CM following: awaiting SNF.  As of 6/21/2025, SNF requires P2P scheduled Monday at 1100 w/ Dr. Payne.   As of 6/22/2025, Cr bumped 2.5. Nurse reports last charted BM: 6/16 = Mag Citrate. BP soft. Attempt IV for gentle IV hydration.     Interval History: Pt lying in bed in no acute distress. Mentation improved as yesterday. Cooperative to assessment. BM: 6/16 per bedside nurse review of charting. Mag Citrate 1/2 bottle now and repeat in 2-3 prn if no BM achieved. BP soft, awaiting IV restart attempt for gentle IV hydration. Cr 2.5. Dr. Payne to discuss w/ daughter DC disposition as awaiting peer to peer for SNF placement.    Review of Systems   Unable to perform ROS: Dementia     Objective:     Vital Signs (Most Recent):  Temp: 99 °F (37.2 °C) (06/22/25 0824)  Pulse: 91 (06/22/25 0824)  Resp: 17 (06/22/25 0824)  BP: (!) 96/54 (06/22/25 0824)  SpO2: (!) 91 % (06/22/25 0824) Vital Signs (24h Range):  Temp:  [97.2 °F (36.2 °C)-99 °F (37.2 °C)] 99 °F (37.2 °C)  Pulse:  [89-99] 91  Resp:  [17-20] 17  SpO2:  [91 %-97 %] 91 %  BP: ()/(52-67) 96/54     Weight: 64.5 kg (142 lb 3.2 oz)  Body mass index  is 26.87 kg/m².    Intake/Output Summary (Last 24 hours) at 6/22/2025 1135  Last data filed at 6/22/2025 0924  Gross per 24 hour   Intake 660 ml   Output --   Net 660 ml         Physical Exam  Vitals reviewed.   Constitutional:       General: She is not in acute distress.     Appearance: She is normal weight. She is not ill-appearing or diaphoretic.   HENT:      Head: Normocephalic and atraumatic.      Nose: Nose normal.      Mouth/Throat:      Mouth: Mucous membranes are moist.      Pharynx: Oropharynx is clear.   Eyes:      Extraocular Movements: Extraocular movements intact.      Pupils: Pupils are equal, round, and reactive to light.   Cardiovascular:      Rate and Rhythm: Normal rate and regular rhythm.      Pulses: Normal pulses.      Heart sounds: Normal heart sounds.   Pulmonary:      Effort: Pulmonary effort is normal. No respiratory distress.      Breath sounds: Normal breath sounds. No wheezing, rhonchi or rales.   Chest:      Chest wall: No tenderness.   Abdominal:      General: Bowel sounds are normal. There is no distension.      Palpations: Abdomen is soft.      Tenderness: There is no abdominal tenderness. There is no guarding.   Musculoskeletal:         General: Tenderness present.      Right lower leg: No edema.      Left lower leg: No edema.   Skin:     General: Skin is warm and dry.   Neurological:      Comments: Taking oral meds at this time per bedside nurse. Bouts of anger and confusion. Uncooperative at times.   Psychiatric:         Mood and Affect: Angry: intermittently.         Behavior: Behavior is cooperative.      Comments: No auditory or visual hallucinations apparent at this time.               Significant Labs: All pertinent labs within the past 24 hours have been reviewed.  Recent Lab Results         06/22/25  0547        Albumin 2.7       ALP 56       ALT 9       Anion Gap 12       AST 12       Baso # 0.05       Basophil % 0.5       BILIRUBIN TOTAL 0.6  Comment: For infants and  newborns, interpretation of results should be based   on gestational age, weight and in agreement with clinical   observations.    Premature Infant recommended reference ranges:   0-24 hours:  <8.0 mg/dL   24-48 hours: <12.0 mg/dL   3-5 days:    <15.0 mg/dL   6-29 days:   <15.0 mg/dL       BUN 54       Calcium 9.1       Chloride 103       CO2 22       Creatinine 2.5       eGFR 19  Comment: Estimated GFR calculated using the CKD-EPI creatinine (2021) equation.       Eos # 0.26       Eos % 2.5       Glucose 91       Gran # (ANC) 6.43       Hematocrit 27.8       Hemoglobin 8.5       Immature Grans (Abs) 0.13  Comment: Mild elevation in immature granulocytes is non specific and can be seen in a variety of conditions including stress response, acute inflammation, trauma and pregnancy. Correlation with other laboratory and clinical findings is essential.       Immature Granulocytes 1.2       Lymph # 2.85       Lymph % 27.1       MCH 22.1       MCHC 30.6       MCV 72       Mono # 0.81       Mono % 7.7       MPV 9.6       Neut % 61.0       nRBC 0       Platelet Count 457       Potassium 4.0       PROTEIN TOTAL 6.7       RBC 3.85       RDW 16.1       Sodium 137       WBC 10.53               Significant Imaging: I have reviewed all pertinent imaging results/findings within the past 24 hours.      Assessment & Plan  Acute metabolic encephalopathy  History of dementia  Hallucinations  Dementia is stable currently. Continue home dementia meds and non-pharmacologic interventions to prevent delirium (No VS between 11PM-5AM, activity during day, opening blinds, providing glasses/hearing aids, and up in chair during daytime). Use PRN anti-psychotics to prevent behavior of self harm during sundowning, and avoid narcotics and benzos unless absolutely necessary. PRN anti-psychotics prescribed to avoid self harm behaviors.    6/15/2025: Witnessed patient having auditory and visual hallucinations during examination.    - Consider UTI/PNA  vs Dementia vs Hosp induced psychosis  - Treat underlying cause  - Daughter maintains not pt baseline  - Dementia Precautions  - MRI Brain w/o acute abn  - 6/19: Neuro and TelePsych: Pt seems to be in a state of delirium. Sleep deprivation may be worsening s/s. Incr Seroquel to 50 BID. Zyprexa IM prn. Will take quite some time given Dementia for mentation to improve, if it improves.   - Mentation: more cooperative today. Oriented to self. No apparent auditory or visual hallucinations at this time.  - Cont Seroquel 50 mg BID  - Awaiting SNF placement requiring P2P 6/23/2025 1100 w/ Dr. Payne    UTI (urinary tract infection)  Presents with complaints of dysuria and confusion with AH/VH. Urinalysis revealed:   Lab Results   Component Value Date    COLORU Yellow 06/15/2025    APPEARANCEUA Clear 06/15/2025    SPECGRAV 1.015 06/15/2025    PHUR 7.0 06/15/2025    PROTEINUA Negative 06/15/2025    GLUCUA Negative 06/15/2025    KETONESU Negative 03/15/2025    NITRITE Negative 06/15/2025    UROBILINOGEN Negative 06/15/2025    BILIRUBINUA Negative 06/15/2025    LEUKOCYTESUR Negative 06/15/2025    RBCUA 2 06/10/2025    WBCUA 8 (H) 06/10/2025    BACTERIA Moderate (A) 06/10/2025    HYALINECASTS 6 (A) 03/06/2025   Received 1 g Rocephin at outside facility  Plan:  - UA as above  - Did NOT reflex to culture -- confirmed with Micro Lab 6/16/2025  - Azithromycin PO x 5D completed  - Rocephin IV x 5 D completed  - Resolved    Essential hypertension  Chronic, uncontrolled.  Latest blood pressure and vitals reviewed-   Temp:  [98 °F (36.7 °C)-99 °F (37.2 °C)]   Pulse:  [89-91]   Resp:  [17-20]   BP: ()/(52-67)   SpO2:  [91 %-97 %] .   Home meds for hypertension were reviewed and noted below.   Hypertension Medications              valsartan-hydrochlorothiazide (DIOVAN-HCT) 160-12.5 mg per tablet Take 1 tablet by mouth once daily.     While in the hospital, will manage blood pressure as follows; Continue home antihypertensive regimen  once verified with family in a.m.; we will treat with p.r.n. hydralazine    Will utilize p.r.n. blood pressure medication only if patient's blood pressure greater than  170/90 and she develops symptoms such as worsening chest pain or shortness of breath.    - Admit: IV metoprolol 5 mg Q6 hours until able to take oral.   - prn Hydralazine  - 6/15: Patient spitting out medication  - 6/16: attempted to take pills--spit some out after administration  - 6/16 HS: taking oral meds, held IV Metoprolol 2/2 BP soft  - 6/17: Hold IV Lasix, Valsartan, and HCTZ 2/2 Cr bump; control BP w/ IV Metoprolol and prn Hydralazine  - 6/18: Hold IV Lasix, Valsartan, and HCTZ; control BP w/ IV Metoprolol and prn Hydralazine  - 6/19: taking all orals, DC IV Metoprolol  - 6/20: DC Lasix, Valsartan, and HCTZ. Norvasc 5 mg daily  - 6/21: Hold Norvasc 2/2 BP soft  Moderate major depression  Chronic. Stable. Not in acute exacerbation and currently denies endorsing any suicidal/homicidal ideations. Active auditory and visual hallucinations. Unsure if this is baseline due to patient's underlying dementia versus UTI symptoms.  Plan:  - Continue home medications   - MRI Brain w/o acute abn  - 6/19: Neuro and TelePsych: Pt seems to be in a state of delirium. Sleep deprivation may be worsening s/s. Incr Seroquel to 50 BID. Zyprexa IM prn. Will take quite some time given Dementia for mentation to improve, if it improves.     Primary open angle glaucoma (POAG) of both eyes, severe stage  - continue eye drops   Neck pain  CT imaging cervical spine revealed:  1. There is grade 1 anterolisthesis of C6 on C7. There is grade 1 anterolisthesis of C7 on T1.  2. There are mild degenerative changes between C2 and T1.    Plan:   - analgesics p.r.n.  - Percocet prn    Closed fracture of multiple pubic rami, right, initial encounter  CT imaging revealed:  - Age-indeterminate acute or subacute appearing fracture involving the right superior and inferior pubic ramus  images 139 through 156. Nonemergent pelvic MRI can be obtained as clinically warranted.  - analgesics p.r.n.   - Orthopedic following: pain management  - Change Norco to Percocet  - XR R Humerus: no acute fracture or dislocation  - XR R Knee: no acute findings  - Dr. Minor: weight-bearing OK  - PT/OT: SNF for mod therapy  - 6/20: Dr. Herrera, ortho, signed off    Acute diastolic heart failure  - Echo: EF 60-65%, diastolic dysfunction  - BNP 91  - Monitor strict I&Os and daily weights.    - Low sodium diet  - Place on fluid restriction of 1.5 L.     Results for orders placed during the hospital encounter of 10/31/23    Echo    Interpretation Summary    Left Ventricle: The left ventricle is normal in size. Normal wall thickness. There is concentric remodeling. Normal wall motion. There is normal systolic function with a visually estimated ejection fraction of 60 - 65%. There is normal diastolic function.    Right Ventricle: Normal right ventricular cavity size. Wall thickness is normal. Right ventricle wall motion  is normal. Systolic function is normal.    Pulmonary Artery: The estimated pulmonary artery systolic pressure is 18 mmHg.    IVC/SVC: Normal venous pressure at 3 mmHg.     PNA (pneumonia)  Patient has a diagnosis of pneumonia. The cause of the pneumonia is unknown at this time. The patient has the following signs/symptoms of pneumonia: none, s/s resolved. The patient does not have a current oxygen requirement and the patient does not have a home oxygen requirement. I have reviewed the pertinent imaging. The following cultures have been collected: Blood cultures The culture results are listed below.     Current antimicrobial regimen consists of the antibiotics listed below. Will monitor patient closely and continue current treatment plan unchanged.    Antibiotics (From admission, onward)      None            Microbiology Results (last 7 days)       Procedure Component Value Units Date/Time    Blood  culture x two cultures. Draw prior to antibiotics. [[id number]]  (Normal) Collected: 06/13/25 1430    Order Status: Completed Specimen: Blood from Peripheral, Forearm, Left Updated: 06/19/25 1702     Blood Culture No Growth After 5 Days    Blood culture x two cultures. Draw prior to antibiotics. [[id number]]  (Normal) Collected: 06/13/25 1436    Order Status: Completed Specimen: Blood from Peripheral, Antecubital, Right Updated: 06/19/25 1702     Blood Culture No Growth After 5 Days          Anemia  Anemia: Most recent hemoglobin and hematocrit are listed below.  Recent Labs     06/20/25  0532 06/21/25  0756 06/22/25  0547   HGB 9.6* 8.7* 8.5*   HCT 31.1* 28.9* 27.8*     Plan  - Monitor serial CBC: Daily  - Transfuse PRBC if patient becomes hemodynamically unstable, symptomatic or H/H drops below 7/21.  - Patient has not received any PRBC transfusions to date  - Patient's anemia is currently stable  - Trend    Hyponatremia  Hyponatremia is likely due to Dehydration/hypovolemia. The patient's most recent sodium results are listed below.  Recent Labs     06/20/25  0532 06/21/25  0756 06/22/25  0547    140 137     Plan  - Correct the sodium by 4-6mEq in 24 hours.   - Monitor sodium Daily.   - Patient hyponatremia is stable  - Trend    Severe malnutrition  Nutrition consulted. Most recent weight and BMI monitored-     Measurements:  Wt Readings from Last 1 Encounters:   06/21/25 64.5 kg (142 lb 3.2 oz)   Body mass index is 26.87 kg/m².    Patient has been screened and assessed by RD.    Malnutrition Type:  Context: acute illness or injury  Level: severe    Malnutrition Characteristic Summary:  Weight Loss (Malnutrition): greater than 2% in 1 week  Energy Intake (Malnutrition): less than or equal to 50% for greater than or equal to 5 days  Subcutaneous Fat (Malnutrition): mild depletion  Muscle Mass (Malnutrition): mild depletion    Interventions/Recommendations (treatment strategy):  1. Recommend a Low sodium diet  with texture per SLP. 2. Recommend Boost Plus with all meals to provide additional calories. 3. Recommend starting a bowel regimen. 4. Encourage PO intake with feeding assistance as warranted. 5. Weigh x2 weekly.    CORA on CKD, stage III  CORA is likely due to pre-renal azotemia due to intravascular volume depletion. Baseline creatinine is 1.4. Most recent creatinine and eGFR are listed below.  Recent Labs     06/20/25  0532 06/21/25  0756 06/22/25  0547   CREATININE 0.9 1.3 2.5*   EGFRNORACEVR >60 42* 19*      Plan  - CORA is worsening. Will give gentle IVF  - Avoid nephrotoxins and renally dose meds for GFR listed above  - Monitor urine output, serial BMP, and adjust therapy as needed  - Trend    VTE Risk Mitigation (From admission, onward)           Ordered     enoxaparin injection 40 mg  Daily         06/19/25 0854     IP VTE HIGH RISK PATIENT  Once         06/13/25 2255     Place sequential compression device  Until discontinued         06/13/25 2255                    Discharge Planning   YIMI: 6/20/2025     Code Status: Full Code   Medical Readiness for Discharge Date: 6/20/2025  Discharge Plan A: Home        EVERARDO RodriguezC  Department of Hospital Medicine   [facility] - Telemetry ([facility])

## 2025-06-22 NOTE — ASSESSMENT & PLAN NOTE
Anemia: Most recent hemoglobin and hematocrit are listed below.  Recent Labs     06/20/25  0532 06/21/25  0756 06/22/25  0547   HGB 9.6* 8.7* 8.5*   HCT 31.1* 28.9* 27.8*     Plan  - Monitor serial CBC: Daily  - Transfuse PRBC if patient becomes hemodynamically unstable, symptomatic or H/H drops below 7/21.  - Patient has not received any PRBC transfusions to date  - Patient's anemia is currently stable  - Trend

## 2025-06-23 VITALS
TEMPERATURE: 98 F | HEART RATE: 102 BPM | SYSTOLIC BLOOD PRESSURE: 121 MMHG | OXYGEN SATURATION: 93 % | HEIGHT: 61 IN | BODY MASS INDEX: 27.34 KG/M2 | DIASTOLIC BLOOD PRESSURE: 57 MMHG | WEIGHT: 144.81 LBS | RESPIRATION RATE: 16 BRPM

## 2025-06-23 PROBLEM — N39.0 UTI (URINARY TRACT INFECTION): Status: RESOLVED | Noted: 2025-06-14 | Resolved: 2025-06-23

## 2025-06-23 PROBLEM — J18.9 PNA (PNEUMONIA): Status: RESOLVED | Noted: 2025-06-15 | Resolved: 2025-06-23

## 2025-06-23 PROBLEM — E87.1 HYPONATREMIA: Status: RESOLVED | Noted: 2025-06-16 | Resolved: 2025-06-23

## 2025-06-23 LAB
ABSOLUTE EOSINOPHIL (OHS): 0.26 K/UL
ABSOLUTE MONOCYTE (OHS): 0.78 K/UL (ref 0.3–1)
ABSOLUTE NEUTROPHIL COUNT (OHS): 5.59 K/UL (ref 1.8–7.7)
ALBUMIN SERPL BCP-MCNC: 2.5 G/DL (ref 3.5–5.2)
ALP SERPL-CCNC: 69 UNIT/L (ref 40–150)
ALT SERPL W/O P-5'-P-CCNC: 5 UNIT/L (ref 10–44)
ANION GAP (OHS): 11 MMOL/L (ref 8–16)
AST SERPL-CCNC: 12 UNIT/L (ref 11–45)
BASOPHILS # BLD AUTO: 0.04 K/UL
BASOPHILS NFR BLD AUTO: 0.4 %
BILIRUB SERPL-MCNC: 0.4 MG/DL (ref 0.1–1)
BUN SERPL-MCNC: 67 MG/DL (ref 8–23)
CALCIUM SERPL-MCNC: 8.9 MG/DL (ref 8.7–10.5)
CHLORIDE SERPL-SCNC: 105 MMOL/L (ref 95–110)
CO2 SERPL-SCNC: 21 MMOL/L (ref 23–29)
CREAT SERPL-MCNC: 2.1 MG/DL (ref 0.5–1.4)
ERYTHROCYTE [DISTWIDTH] IN BLOOD BY AUTOMATED COUNT: 15.7 % (ref 11.5–14.5)
GFR SERPLBLD CREATININE-BSD FMLA CKD-EPI: 24 ML/MIN/1.73/M2
GLUCOSE SERPL-MCNC: 109 MG/DL (ref 70–110)
HCT VFR BLD AUTO: 26.9 % (ref 37–48.5)
HGB BLD-MCNC: 8.3 GM/DL (ref 12–16)
IMM GRANULOCYTES # BLD AUTO: 0.08 K/UL (ref 0–0.04)
IMM GRANULOCYTES NFR BLD AUTO: 0.9 % (ref 0–0.5)
LYMPHOCYTES # BLD AUTO: 2.15 K/UL (ref 1–4.8)
MCH RBC QN AUTO: 21.8 PG (ref 27–31)
MCHC RBC AUTO-ENTMCNC: 30.9 G/DL (ref 32–36)
MCV RBC AUTO: 71 FL (ref 82–98)
NUCLEATED RBC (/100WBC) (OHS): 0 /100 WBC
PLATELET # BLD AUTO: 480 K/UL (ref 150–450)
PMV BLD AUTO: 10.4 FL (ref 9.2–12.9)
POTASSIUM SERPL-SCNC: 4.2 MMOL/L (ref 3.5–5.1)
PROT SERPL-MCNC: 6.5 GM/DL (ref 6–8.4)
RBC # BLD AUTO: 3.81 M/UL (ref 4–5.4)
RELATIVE EOSINOPHIL (OHS): 2.9 %
RELATIVE LYMPHOCYTE (OHS): 24.2 % (ref 18–48)
RELATIVE MONOCYTE (OHS): 8.8 % (ref 4–15)
RELATIVE NEUTROPHIL (OHS): 62.8 % (ref 38–73)
SODIUM SERPL-SCNC: 137 MMOL/L (ref 136–145)
WBC # BLD AUTO: 8.9 K/UL (ref 3.9–12.7)

## 2025-06-23 PROCEDURE — 25000003 PHARM REV CODE 250

## 2025-06-23 PROCEDURE — 85025 COMPLETE CBC W/AUTO DIFF WBC: CPT

## 2025-06-23 PROCEDURE — 25000003 PHARM REV CODE 250: Performed by: NURSE PRACTITIONER

## 2025-06-23 PROCEDURE — 25000003 PHARM REV CODE 250: Performed by: EMERGENCY MEDICINE

## 2025-06-23 PROCEDURE — 63600175 PHARM REV CODE 636 W HCPCS: Performed by: EMERGENCY MEDICINE

## 2025-06-23 PROCEDURE — 36415 COLL VENOUS BLD VENIPUNCTURE: CPT

## 2025-06-23 PROCEDURE — 97530 THERAPEUTIC ACTIVITIES: CPT | Mod: CQ

## 2025-06-23 PROCEDURE — 25000003 PHARM REV CODE 250: Performed by: HOSPITALIST

## 2025-06-23 PROCEDURE — 80053 COMPREHEN METABOLIC PANEL: CPT

## 2025-06-23 PROCEDURE — 97530 THERAPEUTIC ACTIVITIES: CPT

## 2025-06-23 RX ORDER — MORPHINE SULFATE 4 MG/ML
2 INJECTION, SOLUTION INTRAMUSCULAR; INTRAVENOUS EVERY 6 HOURS PRN
Status: DISCONTINUED | OUTPATIENT
Start: 2025-06-23 | End: 2025-06-23 | Stop reason: HOSPADM

## 2025-06-23 RX ORDER — OXYCODONE AND ACETAMINOPHEN 7.5; 325 MG/1; MG/1
1 TABLET ORAL EVERY 4 HOURS PRN
Refills: 0 | Status: DISCONTINUED | OUTPATIENT
Start: 2025-06-23 | End: 2025-06-23 | Stop reason: HOSPADM

## 2025-06-23 RX ORDER — SODIUM CHLORIDE 9 MG/ML
INJECTION, SOLUTION INTRAVENOUS CONTINUOUS
Status: DISCONTINUED | OUTPATIENT
Start: 2025-06-23 | End: 2025-06-23 | Stop reason: HOSPADM

## 2025-06-23 RX ADMIN — CITALOPRAM HYDROBROMIDE 10 MG: 10 TABLET ORAL at 08:06

## 2025-06-23 RX ADMIN — AMLODIPINE BESYLATE 5 MG: 5 TABLET ORAL at 08:06

## 2025-06-23 RX ADMIN — MORPHINE SULFATE 2 MG: 4 INJECTION INTRAVENOUS at 12:06

## 2025-06-23 RX ADMIN — SENNOSIDES AND DOCUSATE SODIUM 1 TABLET: 50; 8.6 TABLET ORAL at 08:06

## 2025-06-23 RX ADMIN — PREDNISOLONE ACETATE 1 DROP: 10 SUSPENSION/ DROPS OPHTHALMIC at 08:06

## 2025-06-23 RX ADMIN — ATORVASTATIN CALCIUM 10 MG: 10 TABLET, FILM COATED ORAL at 08:06

## 2025-06-23 RX ADMIN — OXYCODONE HYDROCHLORIDE AND ACETAMINOPHEN 1 TABLET: 7.5; 325 TABLET ORAL at 10:06

## 2025-06-23 RX ADMIN — PANTOPRAZOLE SODIUM 40 MG: 40 TABLET, DELAYED RELEASE ORAL at 08:06

## 2025-06-23 RX ADMIN — QUETIAPINE FUMARATE 50 MG: 25 TABLET ORAL at 08:06

## 2025-06-23 RX ADMIN — SODIUM CHLORIDE: 9 INJECTION, SOLUTION INTRAVENOUS at 12:06

## 2025-06-23 NOTE — PLAN OF CARE
Problem: Adult Inpatient Plan of Care  Goal: Plan of Care Review  Outcome: Progressing     Problem: Adult Inpatient Plan of Care  Goal: Readiness for Transition of Care  Outcome: Progressing     Problem: Fall Injury Risk  Goal: Absence of Fall and Fall-Related Injury  Outcome: Progressing     Problem: Skin Injury Risk Increased  Goal: Skin Health and Integrity  Outcome: Progressing

## 2025-06-23 NOTE — PLAN OF CARE
"Pt did not tolerate Tx session well. Pt unwilling to to continue in Tx session this date. Even with max encouragement for participation, Pt stated "I don't care" repeatedly and "I wanna just die." Pt was total A t/fs back to bed for safety.  "

## 2025-06-23 NOTE — PLAN OF CARE
06/23/25 1114   Rounds   Attendance Provider;Nurse ;Charge nurse;Physical therapist;Occupational therapist   Discharge Plan A Skilled Nursing Facility   Why the patient remains in the hospital Requires continued medical care   Transition of Care Barriers None     SNF pending P2P today.

## 2025-06-23 NOTE — PLAN OF CARE
[facility] - Telemetry (Hospital)  Discharge Final Note    Primary Care Provider: Marcello Zambrano MD    Expected Discharge Date: 6/23/2025    Final Discharge Note (most recent)       Final Note - 06/23/25 1312          Final Note    Assessment Type Final Discharge Note     Anticipated Discharge Disposition Hospice/Home     Hospital Resources/Appts/Education Provided Post-Acute resouces added to AVS        Post-Acute Status    Post-Acute Authorization Hospice     Hospice Status Set-up Complete/Auth obtained     Patient choice form signed by patient/caregiver List from CMS Compare     Discharge Delays None known at this time                     Important Message from Medicare             Contact Info       Marcello Zambrano MD   Specialty: Internal Medicine, Family Medicine   Relationship: PCP - General    [id number] [place] [id number]   Phone: [phone]       Next Steps: Follow up          Patient discharging to [facility]. Primary nurse notified of number to call report. Charge nurse to arrange [facility] for transport [facility].

## 2025-06-23 NOTE — PT/OT/SLP PROGRESS
"Occupational Therapy   Treatment    Name: Tea Ring  MRN: [id number]  Admitting Diagnosis:  Acute metabolic encephalopathy       Recommendations:     Discharge Recommendations: Moderate Intensity Therapy  Discharge Equipment Recommendations:  to be determined by next level of care  Barriers to discharge:  Decreased caregiver support    Assessment:     Tea Ring is a 79 y.o. female with a medical diagnosis of Acute metabolic encephalopathy.  She presents with performance deficits affecting function are weakness, impaired endurance, impaired self care skills, impaired functional mobility, gait instability, impaired balance, impaired cognition, decreased coordination, decreased upper extremity function, decreased lower extremity function, decreased safety awareness, pain, decreased ROM, impaired coordination, abnormal tone.     Rehab Prognosis:  Poor; patient would benefit from acute skilled OT services to address these deficits and reach maximum level of function.       Plan:     Patient to be seen 2 x/week to address the above listed problems via self-care/home management, therapeutic activities, therapeutic exercises  Plan of Care Expires: 07/03/25  Plan of Care Reviewed with: patient    Subjective     Chief Complaint: "I wanna just die"  Patient/Family Comments/goals: "I don't care"  Pain/Comfort:  Pain Rating 1:  (unrated)    Objective:     Communicated with: Nurse and EPIC chart  prior to session.  Patient found HOB elevated with bed alarm upon OT entry to room.    General Precautions: Standard, fall    Orthopedic Precautions:RLE weight bearing as tolerated, LLE weight bearing as tolerated  Braces: N/A  Respiratory Status: Room air     Occupational Performance:     Bed Mobility:    Patient completed Rolling/Turning to Right with total assistance  Patient completed Scooting/Bridging with total assistance  Patient completed Supine to Sit with total assistance  Patient completed Sit to Supine with total assistance " "  Pt required extra time     Functional Mobility/Transfers:  Patient completed Sit <> Stand Transfer with Max assistance  with  rolling walker   Patient completed Bed <> Chair Transfer using Stand Pivot technique with Max assistance with rolling walker  Pt completed Chair to bed transfer using squat pivot transfer with total assist with no AD  Pt refused to follow safety cue  Pt had to be transferred back to bed d/t refusal to participate any longer in [facility] 6 Click ADL: 6    Treatment & Education:  Pt unmotivated and unwilling to engage in therapeutic task this date. When given encouragement to engage, Pt repeatedly stated, "I don't care" and "I'll just die." Because Pt displayed unsafe sitting and heightened emotional state, Pt was transferred back to bed and nurse alerted. Educated patient on importance of increased tolerance to upright position and direct impact on CV endurance and strength. Patient encouraged to sit up in chair/ EOB, for a minimum of 2 consecutive hours, 3x per day. Encouraged patient to perform AROM TE to BLE throughout the day within all available planes of motion. Re enforced importance of utilizing call light to meet needs in room and not attempt to get up without staff assistance. Patient verbalized understanding and agreed to comply.        Patient left with bed in chair position with all lines intact, call button in reach, bed alarm on, and nurse notified    GOALS:   Multidisciplinary Problems       Occupational Therapy Goals          Problem: Occupational Therapy    Goal Priority Disciplines Outcome Interventions   Occupational Therapy Goal     OT, PT/OT Not Progressing    Description: Goals to be met by: 07/03/25     Patient will increase functional independence with ADLs by performing:    Grooming while seated with Minimal Assistance.  Sitting at edge of bed x10 minutes with Stand-by Assistance.  Supine to sit with Moderate Assistance.                           Time " Tracking:     OT Date of Treatment: 06/23/25  OT Start Time: 0920  OT Stop Time: 0943  OT Total Time (min): 23 min    Billable Minutes:Therapeutic Activity 23    OT/RADHA: RADHA     Number of RADHA visits since last OT visit: 2  OTR/L readily available for conference at the time of the provision of services-  BARB Barton    6/23/2025

## 2025-06-23 NOTE — PHYSICIAN QUERY
Please clarify if the Osteoporosis diagnosis has been:     Osteoporosis is confirmed & linked to Right superior inferior pubic rami fracture

## 2025-06-23 NOTE — PT/OT/SLP PROGRESS
"Physical Therapy  Treatment    Tea Ring   MRN: [id number]   Admitting Diagnosis: Acute metabolic encephalopathy    PT Received On: 06/23/25  PT Start Time: 0919     PT Stop Time: 0942    PT Total Time (min): 23 min       Billable Minutes:  Therapeutic Activity 23    Treatment Type: Treatment  PT/PTA: PTA     Number of PTA visits since last PT visit: 4       General Precautions: Standard, fall  Orthopedic Precautions: RLE weight bearing as tolerated, LLE weight bearing as tolerated  Braces: N/A  Respiratory Status: Room air         Subjective:  Communicated with patient's nurse Laura and conducted Epic chart review prior to session.  Patient agreeable to participate with therapy initially. Once seated in chair patient requested back to bed. Multiple attempts to encourage patient and provide her education on the need to get out of bed was responded with "I don't care." With continued efforts, she continued to get more agitated and irate with therapists. At one point, she states "I just want to die."     Pain/Comfort  Pain Rating 1: 10/10  Location - Side 1: Right  Location - Orientation 1: generalized  Location 1: hip  Pain Addressed 1: Reposition, Cessation of Activity, Nurse notified  Pain Rating Post-Intervention 1: 10/10    Objective:   Patient found with: bed alarm    Functional Mobility:  Bed Mobility:    Total A to EOB and back to supine position    Transfers:   Total A squat pivot transfer from bed <> chair    Gait:    NT    Treatment and Education:  Educated patient on importance of increased tolerance to upright position and direct impact on CV endurance and strength. Patient encouraged to utilize elevated HOB to simulate chair position until able to safely complete chair T/F. Patient given a minimum goal of majority of the day to be spent in upright, especially with all meals. Encouraged patient to perform AROM TE to BLE throughout the day within all available planes of motion. Re enforced importance of " "utilizing call light to meet needs in room and not attempt to get up without staff assistance. Patient verbalized understanding and agreed to comply.      AM-PAC 6 CLICK MOBILITY  How much help from another person does this patient currently need?   1 = Unable, Total/Dependent Assistance  2 = A lot, Maximum/Moderate Assistance  3 = A little, Minimum/Contact Guard/Supervision  4 = None, Modified [place]/Independent    Turning over in bed (including adjusting bedclothes, sheets and blankets)?: 1  Sitting down on and standing up from a chair with arms (e.g., wheelchair, bedside commode, etc.): 1  Moving from lying on back to sitting on the side of the bed?: 1  Moving to and from a bed to a chair (including a wheelchair)?: 1  Need to walk in hospital room?: 1  Climbing 3-5 steps with a railing?: 1  Basic Mobility Total Score: 6    AM-PAC Raw Score CMS G-Code Modifier Level of Impairment Assistance   6 % Total / Unable   7 - 9 CM 80 - 100% Maximal Assist   10 - 14 CL 60 - 80% Moderate Assist   15 - 19 CK 40 - 60% Moderate Assist   20 - 22 CJ 20 - 40% Minimal Assist   23 CI 1-20% SBA / CGA   24 CH 0% Independent/ Mod I     Patient left up in chair with all lines intact, call button in reach, bed alarm on, and nurse notified.    Assessment:  Tea Ring is a 79 y.o. female with a medical diagnosis of Acute metabolic encephalopathy and presents with overall decline in functional mobility. Patient would continue to benefit from skilled PT to address functional limitations listed below in order to return to PLOF/decrease caregiver burden.    Patient showed minimal initiation with all functional mobility. Her pain is poorly tolerated. Patient was transferred to the chair, however she is unable to safely tolerate sitting in chair. Leaned forward and to the left to compensate for the pain. This was about the time that she repeated "I don't care" to all education and commands and then stated "I just want to die." Due " to her poor pain tolerance, unsafe presence in chair, and current mental state, she was deemed inappropriate to leave in the chair and we returned her to supine position in the bed. Held conversation with daughter and Velma from [facility] some time later about patient's participation and tolerance with therapy.    Rehab identified problem list/impairments: weakness, impaired endurance, impaired self care skills, gait instability, impaired functional mobility, impaired cognition, decreased upper extremity function, decreased lower extremity function, decreased ROM, pain, decreased safety awareness, impaired balance, decreased coordination    Rehab potential is poor.    Activity tolerance: Poor    Discharge recommendations: Moderate Intensity Therapy      Barriers to discharge:      Equipment recommendations: to be determined by next level of care     GOALS:   Multidisciplinary Problems       Physical Therapy Goals          Problem: Physical Therapy    Goal Priority Disciplines Outcome Interventions   Physical Therapy Goal     PT, PT/OT     Description: Lt25  1. PT WILL COMPLETE BED MOBILITY WITH CGA  2. PT WILL STAND T/F TO CHAIR WITH RW AND MOD A FOR OOB TOLERANCE  3. PT WILL GT TRAIN X 50' WITH RW AND MOD A TO PROGRESS GT  4. PT WILL INC AMPAC SCORE BY 2 POINTS TO PROGRESS GROSS FUNC MOBILITY.                          DME Justifications:  No DME recommended requiring DME justifications    PLAN:    Patient to be seen 3 x/week to address the above listed problems via gait training, therapeutic activities, therapeutic exercises  Plan of Care expires: 25  Plan of Care reviewed with: patient         2025

## 2025-06-27 ENCOUNTER — TELEPHONE (OUTPATIENT)
Dept: ORTHOPEDICS | Facility: CLINIC | Age: 80
End: 2025-06-27
Payer: MEDICARE

## 2025-06-27 DIAGNOSIS — R10.2 PAIN IN PELVIS: ICD-10-CM

## 2025-06-27 DIAGNOSIS — M89.8X5 PAIN IN FEMUR: Primary | ICD-10-CM

## 2025-06-27 NOTE — ASSESSMENT & PLAN NOTE
Nutrition consulted. Most recent weight and BMI monitored-     Measurements:  Wt Readings from Last 1 Encounters:   06/23/25 65.7 kg (144 lb 13.5 oz)   Body mass index is 27.37 kg/m².    Patient has been screened and assessed by RD.    Malnutrition Type:  Context: acute illness or injury  Level: severe    Malnutrition Characteristic Summary:  Weight Loss (Malnutrition): greater than 2% in 1 week  Energy Intake (Malnutrition): less than or equal to 50% for greater than or equal to 5 days  Subcutaneous Fat (Malnutrition): mild depletion  Muscle Mass (Malnutrition): mild depletion    Interventions/Recommendations (treatment strategy):  1. Recommend a Low sodium diet with texture per SLP. 2. Recommend Boost Plus with all meals to provide additional calories. 3. Recommend starting a bowel regimen. 4. Encourage PO intake with feeding assistance as warranted. 5. Weigh x2 weekly.

## 2025-06-27 NOTE — DISCHARGE SUMMARY
[facility] Medicine  Discharge Summary      Patient Name: Tea Ring  MRN: [id number]  FATOU: [id number]  Patient Class: IP- Inpatient  Admission Date: 6/13/2025  Hospital Length of Stay: 9 days  Discharge Date and Time: 6/23/2025  1:50 PM  Attending Physician: Alice att. providers found   Discharging Provider: Kylie Payne MD  Primary Care Provider: Marcello Zambrano MD    Primary Care Team: Networked reference to record PCT     HPI:   Tea Ring is a 79 y.o. female with a PMH  has a past medical history of Alpha thalassemia, Asthma, Cataract, Chronic anxiety, Chronic knee pain, Chronic pain of left ankle, Clotting disorder, Cutaneous lupus erythematosus, Dementia without behavioral disturbance, Depression, Depression, Ex-smoker, Hypertension, and Osteopenia (1/16 reck1/18).  Presents as a transfer from our [facility] facility for further treatment of generalized weakness fungal recent UTI and ortho consult for pubic rami fracture. Patient reported that she has been having difficulty urinating for over 2 weeks. She has had to use the bathroom at least 6 times daily. Patient reports that she will get the urge to urinate and then will itch down there. She was started on Macrobid on Damaris 10, 2025 by her primary care physician. Patient reports that she has been feeling lightheaded. She denies any specific chest pain, chest pressure, nausea, vomiting, diarrhea, dysuria. Patient reports that she had walked into the gas bedroom might have passed out. Patient is complaining right hip pain, neck pain, and generalized weakness.  Denies any injury or trauma which may have caused her symptoms.  Denies any alleviating or aggravating factors.  Denies any other symptoms at this time.    ER workup revealed CBC with leukocytosis of 17.37.  CMP with a BUN/creatinine of 23/1.4 with EGFR of 39 (previously 20/1.0 with EGFR 58 on 05/20/2025).CBG of 126 mg/dL.  Troponin negative.  Procalcitonin level negative.   Initial lactic acid 2.5 with repeat of 3.4 followed by 1.0.  Previous UA revealed acute cystitis, with repeat UA showing improvement.  Blood cultures obtained x2.  CT cervical spine revealed age indeterminate fracture of the medial aspect of left clavicle and degenerative changes of cervical spine.  CT head negative for acute intracranial abnormality/findings.  Chest x-ray negative for acute cardiopulmonary findings.  Plain film imaging of the hip/pelvis revealed fracture of the superior and inferior pubic rami.  CT renal stone study of abdomen and pelvis revealed age indeterminate acute or subacute appearing fracture involving right superior and inferior pubic rami.  CT of the right knee pending results.  On-call orthopedic surgeon consulted.  Recommended hospital Medicine to admit and will see us consult.  Vital signs stable.  Patient received 1 g Rocephin, 2 L lactated Ringer's, and a total of 4 mg of morphine at outside facility.  Patient admitted under observation status.    RECONCILE HOME MEDICATIONS WITH FAMILY DURING DAY.  PATIENT UNABLE TO REPORT WHICH MEDICATION SHE TAKES.    PCP: Marcello Zambrano      * No surgery found *      Hospital Course:   80 y/o female admitted for UTI and acute encephalopathy. Patient started IV rocephin. Ortho has been consulted for pubic rami fracture.   As of 6/15 patient with worsening confusion. WBCs trending up. CXR showed worsening vascular congestion vs infectious process. IVFs stopped and IV lasix started.  Will add azithromycin to current IV abx therapy. Case discussed with Dr. Lo.   As of 6/16/2025, T 99.9 highest overnight, VSS otherwise. Ortho pending: Pubic rami fx = pain management. Diff following commands/swallowing pills--spits them out. Confusion slightly improved today--no obvious hallucinations. Bl Cx no growth after 36 hours. Overnight, taking PO meds. C/o pain not improved w/ current meds.  As of 6/17/2025, VSS. Cr bumped 1.1>1.6. Renal US: no acute abn.  Hold Lasix, Valsartan, and HCTZ. Cover BP w/ IV Metoprolol and prn Hydralazine. K repletion 10 IV. BMP at 1500. Mentation not at baseline per daughter at bedside. Bouts of anger and uncooperative intermittently. Pain med adjusted. MRI Brain pending. In afternoon, mentation worsened and unable to obtain MRI or 1500 BMP redraw.  As of 6/18/2025, intermittent cooperation w/ continuous confusion, not at baseline, and bouts of anger. Hold Lasix, Valsartan, and HCTZ 2/2 kidney fx. K repletion. Re-attempt MRI today. 1500 BMP.  As of 6/19/2025, confused, speaking to someone who is not present, more cooperative today. Daughter at bedside. Cr and K WNL. Seroquel 25 in AM added. Neuro and TelePsych: Pt seems to be in a state of delirium. Sleep deprivation may be worsening s/s. Incr Seroquel to 50 BID. Zyprexa IM prn. Will take quite some time given Dementia for mentation to improve, if it improves.   As of 6/20/2025, transitioned to PO and de-escalated meds. CM following: awaiting SNF.  As of 6/21/2025, SNF requires P2P scheduled Monday at 1100 w/ Dr. Payne.   As of 6/22/2025, Cr bumped 2.5. Nurse reports last charted BM: 6/16 = Mag Citrate. BP soft. Attempt IV for gentle IV hydration.     6/23- remains same, c/o pain in the back. Mental status waxing and waning. We had a long discussion with her daughter about her poor condition and poor prognosis. She is a DNR. Daughter agreed with comfort care and hospice. She was accepted by [facility] at the [facility]. She was seen and examined and deemed appropriate for discharge to hospice today by ambulance.      Goals of Care Treatment Preferences:  Code Status: DNR         Consults:   Consults (From admission, onward)          Status Ordering Provider     Inpatient consult to Telemedicine - Psych  Once        Provider:  Artemio Marti MD    Completed ARNOLDO MOYA     Inpatient Consult to Neurology Services (General Neurology)  Once        Provider:  Anselmo Durand  MD    Completed ARNOLDO MOYA     Inpatient consult to Orthopedic Surgery  Once        Provider:  Kamari Nicole MD    Completed ARNOLDO MOYA     Inpatient consult to Social Work/Case Management  Once        Provider:  (Not yet assigned)    Completed IAN PERRY     Inpatient consult to Registered Dietitian/Nutritionist  Once        Provider:  (Not yet assigned)    Completed IAN PERRY     Inpatient consult to Orthopedic Surgery  Once        Provider:  Kamari Nicole MD    Completed GILBERT CARRERO            Assessment & Plan  Acute metabolic encephalopathy  History of dementia  Hallucinations  Dementia is stable currently. Continue home dementia meds and non-pharmacologic interventions to prevent delirium (No VS between 11PM-5AM, activity during day, opening blinds, providing glasses/hearing aids, and up in chair during daytime). Use PRN anti-psychotics to prevent behavior of self harm during sundowning, and avoid narcotics and benzos unless absolutely necessary. PRN anti-psychotics prescribed to avoid self harm behaviors.    6/15/2025: Witnessed patient having auditory and visual hallucinations during examination.    - Consider UTI/PNA vs Dementia vs Hosp induced psychosis  - Treat underlying cause  - Daughter maintains not pt baseline  - Dementia Precautions  - MRI Brain w/o acute abn  - 6/19: Neuro and TelePsych: Pt seems to be in a state of delirium. Sleep deprivation may be worsening s/s. Incr Seroquel to 50 BID. Zyprexa IM prn. Will take quite some time given Dementia for mentation to improve, if it improves.   - Mentation: more cooperative today. Oriented to self. No apparent auditory or visual hallucinations at this time.  - Cont Seroquel 50 mg BID  - Awaiting SNF placement requiring P2P 6/23/2025 1100 w/ Dr. Payne    Essential hypertension  Chronic, uncontrolled.  Latest blood pressure and vitals reviewed-    .   Home meds for hypertension were reviewed and noted  below.   Hypertension Medications              valsartan-hydrochlorothiazide (DIOVAN-HCT) 160-12.5 mg per tablet Take 1 tablet by mouth once daily.     While in the hospital, will manage blood pressure as follows; Continue home antihypertensive regimen once verified with family in a.m.; we will treat with p.r.n. hydralazine    Will utilize p.r.n. blood pressure medication only if patient's blood pressure greater than  170/90 and she develops symptoms such as worsening chest pain or shortness of breath.    - Admit: IV metoprolol 5 mg Q6 hours until able to take oral.   - prn Hydralazine  - 6/15: Patient spitting out medication  - 6/16: attempted to take pills--spit some out after administration  - 6/16 HS: taking oral meds, held IV Metoprolol 2/2 BP soft  - 6/17: Hold IV Lasix, Valsartan, and HCTZ 2/2 Cr bump; control BP w/ IV Metoprolol and prn Hydralazine  - 6/18: Hold IV Lasix, Valsartan, and HCTZ; control BP w/ IV Metoprolol and prn Hydralazine  - 6/19: taking all orals, DC IV Metoprolol  - 6/20: DC Lasix, Valsartan, and HCTZ. Norvasc 5 mg daily  - 6/21: Hold Norvasc 2/2 BP soft  Moderate major depression  Chronic. Stable. Not in acute exacerbation and currently denies endorsing any suicidal/homicidal ideations. Active auditory and visual hallucinations. Unsure if this is baseline due to patient's underlying dementia versus UTI symptoms.  Plan:  - Continue home medications   - MRI Brain w/o acute abn  - 6/19: Neuro and TelePsych: Pt seems to be in a state of delirium. Sleep deprivation may be worsening s/s. Incr Seroquel to 50 BID. Zyprexa IM prn. Will take quite some time given Dementia for mentation to improve, if it improves.     Primary open angle glaucoma (POAG) of both eyes, severe stage  - continue eye drops   Neck pain  CT imaging cervical spine revealed:  1. There is grade 1 anterolisthesis of C6 on C7. There is grade 1 anterolisthesis of C7 on T1.  2. There are mild degenerative changes between C2 and  "T1.    Plan:   - analgesics p.r.n.  - Percocet prn    Closed fracture of multiple pubic rami, right, initial encounter  CT imaging revealed:  - Age-indeterminate acute or subacute appearing fracture involving the right superior and inferior pubic ramus images 139 through 156. Nonemergent pelvic MRI can be obtained as clinically warranted.  - analgesics p.r.n.   - Orthopedic following: pain management  - Change Norco to Percocet  - XR R Humerus: no acute fracture or dislocation  - XR R Knee: no acute findings  - Dr. Minor: weight-bearing OK  - PT/OT: SNF for mod therapy  - 6/20: Dr. Herrera, ortho, signed off    Acute diastolic heart failure  - Echo: EF 60-65%, diastolic dysfunction  - BNP 91  - Monitor strict I&Os and daily weights.    - Low sodium diet  - Place on fluid restriction of 1.5 L.     Results for orders placed during the hospital encounter of 10/31/23    Echo    Interpretation Summary    Left Ventricle: The left ventricle is normal in size. Normal wall thickness. There is concentric remodeling. Normal wall motion. There is normal systolic function with a visually estimated ejection fraction of 60 - 65%. There is normal diastolic function.    Right Ventricle: Normal right ventricular cavity size. Wall thickness is normal. Right ventricle wall motion  is normal. Systolic function is normal.    Pulmonary Artery: The estimated pulmonary artery systolic pressure is 18 mmHg.    IVC/SVC: Normal venous pressure at 3 mmHg.     Anemia  Anemia: Most recent hemoglobin and hematocrit are listed below.  No results for input(s): "HGB", "HCT" in the last 72 hours.    Plan  - Monitor serial CBC: Daily  - Transfuse PRBC if patient becomes hemodynamically unstable, symptomatic or H/H drops below 7/21.  - Patient has not received any PRBC transfusions to date  - Patient's anemia is currently stable  - Trend    Severe malnutrition  Nutrition consulted. Most recent weight and BMI monitored-     Measurements:  Wt Readings " "from Last 1 Encounters:   06/23/25 65.7 kg (144 lb 13.5 oz)   Body mass index is 27.37 kg/m².    Patient has been screened and assessed by RD.    Malnutrition Type:  Context: acute illness or injury  Level: severe    Malnutrition Characteristic Summary:  Weight Loss (Malnutrition): greater than 2% in 1 week  Energy Intake (Malnutrition): less than or equal to 50% for greater than or equal to 5 days  Subcutaneous Fat (Malnutrition): mild depletion  Muscle Mass (Malnutrition): mild depletion    Interventions/Recommendations (treatment strategy):  1. Recommend a Low sodium diet with texture per SLP. 2. Recommend Boost Plus with all meals to provide additional calories. 3. Recommend starting a bowel regimen. 4. Encourage PO intake with feeding assistance as warranted. 5. Weigh x2 weekly.    CORA on CKD, stage III  CORA is likely due to pre-renal azotemia due to intravascular volume depletion. Baseline creatinine is 1.4. Most recent creatinine and eGFR are listed below.  No results for input(s): "CREATININE", "EGFRNORACEVR" in the last 72 hours.     Plan  - CORA is worsening. Will give gentle IVF  - Avoid nephrotoxins and renally dose meds for GFR listed above  - Monitor urine output, serial BMP, and adjust therapy as needed  - Trend    Final Active Diagnoses:    Diagnosis Date Noted POA    PRINCIPAL PROBLEM:  Acute metabolic encephalopathy [G93.41] 06/15/2025 Yes    Severe malnutrition [E43] 06/20/2025 Yes    Anemia [D64.9] 06/16/2025 Yes    Acute diastolic heart failure [I50.31] 06/15/2025 No    Hallucinations [R44.3] 06/14/2025 Yes    Neck pain [M54.2] 06/14/2025 Yes    Closed fracture of multiple pubic rami, right, initial encounter [S32.591A] 06/14/2025 Yes    History of dementia [Z86.59] 06/14/2025 Not Applicable    CORA on CKD, stage III [N17.9] 08/18/2022 Yes    Essential hypertension [I10] 08/14/2019 Yes     Chronic    Primary open angle glaucoma (POAG) of both eyes, severe stage [H40.1133] 10/18/2018 Yes    " Moderate major depression [F32.1] 10/08/2018 Yes      Problems Resolved During this Admission:    Diagnosis Date Noted Date Resolved POA    Hyponatremia [E87.1] 06/16/2025 06/23/2025 Yes    PNA (pneumonia) [J18.9] 06/15/2025 06/23/2025 No    UTI (urinary tract infection) [N39.0] 06/14/2025 06/23/2025 Yes       Discharged Condition: poor    Disposition: Hospice/Medical Facility    Follow Up:   Contact information for follow-up providers       Marcello Zambrano MD Follow up.    Specialties: Internal Medicine, Family Medicine  Contact information:  [address] [address] [address] [phone]                       Contact information for after-discharge care       Destination       [facility] .    Service: Inpatient Hospice  Contact information:  [address], [address]  [facility] [address] [phone]                                 Patient Instructions:      Diet Adult Regular     Activity as tolerated       Significant Diagnostic Studies: Labs:   Lab Results   Component Value Date    WBC 8.90 06/23/2025    HGB 8.3 (L) 06/23/2025    HCT 26.9 (L) 06/23/2025    MCV 71 (L) 06/23/2025     (H) 06/23/2025     CMP  Sodium   Date Value Ref Range Status   06/23/2025 137 136 - 145 mmol/L Final   03/15/2025 140 136 - 145 mmol/L Final     Potassium   Date Value Ref Range Status   06/23/2025 4.2 3.5 - 5.1 mmol/L Final   03/15/2025 4.0 3.5 - 5.1 mmol/L Final     Chloride   Date Value Ref Range Status   06/23/2025 105 95 - 110 mmol/L Final   03/15/2025 107 95 - 110 mmol/L Final     CO2   Date Value Ref Range Status   06/23/2025 21 (L) 23 - 29 mmol/L Final   03/15/2025 23 23 - 29 mmol/L Final     Glucose   Date Value Ref Range Status   06/23/2025 109 70 - 110 mg/dL Final   03/15/2025 93 70 - 110 mg/dL Final     BUN   Date Value Ref Range Status   06/23/2025 67 (H) 8 - 23 mg/dL Final     Creatinine   Date Value Ref Range Status   06/23/2025 2.1 (H) 0.5 - 1.4 mg/dL Final     Calcium    Date Value Ref Range Status   06/23/2025 8.9 8.7 - 10.5 mg/dL Final   03/15/2025 9.3 8.7 - 10.5 mg/dL Final     Protein Total   Date Value Ref Range Status   06/23/2025 6.5 6.0 - 8.4 gm/dL Final     Total Protein   Date Value Ref Range Status   03/15/2025 8.1 6.0 - 8.4 g/dL Final     Albumin   Date Value Ref Range Status   06/23/2025 2.5 (L) 3.5 - 5.2 g/dL Final   03/15/2025 3.8 3.5 - 5.2 g/dL Final     Total Bilirubin   Date Value Ref Range Status   03/15/2025 0.6 0.1 - 1.0 mg/dL Final     Comment:     For infants and newborns, interpretation of results should be based  on gestational age, weight and in agreement with clinical  observations.    Premature Infant recommended reference ranges:  Up to 24 hours.............<8.0 mg/dL  Up to 48 hours............<12.0 mg/dL  3-5 days..................<15.0 mg/dL  6-29 days.................<15.0 mg/dL       Bilirubin Total   Date Value Ref Range Status   06/23/2025 0.4 0.1 - 1.0 mg/dL Final     Comment:     For infants and newborns, interpretation of results should be based   on gestational age, weight and in agreement with clinical   observations.    Premature Infant recommended reference ranges:   0-24 hours:  <8.0 mg/dL   24-48 hours: <12.0 mg/dL   3-5 days:    <15.0 mg/dL   6-29 days:   <15.0 mg/dL     Alkaline Phosphatase   Date Value Ref Range Status   03/15/2025 80 40 - 150 U/L Final     ALP   Date Value Ref Range Status   06/23/2025 69 40 - 150 unit/L Final     AST   Date Value Ref Range Status   06/23/2025 12 11 - 45 unit/L Final   03/15/2025 11 10 - 40 U/L Final     ALT   Date Value Ref Range Status   06/23/2025 5 (L) 10 - 44 unit/L Final   03/15/2025 7 (L) 10 - 44 U/L Final     Anion Gap   Date Value Ref Range Status   06/23/2025 11 8 - 16 mmol/L Final     eGFR   Date Value Ref Range Status   06/23/2025 24 (L) >60 mL/min/1.73/m2 Final     Comment:     Estimated GFR calculated using the CKD-EPI creatinine (2021) equation.   03/15/2025 38.5 (A) >60 mL/min/1.73  m^2 Final       All labs within the past 24 hours have been reviewed  Microbiology: Blood Culture   Lab Results   Component Value Date    LABBLOO No growth after 5 days. 10/31/2023    LABBLOO No growth after 5 days. 10/31/2023    and Urine Culture    Lab Results   Component Value Date    LABURIN No significant growth 01/28/2025     Radiology:   Imaging Results              CT Knee Without Contrast Right (Final result)  Result time 06/14/25 08:27:25      Final result by Santiago Chase III, MD (06/14/25 08:27:25)                   Impression:     Limited exam due to patient motion.  Acute minimally displaced fracture of the proximal fibular diaphysis. Suspected acute minimally displaced periprosthetic fractures of the distal femoral diaphysis and proximal tibial diaphysis, however there is significant patient motion at these locations.  X-ray recommended.    Finalized on: 6/14/2025 8:27 AM By:  Santiago Chase MD  [facility]# [id number]      2025-06-14 08:29:32.492     [facility]               Narrative:    EXAM: CT KNEE WITHOUT CONTRAST RIGHT    CLINICAL HISTORY: Knee replacement, periprosthetic fracture suspected; TechNotes: Pt would not stop moving, she is altered, nurse tried to calm her down, best images obtained.    TECHNIQUE: Routine axial noncontrast CT of the right knee was performed with sagittal and coronal reformatted images. All CT scans at [this location] are performed using dose modulation techniques as appropriate to a performed exam including the following: automated exposure control; adjustment of the mA and/or kV according to patient size (this includes techniques or standardized protocols for targeted exams where dose is matched to indication / reason for exam; i.e. extremities or head); use of iterative reconstruction technique.    FINDINGS:  Limited exam due to patient motion.  Acute minimally displaced fracture of the proximal fibular diaphysis.  Suspected acute minimally displaced periprosthetic fractures  of the distal femoral diaphysis and proximal tibial diaphysis, however there is significant patient motion at these locations.  No other fracture identified.  The total knee arthroplasty appears grossly intact and satisfactory in position.  Normal joint alignment.                                         X-Ray Hip 2 or 3 views Right with Pelvis when performed (Final result)  Result time 06/13/25 15:31:11      Final result by Celso Castillo MD (06/13/25 15:31:11)                   Impression:      As above      Electronically signed by: Celso Castillo MD  Date:    06/13/2025  Time:    15:31               Narrative:    EXAMINATION:  XR HIP WITH PELVIS WHEN PERFORMED 2 OR 3 VIEWS RIGHT    CLINICAL HISTORY:  XR HIP WITH PELVIS WHEN PERFORMED 2 OR 3 VIEWS RIGHT    COMPARISON:  None    FINDINGS:  Four views of the right hip were obtained.    Fracture side of the superior and inferior pubic ramus.  Postoperative changes within the right femoral neck.  Diffuse osteopenia.  Soft tissues are unremarkable.                                       CT Renal Stone Study ABD Pelvis WO (Final result)  Result time 06/13/25 15:27:13      Final result by Celso Castillo MD (06/13/25 15:27:13)                   Impression:      Age-indeterminate acute or subacute appearing fracture involving the right superior and inferior pubic ramus images 139 through 156.  Nonemergent pelvic MRI can be obtained as clinically warranted    No evidence of obstructive uropathy.    All CT scans at this facility use dose modulation, iterative reconstruction, and/or weight based dosing when appropriate to reduce radiation dose to as low as reasonable achievable.      Electronically signed by: Celso Castillo MD  Date:    06/13/2025  Time:    15:27               Narrative:    EXAMINATION:  CT RENAL STONE STUDY ABD PELVIS WO    CLINICAL HISTORY:  Flank pain, kidney stone suspected; Right lower quadrant pain    TECHNIQUE:  Low dose axial images, sagittal and  coronal reformations were obtained from the lung bases to the pubic symphysis.  Oral contrast was not administered.    COMPARISON:  05/06/2019    FINDINGS:  Heart: Normal size. No effusion.    Lung Bases: Clear.    Liver: Normal size and attenuation. No focal lesions.    Gallbladder: Post cholecystectomy.    Bile Ducts: No dilatation.    Pancreas: No obvious mass. No peripancreatic fat stranding.    Spleen: Normal.    Adrenals: Normal.    Kidneys/Ureters: Mild renal cortical thinning.  No mass, hydroureteronephrosis, or nephroureterolithiasis.    Bladder: No wall thickening.    Reproductive organs: Post hysterectomy.    GI Tract/Mesentery: No evidence of bowel obstruction or inflammation.    Peritoneal Space: No ascites or free air.    Retroperitoneum: Mild stranding and small hematoma anterior to the bladder in the region of the right pubic ramus fracture.  Evaluation for active hemorrhage limited without contrast.  No significant adenopathy.    Abdominal wall: Normal.    Vasculature: No aneurysm. Aorta demonstrates atherosclerotic disease.    Bones: Age-indeterminate acute or subacute appearing fracture involving the right superior and inferior pubic ramus images 139 through 156.  Pelvic MRI can be obtained as clinically warranted.  No suspicious lytic or sclerotic lesions.  Advanced degenerative changes within the lower lumbar spine.  Diffuse osteopenia.  Remote right-sided femoral neck fracture.  Postoperative changes within the right hip.                                       CT Cervical Spine Without Contrast (Final result)  Result time 06/13/25 15:24:10      Final result by NERIS Garvin Sr., MD (06/13/25 15:24:10)                   Impression:      1. There is an age-indeterminate fracture in the medial aspect of the left clavicle.  2. There is grade 1 anterolisthesis of C6 on C7. There is grade 1 anterolisthesis of C7 on T1.  3. There are mild degenerative changes between C2 and T1.  All CT scans at  this facility use dose modulation, iterative reconstruction, and/or weight base dosing when appropriate to reduce radiation dose when appropriate to reduce radiation dose to as low as reasonably achievable.      Electronically signed by: Kamari Garvin MD  Date:    06/13/2025  Time:    15:24               Narrative:    EXAMINATION:  CT CERVICAL SPINE WITHOUT CONTRAST    CLINICAL HISTORY:  Neck trauma (Age >= 65y);    TECHNIQUE:  Standard cervical spine CT protocol was performed without IV contrast.    COMPARISON:  03/18/2024    FINDINGS:  The following pertains to the cervical spine.  There is grade 1 anterolisthesis of C6 on C7.  There is grade 1 anterolisthesis of C7 on T1.  There is no fracture or scoliosis. There is normal cervical lordosis.  There are mild degenerative changes between C2 and T1.  There is a mild amount of atherosclerosis.    There is an age-indeterminate fracture in the medial aspect of the left clavicle.                                       CT Head Without Contrast (Final result)  Result time 06/13/25 15:10:07      Final result by Celso Castillo MD (06/13/25 15:10:07)                   Impression:      Chronic microvascular ischemic changes.    All CT scans at this facility use dose modulation, iterative reconstruction, and/or weight based dosing when appropriate to reduce radiation dose to as low as reasonable achievable.      Electronically signed by: Celso Castillo MD  Date:    06/13/2025  Time:    15:10               Narrative:    EXAMINATION:  CT HEAD WITHOUT CONTRAST    CLINICAL HISTORY:  Fall; Unspecified fall, initial encounter    TECHNIQUE:  Low dose axial CT images obtained throughout the head without intravenous contrast. Sagittal and coronal reconstructions were performed.    COMPARISON:  03/15/2025    FINDINGS:  Intracranial compartment:    The brain parenchyma demonstrates areas of decreased attenuation with mild to moderate periventricular white matter consistent with  chronic microvascular ischemic changes..  No parenchymal mass, hemorrhage, edema or major vascular distribution infarct.  Vascular calcifications are noted.    Mild prominence of the sulci and ventricles are consistent with age-related involutional changes.    No extra-axial blood or fluid collections.    Skull/extracranial contents (limited evaluation): No fracture. Mastoid air cells and paranasal sinuses are essentially clear.                                       X-Ray Chest AP Portable (Final result)  Result time 06/13/25 14:26:36      Final result by Celso Castillo MD (06/13/25 14:26:36)                   Impression:      No acute process seen.      Electronically signed by: Celso Castillo MD  Date:    06/13/2025  Time:    14:26               Narrative:    EXAMINATION:  XR CHEST AP PORTABLE    CLINICAL HISTORY:  Sepsis;    FINDINGS:  Single view of the chest.  Comparison 03/15/2025    Cardiac silhouette is normal.  The lungs demonstrate no evidence of active disease.  No evidence of pleural effusion or pneumothorax.  Bones appear intact.  Moderate degenerative changes and moderate atherosclerotic disease. Left humeral neck fracture.                                       Radiology (Final result)  Result time 06/17/25 16:12:37      Final result by Unknown User (06/17/25 16:12:37)                                         Pending Diagnostic Studies:       Procedure Component Value Units Date/Time    EKG 12-lead [[id number]]     Order Status: Sent Lab Status: No result            Medications:  Reconciled Home Medications:      Medication List        CHANGE how you take these medications      * prednisoLONE acetate 1 % Drps  Commonly known as: PRED FORTE  Place 1 drop into both eyes 4 (four) times daily.  What changed: Another medication with the same name was removed. Continue taking this medication, and follow the directions you see here.     * prednisoLONE acetate 1 % Drps  Commonly known as: PRED FORTE  place 1  drop into the right eye 4 times daily.  What changed: Another medication with the same name was removed. Continue taking this medication, and follow the directions you see here.           * This list has 2 medication(s) that are the same as other medications prescribed for you. Read the directions carefully, and ask your doctor or other care provider to review them with you.                CONTINUE taking these medications      atropine 1% 1 % Drop  Commonly known as: ISOPTO ATROPINE  Place 1 drop into the right eye 2 (two) times a day.     baclofen 10 MG tablet  Commonly known as: LIORESAL  Take 10 mg by mouth 2 (two) times daily as needed.     brimonidine 0.2% 0.2 % Drop  Commonly known as: ALPHAGAN  PLACE 1 DROP IN EACH EYE TWICE A DAY     dorzolamide-timolol 2-0.5% 22.3-6.8 mg/mL ophthalmic solution  Commonly known as: COSOPT  PLACE 1 DROP IN EACH EYE TWICE A DAY     estradioL 0.01 % (0.1 mg/gram) vaginal cream  Commonly known as: ESTRACE  Place 1 g vaginally twice a week.     ketorolac 0.5% 0.5 % Drop  Commonly known as: ACULAR  PLACE 1 DROP IN THE LEFT EYE FOUR TIMES DAILY     * LOTEMAX SM 0.38 % Drpg  Generic drug: loteprednol etabonate  Place 1 drop into the right eye 4 (four) times daily.     * loteprednol 0.5 % ophthalmic suspension  Commonly known as: LOTEMAX  Place 1 drop into the left eye 4 (four) times daily.     oxyCODONE-acetaminophen  mg per tablet  Commonly known as: PERCOCET  Take 1 tablet by mouth 3 (three) times daily as needed.     QUEtiapine 25 MG Tab  Commonly known as: SEROQUEL  Take 2 tablets (50 mg total) by mouth once daily.           * This list has 2 medication(s) that are the same as other medications prescribed for you. Read the directions carefully, and ask your doctor or other care provider to review them with you.                STOP taking these medications      acetaZOLAMIDE 125 MG Tab  Commonly known as: DIAMOX     amitriptyline 50 MG tablet  Commonly known as: ELAVIL      atorvastatin 10 MG tablet  Commonly known as: LIPITOR     citalopram 10 MG tablet  Commonly known as: CeleXA     ergocalciferol 50,000 unit Cap  Commonly known as: ERGOCALCIFEROL     fexofenadine 180 MG tablet  Commonly known as: ALLEGRA     fluticasone propionate 50 mcg/actuation nasal spray  Commonly known as: FLONASE     latanoprost 0.005 % ophthalmic solution     magnesium oxide 400 mg (241.3 mg magnesium) tablet  Commonly known as: MAG-OX     moxifloxacin 0.5 % ophthalmic solution  Commonly known as: VIGAMOX     multivitamin with folic acid 400 mcg Tab     nitrofurantoin (macrocrystal-monohydrate) 100 MG capsule  Commonly known as: MACROBID     pantoprazole 40 MG tablet  Commonly known as: PROTONIX     polymyxin B sulf-trimethoprim 10,000 unit- 1 mg/mL Drop  Commonly known as: POLYTRIM     thiamine mononitrate (vit B1) 100 mg Tab  Commonly known as: VITAMIN B-1 (MONONITRATE)     valsartan-hydrochlorothiazide 160-12.5 mg per tablet  Commonly known as: DIOVAN-HCT              Indwelling Lines/Drains at time of discharge:   Lines/Drains/Airways       None                       Time spent on the discharge of patient: 45 minutes         Kylie Payne MD  Department of Hospital Medicine  [facility] - Telemetry ([facility])

## 2025-06-27 NOTE — TELEPHONE ENCOUNTER
Spoke with patient's daughter and was able to schedule the patient an ER follow up. The daughter then verbalized understanding of appointment date, time, and location.

## 2025-06-27 NOTE — TELEPHONE ENCOUNTER
----- Message from Med Assistant Mary sent at 6/26/2025  8:07 AM CDT -----  Please have this patient come see Dr. Herrera the week of 07/21/2025 with x-rays of the pelvis with inlet/outlet views and right femur films

## 2025-06-27 NOTE — ASSESSMENT & PLAN NOTE
Chronic, uncontrolled.  Latest blood pressure and vitals reviewed-    .   Home meds for hypertension were reviewed and noted below.   Hypertension Medications              valsartan-hydrochlorothiazide (DIOVAN-HCT) 160-12.5 mg per tablet Take 1 tablet by mouth once daily.     While in the hospital, will manage blood pressure as follows; Continue home antihypertensive regimen once verified with family in a.m.; we will treat with p.r.n. hydralazine    Will utilize p.r.n. blood pressure medication only if patient's blood pressure greater than  170/90 and she develops symptoms such as worsening chest pain or shortness of breath.    - Admit: IV metoprolol 5 mg Q6 hours until able to take oral.   - prn Hydralazine  - 6/15: Patient spitting out medication  - 6/16: attempted to take pills--spit some out after administration  - 6/16 HS: taking oral meds, held IV Metoprolol 2/2 BP soft  - 6/17: Hold IV Lasix, Valsartan, and HCTZ 2/2 Cr bump; control BP w/ IV Metoprolol and prn Hydralazine  - 6/18: Hold IV Lasix, Valsartan, and HCTZ; control BP w/ IV Metoprolol and prn Hydralazine  - 6/19: taking all orals, DC IV Metoprolol  - 6/20: DC Lasix, Valsartan, and HCTZ. Norvasc 5 mg daily  - 6/21: Hold Norvasc 2/2 BP soft

## 2025-06-27 NOTE — ASSESSMENT & PLAN NOTE
"Anemia: Most recent hemoglobin and hematocrit are listed below.  No results for input(s): "HGB", "HCT" in the last 72 hours.    Plan  - Monitor serial CBC: Daily  - Transfuse PRBC if patient becomes hemodynamically unstable, symptomatic or H/H drops below 7/21.  - Patient has not received any PRBC transfusions to date  - Patient's anemia is currently stable  - Trend    "

## 2025-06-27 NOTE — ASSESSMENT & PLAN NOTE
- Echo: EF 60-65%, diastolic dysfunction  - BNP 91  - Monitor strict I&Os and daily weights.    - Low sodium diet  - Place on fluid restriction of 1.5 L.     Results for orders placed during the hospital encounter of 10/31/23    Echo    Interpretation Summary    Left Ventricle: The left ventricle is normal in size. Normal wall thickness. There is concentric remodeling. Normal wall motion. There is normal systolic function with a visually estimated ejection fraction of 60 - 65%. There is normal diastolic function.    Right Ventricle: Normal right ventricular cavity size. Wall thickness is normal. Right ventricle wall motion  is normal. Systolic function is normal.    Pulmonary Artery: The estimated pulmonary artery systolic pressure is 18 mmHg.    IVC/SVC: Normal venous pressure at 3 mmHg.      No

## 2025-06-27 NOTE — ASSESSMENT & PLAN NOTE
"CORA is likely due to pre-renal azotemia due to intravascular volume depletion. Baseline creatinine is 1.4. Most recent creatinine and eGFR are listed below.  No results for input(s): "CREATININE", "EGFRNORACEVR" in the last 72 hours.     Plan  - CORA is worsening. Will give gentle IVF  - Avoid nephrotoxins and renally dose meds for GFR listed above  - Monitor urine output, serial BMP, and adjust therapy as needed  - Trend    "

## 2025-07-18 NOTE — TELEPHONE ENCOUNTER
----- Message from Jo Lavonne sent at 1/23/2024 11:53 AM CST -----  Contact: [facility]  Type:  Needs Medical Advice    Who Called: Allegra   Symptoms (please be specific): Signs and symptoms of UTI/burning with urination/abdominal pain   How long has patient had these symptoms:  Approximately 1 week  Pharmacy name and phone #:    [facility] - [place] - [id number]  [id number] [address] [id number]  Phone: [phone] Fax: [phone]  Would the patient rather a call back or a response via [facility]? call  Best Call Back Number: [phone]    Additional Information: Reports patient was recently discharged [facility] and was not provided with any antibiotics. Reports patient had urine testing and is symptoms and request an antibiotic is sent to pharmacy above and discuss whether results are needed to be requested or new test performed.   Thank you,  MONIKA        35 year old female  for repeat . Per pt + breech

## 2025-07-22 NOTE — TELEPHONE ENCOUNTER
No care due was identified.  Health [facility] Embedded Care Due Messages. Reference number: [id number].   7/22/2025 7:36:32 AM CDT

## 2025-07-23 RX ORDER — CITALOPRAM 10 MG/1
10 TABLET ORAL
Qty: 30 TABLET | Refills: 2 | Status: SHIPPED | OUTPATIENT
Start: 2025-07-23